# Patient Record
Sex: MALE | Race: WHITE | NOT HISPANIC OR LATINO | Employment: OTHER | URBAN - METROPOLITAN AREA
[De-identification: names, ages, dates, MRNs, and addresses within clinical notes are randomized per-mention and may not be internally consistent; named-entity substitution may affect disease eponyms.]

---

## 2017-01-18 PROBLEM — R07.9 CHEST PAIN: Status: ACTIVE | Noted: 2017-01-18

## 2017-01-18 PROBLEM — R07.9 CHEST PAIN WITH MODERATE RISK FOR CARDIAC ETIOLOGY: Status: ACTIVE | Noted: 2017-01-18

## 2017-03-13 ENCOUNTER — TELEPHONE (OUTPATIENT)
Dept: OTOLARYNGOLOGY | Facility: CLINIC | Age: 52
End: 2017-03-13

## 2017-03-13 NOTE — TELEPHONE ENCOUNTER
----- Message from Ruth Washington sent at 3/13/2017  4:23 PM CDT -----  Contact: Pt can be reached at [phone]  Pt is calling to schedule an appt for cyst on right side of neck, pt is requesting an appt.    Dr. Troncoso ([facility]) recommended pt to be seen with Dr. Woods  For head and neck      Thank you!

## 2017-04-04 ENCOUNTER — OFFICE VISIT (OUTPATIENT)
Dept: OTOLARYNGOLOGY | Facility: CLINIC | Age: 52
End: 2017-04-04
Payer: MEDICARE

## 2017-04-04 VITALS
HEART RATE: 75 BPM | WEIGHT: 291 LBS | SYSTOLIC BLOOD PRESSURE: 90 MMHG | TEMPERATURE: 97 F | BODY MASS INDEX: 37.36 KG/M2 | DIASTOLIC BLOOD PRESSURE: 65 MMHG

## 2017-04-04 DIAGNOSIS — L72.3 SEBACEOUS CYST: ICD-10-CM

## 2017-04-04 PROCEDURE — 1160F RVW MEDS BY RX/DR IN RCRD: CPT | Mod: S$GLB,,, | Performed by: OTOLARYNGOLOGY

## 2017-04-04 PROCEDURE — 3078F DIAST BP <80 MM HG: CPT | Mod: S$GLB,,, | Performed by: OTOLARYNGOLOGY

## 2017-04-04 PROCEDURE — 99203 OFFICE O/P NEW LOW 30 MIN: CPT | Mod: S$GLB,,, | Performed by: OTOLARYNGOLOGY

## 2017-04-04 PROCEDURE — 3074F SYST BP LT 130 MM HG: CPT | Mod: S$GLB,,, | Performed by: OTOLARYNGOLOGY

## 2017-04-04 PROCEDURE — 99999 PR PBB SHADOW E&M-EST. PATIENT-LVL III: CPT | Mod: PBBFAC,,, | Performed by: OTOLARYNGOLOGY

## 2017-04-04 NOTE — PROGRESS NOTES
"Subjective:       Patient ID: Gio Forrest is a 51 y.o. male.    Chief Complaint: mass on neck    HPI     Gio Forrest is a 51 year old male who [facility] for a posterior neck mass. It has been present for over 20 years. He has seen several dermatologist for this over the years, most recently Dr. Amos. Several months ago, Dr. Aoms injected it "with something."  His wife will often manually drain it, expressing foul smelling white discharge. He has been on antibiotics for this before, with no resolution of symptoms. He has some pain to the area with palpation. He denies sore throat, dysphagia, odynophagia, or otalgia. He is a non smoker. He recently had back surgery.    Past Medical History:   Diagnosis Date    Cardiomyopathy 8/12/2015    nonischemic    Diabetes     Dyslipidemia 8/12/2015    Gout 8/12/2015    Hyperlipidemia     Hypertension     Obesity 8/12/2015    Obstructive sleep apnea 8/12/2015    Obstructive sleep apnea 8/12/2015    Restless leg syndrome     Type 2 diabetes mellitus 8/12/2015       Past Surgical History:   Procedure Laterality Date    CERVICAL SPINE SURGERY      COLONOSCOPY  2008    HERNIA REPAIR      SHOULDER ARTHROSCOPY      SPINE SURGERY      TONSILLECTOMY           Current Outpatient Prescriptions:     ACCU-CHEK SMARTVIEW TEST STRIP Strp, , Disp: , Rfl:     acetaminophen (TYLENOL) 325 MG tablet, Take 2 tablets (650 mg total) by mouth every 8 (eight) hours as needed., Disp: , Rfl: 0    allopurinol (ZYLOPRIM) 100 MG tablet, Take 1 tablet (100 mg total) by mouth once daily., Disp: 30 tablet, Rfl: 3    ammonium lactate 12 % Crea, APPLY TO THE AFFECTED AREA(S) TWICE DAILY, Disp: , Rfl: 3    aspirin (ECOTRIN) 81 MG EC tablet, Take 81 mg by mouth once daily. Patient states holding asa at this time related scheduled surgery 01/24/2016, Disp: , Rfl:     atorvastatin (LIPITOR) 40 MG tablet, Take 40 mg by mouth every evening. , Disp: , " Rfl:     benazepril (LOTENSIN) 5 MG tablet, Take 1 tablet (5 mg total) by mouth 2 (two) times daily. (Patient taking differently: Take 5 mg by mouth every evening. ), Disp: 60 tablet, Rfl: 3    citalopram (CELEXA) 40 MG tablet, Take 1 tablet (40 mg total) by mouth once daily., Disp: 30 tablet, Rfl: 3    gabapentin (NEURONTIN) 300 MG capsule, Take 2 capsules (600 mg total) by mouth 3 (three) times daily., Disp: 180 capsule, Rfl: 3    hydrocodone-acetaminophen 10-325mg (NORCO)  mg Tab, Take 1 tablet by mouth 3 (three) times daily as needed. , Disp: , Rfl:     INSULIN ASPART (NOVOLOG SUBQ), Inject 30 Units into the skin 3 (three) times daily before meals. , Disp: , Rfl:     insulin aspart (NOVOLOG) 100 unit/mL injection, Inject 35 Units into the skin 2 (two) times daily before meals., Disp: 21 mL, Rfl: 3    insulin aspart protamine-insulin aspart (NOVOLOG MIX 70-30 FLEXPEN) 100 unit/mL (70-30) InPn pen, Inject 95 Units into the skin. Pt injects 95 units in am and 75 in the pm, Disp: , Rfl:     metformin (GLUCOPHAGE) 1000 MG tablet, Take 1 tablet (1,000 mg total) by mouth 2 (two) times daily., Disp: 60 tablet, Rfl: 3    MULTIVIT,THER IRON,CA,FA & MIN (MULTIVITAMIN) Tab, Take 1 tablet by mouth once daily. , Disp: , Rfl:     pantoprazole (PROTONIX) 40 MG tablet, Take 40 mg by mouth once daily., Disp: , Rfl:     pramipexole (MIRAPEX) 1 MG tablet, Pt takes 2 tabs at hs., Disp: 60 tablet, Rfl: 3    SILENOR 3 mg Tab, Take 1 tablet by mouth nightly as needed., Disp: , Rfl: 3    tizanidine (ZANAFLEX) 4 MG tablet, TAKE ONE TABLET BY MOUTH EVERY 6 HOURS AS NEEDED, Disp: 90 tablet, Rfl: 1    Review of patient's allergies indicates:  No Known Allergies    Social History     Social History    Marital status:      Spouse name: N/A    Number of children: N/A    Years of education: N/A     Occupational History    Not on file.     Social History Main Topics    Smoking status: Former Smoker    Smokeless  tobacco: Never Used    Alcohol use No    Drug use: No    Sexual activity: Yes     Partners: Female     Other Topics Concern    Not on file     Social History Narrative       Family History   Problem Relation Age of Onset    Diabetes Mother     Depression Mother     Heart disease Father     Anuerysm Father     Diabetes Father     Stroke Father        Review of Systems   Constitutional: Positive for fatigue. Negative for appetite change, chills, diaphoresis, fever and unexpected weight change.   HENT: Positive for hearing loss. Negative for congestion, dental problem, drooling, ear discharge, ear pain, facial swelling, mouth sores, nosebleeds, postnasal drip, rhinorrhea, sinus pressure, sneezing, sore throat, tinnitus, trouble swallowing and voice change.    Eyes: Positive for visual disturbance. Negative for pain, discharge, redness and itching.   Respiratory: Positive for shortness of breath. Negative for cough.    Cardiovascular: Negative for chest pain.   Gastrointestinal: Negative for abdominal distention, abdominal pain, diarrhea, nausea and vomiting.        Reflux   Endocrine: Negative for cold intolerance and heat intolerance.   Genitourinary: Negative for difficulty urinating.   Musculoskeletal: Positive for back pain and neck pain. Negative for neck stiffness.   Skin: Positive for rash.   Neurological: Positive for numbness. Negative for dizziness, weakness and headaches.   Hematological: Negative for adenopathy.   Psychiatric/Behavioral: Positive for dysphoric mood. The patient is nervous/anxious.        Objective:      Physical Exam   Constitutional: He is oriented to person, place, and time. He appears well-developed and well-nourished. He is cooperative. He does not appear ill. No distress.   HENT:   Head: Normocephalic and atraumatic.   Right Ear: Hearing, tympanic membrane, external ear and ear canal normal.   Left Ear: Hearing, tympanic membrane, external ear and ear canal normal.   Nose:  Nose normal. No mucosal edema, rhinorrhea or nasal deformity. No epistaxis.  No foreign bodies. Right sinus exhibits no maxillary sinus tenderness and no frontal sinus tenderness. Left sinus exhibits no maxillary sinus tenderness and no frontal sinus tenderness.   Mouth/Throat: Uvula is midline, oropharynx is clear and moist and mucous membranes are normal. Mucous membranes are not pale, not dry and not cyanotic. He does not have dentures. No oral lesions. No trismus in the jaw. Abnormal dentition. No uvula swelling. No oropharyngeal exudate, posterior oropharyngeal edema, posterior oropharyngeal erythema or tonsillar abscesses.   Eyes: Conjunctivae are normal. Right eye exhibits no discharge. Left eye exhibits no discharge. No scleral icterus.   Neck: Trachea normal, normal range of motion and phonation normal. Neck supple. No JVD present. No tracheal tenderness present. No tracheal deviation present. No thyroid mass and no thyromegaly present.       Salivary glands - there are no lesions or asymmetric findings in the submandibular or parotid glands     Cardiovascular: Normal rate.    Pulmonary/Chest: Effort normal. No stridor. No respiratory distress.   Lymphadenopathy:        Head (right side): No submental, no submandibular, no tonsillar and no preauricular adenopathy present.        Head (left side): No submental, no submandibular, no tonsillar and no preauricular adenopathy present.     He has no cervical adenopathy.   Neurological: He is alert and oriented to person, place, and time. No cranial nerve deficit.   Skin: Skin is warm and dry. No rash noted. He is not diaphoretic. No erythema. No pallor.   Psychiatric: He has a normal mood and affect. His behavior is normal. Thought content normal.   Vitals reviewed.      Assessment:       1. Sebaceous cyst       Plan:       Mr. Forrest has a posterior neck sebaceous cyst that has been chronically problematic. I have offered excision under local-MAC in the OR  versus possibly doing this in the clinic, versus observation. We will look for a date in the near future to do this.

## 2017-04-04 NOTE — MR AVS SNAPSHOT
[facility]  [address]  [facility] [address]  Phone: [phone]  Fax: [phone]                  Gio Forrest   2017 11:00 AM   Office Visit    Description:  Male : 1965   Provider:  Axel Woods MD   Department:  [facility] - Head[facility]Neck [facility]           Reason for Visit     mass on neck                To Do List           Goals (5 Years of Data)     None      Follow-Up and Disposition     Return if symptoms worsen or fail to improve.      [facility] On Call     [facility] On Call Nurse Care Line -  Assistance  Unless otherwise directed by your provider, please contact [facility] On-Call, our nurse care line that is available for  assistance.     Registered nurses in the [facility] On Call Center provide: appointment scheduling, clinical advisement, health education, and other advisory services.  Call: [phone] (toll free)               Medications           Message regarding Medications     Verify the changes and/or additions to your medication regime listed below are the same as discussed with your clinician today.  If any of these changes or additions are incorrect, please notify your healthcare provider.             Verify that the below list of medications is an accurate representation of the medications you are currently taking.  If none reported, the list may be blank. If incorrect, please contact your healthcare provider. Carry this list with you in case of emergency.           Current Medications     ACCU-CHEK SMARTVIEW TEST STRIP Strp     acetaminophen (TYLENOL) 325 MG tablet Take 2 tablets (650 mg total) by mouth every 8 (eight) hours as needed.    allopurinol (ZYLOPRIM) 100 MG tablet Take 1 tablet (100 mg total) by mouth once daily.    ammonium lactate 12 % Crea APPLY TO THE AFFECTED AREA(S) TWICE DAILY    aspirin (ECOTRIN) 81 MG EC tablet Take 81 mg by mouth once daily. Patient states holding asa at this time related scheduled surgery  01/24/2016    atorvastatin (LIPITOR) 40 MG tablet Take 40 mg by mouth every evening.     benazepril (LOTENSIN) 5 MG tablet Take 1 tablet (5 mg total) by mouth 2 (two) times daily.    citalopram (CELEXA) 40 MG tablet Take 1 tablet (40 mg total) by mouth once daily.    gabapentin (NEURONTIN) 300 MG capsule Take 2 capsules (600 mg total) by mouth 3 (three) times daily.    hydrocodone-acetaminophen 10-325mg (NORCO)  mg Tab Take 1 tablet by mouth 3 (three) times daily as needed.     INSULIN ASPART (NOVOLOG SUBQ) Inject 30 Units into the skin 3 (three) times daily before meals.     insulin aspart (NOVOLOG) 100 unit/mL injection Inject 35 Units into the skin 2 (two) times daily before meals.    insulin aspart protamine-insulin aspart (NOVOLOG MIX 70-30 FLEXPEN) 100 unit/mL (70-30) InPn pen Inject 95 Units into the skin. Pt injects 95 units in am and 75 in the pm    metformin (GLUCOPHAGE) 1000 MG tablet Take 1 tablet (1,000 mg total) by mouth 2 (two) times daily.    MULTIVIT,THER IRON,CA,FA & MIN (MULTIVITAMIN) Tab Take 1 tablet by mouth once daily.     pantoprazole (PROTONIX) 40 MG tablet Take 40 mg by mouth once daily.    pramipexole (MIRAPEX) 1 MG tablet Pt takes 2 tabs at hs.    SILENOR 3 mg Tab Take 1 tablet by mouth nightly as needed.    tizanidine (ZANAFLEX) 4 MG tablet TAKE ONE TABLET BY MOUTH EVERY 6 HOURS AS NEEDED           Clinical Reference Information           Your Vitals Were     BP Pulse Temp Weight BMI    90/65 75 97.4 °F (36.3 °C) 132 kg (291 lb 0.1 oz) 37.36 kg/m2      Blood Pressure          Most Recent Value    BP  90/65      Allergies as of 4/4/2017     No Known Allergies      Immunizations Administered on Date of Encounter - 4/4/2017     None      [facility] Sign-Up     Activating your [facility] account is as easy as 1-2-3!     1) Visit [email or web address], select Sign Up Now, enter this activation code and your date of birth, then select Next.  [email or web address]  Expires: 5/19/2017 11:50 AM      2)  Create a username and password to use when you visit [facility] in the future and select a security question in case you lose your password and select Next.    3) Enter your e-mail address and click Sign Up!    Additional Information  If you have questions, please e-mail [email or web address] or call [phone] to talk to our [employer] staff. Remember, [email or web address] is NOT to be used for urgent needs. For medical emergencies, dial 911.         Instructions    Call me if we have not set a date by Elizabeth.       Language Assistance Services     ATTENTION: Language assistance services are available, free of charge. Please call [phone].      ATENCIÓN: Si habla becky, tiene a cruz disposición servicios gratuitos de asistencia lingüística. Llame al [phone].     CHÚ Ý: N?u b?n nói Ti?ng Vi?t, có các d?ch v? h? tr? ngôn ng? mi?n phí dành cho b?n. G?i s? [phone].         [facility] complies with applicable Federal civil rights laws and does not discriminate on the basis of race, color, national origin, age, disability, or sex.

## 2017-04-18 PROBLEM — L72.3 SEBACEOUS CYST: Status: ACTIVE | Noted: 2017-04-18

## 2017-04-27 ENCOUNTER — TELEPHONE (OUTPATIENT)
Dept: OTOLARYNGOLOGY | Facility: CLINIC | Age: 52
End: 2017-04-27

## 2017-04-27 NOTE — TELEPHONE ENCOUNTER
Spoke to Gio Forrest wanted to schedule procedure Dasia is going to call Dr. Woods so he can give her date for procedure, will be calling back Mr. Forrest with the date of procedure.

## 2017-04-27 NOTE — TELEPHONE ENCOUNTER
----- Message from Ruth Carrasco sent at 4/27/2017 10:08 AM CDT -----  Contact: [phone]  Please call above patient needs to schedule a procedure waiting on your call thanks

## 2017-05-02 ENCOUNTER — TELEPHONE (OUTPATIENT)
Dept: OTOLARYNGOLOGY | Facility: CLINIC | Age: 52
End: 2017-05-02

## 2017-05-03 ENCOUNTER — TELEPHONE (OUTPATIENT)
Dept: OTOLARYNGOLOGY | Facility: CLINIC | Age: 52
End: 2017-05-03

## 2017-05-03 NOTE — TELEPHONE ENCOUNTER
----- Message from Deandra Patel LPN sent at 5/2/2017  5:50 PM CDT -----  Contact: pt   Hey girl,  I see you talked to this gentleman. I got the message noted below so I called him back and he said you all were working on it. Please let me know if I can help with anything.  Tila, Katelynn    ----- Message -----     From: Lynne Mi     Sent: 5/2/2017   1:43 PM       To: Conrado Monahan Staff    Pt is returning the nurses call pt said someone by the name of Padmini pt isn't sure who called  Can you please call pt at  [phone] jc

## 2017-05-17 ENCOUNTER — TELEPHONE (OUTPATIENT)
Dept: OTOLARYNGOLOGY | Facility: CLINIC | Age: 52
End: 2017-05-17

## 2017-05-17 NOTE — TELEPHONE ENCOUNTER
----- Message from Dasia Valladares RN sent at 5/17/2017 11:01 AM CDT -----  Contact: [phone]      ----- Message -----     From: Axel Woods MD     Sent: 5/12/2017   1:50 PM       To: Dasia Valladares RN    Can you let him know that I am trying to figure out when I will be able to operate on the [place] in June?  THanks  B  ----- Message -----     From: Dasia Valladares RN     Sent: 5/2/2017   4:08 PM       To: Axel Woods MD    Hi Dr. Woods, I spoke with Mr. Forrest he will take the next available OR date  D   ----- Message -----     From: Axel Woods MD     Sent: 5/1/2017   5:15 PM       To: Dasia Valladares RN    I forgot. When does he want it? Did we talk about OR versus clinic?  ----- Message -----     From: Dasia Valladares RN     Sent: 4/27/2017  10:25 AM       To: Axel Woods MD    Hi Dr. Woods Mr. Forrest called for his surgery date I did not see a case request entered  D  ----- Message -----     From: Ruth Carrasco     Sent: 4/27/2017  10:08 AM       To: Chuck REDDING Staff    Please call above patient needs to schedule a procedure waiting on your call thanks

## 2017-07-13 ENCOUNTER — OFFICE VISIT (OUTPATIENT)
Dept: OTOLARYNGOLOGY | Facility: CLINIC | Age: 52
End: 2017-07-13
Payer: MEDICARE

## 2017-07-13 ENCOUNTER — CLINICAL SUPPORT (OUTPATIENT)
Dept: AUDIOLOGY | Facility: CLINIC | Age: 52
End: 2017-07-13
Payer: MEDICARE

## 2017-07-13 VITALS
HEIGHT: 74 IN | SYSTOLIC BLOOD PRESSURE: 99 MMHG | WEIGHT: 289 LBS | BODY MASS INDEX: 37.09 KG/M2 | DIASTOLIC BLOOD PRESSURE: 61 MMHG | TEMPERATURE: 99 F | HEART RATE: 69 BPM

## 2017-07-13 DIAGNOSIS — H69.91 TYPE C TYMPANOGRAM OF RIGHT EAR: ICD-10-CM

## 2017-07-13 DIAGNOSIS — H90.11 CONDUCTIVE HEARING LOSS OF RIGHT EAR WITH UNRESTRICTED HEARING OF LEFT EAR: ICD-10-CM

## 2017-07-13 DIAGNOSIS — H92.01 OTALGIA OF RIGHT EAR: Primary | ICD-10-CM

## 2017-07-13 DIAGNOSIS — H92.01 REFERRED OTALGIA OF RIGHT EAR: ICD-10-CM

## 2017-07-13 DIAGNOSIS — M26.659 TMJ ARTHROPATHY: Primary | ICD-10-CM

## 2017-07-13 PROCEDURE — 92567 TYMPANOMETRY: CPT | Mod: S$GLB,,, | Performed by: AUDIOLOGIST

## 2017-07-13 PROCEDURE — 92557 COMPREHENSIVE HEARING TEST: CPT | Mod: S$GLB,,, | Performed by: AUDIOLOGIST

## 2017-07-13 PROCEDURE — 99214 OFFICE O/P EST MOD 30 MIN: CPT | Mod: S$GLB,,, | Performed by: NURSE PRACTITIONER

## 2017-07-13 PROCEDURE — 99999 PR PBB SHADOW E&M-EST. PATIENT-LVL IV: CPT | Mod: PBBFAC,,, | Performed by: NURSE PRACTITIONER

## 2017-07-13 PROCEDURE — 99999 PR PBB SHADOW E&M-EST. PATIENT-LVL I: CPT | Mod: PBBFAC,,,

## 2017-07-13 RX ORDER — TRAMADOL HYDROCHLORIDE 100 MG/1
100 TABLET, EXTENDED RELEASE ORAL DAILY
Qty: 14 TABLET | Refills: 0 | Status: SHIPPED | OUTPATIENT
Start: 2017-07-13 | End: 2017-07-28

## 2017-07-13 NOTE — LETTER
July 13, 2017      Morgan Hernandez MD  [address] [address]  [facility] [address]           [place] [facility]  [address] [facility] [address] [place] [address]  Phone: [phone]  Fax: [phone]          Patient: Gio Forrest   MR Number: [id number]   YOB: 1965   Date of Visit: 7/13/2017       Dear Dr. Morgan Hernandez:    Thank you for referring Gio Forrest to me for evaluation. Attached you will find relevant portions of my assessment and plan of care.    If you have questions, please do not hesitate to call me. I look forward to following Gio Forrest along with you.    Sincerely,    Anjali Parker, NP    Enclosure  CC:  No Recipients    If you would like to receive this communication electronically, please contact [email or web address] or [phone] to request more information on [employer] access.    For providers and/or their staff who would like to refer a patient to [facility], please contact us through our one-stop-shop provider referral line, [facility], at [phone].    If you feel you have received this communication in error or would no longer like to receive these types of communications, please e-mail [email or web address]

## 2017-07-13 NOTE — PROGRESS NOTES
Gio Forrest was seen 07/13/2017 for an audiological evaluation per Anjali Parker. Patient complained of right-sided otalgia and hearing loss that began after a dental procedure in approximately four weeks ago. Pt reported intermittent otalgia of the right ear since this event.     Results reveal a mild conductive hearing loss for the right ear, and normal hearing for the left ear.    Speech Reception Thresholds were  25 dBHL for the right ear and 15 dBHL for the left ear.    Word recognition scores were excellent for the right ear and excellent for the left ear.   Tympanograms were Type A with negative pressure for the right ear and Type A for the left ear.    Audiogram results were reviewed in detail with patient and all questions were answered. Results will be reviewed by ENT at the completion of this note.   Rec. Post treatment audiogram based on Anjali Parker's recommendations to monitor conductive component in right ear.  Rec. annual audio to monitor progression of hearing loss if conductive HL does not improve.  Recommend use of hearing protection devices when in the presence of loud sounds.

## 2017-07-13 NOTE — PROGRESS NOTES
Subjective:       Patient ID: Gio Forrest is a 51 y.o. male.    Chief Complaint: No chief complaint on file.    HPI   Patient had two right mandibular molars extracted approx four weeks ago. Since then he has experiencing varying degrees of right otalgia. Hearing is fine. No otorrhea. He states pain feels like an aggravated nerve.     Review of Systems   Constitutional: Negative.    HENT: Negative.    Eyes: Negative.    Respiratory: Negative.    Cardiovascular: Negative.    Gastrointestinal: Negative.    Musculoskeletal: Negative.    Skin: Negative.    Neurological: Negative.    Hematological: Negative.    Psychiatric/Behavioral: Negative.        Objective:      Physical Exam   Constitutional: He is oriented to person, place, and time. Vital signs are normal. He appears well-developed and well-nourished. He is cooperative. He does not appear ill. No distress.   HENT:   Head: Normocephalic and atraumatic.   Right Ear: Hearing, tympanic membrane, external ear and ear canal normal. Tympanic membrane is not erythematous. No middle ear effusion.   Left Ear: Hearing, tympanic membrane, external ear and ear canal normal. Tympanic membrane is not erythematous.  No middle ear effusion.   Nose: Nose normal. No mucosal edema or rhinorrhea. Right sinus exhibits no maxillary sinus tenderness and no frontal sinus tenderness. Left sinus exhibits no maxillary sinus tenderness and no frontal sinus tenderness.   Mouth/Throat: Uvula is midline, oropharynx is clear and moist and mucous membranes are normal. Mucous membranes are not pale, not dry and not cyanotic. No oral lesions. No oropharyngeal exudate, posterior oropharyngeal edema or posterior oropharyngeal erythema.   ++Pain elicited during palpation within auditory meatus while patient opened/closed jaw.   Eyes: Conjunctivae, EOM and lids are normal. Pupils are equal, round, and reactive to light. Right eye exhibits no discharge. Left eye exhibits no discharge. No scleral  "icterus.   Neck: Trachea normal and normal range of motion. Neck supple. No tracheal deviation present. No thyroid mass and no thyromegaly present.   Cardiovascular: Normal rate.    Pulmonary/Chest: Effort normal. No stridor. No respiratory distress. He has no wheezes.   Musculoskeletal: Normal range of motion.   Lymphadenopathy:        Head (right side): No submental, no submandibular, no tonsillar, no preauricular and no posterior auricular adenopathy present.        Head (left side): No submental, no submandibular, no tonsillar, no preauricular and no posterior auricular adenopathy present.     He has no cervical adenopathy.        Right cervical: No superficial cervical and no posterior cervical adenopathy present.       Left cervical: No superficial cervical and no posterior cervical adenopathy present.   Neurological: He is alert and oriented to person, place, and time. He has normal strength. Coordination and gait normal.   Skin: Skin is warm, dry and intact. No lesion and no rash noted. He is not diaphoretic. No cyanosis. No pallor.   Psychiatric: He has a normal mood and affect. His speech is normal and behavior is normal. Judgment and thought content normal. Cognition and memory are normal.   Nursing note and vitals reviewed.      Assessment:     Mild conductive loss AD only    TMJ arthropathy   Plan:     Nasal valsalva several times a day discussed for ETD AD associated with type "C" tympanogram and mild conductive component.     TMJ arthropathy discussed. Ultram X 2 weeks.   Return to clinic as needed for further ENT symptoms or complaints  "

## 2017-09-14 PROBLEM — Z86.0100 HISTORY OF COLON POLYPS: Status: ACTIVE | Noted: 2017-09-14

## 2017-09-14 PROBLEM — Z86.010 HISTORY OF COLON POLYPS: Status: ACTIVE | Noted: 2017-09-14

## 2018-05-18 ENCOUNTER — OFFICE VISIT (OUTPATIENT)
Dept: FAMILY MEDICINE | Facility: CLINIC | Age: 53
End: 2018-05-18
Payer: MEDICARE

## 2018-05-18 ENCOUNTER — LAB VISIT (OUTPATIENT)
Dept: LAB | Facility: HOSPITAL | Age: 53
End: 2018-05-18
Attending: FAMILY MEDICINE
Payer: MEDICARE

## 2018-05-18 VITALS
SYSTOLIC BLOOD PRESSURE: 128 MMHG | BODY MASS INDEX: 39.53 KG/M2 | DIASTOLIC BLOOD PRESSURE: 76 MMHG | WEIGHT: 308 LBS | HEIGHT: 74 IN | RESPIRATION RATE: 16 BRPM | HEART RATE: 80 BPM

## 2018-05-18 DIAGNOSIS — Z79.4 TYPE 2 DIABETES MELLITUS WITH DIABETIC DERMATITIS, WITH LONG-TERM CURRENT USE OF INSULIN: Primary | ICD-10-CM

## 2018-05-18 DIAGNOSIS — E11.620 TYPE 2 DIABETES MELLITUS WITH DIABETIC DERMATITIS, WITH LONG-TERM CURRENT USE OF INSULIN: ICD-10-CM

## 2018-05-18 DIAGNOSIS — E78.5 DYSLIPIDEMIA: ICD-10-CM

## 2018-05-18 DIAGNOSIS — Z12.5 SCREENING PSA (PROSTATE SPECIFIC ANTIGEN): ICD-10-CM

## 2018-05-18 DIAGNOSIS — I10 ESSENTIAL HYPERTENSION: ICD-10-CM

## 2018-05-18 DIAGNOSIS — I42.9 CARDIOMYOPATHY, UNSPECIFIED TYPE: ICD-10-CM

## 2018-05-18 DIAGNOSIS — E11.620 TYPE 2 DIABETES MELLITUS WITH DIABETIC DERMATITIS, WITH LONG-TERM CURRENT USE OF INSULIN: Primary | ICD-10-CM

## 2018-05-18 DIAGNOSIS — E66.01 MORBID OBESITY: ICD-10-CM

## 2018-05-18 DIAGNOSIS — Z79.4 TYPE 2 DIABETES MELLITUS WITH DIABETIC DERMATITIS, WITH LONG-TERM CURRENT USE OF INSULIN: ICD-10-CM

## 2018-05-18 PROBLEM — R07.9 CHEST PAIN: Status: RESOLVED | Noted: 2017-01-18 | Resolved: 2018-05-18

## 2018-05-18 PROBLEM — R07.9 CHEST PAIN WITH MODERATE RISK FOR CARDIAC ETIOLOGY: Status: RESOLVED | Noted: 2017-01-18 | Resolved: 2018-05-18

## 2018-05-18 LAB
ALBUMIN SERPL BCP-MCNC: 4 G/DL
ALP SERPL-CCNC: 131 U/L
ALT SERPL W/O P-5'-P-CCNC: 44 U/L
ANION GAP SERPL CALC-SCNC: 11 MMOL/L
AST SERPL-CCNC: 28 U/L
BASOPHILS # BLD AUTO: 0.04 K/UL
BASOPHILS NFR BLD: 0.4 %
BILIRUB SERPL-MCNC: 0.5 MG/DL
BUN SERPL-MCNC: 20 MG/DL
CALCIUM SERPL-MCNC: 9.9 MG/DL
CHLORIDE SERPL-SCNC: 102 MMOL/L
CHOLEST SERPL-MCNC: 125 MG/DL
CHOLEST/HDLC SERPL: 3.6 {RATIO}
CO2 SERPL-SCNC: 27 MMOL/L
COMPLEXED PSA SERPL-MCNC: 0.31 NG/ML
CREAT SERPL-MCNC: 1 MG/DL
DIFFERENTIAL METHOD: ABNORMAL
EOSINOPHIL # BLD AUTO: 0.2 K/UL
EOSINOPHIL NFR BLD: 1.8 %
ERYTHROCYTE [DISTWIDTH] IN BLOOD BY AUTOMATED COUNT: 12.9 %
EST. GFR  (AFRICAN AMERICAN): >60 ML/MIN/1.73 M^2
EST. GFR  (NON AFRICAN AMERICAN): >60 ML/MIN/1.73 M^2
ESTIMATED AVG GLUCOSE: 186 MG/DL
GLUCOSE SERPL-MCNC: 184 MG/DL
HBA1C MFR BLD HPLC: 8.1 %
HCT VFR BLD AUTO: 41 %
HDLC SERPL-MCNC: 35 MG/DL
HDLC SERPL: 28 %
HGB BLD-MCNC: 12.6 G/DL
IMM GRANULOCYTES # BLD AUTO: 0.03 K/UL
IMM GRANULOCYTES NFR BLD AUTO: 0.3 %
LDLC SERPL CALC-MCNC: 63.6 MG/DL
LYMPHOCYTES # BLD AUTO: 2.2 K/UL
LYMPHOCYTES NFR BLD: 23.8 %
MCH RBC QN AUTO: 28.3 PG
MCHC RBC AUTO-ENTMCNC: 30.7 G/DL
MCV RBC AUTO: 92 FL
MONOCYTES # BLD AUTO: 0.5 K/UL
MONOCYTES NFR BLD: 5.3 %
NEUTROPHILS # BLD AUTO: 6.4 K/UL
NEUTROPHILS NFR BLD: 68.4 %
NONHDLC SERPL-MCNC: 90 MG/DL
NRBC BLD-RTO: 0 /100 WBC
PLATELET # BLD AUTO: 228 K/UL
PMV BLD AUTO: 11 FL
POTASSIUM SERPL-SCNC: 4.9 MMOL/L
PROT SERPL-MCNC: 7.7 G/DL
RBC # BLD AUTO: 4.46 M/UL
SODIUM SERPL-SCNC: 140 MMOL/L
TRIGL SERPL-MCNC: 132 MG/DL
TSH SERPL DL<=0.005 MIU/L-ACNC: 1.22 UIU/ML
WBC # BLD AUTO: 9.32 K/UL

## 2018-05-18 PROCEDURE — 3074F SYST BP LT 130 MM HG: CPT | Mod: CPTII,S$GLB,, | Performed by: FAMILY MEDICINE

## 2018-05-18 PROCEDURE — 36415 COLL VENOUS BLD VENIPUNCTURE: CPT | Mod: PO

## 2018-05-18 PROCEDURE — 3078F DIAST BP <80 MM HG: CPT | Mod: CPTII,S$GLB,, | Performed by: FAMILY MEDICINE

## 2018-05-18 PROCEDURE — 84153 ASSAY OF PSA TOTAL: CPT

## 2018-05-18 PROCEDURE — 99999 PR PBB SHADOW E&M-EST. PATIENT-LVL III: CPT | Mod: PBBFAC,,, | Performed by: FAMILY MEDICINE

## 2018-05-18 PROCEDURE — 83036 HEMOGLOBIN GLYCOSYLATED A1C: CPT

## 2018-05-18 PROCEDURE — 85025 COMPLETE CBC W/AUTO DIFF WBC: CPT

## 2018-05-18 PROCEDURE — 84443 ASSAY THYROID STIM HORMONE: CPT

## 2018-05-18 PROCEDURE — 80061 LIPID PANEL: CPT

## 2018-05-18 PROCEDURE — 80053 COMPREHEN METABOLIC PANEL: CPT

## 2018-05-18 PROCEDURE — 99204 OFFICE O/P NEW MOD 45 MIN: CPT | Mod: S$GLB,,, | Performed by: FAMILY MEDICINE

## 2018-05-18 PROCEDURE — 3008F BODY MASS INDEX DOCD: CPT | Mod: CPTII,S$GLB,, | Performed by: FAMILY MEDICINE

## 2018-05-18 RX ORDER — PANTOPRAZOLE SODIUM 40 MG/1
40 TABLET, DELAYED RELEASE ORAL DAILY
Qty: 90 TABLET | Refills: 1 | Status: SHIPPED | OUTPATIENT
Start: 2018-05-18 | End: 2018-08-07 | Stop reason: SDUPTHER

## 2018-05-18 RX ORDER — BENAZEPRIL HYDROCHLORIDE 5 MG/1
5 TABLET ORAL 2 TIMES DAILY
Qty: 180 TABLET | Refills: 1 | Status: SHIPPED | OUTPATIENT
Start: 2018-05-18 | End: 2018-06-18 | Stop reason: SDUPTHER

## 2018-05-18 RX ORDER — METFORMIN HYDROCHLORIDE 1000 MG/1
1000 TABLET ORAL 2 TIMES DAILY
Qty: 180 TABLET | Refills: 1 | Status: SHIPPED | OUTPATIENT
Start: 2018-05-18 | End: 2018-08-07 | Stop reason: SDUPTHER

## 2018-05-18 RX ORDER — CITALOPRAM 40 MG/1
40 TABLET, FILM COATED ORAL DAILY
Qty: 90 TABLET | Refills: 1 | Status: SHIPPED | OUTPATIENT
Start: 2018-05-18 | End: 2018-09-26 | Stop reason: SDUPTHER

## 2018-05-18 RX ORDER — INSULIN ASPART 100 [IU]/ML
INJECTION, SUSPENSION SUBCUTANEOUS
Qty: 3 ML | Refills: 2 | Status: SHIPPED | OUTPATIENT
Start: 2018-05-18 | End: 2018-06-11 | Stop reason: SDUPTHER

## 2018-05-18 RX ORDER — ATORVASTATIN CALCIUM 40 MG/1
40 TABLET, FILM COATED ORAL NIGHTLY
Qty: 90 TABLET | Refills: 1 | Status: SHIPPED | OUTPATIENT
Start: 2018-05-18 | End: 2019-01-16 | Stop reason: SDUPTHER

## 2018-05-18 NOTE — PROGRESS NOTES
Subjective:       Patient ID: Gio Forrest is a 52 y.o. male.    Chief Complaint: Establish Care (Patient here to establish care)    Here as new patient to me to establish care. Needs most meds refilled and several referrals.  He follows with pain management as well.      Hypertension   This is a chronic problem. The current episode started more than 1 year ago. The problem is controlled. Pertinent negatives include no chest pain, palpitations or shortness of breath.   Diabetes   He presents for his follow-up diabetic visit. He has type 2 diabetes mellitus. Pertinent negatives for diabetes include no chest pain and no fatigue.     Review of Systems   Constitutional: Negative for chills, fatigue and fever.   Respiratory: Negative for cough, chest tightness and shortness of breath.    Cardiovascular: Negative for chest pain, palpitations and leg swelling.   Gastrointestinal: Negative for abdominal distention and abdominal pain.   Endocrine: Negative for cold intolerance and heat intolerance.   Skin: Negative for rash.       Objective:      Physical Exam   Constitutional: He appears well-developed and well-nourished.   HENT:   Head: Normocephalic and atraumatic.   Cardiovascular: Normal rate, regular rhythm and normal heart sounds.    Pulmonary/Chest: Effort normal and breath sounds normal.   Psychiatric: He has a normal mood and affect.   Nursing note and vitals reviewed.      Assessment:       1. Type 2 diabetes mellitus with diabetic dermatitis, with long-term current use of insulin    2. Dyslipidemia    3. Essential hypertension    4. Cardiomyopathy, unspecified type    5. Screening PSA (prostate specific antigen)    6. Morbid obesity        Plan:       Type 2 diabetes mellitus with diabetic dermatitis, with long-term current use of insulin  -     CBC auto differential; Future; Expected date: 05/18/2018  -     Comprehensive metabolic panel; Future; Expected date: 05/18/2018  -     Hemoglobin A1c; Future;  Expected date: 05/18/2018  -     Lipid panel; Future; Expected date: 05/18/2018  -     Microalbumin/creatinine urine ratio; Future; Expected date: 05/18/2018  -     TSH; Future; Expected date: 05/18/2018  -     Ambulatory referral to Optometry  -     Ambulatory referral to Podiatry    Dyslipidemia  -     CBC auto differential; Future; Expected date: 05/18/2018  -     Comprehensive metabolic panel; Future; Expected date: 05/18/2018  -     Hemoglobin A1c; Future; Expected date: 05/18/2018  -     Lipid panel; Future; Expected date: 05/18/2018  -     Microalbumin/creatinine urine ratio; Future; Expected date: 05/18/2018  -     TSH; Future; Expected date: 05/18/2018    Essential hypertension  -     CBC auto differential; Future; Expected date: 05/18/2018  -     Comprehensive metabolic panel; Future; Expected date: 05/18/2018  -     Hemoglobin A1c; Future; Expected date: 05/18/2018  -     Lipid panel; Future; Expected date: 05/18/2018  -     Microalbumin/creatinine urine ratio; Future; Expected date: 05/18/2018  -     TSH; Future; Expected date: 05/18/2018    Cardiomyopathy, unspecified type    Screening PSA (prostate specific antigen)  -     PSA, Screening; Future; Expected date: 05/18/2018    Morbid obesity    Other orders  -     atorvastatin (LIPITOR) 40 MG tablet; Take 1 tablet (40 mg total) by mouth every evening.  Dispense: 90 tablet; Refill: 1  -     benazepril (LOTENSIN) 5 MG tablet; Take 1 tablet (5 mg total) by mouth 2 (two) times daily.  Dispense: 180 tablet; Refill: 1  -     citalopram (CELEXA) 40 MG tablet; Take 1 tablet (40 mg total) by mouth once daily.  Dispense: 90 tablet; Refill: 1  -     metFORMIN (GLUCOPHAGE) 1000 MG tablet; Take 1 tablet (1,000 mg total) by mouth 2 (two) times daily.  Dispense: 180 tablet; Refill: 1  -     pantoprazole (PROTONIX) 40 MG tablet; Take 1 tablet (40 mg total) by mouth once daily.  Dispense: 90 tablet; Refill: 1  -     insulin aspart protamine-insulin aspart (NOVOLOG MIX  70-30FLEXPEN U-100) 100 unit/mL (70-30) InPn pen; Pt injects 95 units in am and 75 in the pm  Dispense: 3 mL; Refill: 2      Update labs and health maintenance. To see endo next month.  Will monitor chronic medical issues and continue current plan of care.      Follow-up in about 3 months (around 8/18/2018), or if symptoms worsen or fail to improve.

## 2018-05-22 ENCOUNTER — TELEPHONE (OUTPATIENT)
Dept: ENDOCRINOLOGY | Facility: CLINIC | Age: 53
End: 2018-05-22

## 2018-05-22 DIAGNOSIS — M10.00 IDIOPATHIC GOUT, UNSPECIFIED CHRONICITY, UNSPECIFIED SITE: ICD-10-CM

## 2018-05-22 NOTE — TELEPHONE ENCOUNTER
LM informing pt that no one in the Endocrine dept called him today, may have been another dept within [facility].

## 2018-05-22 NOTE — TELEPHONE ENCOUNTER
----- Message from Deep Lewis sent at 5/22/2018  9:28 AM CDT -----  Contact: same  Unsuccessful call placed to office.  Patient called in and stated he thought someone from this office had just called him but stated he was not sure.?      Patient call back number is [phone]

## 2018-05-25 RX ORDER — ALLOPURINOL 100 MG/1
100 TABLET ORAL DAILY
Qty: 30 TABLET | Refills: 1 | Status: SHIPPED | OUTPATIENT
Start: 2018-05-25 | End: 2018-08-03 | Stop reason: SDUPTHER

## 2018-05-25 RX ORDER — DOXEPIN 3 MG/1
1 TABLET, FILM COATED ORAL NIGHTLY PRN
Qty: 30 TABLET | Refills: 1 | Status: SHIPPED | OUTPATIENT
Start: 2018-05-25 | End: 2018-08-03 | Stop reason: SDUPTHER

## 2018-05-26 PROBLEM — R07.9 CHEST PAIN: Status: ACTIVE | Noted: 2018-05-26

## 2018-05-31 ENCOUNTER — TELEPHONE (OUTPATIENT)
Dept: FAMILY MEDICINE | Facility: CLINIC | Age: 53
End: 2018-05-31

## 2018-05-31 NOTE — TELEPHONE ENCOUNTER
----- Message from Dianne Ramsey sent at 5/31/2018 10:11 AM CDT -----  Contact: Katja FLORES   Type: Needs Medical Advice    Who Called:  Katja FLORES   Symptoms (please be specific):  NA  How long has patient had these symptoms:  NA  Pharmacy name and phone #: NA  Best Call Back Number:   Additional Information: Calling to schedule a [facility] f/u in 1 week. The patient was in the hospital for chest pains and had an angiogram done. He ended up having his gall bladder taken out. No avail appt in a week. Please advise.

## 2018-06-01 ENCOUNTER — TELEPHONE (OUTPATIENT)
Dept: FAMILY MEDICINE | Facility: CLINIC | Age: 53
End: 2018-06-01

## 2018-06-01 NOTE — PROGRESS NOTES
CC: This 52 y.o. male presents for management of diabetes  and chronic conditions pending review including HTN, HLP    HPI: He was diagnosed with T2DM in 2005.Has never been hospitalized r/t DM.  Family hx of DM: sister, mom and dad    Reports missing doses of DM medication 1-2 times a week- usually am insulin    monitoring BG at home: fasting              Diet: Eats 3  Meals a day, snacks  B: honey bun and gwen milk today or oatmeal, meat  Am snack: mints  Lunch: out- hamburger and fries  Afternoon snack- chips, honey bun  Dinner: Wife will cook, tries to bake or grill food  (last night ate Therio w potatoes)  Bedtime snack: Strawberry shortcake, likes sweets  Exercise: none  Gallbladder recently removed    CURRENT DM MEDS: Novolog 70/30 95 units and bedtime 75 units   Does notice that his bg drops in the day time as lowest as thr 50s, does get sweaty shaky (happening ~ 3 times a month)  Treats- his lows with lifesavers  Vial/pen:  Uses pen  Glucometer type:  True Test 2018    Standards of Care:  Eye exam: 1/2018 no h/o retinopathy     Has his [employer] business    ROS:   Gen: Appetite good, no weight gain or loss, denies fatigue and weakness.  Skin: Skin is intact and heals well, no rashes, no hair changes  Eyes: Denies visual disturbances  Resp: no SOB or HENDERSON, no cough  Cardiac: No palpitations, chest pain, no edema   GI: No nausea or vomiting, diarrhea, constipation, or abdominal pain.  /GYN: No nocturia, burning or pain.   MS/Neuro: Denies numbness/ tingling in BLE; Gait steady, speech clear  Psych: Denies drug/ETOH abuse, no hx of depression.  Other systems: negative.    PE:  GENERAL: Well developed, well nourished.appears older than age.   PSYCH: AAOx3, appropriate mood and affect, pleasant expression, conversant, appears relaxed, well groomed.   EYES: Conjunctiva, corneas clear  NECK: Supple, trachea midline   NEURO: Gait steady  SKIN: Skin warm and dry no acanthosis nigracans.  FOOT  EXAMINATION: 6/11/18  No foot deformity, corns or callus formation,  nails in good condition and well trimmed, no interspace maceration or ulceration noted.  Decreased hair growth present over toes/feet.  Positive vibratory response to 128 Hz tuning fork bilaterally.  Protective sensation intact with 10 gram monofilament.  +2 dorsalis pedis and posterior pulses noted.      Lab Results   Component Value Date    MICALBCREAT 8.0 05/18/2018       Hemoglobin A1C   Date Value Ref Range Status   05/18/2018 8.1 (H) 4.0 - 5.6 % Final     Comment:     According to ADA guidelines, hemoglobin A1c <7.0% represents  optimal control in non-pregnant diabetic patients. Different  metrics may apply to specific patient populations.   Standards of Medical Care in Diabetes-2016.  For the purpose of screening for the presence of diabetes:  <5.7%     Consistent with the absence of diabetes  5.7-6.4%  Consistent with increasing risk for diabetes   (prediabetes)  >or=6.5%  Consistent with diabetes  Currently, no consensus exists for use of hemoglobin A1c  for diagnosis of diabetes for children.  This Hemoglobin A1c assay has significant interference with fetal   hemoglobin   (HbF). The results are invalid for patients with abnormal amounts of   HbF,   including those with known Hereditary Persistence   of Fetal Hemoglobin. Heterozygous hemoglobin variants (HbAS, HbAC,   HbAD, HbAE, HbA2) do not significantly interfere with this assay;   however, presence of multiple variants in a sample may impact the %   interference.     01/19/2017 9.5 (H) 0.0 - 5.6 % Final     Comment:     Reference Interval:  5.0 - 5.6 Normal   5.7 - 6.4 High Risk   > 6.5 Diabetic    Hgb A1c results are standardized based on the (NGSP) National   Glycohemoglobin Standardization Program.    Hemoglobin A1C levels are related to mean serum/plasma glucose   during the preceding 2-3 months.        09/28/2016 9.9 (H) 0.0 - 5.6 % Final     Comment:     Reference Interval:  5.0  - 5.6 Normal   5.7 - 6.4 High Risk   > 6.5 Diabetic    Hgb A1c results are standardized based on the (NGSP) National   Glycohemoglobin Standardization Program.    Hemoglobin A1C levels are related to mean serum/plasma glucose   during the preceding 2-3 months.             ASSESSMENT and PLAN:     1. T2DM with hyperglycemia-   Having to stick himself twice w 70/30 dose at 95 u in am  Change Novolog 70/30 to 80 units breakfast and 75 units at dinner- not bedtime  Discussed timing of insulin and MOA  Missing doses, A1C and bg uncontrolled on this regimen  Would benefit from a continuous insulin infusion and a insulin bolus with each meal  Will Start vgo 40 6 clicks with each meal  Has previously failed MDI r/t missed meal doses, Vgo is attached to his person which would eliminate his previous issue   Discussed A1c and BG goals.   Instructed to monitor BG and bring meter/ log to every clinic visit.   - takes ASA, ACEi, statin    2. HTN - controlled, continue meds as previously prescribed and monitor.     3. HLP -  on statin therapy, LFTs WNL    4. TARYN- wears cpap nightly     5. LINDSEY- encouraged weight loss    6. Morbid obesity- Body mass index is 40.84 kg/m².'  Stressed need to improve diet, exercise 30 mins a day, 5 days a week. Limit sweets     Follow-up: in 3 months with lab prior

## 2018-06-05 ENCOUNTER — PATIENT OUTREACH (OUTPATIENT)
Dept: ADMINISTRATIVE | Facility: HOSPITAL | Age: 53
End: 2018-06-05

## 2018-06-05 ENCOUNTER — OFFICE VISIT (OUTPATIENT)
Dept: PODIATRY | Facility: CLINIC | Age: 53
End: 2018-06-05
Payer: MEDICARE

## 2018-06-05 ENCOUNTER — TELEPHONE (OUTPATIENT)
Dept: PODIATRY | Facility: CLINIC | Age: 53
End: 2018-06-05

## 2018-06-05 VITALS — WEIGHT: 303.81 LBS | BODY MASS INDEX: 40.27 KG/M2 | HEIGHT: 73 IN

## 2018-06-05 DIAGNOSIS — E11.49 TYPE II DIABETES MELLITUS WITH NEUROLOGICAL MANIFESTATIONS: Primary | ICD-10-CM

## 2018-06-05 PROCEDURE — 3008F BODY MASS INDEX DOCD: CPT | Mod: CPTII,S$GLB,, | Performed by: PODIATRIST

## 2018-06-05 PROCEDURE — 3045F PR MOST RECENT HEMOGLOBIN A1C LEVEL 7.0-9.0%: CPT | Mod: CPTII,S$GLB,, | Performed by: PODIATRIST

## 2018-06-05 PROCEDURE — 99202 OFFICE O/P NEW SF 15 MIN: CPT | Mod: S$GLB,,, | Performed by: PODIATRIST

## 2018-06-05 PROCEDURE — 99999 PR PBB SHADOW E&M-EST. PATIENT-LVL III: CPT | Mod: PBBFAC,,, | Performed by: PODIATRIST

## 2018-06-05 NOTE — LETTER
June 5, 2018    Gio Forrest  [address]  [facility] [address]             [facility]  [address]  [facility] [address]  Phone: [phone] Dear Mr. Forrest:    [facility] is committed to your overall health and would like to ensure that you are up to date on all of your health maintenance testing.  Our records indicate that you are overdue for your  ANNUAL DIABETIC EYE EXAM (Diabetic Retinopathy screening).    If you recently had this exam done at another facility, please let us know so that we may obtain copies from that facility.  If you have a copy of this eye exam report, please provide a copy for us to scan into your chart.  You are welcome to request that the report be faxed to us at  ([phone]).   If you have an upcoming eye exam appointment at another facility, please provide them with our fax number so that they may fax the report to us.      Also,  If you have not had this eye exam in the past year, we now have a Retinal Camera at the [facility].  If we do this exam the same day you see a member of the Primary Care team,  we can save you from having to pay a second co pay!     If you have any questions or concerns, please don't hesitate to call.    Sincerely,    Rubia Soto  Clinical Care Coordinator  [facility]  [address] [place] [address]  Phone: [phone]   Fax: [phone]

## 2018-06-05 NOTE — TELEPHONE ENCOUNTER
Returned call to pt. To assure he would make it into office before 2:45. Pt stated he would make it. Informed the pt he would not be able to be seen after that timeframe. Pt verbalized understanding and agreement.

## 2018-06-05 NOTE — PROGRESS NOTES
Subjective:      Patient ID: Gio Forrest is a 52 y.o. male.    Chief Complaint: Diabetic Foot Exam (Dr. Carroll 05/18/18) and Ingrown Toenail (left 3rd toenail )    Diabetes, increased risk amputation needing evaluation/management/optomization of foot care.  No current symptoms.  Wears casual shoes both feet.    Review of Systems   Constitution: Negative for chills, diaphoresis, fever, malaise/fatigue and night sweats.   Cardiovascular: Negative for claudication, cyanosis, leg swelling and syncope.   Skin: Negative for color change, dry skin, nail changes, rash, suspicious lesions and unusual hair distribution.   Musculoskeletal: Negative for falls, joint pain, joint swelling, muscle cramps, muscle weakness and stiffness.   Gastrointestinal: Negative for constipation, diarrhea, nausea and vomiting.   Neurological: Positive for sensory change. Negative for brief paralysis, disturbances in coordination, focal weakness, numbness, paresthesias and tremors.           Objective:      Physical Exam   Constitutional: He is oriented to person, place, and time. He appears well-developed and well-nourished. He is cooperative. No distress.   Cardiovascular:   Pulses:       Popliteal pulses are 2+ on the right side, and 2+ on the left side.        Dorsalis pedis pulses are 2+ on the right side, and 2+ on the left side.        Posterior tibial pulses are 2+ on the right side, and 2+ on the left side.   Capillary refill 3 seconds all toes/distal feet, all toes/both feet warm to touch.      Negative lymphadenopathy bilateral popliteal fossa and tarsal tunnel.      Negavie lower extremity edema bilateral.     Musculoskeletal:        Right ankle: He exhibits normal range of motion, no swelling, no ecchymosis, no deformity, no laceration and normal pulse. Achilles tendon normal. Achilles tendon exhibits no pain, no defect and normal Dhillon's test results.   Normal angle, base, station of gait. All ten toes without clubbing,  cyanosis, or signs of ischemia.  No pain to palpation bilateral lower extremities.  Range of motion, stability, muscle strength, and muscle tone normal bilateral feet and legs.     Lymphadenopathy: No inguinal adenopathy noted on the right or left side.   Negative lymphadenopathy bilateral popliteal fossa and tarsal tunnel.    Negative lymphangitic streaking bilateral feet/ankles/legs.   Neurological: He is alert and oriented to person, place, and time. He has normal strength. He displays no atrophy and no tremor. A sensory deficit is present. He exhibits normal muscle tone. Gait normal.   Reflex Scores:       Patellar reflexes are 2+ on the right side and 2+ on the left side.       Achilles reflexes are 2+ on the right side and 2+ on the left side.  Diminished/loss of protective sensation all toes bilateral to 10 gram monofilament.     Skin: Skin is warm, dry and intact. Capillary refill takes 2 to 3 seconds. No abrasion, no bruising, no burn, no ecchymosis, no laceration, no lesion and no rash noted. He is not diaphoretic. No cyanosis or erythema. No pallor. Nails show no clubbing.     Skin is normal age and health appropriate color, turgor, texture, and temperature bilateral lower extremities without ulceration, hyperpigmentation, discoloration, masses nodules or cords palpated.  No ecchymosis, erythema, edema, or cardinal signs of infection bilateral lower extremities.    All nails normal color and trophic qualities.    Psychiatric: He has a normal mood and affect.             Assessment:       Encounter Diagnosis   Name Primary?    Type II diabetes mellitus with neurological manifestations Yes         Plan:       Gio was seen today for diabetic foot exam and ingrown toenail.    Diagnoses and all orders for this visit:    Type II diabetes mellitus with neurological manifestations      I counseled the patient on his conditions, their implications and medical management.        - Shoe inspection. Diabetic  Foot Education. Patient reminded of the importance of good nutrition and blood sugar control to help prevent podiatric complications of diabetes. Patient instructed on proper foot hygeine. We discussed wearing proper shoe gear, daily foot inspections, never walking without protective shoe gear, never putting sharp instruments to feet, routine podiatric visits at least annually.      Discussed conservative treatment with shoes of adequate dimensions, material, and style to alleviate symptoms and delay or prevent surgical intervention.          Follow-up in about 1 year (around 6/5/2019).

## 2018-06-05 NOTE — PROGRESS NOTES
Health Maintenance Due   Topic Date Due    Foot Exam  09/28/2017    Eye Exam  05/03/2018     Letter mailed

## 2018-06-05 NOTE — TELEPHONE ENCOUNTER
----- Message from Armida Rader sent at 6/5/2018  2:21 PM CDT -----  Contact: self [phone]  He is calling to let you know that he is on his way, but he may be 5-10 min late.  Thank you!

## 2018-06-07 DIAGNOSIS — Z13.5 DIABETIC RETINOPATHY SCREENING: ICD-10-CM

## 2018-06-11 ENCOUNTER — TELEPHONE (OUTPATIENT)
Dept: ENDOCRINOLOGY | Facility: CLINIC | Age: 53
End: 2018-06-11

## 2018-06-11 ENCOUNTER — OFFICE VISIT (OUTPATIENT)
Dept: ENDOCRINOLOGY | Facility: CLINIC | Age: 53
End: 2018-06-11
Payer: MEDICARE

## 2018-06-11 VITALS
SYSTOLIC BLOOD PRESSURE: 112 MMHG | WEIGHT: 309.5 LBS | BODY MASS INDEX: 41.02 KG/M2 | HEART RATE: 78 BPM | DIASTOLIC BLOOD PRESSURE: 70 MMHG | HEIGHT: 73 IN

## 2018-06-11 DIAGNOSIS — E78.5 DYSLIPIDEMIA: ICD-10-CM

## 2018-06-11 DIAGNOSIS — E66.01 MORBID OBESITY: ICD-10-CM

## 2018-06-11 DIAGNOSIS — I10 ESSENTIAL HYPERTENSION: ICD-10-CM

## 2018-06-11 DIAGNOSIS — K75.81 NASH (NONALCOHOLIC STEATOHEPATITIS): ICD-10-CM

## 2018-06-11 DIAGNOSIS — E11.42 TYPE 2 DIABETES MELLITUS WITH DIABETIC POLYNEUROPATHY, WITH LONG-TERM CURRENT USE OF INSULIN: Primary | ICD-10-CM

## 2018-06-11 DIAGNOSIS — Z79.4 TYPE 2 DIABETES MELLITUS WITH DIABETIC POLYNEUROPATHY, WITH LONG-TERM CURRENT USE OF INSULIN: Primary | ICD-10-CM

## 2018-06-11 DIAGNOSIS — G47.33 OBSTRUCTIVE SLEEP APNEA: ICD-10-CM

## 2018-06-11 PROCEDURE — 99204 OFFICE O/P NEW MOD 45 MIN: CPT | Mod: S$GLB,,, | Performed by: NURSE PRACTITIONER

## 2018-06-11 PROCEDURE — 99999 PR PBB SHADOW E&M-EST. PATIENT-LVL IV: CPT | Mod: PBBFAC,,, | Performed by: NURSE PRACTITIONER

## 2018-06-11 RX ORDER — INSULIN ASPART 100 [IU]/ML
INJECTION, SOLUTION INTRAVENOUS; SUBCUTANEOUS
Qty: 30 ML | Refills: 12 | Status: SHIPPED | OUTPATIENT
Start: 2018-06-11 | End: 2018-09-10

## 2018-06-11 RX ORDER — INSULIN ASPART 100 [IU]/ML
INJECTION, SUSPENSION SUBCUTANEOUS
Qty: 3 ML | Refills: 2
Start: 2018-06-11 | End: 2018-09-10

## 2018-06-11 NOTE — TELEPHONE ENCOUNTER
----- Message from Remedios Morgan sent at 6/11/2018 10:31 AM CDT -----  Contact: pt  Pt calling at 10:30 am states on his way,trying to get to appointment in his 15 minute window. Called the office to advise but no answer.[phone] (home)

## 2018-06-11 NOTE — PATIENT INSTRUCTIONS
Snacks can be an important part of a balanced, healthy meal plan. They allow you to eat more frequently, feeling full and satisfied throughout the day. Also, they allow you to spread carbohydrates evenly, which may stabilize blood sugars.  Plus, snacks are enjoyable!     The amount of carbohydrate needed at snacks varies. Generally, about 15-30 grams of carbohydrate per snack is recommended.  Below you will find some tasty treats.       0-5 gm carb   Crystal Light   Vitamin Water Zero   Herbal tea, unsweetened   2 tsp peanut butter on celery   1./2 cup sugar-free jell-o   1 sugar-free popsicle   ¼ cup blueberries   8oz Blue Misti unsweetened almond milk   5 baby carrots & celery sticks, cucumbers, bell peppers dipped in ¼ cup salsa, 2Tbsp light ranch dressing or 2Tbsp plain Greek yogurt   10 Goldfish crackers   ½ oz low-fat cheese or string cheese   1 closed handful of nuts, unsalted   1 Tbsp of sunflower seeds, unsalted   1 cup Smart Pop popcorn   1 whole grain brown rice cake        15 gm carb   1 small piece of fruit or ½ banana or 1/2 cup lite canned fruit   3 elizabeth cracker squares   3 cups Smart Pop popcorn, top spray butter, Moser lite salt or cinnamon and Truvia   5 Vanilla Wafers   ½ cup low fat, no added sugar ice cream or frozen yogurt (Blue bell, Blue Bunny, Weight Watchers, Skinny Cow)   ½ turkey, ham, or chicken sandwich   ½ c fruit with ½ c Cottage cheese   4-6 unsalted wheat crackers with 1 oz low fat cheese or 1 tbsp peanut butter    30-45 goldfish crackers (depending on flavor)    7-8 Gnosticist mini brown rice cakes (caramel, apple cinnamon, chocolate)    12 Gnosticist mini brown rice cakes (cheddar, bbq, ranch)    1/3 cup hummus dip with raw veg   1/2 whole wheat chris, 1Tbsp hummus   Mini Pizza (1/2 whole wheat English muffin, low-fat  cheese, tomato sauce)   100 calorie snack pack (Oreo, Chips Ahoy, Ritz Mix, Baked Cheetos)   4-6 oz. light or Greek Style yogurt  (Afsaneh, Nancie, [facility], [facility])   ½ cup sugar-free pudding     6 in. wheat tortilla or chris oven toasted chips (topped with spray butter flavoring, cinnamon, Truvia OR spray butter, garlic powder, chili powder)    18 BBQ Popchips (available at [employer], [employer], Fresh Market)

## 2018-06-13 ENCOUNTER — TELEPHONE (OUTPATIENT)
Dept: FAMILY MEDICINE | Facility: CLINIC | Age: 53
End: 2018-06-13

## 2018-06-13 NOTE — TELEPHONE ENCOUNTER
----- Message from Missy Ledezma sent at 6/13/2018  9:29 AM CDT -----  Contact: Self  Patient forgot about his appt this morning at 9:40 AM and is having to reschedule and he is so sorry.  Thanks

## 2018-06-14 ENCOUNTER — TELEPHONE (OUTPATIENT)
Dept: ENDOCRINOLOGY | Facility: CLINIC | Age: 53
End: 2018-06-14

## 2018-06-14 NOTE — TELEPHONE ENCOUNTER
----- Message from Nanette Can sent at 6/14/2018  1:17 PM CDT -----  Asking to speak to nurse about his diabetic machine and his sugar levels / feels that they are low .. Afraid to take night insulin ? Call pt [phone]

## 2018-06-15 NOTE — TELEPHONE ENCOUNTER
----- Message from Beatriz Carter sent at 6/15/2018 11:40 AM CDT -----  Type: Needs Medical Advice    Who Called:  Patient   Symptoms (please be specific):  n/a  How long has patient had these symptoms:  n/a  Pharmacy name and phone #:  n/a  Best Call Back Number: [phone]  Additional Information: contact patient regarding what his insulin levels should be with the new VGO machine he received, he also wants to discuss his appointment with education.     Thank you

## 2018-06-15 NOTE — TELEPHONE ENCOUNTER
Pt Vgo device in. Rescheduled appt with Diabetes Educ. For Mon, June 18th for 3:00pm. Instructed to bring logs for visit    Verbalized understanding

## 2018-06-18 ENCOUNTER — OFFICE VISIT (OUTPATIENT)
Dept: PRIMARY CARE CLINIC | Facility: CLINIC | Age: 53
End: 2018-06-18
Payer: MEDICARE

## 2018-06-18 VITALS
HEART RATE: 64 BPM | TEMPERATURE: 98 F | BODY MASS INDEX: 41.26 KG/M2 | WEIGHT: 311.31 LBS | HEIGHT: 73 IN | SYSTOLIC BLOOD PRESSURE: 90 MMHG | DIASTOLIC BLOOD PRESSURE: 58 MMHG

## 2018-06-18 DIAGNOSIS — Z79.4 TYPE 2 DIABETES MELLITUS WITH DIABETIC POLYNEUROPATHY, WITH LONG-TERM CURRENT USE OF INSULIN: ICD-10-CM

## 2018-06-18 DIAGNOSIS — I10 ESSENTIAL HYPERTENSION: Primary | ICD-10-CM

## 2018-06-18 DIAGNOSIS — R53.83 FATIGUE, UNSPECIFIED TYPE: ICD-10-CM

## 2018-06-18 DIAGNOSIS — E11.42 TYPE 2 DIABETES MELLITUS WITH DIABETIC POLYNEUROPATHY, WITH LONG-TERM CURRENT USE OF INSULIN: ICD-10-CM

## 2018-06-18 DIAGNOSIS — I95.9 HYPOTENSION, UNSPECIFIED HYPOTENSION TYPE: ICD-10-CM

## 2018-06-18 DIAGNOSIS — E66.01 MORBID OBESITY: ICD-10-CM

## 2018-06-18 PROCEDURE — 3074F SYST BP LT 130 MM HG: CPT | Mod: CPTII,S$GLB,, | Performed by: NURSE PRACTITIONER

## 2018-06-18 PROCEDURE — 3045F PR MOST RECENT HEMOGLOBIN A1C LEVEL 7.0-9.0%: CPT | Mod: CPTII,S$GLB,, | Performed by: NURSE PRACTITIONER

## 2018-06-18 PROCEDURE — 3008F BODY MASS INDEX DOCD: CPT | Mod: CPTII,S$GLB,, | Performed by: NURSE PRACTITIONER

## 2018-06-18 PROCEDURE — 3078F DIAST BP <80 MM HG: CPT | Mod: CPTII,S$GLB,, | Performed by: NURSE PRACTITIONER

## 2018-06-18 PROCEDURE — 99214 OFFICE O/P EST MOD 30 MIN: CPT | Mod: S$GLB,,, | Performed by: NURSE PRACTITIONER

## 2018-06-18 RX ORDER — BENAZEPRIL HYDROCHLORIDE 5 MG/1
2.5 TABLET ORAL DAILY
Qty: 180 TABLET | Refills: 1
Start: 2018-06-18 | End: 2019-04-23 | Stop reason: SDUPTHER

## 2018-06-18 RX ORDER — BENAZEPRIL HYDROCHLORIDE 5 MG/1
2.5 TABLET ORAL DAILY
Qty: 180 TABLET | Refills: 1
Start: 2018-06-18 | End: 2018-06-18 | Stop reason: SDUPTHER

## 2018-06-18 NOTE — PROGRESS NOTES
Subjective:       Patient ID: Gio Forrest is a 52 y.o. male.    Chief Complaint: Diabetes  He was last seen in primary care by Dr. Carroll on 05/18/2018. This is my first time seeing him in the clinic.   Diabetes   Associated symptoms include fatigue. Pertinent negatives for diabetes include no chest pain.    has fatigue for the past 6-8 months. States he sits and get comfortable and is tired.  had a palate reduction earlier this year by Dr. Batista.  had a home study since surgery and he will be seeing Dr. Batista soon and having his machine re calibrated.   He has had some fluids this morning and has had breakfast.   Vitals:    06/18/18 1017   BP: (!) 90/58   Pulse:    Temp:      BP Readings from Last 3 Encounters:   06/18/18 (!) 90/58   06/11/18 112/70   06/06/18 122/70     Lab Results   Component Value Date    HGBA1C 8.1 (H) 05/18/2018     CMP  Sodium   Date Value Ref Range Status   05/30/2018 136 136 - 145 mmol/L Final     Potassium   Date Value Ref Range Status   05/30/2018 4.3 3.5 - 5.1 mmol/L Final     Chloride   Date Value Ref Range Status   05/30/2018 92 (L) 95 - 110 mmol/L Final     CO2   Date Value Ref Range Status   05/30/2018 29 22 - 31 mmol/L Final     Glucose   Date Value Ref Range Status   05/30/2018 303 (H) 70 - 110 mg/dL Final     Comment:     The ADA recommends the following guidelines for fasting glucose:  Normal:       less than 100 mg/dL  Prediabetes:  100 mg/dL to 125 mg/dL  Diabetes:     126 mg/dL or higher       BUN, Bld   Date Value Ref Range Status   05/30/2018 19 9 - 21 mg/dL Final     Creatinine   Date Value Ref Range Status   05/30/2018 0.98 0.50 - 1.40 mg/dL Final     Calcium   Date Value Ref Range Status   05/30/2018 9.8 8.4 - 10.2 mg/dL Final     Total Protein   Date Value Ref Range Status   05/28/2018 7.0 6.0 - 8.4 g/dL Final     Albumin   Date Value Ref Range Status   05/28/2018 3.9 3.5 - 5.2 g/dL Final     Total Bilirubin   Date Value Ref Range Status    05/28/2018 0.6 0.2 - 1.3 mg/dL Final     Alkaline Phosphatase   Date Value Ref Range Status   05/28/2018 121 38 - 145 U/L Final     AST ([place])   Date Value Ref Range Status   02/15/2016 66 (H) 17 - 59 U/L Final     AST   Date Value Ref Range Status   05/28/2018 29 17 - 59 U/L Final     ALT   Date Value Ref Range Status   05/28/2018 52 (H) 10 - 44 U/L Final     Anion Gap   Date Value Ref Range Status   05/30/2018 15 8 - 16 mmol/L Final     eGFR if    Date Value Ref Range Status   05/30/2018 >60 >60 mL/min/1.73 m^2 Final     eGFR if non    Date Value Ref Range Status   05/30/2018 >60 >60 mL/min/1.73 m^2 Final     Comment:     Calculation used to obtain the estimated glomerular filtration  rate (eGFR) is the CKD-EPI equation.        Review of Systems   Constitutional: Positive for fatigue.   Respiratory: Negative for shortness of breath.    Cardiovascular: Negative for chest pain and leg swelling.   All other systems reviewed and are negative.      States he takes his Lotensin once daily  His home glucose readings have not been higher than 191 (when ate watermelon), Mostly in 130-150  He had cholecystectomy 05/30/2018 at [facility]  He had an angiography per Dr. Portillo on 05/28/2018.  Objective:      Physical Exam   Constitutional: He is oriented to person, place, and time. He appears well-developed and well-nourished. He is active and cooperative.   HENT:   Head: Normocephalic and atraumatic.   Right Ear: Hearing, tympanic membrane, external ear and ear canal normal.   Left Ear: Hearing, tympanic membrane, external ear and ear canal normal.   Nose: Nose normal.   Mouth/Throat: Uvula is midline, oropharynx is clear and moist and mucous membranes are normal.   Eyes: Lids are normal.   Neck: Trachea normal, normal range of motion, full passive range of motion without pain and phonation normal. Neck supple.   Cardiovascular: Normal rate, regular rhythm and normal heart sounds.     Pulmonary/Chest: Effort normal and breath sounds normal.   Abdominal: Soft. Bowel sounds are normal. There is no tenderness.   Musculoskeletal: Normal range of motion.   Lymphadenopathy:        Head (right side): No submental, no submandibular, no tonsillar, no preauricular, no posterior auricular and no occipital adenopathy present.        Head (left side): No submental, no submandibular, no tonsillar, no preauricular, no posterior auricular and no occipital adenopathy present.     He has no cervical adenopathy.   Neurological: He is alert and oriented to person, place, and time.   Skin: Skin is warm, dry and intact.   Psychiatric: He has a normal mood and affect. His speech is normal and behavior is normal. Judgment and thought content normal. Cognition and memory are normal.   Nursing note and vitals reviewed.      Assessment & Plan:       Essential hypertension    Hypotension, unspecified hypotension type    Type 2 diabetes mellitus with diabetic polyneuropathy, with long-term current use of insulin    Fatigue, unspecified type    Morbid obesity    Other orders  -     Discontinue: benazepril (LOTENSIN) 5 MG tablet; Take 0.5 tablets (2.5 mg total) by mouth once daily.  Dispense: 180 tablet; Refill: 1  -     benazepril (LOTENSIN) 5 MG tablet; Take 0.5 tablets (2.5 mg total) by mouth once daily. Hold as of 06/18/2018 [facility]  Dispense: 180 tablet; Refill: 1      Medication List with Changes/Refills   Current Medications    ACCU-CHEK SMARTVIEW TEST STRIP STRP        ALLOPURINOL (ZYLOPRIM) 100 MG TABLET    Take 1 tablet (100 mg total) by mouth once daily.    AMMONIUM LACTATE 12 % CREA    APPLY TO THE AFFECTED AREA(S) TWICE DAILY    ASPIRIN (ECOTRIN) 81 MG EC TABLET    Take 81 mg by mouth once daily.     ATORVASTATIN (LIPITOR) 40 MG TABLET    Take 1 tablet (40 mg total) by mouth every evening.    CITALOPRAM (CELEXA) 40 MG TABLET    Take 1 tablet (40 mg total) by mouth once daily.    DOXEPIN (SILENOR) 3 MG TAB     Take 1 tablet by mouth nightly as needed.    GABAPENTIN (NEURONTIN) 300 MG CAPSULE    Take 2 capsules (600 mg total) by mouth 3 (three) times daily.    HYDROCODONE-ACETAMINOPHEN 10-325MG (NORCO)  MG TAB    Take 1 tablet by mouth 2 (two) times daily.     INSULIN ASPART PROTAMINE-INSULIN ASPART (NOVOLOG MIX 70-30FLEXPEN U-100) 100 UNIT/ML (70-30) INPN PEN    Pt injects 80 units in am and 75 in the pm    INSULIN ASPART U-100 (NOVOLOG U-100 INSULIN ASPART) 100 UNIT/ML INJECTION    Vgo 40 with  6 clicks at each meal    METFORMIN (GLUCOPHAGE) 1000 MG TABLET    Take 1 tablet (1,000 mg total) by mouth 2 (two) times daily.    MULTIVIT,THER IRON,CA,FA & MIN (MULTIVITAMIN) TAB    Take 1 tablet by mouth once daily.     PANTOPRAZOLE (PROTONIX) 40 MG TABLET    Take 1 tablet (40 mg total) by mouth once daily.    PRAMIPEXOLE (MIRAPEX) 1 MG TABLET    TAKE TWO TABLETS BY MOUTH AT BEDTIME AS NEEDED    SUB-Q INSULIN DEVICE, 40 UNIT (VGO 40) MARK    1 Device by Misc.(Non-Drug; Combo Route) route once daily.   Changed and/or Refilled Medications    Modified Medication Previous Medication    BENAZEPRIL (LOTENSIN) 5 MG TABLET benazepril (LOTENSIN) 5 MG tablet       Take 0.5 tablets (2.5 mg total) by mouth once daily. Hold as of 06/18/2018 [facility]    Take 1 tablet (5 mg total) by mouth 2 (two) times daily.   Hold benazepril until next Monday and then we will consider 2.5mg after blood pressure readings.   Hydrate well throughout the day    I have spent 30 minutes with patient with more than 50% of time with counseling regarding low blood pressure reading, changes in medication, importance of hydrating well and signs such as any heart rate abnormalities, decrease in urine output or darkened urine color which will be a sign to got to ED for evaluation or call for appt in clinic.He has been instructed to rest frequently when outside and to get up slowly from seated. He verbalized understanding      Follow-up if symptoms worsen or fail  to improve.

## 2018-06-18 NOTE — PATIENT INSTRUCTIONS
Hold benazepril until next Monday and then we will consider 2.5mg after blood pressure readings.   Hydrate well throughout the day

## 2018-06-25 ENCOUNTER — CLINICAL SUPPORT (OUTPATIENT)
Dept: DIABETES | Facility: CLINIC | Age: 53
End: 2018-06-25
Payer: MEDICARE

## 2018-06-25 VITALS — BODY MASS INDEX: 41.2 KG/M2 | HEIGHT: 73 IN | WEIGHT: 310.88 LBS

## 2018-06-25 DIAGNOSIS — Z79.4 TYPE 2 DIABETES MELLITUS WITH DIABETIC POLYNEUROPATHY, WITH LONG-TERM CURRENT USE OF INSULIN: ICD-10-CM

## 2018-06-25 DIAGNOSIS — E11.42 TYPE 2 DIABETES MELLITUS WITH DIABETIC POLYNEUROPATHY, WITH LONG-TERM CURRENT USE OF INSULIN: ICD-10-CM

## 2018-06-25 PROCEDURE — 99999 PR PBB SHADOW E&M-EST. PATIENT-LVL I: CPT | Mod: PBBFAC,,,

## 2018-06-25 PROCEDURE — G0108 DIAB MANAGE TRN  PER INDIV: HCPCS | Mod: S$GLB,,, | Performed by: DIETITIAN, REGISTERED

## 2018-06-25 NOTE — PROGRESS NOTES
DIABETES EDUCATOR NOTE   V-GO INSULIN DELIVERY INSTRUCTIONS     DIABETES EDUCATION    Referring provider: Gabby Rooney    Patient is here for instructions on use of the V-GO 40 which will provide 40 units of basal insulin given over 24 hours (1.67 units/hr) and up to 36 units of on-demand bolus dosing in 2-Unit increments. Patient will be giving 12 units of Novolog at each meal (6 pushes). Patient held his 70/30 injection this morning. Patient also advised that he will discontinue taking the 70/30 insulin and that he will only use the Novolog vial with the V-Go system.  He comes in today with his VGO prescription and Novolog vials filled so he was able to fill a cartridge and start one in clinic today.      Patient was instructed on the following:    Insert vial into EZ Fill and leave in place until vial is empty   Remove plug and insert V-Go    Draw insulin into EZ Fill and fill V-Go with insulin (check that V-Go is full of insulin)    Remove V-GO and clean and replace plug (advised to wipe the circular opening with an alcohol swab before replacing)    Select site for V-Go (site selection reviewed) and prepare infusion site with aseptic technique    Remove button cover and adhesive liner    Attach V-Go to site selected and insert needle by pushing needle button in to activate basal rate    Instructed patient on how to activate the bolus ready button and to push the bolus delivery button into the V-Go to deliver 2 units of insulin (1 push = 2 units). Patient advised that once all 36 units of the on-demand bolus have been given, the bolus buttons will lock, but the basal insulin will continue for the remainder of the 24 hours    To remove, release needle by sliding and pressing the needle release button    Remove the V-Go and discard (the V-Go is 100% disposable)   Patient instructed that the V-Go needs to be changed every 24 hours.    Patient was able to perform successful return demonstration of all.      General precautions reviewed with the patient:    V-Go needs to be removed before having an X-ray, MRI or CT scan (or any similar test or procedure). Replace with a new V-Go after the test or procedure is completed. Patient also advised to check with her doctor before any type of surgical procedure to see if the V-Go can be worn.    Patient advised to monitor her BG 4 times a day (pre-meals and at HS)    The V-Go should not be exposed to direct sunlight, hot tub, whirlpool or sauna use (replace with a new filled V-Go afterward)    Check that the V-Go is securely in place during and after periods of increased physical activity, exposure to water or gone under water to the depth of 3 feet, 3 inches (the V-Go can go under water and continue to work safely)    Reviewed s/s and tx of hypoglycemia      All of patient's questions and concerns were addressed.  He is concerned that he sweats through several pairs of clothes daily.  Instructed him to let us know if he is having trouble is the VGO units staying in place. Patient instructed to keep a BG log (log sheets provided) and send BG readings to MD in 1-2 weeks for review. Phone number was provided and patient advised to call with any questions or concerns.     Time spent with patient: 45 minutes

## 2018-07-02 ENCOUNTER — CLINICAL SUPPORT (OUTPATIENT)
Dept: PRIMARY CARE CLINIC | Facility: CLINIC | Age: 53
End: 2018-07-02
Payer: MEDICARE

## 2018-07-02 DIAGNOSIS — Z01.30 BP CHECK: Primary | ICD-10-CM

## 2018-07-02 NOTE — PROGRESS NOTES
Gio Forrest 52 y.o. male is here today for Blood Pressure check.   History of HTN no.    Review of patient's allergies indicates:  No Known Allergies  Creatinine   Date Value Ref Range Status   05/30/2018 0.98 0.50 - 1.40 mg/dL Final     Sodium   Date Value Ref Range Status   05/30/2018 136 136 - 145 mmol/L Final     Potassium   Date Value Ref Range Status   05/30/2018 4.3 3.5 - 5.1 mmol/L Final   ]  Patient verifies taking blood pressure medications on a regular basis at the same time of the day.     Current Outpatient Prescriptions:     ACCU-CHEK SMARTVIEW TEST STRIP Strp, , Disp: , Rfl:     allopurinol (ZYLOPRIM) 100 MG tablet, Take 1 tablet (100 mg total) by mouth once daily., Disp: 30 tablet, Rfl: 1    ammonium lactate 12 % Crea, APPLY TO THE AFFECTED AREA(S) TWICE DAILY, Disp: , Rfl: 3    aspirin (ECOTRIN) 81 MG EC tablet, Take 81 mg by mouth once daily. , Disp: , Rfl:     atorvastatin (LIPITOR) 40 MG tablet, Take 1 tablet (40 mg total) by mouth every evening., Disp: 90 tablet, Rfl: 1    benazepril (LOTENSIN) 5 MG tablet, Take 0.5 tablets (2.5 mg total) by mouth once daily. Hold as of 06/18/2018 [facility], Disp: 180 tablet, Rfl: 1    citalopram (CELEXA) 40 MG tablet, Take 1 tablet (40 mg total) by mouth once daily., Disp: 90 tablet, Rfl: 1    doxepin (SILENOR) 3 mg Tab, Take 1 tablet by mouth nightly as needed., Disp: 30 tablet, Rfl: 1    gabapentin (NEURONTIN) 300 MG capsule, Take 2 capsules (600 mg total) by mouth 3 (three) times daily., Disp: 180 capsule, Rfl: 3    hydrocodone-acetaminophen 10-325mg (NORCO)  mg Tab, Take 1 tablet by mouth 2 (two) times daily. , Disp: , Rfl:     insulin aspart protamine-insulin aspart (NOVOLOG MIX 70-30FLEXPEN U-100) 100 unit/mL (70-30) InPn pen, Pt injects 80 units in am and 75 in the pm, Disp: 3 mL, Rfl: 2    insulin aspart U-100 (NOVOLOG U-100 INSULIN ASPART) 100 unit/mL injection, Vgo 40 with  6 clicks at each meal, Disp: 30 mL, Rfl: 12     metFORMIN (GLUCOPHAGE) 1000 MG tablet, Take 1 tablet (1,000 mg total) by mouth 2 (two) times daily., Disp: 180 tablet, Rfl: 1    MULTIVIT,THER IRON,CA,FA & MIN (MULTIVITAMIN) Tab, Take 1 tablet by mouth once daily. , Disp: , Rfl:     pantoprazole (PROTONIX) 40 MG tablet, Take 1 tablet (40 mg total) by mouth once daily., Disp: 90 tablet, Rfl: 1    pramipexole (MIRAPEX) 1 MG tablet, TAKE TWO TABLETS BY MOUTH AT BEDTIME AS NEEDED, Disp: 60 tablet, Rfl: 0    sub-q insulin device, 40 unit (VGO 40) [place], 1 Device by Misc.(Non-Drug; Combo Route) route once daily., Disp: 30 Device, Rfl: 12  Does patient have record of home blood pressure readings no. Readings have been averaging normal.   Last dose of blood pressure medication was taken at currently holding.  Patient is asymptomatic.   Complains of no symptoms.        Blood pressure reading after 15 minutes was 138/78  Beba Carrasco notified.

## 2018-07-07 ENCOUNTER — TELEPHONE (OUTPATIENT)
Dept: ENDOCRINOLOGY | Facility: CLINIC | Age: 53
End: 2018-07-07

## 2018-07-07 NOTE — TELEPHONE ENCOUNTER
BG log received  Overall much improved on Vgo but he is having elevated fastings  Please ask patient if he is snacking after dinner?    (May need to give a separate injection of novolog at that time or perhaps a starlix.  If not snacking may require and injection on basal daily)

## 2018-07-09 ENCOUNTER — TELEPHONE (OUTPATIENT)
Dept: ENDOCRINOLOGY | Facility: CLINIC | Age: 53
End: 2018-07-09

## 2018-07-09 NOTE — TELEPHONE ENCOUNTER
----- Message from Yuridia Carbajal sent at 7/9/2018  4:16 PM CDT -----  returned call..[phone] (home)

## 2018-07-09 NOTE — TELEPHONE ENCOUNTER
----- Message from Analia Huynh sent at 7/9/2018  3:28 PM CDT -----  Returning your call.  Please call patient at [phone].

## 2018-07-09 NOTE — TELEPHONE ENCOUNTER
Attempted to reach pt, no answer  Pt called office several times with no answer on returned calls

## 2018-07-13 ENCOUNTER — TELEPHONE (OUTPATIENT)
Dept: ENDOCRINOLOGY | Facility: CLINIC | Age: 53
End: 2018-07-13

## 2018-07-13 RX ORDER — NATEGLINIDE 120 MG/1
120 TABLET ORAL
Qty: 90 TABLET | Refills: 3 | Status: SHIPPED | OUTPATIENT
Start: 2018-07-13 | End: 2018-09-10

## 2018-07-13 NOTE — TELEPHONE ENCOUNTER
Pt showed up at clinic stating that his blood sugar has been running high even with meds he is on. Stated 400 this am and wanted to let Gabby know, no logs turned in.

## 2018-08-03 ENCOUNTER — TELEPHONE (OUTPATIENT)
Dept: FAMILY MEDICINE | Facility: CLINIC | Age: 53
End: 2018-08-03

## 2018-08-03 DIAGNOSIS — M10.00 IDIOPATHIC GOUT, UNSPECIFIED CHRONICITY, UNSPECIFIED SITE: ICD-10-CM

## 2018-08-03 NOTE — PROGRESS NOTES
Refill Authorization Note     is requesting a refill authorization.    Brief assessment and rationale for refill: DEFER; doxepin/ Approve; Needs Labs      Date of last appointment: 5/18/2018     Refills Authorized: Yes  If authorized number of refills: 0        Medication-related problems identified: Requires labs  Medication Therapy Plan: GOUT- not commented on recently; Needs Labs (Uric Acid) & NTBS; Nov-8/30/18; Approve 3 more months   Name and strength of medication: doxepin (SILENOR) 3 mg /allopurinol (ZYLOPRIM) 100 MG tablet     Medication reconciliation completed: No  Comments:     Lab Results   Component Value Date    WBC 8.36 05/30/2018    HGB 13.4 (L) 05/30/2018    HCT 41.0 05/30/2018    MCV 87 05/30/2018     05/30/2018      Lab Results   Component Value Date    CREATININE 0.98 05/30/2018    BUN 19 05/30/2018     05/30/2018    K 4.3 05/30/2018    CL 92 (L) 05/30/2018    CO2 29 05/30/2018      Lab Results   Component Value Date    URICACID 5.0 05/17/2016      Lab Results   Component Value Date    ALT 52 (H) 05/28/2018    AST 29 05/28/2018    ALKPHOS 121 05/28/2018    BILITOT 0.6 05/28/2018

## 2018-08-06 RX ORDER — ALLOPURINOL 100 MG/1
100 TABLET ORAL DAILY
Qty: 90 TABLET | Refills: 0 | Status: SHIPPED | OUTPATIENT
Start: 2018-08-06 | End: 2018-11-14 | Stop reason: SDUPTHER

## 2018-08-06 NOTE — PROGRESS NOTES
Refill Authorization Note     is requesting a refill authorization.    Brief assessment and rationale for refill: DEFER; doxepin (unclear indication...insomnia?) / Approve; allopurinol: Needs Labs      Date of last appointment: 5/18/2018     Refills Authorized: Yes  If authorized number of refills: 0        Medication-related problems identified: Requires labs  Medication Therapy Plan: GOUT- not commented on recently; Needs Labs (Uric Acid) & NTBS; Nov-8/30/18; Approve 3 more months   Name and strength of medication: doxepin (SILENOR) 3 mg /allopurinol (ZYLOPRIM) 100 MG tablet     Medication reconciliation completed: No  Comments:   Lab Results   Component Value Date    WBC 8.36 05/30/2018    HGB 13.4 (L) 05/30/2018    HCT 41.0 05/30/2018    MCV 87 05/30/2018     05/30/2018      Lab Results   Component Value Date    CREATININE 0.98 05/30/2018    BUN 19 05/30/2018     05/30/2018    K 4.3 05/30/2018    CL 92 (L) 05/30/2018    CO2 29 05/30/2018      Lab Results   Component Value Date    URICACID 5.0 05/17/2016      Lab Results   Component Value Date    ALT 52 (H) 05/28/2018    AST 29 05/28/2018    ALKPHOS 121 05/28/2018    BILITOT 0.6 05/28/2018

## 2018-08-07 RX ORDER — DOXEPIN 3 MG/1
1 TABLET, FILM COATED ORAL NIGHTLY PRN
Qty: 30 TABLET | Refills: 1 | Status: SHIPPED | OUTPATIENT
Start: 2018-08-07 | End: 2018-09-04 | Stop reason: SDUPTHER

## 2018-08-07 RX ORDER — PANTOPRAZOLE SODIUM 40 MG/1
40 TABLET, DELAYED RELEASE ORAL DAILY
Qty: 90 TABLET | Refills: 1 | Status: SHIPPED | OUTPATIENT
Start: 2018-08-07 | End: 2019-01-07 | Stop reason: SDUPTHER

## 2018-08-07 RX ORDER — METFORMIN HYDROCHLORIDE 1000 MG/1
1000 TABLET ORAL 2 TIMES DAILY
Qty: 180 TABLET | Refills: 1 | Status: SHIPPED | OUTPATIENT
Start: 2018-08-07 | End: 2019-01-24 | Stop reason: SDUPTHER

## 2018-08-14 ENCOUNTER — TELEPHONE (OUTPATIENT)
Dept: FAMILY MEDICINE | Facility: CLINIC | Age: 53
End: 2018-08-14

## 2018-08-14 RX ORDER — PRAMIPEXOLE DIHYDROCHLORIDE 1 MG/1
2 TABLET ORAL NIGHTLY
Qty: 60 TABLET | Refills: 2 | Status: SHIPPED | OUTPATIENT
Start: 2018-08-14 | End: 2018-11-07 | Stop reason: SDUPTHER

## 2018-08-14 NOTE — TELEPHONE ENCOUNTER
----- Message from Beatriz Baez sent at 8/14/2018 12:45 PM CDT -----  Type:  Pharmacy Calling for an RX      Pharmacy Name:  [facility]  Prescription Name:  pramipexole (MIRAPEX) 1 MG tablet   Best Call Back Number:  [phone]  Additional Information:  Stated sent several requests/no response

## 2018-08-16 ENCOUNTER — PATIENT OUTREACH (OUTPATIENT)
Dept: ADMINISTRATIVE | Facility: HOSPITAL | Age: 53
End: 2018-08-16

## 2018-08-16 NOTE — LETTER
August 16, 2018    Gio Forrest  [address] [address] [address]             [facility]  [address]  [facility] [address]  Phone: [phone] Dear Mr. Forrest:    [facility] is committed to your overall health.  To help you get the most out of each of your visits, we will review your information to make sure you are up to date on all of your recommended tests and/or procedures.      Dr. Carroll  has found that your chart shows you may be due for the following:    Annual Dilated Eye Exam    REMINDER: lab appointment 9/4/18    If you have had any of the above done at another facility, please bring the records or information with you so that your record at [facility] will be complete.  If you would like to schedule any of these, please contact me.    If you are currently taking medication, please bring it with you to your appointment for review.    If you have any questions or concerns, please don't hesitate to call.    Sincerely,    Rubia Soto  Clinical Care Coordinator  [facility] [address] [place] [address]  Phone: [phone]   Fax: [phone]

## 2018-08-16 NOTE — PROGRESS NOTES
Health Maintenance Due   Topic Date Due    Eye Exam  05/03/2018    Influenza Vaccine  08/01/2018    Hemoglobin A1c  08/18/2018     Pre-visit outreach via mail

## 2018-08-29 ENCOUNTER — TELEPHONE (OUTPATIENT)
Dept: FAMILY MEDICINE | Facility: CLINIC | Age: 53
End: 2018-08-29

## 2018-08-29 NOTE — TELEPHONE ENCOUNTER
----- Message from Janet Crum MA sent at 8/29/2018 11:42 AM CDT -----  Contact: rodolfo   [facility] surgery clearance  Hand surgery   Call back

## 2018-09-04 RX ORDER — DOXEPIN 3 MG/1
1 TABLET, FILM COATED ORAL NIGHTLY PRN
Qty: 30 TABLET | Refills: 1 | Status: SHIPPED | OUTPATIENT
Start: 2018-09-04 | End: 2018-12-05 | Stop reason: SDUPTHER

## 2018-09-05 ENCOUNTER — TELEPHONE (OUTPATIENT)
Dept: PODIATRY | Facility: CLINIC | Age: 53
End: 2018-09-05

## 2018-09-05 NOTE — TELEPHONE ENCOUNTER
----- Message from Jameson Lopez sent at 9/5/2018 12:14 PM CDT -----  Contact: IVONE Barrera requests staff call him, needs to speak with someone about a matter.

## 2018-09-06 ENCOUNTER — TELEPHONE (OUTPATIENT)
Dept: PODIATRY | Facility: CLINIC | Age: 53
End: 2018-09-06

## 2018-09-06 ENCOUNTER — OFFICE VISIT (OUTPATIENT)
Dept: FAMILY MEDICINE | Facility: CLINIC | Age: 53
End: 2018-09-06
Payer: MEDICARE

## 2018-09-06 ENCOUNTER — LAB VISIT (OUTPATIENT)
Dept: LAB | Facility: HOSPITAL | Age: 53
End: 2018-09-06
Attending: NURSE PRACTITIONER
Payer: MEDICARE

## 2018-09-06 VITALS
HEART RATE: 67 BPM | DIASTOLIC BLOOD PRESSURE: 70 MMHG | RESPIRATION RATE: 18 BRPM | SYSTOLIC BLOOD PRESSURE: 119 MMHG | TEMPERATURE: 98 F | OXYGEN SATURATION: 98 % | WEIGHT: 303.56 LBS | HEIGHT: 73 IN | BODY MASS INDEX: 40.23 KG/M2

## 2018-09-06 DIAGNOSIS — Z79.4 TYPE 2 DIABETES MELLITUS WITH DIABETIC POLYNEUROPATHY, WITH LONG-TERM CURRENT USE OF INSULIN: ICD-10-CM

## 2018-09-06 DIAGNOSIS — G47.33 OSA ON CPAP: ICD-10-CM

## 2018-09-06 DIAGNOSIS — E11.42 TYPE 2 DIABETES MELLITUS WITH DIABETIC POLYNEUROPATHY, WITH LONG-TERM CURRENT USE OF INSULIN: ICD-10-CM

## 2018-09-06 DIAGNOSIS — Z01.818 PRE-OPERATIVE CLEARANCE: Primary | ICD-10-CM

## 2018-09-06 DIAGNOSIS — I10 ESSENTIAL HYPERTENSION: ICD-10-CM

## 2018-09-06 DIAGNOSIS — I42.8 OTHER CARDIOMYOPATHY: ICD-10-CM

## 2018-09-06 LAB
ESTIMATED AVG GLUCOSE: 289 MG/DL
HBA1C MFR BLD HPLC: 11.7 %

## 2018-09-06 PROCEDURE — 36415 COLL VENOUS BLD VENIPUNCTURE: CPT | Mod: PO

## 2018-09-06 PROCEDURE — 3008F BODY MASS INDEX DOCD: CPT | Mod: CPTII,,, | Performed by: NURSE PRACTITIONER

## 2018-09-06 PROCEDURE — 99999 PR PBB SHADOW E&M-EST. PATIENT-LVL V: CPT | Mod: PBBFAC,,, | Performed by: NURSE PRACTITIONER

## 2018-09-06 PROCEDURE — 3046F HEMOGLOBIN A1C LEVEL >9.0%: CPT | Mod: CPTII,,, | Performed by: NURSE PRACTITIONER

## 2018-09-06 PROCEDURE — 3078F DIAST BP <80 MM HG: CPT | Mod: CPTII,,, | Performed by: NURSE PRACTITIONER

## 2018-09-06 PROCEDURE — 3074F SYST BP LT 130 MM HG: CPT | Mod: CPTII,,, | Performed by: NURSE PRACTITIONER

## 2018-09-06 PROCEDURE — 99215 OFFICE O/P EST HI 40 MIN: CPT | Mod: PBBFAC,PO | Performed by: NURSE PRACTITIONER

## 2018-09-06 PROCEDURE — 99214 OFFICE O/P EST MOD 30 MIN: CPT | Mod: S$PBB,,, | Performed by: NURSE PRACTITIONER

## 2018-09-06 PROCEDURE — 83036 HEMOGLOBIN GLYCOSYLATED A1C: CPT

## 2018-09-06 RX ORDER — TIZANIDINE 4 MG/1
TABLET ORAL
COMMUNITY
End: 2019-04-23

## 2018-09-06 RX ORDER — CYCLOBENZAPRINE HCL 10 MG
10 TABLET ORAL NIGHTLY
Refills: 5 | COMMUNITY
Start: 2018-09-04 | End: 2019-12-25 | Stop reason: SDUPTHER

## 2018-09-06 RX ORDER — OXYCODONE AND ACETAMINOPHEN 10; 325 MG/1; MG/1
1 TABLET ORAL NIGHTLY
Refills: 0 | COMMUNITY
Start: 2018-09-04 | End: 2019-11-17

## 2018-09-06 NOTE — PROGRESS NOTES
Subjective:       Patient ID: Gio Forrest is a 52 y.o. male.    Chief Complaint: Pre-op Exam  He was last seen in primary care by me on 06/18/2018. He last saw Carly on 05/18/2018.  HPI   He is scheduled to have a trigger release of right hand per Dr. Wu on 09/12/2018. He is also scheduled for left shoulder surgery in October. He is here for pre operative clearance.  He is currently dealing with the loss of his mother a few days ago.   Vitals:    09/06/18 0734   BP: 119/70   Pulse: 67   Resp: 18   Temp: 98.3 °F (36.8 °C)     Past Medical History:   Diagnosis Date    Cardiomyopathy 8/12/2015    nonischemic    Depression     Diabetes     Dyslipidemia 8/12/2015    Gout 8/12/2015    Hyperlipidemia     Hypertension     Idiopathic gout     Lumbar pseudoarthrosis     LINDSEY (nonalcoholic steatohepatitis)     Obesity 8/12/2015    Obstructive sleep apnea 8/12/2015    Obstructive sleep apnea 8/12/2015    Restless leg syndrome     Sebaceous cyst 4/18/2017    Type 2 diabetes mellitus 8/12/2015     Lab Results   Component Value Date    WBC 8.36 05/30/2018    HGB 13.4 (L) 05/30/2018    HCT 41.0 05/30/2018    MCV 87 05/30/2018     05/30/2018     CMP  Sodium   Date Value Ref Range Status   05/30/2018 136 136 - 145 mmol/L Final     Potassium   Date Value Ref Range Status   05/30/2018 4.3 3.5 - 5.1 mmol/L Final     Chloride   Date Value Ref Range Status   05/30/2018 92 (L) 95 - 110 mmol/L Final     CO2   Date Value Ref Range Status   05/30/2018 29 22 - 31 mmol/L Final     Glucose   Date Value Ref Range Status   05/30/2018 303 (H) 70 - 110 mg/dL Final     Comment:     The ADA recommends the following guidelines for fasting glucose:  Normal:       less than 100 mg/dL  Prediabetes:  100 mg/dL to 125 mg/dL  Diabetes:     126 mg/dL or higher       BUN, Bld   Date Value Ref Range Status   05/30/2018 19 9 - 21 mg/dL Final     Creatinine   Date Value Ref Range Status   05/30/2018 0.98 0.50 - 1.40 mg/dL Final      Calcium   Date Value Ref Range Status   2018 9.8 8.4 - 10.2 mg/dL Final     Total Protein   Date Value Ref Range Status   2018 7.0 6.0 - 8.4 g/dL Final     Albumin   Date Value Ref Range Status   2018 3.9 3.5 - 5.2 g/dL Final     Total Bilirubin   Date Value Ref Range Status   2018 0.6 0.2 - 1.3 mg/dL Final     Alkaline Phosphatase   Date Value Ref Range Status   2018 121 38 - 145 U/L Final     AST ([place])   Date Value Ref Range Status   02/15/2016 66 (H) 17 - 59 U/L Final     AST   Date Value Ref Range Status   2018 29 17 - 59 U/L Final     ALT   Date Value Ref Range Status   2018 52 (H) 10 - 44 U/L Final     Anion Gap   Date Value Ref Range Status   2018 15 8 - 16 mmol/L Final     eGFR if    Date Value Ref Range Status   2018 >60 >60 mL/min/1.73 m^2 Final     eGFR if non    Date Value Ref Range Status   2018 >60 >60 mL/min/1.73 m^2 Final     Comment:     Calculation used to obtain the estimated glomerular filtration  rate (eGFR) is the CKD-EPI equation.        Lab Results   Component Value Date    HGBA1C 11.7 (H) 2018     EK2018 at [facility]  Cardiac Clearance Needed: YES, I have called and requested per Dr. Kowalski  Anticoagulants: yes    Review of Systems    Patient had a cardiac catheterization on 2018  CXRay at [facility] on 2018 with Nn acute findings          EKG per [facility] on 2018   Labs per [facility] on 2018                  Had Rhizotomy completed 2 weeks ago per Dr. Dhillon and he states experienced no complications using conscious sedation.  Objective:      Physical Exam   Constitutional: He is oriented to person, place, and time. He appears well-developed and well-nourished.   HENT:   Head: Normocephalic and atraumatic.   Right Ear: External ear normal.   Left Ear: External ear normal.   Nose: Nose normal.   Mouth/Throat: Oropharynx is clear and moist.    Neck: Normal range of motion. Neck supple.   Cardiovascular: Normal rate, regular rhythm and normal heart sounds.   Pulmonary/Chest: Effort normal and breath sounds normal.   Abdominal: Soft. Bowel sounds are normal.       Musculoskeletal: Normal range of motion.   Lymphadenopathy:        Head (right side): No submental, no submandibular, no tonsillar, no preauricular, no posterior auricular and no occipital adenopathy present.        Head (left side): No submental, no submandibular, no tonsillar, no preauricular, no posterior auricular and no occipital adenopathy present.     He has no cervical adenopathy.   Neurological: He is alert and oriented to person, place, and time.   Skin: Skin is warm, dry and intact.   Psychiatric: He has a normal mood and affect. His speech is normal and behavior is normal. Judgment and thought content normal. Cognition and memory are normal. He expresses no suicidal ideation. He expresses no suicidal plans.   He is experiencing some grief reaction with the recent loss of his mother   Nursing note and vitals reviewed.      Assessment & Plan:       Pre-operative clearance  -     Cancel: IN OFFICE EKG 12-LEAD (to Muse)    Type 2 diabetes mellitus with diabetic polyneuropathy, with long-term current use of insulin  -     Cancel: IN OFFICE EKG 12-LEAD (to Muse)  -     Hemoglobin A1c; Future; Expected date: 09/06/2018    Essential hypertension  -     Cancel: IN OFFICE EKG 12-LEAD (to Muse)    TARYN on CPAP    Other cardiomyopathy    With his history of cardiomyopathy,   He is able to do vigorous yard work or exercise without any chest pain or shortness of breath.  DM is uncontrolled  HTN is controlled  I have explained to him to discuss appt with Dr. Kowalski to get cardiac clearance.  I have also had appt made with Gabby Rooney DNP regarding his elevated sugar levels and he will see her on Monday.     Medication List           Accurate as of 9/6/18 11:59 PM. If you have any questions, ask  your nurse or doctor.               CONTINUE taking these medications    ACCU-CHEK SMARTVIEW TEST STRIP Strp  Generic drug:  blood sugar diagnostic     allopurinol 100 MG tablet  Commonly known as:  ZYLOPRIM  Take 1 tablet (100 mg total) by mouth once daily.     ammonium lactate 12 % Crea     aspirin 81 MG EC tablet  Commonly known as:  ECOTRIN     atorvastatin 40 MG tablet  Commonly known as:  LIPITOR  Take 1 tablet (40 mg total) by mouth every evening.     benazepril 5 MG tablet  Commonly known as:  LOTENSIN  Take 0.5 tablets (2.5 mg total) by mouth once daily. Hold as of 06/18/2018 Vera W[facility]     citalopram 40 MG tablet  Commonly known as:  CELEXA  Take 1 tablet (40 mg total) by mouth once daily.     cyclobenzaprine 10 MG tablet  Commonly known as:  FLEXERIL     doxepin 3 mg Tab  Commonly known as:  SILENOR  Take 1 tablet by mouth nightly as needed.     gabapentin 300 MG capsule  Commonly known as:  NEURONTIN  Take 2 capsules (600 mg total) by mouth 3 (three) times daily.     HYDROcodone-acetaminophen  mg per tablet  Commonly known as:  NORCO     insulin aspart protamine-insulin aspart 100 unit/mL (70-30) Inpn pen  Commonly known as:  NovoLOG Mix 70-30FlexPen U-100  Pt injects 80 units in am and 75 in the pm     insulin aspart U-100 100 unit/mL injection  Commonly known as:  NovoLOG U-100 Insulin aspart  Vgo 40 with  6 clicks at each meal     metFORMIN 1000 MG tablet  Commonly known as:  GLUCOPHAGE  Take 1 tablet (1,000 mg total) by mouth 2 (two) times daily.     multivitamin Tab     nateglinide 120 MG tablet  Commonly known as:  STARLIX  Take 1 tablet (120 mg total) by mouth daily with dinner or evening meal.     oxyCODONE-acetaminophen  mg per tablet  Commonly known as:  PERCOCET     pantoprazole 40 MG tablet  Commonly known as:  PROTONIX  Take 1 tablet (40 mg total) by mouth once daily.     pramipexole 1 MG tablet  Commonly known as:  MIRAPEX  Take 2 tablets (2 mg total) by mouth nightly.     sub-q  insulin device, 40 unit Oxana  Commonly known as:  VGO 40  1 Device by Misc.(Non-Drug; Combo Route) route once daily.     tiZANidine 4 MG tablet  Commonly known as:  ZANAFLEX              No Follow-up on file.

## 2018-09-06 NOTE — TELEPHONE ENCOUNTER
----- Message from Jameson Lopez sent at 9/6/2018  7:35 AM CDT -----  Contact: IVONE  Pt states unable to attend apt scheduled on 9/10/18 at 1:30 with Dr Jacinto in [place]. Pt states has apt in [place] at 2:20 on that day with doctor who will be doing his surgery 9/12/18. Pt states he has something that he needs podiatrist to see before he has surgery.

## 2018-09-10 ENCOUNTER — OFFICE VISIT (OUTPATIENT)
Dept: PODIATRY | Facility: CLINIC | Age: 53
End: 2018-09-10
Payer: MEDICARE

## 2018-09-10 ENCOUNTER — TELEPHONE (OUTPATIENT)
Dept: FAMILY MEDICINE | Facility: CLINIC | Age: 53
End: 2018-09-10

## 2018-09-10 ENCOUNTER — OFFICE VISIT (OUTPATIENT)
Dept: ENDOCRINOLOGY | Facility: CLINIC | Age: 53
End: 2018-09-10
Payer: MEDICARE

## 2018-09-10 ENCOUNTER — HOSPITAL ENCOUNTER (OUTPATIENT)
Dept: RADIOLOGY | Facility: CLINIC | Age: 53
Discharge: HOME | End: 2018-09-10
Attending: PODIATRIST
Payer: MEDICARE

## 2018-09-10 VITALS
BODY MASS INDEX: 40.56 KG/M2 | WEIGHT: 306 LBS | SYSTOLIC BLOOD PRESSURE: 100 MMHG | HEIGHT: 73 IN | DIASTOLIC BLOOD PRESSURE: 68 MMHG | RESPIRATION RATE: 18 BRPM | HEART RATE: 76 BPM

## 2018-09-10 VITALS — HEIGHT: 73 IN | WEIGHT: 304 LBS | BODY MASS INDEX: 40.29 KG/M2

## 2018-09-10 DIAGNOSIS — E78.5 DYSLIPIDEMIA: ICD-10-CM

## 2018-09-10 DIAGNOSIS — M77.9 CAPSULITIS: Primary | ICD-10-CM

## 2018-09-10 DIAGNOSIS — I10 ESSENTIAL HYPERTENSION: ICD-10-CM

## 2018-09-10 DIAGNOSIS — M79.672 FOOT PAIN, LEFT: ICD-10-CM

## 2018-09-10 DIAGNOSIS — Z79.4 TYPE 2 DIABETES MELLITUS WITH DIABETIC POLYNEUROPATHY, WITH LONG-TERM CURRENT USE OF INSULIN: Primary | ICD-10-CM

## 2018-09-10 DIAGNOSIS — K75.81 NASH (NONALCOHOLIC STEATOHEPATITIS): ICD-10-CM

## 2018-09-10 DIAGNOSIS — E11.42 TYPE 2 DIABETES MELLITUS WITH DIABETIC POLYNEUROPATHY, WITH LONG-TERM CURRENT USE OF INSULIN: Primary | ICD-10-CM

## 2018-09-10 DIAGNOSIS — M77.9 CAPSULITIS: ICD-10-CM

## 2018-09-10 DIAGNOSIS — E66.01 MORBID OBESITY: ICD-10-CM

## 2018-09-10 PROCEDURE — 99214 OFFICE O/P EST MOD 30 MIN: CPT | Mod: S$PBB,,, | Performed by: NURSE PRACTITIONER

## 2018-09-10 PROCEDURE — 99999 PR PBB SHADOW E&M-EST. PATIENT-LVL III: CPT | Mod: PBBFAC,,, | Performed by: PODIATRIST

## 2018-09-10 PROCEDURE — 3008F BODY MASS INDEX DOCD: CPT | Mod: CPTII,,, | Performed by: PODIATRIST

## 2018-09-10 PROCEDURE — 99215 OFFICE O/P EST HI 40 MIN: CPT | Mod: PBBFAC,25,27,PO | Performed by: NURSE PRACTITIONER

## 2018-09-10 PROCEDURE — 99213 OFFICE O/P EST LOW 20 MIN: CPT | Mod: 25,S$PBB,, | Performed by: PODIATRIST

## 2018-09-10 PROCEDURE — 29540 STRAPPING ANKLE &/FOOT: CPT | Mod: PBBFAC,PO,LT | Performed by: PODIATRIST

## 2018-09-10 PROCEDURE — 73630 X-RAY EXAM OF FOOT: CPT | Mod: TC,FY,PO,LT

## 2018-09-10 PROCEDURE — 99999 PR PBB SHADOW E&M-EST. PATIENT-LVL V: CPT | Mod: PBBFAC,,, | Performed by: NURSE PRACTITIONER

## 2018-09-10 PROCEDURE — 29540 STRAPPING ANKLE &/FOOT: CPT | Mod: S$PBB,LT,, | Performed by: PODIATRIST

## 2018-09-10 PROCEDURE — 99213 OFFICE O/P EST LOW 20 MIN: CPT | Mod: PBBFAC,25,PO | Performed by: PODIATRIST

## 2018-09-10 PROCEDURE — 73630 X-RAY EXAM OF FOOT: CPT | Mod: 26,LT,S$GLB, | Performed by: RADIOLOGY

## 2018-09-10 RX ORDER — PEN NEEDLE, DIABETIC 30 GX3/16"
NEEDLE, DISPOSABLE MISCELLANEOUS
Qty: 200 EACH | Refills: 3 | Status: SHIPPED | OUTPATIENT
Start: 2018-09-10 | End: 2020-06-23

## 2018-09-10 RX ORDER — DICLOFENAC SODIUM 10 MG/G
2 GEL TOPICAL 4 TIMES DAILY
Qty: 100 G | Refills: 2 | Status: ON HOLD | OUTPATIENT
Start: 2018-09-10 | End: 2019-08-28

## 2018-09-10 RX ORDER — INSULIN ASPART 100 [IU]/ML
INJECTION, SUSPENSION SUBCUTANEOUS
Qty: 11 BOX | Refills: 3
Start: 2018-09-10 | End: 2018-12-17

## 2018-09-10 NOTE — PROGRESS NOTES
Subjective:      Patient ID: Gio Forrest is a 52 y.o. male.    Chief Complaint: Foot Pain (Left)    Sharp deep pain left forefoot.  Gradual onset, worsening over past several weeks, aggravated by increased weight bearing, shoe gear, pressure.  No previous medical treatment.  OTC pain med not helping. Denies trauma, surgery both feet.    Review of Systems   Constitution: Negative for chills, diaphoresis, fever, malaise/fatigue and night sweats.   Cardiovascular: Negative for claudication, cyanosis, leg swelling and syncope.   Skin: Negative for color change, dry skin, nail changes, rash, suspicious lesions and unusual hair distribution.   Musculoskeletal: Positive for joint pain. Negative for falls, joint swelling, muscle cramps, muscle weakness and stiffness.   Gastrointestinal: Negative for constipation, diarrhea, nausea and vomiting.   Neurological: Negative for brief paralysis, disturbances in coordination, focal weakness, numbness, paresthesias, sensory change and tremors.           Objective:      Physical Exam   Constitutional: He is oriented to person, place, and time. He appears well-developed and well-nourished. He is cooperative. No distress.   Cardiovascular:   Pulses:       Popliteal pulses are 2+ on the right side, and 2+ on the left side.        Dorsalis pedis pulses are 2+ on the right side, and 2+ on the left side.        Posterior tibial pulses are 2+ on the right side, and 2+ on the left side.   Capillary refill 3 seconds all toes/distal feet, all toes/both feet warm to touch.      Negative lymphadenopathy bilateral popliteal fossa and tarsal tunnel.      Negavie lower extremity edema bilateral.     Musculoskeletal:        Right ankle: He exhibits normal range of motion, no swelling, no ecchymosis, no deformity, no laceration and normal pulse. Achilles tendon normal. Achilles tendon exhibits no pain, no defect and normal Dhillon's test results.   Pain to palpation inferior mtpj 2 left without  evidence of trauma or infection.    Ankle dorsiflexion decreased at <10 degrees bilateral with moderate increase with knee flexion bilateral.    Otherwise, Normal angle, base, station of gait. All ten toes without clubbing, cyanosis, or signs of ischemia.  No pain to palpation bilateral lower extremities.  Range of motion, stability, muscle strength, and muscle tone normal bilateral feet and legs.     Lymphadenopathy: No inguinal adenopathy noted on the right or left side.   Negative lymphadenopathy bilateral popliteal fossa and tarsal tunnel.    Negative lymphangitic streaking bilateral feet/ankles/legs.   Neurological: He is alert and oriented to person, place, and time. He has normal strength. He displays no atrophy and no tremor. No sensory deficit. He exhibits normal muscle tone. Gait normal.   Reflex Scores:       Patellar reflexes are 2+ on the right side and 2+ on the left side.       Achilles reflexes are 2+ on the right side and 2+ on the left side.  Negative tinel sign to percussion sural, superficial peroneal, deep peroneal, saphenous, and posterior tibial nerves right and left ankles and feet.      Negative moulder sign/click bilateral all intermetatarsal spaces.     Skin: Skin is warm, dry and intact. Capillary refill takes 2 to 3 seconds. No abrasion, no bruising, no burn, no ecchymosis, no laceration, no lesion and no rash noted. He is not diaphoretic. No cyanosis or erythema. No pallor. Nails show no clubbing.     Skin is normal age and health appropriate color, turgor, texture, and temperature bilateral lower extremities without ulceration, hyperpigmentation, discoloration, masses nodules or cords palpated.  No ecchymosis, erythema, edema, or cardinal signs of infection bilateral lower extremities.     Psychiatric: He has a normal mood and affect.             Assessment:       Encounter Diagnoses   Name Primary?    Capsulitis Yes    Foot pain, left          Plan:       Gio was seen today for  foot pain.    Diagnoses and all orders for this visit:    Capsulitis  -     X-Ray Foot Complete Left; Future    Foot pain, left  -     X-Ray Foot Complete Left; Future    Other orders  -     diclofenac sodium 1 % Gel; Apply 2 g topically 4 (four) times daily.      I counseled the patient on his conditions, their implications and medical management.        Patient will stretch the tendo achilles complex three times daily as demonstrated in the office.  Literature was dispensed illustrating proper stretching technique.    I applied a plantar rest strapping to the patient's left foot to offload symptomatic area, support the arch, and relieve pain.    Patient will obtain over the counter arch supports and wear them in shoes whenever possible.  Athletic shoes intended for walking or running are usually best.    The patient was advised that NSAID-type medications have two very important potential side effects: gastrointestinal irritation including hemorrhage and renal injuries. He was asked to take the medication with food and to stop if he experiences any GI upset. I asked him to call for vomiting, abdominal pain or black/bloody stools. The patient expresses understanding of these issues and questions were answered.    Discussed conservative treatment with shoes of adequate dimensions, material, and style to alleviate symptoms and delay or prevent surgical intervention.    Rx xrays, voltaren gel.          No Follow-up on file.

## 2018-09-10 NOTE — TELEPHONE ENCOUNTER
Spoke with Beatriz at Dr Kowalski's office, last office visit was good. Stress test done in May was Normal. Echo results EF 55-60% with Trace MR.  Ok to proceed with surgery.

## 2018-09-10 NOTE — PROGRESS NOTES
"CC: This 52 y.o. male presents for management of diabetes  and chronic conditions pending review including HTN, HLP    HPI: He was diagnosed with T2DM in 2005.Has never been hospitalized r/t DM.  Family hx of DM: sister, mom and dad    Since last visit Mother has passed away from Fatty liver and hepatic carcinoma     monitoring BG at home:  No logs or meter to clinic today    Bg 568 yesterday am- wife gave him a "shot" ~ 18 u (90 u U-100) of her U-500, at 7 pm his bg was 250, she gave him another "little shot of U500) bg dropped to 118. He was vomiting, diaphoretic, shakey - treated with a popsicle and PB & J sandwich and milk  Bg 150 this am    Diet: Eats 3  Meals a day, snacks on sweets-honey buns, cake, shakes  Exercise: none  CURRENT DM MEDS: Vgo 40 6 clicks  w each meal   Treats- his lows with lifesavers  Vial/pen:  Uses pen  Glucometer type:  True Test 2018    Standards of Care:  Eye exam: 1/2018 no h/o retinopathy     Has his own Lawn care business    ROS:   Gen: Appetite good, no weight gain or loss, denies fatigue and weakness.  Skin: Skin is intact and heals well, no rashes, no hair changes  Eyes: Denies visual disturbances  Resp: no SOB or HENDERSON, no cough  Cardiac: No palpitations, chest pain, no edema   GI: No nausea or vomiting, diarrhea, constipation, or abdominal pain.  /GYN: No nocturia, burning or pain.   MS/Neuro: Denies numbness/ tingling in BLE; Gait steady, speech clear  Psych: Denies drug/ETOH abuse, no hx of depression.  Other systems: negative.    PE:  GENERAL: Well developed, well nourished.appears older than age.   PSYCH: AAOx3, appropriate mood and affect, pleasant expression, conversant, appears relaxed, well groomed.   EYES: Conjunctiva, corneas clear  NECK: Supple, trachea midline   NEURO: Gait steady  SKIN: Skin warm and dry no acanthosis nigracans.  FOOT EXAMINATION: 6/11/18  No foot deformity, corns or callus formation,  nails in good condition and well trimmed, no interspace " maceration or ulceration noted.  Decreased hair growth present over toes/feet.  Positive vibratory response to 128 Hz tuning fork bilaterally.  Protective sensation intact with 10 gram monofilament.  +2 dorsalis pedis and posterior pulses noted.      Lab Results   Component Value Date    MICALBCREAT 8.0 05/18/2018       Hemoglobin A1C   Date Value Ref Range Status   09/06/2018 11.7 (H) 4.0 - 5.6 % Final     Comment:     ADA Screening Guidelines:  5.7-6.4%  Consistent with prediabetes  >or=6.5%  Consistent with diabetes  High levels of fetal hemoglobin interfere with the HbA1C  assay. Heterozygous hemoglobin variants (HbS, HgC, etc)do  not significantly interfere with this assay.   However, presence of multiple variants may affect accuracy.     05/18/2018 8.1 (H) 4.0 - 5.6 % Final     Comment:     According to ADA guidelines, hemoglobin A1c <7.0% represents  optimal control in non-pregnant diabetic patients. Different  metrics may apply to specific patient populations.   Standards of Medical Care in Diabetes-2016.  For the purpose of screening for the presence of diabetes:  <5.7%     Consistent with the absence of diabetes  5.7-6.4%  Consistent with increasing risk for diabetes   (prediabetes)  >or=6.5%  Consistent with diabetes  Currently, no consensus exists for use of hemoglobin A1c  for diagnosis of diabetes for children.  This Hemoglobin A1c assay has significant interference with fetal   hemoglobin   (HbF). The results are invalid for patients with abnormal amounts of   HbF,   including those with known Hereditary Persistence   of Fetal Hemoglobin. Heterozygous hemoglobin variants (HbAS, HbAC,   HbAD, HbAE, HbA2) do not significantly interfere with this assay;   however, presence of multiple variants in a sample may impact the %   interference.     01/19/2017 9.5 (H) 0.0 - 5.6 % Final     Comment:     Reference Interval:  5.0 - 5.6 Normal   5.7 - 6.4 High Risk   > 6.5 Diabetic    Hgb A1c results are  standardized based on the (NGSP) National   Glycohemoglobin Standardization Program.    Hemoglobin A1C levels are related to mean serum/plasma glucose   during the preceding 2-3 months.             ASSESSMENT and PLAN:     1. T2DM with hyperglycemia-    D/c Vgo 40  Start Novolog 70/30 70 units bid- log in 1 week or sooner for issues  Having trigger finger sz in am  Recommend d/c of VGo take 50 u of Nov 70/30 tonight, 20 units in am  Drink 4oz of apple juice if becomes hypo overnight   Discussed A1c and BG goals.   Instructed to monitor BG and bring meter/ log to every clinic visit.   - takes ASA, ACEi, statin    2. HTN - controlled, continue meds as previously prescribed and monitor.     3. HLP -  on statin therapy, LFTs WNL    4. TARYN- wears cpap nightly     5. LINDSEY- encouraged weight loss, refer to hepatology, mother recently passed away w HCC    6. Morbid obesity-  Body mass index is 40.37 kg/m².  Stressed need to improve diet, exercise 30 mins a day, 5 days a week. Limit sweets     Follow-up: in 3 months with lab prior

## 2018-09-10 NOTE — TELEPHONE ENCOUNTER
----- Message from Remedios Morgan sent at 9/10/2018 11:29 AM CDT -----  Contact: Dr Omkar Wu Office-Nohelia Wu Office calling julian need a surgery clearance on the pt who is having surgery on Wed....[phone](Nohelia)

## 2018-09-10 NOTE — PATIENT INSTRUCTIONS
Low Carb Snacks  Snacks can be an important part of a balanced, healthy meal plan. They allow you to eat more frequently, feeling full and satisfied throughout the day. Also, they allow you to spread carbohydrates evenly, which may stabilize blood sugars.  Plus, snacks are enjoyable!     The amount of carbohydrate needed at snacks varies. Generally, about 15-30 grams of carbohydrate per snack is recommended.  Below you will find some tasty treats.       0-5 gm carb   Crystal Light   Vitamin Water Zero   Herbal tea, unsweetened   2 tsp peanut butter on celery   1./2 cup sugar-free jell-o   1 sugar-free popsicle   ¼ cup blueberries   8oz Blue Misti unsweetened almond milk   5 baby carrots & celery sticks, cucumbers, bell peppers dipped in ¼ cup salsa, 2Tbsp light ranch dressing or 2Tbsp plain Greek yogurt   10 Goldfish crackers   ½ oz low-fat cheese or string cheese   1 closed handful of nuts, unsalted   1 Tbsp of sunflower seeds, unsalted   1 cup Smart Pop popcorn   1 whole grain brown rice cake        15 gm carb   1 small piece of fruit or ½ banana or 1/2 cup lite canned fruit   3 elizabeth cracker squares   3 cups Smart Pop popcorn, top spray butter, Moser lite salt or cinnamon and Truvia   5 Vanilla Wafers   ½ cup low fat, no added sugar ice cream or frozen yogurt (Blue bell, Blue Bunny, Weight Watchers, Skinny Cow)   ½ turkey, ham, or chicken sandwich   ½ c fruit with ½ c Cottage cheese   4-6 unsalted wheat crackers with 1 oz low fat cheese or 1 tbsp peanut butter    30-45 goldfish crackers (depending on flavor)    7-8 Judaism mini brown rice cakes (caramel, apple cinnamon, chocolate)    12 Judaism mini brown rice cakes (cheddar, bbq, ranch)    1/3 cup hummus dip with raw veg   1/2 whole wheat chris, 1Tbsp hummus   Mini Pizza (1/2 whole wheat English muffin, low-fat  cheese, tomato sauce)   100 calorie snack pack (Oreo, Chips Ahoy, Ritz Mix, Baked Cheetos)   4-6 oz. light or Greek  "Style yogurt (Chobani, Yoplait, Okios, [place])   ½ cup sugar-free pudding     6 in. wheat tortilla or chris oven toasted chips (topped with spray butter flavoring, cinnamon, Truvia OR spray butter, garlic powder, chili powder)    18 BBQ Popchips (available at [employer], Whole Foods, Fresh Market)     Celery with peanut butter   Celery with tuna salad   Hard boiled eggs   Dill pickles and cheddar cheese (no kidding, it's a great combo)   Nuts (keep raw ones in the freezer if you think you'll overeat them)   Sunflower seeds (get them in the shell so it will take longer to eat them)   Other seeds (How to Toast Pumpkin or Squash Seeds)   Low-Carb Trail Mix   Jerky (beef or turkey -- try to find low-sugar varieties)   Cheese sticks, such as string cheese   Sugar-free Jello, alone or with cottage cheese and a sprinkling of nuts. Make sugar-free lime Jello with part coconut milk -- For a large package, dissolve the powder in a cup of boiling water, add a can of coconut milk, and then add the rest of the water. Stir well.   Pepperoni "chips" -- Zap the slices in the microwave   Cheese with a few apple slices   4-ounce plain or sugar-free yogurt with berries and flax seed meal   Smoked salmon and cream cheese on cucumber slices   Lettuce Roll-ups -- Roll luncheon meat, egg salad, tuna or other filling and veggies in lettuce leaves   Lunch Meat Roll-ups -- Roll cheese or veggies in lunch meat (read the labels for carbs on the lunch meat)   Spread bean dip, spinach dip, or other low-carb dip or spread on the lunch meat or lettuce and then roll it up   Raw veggies and spinach dip, or other low-carb dip   Pork rinds ([place]), with or without dip   Ricotta cheese with fruit and/or nuts and/or flax seed meal   Mushrooms with cheese spread inside (or other spreads or dips)   Low-carb snack bars (watch out for sugar alcohols, especially maltitol)   Product Review: Atkins Advantage Bars   Pepperoni " Chips -- Microwave pepperoni slices until crisp. Great with cheeses and dips   Garlic Parmesan Flax Seed Crackers   Parmesan Crisps -- Good when you want a crunchy snack.   Peanut Butter Protein Balls

## 2018-09-10 NOTE — TELEPHONE ENCOUNTER
----- Message from Thierry Link sent at 9/10/2018  3:10 PM CDT -----  Contact: Pt  The Pt is requesting a call back regarding needing surgery clearance. Please call Pt to advise.     Call Back#: [phone]   Thanks

## 2018-09-10 NOTE — TELEPHONE ENCOUNTER
Phoned Stella with Dr Wu's office in regards to the pt's surgery clearance. Pt is scheduled for surgery in the AM. Instructed Nohelia as to what Vervenu Instructed pt to see Dr Kowalski for cardiac clearance and that the pt was suppose to see Gabby Rooney today? But according to pt's chart his appt with Gabby Rooney is not until 12/18. Please let us know what we need to tell Dr Wu's office. Thank you. CLC

## 2018-09-11 ENCOUNTER — TELEPHONE (OUTPATIENT)
Dept: FAMILY MEDICINE | Facility: CLINIC | Age: 53
End: 2018-09-11

## 2018-09-11 NOTE — TELEPHONE ENCOUNTER
----- Message from Mojgan Henderson sent at 9/11/2018  8:23 AM CDT -----  Contact: Nohelia from Dr.Brett Wu's office  Name of Who is Calling: Nohelia      What is the request in detail: Nohelia is calling states she didn't receive the cardiac clearance for the patient. Please fax clearance back to [phone]      Can the clinic reply by [facility]:       What [facility] to [facility]: Nohelia [phone]

## 2018-09-11 NOTE — TELEPHONE ENCOUNTER
Spoke to Nohelia and she states she did receive our clearance but needed the original. I gave her Dr. Gilman number and advised her to call their office. Nohelia verbalized understanding.

## 2018-09-12 ENCOUNTER — TELEPHONE (OUTPATIENT)
Dept: ENDOCRINOLOGY | Facility: CLINIC | Age: 53
End: 2018-09-12

## 2018-09-12 NOTE — TELEPHONE ENCOUNTER
----- Message from Liza Dorman sent at 9/12/2018  8:47 AM CDT -----  Contact: [facility]  Chalo is requesting to speak with nurse to check on pt's Prescription for( Novolog Insulin)  Pt also requested for prescription to be 90 day supply, pt stated he is currently out of medication  Please call to advise  Call Back   Thanks    [facility] - [address] [place] [id number]  Phone: [phone] Fax: [phone]

## 2018-09-26 NOTE — TELEPHONE ENCOUNTER
Received fax from pharmacy, requesting refill on med. Please advise LOV:  9/6/18        NOV:  none

## 2018-09-27 RX ORDER — CITALOPRAM 40 MG/1
40 TABLET, FILM COATED ORAL DAILY
Qty: 90 TABLET | Refills: 1 | Status: SHIPPED | OUTPATIENT
Start: 2018-09-27 | End: 2019-03-19

## 2018-10-12 ENCOUNTER — TELEPHONE (OUTPATIENT)
Dept: ADMINISTRATIVE | Facility: HOSPITAL | Age: 53
End: 2018-10-12

## 2018-10-25 PROBLEM — R65.20 SEVERE SEPSIS: Status: ACTIVE | Noted: 2018-10-25

## 2018-10-25 PROBLEM — J15.9 BACTERIAL PNEUMONIA: Status: ACTIVE | Noted: 2018-10-25

## 2018-10-25 PROBLEM — Z98.890 HISTORY OF ARTHROSCOPY OF LEFT SHOULDER: Status: ACTIVE | Noted: 2018-10-25

## 2018-10-25 PROBLEM — A41.9 SEVERE SEPSIS: Status: ACTIVE | Noted: 2018-10-25

## 2018-10-27 PROBLEM — K59.00 CONSTIPATION: Status: ACTIVE | Noted: 2018-10-27

## 2018-11-07 DIAGNOSIS — Z98.890 HISTORY OF ARTHROSCOPY OF LEFT SHOULDER: Primary | ICD-10-CM

## 2018-11-07 RX ORDER — PRAMIPEXOLE DIHYDROCHLORIDE 1 MG/1
2 TABLET ORAL NIGHTLY
Qty: 60 TABLET | Refills: 2 | Status: SHIPPED | OUTPATIENT
Start: 2018-11-07 | End: 2019-01-28 | Stop reason: SDUPTHER

## 2018-11-14 DIAGNOSIS — M10.00 IDIOPATHIC GOUT, UNSPECIFIED CHRONICITY, UNSPECIFIED SITE: ICD-10-CM

## 2018-11-14 NOTE — PROGRESS NOTES
Refill Authorization Note     is requesting a refill authorization.    Brief assessment and rationale for refill: APPROVE: needs labs  Amount/Quantity of medication ordered: 90d        Refills Authorized: Yes  If authorized number of refills: 0        Medication-related problems identified: Requires labs  Medication Therapy Plan: Labs WNL; Gout NCO; NTBL (Uric) please link to labs scheduled 12/10/18; approve 3 more  Name and strength of medication: allopurinol (ZYLOPRIM) 100 MG tablet  How patient will take medication: t1t qd  Medication reconciliation completed: No        Comments:   Lab Results   Component Value Date    WBC 6.60 10/26/2018    HGB 11.7 (L) 10/26/2018    HCT 37.0 (L) 10/26/2018    MCV 86 10/26/2018     10/26/2018     Lab Results   Component Value Date    URICACID 5.0 05/17/2016     Lab Results   Component Value Date    ALT 55 (H) 10/25/2018    AST 39 10/25/2018    ALKPHOS 121 10/25/2018    BILITOT 1.1 10/25/2018     Lab Results   Component Value Date    CREATININE 0.78 10/26/2018    BUN 19 10/26/2018     (L) 10/26/2018    K 4.9 10/26/2018    CL 96 10/26/2018    CO2 28 10/26/2018

## 2018-11-15 RX ORDER — ALLOPURINOL 100 MG/1
100 TABLET ORAL DAILY
Qty: 90 TABLET | Refills: 0 | Status: SHIPPED | OUTPATIENT
Start: 2018-11-15 | End: 2019-04-04 | Stop reason: SDUPTHER

## 2018-12-07 RX ORDER — DOXEPIN 3 MG/1
1 TABLET, FILM COATED ORAL NIGHTLY PRN
Qty: 30 TABLET | Refills: 1 | Status: SHIPPED | OUTPATIENT
Start: 2018-12-07 | End: 2019-03-19

## 2018-12-11 ENCOUNTER — LAB VISIT (OUTPATIENT)
Dept: LAB | Facility: HOSPITAL | Age: 53
End: 2018-12-11
Attending: FAMILY MEDICINE
Payer: MEDICARE

## 2018-12-11 ENCOUNTER — TELEPHONE (OUTPATIENT)
Dept: ENDOCRINOLOGY | Facility: CLINIC | Age: 53
End: 2018-12-11

## 2018-12-11 DIAGNOSIS — Z79.4 TYPE 2 DIABETES MELLITUS WITH DIABETIC POLYNEUROPATHY, WITH LONG-TERM CURRENT USE OF INSULIN: ICD-10-CM

## 2018-12-11 DIAGNOSIS — E11.42 TYPE 2 DIABETES MELLITUS WITH DIABETIC POLYNEUROPATHY, WITH LONG-TERM CURRENT USE OF INSULIN: ICD-10-CM

## 2018-12-11 DIAGNOSIS — M10.00 IDIOPATHIC GOUT, UNSPECIFIED CHRONICITY, UNSPECIFIED SITE: ICD-10-CM

## 2018-12-11 LAB
25(OH)D3+25(OH)D2 SERPL-MCNC: 24 NG/ML
ALBUMIN SERPL BCP-MCNC: 3.8 G/DL
ALP SERPL-CCNC: 124 U/L
ALT SERPL W/O P-5'-P-CCNC: 84 U/L
ANION GAP SERPL CALC-SCNC: 6 MMOL/L
AST SERPL-CCNC: 76 U/L
BILIRUB SERPL-MCNC: 0.5 MG/DL
BUN SERPL-MCNC: 16 MG/DL
CALCIUM SERPL-MCNC: 10.3 MG/DL
CHLORIDE SERPL-SCNC: 97 MMOL/L
CHOLEST SERPL-MCNC: 117 MG/DL
CHOLEST/HDLC SERPL: 4 {RATIO}
CO2 SERPL-SCNC: 31 MMOL/L
CREAT SERPL-MCNC: 1.3 MG/DL
EST. GFR  (AFRICAN AMERICAN): >60 ML/MIN/1.73 M^2
EST. GFR  (NON AFRICAN AMERICAN): >60 ML/MIN/1.73 M^2
ESTIMATED AVG GLUCOSE: 266 MG/DL
GLUCOSE SERPL-MCNC: 343 MG/DL
HBA1C MFR BLD HPLC: 10.9 %
HDLC SERPL-MCNC: 29 MG/DL
HDLC SERPL: 24.8 %
LDLC SERPL CALC-MCNC: 64 MG/DL
NONHDLC SERPL-MCNC: 88 MG/DL
POTASSIUM SERPL-SCNC: 5.2 MMOL/L
PROT SERPL-MCNC: 7.5 G/DL
SODIUM SERPL-SCNC: 134 MMOL/L
TRIGL SERPL-MCNC: 120 MG/DL
TSH SERPL DL<=0.005 MIU/L-ACNC: 3.16 UIU/ML
URATE SERPL-MCNC: 4.3 MG/DL

## 2018-12-11 PROCEDURE — 80061 LIPID PANEL: CPT

## 2018-12-11 PROCEDURE — 83036 HEMOGLOBIN GLYCOSYLATED A1C: CPT

## 2018-12-11 PROCEDURE — 84550 ASSAY OF BLOOD/URIC ACID: CPT

## 2018-12-11 PROCEDURE — 82306 VITAMIN D 25 HYDROXY: CPT

## 2018-12-11 PROCEDURE — 36415 COLL VENOUS BLD VENIPUNCTURE: CPT | Mod: PO

## 2018-12-11 PROCEDURE — 80053 COMPREHEN METABOLIC PANEL: CPT

## 2018-12-11 PROCEDURE — 84443 ASSAY THYROID STIM HORMONE: CPT

## 2018-12-17 ENCOUNTER — OFFICE VISIT (OUTPATIENT)
Dept: ENDOCRINOLOGY | Facility: CLINIC | Age: 53
End: 2018-12-17
Payer: MEDICARE

## 2018-12-17 VITALS
DIASTOLIC BLOOD PRESSURE: 72 MMHG | SYSTOLIC BLOOD PRESSURE: 110 MMHG | BODY MASS INDEX: 40.8 KG/M2 | WEIGHT: 307.88 LBS | HEART RATE: 77 BPM | HEIGHT: 73 IN

## 2018-12-17 DIAGNOSIS — I10 ESSENTIAL HYPERTENSION: ICD-10-CM

## 2018-12-17 DIAGNOSIS — E78.5 DYSLIPIDEMIA: ICD-10-CM

## 2018-12-17 DIAGNOSIS — G47.33 OSA ON CPAP: ICD-10-CM

## 2018-12-17 DIAGNOSIS — K75.81 NASH (NONALCOHOLIC STEATOHEPATITIS): ICD-10-CM

## 2018-12-17 DIAGNOSIS — E11.42 TYPE 2 DIABETES MELLITUS WITH DIABETIC POLYNEUROPATHY, WITH LONG-TERM CURRENT USE OF INSULIN: Primary | ICD-10-CM

## 2018-12-17 DIAGNOSIS — E66.01 MORBID OBESITY: ICD-10-CM

## 2018-12-17 DIAGNOSIS — Z79.4 TYPE 2 DIABETES MELLITUS WITH DIABETIC POLYNEUROPATHY, WITH LONG-TERM CURRENT USE OF INSULIN: Primary | ICD-10-CM

## 2018-12-17 PROBLEM — R65.20 SEVERE SEPSIS: Status: RESOLVED | Noted: 2018-10-25 | Resolved: 2018-12-17

## 2018-12-17 PROBLEM — R07.9 CHEST PAIN: Status: RESOLVED | Noted: 2018-05-26 | Resolved: 2018-12-17

## 2018-12-17 PROBLEM — J15.9 BACTERIAL PNEUMONIA: Status: RESOLVED | Noted: 2018-10-25 | Resolved: 2018-12-17

## 2018-12-17 PROBLEM — K59.00 CONSTIPATION: Status: RESOLVED | Noted: 2018-10-27 | Resolved: 2018-12-17

## 2018-12-17 PROBLEM — A41.9 SEVERE SEPSIS: Status: RESOLVED | Noted: 2018-10-25 | Resolved: 2018-12-17

## 2018-12-17 PROCEDURE — 99214 OFFICE O/P EST MOD 30 MIN: CPT | Mod: S$GLB,,, | Performed by: NURSE PRACTITIONER

## 2018-12-17 PROCEDURE — 99999 PR PBB SHADOW E&M-EST. PATIENT-LVL IV: CPT | Mod: PBBFAC,,, | Performed by: NURSE PRACTITIONER

## 2018-12-17 RX ORDER — PIOGLITAZONEHYDROCHLORIDE 15 MG/1
15 TABLET ORAL DAILY
Qty: 90 TABLET | Refills: 3 | Status: SHIPPED | OUTPATIENT
Start: 2018-12-17 | End: 2019-02-08 | Stop reason: SDUPTHER

## 2018-12-17 RX ORDER — INSULIN ASPART 100 [IU]/ML
INJECTION, SUSPENSION SUBCUTANEOUS
Qty: 11 BOX | Refills: 3
Start: 2018-12-17 | End: 2019-10-09 | Stop reason: SDUPTHER

## 2018-12-17 NOTE — PROGRESS NOTES
CC: This 53 y.o. male presents for management of diabetes  and chronic conditions pending review including HTN, HLP, morbid obesity, fatty liver, vitamin d deficiency     HPI: He was diagnosed with T2DM in 2005. Has never been hospitalized r/t DM.  Mother has passed away from Fatty liver and hepatic carcinoma in 2018    Family hx of DM: sister, mom and dad    He had a scope on his left arm in October and then admitted next day with pneumonia and severe sepsis  Also had a steroid injection into his trigger finger in October as well.     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  Skin: Skin is intact and heals well, no rashes, no hair changes  Eyes: Denies visual disturbances  Resp: no SOB or HENDERSON, no cough  Cardiac: No palpitations, chest pain, no edema   GI: No nausea or vomiting, diarrhea, constipation, or abdominal pain.  /GYN: No nocturia, burning or pain.   MS/Neuro: +numbness/ tingling in BLE; Gait steady, speech clear  Psych: Denies drug/ETOH abuse, no hx of depression.  Other systems: negative.    PE:  GENERAL: Well developed, well nourished.appears older than age.   PSYCH: AAOx3, appropriate mood and affect, pleasant expression, conversant, appears relaxed, well groomed.   EYES: Conjunctiva, corneas clear  NECK: Supple, trachea midline   NEURO: Gait steady  SKIN: Skin warm and dry no acanthosis nigracans.  FOOT EXAMINATION: 6/11/18  No  foot deformity, corns or callus formation,  nails in good condition and well trimmed, no interspace maceration or ulceration noted.  Decreased hair growth present over toes/feet.  Positive vibratory response to 128 Hz tuning fork bilaterally.  Protective sensation intact with 10 gram monofilament.  +2 dorsalis pedis and posterior pulses noted.      Lab Results   Component Value Date    MICALBCREAT 8.0 05/18/2018       Hemoglobin A1C   Date Value Ref Range Status   12/11/2018 10.9 (H) 4.0 - 5.6 % Final     Comment:     ADA Screening Guidelines:  5.7-6.4%  Consistent with prediabetes  >or=6.5%  Consistent with diabetes  High levels of fetal hemoglobin interfere with the HbA1C  assay. Heterozygous hemoglobin variants (HbS, HgC, etc)do  not significantly interfere with this assay.   However, presence of multiple variants may affect accuracy.     09/06/2018 11.7 (H) 4.0 - 5.6 % Final     Comment:     ADA Screening Guidelines:  5.7-6.4%  Consistent with prediabetes  >or=6.5%  Consistent with diabetes  High levels of fetal hemoglobin interfere with the HbA1C  assay. Heterozygous hemoglobin variants (HbS, HgC, etc)do  not significantly interfere with this assay.   However, presence of multiple variants may affect accuracy.     05/18/2018 8.1 (H) 4.0 - 5.6 % Final     Comment:     According to ADA guidelines, hemoglobin A1c <7.0% represents  optimal control in non-pregnant diabetic patients. Different  metrics may apply to specific patient populations.   Standards of Medical Care in Diabetes-2016.  For the purpose of screening for the presence of diabetes:  <5.7%     Consistent with the absence of diabetes  5.7-6.4%  Consistent with increasing risk for diabetes   (prediabetes)  >or=6.5%  Consistent with diabetes  Currently, no consensus exists for use of hemoglobin A1c  for diagnosis of diabetes for children.  This Hemoglobin A1c assay has significant interference with fetal   hemoglobin   (HbF). The results are invalid for  patients with abnormal amounts of   HbF,   including those with known Hereditary Persistence   of Fetal Hemoglobin. Heterozygous hemoglobin variants (HbAS, HbAC,   HbAD, HbAE, HbA2) do not significantly interfere with this assay;   however, presence of multiple variants in a sample may impact the %   interference.          ASSESSMENT and PLAN:     1. T2DM with hyperglycemia-    Start Actos 15 mg qday  Long discussion to get bg under control! He needs to take meds as prescribed and he needs to limit the sweets   Increase Novolog 70/30 to 80 units bid-aware should be taken 5-15 mins prior to meal  log in 1 week or sooner for issues  Discussed A1c and BG goals.   - takes ASA, ACEi, statin    2. HTN - controlled, continue meds as previously prescribed and monitor.     3. HLP -  on statin therapy, LFTs double normal chronically- fatty liver    4. TARYN- wears cpap nightly     5. LINDSEY- encouraged weight loss, refer to hepatology, mother recently passed away w HCC    6. Morbid obesity-  Body mass index is 40.62 kg/m².    Stressed need to improve diet, exercise 30 mins a day, 5 days a week. Limit sweets!!     Follow-up: in 3 months with lab prior

## 2018-12-18 ENCOUNTER — TELEPHONE (OUTPATIENT)
Dept: ENDOCRINOLOGY | Facility: CLINIC | Age: 53
End: 2018-12-18

## 2018-12-18 NOTE — TELEPHONE ENCOUNTER
----- Message from Andres Mao sent at 12/18/2018  7:55 AM CST -----  Contact: patient  Type: Needs Medical Advice    Who Called:  Patient  Symptoms (please be specific):    How long has patient had these symptoms:    Pharmacy name and phone #:    Best Call Back Number: [phone]  Additional Information: requesting a call back,have questions

## 2018-12-20 ENCOUNTER — PROCEDURE VISIT (OUTPATIENT)
Dept: HEPATOLOGY | Facility: CLINIC | Age: 53
End: 2018-12-20
Attending: NURSE PRACTITIONER
Payer: MEDICARE

## 2018-12-20 ENCOUNTER — OFFICE VISIT (OUTPATIENT)
Dept: HEPATOLOGY | Facility: CLINIC | Age: 53
End: 2018-12-20
Payer: MEDICARE

## 2018-12-20 VITALS
BODY MASS INDEX: 40.59 KG/M2 | WEIGHT: 306.25 LBS | DIASTOLIC BLOOD PRESSURE: 68 MMHG | RESPIRATION RATE: 18 BRPM | OXYGEN SATURATION: 97 % | HEIGHT: 73 IN | TEMPERATURE: 98 F | SYSTOLIC BLOOD PRESSURE: 122 MMHG | HEART RATE: 92 BPM

## 2018-12-20 DIAGNOSIS — E78.5 DYSLIPIDEMIA: ICD-10-CM

## 2018-12-20 DIAGNOSIS — R74.8 ELEVATED LIVER ENZYMES: ICD-10-CM

## 2018-12-20 DIAGNOSIS — K76.0 FATTY LIVER: ICD-10-CM

## 2018-12-20 DIAGNOSIS — I10 ESSENTIAL HYPERTENSION: ICD-10-CM

## 2018-12-20 DIAGNOSIS — K76.0 FATTY LIVER: Primary | ICD-10-CM

## 2018-12-20 DIAGNOSIS — E11.42 TYPE 2 DIABETES MELLITUS WITH DIABETIC POLYNEUROPATHY, WITH LONG-TERM CURRENT USE OF INSULIN: ICD-10-CM

## 2018-12-20 DIAGNOSIS — E66.01 CLASS 3 SEVERE OBESITY WITH SERIOUS COMORBIDITY AND BODY MASS INDEX (BMI) OF 40.0 TO 44.9 IN ADULT, UNSPECIFIED OBESITY TYPE: ICD-10-CM

## 2018-12-20 DIAGNOSIS — Z79.4 TYPE 2 DIABETES MELLITUS WITH DIABETIC POLYNEUROPATHY, WITH LONG-TERM CURRENT USE OF INSULIN: ICD-10-CM

## 2018-12-20 PROCEDURE — 3008F BODY MASS INDEX DOCD: CPT | Mod: CPTII,S$GLB,, | Performed by: NURSE PRACTITIONER

## 2018-12-20 PROCEDURE — 3046F HEMOGLOBIN A1C LEVEL >9.0%: CPT | Mod: CPTII,S$GLB,, | Performed by: NURSE PRACTITIONER

## 2018-12-20 PROCEDURE — 99999 PR PBB SHADOW E&M-EST. PATIENT-LVL IV: CPT | Mod: PBBFAC,,, | Performed by: NURSE PRACTITIONER

## 2018-12-20 PROCEDURE — 99204 OFFICE O/P NEW MOD 45 MIN: CPT | Mod: S$GLB,,, | Performed by: NURSE PRACTITIONER

## 2018-12-20 PROCEDURE — 3078F DIAST BP <80 MM HG: CPT | Mod: CPTII,S$GLB,, | Performed by: NURSE PRACTITIONER

## 2018-12-20 PROCEDURE — 3074F SYST BP LT 130 MM HG: CPT | Mod: CPTII,S$GLB,, | Performed by: NURSE PRACTITIONER

## 2018-12-20 PROCEDURE — 91200 LIVER ELASTOGRAPHY: CPT | Mod: S$GLB,,, | Performed by: NURSE PRACTITIONER

## 2018-12-20 NOTE — PROCEDURES
Fibroscan Procedure     Name: Gio Forrest  Date of Procedure : 2018   :: Aide Kilpatrick NP  Diagnosis: fatty liver    Probe: XL    Fibroscan readin.4 KPa    Fibrosis:F2     CAP readin dB/m    Steatosis: :S3 , >67% steatosis

## 2018-12-20 NOTE — PROGRESS NOTES
I have personally performed a face to face diagnostic evaluation on this patient. I have reviewed and agree with today's findings and the care plan outlined by Aide Kilpatrick NP with following comments:     Gio Forrest is a pleasant 53 y.o. male who was referred for elevated liver enzymes and sonographic finding of hepatic steatosis.     The patient's risk factors for NAFLD include:    · Obesity/overweight                     Yes (BMI 40)  · Dyslipidemia                                yes  · Insulin resistance/Diabetes         Yes (A1C 10.9)  · Family history of diabetes           yes    Agree with obtaining VCTE for hepatic fibrosis/steatosis assessment and checking serologies to rule out other causes of chronic liver diseases for the sake of thoroughness in work up.     We have counseled the patient regarding the life-style modifications: weight loss, low calorie/low fat diet and exercise.

## 2018-12-20 NOTE — LETTER
December 20, 2018      Gabby Rooney DNP, NP  [address]  [facility] [address]           [facility]  [address]  [facility] [address]  Phone: [phone]  Fax: [phone]          Patient: Gio Forrest   MR Number: [id number]   YOB: 1965   Date of Visit: 12/20/2018       Dear Gabby Rooney:    Thank you for referring Gio Forrest to me for evaluation. Attached you will find relevant portions of my assessment and plan of care.    If you have questions, please do not hesitate to call me. I look forward to following Gio Forrest along with you.    Sincerely,    Aide Kilpatrick, [place]    Enclosure  CC:  No Recipients    If you would like to receive this communication electronically, please contact [email or web address] or [phone] to request more information on [employer] Link access.    For providers and/or their staff who would like to refer a patient to [facility], please contact us through our one-stop-shop provider referral line, [facility] [place], at [phone].    If you feel you have received this communication in error or would no longer like to receive these types of communications, please e-mail [email or web address]

## 2018-12-20 NOTE — PROGRESS NOTES
[facility] Visit New Patient Note    REFERRING PROVIDER: Gabby Rooney    CHIEF COMPLAINT: Fatty liver    HPI: This is a 53 y.o. White male referred for evaluation of fatty liver that he has known about for many years. His risk factors for fatty liver include obesity, Body mass index is 40.4 kg/m²., DM2, HLD, HTN. Diabetes does not appear well controlled, most recent HgbA1c 10.9; currently on insulin, actos, and metformin (followed by Endocrine). He is trying to cut portions but eats a lot of carbohydrates.     Denies any current alcohol use though reports history of alcohol abuse > 30 years ago (stopped drinking in 93). He denies and IV or intranasal drug use. No herbal supplements. Of note, his mother had a history of fatty liver and liver cancer.     He feels well overall, does complain of some fatigue. Denies symptoms of hepatic decompensation including jaundice, dark urine, hematemesis, melena, slowed mentation, or abdominal distention. Reports mild pedal swelling.      Abd US (5/26/18): liver normal size (15.1 cm), hepatic steatosis; no comment on spleen size.     Review of available labs:  - Transaminases: elevated (40-80s)  - Alk Phos: normal  - Synthetic liver function: normal   - Platelets: normal, 150-200s        Review of patient's allergies indicates:  No Known Allergies     Current Outpatient Medications on File Prior to Visit   Medication Sig Dispense Refill    allopurinol (ZYLOPRIM) 100 MG tablet Take 1 tablet (100 mg total) by mouth once daily. 90 tablet 0    ammonium lactate 12 % Crea APPLY TO THE AFFECTED AREA(S) TWICE DAILY  3    aspirin (ECOTRIN) 81 MG EC tablet Take 81 mg by mouth once daily.       atorvastatin (LIPITOR) 40 MG tablet Take 1 tablet (40 mg total) by mouth every evening. 90 tablet 1    benazepril (LOTENSIN) 5 MG tablet Take 0.5 tablets (2.5 mg total) by mouth once daily. Hold as of 06/18/2018 Vera Wms 180 tablet 1    cyclobenzaprine (FLEXERIL) 10 MG tablet  "Take 10 mg by mouth 2 (two) times daily.  5    diclofenac sodium 1 % Gel Apply 2 g topically 4 (four) times daily. 100 g 2    doxepin (SILENOR) 3 mg Tab Take 1 tablet by mouth nightly as needed. 30 tablet 1    gabapentin (NEURONTIN) 300 MG capsule Take 2 capsules (600 mg total) by mouth 3 (three) times daily. 180 capsule 3    hydrocodone-acetaminophen 10-325mg (NORCO)  mg Tab Take 1 tablet by mouth 2 (two) times daily.       insulin aspart protamine-insulin aspart (NOVOLOG MIX 70-30FLEXPEN U-100) 100 unit/mL (70-30) InPn pen 80 units at breakfast and dinner 11 Box 3    levoFLOXacin (LEVAQUIN) 750 MG tablet Take 1 tablet (750 mg total) by mouth once daily. 5 tablet 0    metFORMIN (GLUCOPHAGE) 1000 MG tablet Take 1 tablet (1,000 mg total) by mouth 2 (two) times daily. 180 tablet 1    MULTIVIT,THER IRON,CA,FA & MIN (MULTIVITAMIN) Tab Take 1 tablet by mouth once daily.       oxyCODONE-acetaminophen (PERCOCET)  mg per tablet Take 1 tablet by mouth 3 (three) times daily as needed.  0    pen needle, diabetic (BD ULTRA-FINE MERLE PEN NEEDLE) 32 gauge x 5/32" Ndle Uses 2 daily 200 each 3    pioglitazone (ACTOS) 15 MG tablet Take 1 tablet (15 mg total) by mouth once daily. 90 tablet 3    pramipexole (MIRAPEX) 1 MG tablet Take 2 tablets (2 mg total) by mouth nightly. 60 tablet 2    tiZANidine (ZANAFLEX) 4 MG tablet tizanidine 4 mg tablet      ACCU-CHEK SMARTVIEW TEST STRIP Strp       citalopram (CELEXA) 40 MG tablet Take 1 tablet (40 mg total) by mouth once daily. 90 tablet 1    pantoprazole (PROTONIX) 40 MG tablet Take 1 tablet (40 mg total) by mouth once daily. 90 tablet 1     No current facility-administered medications on file prior to visit.        PMHX:  has a past medical history of Cardiomyopathy, Depression, Diabetes, Dyslipidemia (8/12/2015), Gout (8/12/2015), Hyperlipidemia, Hypertension, Idiopathic gout, Lumbar pseudoarthrosis, LINDSEY (nonalcoholic steatohepatitis), Obesity (8/12/2015), " "Obstructive sleep apnea (8/12/2015), Obstructive sleep apnea (8/12/2015), Restless leg syndrome, Sebaceous cyst (4/18/2017), and Type 2 diabetes mellitus (8/12/2015).    PSHX:  has a past surgical history that includes Hernia repair; Shoulder arthroscopy; Cervical spine surgery; Spine surgery; Colonoscopy (2008); Tonsillectomy; Elbow surgery (Bilateral); Back surgery; Cardiac catheterization; Cholecystectomy (05/30/2018); CHOLECYSTECTOMY, LAPAROSCOPIC (N/A, 5/30/2018); Coronary angiography (Left, 5/28/2018); Left heart cath (Left, 5/28/2018); COLONOSCOPY (N/A, 9/14/2017); ESOPHAGOGASTRODUODENOSCOPY (EGD) (N/A, 9/14/2017); and DILATION-ESOPHAGUS (N/A, 9/14/2017).    FAMILY HISTORY: Negative for liver disease, reviewed in EPIC.    SOCIAL HISTORY:   Social History     Tobacco Use   Smoking Status Former Smoker   Smokeless Tobacco Never Used       Social History     Substance and Sexual Activity   Alcohol Use No    Alcohol/week: 0.0 oz       Social History     Substance and Sexual Activity   Drug Use No       ROS:   GENERAL: Denies fever, chills, weight loss/gain. (+) fatigue  HEENT: Denies headaches, dizziness, vision/hearing changes  CARDIOVASCULAR: Denies chest pain, palpitations. Intermittent swelling to bilateral feet   RESPIRATORY: Denies dyspnea, cough  GI: Denies abdominal pain, rectal bleeding, nausea, vomiting. No change in bowel pattern or color  : Denies dysuria, hematuria   SKIN: Denies rash, itching   NEURO: Denies confusion, memory loss, or mood changes  PSYCH: Denies depression or anxiety  HEME/LYMPH: Denies easy bruising or bleeding  MSK: Denies joint pain or myalgia.     PHYSICAL EXAM:   Friendly White male, in no acute distress; alert and oriented to person, place and time  VITALS: /68 (BP Location: Left arm, Patient Position: Sitting, BP Method: Medium (Automatic))   Pulse 92   Temp 98.3 °F (36.8 °C) (Oral)   Resp 18   Ht 6' 1" (1.854 m)   Wt (!) 138.9 kg (306 lb 3.5 oz)   SpO2 97%   " BMI 40.40 kg/m²   HENT: Normocephalic, without obvious abnormality. Oral mucosa pink and moist.   EYES: Sclerae anicteric.   NECK: Supple. No masses or cervical adenopathy.  CARDIOVASCULAR: Regular rate and rhythm. No murmurs.  RESPIRATORY: Normal respiratory effort. BBS CTA. No wheezes or crackles.  GI: Soft, non-tender, non-distended. No palpable hepatosplenomegaly. No masses palpable. No ascites.  EXTREMITIES:  No clubbing, cyanosis or edema.  SKIN: Warm and dry. No jaundice. No rashes noted to exposed skin. No telangectasias noted. No palmar erythema.  NEURO:  Normal gait. No asterixis.  PSYCH:  Memory intact. Thought and speech pattern appropriate. Behavior normal. No depression or anxiety noted.    RECENT LABS:    Labs:  Lab Results   Component Value Date    WBC 6.60 10/26/2018    HGB 11.7 (L) 10/26/2018    HCT 37.0 (L) 10/26/2018     10/26/2018    CHOL 117 (L) 12/11/2018    TRIG 120 12/11/2018    HDL 29 (L) 12/11/2018     (L) 12/11/2018    K 5.2 (H) 12/11/2018    CREATININE 1.3 12/11/2018    ALT 84 (H) 12/11/2018    AST 76 (H) 12/11/2018    ALKPHOS 124 12/11/2018    BILITOT 0.5 12/11/2018    ALBUMIN 3.8 12/11/2018    INR 1.1 10/25/2018       No results found for: HEPAIGG    No results found for: HEPBSAG  No results found for: HEPBCAB  No results found for: HEPBSAB  No results found for: HEPCAB     DIAGNOSTIC STUDIES:  ABD. U/S - 5/26/18  FINDINGS:  Liver measures 15.1 cm with increased echogenicity.  No focal hepatic mass or intrahepatic ductal dilatation.  Normal directional flow is in the main portal vein.    Pancreas, abdominal aorta and IVC are normal.    Gallbladder contains a 1.0 cm shadowing calculus, nonmobile in the gallbladder neck.  No gallbladder wall thickening, pericholecystic fluid or sonographic Corral sign.  Common bile duct measures 0.5 cm.    Right kidney measures 12.3 x 1.6 x 1.3 cm. No stones or hydronephrosis.  Renal echogenicity is normal.      Impression       1. Nonmobile  stone in the gallbladder neck without other ultrasound findings suggestive of acute cholecystitis  2. Hepatic steatosis         ASSESSMENT:  53 y.o. White male with:  1. Fatty liver  -- Liver enzymes: transaminases elevated (40-80s), alk phos normal   -- Synthetic liver function: normal; platelets normal  -- US or other imaging: liver normal size, hepatic steatosis  -- Risk factors for fatty liver include: obesity, HTN, HLD, DM2     2. Diabetes mellitus type 2  -- HgbA1c 10.9    3. Hyperlipidemia  4. Obesity, Body mass index is 40.4 kg/m².      EDUCATION / DISCUSSION:   We discussed the manifestations of fatty liver. At this time there is no FDA approved therapy for fatty liver. The patient and I discussed the importance of lifestyle modifications such as diet, exercise, and weight loss for management of fatty liver. We reviewed modification of risk factors such as hypertension, hyperlipidemia, and diabetes mellitus / impaired fasting glucose. Discussed that excessive alcohol consumption can also cause fatty liver. We discussed a low carb, low sugar diet and a goal of 30 minutes of exercise 5 times per week. Patient is aware that fatty liver can progress to steatohepatitis and possibly to cirrhosis, putting one at increased risk for liver cancer, liver failure, and death.       PLAN:  1. Fibroscan today for fibrosis and steatosis staging  2. Serologic workup to rule out causes of liver disease  3. Referral to bariatric medicine for medical weight loss consult  4. Will check immunity markers for HBV/HAV and arrange for vaccination if needed.  5. Lifestyle modifications for management of fatty liver:   -- Weight loss goal of at least 15-20 lbs  -- Low carbohydrate, low sugar diet  -- Exercise, 5 days a week, 30 min a day  -- Recommend good control of cholesterol, blood pressure, blood sugar levels. Continue following with Endocrine to improve blood sugar control.   6. Follow-up pending above results     Thank you for  allowing me to participate in the care of AQUILINO Romo-C  Hepatology and Liver Transplant     Patient was seen in collaboration with Dr. Brooks      Addendum: Fibroscan kPa = 9.4, F2, moderate fibrosis. CAP = 373, S3, >67% steatosis. Results reviewed with patient in clinic. Follow-up pending lab results, likely around 3 months.

## 2018-12-26 ENCOUNTER — INITIAL CONSULT (OUTPATIENT)
Dept: BARIATRICS | Facility: CLINIC | Age: 53
End: 2018-12-26
Payer: MEDICARE

## 2018-12-26 VITALS
WEIGHT: 309.06 LBS | SYSTOLIC BLOOD PRESSURE: 90 MMHG | HEART RATE: 70 BPM | BODY MASS INDEX: 40.96 KG/M2 | HEIGHT: 73 IN | DIASTOLIC BLOOD PRESSURE: 48 MMHG

## 2018-12-26 DIAGNOSIS — I42.8 OTHER CARDIOMYOPATHY: ICD-10-CM

## 2018-12-26 DIAGNOSIS — E66.01 CLASS 3 SEVERE OBESITY DUE TO EXCESS CALORIES WITH SERIOUS COMORBIDITY AND BODY MASS INDEX (BMI) OF 40.0 TO 44.9 IN ADULT: Primary | ICD-10-CM

## 2018-12-26 DIAGNOSIS — M10.00 IDIOPATHIC GOUT, UNSPECIFIED CHRONICITY, UNSPECIFIED SITE: ICD-10-CM

## 2018-12-26 DIAGNOSIS — I10 ESSENTIAL HYPERTENSION: ICD-10-CM

## 2018-12-26 DIAGNOSIS — G47.33 OSA ON CPAP: ICD-10-CM

## 2018-12-26 DIAGNOSIS — Z79.4 TYPE 2 DIABETES MELLITUS WITH DIABETIC POLYNEUROPATHY, WITH LONG-TERM CURRENT USE OF INSULIN: ICD-10-CM

## 2018-12-26 DIAGNOSIS — E11.42 TYPE 2 DIABETES MELLITUS WITH DIABETIC POLYNEUROPATHY, WITH LONG-TERM CURRENT USE OF INSULIN: ICD-10-CM

## 2018-12-26 DIAGNOSIS — K76.0 FATTY LIVER: ICD-10-CM

## 2018-12-26 PROCEDURE — 99215 OFFICE O/P EST HI 40 MIN: CPT | Mod: S$GLB,,, | Performed by: INTERNAL MEDICINE

## 2018-12-26 PROCEDURE — 99999 PR PBB SHADOW E&M-EST. PATIENT-LVL III: CPT | Mod: PBBFAC,,, | Performed by: INTERNAL MEDICINE

## 2018-12-26 PROCEDURE — 3046F HEMOGLOBIN A1C LEVEL >9.0%: CPT | Mod: CPTII,S$GLB,, | Performed by: INTERNAL MEDICINE

## 2018-12-26 PROCEDURE — 3078F DIAST BP <80 MM HG: CPT | Mod: CPTII,S$GLB,, | Performed by: INTERNAL MEDICINE

## 2018-12-26 PROCEDURE — 3074F SYST BP LT 130 MM HG: CPT | Mod: CPTII,S$GLB,, | Performed by: INTERNAL MEDICINE

## 2018-12-26 PROCEDURE — 3008F BODY MASS INDEX DOCD: CPT | Mod: CPTII,S$GLB,, | Performed by: INTERNAL MEDICINE

## 2018-12-26 NOTE — PROGRESS NOTES
Subjective:       Patient ID: Gio Forrest is a 53 y.o. male.    Chief Complaint: Consult    CC:Weight.     Pt seen with brother present.     Current attempts at weight loss: New pt to me, referred by Aide Kilpatrick, NP  [address] [place] [address] , with Patient Active Problem List:     Dyslipidemia     Cardiomyopathy     TARYN on CPAP     Hypertension     Gout     Nonspecific abnormal cardiovascular system function study     Dyspnea and respiratory abnormality     Sebaceous cyst     History of colon polyps     Morbid obesity     Calculus of gallbladder with acute cholecystitis without obstruction     Type 2 diabetes mellitus with diabetic polyneuropathy, with long-term current use of insulin- on 80 units BID of 70/30. Recently started actos.      History of arthroscopy of left shoulder     Fatty liver       Lab Results       Component                Value               Date                       ALT                      84 (H)              12/11/2018                 AST                      76 (H)              12/11/2018                 ALKPHOS                  124                 12/11/2018                 BILITOT                  0.5                 12/11/2018               Lab Results       Component                Value               Date                       HGBA1C                   10.9 (H)            12/11/2018                 HGBA1C                   11.7 (H)            09/06/2018                 HGBA1C                   8.1 (H)             05/18/2018            Lab Results       Component                Value               Date                       LDLCALC                  64.0                12/11/2018                 CREATININE               1.3                 12/11/2018         Does not exercise. Does have gym membership.   Likes sweets.    Previous diet attempts: Has been to dietician.      History of medication for loss: Denies.   checked today. Is on chronic narcotics.  "He was on Tanzeum in the past. Did not have problems with it.     Heaviest weight: 315#    Lightest weight: 195#    Goal weight: 200#      Last eye exam:    Recent. No glaucoma.                                    Provider:    Typical eating patterns: Disabled. Does cut grass.  Lives with wofe and 10 yo son. Wife does most cooking. They do eat out often.    Breakfast: eggs, grits,. Melendez, sausage. Cereal- chocolate with marshmallows.   Weekends: pancakes    Lunch: BK- 2 burgers and fries. Waffle house bf with waffle.     Dinner: chicken or pork chop or ground meat with pot and veg.  BBQ    Snacks: cakes, cookies, pies.     Beverages: diet soda, sweet tea, water, denies ETOH. 1-2 glasses milk a day.     Willingness to change:  7/10    EKG:Sinus tachycardia  Otherwise normal ECG  Compared to ECG 05/26/2018 23:25:22  Sinus rhythm no longer present  Myocardial infarct finding no longer present    BMR: 2308      Review of Systems   Constitutional: Negative for chills and fever.   Respiratory: Positive for shortness of breath.         Uses CPAP regularly   Cardiovascular: Positive for leg swelling. Negative for chest pain.   Gastrointestinal: Negative for constipation and diarrhea.        Denies GERD on PPI   Genitourinary: Negative for difficulty urinating and dysuria.   Musculoskeletal: Positive for arthralgias and back pain.   Neurological: Positive for light-headedness. Negative for dizziness.        Positional       Objective:     BP (!) 90/48   Pulse 70   Ht 6' 1" (1.854 m)   Wt (!) 140.2 kg (309 lb 1.4 oz)   BMI 40.78 kg/m²     Physical Exam   Constitutional: He is oriented to person, place, and time. He appears well-developed. No distress.   Morbidly obese    HENT:   Head: Normocephalic and atraumatic.   Eyes: Pupils are equal, round, and reactive to light. No scleral icterus.   Neck: Normal range of motion. Neck supple. No thyromegaly present.   Cardiovascular: Normal rate, regular rhythm and normal heart " sounds. Exam reveals no gallop and no friction rub.   No murmur heard.  Pulmonary/Chest: Effort normal and breath sounds normal. No respiratory distress. He has no wheezes.   Abdominal: Soft. Bowel sounds are normal. He exhibits no distension. There is no tenderness.   Musculoskeletal: Normal range of motion. He exhibits edema.   Neurological: He is alert and oriented to person, place, and time.   Skin: Skin is warm and dry.   Psychiatric: He has a normal mood and affect. His behavior is normal. Judgment normal.   Vitals reviewed.      Assessment:       1. Class 3 severe obesity due to excess calories with serious comorbidity and body mass index (BMI) of 40.0 to 44.9 in adult    2. TARYN on CPAP    3. Type 2 diabetes mellitus with diabetic polyneuropathy, with long-term current use of insulin    4. Fatty liver    5. Essential hypertension    6. Other cardiomyopathy    7. Idiopathic gout, unspecified chronicity, unspecified site        Plan:         1. Class 3 severe obesity due to excess calories with serious comorbidity and body mass index (BMI) of 40.0 to 44.9 in adult  Exercise 30 min 3 days a week.     Patient counseled in strategies for long term weight loss and maintenance: Keeping a food diary, exercise for 1 hour a day and eating breakfast everyday.       Protein and fiber in every meal.     3 meals a day made up of the following:  Unlimited green vegetables, tomatoes, mushrooms, spaghetti squash, cauliflower, meat, poultry, seafood, eggs and hard cheeses.   Milk and plain yogurt  Dressings, seasonings, condiments, etc should have less than 2 g sugars.   Beans (1-1.5 cups) or nuts (1/4 cup) can have 1 x a day.   1-2 servings of citrus fruits, berries, pineapple or melon a day (1/2 cup)  Avoid fried foods    No/limit grains, rice, pasta, potatoes, bread, corn, peas, oatmeal, grits, tortillas, crackers, chips    No soda, sweet tea, juices or lemonade    [email or web address] for recipes. Moderate carb  intake    1700 sergio menu and meal ideas given.     2. TARYN on CPAP  Discussed importance of compliance with treatment, and that TARYN does require getting closer to normal BMI range to see improvement that some other co-morbidities. Continue with CPAP.     3. Type 2 diabetes mellitus with diabetic polyneuropathy, with long-term current use of insulin    - semaglutide (OZEMPIC) 0.25 mg or 0.5 mg(2 mg/1.5 mL) PnIj; Inject 0.5 mg into the skin every 7 days.  Dispense: 1.5 mL; Refill: 0  - semaglutide (OZEMPIC) 1 mg/0.75 mL (2 mg/1.5 mL) PnIj; Inject 1 mg into the skin every 7 days.  Dispense: 1.5 mL; Refill: 3    Start Ozempic once a week. Start with 0.25mg once a week x 4 weeks, then 0.5 mg weekly for 2 weeks, then fill 1 mg Rx.      Decrease portions as soon as you start Ozempic. Some nausea in the first 2 weeks is not unusual.     If you get pain across the upper abdomen and around to your back, please call the office.     Check blood sugar regularly as medications may need to be adjusted with changes in diet and weight loss.       4. Fatty liver  Discussed with patient that the only treatment for fatty liver is to lose weight.  Without doing so, it may progress to cirrhosis, permanent liver damage and possibly liver failure. Expect improvement with weight loss.      5. Essential hypertension  The current medical regimen is effective;  continue present plan and medications. Expect improvement with weight loss.     6. Other cardiomyopathy  Avoid stimulant anorectics     7. Idiopathic gout, unspecified chronicity, unspecified site  Discussed with pt that rapid weight loss can cause precipitation of gout attacks. Should pt have any gout flairs, we can add colchicine for prophylaxsis. Gout hand out given.

## 2018-12-26 NOTE — PATIENT INSTRUCTIONS
Start Ozempic once a week. Start with 0.25mg once a week x 4 weeks, then 0.5 mg weekly for 2 weeks, then fill 1 mg Rx.      Decrease portions as soon as you start Ozempic. Some nausea in the first 2 weeks is not unusual.     If you get pain across the upper abdomen and around to your back, please call the office.     Check blood sugar regularly as medications may need to be adjusted with changes in diet and weight loss.     Exercise 30 min 3 days a week.     Patient counseled in strategies for long term weight loss and maintenance: Keeping a food diary, exercise for 1 hour a day and eating breakfast everyday.       Protein and fiber in every meal.     3 meals a day made up of the following:  Unlimited green vegetables, tomatoes, mushrooms, spaghetti squash, cauliflower, meat, poultry, seafood, eggs and hard cheeses.   Milk and plain yogurt  Dressings, seasonings, condiments, etc should have less than 2 g sugars.   Beans (1-1.5 cups) or nuts (1/4 cup) can have 1 x a day.   1-2 servings of citrus fruits, berries, pineapple or melon a day (1/2 cup)  Avoid fried foods    No/limit grains, rice, pasta, potatoes, bread, corn, peas, oatmeal, grits, tortillas, crackers, chips    No soda, sweet tea, juices or lemonade    [email or web address] for recipes. Moderate carb intake    Sample Menu Plan: 1700 Calories;  grams of Protein     DAY 1     Breakfast  1 cup 2% cottage cheese, 1/2 cup fruit      Snack  200 calorie low-carb protein drink (4 grams sugar or less)    Lunch  Tuna salad on lettuce and tomato, using light saucedo    Snack  1oz unsalted nuts and 17 grapes (or a piece of fruit)    Dinner  3-4oz grilled fish   ½ mashed sweet potato with no calorie spray butter  1/2 cup cooked spinach, and1 cup salad with spray dressing    Snack  60 calorie ice cream bar, fudgsicle or frozen fruit bar      DAY 2    Breakfast  2 eggs with 1oz low-fat cheese, 1 slice Croatian hawkins  Snack  200 calorie low-carb protein drink (4 grams  sugar or less)    Lunch  Turkey and low-fat cheese  roll up with lettuce and tomato    Snack  1 cup low-fat cottage cheese, ½ cup fruit    Dinner  Baked chicken thigh  ½ cup garbanzo with olive oil and parmesan cheese  ½ cup cooked green beans, and1 cup salad with spray dressing    Snack  Greek yogurt cup    DAY 3    Breakfast   200 calorie low-carb protein drink (4 grams sugar or less)    Snack  Atkins protein bar    Lunch  Grilled Chicken sandwich, with lettuce, tomato and ¼ small avocado    Snack  2 sugar-free popsicles  1oz unsalted nuts    Dinner  1 cup red, white or black beans, 1 cup seasoned cauliflower rice  ½ cup green beans or other cooked vegetable    Snack  Greek yogurt cup      DAY 4    Breakfast  1 tablespoon peanut butter and ½ banana   Light yogurt cup (less than 100 calories)    Snack  Mozzarella string cheese  Fruit cup (no sugar added)    Lunch  1 cup garbanzo or white beans, with 3oz chicken breast , and ½ cup steamed broccoli. Toss with 1 tbsp olive oil and ½ cup shredded parmesan cheese  1 cup salad with spray dressing    Dinner  1 cup broth based vegetable and beef soup  200 calorie low-carb protein drink (4 grams sugar or less)    Snack  60 calorie ice cream bar, fudgsicle or frozen fruit bar        Meal Ideas for Regular Bariatric Diet  *Recipes and products available at [email or web address]      Breakfast: (15-20g protein)    - Egg white omelet: 2 egg whites or ½ cup Egg Beaters. (Optional proteins: cheese, shrimp, black beans, chicken, sliced turkey) (Optional veggies: tomatoes, salsa, spinach, mushrooms, onions, green peppers, or small slice avocado)     - Egg and sausage: 1 egg or ¼ cup Egg Beaters (any variety), with 1 gonzales or 2 links of Turkey sausage or Veggie breakfast sausage ([place] or Helijiaa)    - Crust-less breakfast quiche: To make a glass pie dish, mix 4oz part skim Ricotta, 1 cup skim milk, and 2 eggs as your base. Add protein: shredded cheese, sliced lean ham or  turkey, turkey hawkins/sausage. Add veggies: tomato, onion, green onion, mushroom, green pepper, spinach, etc.    - Yogurt parfait: Mix 1 - 6oz container Dannon Light N Fit vanilla yogurt, with ¼ cup Kashi Go Lean cereal    - Cottage cheese and fruit: ½ cup part-skim cottage cheese or ricotta cheese topped with fresh fruit or sugar free preserves     - Rupa Medley's Vanilla Egg custard* (add 2 Tbsp instant coffee granules to make Cappuccino Custard*)    - Hi-Protein café latte (skim milk, decaf coffee, 1 scoop protein powder). Optional to add Sugar free syrup or extract flavoring.    Lunch: (20-30g protein)    - ½ cup Black bean soup (Homemade or Progresso), with ¼ cup shredded low-fat cheese. Top with chopped tomato or fresh salsa.     - Lean deli turkey breast and low-fat sliced cheese, mustard or light saucedo to moisten, rolled up together, or wrapped in a Mick lettuce leaf    - Chicken salad made from dinner leftovers, moisten with low-fat salad dressing or light saucedo. Also try leftover salmon, shrimp, tuna or boiled eggs. Serve ½ cup over dark green salad    - Fat-free canned refried beans, topped with ¼ cup shredded low-fat cheese. Top with chopped tomato or fresh salsa.     - Greek salad: Top mixed greens with 1-2oz grilled chicken, tomatoes, red onions, 2-3 kalamata olives, and sprinkle lightly with feta cheese. Spritz with Balsamic vinegar to taste.     - Crust-less lunch quiche: To make a glass pie dish, mix 4oz part skim Ricotta, 1 cup skim milk, and 2 eggs as your base. Add protein: shredded cheese, sliced lean ham or turkey, shrimp, chicken. Add veggies: tomato, onion, green onion, mushroom, green pepper, spinach, artichoke, broccoli, etc.    - Pizza bake: tomato sauce, low-fat shredded mozzarella and turkey pepperoni or [place]araguan hawkins. Add any veggies.    - Cucumber crab bites: Spread ¼ cup crab dip (lump crabmeat + light cream cheese and green onions) over sliced cucumber.     - Chicken with light  spinach and artichoke dip*: Puree in : 6oz cooked and drained spinach, 2 cloves garlic, 1 can cannelloni beans, ½ cup chopped green onions, 1 can drained artichoke hearts (not marinated in oil), lemon juice and basil. Mix in 2oz chopped up chicken.    Supper: (20-30g protein)    - Serve grilled fish over dark green salad tossed with low-fat dressing, served with grilled asparagus mora     - Rotisserie chicken salad: served with sliced strawberries, walnuts, fat-free feta cheese crumbles and 1 tbsp Torres Own Light Raspberry [place] Vinaigrette    - Shrimp cocktail: Dip cold boiled shrimp in homemade low-sugar cocktail sauce (1/2 cup Nannette One Carb ketchup, 2 tbsp horseradish, 1/4 tsp hot sauce, 1 tsp Worcestershire sauce, 1 tbsp freshly-squeezed lemon juice). Serve with dark green salad, walnuts, and crumbled blue cheese drizzled with olive oil and Balsamic vinegar    - Tuna Melt: Spread tuna salad onto 2 thick slices of tomato. Top with low-fat cheese and broil until cheese is melted. May also be made with chicken salad of shrimp salad. Hurricane with different types of cheeses.    - Homemade low-fat Chili using extra lean ground beef or ground turkey. Top with shredded cheese and salsa as desired. May add dollop fat-free sour cream if desired    - Dinner Omelet with shrimp or chicken and onion, green peppers and chives.    - No noodle lasagna: Use sliced zucchini or eggplant in place of noodles.  Layer with part skim ricotta cheese and low sugar meat sauce (use very lean ground beef or ground turkey).    - Mexican chicken bake: Bake chunks of chicken breast or thigh with taco seasoning, Pace brand enchilada sauce, green onions and low-fat cheese. Serve with ¼ cup black beans or fat free refried beans topped with chopped tomatoes or salsa.    - Sade frozen meatballs, simmered in Classico Marinara sauce. Different flavors of salsa or spaghetti sauce create different dishes! Sprinkle with  parmesan cheese. Serve with grilled or steamed veggies, or a dark green salad.    - Simmer boneless skinless chicken thigh chunks in Classico Marinara sauce or roasted salsa until tender with chopped onion, bell pepper, garlic, mushrooms, spinach, etc.     - Hamburger, without the bun, dressed the way you like. Served with grilled or steamed veggies.    - Eggplant parmesan: Bake slices of eggplant at 350 degrees for 15 minutes. Layer tomato sauce, sliced eggplant and low-fat mozzarella cheese in a baking dish and cover with foil. Bake 30-40 more minutes or until bubbly. Uncover and bake at 400 degrees for about 15 more minutes, or until top is slightly crisp.    - Fish tacos: grilled/baked white fish, wrapped in Mick lettuce leaf, topped with salsa, shredded low-fat cheese, and light coleslaw.    Snacks: (100-200 calories; >5g protein)    - 1 low-fat cheese stick with 8 cherry tomatoes or 1 serving fresh fruit  - 4 thin slices fat-free turkey breast and 1 slice low-fat cheese  - 4 thin slices fat-free honey ham with wedge of melon  - 1/4 cup unsalted nuts with ½ cup fruit  - 6-oz container Dannon Light n Fit vanilla yogurt, topped with 1oz unsalted nuts         - apple, celery or baby carrots spread with 2 Tbsp natural peanut butter or almond butter   - apple slices with 1 oz slice low-fat cheese  - celery, cucumber, bell pepper or baby carrots dipped in ¼ cup hummus bean spread or light spinach and artichoke dip (*recipe in lunch section)  - 100 calorie bag microwave light popcorn with 3 tbsp grated parmesan cheese  - Neal Links Beef Steak - 14g protein! (similar to beef jerky)  - 2 wedges Laughing Cow - Light Herb & Garlic Cheese with sliced cucumber or green bell pepper  - 1/2 cup low-fat cottage cheese with ¼ cup fruit or ¼ cup salsa  - RTD Protein drinks: Atkins, Low Carb Slim Fast, EAS light, Muscle Milk Light, etc.  - Homemade Protein drinks: GNC Soy95, Isopure, Nectar, UNJURY, Whey Gourmet, etc. Mix 1  scoop powder with 8oz skim/1% milk or light soymilk.  - Protein bars: Atkins, EAS, Pure Protein, Think Thin, Detour, etc. Must have 0-4 grams sugar - Read the label.    Takeout Options: No more than twice/week  Deli - Salads (no pasta or rice), meats, cheeses. Roasted chicken. Lox (salmon)    Mexican - Platters which don't include tortillas, chips, or rice. Go easy on the beans. Example: Fajitas without the tortillas. Ask the  not to bring chips to the table if they are too tempting.    Greek - Meat or fish and vegetable, but no bread or rice. Including hummus, baba ganoush, etc, is OK. Most sit-down Greek restaurants can provide you with cucumber slices for dipping instead of chris bread.    Fast Food (Avoid as much as possible) - Salads (no croutons and limit salad dressing to 2 tbsp), grilled chicken sandwich without the bun and ask for no saucedo. Sonyas low fat chili or Taco Bell pintos and cheese.    BBQ - The meats are fine if you ask for sauces on the side, but most of the traditional side dishes are loaded with carbs. Segun slaw, baked beans and BBQ sauce are typically made with sugar.    Chinese - Nothing deep-fried, no rice or noodles. Many Chinese sauces have starch and sugar in them, so you'll have to use your judgement. If you find that these sauces trigger cravings, or cause Dumping, you can ask for the sauce to be made without sugar or just use soy sauce.        NUTRITION THERAPY for GOUT  With proper treatment, people who have gout do not usually progress to the chronic tophaceous phase of gout. What is the proper treatment of gout?  to help control gout and there are lifestyle recommendations. People with gout are advised to:  Avoid alcohol or drink alcohol in moderation  Drink plenty of water and other fluids  Maintain an ideal body weight   if overweight but avoid fasting or quick weight loss schemes    How to Treat Gout with Diet and other Modifications  What Should You Eat?  Dietary  restrictions suggest what people should not eat, but what should people eat? What foods will help control gout attacks? The American Medical Association recommends the following dietary guidelines for people with gout, advising them to eat a diet:  high in complex carbohydrates (fiber-rich whole grains, fruits, and vegetables)  low in protein (15% of calories and sources should be soy, lean meats, or poultry)  no more than 30% of calories in fat (with only 10% animal fats)                   (below 50mg purine per 100 gms of edible portion)   Milk and milk products   Eggs   Cereals   Vegetables except those listed below   Fruits   Fats and oils ( 1-2 tbsp. per day)   Nuts                   (50 - 500 mg purine per 100 gms of edible portion )    Vegetables like peas, beans, spinach, apple, mushrooms and cauliflower                  ( 500 - 1000 mg purine per 100 gms of edible portion)   Meat   Crab and Fish especially white bait, sardines, and herring.   Liver   Kidney   Heart   Pancreas

## 2018-12-26 NOTE — LETTER
December 27, 2018      Aide Kilpatrick, NP  [address] [facility] [address]           [facility]  [address]  [place] [address]  Phone: [phone]  Fax: [phone]          Patient: Gio Forrest   MR Number: [id number]   YOB: 1965   Date of Visit: 12/26/2018       Dear Aide Kilpatrick:    Thank you for referring Gio Forrest to me for evaluation. Attached you will find relevant portions of my assessment and plan of care.    If you have questions, please do not hesitate to call me. I look forward to following Gio Forrest along with you.    Sincerely,    Jessika Easley MD    Enclosure  CC:  No Recipients    If you would like to receive this communication electronically, please contact [email or web address] or [phone] to request more information on [facility] access.    For providers and/or their staff who would like to refer a patient to [facility], please contact us through our one-stop-shop provider referral line, [facility], at [phone].    If you feel you have received this communication in error or would no longer like to receive these types of communications, please e-mail [email or web address]

## 2018-12-28 ENCOUNTER — TELEPHONE (OUTPATIENT)
Dept: HEPATOLOGY | Facility: CLINIC | Age: 53
End: 2018-12-28

## 2018-12-28 DIAGNOSIS — R74.8 ELEVATED LIVER ENZYMES: Primary | ICD-10-CM

## 2018-12-28 NOTE — TELEPHONE ENCOUNTER
Called patient to review lab results. No answer, left voicemail with clinic contact information. Will plan for follow-up visit in 3 months with repeat labs at that time.     Please schedule patient for labs and clinic visit in 3 months. Thanks.

## 2019-01-07 DIAGNOSIS — K21.9 GASTROESOPHAGEAL REFLUX DISEASE, ESOPHAGITIS PRESENCE NOT SPECIFIED: Primary | ICD-10-CM

## 2019-01-08 RX ORDER — PANTOPRAZOLE SODIUM 40 MG/1
40 TABLET, DELAYED RELEASE ORAL DAILY
Qty: 90 TABLET | Refills: 0 | Status: SHIPPED | OUTPATIENT
Start: 2019-01-08 | End: 2019-04-23 | Stop reason: SDUPTHER

## 2019-01-08 NOTE — PROGRESS NOTES
Refill Authorization Note     is requesting a refill authorization.    Brief assessment and rationale for refill: APPROVE: prr  Amount/Quantity of medication ordered: 90d        Refills Authorized: Yes  If authorized number of refills: 0           Medication Therapy Plan: GERD- lco(12/26/2018), nco by pcp recently; Approve 3 more months   Name and strength of medication: pantoprazole (PROTONIX) 40 MG tablet  How patient will take medication: t1t po qd  Medication reconciliation completed: No        Comments:   Lab Results   Component Value Date    WBC 6.60 10/26/2018    HGB 11.7 (L) 10/26/2018    RBC 4.30 (L) 10/26/2018    HCT 37.0 (L) 10/26/2018    MCV 86 10/26/2018     10/26/2018     Lab Results   Component Value Date    MG 1.7 10/26/2018     No results found for: [id number]    Appointment in past 12 months or upcoming 90 days  Last visit with authorizing provider:  KAYLIE Carroll MD:5/18/2018     Next visit with authorizing provider:   KAYLIE Carroll MD:Visit date not found

## 2019-01-16 RX ORDER — ATORVASTATIN CALCIUM 40 MG/1
40 TABLET, FILM COATED ORAL NIGHTLY
Qty: 90 TABLET | Refills: 1 | Status: SHIPPED | OUTPATIENT
Start: 2019-01-16 | End: 2019-05-16 | Stop reason: SDUPTHER

## 2019-01-25 ENCOUNTER — TELEPHONE (OUTPATIENT)
Dept: ENDOCRINOLOGY | Facility: CLINIC | Age: 54
End: 2019-01-25

## 2019-01-25 ENCOUNTER — HOSPITAL ENCOUNTER (OUTPATIENT)
Dept: RADIOLOGY | Facility: HOSPITAL | Age: 54
Discharge: HOME | End: 2019-01-25
Attending: PODIATRIST
Payer: MEDICARE

## 2019-01-25 ENCOUNTER — OFFICE VISIT (OUTPATIENT)
Dept: PODIATRY | Facility: CLINIC | Age: 54
End: 2019-01-25
Payer: MEDICARE

## 2019-01-25 VITALS — HEIGHT: 73 IN | WEIGHT: 309.06 LBS | BODY MASS INDEX: 40.96 KG/M2

## 2019-01-25 DIAGNOSIS — M25.571 ACUTE RIGHT ANKLE PAIN: Primary | ICD-10-CM

## 2019-01-25 DIAGNOSIS — R20.2 PARESTHESIA OF FOOT, BILATERAL: ICD-10-CM

## 2019-01-25 DIAGNOSIS — E11.42 DIABETIC POLYNEUROPATHY ASSOCIATED WITH TYPE 2 DIABETES MELLITUS: ICD-10-CM

## 2019-01-25 DIAGNOSIS — E66.01 MORBID OBESITY WITH BMI OF 40.0-44.9, ADULT: ICD-10-CM

## 2019-01-25 DIAGNOSIS — B35.1 ONYCHOMYCOSIS DUE TO DERMATOPHYTE: ICD-10-CM

## 2019-01-25 DIAGNOSIS — M25.571 ACUTE RIGHT ANKLE PAIN: ICD-10-CM

## 2019-01-25 PROCEDURE — 3008F PR BODY MASS INDEX (BMI) DOCUMENTED: ICD-10-PCS | Mod: CPTII,S$GLB,, | Performed by: PODIATRIST

## 2019-01-25 PROCEDURE — 99999 PR PBB SHADOW E&M-EST. PATIENT-LVL II: CPT | Mod: PBBFAC,,, | Performed by: PODIATRIST

## 2019-01-25 PROCEDURE — 99999 PR PBB SHADOW E&M-EST. PATIENT-LVL II: ICD-10-PCS | Mod: PBBFAC,,, | Performed by: PODIATRIST

## 2019-01-25 PROCEDURE — 73610 XR ANKLE COMPLETE 3 VIEW RIGHT: ICD-10-PCS | Mod: 26,RT,, | Performed by: RADIOLOGY

## 2019-01-25 PROCEDURE — 3046F PR MOST RECENT HEMOGLOBIN A1C LEVEL > 9.0%: ICD-10-PCS | Mod: CPTII,S$GLB,, | Performed by: PODIATRIST

## 2019-01-25 PROCEDURE — 11721 PR DEBRIDEMENT OF NAILS, 6 OR MORE: ICD-10-PCS | Mod: Q9,S$GLB,, | Performed by: PODIATRIST

## 2019-01-25 PROCEDURE — 3008F BODY MASS INDEX DOCD: CPT | Mod: CPTII,S$GLB,, | Performed by: PODIATRIST

## 2019-01-25 PROCEDURE — 73610 X-RAY EXAM OF ANKLE: CPT | Mod: 26,RT,, | Performed by: RADIOLOGY

## 2019-01-25 PROCEDURE — 99213 OFFICE O/P EST LOW 20 MIN: CPT | Mod: 25,S$GLB,, | Performed by: PODIATRIST

## 2019-01-25 PROCEDURE — 73610 X-RAY EXAM OF ANKLE: CPT | Mod: TC,PO,RT

## 2019-01-25 PROCEDURE — 3046F HEMOGLOBIN A1C LEVEL >9.0%: CPT | Mod: CPTII,S$GLB,, | Performed by: PODIATRIST

## 2019-01-25 PROCEDURE — 11721 DEBRIDE NAIL 6 OR MORE: CPT | Mod: Q9,S$GLB,, | Performed by: PODIATRIST

## 2019-01-25 PROCEDURE — 99213 PR OFFICE/OUTPT VISIT, EST, LEVL III, 20-29 MIN: ICD-10-PCS | Mod: 25,S$GLB,, | Performed by: PODIATRIST

## 2019-01-25 RX ORDER — TESTOSTERONE CYPIONATE 200 MG/ML
INJECTION, SOLUTION INTRAMUSCULAR
COMMUNITY
End: 2019-03-19

## 2019-01-25 RX ORDER — METFORMIN HYDROCHLORIDE 1000 MG/1
1000 TABLET ORAL 2 TIMES DAILY
Qty: 180 TABLET | Refills: 0 | Status: SHIPPED | OUTPATIENT
Start: 2019-01-25 | End: 2019-03-19 | Stop reason: SDUPTHER

## 2019-01-25 RX ORDER — BROMPHENIRAMINE MALEATE, PSEUDOEPHEDRINE HYDROCHLORIDE, AND DEXTROMETHORPHAN HYDROBROMIDE 2; 30; 10 MG/5ML; MG/5ML; MG/5ML
SYRUP ORAL
COMMUNITY
End: 2019-04-09

## 2019-01-25 NOTE — TELEPHONE ENCOUNTER
Bg log received, bg continue to be uncontrolled likely related to this particular regimen and patient's diet  Is he taking the Ozempic????

## 2019-01-26 NOTE — PROGRESS NOTES
Subjective:      Patient ID: Gio Forrest is a 53 y.o. male.    Chief Complaint: Diabetes Mellitus (Carly 9/6/18 12/11/18 10.9); Diabetic Foot Exam; Foot Problem (tingling in feet at night); and Ankle Pain (right injures it)    Gio is a 53 y.o. male who presents to the clinic for evaluation and treatment of diabetic feet. Gio has a past medical history of Cardiomyopathy, Depression, Diabetes, Dyslipidemia (8/12/2015), Gout (8/12/2015), Hyperlipidemia, Hypertension, Idiopathic gout, Lumbar pseudoarthrosis, LINDSEY (nonalcoholic steatohepatitis), Obesity (8/12/2015), Obstructive sleep apnea (8/12/2015), Obstructive sleep apnea (8/12/2015), Restless leg syndrome, Sebaceous cyst (4/18/2017), and Type 2 diabetes mellitus (8/12/2015). Patient's chief complaint consists of tingling/numbness in bilateral foot.  States symptoms are exacerbated while at rest and partially alleviated with activity.  Has been taking gabapentin, however, has been unable to increase the dosage on account of the side effect of drowsiness.  Inquires as to additional treatment options.  Also, relates having pain in the Rt. Anterior lateral ankle with weight bearing.  States symptoms began 1-2 months ago after spraining the ankle.  Relates improvement in symptoms, however, continues to note pain on occasion with weight bearing.  Symptoms are mostly resolved with applying a topical nsaid.  Denies any additional pedal complaints.    PCP: BLANK Carroll MD    Date Last Seen by PCP: 9/6/18  Hemoglobin A1C   Date Value Ref Range Status   12/11/2018 10.9 (H) 4.0 - 5.6 % Final     Comment:     ADA Screening Guidelines:  5.7-6.4%  Consistent with prediabetes  >or=6.5%  Consistent with diabetes  High levels of fetal hemoglobin interfere with the HbA1C  assay. Heterozygous hemoglobin variants (HbS, HgC, etc)do  not significantly interfere with this assay.   However, presence of multiple variants may affect accuracy.     09/06/2018 11.7 (H) 4.0 -  5.6 % Final     Comment:     ADA Screening Guidelines:  5.7-6.4%  Consistent with prediabetes  >or=6.5%  Consistent with diabetes  High levels of fetal hemoglobin interfere with the HbA1C  assay. Heterozygous hemoglobin variants (HbS, HgC, etc)do  not significantly interfere with this assay.   However, presence of multiple variants may affect accuracy.     05/18/2018 8.1 (H) 4.0 - 5.6 % Final     Comment:     According to ADA guidelines, hemoglobin A1c <7.0% represents  optimal control in non-pregnant diabetic patients. Different  metrics may apply to specific patient populations.   Standards of Medical Care in Diabetes-2016.  For the purpose of screening for the presence of diabetes:  <5.7%     Consistent with the absence of diabetes  5.7-6.4%  Consistent with increasing risk for diabetes   (prediabetes)  >or=6.5%  Consistent with diabetes  Currently, no consensus exists for use of hemoglobin A1c  for diagnosis of diabetes for children.  This Hemoglobin A1c assay has significant interference with fetal   hemoglobin   (HbF). The results are invalid for patients with abnormal amounts of   HbF,   including those with known Hereditary Persistence   of Fetal Hemoglobin. Heterozygous hemoglobin variants (HbAS, HbAC,   HbAD, HbAE, HbA2) do not significantly interfere with this assay;   however, presence of multiple variants in a sample may impact the %   interference.             Past Medical History:   Diagnosis Date    Cardiomyopathy     Depression     Diabetes     Dyslipidemia 8/12/2015    Gout 8/12/2015    Hyperlipidemia     Hypertension     Idiopathic gout     Lumbar pseudoarthrosis     LINDSEY (nonalcoholic steatohepatitis)     Obesity 8/12/2015    Obstructive sleep apnea 8/12/2015    Obstructive sleep apnea 8/12/2015    Restless leg syndrome     Sebaceous cyst 4/18/2017    Type 2 diabetes mellitus 8/12/2015       Past Surgical History:   Procedure Laterality Date    BACK SURGERY      x2    CARDIAC  CATHETERIZATION      CERVICAL SPINE SURGERY      CHOLECYSTECTOMY  05/30/2018    Dr. ROGELIO Tao, [facility]     CHOLECYSTECTOMY, LAPAROSCOPIC N/A 5/30/2018    Performed [facility] Fletcher Tao [facility] at [facility] OR    COLONOSCOPY  2008    COLONOSCOPY N/A 9/14/2017    [facility] Obi Beltre [facility] at [facility] ENDO    Coronary angiography Left 5/28/2018    [facility] Rafiq Malik [facility] at [facility] CATH    DILATION-ESOPHAGUS N/A 9/14/2017    [facility]bi [facility]re [facility] at [facility] ENDO    ELBOW SURGERY Bilateral     ESOPHAGOGASTRODUODENOSCOPY (EGD) N/A 9/14/2017    [facility]bi [facility]re [facility] ENDO    HERNIA REPAIR      Left heart cath Left 5/28/2018    Performed [facility]ik [facility] at [facility] CATH    SHOULDER ARTHROSCOPY      SPINE SURGERY      lumbar x2    TONSILLECTOMY         Family History   Problem Relation Age of Onset    Diabetes Mother     Depression Mother     Liver cancer Mother     Heart disease Father     Anuerysm Father     Diabetes Father     Stroke Father        Social History     Socioeconomic History    Marital status:      Spouse name: None    Number of children: None    Years of education: None    Highest education level: None   Social Needs    Financial resource strain: None    Food insecurity - worry: None    Food insecurity - inability: None    Transportation needs - medical: None    Transportation needs - non-medical: None   Occupational History    None   Tobacco Use    Smoking status: Former Smoker    Smokeless tobacco: Never Used   Substance and Sexual Activity    Alcohol use: No     Alcohol/week: 0.0 oz    Drug use: No    Sexual activity: Yes     Partners: Female   Other Topics Concern    None   Social History Narrative    None       Current Outpatient Medications   Medication Sig Dispense Refill    ACCU-CHEK SMARTVIEW TEST STRIP Strp       allopurinol (ZYLOPRIM) 100 MG tablet Take 1 tablet (100 mg total) by mouth once daily. 90 tablet 0     ammonium lactate 12 % Crea APPLY TO THE AFFECTED AREA(S) TWICE DAILY  3    aspirin (ECOTRIN) 81 MG EC tablet Take 81 mg by mouth once daily.       atorvastatin (LIPITOR) 40 MG tablet Take 1 tablet (40 mg total) by mouth every evening. 90 tablet 1    benazepril (LOTENSIN) 5 MG tablet Take 0.5 tablets (2.5 mg total) by mouth once daily. Hold as of 06/18/2018 Vera Wms 180 tablet 1    brompheniramine-pseudoeph-DM (BROMFED DM) 2-30-10 mg/5 mL Syrp brompheniramine-pseudoephedrine-DM 2 mg-30 mg-10 mg/5 mL oral syrup      citalopram (CELEXA) 40 MG tablet Take 1 tablet (40 mg total) by mouth once daily. 90 tablet 1    cyclobenzaprine (FLEXERIL) 10 MG tablet Take 10 mg by mouth 2 (two) times daily.  5    diclofenac sodium 1 % Gel Apply 2 g topically 4 (four) times daily. 100 g 2    doxepin (SILENOR) 3 mg Tab Take 1 tablet by mouth nightly as needed. 30 tablet 1    flu vac [id number] 36mos up,PF, (FLUZONE QUAD 7775-0528, PF,) 60 mcg (15 mcg x 4)/0.5 mL Syrg Fluzone Quad 2018-19(PF) 60 mcg(15 mcgx4)/0.5 mL intramuscular syringe   inject into the muscle one time.      gabapentin (NEURONTIN) 300 MG capsule Take 2 capsules (600 mg total) by mouth 3 (three) times daily. 180 capsule 3    hydrocodone-acetaminophen 10-325mg (NORCO)  mg Tab Take 1 tablet by mouth 2 (two) times daily.       insulin aspart protamine-insulin aspart (NOVOLOG MIX 70-30FLEXPEN U-100) 100 unit/mL (70-30) InPn pen 80 units at breakfast and dinner 11 Box 3    levoFLOXacin (LEVAQUIN) 750 MG tablet Take 1 tablet (750 mg total) by mouth once daily. 5 tablet 0    metFORMIN (GLUCOPHAGE) 1000 MG tablet Take 1 tablet (1,000 mg total) by mouth 2 (two) times daily. 180 tablet 0    MULTIVIT,THER IRON,CA,FA & MIN (MULTIVITAMIN) Tab Take 1 tablet by mouth once daily.       oxyCODONE-acetaminophen (PERCOCET)  mg per tablet Take 1 tablet by mouth 3 (three) times daily as needed.  0    pantoprazole (PROTONIX) 40 MG tablet Take 1 tablet (40 mg  "total) by mouth once daily. 90 tablet 0    pen needle, diabetic (BD ULTRA-FINE MERLE PEN NEEDLE) 32 gauge x 5/32" Ndle Uses 2 daily 200 each 3    pioglitazone (ACTOS) 15 MG tablet Take 1 tablet (15 mg total) by mouth once daily. 90 tablet 3    pramipexole (MIRAPEX) 1 MG tablet Take 2 tablets (2 mg total) by mouth nightly. 60 tablet 2    semaglutide (OZEMPIC) 0.25 mg or 0.5 mg(2 mg/1.5 mL) PnIj Inject 0.5 mg into the skin every 7 days. 1.5 mL 0    [START ON 1/30/2019] semaglutide (OZEMPIC) 1 mg/0.75 mL (2 mg/1.5 mL) PnIj Inject 1 mg into the skin every 7 days. 1.5 mL 3    testosterone cypionate (DEPOTESTOTERONE CYPIONATE) 200 mg/mL injection testosterone cypionate 200 mg/mL intramuscular oil      tiZANidine (ZANAFLEX) 4 MG tablet tizanidine 4 mg tablet       No current facility-administered medications for this visit.        Review of patient's allergies indicates:  No Known Allergies      Review of Systems   Constitution: Negative for chills and fever.   Cardiovascular: Negative for claudication and leg swelling.   Skin: Positive for color change and nail changes.   Musculoskeletal: Positive for myalgias. Negative for joint pain, joint swelling, muscle cramps and muscle weakness.   Neurological: Positive for numbness and paresthesias.   Psychiatric/Behavioral: Negative for altered mental status.           Objective:      Physical Exam   Constitutional: He is oriented to person, place, and time. He appears well-developed and well-nourished. No distress.   Cardiovascular:   Pulses:       Dorsalis pedis pulses are 2+ on the right side, and 2+ on the left side.        Posterior tibial pulses are 1+ on the right side, and 1+ on the left side.   CFT is < 3 seconds bilateral.  Pedal hair growth is decreased bilateral.  No varicosities noted bilateral.  Mild lower extremity edema noted bilateral.  Toes are warm to touch bilateral.     Musculoskeletal: He exhibits no edema or tenderness.   Muscle strength 5/5 in all " muscle groups bilateral.  No tenderness nor crepitation with ROM of foot/ankle joints bilateral.  Mild pain with palpation to the Rt. Anterior talofibular ligament. (-) anterior and posterior drawer sign bilateral.  Bilateral pes planus foot type.  Bilateral hallux abducto valgus.  Bilateral semi-reducible contracture of toe 2-5.     Neurological: He is alert and oriented to person, place, and time. He has normal strength. A sensory deficit is present.   Protective sensation per [place] monofilament is decreased bilateral.  Vibratory sensation is intact bilateral.  Light touch is intact bilateral.   Skin: Skin is warm, dry and intact. Capillary refill takes less than 2 seconds. No abrasion, no bruising, no burn, no ecchymosis, no laceration, no lesion and no petechiae noted. He is not diaphoretic. No erythema. No pallor.   Pedal skin appears thin bilateral.  Toenails x 10 appear thickened by 2 mm's, elongated by 4 mm's, and discolored with subungual debris. Examination of the skin reveals no evidence of significant maceration, rashes, open lesions, suspicious appearing nevi or other concerning lesions.              Assessment:       Encounter Diagnoses   Name Primary?    Acute right ankle pain Yes    Diabetic polyneuropathy associated with type 2 diabetes mellitus     Onychomycosis due to dermatophyte     Paresthesia of foot, bilateral     Morbid obesity with BMI of 40.0-44.9, adult          Plan:       Gio was seen today for diabetes mellitus, diabetic foot exam, foot problem and ankle pain.    Diagnoses and all orders for this visit:    Acute right ankle pain  -     X-Ray Ankle Complete Right; Future    Diabetic polyneuropathy associated with type 2 diabetes mellitus    Onychomycosis due to dermatophyte    Paresthesia of foot, bilateral    Morbid obesity with BMI of 40.0-44.9, adult      I counseled the patient on his conditions, their implications and medical management.    X-rays ordered of the  Rt. Ankle.  No obvious signs of trauma or significant deformity.      Suspect a mild ankle sprain involving the Rt. Anterior talofibular ligament.  Advised to ice the affected area up to 20 minutes daily.  To continue applying a topical nsaid to the ankle as well. Will consider a MRI should symptoms fail to improve within the next 6 weeks.    Discussed the importance of diet and exercise as they pertain to diabetes management and maintaining a healthy BMI.    Given both verbal and written information regarding alpha lipoic acid and B-complex vitamins.  Take as instructed in clinic.    Shoe inspection. Diabetic Foot Education. Patient reminded of the importance of good nutrition and blood sugar control to help prevent podiatric complications of diabetes. Patient instructed on proper foot hygeine. We discussed wearing proper shoe gear, daily foot inspections, never walking without protective shoe gear, never putting sharp instruments to feet    With patient's permission, nails were aggressively reduced and debrided x 10 to their soft tissue attachment mechanically and with electric , removing all offending nail and debris. Patient relates relief following the procedure. He will continue to monitor the areas daily, inspect his feet, wear protective shoe gear when ambulatory, moisturizer to maintain skin integrity and follow in this office in approximately 2-3 months, sooner p.r.n.    Follow-up in about 3 months (around 4/25/2019).    Rj Nuñez DPM

## 2019-01-28 DIAGNOSIS — Z98.890 HISTORY OF ARTHROSCOPY OF LEFT SHOULDER: ICD-10-CM

## 2019-01-28 NOTE — TELEPHONE ENCOUNTER
----- Message from Fazal Lamb sent at 1/28/2019  9:02 AM CST -----  Who Called:[employer]   Refill or New Rx: refill   RX Name and Strength: pramipexole (MIRAPEX) 1 MG tablet   Preferred Pharmacy with phone number:    [facility] [place]  [place] - [address] [place] [address]  Phone: [phone] Fax: [phone]  Local or Mail Order: local    Ordering Provider:  Same   Best Call Back Number:  [phone]  Additional Information: please call pt when completed to advise.please leave pt a detailed message if there is no answer.

## 2019-01-29 RX ORDER — PRAMIPEXOLE DIHYDROCHLORIDE 1 MG/1
2 TABLET ORAL NIGHTLY
Qty: 60 TABLET | Refills: 2 | Status: SHIPPED | OUTPATIENT
Start: 2019-01-29 | End: 2019-03-26 | Stop reason: SDUPTHER

## 2019-02-08 RX ORDER — PIOGLITAZONEHYDROCHLORIDE 15 MG/1
15 TABLET ORAL DAILY
Qty: 90 TABLET | Refills: 3 | Status: SHIPPED | OUTPATIENT
Start: 2019-02-08 | End: 2020-01-02

## 2019-02-08 NOTE — TELEPHONE ENCOUNTER
----- Message from Nanette Can sent at 2/8/2019  2:26 PM CST -----  [phone] ... Returning call to give nurse further information

## 2019-02-08 NOTE — TELEPHONE ENCOUNTER
"Pt reports improvement in BG recently "down in 100s"  Is taking Ozempic. Reports better diet and planning to join gym.  Will bring in updated bg logs next week for review    Asking for actos refill  Either refill or let me know and I will send refill (since can't send this note and refill together to you)      "

## 2019-02-11 RX ORDER — DOXEPIN 3 MG/1
1 TABLET, FILM COATED ORAL NIGHTLY PRN
Qty: 30 TABLET | Refills: 1 | OUTPATIENT
Start: 2019-02-11

## 2019-02-22 ENCOUNTER — TELEPHONE (OUTPATIENT)
Dept: PODIATRY | Facility: CLINIC | Age: 54
End: 2019-02-22

## 2019-02-22 NOTE — TELEPHONE ENCOUNTER
Called patient back to let him know that I spoke with his wife and got her rescheduled to the very next day. Left all of this on a voicemail.

## 2019-02-22 NOTE — TELEPHONE ENCOUNTER
----- Message from Ruth Kendrick sent at 2/22/2019  9:10 AM CST -----  Contact: self  Patient [phone] is returning call to Nurse from this morning/please call

## 2019-03-18 ENCOUNTER — LAB VISIT (OUTPATIENT)
Dept: LAB | Facility: HOSPITAL | Age: 54
End: 2019-03-18
Attending: NURSE PRACTITIONER
Payer: MEDICARE

## 2019-03-18 DIAGNOSIS — Z79.4 TYPE 2 DIABETES MELLITUS WITH DIABETIC POLYNEUROPATHY, WITH LONG-TERM CURRENT USE OF INSULIN: ICD-10-CM

## 2019-03-18 DIAGNOSIS — E11.42 TYPE 2 DIABETES MELLITUS WITH DIABETIC POLYNEUROPATHY, WITH LONG-TERM CURRENT USE OF INSULIN: ICD-10-CM

## 2019-03-18 LAB
25(OH)D3+25(OH)D2 SERPL-MCNC: 34 NG/ML
ALBUMIN SERPL BCP-MCNC: 4 G/DL
ALP SERPL-CCNC: 127 U/L
ALT SERPL W/O P-5'-P-CCNC: 48 U/L
ANION GAP SERPL CALC-SCNC: 8 MMOL/L
AST SERPL-CCNC: 40 U/L
BILIRUB SERPL-MCNC: 0.6 MG/DL
BUN SERPL-MCNC: 14 MG/DL
CALCIUM SERPL-MCNC: 10.1 MG/DL
CHLORIDE SERPL-SCNC: 103 MMOL/L
CO2 SERPL-SCNC: 32 MMOL/L
CREAT SERPL-MCNC: 1.1 MG/DL
EST. GFR  (AFRICAN AMERICAN): >60 ML/MIN/1.73 M^2
EST. GFR  (NON AFRICAN AMERICAN): >60 ML/MIN/1.73 M^2
ESTIMATED AVG GLUCOSE: 197 MG/DL
GLUCOSE SERPL-MCNC: 103 MG/DL
HBA1C MFR BLD HPLC: 8.5 %
POTASSIUM SERPL-SCNC: 4.6 MMOL/L
PROT SERPL-MCNC: 7.5 G/DL
SODIUM SERPL-SCNC: 143 MMOL/L

## 2019-03-18 PROCEDURE — 80053 COMPREHEN METABOLIC PANEL: CPT

## 2019-03-18 PROCEDURE — 83036 HEMOGLOBIN GLYCOSYLATED A1C: CPT

## 2019-03-18 PROCEDURE — 36415 COLL VENOUS BLD VENIPUNCTURE: CPT | Mod: PO

## 2019-03-18 PROCEDURE — 82306 VITAMIN D 25 HYDROXY: CPT

## 2019-03-19 ENCOUNTER — OFFICE VISIT (OUTPATIENT)
Dept: ENDOCRINOLOGY | Facility: CLINIC | Age: 54
End: 2019-03-19
Payer: MEDICARE

## 2019-03-19 VITALS
DIASTOLIC BLOOD PRESSURE: 70 MMHG | SYSTOLIC BLOOD PRESSURE: 120 MMHG | WEIGHT: 305.25 LBS | BODY MASS INDEX: 39.17 KG/M2 | HEIGHT: 74 IN | HEART RATE: 79 BPM

## 2019-03-19 DIAGNOSIS — I10 ESSENTIAL HYPERTENSION: ICD-10-CM

## 2019-03-19 DIAGNOSIS — E78.5 DYSLIPIDEMIA: ICD-10-CM

## 2019-03-19 DIAGNOSIS — E66.01 MORBID OBESITY: ICD-10-CM

## 2019-03-19 DIAGNOSIS — E11.42 TYPE 2 DIABETES MELLITUS WITH DIABETIC POLYNEUROPATHY, WITH LONG-TERM CURRENT USE OF INSULIN: Primary | ICD-10-CM

## 2019-03-19 DIAGNOSIS — K76.0 FATTY LIVER: ICD-10-CM

## 2019-03-19 DIAGNOSIS — Z79.4 TYPE 2 DIABETES MELLITUS WITH DIABETIC POLYNEUROPATHY, WITH LONG-TERM CURRENT USE OF INSULIN: Primary | ICD-10-CM

## 2019-03-19 DIAGNOSIS — G47.33 OSA ON CPAP: ICD-10-CM

## 2019-03-19 PROCEDURE — 99999 PR PBB SHADOW E&M-EST. PATIENT-LVL IV: ICD-10-PCS | Mod: PBBFAC,,, | Performed by: NURSE PRACTITIONER

## 2019-03-19 PROCEDURE — 99214 OFFICE O/P EST MOD 30 MIN: CPT | Mod: S$GLB,,, | Performed by: NURSE PRACTITIONER

## 2019-03-19 PROCEDURE — 99214 PR OFFICE/OUTPT VISIT, EST, LEVL IV, 30-39 MIN: ICD-10-PCS | Mod: S$GLB,,, | Performed by: NURSE PRACTITIONER

## 2019-03-19 PROCEDURE — 99999 PR PBB SHADOW E&M-EST. PATIENT-LVL IV: CPT | Mod: PBBFAC,,, | Performed by: NURSE PRACTITIONER

## 2019-03-19 RX ORDER — METFORMIN HYDROCHLORIDE 1000 MG/1
1000 TABLET ORAL 2 TIMES DAILY
Qty: 180 TABLET | Refills: 3 | Status: SHIPPED | OUTPATIENT
Start: 2019-03-19 | End: 2020-05-11

## 2019-03-19 NOTE — PROGRESS NOTES
CC: This 53 y.o. male presents for management of diabetes  and chronic conditions pending review including HTN, HLP, morbid obesity, fatty liver, vitamin d deficiency     HPI: He was diagnosed with T2DM in 2005. Has never been hospitalized r/t DM.  Mother has passed away from Fatty liver and hepatic carcinoma in 2018    Family hx of DM: sister, mom and dad    Saw Dr Easley since last visit- started on ozempic, out of drug for 2 weeks now  Not sleeping well- can fall asleep but not stay asleep   Having steroid injection in his back again on April 4 th     monitoring BG at home:  Brings log          Diet: Eats 2-3  Meals a day, snacks on sweets-honey buns, cake, shakes- has been trying to cut back  Exercise: none, but active- working on his car, pressure washing house etc  Did go to the gym 1 day, 2 weeks ago. 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  Skin: Skin is intact and heals well, no rashes, no hair changes  Eyes: Denies visual disturbances  Resp: no SOB or HENDERSON, no cough  Cardiac: No palpitations, chest pain, no edema   GI: No nausea or vomiting, diarrhea, constipation, or abdominal pain.  /GYN: No nocturia, burning or pain.   MS/Neuro: +numbness/ tingling in BLE; Gait steady, speech clear  Psych: Denies drug/ETOH abuse, no hx of depression.  Other systems: negative.    PE:  GENERAL: Well developed, well nourished.appears older than age.   PSYCH: AAOx3, appropriate mood and affect, pleasant expression, conversant, appears relaxed, well groomed.   EYES: Conjunctiva, corneas clear  NECK: Supple, trachea midline   NEURO: Gait steady  SKIN: Skin  warm and dry no acanthosis nigracans.  FOOT EXAMINATION: 6/11/18  No foot deformity, corns or callus formation,  nails in good condition and well trimmed, no interspace maceration or ulceration noted.  Decreased hair growth present over toes/feet.  Positive vibratory response to 128 Hz tuning fork bilaterally.  Protective sensation intact with 10 gram monofilament.  +2 dorsalis pedis and posterior pulses noted.    Lab Results   Component Value Date    MICALBCREAT 8.0 05/18/2018       Hemoglobin A1C   Date Value Ref Range Status   03/18/2019 8.5 (H) 4.0 - 5.6 % Final     Comment:     ADA Screening Guidelines:  5.7-6.4%  Consistent with prediabetes  >or=6.5%  Consistent with diabetes  High levels of fetal hemoglobin interfere with the HbA1C  assay. Heterozygous hemoglobin variants (HbS, HgC, etc)do  not significantly interfere with this assay.   However, presence of multiple variants may affect accuracy.     12/11/2018 10.9 (H) 4.0 - 5.6 % Final     Comment:     ADA Screening Guidelines:  5.7-6.4%  Consistent with prediabetes  >or=6.5%  Consistent with diabetes  High levels of fetal hemoglobin interfere with the HbA1C  assay. Heterozygous hemoglobin variants (HbS, HgC, etc)do  not significantly interfere with this assay.   However, presence of multiple variants may affect accuracy.     09/06/2018 11.7 (H) 4.0 - 5.6 % Final     Comment:     ADA Screening Guidelines:  5.7-6.4%  Consistent with prediabetes  >or=6.5%  Consistent with diabetes  High levels of fetal hemoglobin interfere with the HbA1C  assay. Heterozygous hemoglobin variants (HbS, HgC, etc)do  not significantly interfere with this assay.   However, presence of multiple variants may affect accuracy.          ASSESSMENT and PLAN:     1. T2DM with hyperglycemia-       Continue Novolog 70/30 to 80 units bid, Actos 15 mg qd, metformin 1000 mg bid,Resume Ozempic 0.5 mg qweek,   Resume Ozempic 0.5 mg weekly  log in 1 week or sooner for issues  Discussed A1c and BG  goals.   - takes ASA, ACEi, statin    2. HTN - controlled, continue meds as previously prescribed and monitor.     3. HLP -  on statin therapy, LFTs improved!- fatty liver    4. TARYN- wears cpap nightly     5. LINDSEY- encouraged continued weight loss, saw hepatology, mother passed away w HCC    6. Obesity-  Body mass index is 39.19 kg/m². Continue to improve diet, exercise 30 mins a day, 5 days a week. F/u w Dr. Easley     Follow-up: in 3 months with lab prior

## 2019-03-26 DIAGNOSIS — Z98.890 HISTORY OF ARTHROSCOPY OF LEFT SHOULDER: ICD-10-CM

## 2019-03-27 RX ORDER — PRAMIPEXOLE DIHYDROCHLORIDE 1 MG/1
2 TABLET ORAL NIGHTLY
Qty: 60 TABLET | Refills: 2 | Status: SHIPPED | OUTPATIENT
Start: 2019-03-27 | End: 2019-07-16 | Stop reason: SDUPTHER

## 2019-03-27 RX ORDER — CITALOPRAM 40 MG/1
40 TABLET, FILM COATED ORAL DAILY
Qty: 30 TABLET | Refills: 2 | Status: SHIPPED | OUTPATIENT
Start: 2019-03-27 | End: 2019-04-23

## 2019-04-04 DIAGNOSIS — M10.00 IDIOPATHIC GOUT, UNSPECIFIED CHRONICITY, UNSPECIFIED SITE: ICD-10-CM

## 2019-04-04 RX ORDER — ALLOPURINOL 100 MG/1
100 TABLET ORAL DAILY
Qty: 90 TABLET | Refills: 0 | Status: SHIPPED | OUTPATIENT
Start: 2019-04-04 | End: 2019-04-23 | Stop reason: SDUPTHER

## 2019-04-23 ENCOUNTER — TELEPHONE (OUTPATIENT)
Dept: PODIATRY | Facility: CLINIC | Age: 54
End: 2019-04-23

## 2019-04-23 ENCOUNTER — OFFICE VISIT (OUTPATIENT)
Dept: FAMILY MEDICINE | Facility: CLINIC | Age: 54
End: 2019-04-23
Payer: MEDICARE

## 2019-04-23 VITALS
OXYGEN SATURATION: 96 % | WEIGHT: 295.88 LBS | TEMPERATURE: 98 F | BODY MASS INDEX: 39.21 KG/M2 | SYSTOLIC BLOOD PRESSURE: 118 MMHG | RESPIRATION RATE: 18 BRPM | HEART RATE: 83 BPM | DIASTOLIC BLOOD PRESSURE: 64 MMHG | HEIGHT: 73 IN

## 2019-04-23 DIAGNOSIS — I42.8 OTHER CARDIOMYOPATHY: ICD-10-CM

## 2019-04-23 DIAGNOSIS — M10.00 IDIOPATHIC GOUT, UNSPECIFIED CHRONICITY, UNSPECIFIED SITE: ICD-10-CM

## 2019-04-23 DIAGNOSIS — Z79.4 TYPE 2 DIABETES MELLITUS WITH DIABETIC POLYNEUROPATHY, WITH LONG-TERM CURRENT USE OF INSULIN: ICD-10-CM

## 2019-04-23 DIAGNOSIS — E66.01 MORBID OBESITY: ICD-10-CM

## 2019-04-23 DIAGNOSIS — I25.10 ATHEROSCLEROSIS OF NATIVE CORONARY ARTERY OF NATIVE HEART WITHOUT ANGINA PECTORIS: ICD-10-CM

## 2019-04-23 DIAGNOSIS — I10 ESSENTIAL HYPERTENSION: Primary | ICD-10-CM

## 2019-04-23 DIAGNOSIS — J30.89 ALLERGIC RHINITIS DUE TO OTHER ALLERGIC TRIGGER, UNSPECIFIED SEASONALITY: ICD-10-CM

## 2019-04-23 DIAGNOSIS — E11.42 TYPE 2 DIABETES MELLITUS WITH DIABETIC POLYNEUROPATHY, WITH LONG-TERM CURRENT USE OF INSULIN: ICD-10-CM

## 2019-04-23 DIAGNOSIS — K21.9 GASTROESOPHAGEAL REFLUX DISEASE, ESOPHAGITIS PRESENCE NOT SPECIFIED: ICD-10-CM

## 2019-04-23 PROBLEM — I25.119 ATHEROSCLEROSIS OF NATIVE CORONARY ARTERY OF NATIVE HEART WITH ANGINA PECTORIS: Status: ACTIVE | Noted: 2019-04-23

## 2019-04-23 PROCEDURE — 3074F SYST BP LT 130 MM HG: CPT | Mod: CPTII,S$GLB,, | Performed by: NURSE PRACTITIONER

## 2019-04-23 PROCEDURE — 3045F PR MOST RECENT HEMOGLOBIN A1C LEVEL 7.0-9.0%: CPT | Mod: CPTII,S$GLB,, | Performed by: NURSE PRACTITIONER

## 2019-04-23 PROCEDURE — 99999 PR PBB SHADOW E&M-EST. PATIENT-LVL V: ICD-10-PCS | Mod: PBBFAC,,, | Performed by: NURSE PRACTITIONER

## 2019-04-23 PROCEDURE — 99999 PR PBB SHADOW E&M-EST. PATIENT-LVL V: CPT | Mod: PBBFAC,,, | Performed by: NURSE PRACTITIONER

## 2019-04-23 PROCEDURE — 3008F PR BODY MASS INDEX (BMI) DOCUMENTED: ICD-10-PCS | Mod: CPTII,S$GLB,, | Performed by: NURSE PRACTITIONER

## 2019-04-23 PROCEDURE — 3078F PR MOST RECENT DIASTOLIC BLOOD PRESSURE < 80 MM HG: ICD-10-PCS | Mod: CPTII,S$GLB,, | Performed by: NURSE PRACTITIONER

## 2019-04-23 PROCEDURE — 99499 RISK ADDL DX/OHS AUDIT: ICD-10-PCS | Mod: S$GLB,,, | Performed by: NURSE PRACTITIONER

## 2019-04-23 PROCEDURE — 3078F DIAST BP <80 MM HG: CPT | Mod: CPTII,S$GLB,, | Performed by: NURSE PRACTITIONER

## 2019-04-23 PROCEDURE — 3008F BODY MASS INDEX DOCD: CPT | Mod: CPTII,S$GLB,, | Performed by: NURSE PRACTITIONER

## 2019-04-23 PROCEDURE — 3074F PR MOST RECENT SYSTOLIC BLOOD PRESSURE < 130 MM HG: ICD-10-PCS | Mod: CPTII,S$GLB,, | Performed by: NURSE PRACTITIONER

## 2019-04-23 PROCEDURE — 99214 OFFICE O/P EST MOD 30 MIN: CPT | Mod: S$GLB,,, | Performed by: NURSE PRACTITIONER

## 2019-04-23 PROCEDURE — 99499 UNLISTED E&M SERVICE: CPT | Mod: S$GLB,,, | Performed by: NURSE PRACTITIONER

## 2019-04-23 PROCEDURE — 3045F PR MOST RECENT HEMOGLOBIN A1C LEVEL 7.0-9.0%: ICD-10-PCS | Mod: CPTII,S$GLB,, | Performed by: NURSE PRACTITIONER

## 2019-04-23 PROCEDURE — 99214 PR OFFICE/OUTPT VISIT, EST, LEVL IV, 30-39 MIN: ICD-10-PCS | Mod: S$GLB,,, | Performed by: NURSE PRACTITIONER

## 2019-04-23 RX ORDER — BENAZEPRIL HYDROCHLORIDE 5 MG/1
2.5 TABLET ORAL DAILY
Qty: 180 TABLET | Refills: 1 | Status: ON HOLD
Start: 2019-04-23 | End: 2019-08-28 | Stop reason: SDUPTHER

## 2019-04-23 RX ORDER — ALLOPURINOL 100 MG/1
100 TABLET ORAL DAILY
Qty: 90 TABLET | Refills: 1 | Status: ON HOLD | OUTPATIENT
Start: 2019-04-23 | End: 2019-08-28 | Stop reason: SDUPTHER

## 2019-04-23 RX ORDER — DESLORATADINE 5 MG/1
5 TABLET ORAL DAILY
Qty: 30 TABLET | Refills: 3 | Status: SHIPPED | OUTPATIENT
Start: 2019-04-23 | End: 2019-07-31 | Stop reason: SDUPTHER

## 2019-04-23 RX ORDER — PANTOPRAZOLE SODIUM 40 MG/1
40 TABLET, DELAYED RELEASE ORAL DAILY
Qty: 90 TABLET | Refills: 1 | Status: SHIPPED | OUTPATIENT
Start: 2019-04-23 | End: 2019-09-04 | Stop reason: SDUPTHER

## 2019-04-23 RX ORDER — LANCETS
EACH MISCELLANEOUS
Qty: 100 EACH | Refills: 3 | Status: SHIPPED | OUTPATIENT
Start: 2019-04-23 | End: 2020-04-28 | Stop reason: ALTCHOICE

## 2019-04-23 RX ORDER — INSULIN PUMP SYRINGE, 3 ML
EACH MISCELLANEOUS
Qty: 1 EACH | Refills: 1 | Status: SHIPPED | OUTPATIENT
Start: 2019-04-23 | End: 2024-03-05

## 2019-04-23 RX ORDER — CLONAZEPAM 0.5 MG/1
0.5 TABLET ORAL DAILY
COMMUNITY
End: 2022-01-26

## 2019-04-23 NOTE — TELEPHONE ENCOUNTER
Called patient called back no answer, left very detailed message on cell phone. Letting him know there was nothing open or available until his schedule date on 5/2/19. Let him know I could place him on a wait list but that was all I could do for a routine DM foot check.

## 2019-04-23 NOTE — PROGRESS NOTES
Subjective:       Patient ID: Gio Forrest is a 53 y.o. male.    Chief Complaint: Hypertension (Patient here for follow up) and Hyperlipidemia  He was last seen in primary care by me on 09/06/2018.  Last saw Carly on 05/18/2018.  HPI   He was seen at the ED ([facility]) on 04/06/2019 and 04/09/2019 and was diagnosed with bronchitis. States he is doing much better. He is following up with chronic medical diagnosis.  Vitals:    04/23/19 0731   BP: 118/64   Pulse: 83   Resp: 18   Temp: 98.1 °F (36.7 °C)     BP Readings from Last 3 Encounters:   04/23/19 118/64   04/09/19 126/68   04/06/19 (!) 102/53     Review of Systems      He has lost 11 pounds since December 2018  Lab Results   Component Value Date    HGBA1C 8.5 (H) 03/18/2019     CMP  Sodium   Date Value Ref Range Status   03/18/2019 143 136 - 145 mmol/L Final     Potassium   Date Value Ref Range Status   03/18/2019 4.6 3.5 - 5.1 mmol/L Final     Chloride   Date Value Ref Range Status   03/18/2019 103 95 - 110 mmol/L Final     CO2   Date Value Ref Range Status   03/18/2019 32 (H) 23 - 29 mmol/L Final     Glucose   Date Value Ref Range Status   03/18/2019 103 70 - 110 mg/dL Final     BUN, Bld   Date Value Ref Range Status   03/18/2019 14 6 - 20 mg/dL Final     Creatinine   Date Value Ref Range Status   03/18/2019 1.1 0.5 - 1.4 mg/dL Final     Calcium   Date Value Ref Range Status   03/18/2019 10.1 8.7 - 10.5 mg/dL Final     Total Protein   Date Value Ref Range Status   03/18/2019 7.5 6.0 - 8.4 g/dL Final     Albumin   Date Value Ref Range Status   03/18/2019 4.0 3.5 - 5.2 g/dL Final     Total Bilirubin   Date Value Ref Range Status   03/18/2019 0.6 0.1 - 1.0 mg/dL Final     Comment:     For infants and newborns, interpretation of results should be based  on gestational age, weight and in agreement with clinical  observations.  Premature Infant recommended reference ranges:  Up to 24 hours.............<8.0 mg/dL  Up to 48 hours............<12.0 mg/dL  3-5  days..................<15.0 mg/dL  6-29 days.................<15.0 mg/dL       Alkaline Phosphatase   Date Value Ref Range Status   03/18/2019 127 55 - 135 U/L Final     AST ([place])   Date Value Ref Range Status   02/15/2016 66 (H) 17 - 59 U/L Final     AST   Date Value Ref Range Status   03/18/2019 40 10 - 40 U/L Final     ALT   Date Value Ref Range Status   03/18/2019 48 (H) 10 - 44 U/L Final     Anion Gap   Date Value Ref Range Status   03/18/2019 8 8 - 16 mmol/L Final     eGFR if    Date Value Ref Range Status   03/18/2019 >60.0 >60 mL/min/1.73 m^2 Final     eGFR if non    Date Value Ref Range Status   03/18/2019 >60.0 >60 mL/min/1.73 m^2 Final     Comment:     Calculation used to obtain the estimated glomerular filtration  rate (eGFR) is the CKD-EPI equation.        Objective:      Physical Exam   Constitutional: He is oriented to person, place, and time. Vital signs are normal. He appears well-developed and well-nourished. He is active and cooperative.   HENT:   Head: Normocephalic and atraumatic.   Right Ear: Hearing, tympanic membrane, external ear and ear canal normal.   Left Ear: Hearing, tympanic membrane, external ear and ear canal normal.   Nose: Nose normal.   Mouth/Throat: Uvula is midline, oropharynx is clear and moist and mucous membranes are normal.   Neck: Normal range of motion. Neck supple.   Cardiovascular: Normal rate and regular rhythm.   Pulmonary/Chest: Effort normal and breath sounds normal.   Abdominal: Soft. Bowel sounds are normal. There is no tenderness.   Large obese abdomen   Musculoskeletal: Normal range of motion.   Lymphadenopathy:        Head (right side): No submental, no submandibular, no tonsillar, no preauricular, no posterior auricular and no occipital adenopathy present.        Head (left side): No submental, no submandibular, no tonsillar, no preauricular, no posterior auricular and no occipital adenopathy present.     He has no  cervical adenopathy.   Neurological: He is alert and oriented to person, place, and time.   Skin: Skin is warm, dry and intact.   Psychiatric: He has a normal mood and affect. His speech is normal and behavior is normal. Judgment and thought content normal. Cognition and memory are normal.   Nursing note and vitals reviewed.      Assessment & Plan:       Essential hypertension  -     benazepril (LOTENSIN) 5 MG tablet; Take 0.5 tablets (2.5 mg total) by mouth once daily. Hold as of 06/18/2018 [facility]  Dispense: 180 tablet; Refill: 1    Type 2 diabetes mellitus with diabetic polyneuropathy, with long-term current use of insulin  -     blood-glucose meter kit; To check BG 2 times daily, to use with insurance preferred meter  Dispense: 1 each; Refill: 1  -     lancets Misc; To check BG 2 times daily, to use with insurance preferred meter  Dispense: 100 each; Refill: 3  -     blood sugar diagnostic Strp; To check BG 2 times daily, to use with insurance preferred meter  Dispense: 100 each; Refill: 3    Other cardiomyopathy    Atherosclerosis of native coronary artery of native heart without angina pectoris    Gastroesophageal reflux disease, esophagitis presence not specified  -     pantoprazole (PROTONIX) 40 MG tablet; Take 1 tablet (40 mg total) by mouth once daily.  Dispense: 90 tablet; Refill: 1    Morbid obesity    Idiopathic gout, unspecified chronicity, unspecified site  -     allopurinol (ZYLOPRIM) 100 MG tablet; Take 1 tablet (100 mg total) by mouth once daily.  Dispense: 90 tablet; Refill: 1    Allergic rhinitis due to other allergic trigger, unspecified seasonality  -     desloratadine (CLARINEX) 5 mg tablet; Take 1 tablet (5 mg total) by mouth once daily. Take for drip in back of throat  Dispense: 30 tablet; Refill: 3          Orthopaedic Surgeon - Omkar Brizuela at [facility] in [place]  Medication List with Changes/Refills   New Medications    BLOOD SUGAR DIAGNOSTIC STRP    To check BG 2 times daily, to use  "with insurance preferred meter    BLOOD-GLUCOSE METER KIT    To check BG 2 times daily, to use with insurance preferred meter    DESLORATADINE (CLARINEX) 5 MG TABLET    Take 1 tablet (5 mg total) by mouth once daily. Take for drip in back of throat    LANCETS MISC    To check BG 2 times daily, to use with insurance preferred meter   Current Medications    ACCU-CHEK SMARTVIEW TEST STRIP STRP        ALBUTEROL (PROVENTIL/VENTOLIN HFA) 90 MCG/ACTUATION INHALER    Inhale 1-2 puffs into the lungs every 6 (six) hours as needed for Wheezing. Rescue    AMMONIUM LACTATE 12 % CREA    APPLY TO THE AFFECTED AREA(S) TWICE DAILY    ATORVASTATIN (LIPITOR) 40 MG TABLET    Take 1 tablet (40 mg total) by mouth every evening.    CLONAZEPAM (KLONOPIN) 0.5 MG TABLET    Take 0.5 mg by mouth.    CYCLOBENZAPRINE (FLEXERIL) 10 MG TABLET    Take 10 mg by mouth 2 (two) times daily.    DICLOFENAC SODIUM 1 % GEL    Apply 2 g topically 4 (four) times daily.    GABAPENTIN (NEURONTIN) 300 MG CAPSULE    Take 2 capsules (600 mg total) by mouth 3 (three) times daily.    INSULIN ASPART PROTAMINE-INSULIN ASPART (NOVOLOG MIX 70-30FLEXPEN U-100) 100 UNIT/ML (70-30) INPN PEN    80 units at breakfast and dinner    METFORMIN (GLUCOPHAGE) 1000 MG TABLET    Take 1 tablet (1,000 mg total) by mouth 2 (two) times daily.    MULTIVIT,THER IRON,CA,FA & MIN (MULTIVITAMIN) TAB    Take 1 tablet by mouth once daily.     OXYCODONE-ACETAMINOPHEN (PERCOCET)  MG PER TABLET    Take 1 tablet by mouth nightly as needed.     PEN NEEDLE, DIABETIC (BD ULTRA-FINE MERLE PEN NEEDLE) 32 GAUGE X 5/32" NDLE    Uses 2 daily    PIOGLITAZONE (ACTOS) 15 MG TABLET    Take 1 tablet (15 mg total) by mouth once daily.    PRAMIPEXOLE (MIRAPEX) 1 MG TABLET    Take 2 tablets (2 mg total) by mouth nightly.    SEMAGLUTIDE (OZEMPIC) 1 MG/0.75 ML (2 MG/1.5 ML) PNIJ    Inject 1 mg into the skin every 7 days.   Changed and/or Refilled Medications    Modified Medication Previous Medication    " ALLOPURINOL (ZYLOPRIM) 100 MG TABLET allopurinol (ZYLOPRIM) 100 MG tablet       Take 1 tablet (100 mg total) by mouth once daily.    Take 1 tablet (100 mg total) by mouth once daily.    BENAZEPRIL (LOTENSIN) 5 MG TABLET benazepril (LOTENSIN) 5 MG tablet       Take 0.5 tablets (2.5 mg total) by mouth once daily. Hold as of 06/18/2018 [facility]    Take 0.5 tablets (2.5 mg total) by mouth once daily. Hold as of 06/18/2018 [facility]    PANTOPRAZOLE (PROTONIX) 40 MG TABLET pantoprazole (PROTONIX) 40 MG tablet       Take 1 tablet (40 mg total) by mouth once daily.    Take 1 tablet (40 mg total) by mouth once daily.   Discontinued Medications    ASPIRIN (ECOTRIN) 81 MG EC TABLET    Take 81 mg by mouth once daily.     AZITHROMYCIN (Z-REINALDO) 250 MG TABLET    Take 1 tablet (250 mg total) by mouth once daily. Take first 2 tablets together, then 1 every day until finished.    CITALOPRAM (CELEXA) 40 MG TABLET    Take 1 tablet (40 mg total) by mouth once daily.    FLU VAC [id number] 36MOS UP,PF, (FLUZONE QUAD 3802-7609, PF,) 60 MCG (15 MCG X 4)/0.5 ML SYRG    Fluzone Quad 2018-19(PF) 60 mcg(15 mcgx4)/0.5 mL intramuscular syringe   inject into the muscle one time.    SEMAGLUTIDE (OZEMPIC) 0.25 MG OR 0.5 MG(2 MG/1.5 ML) PNIJ    Inject 0.5 mg into the skin every 7 days.    TIZANIDINE (ZANAFLEX) 4 MG TABLET    tizanidine 4 mg tablet     Follow up if symptoms worsen or fail to improve.

## 2019-05-17 RX ORDER — ATORVASTATIN CALCIUM 40 MG/1
TABLET, FILM COATED ORAL
Qty: 90 TABLET | Refills: 1 | Status: ON HOLD | OUTPATIENT
Start: 2019-05-17 | End: 2019-08-28 | Stop reason: SDUPTHER

## 2019-06-17 ENCOUNTER — LAB VISIT (OUTPATIENT)
Dept: LAB | Facility: HOSPITAL | Age: 54
End: 2019-06-17
Attending: NURSE PRACTITIONER
Payer: MEDICARE

## 2019-06-17 DIAGNOSIS — Z79.4 TYPE 2 DIABETES MELLITUS WITH DIABETIC POLYNEUROPATHY, WITH LONG-TERM CURRENT USE OF INSULIN: ICD-10-CM

## 2019-06-17 DIAGNOSIS — E11.42 TYPE 2 DIABETES MELLITUS WITH DIABETIC POLYNEUROPATHY, WITH LONG-TERM CURRENT USE OF INSULIN: ICD-10-CM

## 2019-06-17 LAB
ALBUMIN SERPL BCP-MCNC: 3.6 G/DL (ref 3.5–5.2)
ALP SERPL-CCNC: 126 U/L (ref 55–135)
ALT SERPL W/O P-5'-P-CCNC: 33 U/L (ref 10–44)
ANION GAP SERPL CALC-SCNC: 9 MMOL/L (ref 8–16)
AST SERPL-CCNC: 24 U/L (ref 10–40)
BILIRUB SERPL-MCNC: 0.4 MG/DL (ref 0.1–1)
BUN SERPL-MCNC: 15 MG/DL (ref 6–20)
CALCIUM SERPL-MCNC: 9.6 MG/DL (ref 8.7–10.5)
CHLORIDE SERPL-SCNC: 103 MMOL/L (ref 95–110)
CHOLEST SERPL-MCNC: 88 MG/DL (ref 120–199)
CHOLEST/HDLC SERPL: 3.5 {RATIO} (ref 2–5)
CO2 SERPL-SCNC: 29 MMOL/L (ref 23–29)
CREAT SERPL-MCNC: 1.1 MG/DL (ref 0.5–1.4)
EST. GFR  (AFRICAN AMERICAN): >60 ML/MIN/1.73 M^2
EST. GFR  (NON AFRICAN AMERICAN): >60 ML/MIN/1.73 M^2
ESTIMATED AVG GLUCOSE: 186 MG/DL (ref 68–131)
GLUCOSE SERPL-MCNC: 174 MG/DL (ref 70–110)
HBA1C MFR BLD HPLC: 8.1 % (ref 4–5.6)
HDLC SERPL-MCNC: 25 MG/DL (ref 40–75)
HDLC SERPL: 28.4 % (ref 20–50)
LDLC SERPL CALC-MCNC: 40.4 MG/DL (ref 63–159)
NONHDLC SERPL-MCNC: 63 MG/DL
POTASSIUM SERPL-SCNC: 4.5 MMOL/L (ref 3.5–5.1)
PROT SERPL-MCNC: 7.1 G/DL (ref 6–8.4)
SODIUM SERPL-SCNC: 141 MMOL/L (ref 136–145)
TRIGL SERPL-MCNC: 113 MG/DL (ref 30–150)

## 2019-06-17 PROCEDURE — 83036 HEMOGLOBIN GLYCOSYLATED A1C: CPT

## 2019-06-17 PROCEDURE — 80053 COMPREHEN METABOLIC PANEL: CPT

## 2019-06-17 PROCEDURE — 80061 LIPID PANEL: CPT

## 2019-06-17 PROCEDURE — 36415 COLL VENOUS BLD VENIPUNCTURE: CPT | Mod: PO

## 2019-06-20 ENCOUNTER — TELEPHONE (OUTPATIENT)
Dept: ENDOCRINOLOGY | Facility: CLINIC | Age: 54
End: 2019-06-20

## 2019-06-20 NOTE — TELEPHONE ENCOUNTER
----- Message from RT Kenzie sent at 6/20/2019  2:15 PM CDT -----  Contact: pt    pt , returned Nurse Levy's missed call, called pod, thanks.

## 2019-07-05 ENCOUNTER — OFFICE VISIT (OUTPATIENT)
Dept: PODIATRY | Facility: CLINIC | Age: 54
End: 2019-07-05
Payer: MEDICARE

## 2019-07-05 VITALS — HEIGHT: 73 IN | BODY MASS INDEX: 39.21 KG/M2 | WEIGHT: 295.88 LBS

## 2019-07-05 DIAGNOSIS — R20.2 PARESTHESIA OF FOOT, BILATERAL: ICD-10-CM

## 2019-07-05 DIAGNOSIS — E66.01 SEVERE OBESITY (BMI 35.0-39.9) WITH COMORBIDITY: ICD-10-CM

## 2019-07-05 DIAGNOSIS — B35.1 ONYCHOMYCOSIS DUE TO DERMATOPHYTE: ICD-10-CM

## 2019-07-05 DIAGNOSIS — E11.42 DIABETIC POLYNEUROPATHY ASSOCIATED WITH TYPE 2 DIABETES MELLITUS: Primary | ICD-10-CM

## 2019-07-05 PROCEDURE — 11721 PR DEBRIDEMENT OF NAILS, 6 OR MORE: ICD-10-PCS | Mod: 59,Q9,S$GLB, | Performed by: PODIATRIST

## 2019-07-05 PROCEDURE — 11056 PR TRIM BENIGN HYPERKERATOTIC SKIN LESION,2-4: ICD-10-PCS | Mod: Q9,S$GLB,, | Performed by: PODIATRIST

## 2019-07-05 PROCEDURE — 99499 UNLISTED E&M SERVICE: CPT | Mod: S$GLB,,, | Performed by: PODIATRIST

## 2019-07-05 PROCEDURE — 11056 PARNG/CUTG B9 HYPRKR LES 2-4: CPT | Mod: Q9,S$GLB,, | Performed by: PODIATRIST

## 2019-07-05 PROCEDURE — 99999 PR PBB SHADOW E&M-EST. PATIENT-LVL II: CPT | Mod: PBBFAC,,, | Performed by: PODIATRIST

## 2019-07-05 PROCEDURE — 11721 DEBRIDE NAIL 6 OR MORE: CPT | Mod: 59,Q9,S$GLB, | Performed by: PODIATRIST

## 2019-07-05 PROCEDURE — 99999 PR PBB SHADOW E&M-EST. PATIENT-LVL II: ICD-10-PCS | Mod: PBBFAC,,, | Performed by: PODIATRIST

## 2019-07-05 PROCEDURE — 99499 NO LOS: ICD-10-PCS | Mod: S$GLB,,, | Performed by: PODIATRIST

## 2019-07-05 NOTE — PROGRESS NOTES
Subjective:      Patient ID: Gio Forrest is a 53 y.o. male.    Chief Complaint: Diabetes Mellitus (Carly 6/17/19 8.1) and Diabetic Foot Exam    Gio is a 53 y.o. male who presents to the clinic for evaluation and treatment of diabetic feet. Gio has a past medical history of Cardiomyopathy, Depression, Diabetes, Dyslipidemia (8/12/2015), Gout (8/12/2015), Hyperlipidemia, Hypertension, Idiopathic gout, Lumbar pseudoarthrosis, LINDSEY (nonalcoholic steatohepatitis), Obesity (8/12/2015), Obstructive sleep apnea (8/12/2015), Obstructive sleep apnea (8/12/2015), Restless leg syndrome, Sebaceous cyst (4/18/2017), and Type 2 diabetes mellitus (8/12/2015). Patient continues to note tingling/numbness in bilateral foot.  States symptoms are present nocturnally and at rest.  Symptoms are mostly alleviated with activity.  Inquires as to the name of previously mentioned supplements to address neuropathy symptoms, as he misplaced the prior AVS.  Denies any additional pedal complaints.    PCP: BLANK Carroll MD    Date Last Seen by PCP: 6/17/19  Hemoglobin A1C   Date Value Ref Range Status   06/17/2019 8.1 (H) 4.0 - 5.6 % Final     Comment:     ADA Screening Guidelines:  5.7-6.4%  Consistent with prediabetes  >or=6.5%  Consistent with diabetes  High levels of fetal hemoglobin interfere with the HbA1C  assay. Heterozygous hemoglobin variants (HbS, HgC, etc)do  not significantly interfere with this assay.   However, presence of multiple variants may affect accuracy.     03/18/2019 8.5 (H) 4.0 - 5.6 % Final     Comment:     ADA Screening Guidelines:  5.7-6.4%  Consistent with prediabetes  >or=6.5%  Consistent with diabetes  High levels of fetal hemoglobin interfere with the HbA1C  assay. Heterozygous hemoglobin variants (HbS, HgC, etc)do  not significantly interfere with this assay.   However, presence of multiple variants may affect accuracy.     12/11/2018 10.9 (H) 4.0 - 5.6 % Final     Comment:     ADA Screening  Guidelines:  5.7-6.4%  Consistent with prediabetes  >or=6.5%  Consistent with diabetes  High levels of fetal hemoglobin interfere with the HbA1C  assay. Heterozygous hemoglobin variants (HbS, HgC, etc)do  not significantly interfere with this assay.   However, presence of multiple variants may affect accuracy.             Past Medical History:   Diagnosis Date    Cardiomyopathy     Depression     Diabetes     Dyslipidemia 8/12/2015    Gout 8/12/2015    Hyperlipidemia     Hypertension     Idiopathic gout     Lumbar pseudoarthrosis     LINDSEY (nonalcoholic steatohepatitis)     Obesity 8/12/2015    Obstructive sleep apnea 8/12/2015    Obstructive sleep apnea 8/12/2015    Restless leg syndrome     Sebaceous cyst 4/18/2017    Type 2 diabetes mellitus 8/12/2015       Past Surgical History:   Procedure Laterality Date    BACK SURGERY      x2    CARDIAC CATHETERIZATION      CERVICAL SPINE SURGERY      CHOLECYSTECTOMY  05/30/2018    Dr. ROGELIO Tao, [facility]     CHOLECYSTECTOMY, LAPAROSCOPIC N/A 5/30/2018    Performed by Fletcher Tao MD at [facility] OR    COLONOSCOPY  2008    COLONOSCOPY N/A 9/14/2017    Performed by Obi Beltre MD at [facility] ENDO    Coronary angiography Left 5/28/2018    Performed by Rafiq Malik MD at [facility] CATH    DILATION-ESOPHAGUS N/A 9/14/2017    Performed by Obi Beltre MD at [facility] ENDO    ELBOW SURGERY Bilateral     ESOPHAGOGASTRODUODENOSCOPY (EGD) N/A 9/14/2017    Performed by Obi Beltre MD at [facility] ENDO    HERNIA REPAIR      Left heart cath Left 5/28/2018    Performed by Rafiq Malik MD at [facility] CATH    SHOULDER ARTHROSCOPY      SPINE SURGERY      lumbar x2    TONSILLECTOMY         Family History   Problem Relation Age of Onset    Diabetes Mother     Depression Mother     Liver cancer Mother     Heart disease Father     Anuerysm Father     Diabetes Father     Stroke Father        Social History     Socioeconomic History    Marital status:       Spouse name: Not on file    Number of children: Not on file    Years of education: Not on file    Highest education level: Not on file   Occupational History    Not on file   Social Needs    Financial resource strain: Not on file    Food insecurity:     Worry: Not on file     Inability: Not on file    Transportation needs:     Medical: Not on file     Non-medical: Not on file   Tobacco Use    Smoking status: Former Smoker    Smokeless tobacco: Never Used   Substance and Sexual Activity    Alcohol use: No     Alcohol/week: 0.0 oz    Drug use: No    Sexual activity: Yes     Partners: Female   Lifestyle    Physical activity:     Days per week: Not on file     Minutes per session: Not on file    Stress: Not on file   Relationships    Social connections:     Talks on phone: Not on file     Gets together: Not on file     Attends Christianity service: Not on file     Active member of club or organization: Not on file     Attends meetings of clubs or organizations: Not on file     Relationship status: Not on file   Other Topics Concern    Not on file   Social History Narrative    Not on file       Current Outpatient Medications   Medication Sig Dispense Refill    ACCU-CHEK SMARTVIEW TEST STRIP Strp       albuterol (PROVENTIL/VENTOLIN HFA) 90 mcg/actuation inhaler Inhale 1-2 puffs into the lungs every 6 (six) hours as needed for Wheezing. Rescue 1 Inhaler 0    allopurinol (ZYLOPRIM) 100 MG tablet Take 1 tablet (100 mg total) by mouth once daily. 90 tablet 1    ammonium lactate 12 % Crea APPLY TO THE AFFECTED AREA(S) TWICE DAILY  3    atorvastatin (LIPITOR) 40 MG tablet TAKE ONE TABLET BY MOUTH EVERY EVENING 90 tablet 1    benazepril (LOTENSIN) 5 MG tablet Take 0.5 tablets (2.5 mg total) by mouth once daily. Hold as of 06/18/2018 [facility] 180 tablet 1    blood sugar diagnostic Strp To check BG 2 times daily, to use with insurance preferred meter 100 each 3    blood-glucose meter kit To check  "BG 2 times daily, to use with insurance preferred meter 1 each 1    clonazePAM (KLONOPIN) 0.5 MG tablet Take 0.5 mg by mouth.      cyclobenzaprine (FLEXERIL) 10 MG tablet Take 10 mg by mouth 2 (two) times daily.  5    desloratadine (CLARINEX) 5 mg tablet Take 1 tablet (5 mg total) by mouth once daily. Take for drip in back of throat 30 tablet 3    diclofenac sodium 1 % Gel Apply 2 g topically 4 (four) times daily. 100 g 2    gabapentin (NEURONTIN) 300 MG capsule Take 2 capsules (600 mg total) by mouth 3 (three) times daily. (Patient taking differently: Take 300 mg by mouth 4 (four) times daily. One tablet in am, three tablets at night) 180 capsule 3    insulin aspart protamine-insulin aspart (NOVOLOG MIX 70-30FLEXPEN U-100) 100 unit/mL (70-30) InPn pen 80 units at breakfast and dinner 11 Box 3    lancets Misc To check BG 2 times daily, to use with insurance preferred meter 100 each 3    metFORMIN (GLUCOPHAGE) 1000 MG tablet Take 1 tablet (1,000 mg total) by mouth 2 (two) times daily. 180 tablet 3    MULTIVIT,THER IRON,CA,FA & MIN (MULTIVITAMIN) Tab Take 1 tablet by mouth once daily.       oxyCODONE-acetaminophen (PERCOCET)  mg per tablet Take 1 tablet by mouth nightly as needed.   0    pantoprazole (PROTONIX) 40 MG tablet Take 1 tablet (40 mg total) by mouth once daily. 90 tablet 1    pen needle, diabetic (BD ULTRA-FINE MERLE PEN NEEDLE) 32 gauge x 5/32" Ndle Uses 2 daily 200 each 3    pioglitazone (ACTOS) 15 MG tablet Take 1 tablet (15 mg total) by mouth once daily. 90 tablet 3    pramipexole (MIRAPEX) 1 MG tablet Take 2 tablets (2 mg total) by mouth nightly. 60 tablet 2    semaglutide (OZEMPIC) 1 mg/0.75 mL (2 mg/1.5 mL) PnIj Inject 1 mg into the skin every 7 days. 1.5 mL 3     No current facility-administered medications for this visit.        Review of patient's allergies indicates:  No Known Allergies      Review of Systems   Constitution: Negative for chills and fever.   Cardiovascular: " Negative for claudication and leg swelling.   Skin: Positive for color change and nail changes.   Musculoskeletal: Negative for joint pain, joint swelling, muscle cramps, muscle weakness and myalgias.   Neurological: Positive for numbness and paresthesias.   Psychiatric/Behavioral: Negative for altered mental status.           Objective:      Physical Exam   Constitutional: He is oriented to person, place, and time. He appears well-developed and well-nourished. No distress.   Cardiovascular:   Pulses:       Dorsalis pedis pulses are 2+ on the right side, and 2+ on the left side.        Posterior tibial pulses are 1+ on the right side, and 1+ on the left side.   CFT is < 3 seconds bilateral.  Pedal hair growth is decreased bilateral.  No varicosities noted bilateral.  Mild lower extremity edema noted bilateral.  Toes are warm to touch bilateral.     Musculoskeletal: He exhibits no edema or tenderness.   Muscle strength 5/5 in all muscle groups bilateral.  No tenderness nor crepitation with ROM of foot/ankle joints bilateral.  (-) for pain with palpation of bilateral foot and ankle.  Bilateral pes planus foot type.  Bilateral hallux abducto valgus.  Bilateral semi-reducible contracture of toe 2-5.     Neurological: He is alert and oriented to person, place, and time. He has normal strength. A sensory deficit is present.   Protective sensation per [place] monofilament is decreased bilateral.  Vibratory sensation is intact bilateral.  Light touch is intact bilateral.   Skin: Skin is warm, dry and intact. Capillary refill takes less than 2 seconds. Lesion noted. No abrasion, no bruising, no burn, no ecchymosis, no laceration and no petechiae noted. He is not diaphoretic. No erythema. No pallor.   Pedal skin appears thin bilateral.  Toenails x 10 appear thickened by 2 mm's, elongated by 3 mm's, and discolored with subungual debris.  Hyperkeratotic lesion noted to bilateral medial hallux.   Examination of the skin  reveals no evidence of significant maceration, rashes, open lesions, suspicious appearing nevi or other concerning lesions.              Assessment:       Encounter Diagnoses   Name Primary?    Diabetic polyneuropathy associated with type 2 diabetes mellitus Yes    Paresthesia of foot, bilateral     Onychomycosis due to dermatophyte     Severe obesity (BMI 35.0-39.9) with comorbidity          Plan:       Gio was seen today for diabetes mellitus and diabetic foot exam.    Diagnoses and all orders for this visit:    Diabetic polyneuropathy associated with type 2 diabetes mellitus    Paresthesia of foot, bilateral    Onychomycosis due to dermatophyte    Severe obesity (BMI 35.0-39.9) with comorbidity      I counseled the patient on his conditions, their implications and medical management.    Discussed the importance of diet and exercise as they pertain to diabetes management and maintaining a healthy BMI.    Being that he misplaced the prior AVS, he was given written information regarding alpha lipoic acid and B-complex vitamins.  Take as instructed in clinic.    Shoe inspection. Diabetic Foot Education. Patient reminded of the importance of good nutrition and blood sugar control to help prevent podiatric complications of diabetes. Patient instructed on proper foot hygeine. We discussed wearing proper shoe gear, daily foot inspections, never walking without protective shoe gear, never putting sharp instruments to feet    With patient's permission, nails were aggressively reduced and debrided x 10 to their soft tissue attachment mechanically and with electric , removing all offending nail and debris.  Also, a sterile #15 scalpel was used to trim lesions x 2 down to smooth appearing skin without incident.  Patient relates relief following the procedure. He will continue to monitor the areas daily, inspect his feet, wear protective shoe gear when ambulatory, moisturizer to maintain skin integrity and  follow in this office in approximately 4 months, sooner p.r.n.    Follow up in about 4 months (around 11/5/2019).    Rj Nuñez DPM

## 2019-07-16 DIAGNOSIS — Z98.890 HISTORY OF ARTHROSCOPY OF LEFT SHOULDER: ICD-10-CM

## 2019-07-17 RX ORDER — PRAMIPEXOLE DIHYDROCHLORIDE 1 MG/1
TABLET ORAL
Qty: 60 TABLET | Refills: 2 | Status: ON HOLD | OUTPATIENT
Start: 2019-07-17 | End: 2019-08-28 | Stop reason: SDUPTHER

## 2019-07-30 ENCOUNTER — OFFICE VISIT (OUTPATIENT)
Dept: BARIATRICS | Facility: CLINIC | Age: 54
End: 2019-07-30
Payer: MEDICARE

## 2019-07-30 VITALS
DIASTOLIC BLOOD PRESSURE: 62 MMHG | SYSTOLIC BLOOD PRESSURE: 115 MMHG | WEIGHT: 298.31 LBS | HEIGHT: 73 IN | BODY MASS INDEX: 39.54 KG/M2 | HEART RATE: 63 BPM

## 2019-07-30 DIAGNOSIS — E11.42 TYPE 2 DIABETES MELLITUS WITH DIABETIC POLYNEUROPATHY, WITH LONG-TERM CURRENT USE OF INSULIN: ICD-10-CM

## 2019-07-30 DIAGNOSIS — E66.01 CLASS 2 SEVERE OBESITY WITH BODY MASS INDEX (BMI) OF 35 TO 39.9 WITH SERIOUS COMORBIDITY: Primary | ICD-10-CM

## 2019-07-30 DIAGNOSIS — Z79.4 TYPE 2 DIABETES MELLITUS WITH DIABETIC POLYNEUROPATHY, WITH LONG-TERM CURRENT USE OF INSULIN: ICD-10-CM

## 2019-07-30 PROCEDURE — 99999 PR PBB SHADOW E&M-EST. PATIENT-LVL III: ICD-10-PCS | Mod: PBBFAC,,, | Performed by: INTERNAL MEDICINE

## 2019-07-30 PROCEDURE — 99213 OFFICE O/P EST LOW 20 MIN: CPT | Mod: S$GLB,,, | Performed by: INTERNAL MEDICINE

## 2019-07-30 PROCEDURE — 3078F DIAST BP <80 MM HG: CPT | Mod: CPTII,S$GLB,, | Performed by: INTERNAL MEDICINE

## 2019-07-30 PROCEDURE — 3045F PR MOST RECENT HEMOGLOBIN A1C LEVEL 7.0-9.0%: CPT | Mod: CPTII,S$GLB,, | Performed by: INTERNAL MEDICINE

## 2019-07-30 PROCEDURE — 3008F PR BODY MASS INDEX (BMI) DOCUMENTED: ICD-10-PCS | Mod: CPTII,S$GLB,, | Performed by: INTERNAL MEDICINE

## 2019-07-30 PROCEDURE — 3078F PR MOST RECENT DIASTOLIC BLOOD PRESSURE < 80 MM HG: ICD-10-PCS | Mod: CPTII,S$GLB,, | Performed by: INTERNAL MEDICINE

## 2019-07-30 PROCEDURE — 3008F BODY MASS INDEX DOCD: CPT | Mod: CPTII,S$GLB,, | Performed by: INTERNAL MEDICINE

## 2019-07-30 PROCEDURE — 3074F SYST BP LT 130 MM HG: CPT | Mod: CPTII,S$GLB,, | Performed by: INTERNAL MEDICINE

## 2019-07-30 PROCEDURE — 99999 PR PBB SHADOW E&M-EST. PATIENT-LVL III: CPT | Mod: PBBFAC,,, | Performed by: INTERNAL MEDICINE

## 2019-07-30 PROCEDURE — 3074F PR MOST RECENT SYSTOLIC BLOOD PRESSURE < 130 MM HG: ICD-10-PCS | Mod: CPTII,S$GLB,, | Performed by: INTERNAL MEDICINE

## 2019-07-30 PROCEDURE — 3045F PR MOST RECENT HEMOGLOBIN A1C LEVEL 7.0-9.0%: ICD-10-PCS | Mod: CPTII,S$GLB,, | Performed by: INTERNAL MEDICINE

## 2019-07-30 PROCEDURE — 99213 PR OFFICE/OUTPT VISIT, EST, LEVL III, 20-29 MIN: ICD-10-PCS | Mod: S$GLB,,, | Performed by: INTERNAL MEDICINE

## 2019-07-30 RX ORDER — TOPIRAMATE 25 MG/1
25 TABLET ORAL 2 TIMES DAILY
Qty: 60 TABLET | Refills: 3 | Status: ON HOLD | OUTPATIENT
Start: 2019-07-30 | End: 2019-08-28

## 2019-07-30 NOTE — PROGRESS NOTES
Subjective:       Patient ID: Gio Forrest is a 53 y.o. male.    Chief Complaint: Follow-up    Pt here today for follow-up. Has lost 11 lbs. Started LCHF diet and Ozempic. He states he is eating less. He does still make some not so great choices like pizza, though less often. Doing a lot of yard work. He has signed up for the gym.   . His LEs have normalized. His A1c has improved significantly, though not at goal.   Lab Results       Component                Value               Date                       ALT                      33                  06/17/2019                 AST                      24                  06/17/2019                 ALKPHOS                  126                 06/17/2019                 BILITOT                  0.4                 06/17/2019                Lab Results       Component                Value               Date                       HGBA1C                   8.1 (H)             06/17/2019                 HGBA1C                   8.5 (H)             03/18/2019                 HGBA1C                   10.9 (H)            12/11/2018            Lab Results       Component                Value               Date                       LDLCALC                  40.4 (L)            06/17/2019                 CREATININE               1.1                 06/17/2019               Review of Systems   Constitutional: Negative for chills and fever.   Respiratory: Positive for shortness of breath.         Uses CPAP regularly   Cardiovascular: Positive for leg swelling. Negative for chest pain.   Gastrointestinal: Negative for constipation and diarrhea.        Denies GERD on PPI   Genitourinary: Negative for difficulty urinating and dysuria.   Musculoskeletal: Positive for arthralgias and back pain.   Neurological: Positive for light-headedness. Negative for dizziness.        Positional       Objective:     /62 (BP Location: Right arm, Patient Position: Sitting, BP Method: Large  "(Automatic))   Pulse 63   Ht 6' 1" (1.854 m)   Wt 135.3 kg (298 lb 4.5 oz)   BMI 39.35 kg/m²     Physical Exam   Constitutional: He is oriented to person, place, and time. He appears well-developed. No distress.   Morbidly obese    HENT:   Head: Normocephalic and atraumatic.   Eyes: Pupils are equal, round, and reactive to light. No scleral icterus.   Neck: Normal range of motion. Neck supple.   Cardiovascular: Normal rate.   Pulmonary/Chest: Effort normal.   Musculoskeletal: Normal range of motion. He exhibits no edema.   Neurological: He is alert and oriented to person, place, and time.   Skin: Skin is warm and dry.   Psychiatric: He has a normal mood and affect. His behavior is normal. Judgment normal.   Vitals reviewed.      Assessment:       1. Class 2 severe obesity with body mass index (BMI) of 35 to 39.9 with serious comorbidity    2. Type 2 diabetes mellitus with diabetic polyneuropathy, with long-term current use of insulin        Plan:         Gio was seen today for follow-up.    Diagnoses and all orders for this visit:    Class 2 severe obesity with body mass index (BMI) of 35 to 39.9 with serious comorbidity    Type 2 diabetes mellitus with diabetic polyneuropathy, with long-term current use of insulin  -     semaglutide (OZEMPIC) 1 mg/dose (2 mg/1.5 mL) PnIj; Inject 1 mg subq weekly    Other orders  -     topiramate (TOPAMAX) 25 MG tablet; Take 1 tablet (25 mg total) by mouth 2 (two) times daily.        Exercise 30 min 5 days a week.       Protein and fiber in every meal.     3 meals a day made up of the following:  Unlimited green vegetables, tomatoes, mushrooms, spaghetti squash, cauliflower, meat, poultry, seafood, eggs and hard cheeses.   Milk and plain yogurt  Dressings, seasonings, condiments, etc should have less than 2 g sugars.   Beans (1-1.5 cups) or nuts (1/4 cup) can have 1 x a day.   1-2 servings of citrus fruits, berries, pineapple or melon a day (1/2 cup)  Avoid fried " foods    No/limit grains, rice, pasta, potatoes, bread, corn, peas, oatmeal, grits, tortillas, crackers, chips    No soda, sweet tea, juices or lemonade    [email or web address] for recipes. Moderate carb intake    1500 esrgio menu and healthy all day tips given.

## 2019-07-30 NOTE — PATIENT INSTRUCTIONS
Patient was informed that topiramate is used for migraine prevention and seizures. Weight loss is a common side effect that is well documented. S/he understands this. S/he was informed of the potential side effects such as serious and possibly fatal rash in which case the medication should be discontinued immediately. Paresthesias, forgetfulness, fatigue, kidney stones, GI symptoms, and changes in lab values such as electrolytes, blood counts and kidney function.    Start topiramate  in the evening for 1 week, then morning and evening.     Exercise 30 min 5 days a week.       Protein and fiber in every meal.     3 meals a day made up of the following:  Unlimited green vegetables, tomatoes, mushrooms, spaghetti squash, cauliflower, meat, poultry, seafood, eggs and hard cheeses.   Milk and plain yogurt  Dressings, seasonings, condiments, etc should have less than 2 g sugars.   Beans (1-1.5 cups) or nuts (1/4 cup) can have 1 x a day.   1-2 servings of citrus fruits, berries, pineapple or melon a day (1/2 cup)  Avoid fried foods    No/limit grains, rice, pasta, potatoes, bread, corn, peas, oatmeal, grits, tortillas, crackers, chips    No soda, sweet tea, juices or lemonade    [email or web address] for recipes. Moderate carb intake        1200- 1500 calorie Sample meal plan   80-120g protein per day.   Protein drinks and bars: 0-4 grams sugar   Drink lots of water throughout the day and exercise!   MENU # 1   Breakfast: 2 eggs, 1 turkey sausage gonzales, 1 apple   Snack: P3 protein pack  Lunch: 2 roll-ups (sliced turkey, low-fat sliced cheese, mustard), 12 baby carrots dipped in 1 Tbsp natural peanut butter   Mid-Day Snack: ¼ cup unsalted almonds, ½ cup fruit   Dinner: 1 chicken thigh simmered in tomato sauce + 2 Tbsp mozzarella cheese, ½ cup black beans, 1/2 cup steamed carrots   Evening Snack: 1/2 cup grapes with 4 cubes low-fat cheddar cheese   ___________________________________________________   MENU # 2   Breakfast: 200  calorie protein drink   Mid-morning snack : ¼ cup unsalted nuts, medium banana   Lunch: 3oz tuna or chicken salad made with 2 tbsp light saucedo, over salad with tomatoes and cucumbers.   Snack: low-fat cheese stick   Dinner: 3oz hamburger gonzales, slice of low-fat cheese, 1 cup yellow squash and zucchini sauteed in nonstick cookspray  Snack: 6oz light yogurt   _______________________________________________________   Menu #3   Breakfast: 6oz plain Greek yogurt + fruit (½ banana OR ½ cup fruit - pineapple, blueberries, strawberries, peach), may add Splenda to feli.   Lunch: Grilled chicken breast w/ slice low-fat pepper silvia cheese, 1/2 cup grilled/baked asparagus and small salad with Salad Spritzer.   Mid-Day snack: 200 calorie protein drink   Dinner: 4oz Grilled fish, ½ cup white beans, ½ cup cooked spinach   Evening Snack: fudgsicle no-sugar-added   Menu # 4   Breakfast: 1 scoop vanilla protein powder + 4oz skim milk + 4oz coffee   Snack: Pure protein bar   Lunch: 2 Lettuce tacos: 3oz seasoned ground turkey wrapped in a Mick lettuce leaves with 1 Tbsp shredded cheese and dollop low-fat sour cream   Snack: ½ cup cottage cheese, ½ cup pineapple chunks   Dinner: Shrimp omelet: 2 eggs, ½ cup shrimp, green onions, and shredded cheese   ______________________________________________________   Menu #5   Breakfast: 1 cup low-fat cottage cheese, ½ cup peaches (no sugar added)   Snack: 1 apple, 4 cubes cheese   Lunch: 3oz baked pork chop, 1 cup okra and tomato stew   Snack: 1/2 cup black beans + salsa + dollop light sour cream   Dinner: Caprese chicken salad: 3 oz chicken breast, 1oz fresh mozzarella, sliced tomato, 1 Tbsp olive oil, basil   Snack: sugar-free popsicle   _______________________________________________________  Menu #6   Breakfast: ½ cup part-skim ricotta cheese, 2 Tbsp sugar-free strawberry preserves, sprinkle of slivered almonds   Snack: 1 orange + string cheese  Lunch: 3 oz canned chicken, 1oz shredded  cheddar cheese, ½ cup black beans, 2 Tbsp salsa   Snack: 200 calorie Protein drink   Dinner: 4 oz grilled salmon steak, over mixed green salad with 2 tbsp light dressing   _______________________________________________________  Menu #7  Breakfast: crust-less quiche (bake 1 egg mixed w/ low fat cheese, broccoli and low sodium ham in a muffin cup until cooked inside)  Snack: 1 light yogurt  Lunch: protein shake (1 scoop powder + 8 oz skim milk, blended w/ ice)  Snack: small apple w/ 2 tbsp PB2 (powdered peanut butter)  Dinner: 2 Turkey meatballs w/ low sugar marinara sauce, 1/2 cup spiralized zucchini (sauteed on stove top w/ nonstick cookspray)        Eating well to be healthy and lose weight does not have to be hard. It also does not have to be time consuming or expensive. There a lots of ways you can work in healthy choices into your day. Many of these are easy, quick and even family friendly!    Homemade hazelnut au lait  Brew your favorite brand of hazelnut flavored coffee ([employer] makes a good one). Microwave 1/2 cup of milk that fits your eating plan (whole, skim or sugar-free almond milk can all work). Add half to 1 oz sugar free hazelnut syrup.     Quick and easy breakfast  1-2 boiled eggs or mini-frittatas with a tangerine. The boiled eggs and mini-frittatas can both be made ahead and last for up to 4 days in the refrigerator. Bonus if you portion them out in ready to go containers or zipper bags.     Breakfast Egg Muffins with Hawkins and Spinach  Makes 12 muffins  Ingredients    6 eggs  ¼ cup milk  ¼ teaspoon salt  2 cups grated cheddar cheese  3/4 cup spinach, cooked and drained (about 8 oz fresh spinach)  6 hawkins slices, cooked, drained of fat, and chopped  1/2 cup grated Parmesan cheese (optional)    Instructions      Preheat oven to 350 degrees. Use a regular 12-cup muffin pan. Spray the muffin pan with non-stick cooking spray.  In a large bowl, beat eggs until smooth. Add milk, salt, Cheddar cheese  and mix. Stir spinach, cooked hawkins into the egg mixture. Ladle the egg mixture into greased muffin cups ¾ full.  Top each muffin cup with grated Parmesan cheese.  Bake for 25 minutes. Remove from the oven, let the muffins cool for 30 minutes before removing them from the pan.      Be a brown bagger! When you make dinner, plan for an extra helping. When you serve your plate for dinner, serve an additional helping into a container that you can take with you the next day. If you don't have a refrigerator available during the day, an insulated lunch bag and ice packs will help you safely store you lunch.     Cold Brewed Iced tea. Fill a pitcher with 64 oz filtered water. Add either 4 regular tea bags of your choice or a large iced tea bag. Refrigerate over night then remove the tea bags. The tea will not be bitter and is super flavorful. Get creative! Try combinations like green tea and hibiscus tea or black tea with lemon zinger. Add orange or lemon slices for even more flavor.     Snack wisely. Protein filled snacks will fill you up, allowing you to get by with fewer calories. String cheese, pork skins (chicharrones), turkey pepperoni, or celery with cream cheese will all fit the bill.       Ditch the take out. Turkey tacos (with or without a low carb tortilla), burgers (without the bun), or fun stir fries are all quick and easy. The whole family will be happy, and you can save calories and money.      Orange Chicken Stir de la paz with asparagus   Makes 6 servings  Ingredients:    1.5 lbs boneless skinless chicken breast/tenders, diced into 1-inch pieces  1 Tbsp extra virgin olive or avocado oil, divided  2 lb asparagus, end portions trimmed and remainder diced into 1 1/2-inch pieces  1 small yellow onion, sliced into thin strips  8 oz button mushrooms, sliced  1 Tbsp peeled and finely grated fresh yuri  4 cloves garlic, minced  1/2 cup low-sodium chicken broth  Juice of 2 fresh oranges  2 Tbsp low sodium soy sauce  2  Tbsp cornstarch  Sea salt and freshly ground black pepper    Directions:    In a 12-inch non-stick wok, heat 1/2 oil over moderately high heat. Once oil is hot, add diced chicken and season lightly with salt and pepper. Sauté until cooked through, tossing occasionally, about 5-6 minutes.  Place chicken on a large plate and set aside. Return wok, reduce to medium-high heat, add remaining oil.  Once oil is hot, add asparagus, yellow onion and mushrooms, and sauté until tender-crisp, about 4 - 5 minutes, adding in garlic and yuri during the last 1 minute of sautéing.  Meanwhile, in a mixing bowl whisk together chicken broth, orange juice, soy sauce and cornstarch until well blended.  Pour chicken broth mixture into skillet with veggies, season with salt and pepper to taste, and bring mixture to a light boil, stirring constantly. Allow mixture to gently boil, stirring constantly, until thickened, about 1 minute.  Toss chicken into mixture and serve immediately over cauliflower rice or Shirataki noodles.      Skinny Chicken Tortilla Soup  Makes 7 servings    2 teaspoons olive oil  1 cup onion, chopped (about 1 small)  2 cups celery, sliced (about 4 medium stalks)  4 garlic cloves, minced  4 medium tomatoes, chopped  2 cups water  4 cups low-sodium organic chicken broth  3 cups chopped and/or shredded rotisserie chicken, skinless  2 cups sliced carrots (about 3 medium)  1 teaspoon dried oregano leaves  2 teaspoons chili powder  1 teaspoon garlic powder  2 teaspoons cumin  ½ teaspoon cayenne pepper (add less or omit, if you don't want a spicy soup)  ½ teaspoon sea salt + more to taste  ½ teaspoon pepper + more to taste    Directions:   Put all ingredients into a large crock pot. Cook on low for 5-6 hours.     Optional garnish with chopped avocado, chopped fresh cilantro, crumbled Cotija cheese, sour cream, Greek yogurt, your favorite hot sauce.           Vegan Avocado Banana Chocolate Pudding  Makes 4  servings  Ingredients    1 1/2 ripe avocados  2 ripe bananas  6 tbsp raw cacao powder or unsweetened cocoa powder  2-3 tbsp maple syrup (or calorie free sweetener)  1/4 cup almond milk  Instructions    Blend everything together in a  until the consistency is smooth and velvety. Taste and see if more sweetener is needed and stir to make sure everything is evenly mixed. Blend a second time if needed.  Top with banana slices, raw cacao nibs, almond butter, or any other toppings and enjoy!

## 2019-07-31 DIAGNOSIS — J30.89 ALLERGIC RHINITIS DUE TO OTHER ALLERGIC TRIGGER, UNSPECIFIED SEASONALITY: ICD-10-CM

## 2019-07-31 RX ORDER — DESLORATADINE 5 MG/1
TABLET ORAL
Qty: 30 TABLET | Refills: 3 | Status: ON HOLD | OUTPATIENT
Start: 2019-07-31 | End: 2019-08-28 | Stop reason: SDUPTHER

## 2019-08-08 ENCOUNTER — PATIENT OUTREACH (OUTPATIENT)
Dept: ADMINISTRATIVE | Facility: HOSPITAL | Age: 54
End: 2019-08-08

## 2019-08-22 RX ORDER — CITALOPRAM 40 MG/1
TABLET, FILM COATED ORAL
Qty: 30 TABLET | Refills: 2 | OUTPATIENT
Start: 2019-08-22

## 2019-08-26 ENCOUNTER — TELEPHONE (OUTPATIENT)
Dept: ENDOCRINOLOGY | Facility: CLINIC | Age: 54
End: 2019-08-26

## 2019-08-26 NOTE — TELEPHONE ENCOUNTER
----- Message from Vick Cruz sent at 8/26/2019  1:59 PM CDT -----  Contact: Patient [phone]  Patient would like to know when is he suppose to come back for a f/u visit. Please call to advise.

## 2019-08-26 NOTE — TELEPHONE ENCOUNTER
----- Message from Lynne Sandhu sent at 4/23/2019  7:26 AM CDT -----  Contact: self  Patient is in clinic today and would like to be seen as soon as possible for his 5/2 appt. Please call back at [phone] (home)      Unclear cause   Recheck CBC and CMP; add sed rate  Consider rechecking urine once antibiotics completed  XS tylenol for pain  Increase fluids  Will f/u with labs tomorrow

## 2019-08-27 PROBLEM — I47.10 SVT (SUPRAVENTRICULAR TACHYCARDIA): Status: ACTIVE | Noted: 2019-08-27

## 2019-08-27 PROBLEM — N17.9 AKI (ACUTE KIDNEY INJURY): Status: ACTIVE | Noted: 2019-08-27

## 2019-09-04 ENCOUNTER — OFFICE VISIT (OUTPATIENT)
Dept: FAMILY MEDICINE | Facility: CLINIC | Age: 54
End: 2019-09-04
Payer: MEDICARE

## 2019-09-04 VITALS
SYSTOLIC BLOOD PRESSURE: 104 MMHG | DIASTOLIC BLOOD PRESSURE: 68 MMHG | HEIGHT: 73 IN | WEIGHT: 298.06 LBS | BODY MASS INDEX: 39.5 KG/M2 | OXYGEN SATURATION: 96 % | TEMPERATURE: 98 F | HEART RATE: 72 BPM

## 2019-09-04 DIAGNOSIS — K21.9 GASTROESOPHAGEAL REFLUX DISEASE, ESOPHAGITIS PRESENCE NOT SPECIFIED: ICD-10-CM

## 2019-09-04 DIAGNOSIS — G25.81 RESTLESS LEG SYNDROME: ICD-10-CM

## 2019-09-04 DIAGNOSIS — R13.19 OTHER DYSPHAGIA: ICD-10-CM

## 2019-09-04 DIAGNOSIS — R53.83 OTHER FATIGUE: ICD-10-CM

## 2019-09-04 DIAGNOSIS — Z79.4 TYPE 2 DIABETES MELLITUS WITH DIABETIC POLYNEUROPATHY, WITH LONG-TERM CURRENT USE OF INSULIN: ICD-10-CM

## 2019-09-04 DIAGNOSIS — Z79.899 POLYPHARMACY: ICD-10-CM

## 2019-09-04 DIAGNOSIS — K59.09 OTHER CONSTIPATION: ICD-10-CM

## 2019-09-04 DIAGNOSIS — N17.9 AKI (ACUTE KIDNEY INJURY): Primary | ICD-10-CM

## 2019-09-04 DIAGNOSIS — E11.42 TYPE 2 DIABETES MELLITUS WITH DIABETIC POLYNEUROPATHY, WITH LONG-TERM CURRENT USE OF INSULIN: ICD-10-CM

## 2019-09-04 DIAGNOSIS — I47.10 SVT (SUPRAVENTRICULAR TACHYCARDIA): ICD-10-CM

## 2019-09-04 PROCEDURE — 3074F PR MOST RECENT SYSTOLIC BLOOD PRESSURE < 130 MM HG: ICD-10-PCS | Mod: CPTII,S$GLB,, | Performed by: NURSE PRACTITIONER

## 2019-09-04 PROCEDURE — 99214 OFFICE O/P EST MOD 30 MIN: CPT | Mod: 25,S$GLB,, | Performed by: NURSE PRACTITIONER

## 2019-09-04 PROCEDURE — 3074F SYST BP LT 130 MM HG: CPT | Mod: CPTII,S$GLB,, | Performed by: NURSE PRACTITIONER

## 2019-09-04 PROCEDURE — 99499 RISK ADDL DX/OHS AUDIT: ICD-10-PCS | Mod: S$GLB,,, | Performed by: NURSE PRACTITIONER

## 2019-09-04 PROCEDURE — G0008 ADMIN INFLUENZA VIRUS VAC: HCPCS | Mod: S$GLB,,, | Performed by: NURSE PRACTITIONER

## 2019-09-04 PROCEDURE — 99999 PR PBB SHADOW E&M-EST. PATIENT-LVL V: CPT | Mod: PBBFAC,,, | Performed by: NURSE PRACTITIONER

## 2019-09-04 PROCEDURE — 3045F PR MOST RECENT HEMOGLOBIN A1C LEVEL 7.0-9.0%: ICD-10-PCS | Mod: CPTII,S$GLB,, | Performed by: NURSE PRACTITIONER

## 2019-09-04 PROCEDURE — 99499 UNLISTED E&M SERVICE: CPT | Mod: S$GLB,,, | Performed by: NURSE PRACTITIONER

## 2019-09-04 PROCEDURE — 3008F PR BODY MASS INDEX (BMI) DOCUMENTED: ICD-10-PCS | Mod: CPTII,S$GLB,, | Performed by: NURSE PRACTITIONER

## 2019-09-04 PROCEDURE — 3078F PR MOST RECENT DIASTOLIC BLOOD PRESSURE < 80 MM HG: ICD-10-PCS | Mod: CPTII,S$GLB,, | Performed by: NURSE PRACTITIONER

## 2019-09-04 PROCEDURE — 3078F DIAST BP <80 MM HG: CPT | Mod: CPTII,S$GLB,, | Performed by: NURSE PRACTITIONER

## 2019-09-04 PROCEDURE — 90686 FLU VACCINE (QUAD) GREATER THAN OR EQUAL TO 3YO PRESERVATIVE FREE IM: ICD-10-PCS | Mod: S$GLB,,, | Performed by: NURSE PRACTITIONER

## 2019-09-04 PROCEDURE — 99999 PR PBB SHADOW E&M-EST. PATIENT-LVL V: ICD-10-PCS | Mod: PBBFAC,,, | Performed by: NURSE PRACTITIONER

## 2019-09-04 PROCEDURE — 99214 PR OFFICE/OUTPT VISIT, EST, LEVL IV, 30-39 MIN: ICD-10-PCS | Mod: 25,S$GLB,, | Performed by: NURSE PRACTITIONER

## 2019-09-04 PROCEDURE — 90686 IIV4 VACC NO PRSV 0.5 ML IM: CPT | Mod: S$GLB,,, | Performed by: NURSE PRACTITIONER

## 2019-09-04 PROCEDURE — 3045F PR MOST RECENT HEMOGLOBIN A1C LEVEL 7.0-9.0%: CPT | Mod: CPTII,S$GLB,, | Performed by: NURSE PRACTITIONER

## 2019-09-04 PROCEDURE — G0008 FLU VACCINE (QUAD) GREATER THAN OR EQUAL TO 3YO PRESERVATIVE FREE IM: ICD-10-PCS | Mod: S$GLB,,, | Performed by: NURSE PRACTITIONER

## 2019-09-04 PROCEDURE — 3008F BODY MASS INDEX DOCD: CPT | Mod: CPTII,S$GLB,, | Performed by: NURSE PRACTITIONER

## 2019-09-04 RX ORDER — CITALOPRAM 40 MG/1
40 TABLET, FILM COATED ORAL DAILY
Refills: 2 | COMMUNITY
Start: 2019-08-24 | End: 2020-01-02

## 2019-09-04 RX ORDER — PANTOPRAZOLE SODIUM 40 MG/1
40 TABLET, DELAYED RELEASE ORAL DAILY
Qty: 90 TABLET | Refills: 1 | Status: SHIPPED | OUTPATIENT
Start: 2019-09-04 | End: 2020-03-17

## 2019-09-04 RX ORDER — PRAMIPEXOLE DIHYDROCHLORIDE 1 MG/1
2 TABLET ORAL NIGHTLY
Qty: 60 TABLET | Refills: 5 | Status: SHIPPED | OUTPATIENT
Start: 2019-09-04 | End: 2019-10-09

## 2019-09-04 NOTE — PATIENT INSTRUCTIONS
Keep appt tomorrow with cardiologist  Ducosate stool softener daily  Stay out of the heat for the next few weeks  Hydrate well  Make a follow up with GI ([facility]) for your swallowing concerns.      Constipation (Adult)  Constipation means that you have bowel movements that are less frequent than usual. Stools often become very hard and difficult to pass.  Constipation is very common. At some point in life it affects almost everyone. Since everyone's bowel habits are different, what is constipation to one person may not be to another. Your healthcare provider may do tests to diagnose constipation. It depends on what he or she finds when evaluating you.    Symptoms of constipation include:  · Abdominal pain  · Bloating  · Vomiting  · Painful bowel movements  · Itching, swelling, bleeding, or pain around the anus  Causes  Constipation can have many causes. These include:  · Diet low in fiber  · Too much dairy  · Not drinking enough liquids  · Lack of exercise or physical activity. This is especially true for older adults.  · Changes in lifestyle or daily routine, including pregnancy, aging, work, and travel  · Frequent use or misuse of laxatives  · Ignoring the urge to have a bowel movement or delaying it until later  · Medicines, such as certain prescription pain medicines, iron supplements, antacids, certain antidepressants, and calcium supplements  · Diseases like irritable bowel syndrome, bowel obstructions, stroke, diabetes, thyroid disease, Parkinson disease, hemorrhoids, and colon cancer  Complications  Potential complications of constipation can include:  · Hemorrhoids  · Rectal bleeding from hemorrhoids or anal fissures (skin tears)  · Hernias  · Dependency on laxatives  · Chronic constipation  · Fecal impaction  · Bowel obstruction or perforation  Home care  All treatment should be done after talking with your healthcare provider. This is especially true if you have another medical problems, are taking  prescription medicines, or are an older adult. Treatment most often involves lifestyle changes. You may also need medicines. Your healthcare provider will tell you which will work best for you. Follow the advice below to help avoid this problem in the future.  Lifestyle changes  These lifestyle changes can help prevent constipation:  · Diet. Eat a high-fiber diet, with fresh fruit and vegetables, and reduce dairy intake, meats, and processed foods  · Fluids. It's important to get enough fluids each day. Drink plenty of water when you eat more fiber. If you are on diet that limits the amount of fluid you can have, talk about this with your healthcare provider.  · Regular exercise. Check with your healthcare provider first.  Medications  Take any medicines as directed. Some laxatives are safe to use only every now and then. Others can be taken on a regular basis. Talk with your doctor or pharmacist if you have questions.  Prescription pain medicines can cause constipation. If you are taking this kind of medicine, ask your healthcare provider if you should also take a stool softener.  Medicines you may take to treat constipation include:  · Fiber supplements  · Stool softeners  · Laxatives  · Enemas  · Rectal suppositories  Follow-up care  Follow up with your healthcare provider if symptoms don't get better in the next few days. You may need to have more tests or see a specialist.  Call 911  Call 911 if any of these occur:  · Trouble breathing  · Stiff, rigid abdomen that is severely painful to touch  · Confusion  · Fainting or loss of consciousness  · Rapid heart rate  · Chest pain  When to seek medical advice  Call your healthcare provider right away if any of these occur:  · Fever over 100.4°F (38°C)  · Failure to resume normal bowel movements  · Pain in your abdomen or back gets worse  · Nausea or vomiting  · Swelling in your abdomen  · Blood in the stool  · Black, tarry stool  · Involuntary weight  loss  · Weakness  Date Last Reviewed: 12/30/2015  © 1277-7460 [employer], [employer]. [address] [place] [address], [place] [address]. All rights reserved. This information is not intended as a substitute for professional medical care. Always follow your healthcare professional's instructions.

## 2019-09-04 NOTE — PROGRESS NOTES
Subjective:       Patient ID: Gio Forrest is a 53 y.o. male.    Chief Complaint: Hypertension and Diabetes  He was last seen in primary care by me on 04/23/2019.  He last saw Carly on 07/16/19  HPI   He was seen in the ED on 08/27/2019 and was inpatient for about 24 hours.   He was diagnosed with acute kidney injury and had a run of SVT which was concerted with adenosine. He was discharged on 08/28/2019.  He went to have oral surgery on 08/29/2019 and had numerous teeth removed 6.  He presented to ED again on 08/30/2019 for chest pain.  States he gets SOB with very minimal exertion since this episode of hospital visits.  States been a little hard to swallow at times.    Vitals:    09/04/19 1136   BP: 104/68   Pulse: 72   Temp: 97.7 °F (36.5 °C)     Review of Systems   Constitutional: Positive for fatigue.       Lab Results   Component Value Date    HGBA1C 8.1 (H) 06/17/2019     CMP  Sodium   Date Value Ref Range Status   08/30/2019 134 (L) 136 - 145 mmol/L Final     Potassium   Date Value Ref Range Status   08/30/2019 5.1 3.5 - 5.1 mmol/L Final     Chloride   Date Value Ref Range Status   08/30/2019 94 (L) 95 - 110 mmol/L Final     CO2   Date Value Ref Range Status   08/30/2019 30 22 - 31 mmol/L Final     Glucose   Date Value Ref Range Status   08/30/2019 248 (H) 70 - 110 mg/dL Final     Comment:     The ADA recommends the following guidelines for fasting glucose:  Normal:       less than 100 mg/dL  Prediabetes:  100 mg/dL to 125 mg/dL  Diabetes:     126 mg/dL or higher       BUN, Bld   Date Value Ref Range Status   08/30/2019 14 9 - 21 mg/dL Final     Creatinine   Date Value Ref Range Status   08/30/2019 0.87 0.50 - 1.40 mg/dL Final     Calcium   Date Value Ref Range Status   08/30/2019 9.4 8.4 - 10.2 mg/dL Final     Total Protein   Date Value Ref Range Status   08/30/2019 7.7 6.0 - 8.4 g/dL Final     Albumin   Date Value Ref Range Status   08/30/2019 4.5 3.5 - 5.2 g/dL Final     Total Bilirubin   Date Value  Ref Range Status   08/30/2019 0.5 0.2 - 1.3 mg/dL Final     Alkaline Phosphatase   Date Value Ref Range Status   08/30/2019 104 38 - 145 U/L Final     AST ([place])   Date Value Ref Range Status   02/15/2016 66 (H) 17 - 59 U/L Final     AST   Date Value Ref Range Status   08/30/2019 31 17 - 59 U/L Final     ALT   Date Value Ref Range Status   08/30/2019 50 (H) 10 - 44 U/L Final     Anion Gap   Date Value Ref Range Status   08/30/2019 10 8 - 16 mmol/L Final     eGFR if    Date Value Ref Range Status   08/30/2019 >60 >60 mL/min/1.73 m^2 Final     eGFR if non    Date Value Ref Range Status   08/30/2019 >60 >60 mL/min/1.73 m^2 Final     Comment:     Calculation used to obtain the estimated glomerular filtration  rate (eGFR) is the CKD-EPI equation.        I have reviewed his last labs from [facility] dated 08/30/2019  He had a colonoscopy on 09/14/2017 with 5mm polyp  Reviewed last results of UGI on 09/14/17  Objective:      Physical Exam   Constitutional: He is oriented to person, place, and time. Vital signs are normal. He appears well-developed and well-nourished. He is active and cooperative.  Non-toxic appearance. He does not have a sickly appearance. He does not appear ill. No distress.   HENT:   Head: Normocephalic and atraumatic.   Right Ear: Hearing, tympanic membrane, external ear and ear canal normal.   Left Ear: Hearing, tympanic membrane, external ear and ear canal normal.   Nose: Nose normal.   Mouth/Throat: Uvula is midline, oropharynx is clear and moist and mucous membranes are normal.   Eyes: Lids are normal.   Neck: Trachea normal, normal range of motion, full passive range of motion without pain and phonation normal. Neck supple.   Cardiovascular: Normal rate, regular rhythm and normal heart sounds.   Pulmonary/Chest: Effort normal and breath sounds normal.   Abdominal: Soft. Bowel sounds are normal. There is no tenderness.   Large and obese abdomen   Musculoskeletal:  Normal range of motion.   Lymphadenopathy:        Head (right side): No submental, no submandibular, no tonsillar, no preauricular, no posterior auricular and no occipital adenopathy present.        Head (left side): No submental, no submandibular, no tonsillar, no preauricular, no posterior auricular and no occipital adenopathy present.     He has no cervical adenopathy.   Neurological: He is alert and oriented to person, place, and time.   Skin: Skin is warm, dry and intact.   Psychiatric: He has a normal mood and affect. His speech is normal and behavior is normal. Judgment and thought content normal. Cognition and memory are normal.   Nursing note and vitals reviewed.      Assessment & Plan:       KRIS (acute kidney injury)- Resolved  CMP  Sodium   Date Value Ref Range Status   08/30/2019 134 (L) 136 - 145 mmol/L Final     Potassium   Date Value Ref Range Status   08/30/2019 5.1 3.5 - 5.1 mmol/L Final     Chloride   Date Value Ref Range Status   08/30/2019 94 (L) 95 - 110 mmol/L Final     CO2   Date Value Ref Range Status   08/30/2019 30 22 - 31 mmol/L Final     Glucose   Date Value Ref Range Status   08/30/2019 248 (H) 70 - 110 mg/dL Final     Comment:     The ADA recommends the following guidelines for fasting glucose:  Normal:       less than 100 mg/dL  Prediabetes:  100 mg/dL to 125 mg/dL  Diabetes:     126 mg/dL or higher       BUN, Bld   Date Value Ref Range Status   08/30/2019 14 9 - 21 mg/dL Final     Creatinine   Date Value Ref Range Status   08/30/2019 0.87 0.50 - 1.40 mg/dL Final     Calcium   Date Value Ref Range Status   08/30/2019 9.4 8.4 - 10.2 mg/dL Final     Total Protein   Date Value Ref Range Status   08/30/2019 7.7 6.0 - 8.4 g/dL Final     Albumin   Date Value Ref Range Status   08/30/2019 4.5 3.5 - 5.2 g/dL Final     Total Bilirubin   Date Value Ref Range Status   08/30/2019 0.5 0.2 - 1.3 mg/dL Final     Alkaline Phosphatase   Date Value Ref Range Status   08/30/2019 104 38 - 145 U/L Final      AST ([place])   Date Value Ref Range Status   02/15/2016 66 (H) 17 - 59 U/L Final     AST   Date Value Ref Range Status   08/30/2019 31 17 - 59 U/L Final     ALT   Date Value Ref Range Status   08/30/2019 50 (H) 10 - 44 U/L Final     Anion Gap   Date Value Ref Range Status   08/30/2019 10 8 - 16 mmol/L Final     eGFR if    Date Value Ref Range Status   08/30/2019 >60 >60 mL/min/1.73 m^2 Final     eGFR if non    Date Value Ref Range Status   08/30/2019 >60 >60 mL/min/1.73 m^2 Final     Comment:     Calculation used to obtain the estimated glomerular filtration  rate (eGFR) is the CKD-EPI equation.          Other fatigue    SVT (supraventricular tachycardia)- Resolved, will see cardiology tomorrow    Other constipation- OTC stool softener daily    Type 2 diabetes mellitus with diabetic polyneuropathy, with long-term current use of insulin    Restless leg syndrome  -     pramipexole (MIRAPEX) 1 MG tablet; Take 2 tablets (2 mg total) by mouth every evening.  Dispense: 60 tablet; Refill: 5    Gastroesophageal reflux disease, esophagitis presence not specified  -     pantoprazole (PROTONIX) 40 MG tablet; Take 1 tablet (40 mg total) by mouth once daily.  Dispense: 90 tablet; Refill: 1    Other dysphagia- Follow up with GI ([place])     Polypharmacy    Other orders  -     Influenza - Quadrivalent (3 years & older) (PF)      Medication List with Changes/Refills   Current Medications    ACCU-CHEK SMARTVIEW TEST STRIP STRP        ALLOPURINOL (ZYLOPRIM) 100 MG TABLET    Take 100 mg by mouth once daily.    ASPIRIN (ECOTRIN) 81 MG EC TABLET    Take 81 mg by mouth once daily.    ATORVASTATIN (LIPITOR) 40 MG TABLET    Take 40 mg by mouth once daily.    BENAZEPRIL (LOTENSIN) 5 MG TABLET    Take 0.5 tablets (2.5 mg total) by mouth once daily.    BLOOD SUGAR DIAGNOSTIC STRP    To check BG 2 times daily, to use with insurance preferred meter    BLOOD-GLUCOSE METER KIT    To check BG 2 times  "daily, to use with insurance preferred meter    CITALOPRAM (CELEXA) 40 MG TABLET    Take 40 mg by mouth once daily.    CLONAZEPAM (KLONOPIN) 0.5 MG TABLET    Take 0.5 mg by mouth once daily.     CYCLOBENZAPRINE (FLEXERIL) 10 MG TABLET    Take 10 mg by mouth every evening.     DESLORATADINE (CLARINEX) 5 MG TABLET    Take 5 mg by mouth once daily.    GABAPENTIN (NEURONTIN) 300 MG CAPSULE    Take 300 mg by mouth every morning.     GABAPENTIN (NEURONTIN) 300 MG CAPSULE    Take 900 mg by mouth every evening.     INSULIN ASPART PROTAMINE-INSULIN ASPART (NOVOLOG MIX 70-30FLEXPEN U-100) 100 UNIT/ML (70-30) INPN PEN    80 units at breakfast and dinner    LANCETS MISC    To check BG 2 times daily, to use with insurance preferred meter    METFORMIN (GLUCOPHAGE) 1000 MG TABLET    Take 1 tablet (1,000 mg total) by mouth 2 (two) times daily.    METOPROLOL SUCCINATE (TOPROL-XL) 25 MG 24 HR TABLET    Take 1 tablet (25 mg total) by mouth once daily.    MULTIVIT,THER IRON,CA,FA & MIN (MULTIVITAMIN) TAB    Take 1 tablet by mouth once daily.     OXYCODONE-ACETAMINOPHEN (PERCOCET)  MG PER TABLET    Take 1 tablet by mouth every evening.     PEN NEEDLE, DIABETIC (BD ULTRA-FINE MERLE PEN NEEDLE) 32 GAUGE X 5/32" NDLE    Uses 2 daily    PIOGLITAZONE (ACTOS) 15 MG TABLET    Take 1 tablet (15 mg total) by mouth once daily.    SEMAGLUTIDE (OZEMPIC) 1 MG/DOSE (2 MG/1.5 ML) PNIJ    Inject 1 mg into the skin every 7 days.   Changed and/or Refilled Medications    Modified Medication Previous Medication    PANTOPRAZOLE (PROTONIX) 40 MG TABLET pantoprazole (PROTONIX) 40 MG tablet       Take 1 tablet (40 mg total) by mouth once daily.    Take 1 tablet (40 mg total) by mouth once daily.    PRAMIPEXOLE (MIRAPEX) 1 MG TABLET pramipexole (MIRAPEX) 1 MG tablet       Take 2 tablets (2 mg total) by mouth every evening.    Take 2 mg by mouth every evening.   Discontinued Medications    FLU VAC [id number] 36MOS UP,PF, 60 MCG (15 MCG X 4)/0.5 ML SYRG    " Fluzone Quad 2018-19(PF) 60 mcg(15 mcgx4)/0.5 mL intramuscular syringe   inject into the muscle one time.         Follow up in about 3 months (around 12/4/2019), or if symptoms worsen or fail to improve.

## 2019-09-17 DIAGNOSIS — M10.00 IDIOPATHIC GOUT, UNSPECIFIED CHRONICITY, UNSPECIFIED SITE: ICD-10-CM

## 2019-09-18 RX ORDER — ALLOPURINOL 100 MG/1
TABLET ORAL
Qty: 90 TABLET | Refills: 0 | Status: SHIPPED | OUTPATIENT
Start: 2019-09-18 | End: 2020-01-07

## 2019-10-07 DIAGNOSIS — Z98.890 HISTORY OF ARTHROSCOPY OF LEFT SHOULDER: ICD-10-CM

## 2019-10-09 RX ORDER — PRAMIPEXOLE DIHYDROCHLORIDE 1 MG/1
TABLET ORAL
Qty: 60 TABLET | Refills: 2 | Status: SHIPPED | OUTPATIENT
Start: 2019-10-09 | End: 2020-07-07

## 2019-10-09 RX ORDER — INSULIN ASPART 100 [IU]/ML
INJECTION, SUSPENSION SUBCUTANEOUS
Qty: 11 BOX | Refills: 0 | Status: SHIPPED | OUTPATIENT
Start: 2019-10-09 | End: 2020-03-16

## 2019-10-09 NOTE — TELEPHONE ENCOUNTER
----- Message from Efe Gruber sent at 10/9/2019 10:42 AM CDT -----  [facility]  Patient need a refill on NOVOLOG MIX 70-30FLEXPEN U-100 please send to [facility], any questions please call back at [phone] (home)     [facility] - [place] - [address] [place] [address]  Phone: [phone] Fax: [phone]

## 2019-10-14 DIAGNOSIS — K21.9 GASTROESOPHAGEAL REFLUX DISEASE, ESOPHAGITIS PRESENCE NOT SPECIFIED: ICD-10-CM

## 2019-10-15 RX ORDER — PANTOPRAZOLE SODIUM 40 MG/1
TABLET, DELAYED RELEASE ORAL
Qty: 90 TABLET | Refills: 1 | Status: SHIPPED | OUTPATIENT
Start: 2019-10-15 | End: 2019-11-17 | Stop reason: SDUPTHER

## 2019-10-25 ENCOUNTER — TELEPHONE (OUTPATIENT)
Dept: FAMILY MEDICINE | Facility: CLINIC | Age: 54
End: 2019-10-25

## 2019-10-25 ENCOUNTER — TELEPHONE (OUTPATIENT)
Dept: ENDOCRINOLOGY | Facility: CLINIC | Age: 54
End: 2019-10-25

## 2019-10-25 NOTE — TELEPHONE ENCOUNTER
Call to patient, advised urgent care as symptoms are worsening and he has been dealing with it for over a week.

## 2019-10-25 NOTE — TELEPHONE ENCOUNTER
----- Message from Maeve Cruz sent at 10/25/2019  4:08 PM CDT -----  Type:  Sooner Apoointment Request    Caller is requesting a sooner appointment.  Caller declined first available appointment listed below.  Caller will not accept being placed on the waitlist and is requesting a message be sent to doctor.    Name of Caller:  Patient - Gio   When is the first available appointment?  02/12/2020  Symptoms:  Needs follow up visit missed the last on on 9/6 was NO show   Best Call Back Number:  [phone]   Additional Information: trying to get in sooner

## 2019-10-25 NOTE — TELEPHONE ENCOUNTER
----- Message from Maeve Cruz sent at 10/25/2019  4:04 PM CDT -----  Type: Needs Medical Advice     Who Called: patient - Gio    Symptoms (please be specific):  Bad cough about a week also has phlegm has a yellow color to it   How long has patient had these symptoms:   1 week   Pharmacy name and phone #:    [facility] - [place] - [address] [place] [address]  Phone: [phone] Fax: [phone]      Best Call Back Number: [phone]  Additional Information: pt requesting to have something called in to his local pharmacy - last seen Beba on 9/4/19

## 2019-11-04 ENCOUNTER — TELEPHONE (OUTPATIENT)
Dept: ENDOCRINOLOGY | Facility: CLINIC | Age: 54
End: 2019-11-04

## 2019-11-04 DIAGNOSIS — E11.42 TYPE 2 DIABETES MELLITUS WITH DIABETIC POLYNEUROPATHY, WITH LONG-TERM CURRENT USE OF INSULIN: Primary | ICD-10-CM

## 2019-11-04 DIAGNOSIS — Z79.4 TYPE 2 DIABETES MELLITUS WITH DIABETIC POLYNEUROPATHY, WITH LONG-TERM CURRENT USE OF INSULIN: Primary | ICD-10-CM

## 2019-11-04 NOTE — PROGRESS NOTES
Refill Authorization Note     is requesting a refill authorization.    Brief assessment and rationale for refill: APPROVE: prr   Name and strength of medication: ATORVASTATIN CALCIUM 40 MG TABLET            Medication reconciliation completed: No              How patient will take medication: t1t po qd           Comments:   Requested Prescriptions   Pending Prescriptions Disp Refills    atorvastatin (LIPITOR) 40 MG tablet [Pharmacy Med Name: ATORVASTATIN CALCIUM 40 MG TABLET] 90 tablet 1     Sig: TAKE ONE TABLET BY MOUTH EVERY EVENING       Cardiovascular:  Antilipid - Statins Passed - 11/4/2019  9:32 AM        Passed - Patient is at least 18 years old        Passed - Office visit in past 12 months or future 90 days     Recent Outpatient Visits            2 months ago KRIS (acute kidney injury)    [facility] Beba Carrasco DNP, APRN    3 months ago Class 2 severe obesity with body mass index (BMI) of 35 to 39.9 with serious comorbidity    [facility] - Bariatric Surgery Jessika Easley MD    4 months ago Diabetic polyneuropathy associated with type 2 diabetes mellitus    [facility] - Podiatry Rj Nuñez DPM    6 months ago Essential hypertension    [facility] Beba Carrasco DNP, APRSYLVESTER    7 months ago Type 2 diabetes mellitus with diabetic polyneuropathy, with long-term current use of insulin    [facility] - Endocrinology Gabby Rooney DNP, NP          Future Appointments              Tomorrow Gabby Rooney DNP, NP [facility] - Endocrinology, [facility]    In 1 week [facility]    In 1 month Beba Carrasco DNP, APRN [facility]                Passed - Lipid Panel completed in last 360 days     Lab Results   Component Value Date    CHOL 88 (L) 06/17/2019    HDL 25 (L) 06/17/2019    LDLCALC 40.4 (L) 06/17/2019    TRIG 113 06/17/2019             Passed - ALT is 94 or below and within 360 days     ALT    Date Value Ref Range Status   08/30/2019 50 (H) 10 - 44 U/L Final   08/28/2019 41 10 - 44 U/L Final   08/27/2019 47 (H) 10 - 44 U/L Final              Passed - AST is 54 or below and within 360 days     [facility]   Date Value Ref Range Status   02/15/2016 66 (H) 17 - 59 U/L Final     AST   Date Value Ref Range Status   08/30/2019 31 17 - 59 U/L Final   08/28/2019 26 17 - 59 U/L Final   08/27/2019 34 17 - 59 U/L Final

## 2019-11-04 NOTE — TELEPHONE ENCOUNTER
----- Message from Genesis Blanchard sent at 11/4/2019  1:29 PM CST -----  Contact: patient   Pt want to know if he needs any blood work done before appt tomorrow, please call back at [phone] (home)

## 2019-11-04 NOTE — TELEPHONE ENCOUNTER
LM advising pt lab orders are in but they are fasting.  His appt is tomorrow afternoon w/ Ms. Gabby.  If unable to come tomorrow morning for labs, may have to come in another day soon for the fasting labs (has an appt with another provider next week).

## 2019-11-05 ENCOUNTER — OFFICE VISIT (OUTPATIENT)
Dept: ENDOCRINOLOGY | Facility: CLINIC | Age: 54
End: 2019-11-05
Payer: MEDICARE

## 2019-11-05 VITALS
DIASTOLIC BLOOD PRESSURE: 70 MMHG | WEIGHT: 300 LBS | HEIGHT: 73 IN | SYSTOLIC BLOOD PRESSURE: 130 MMHG | HEART RATE: 73 BPM | BODY MASS INDEX: 39.76 KG/M2

## 2019-11-05 DIAGNOSIS — E78.5 DYSLIPIDEMIA: ICD-10-CM

## 2019-11-05 DIAGNOSIS — I42.8 OTHER CARDIOMYOPATHY: ICD-10-CM

## 2019-11-05 DIAGNOSIS — K76.0 FATTY LIVER: ICD-10-CM

## 2019-11-05 DIAGNOSIS — E11.42 TYPE 2 DIABETES MELLITUS WITH DIABETIC POLYNEUROPATHY, WITH LONG-TERM CURRENT USE OF INSULIN: Primary | ICD-10-CM

## 2019-11-05 DIAGNOSIS — I10 ESSENTIAL HYPERTENSION: ICD-10-CM

## 2019-11-05 DIAGNOSIS — Z79.4 TYPE 2 DIABETES MELLITUS WITH DIABETIC POLYNEUROPATHY, WITH LONG-TERM CURRENT USE OF INSULIN: Primary | ICD-10-CM

## 2019-11-05 DIAGNOSIS — G47.33 OSA ON CPAP: ICD-10-CM

## 2019-11-05 PROBLEM — N17.9 AKI (ACUTE KIDNEY INJURY): Status: RESOLVED | Noted: 2019-08-27 | Resolved: 2019-11-05

## 2019-11-05 PROCEDURE — 99999 PR PBB SHADOW E&M-EST. PATIENT-LVL V: CPT | Mod: PBBFAC,,, | Performed by: NURSE PRACTITIONER

## 2019-11-05 PROCEDURE — 99999 PR PBB SHADOW E&M-EST. PATIENT-LVL V: ICD-10-PCS | Mod: PBBFAC,,, | Performed by: NURSE PRACTITIONER

## 2019-11-05 PROCEDURE — 99214 PR OFFICE/OUTPT VISIT, EST, LEVL IV, 30-39 MIN: ICD-10-PCS | Mod: S$GLB,,, | Performed by: NURSE PRACTITIONER

## 2019-11-05 PROCEDURE — 99214 OFFICE O/P EST MOD 30 MIN: CPT | Mod: S$GLB,,, | Performed by: NURSE PRACTITIONER

## 2019-11-05 RX ORDER — ATORVASTATIN CALCIUM 40 MG/1
TABLET, FILM COATED ORAL
Qty: 90 TABLET | Refills: 1 | Status: SHIPPED | OUTPATIENT
Start: 2019-11-05 | End: 2020-04-21

## 2019-11-05 NOTE — PATIENT INSTRUCTIONS
Low Carb Snacks  Snacks can be an important part of a balanced, healthy meal plan. They allow you to eat more frequently, feeling full and satisfied throughout the day. Also, they allow you to spread carbohydrates evenly, which may stabilize blood sugars.  Plus, snacks are enjoyable!     The amount of carbohydrate needed at snacks varies. Generally, about 15-30 grams of carbohydrate per snack is recommended.  Below you will find some tasty treats.       0-5 gm carb   Crystal Light   Vitamin Water Zero   Herbal tea, unsweetened   2 tsp peanut butter on celery   1./2 cup sugar-free jell-o   1 sugar-free popsicle   ¼ cup blueberries   8oz Blue Misti unsweetened almond milk   5 baby carrots & celery sticks, cucumbers, bell peppers dipped in ¼ cup salsa, 2Tbsp light ranch dressing or 2Tbsp plain Greek yogurt   10 Goldfish crackers   ½ oz low-fat cheese or string cheese   1 closed handful of nuts, unsalted   1 Tbsp of sunflower seeds, unsalted   1 cup Smart Pop popcorn   1 whole grain brown rice cake        15 gm carb   1 small piece of fruit or ½ banana or 1/2 cup lite canned fruit   3 elizabeth cracker squares   3 cups Smart Pop popcorn, top spray butter, Moser lite salt or cinnamon and Truvia   5 Vanilla Wafers   ½ cup low fat, no added sugar ice cream or frozen yogurt (Blue bell, Blue Bunny, Weight Watchers, Skinny Cow)   ½ turkey, ham, or chicken sandwich   ½ c fruit with ½ c Cottage cheese   4-6 unsalted wheat crackers with 1 oz low fat cheese or 1 tbsp peanut butter    30-45 goldfish crackers (depending on flavor)    7-8 Zoroastrian mini brown rice cakes (caramel, apple cinnamon, chocolate)    12 Zoroastrian mini brown rice cakes (cheddar, bbq, ranch)    1/3 cup hummus dip with raw veg   1/2 whole wheat chris, 1Tbsp hummus   Mini Pizza (1/2 whole wheat English muffin, low-fat  cheese, tomato sauce)   100 calorie snack pack (Oreo, Chips Ahoy, Ritz Mix, Baked Cheetos)   4-6 oz. light or Greek  "Style yogurt (Chobani, Yoplait, Okios, [place])   ½ cup sugar-free pudding     6 in. wheat tortilla or chris oven toasted chips (topped with spray butter flavoring, cinnamon, Truvia OR spray butter, garlic powder, chili powder)    18 BBQ Popchips (available at Target, Whole Foods, Fresh Market)     Celery with peanut butter   Celery with tuna salad   Hard boiled eggs   Dill pickles and cheddar cheese (no kidding, it's a great combo)   Nuts (keep raw ones in the freezer if you think you'll overeat them)   Sunflower seeds (get them in the shell so it will take longer to eat them)   Other seeds (How to Toast Pumpkin or Squash Seeds)   Low-Carb Trail Mix   Jerky (beef or turkey -- try to find low-sugar varieties)   Cheese sticks, such as string cheese   Sugar-free Jello, alone or with cottage cheese and a sprinkling of nuts. Make sugar-free lime Jello with part coconut milk -- For a large package, dissolve the powder in a cup of boiling water, add a can of coconut milk, and then add the rest of the water. Stir well.   Pepperoni "chips" -- Zap the slices in the microwave   Cheese with a few apple slices   4-ounce plain or sugar-free yogurt with berries and flax seed meal   Smoked salmon and cream cheese on cucumber slices   Lettuce Roll-ups -- Roll luncheon meat, egg salad, tuna or other filling and veggies in lettuce leaves   Lunch Meat Roll-ups -- Roll cheese or veggies in lunch meat (read the labels for carbs on the lunch meat)   Spread bean dip, spinach dip, or other low-carb dip or spread on the lunch meat or lettuce and then roll it up   Raw veggies and spinach dip, or other low-carb dip   Pork rinds (Chicharrón), with or without dip   Ricotta cheese with fruit and/or nuts and/or flax seed meal   Mushrooms with cheese spread inside (or other spreads or dips)   Low-carb snack bars (watch out for sugar alcohols, especially maltitol)   Product Review: Atkins Advantage Bars   Pepperoni " Chips -- Microwave pepperoni slices until crisp. Great with cheeses and dips   Garlic Parmesan Flax Seed Crackers   Parmesan Crisps -- Good when you want a crunchy snack.   Peanut Butter Protein Balls

## 2019-11-05 NOTE — PROGRESS NOTES
CC: This 54 y.o. male presents for management of diabetes  and chronic conditions pending review including HTN, HLP, morbid obesity, fatty liver, vitamin d deficiency     HPI: He was diagnosed with T2DM in 2005. Has never been hospitalized r/t DM.  Mother has passed away from Fatty liver and hepatic carcinoma in 2018    Family hx of DM: sister, mom and dad    Last seen March 2019  Rhizotomy this am  He has no recent labs  No recent logs   Did not take his insulin this am   Bg this am 157  Usually checks fasting only   + hypoglycemia- had bg of 47, may happen in the middle of the night or mid day    Off 70/30 for ~ 1 week bc of Pharmacy error (gave him rapid acting only)    Diet: Eats 2-3  Meals a day, snacks on sweets-honey buns, cake, shakes- has been trying to cut back    Exercise: none  CURRENT DM MEDS:   Novolog 70/30 80 u bid; metformin 1000 mg bid, actos 15 mg qd,  ozempic 1 mg weekly  Treats- his lows with Mixerss  Vial/pen:  Uses pen  Glucometer type:  True Test 2018    Standards of Care:  Eye exam: 1/2018 no h/o retinopathy - appt w [facility]     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  Skin: Skin is intact and heals well, no rashes, no hair changes  Eyes: Denies visual disturbances  Resp: no SOB or HENDERSON, no cough  Cardiac: No palpitations, chest pain, no edema   GI: No nausea or vomiting, diarrhea, constipation, or abdominal pain.  /GYN: No nocturia, burning or pain.   MS/Neuro: +numbness/ tingling in BLE; Gait steady, speech clear  Psych: Denies drug/ETOH abuse, no hx of depression.  Other systems: negative.    PE:  GENERAL: Well developed, well nourished.appears older than age.   PSYCH: AAOx3, appropriate mood and affect, pleasant expression, conversant, appears relaxed, well groomed.   EYES: Conjunctiva, corneas clear  NECK: Supple, trachea midline   NEURO: Gait steady  SKIN: Skin warm and dry no acanthosis nigracans.  FOOT EXAMINATION:7/2019         Lab Results   Component Value Date    MICALBCREAT 4.8 06/17/2019       Hemoglobin A1C   Date Value Ref Range Status   06/17/2019 8.1 (H) 4.0 - 5.6 % Final     Comment:     ADA Screening Guidelines:  5.7-6.4%  Consistent with prediabetes  >or=6.5%  Consistent with diabetes  High levels of fetal hemoglobin interfere with the HbA1C  assay. Heterozygous hemoglobin variants (HbS, HgC, etc)do  not significantly interfere with this assay.   However, presence of multiple variants may affect accuracy.     03/18/2019 8.5 (H) 4.0 - 5.6 % Final     Comment:     ADA Screening Guidelines:  5.7-6.4%  Consistent with prediabetes  >or=6.5%  Consistent with diabetes  High levels of fetal hemoglobin interfere with the HbA1C  assay. Heterozygous hemoglobin variants (HbS, HgC, etc)do  not significantly interfere with this assay.   However, presence of multiple variants may affect accuracy.     12/11/2018 10.9 (H) 4.0 - 5.6 % Final     Comment:     ADA Screening Guidelines:  5.7-6.4%  Consistent with prediabetes  >or=6.5%  Consistent with diabetes  High levels of fetal hemoglobin interfere with the HbA1C  assay. Heterozygous hemoglobin variants (HbS, HgC, etc)do  not significantly interfere with this assay.   However, presence of multiple variants may affect accuracy.          ASSESSMENT and PLAN:     1. T2DM with hyperglycemia-   Labs in am   CGMS- med changes based on results  Continue Novolog 70/30 to 80 units bid, Actos 15 mg qd, metformin 1000 mg bid,Resume Ozempic 1 mg qweek,      Discussed A1c and BG goals.   - takes ASA, ACEi, statin    2. HTN - controlled, continue meds as previously prescribed and monitor.     3. HLP -  on statin therapy,  Check labs tomorrow    4. TARYN- not wearing regularly, WEAR cpap nightly     5. LINDSEY- encouraged continued weight loss, saw hepatology, mother passed away w HCC    6. Obesity-  Body mass index is 39.58 kg/m². Continue to improve diet, exercise 30 mins a day, 5 days a week.        Follow-up: after CGMS and in 3 months with lab prior

## 2019-11-07 ENCOUNTER — PATIENT OUTREACH (OUTPATIENT)
Dept: ADMINISTRATIVE | Facility: HOSPITAL | Age: 54
End: 2019-11-07

## 2019-11-07 ENCOUNTER — LAB VISIT (OUTPATIENT)
Dept: LAB | Facility: HOSPITAL | Age: 54
End: 2019-11-07
Payer: MEDICARE

## 2019-11-07 DIAGNOSIS — E11.42 TYPE 2 DIABETES MELLITUS WITH DIABETIC POLYNEUROPATHY, WITH LONG-TERM CURRENT USE OF INSULIN: ICD-10-CM

## 2019-11-07 DIAGNOSIS — Z79.4 TYPE 2 DIABETES MELLITUS WITH DIABETIC POLYNEUROPATHY, WITH LONG-TERM CURRENT USE OF INSULIN: ICD-10-CM

## 2019-11-07 LAB
ALBUMIN SERPL BCP-MCNC: 3.9 G/DL (ref 3.5–5.2)
ALP SERPL-CCNC: 126 U/L (ref 55–135)
ALT SERPL W/O P-5'-P-CCNC: 40 U/L (ref 10–44)
ANION GAP SERPL CALC-SCNC: 8 MMOL/L (ref 8–16)
AST SERPL-CCNC: 24 U/L (ref 10–40)
BILIRUB SERPL-MCNC: 0.6 MG/DL (ref 0.1–1)
BUN SERPL-MCNC: 19 MG/DL (ref 6–20)
CALCIUM SERPL-MCNC: 9.4 MG/DL (ref 8.7–10.5)
CHLORIDE SERPL-SCNC: 98 MMOL/L (ref 95–110)
CHOLEST SERPL-MCNC: 100 MG/DL (ref 120–199)
CHOLEST/HDLC SERPL: 3.1 {RATIO} (ref 2–5)
CO2 SERPL-SCNC: 30 MMOL/L (ref 23–29)
CREAT SERPL-MCNC: 1.2 MG/DL (ref 0.5–1.4)
EST. GFR  (AFRICAN AMERICAN): >60 ML/MIN/1.73 M^2
EST. GFR  (NON AFRICAN AMERICAN): >60 ML/MIN/1.73 M^2
ESTIMATED AVG GLUCOSE: 217 MG/DL (ref 68–131)
GLUCOSE SERPL-MCNC: 318 MG/DL (ref 70–110)
HBA1C MFR BLD HPLC: 9.2 % (ref 4–5.6)
HDLC SERPL-MCNC: 32 MG/DL (ref 40–75)
HDLC SERPL: 32 % (ref 20–50)
LDLC SERPL CALC-MCNC: 45.4 MG/DL (ref 63–159)
NONHDLC SERPL-MCNC: 68 MG/DL
POTASSIUM SERPL-SCNC: 4.8 MMOL/L (ref 3.5–5.1)
PROT SERPL-MCNC: 7.5 G/DL (ref 6–8.4)
SODIUM SERPL-SCNC: 136 MMOL/L (ref 136–145)
TRIGL SERPL-MCNC: 113 MG/DL (ref 30–150)

## 2019-11-07 PROCEDURE — 80061 LIPID PANEL: CPT

## 2019-11-07 PROCEDURE — 83036 HEMOGLOBIN GLYCOSYLATED A1C: CPT

## 2019-11-07 PROCEDURE — 36415 COLL VENOUS BLD VENIPUNCTURE: CPT | Mod: PO

## 2019-11-07 PROCEDURE — 80053 COMPREHEN METABOLIC PANEL: CPT

## 2019-11-07 NOTE — PROGRESS NOTES
a1c uncontrolled report  Future Appointments   Date Time Provider Department Center   11/11/2019  2:00 PM JE ENDOCRINE EDUCATOR [facility] DIABET[facility]   11/12/2019  8:40 AM Rj Nuñez DPM [facility] POD [facility]   11/18/2019 10:00 AM JE ENDOCRINE EDUCATOR [facility]   11/18/2019 10:30 AM Gabby Rooney DNP, NP [facility] ENDOCRN [facility]   12/9/2019 10:00 AM Beba Carrasco DNP, APRN [facility]   1/31/2020 10:00 AM LAB, JE [facility]   2/7/2020 11:00 AM Gabby Rooney DNP, NP [facility] ENDOCRN [facility]

## 2019-11-11 ENCOUNTER — CLINICAL SUPPORT (OUTPATIENT)
Dept: DIABETES | Facility: CLINIC | Age: 54
End: 2019-11-11
Payer: MEDICARE

## 2019-11-11 ENCOUNTER — TELEPHONE (OUTPATIENT)
Dept: ENDOCRINOLOGY | Facility: CLINIC | Age: 54
End: 2019-11-11

## 2019-11-11 VITALS — BODY MASS INDEX: 39.59 KG/M2 | WEIGHT: 300.06 LBS

## 2019-11-11 DIAGNOSIS — E11.42 TYPE 2 DIABETES MELLITUS WITH DIABETIC POLYNEUROPATHY, WITH LONG-TERM CURRENT USE OF INSULIN: Primary | ICD-10-CM

## 2019-11-11 DIAGNOSIS — Z79.4 TYPE 2 DIABETES MELLITUS WITH DIABETIC POLYNEUROPATHY, WITH LONG-TERM CURRENT USE OF INSULIN: Primary | ICD-10-CM

## 2019-11-11 NOTE — PROGRESS NOTES
"DIABETES EDUCATOR NOTE   PLACEMENT OF FREESTYLE DORYS PRO SENSOR  CONTINOUS GLUCOSE MONITORING SYSTEM (CGMS)    Patient is here in clinic today for placement of continuous glucose monitoring sensor.      Patient verified that they were here for CGMS procedure ordered by their provider and that they have a working glucose meter and supplies at home.   Patient provided with a Freestyle Doyrs Sensor and a copy of the Continuous Glucose Monitoring Patient Log to fill out during the study.   A detailed explanation of Continuous Glucose Monitoring was provided. Patient informed that this is a blind procedure and that they will not actually see the blood sugar tracing in real time.  Reviewed with patient the  patient education handout called "Your Freestyle Dorys Pro sensor: What you need to know" to review self-care during the study to avoid sensor loosening or removal ie... bathing, swimming, dressing, and exercising.   Instructed patient to check blood sugar using home glucometer and to record the following on provided patient log sheets: Blood sugar taken at home, Meals and snacks, Activity, and Diabetes medications taken and dosage    Patient was brought to a private location.  Arm for insertion was selected and prepared and allowed to dry. Glucose Sensor Serial Number [id number] was inserted to back of patient's left upper arm.    The following forms were given and reviewed in detail with patient and all questions answered.   · Continuous Glucose Monitoring Patient Log #[id number]  · Freestyle Manufacture Patient Handout "Your Freestyle Dorys Pro Sensor: What you need to know"     Instructions: Time: 15 min   Insertion of sensor done individually in private:  Time: 15 minutes         "

## 2019-11-11 NOTE — TELEPHONE ENCOUNTER
"Pt informed Diabetes Educators will not be in tomorrow  "Ok then I will be in today for 2:00pm. Thank you man"  "

## 2019-11-11 NOTE — TELEPHONE ENCOUNTER
----- Message from Jaye Cox sent at 11/11/2019 12:15 PM CST -----  Contact: PT  Type: Needs Medical Advice    Who Called:  Pt  Best Call Back Number: [phone]  Additional Information: Requesting a call back regarding getting his appointment moved to tomorrow morning instead of at 2 pm but if you cant move it tomorrow morning pt stated he will keep is appointment for today   Please Advise ---Thank you

## 2019-11-18 ENCOUNTER — TELEPHONE (OUTPATIENT)
Dept: ENDOCRINOLOGY | Facility: CLINIC | Age: 54
End: 2019-11-18

## 2019-11-18 NOTE — TELEPHONE ENCOUNTER
Pt called to see if he still needed to come in for appt today, but message was sent in wife's chart. Tried calling pt to reschedule to Fri, no answer or vm.

## 2019-11-19 ENCOUNTER — CLINICAL SUPPORT (OUTPATIENT)
Dept: DIABETES | Facility: CLINIC | Age: 54
End: 2019-11-19
Payer: MEDICARE

## 2019-11-19 ENCOUNTER — OFFICE VISIT (OUTPATIENT)
Dept: ENDOCRINOLOGY | Facility: CLINIC | Age: 54
End: 2019-11-19
Payer: MEDICARE

## 2019-11-19 ENCOUNTER — OFFICE VISIT (OUTPATIENT)
Dept: PODIATRY | Facility: CLINIC | Age: 54
End: 2019-11-19
Payer: MEDICARE

## 2019-11-19 VITALS
HEIGHT: 74 IN | DIASTOLIC BLOOD PRESSURE: 60 MMHG | SYSTOLIC BLOOD PRESSURE: 95 MMHG | HEART RATE: 64 BPM | WEIGHT: 306.69 LBS | HEIGHT: 74 IN | BODY MASS INDEX: 39.36 KG/M2 | WEIGHT: 306.69 LBS | BODY MASS INDEX: 39.36 KG/M2

## 2019-11-19 VITALS
DIASTOLIC BLOOD PRESSURE: 60 MMHG | HEART RATE: 64 BPM | HEIGHT: 74 IN | BODY MASS INDEX: 39.38 KG/M2 | WEIGHT: 306.88 LBS | SYSTOLIC BLOOD PRESSURE: 101 MMHG

## 2019-11-19 DIAGNOSIS — K76.0 FATTY LIVER: ICD-10-CM

## 2019-11-19 DIAGNOSIS — E11.42 DIABETIC POLYNEUROPATHY ASSOCIATED WITH TYPE 2 DIABETES MELLITUS: Primary | ICD-10-CM

## 2019-11-19 DIAGNOSIS — E11.42 TYPE 2 DIABETES MELLITUS WITH DIABETIC POLYNEUROPATHY, WITH LONG-TERM CURRENT USE OF INSULIN: Primary | ICD-10-CM

## 2019-11-19 DIAGNOSIS — E11.42 TYPE 2 DIABETES MELLITUS WITH DIABETIC POLYNEUROPATHY, WITH LONG-TERM CURRENT USE OF INSULIN: ICD-10-CM

## 2019-11-19 DIAGNOSIS — E78.5 DYSLIPIDEMIA: ICD-10-CM

## 2019-11-19 DIAGNOSIS — B35.1 ONYCHOMYCOSIS DUE TO DERMATOPHYTE: ICD-10-CM

## 2019-11-19 DIAGNOSIS — I10 ESSENTIAL HYPERTENSION: ICD-10-CM

## 2019-11-19 DIAGNOSIS — R20.2 PARESTHESIA OF FOOT, BILATERAL: ICD-10-CM

## 2019-11-19 DIAGNOSIS — E66.01 SEVERE OBESITY (BMI 35.0-39.9) WITH COMORBIDITY: ICD-10-CM

## 2019-11-19 DIAGNOSIS — L84 CORN OR CALLUS: ICD-10-CM

## 2019-11-19 DIAGNOSIS — G47.33 OSA ON CPAP: ICD-10-CM

## 2019-11-19 DIAGNOSIS — Z79.4 TYPE 2 DIABETES MELLITUS WITH DIABETIC POLYNEUROPATHY, WITH LONG-TERM CURRENT USE OF INSULIN: ICD-10-CM

## 2019-11-19 DIAGNOSIS — Z79.4 TYPE 2 DIABETES MELLITUS WITH DIABETIC POLYNEUROPATHY, WITH LONG-TERM CURRENT USE OF INSULIN: Primary | ICD-10-CM

## 2019-11-19 PROCEDURE — 99999 PR PBB SHADOW E&M-EST. PATIENT-LVL II: CPT | Mod: PBBFAC,,,

## 2019-11-19 PROCEDURE — 11056 PARNG/CUTG B9 HYPRKR LES 2-4: CPT | Mod: Q9,S$GLB,, | Performed by: PODIATRIST

## 2019-11-19 PROCEDURE — 95250 PR GLUCOSE MONITORING,72 HRS,SUB-Q SENSOR: ICD-10-PCS | Mod: S$GLB,,, | Performed by: DIETITIAN, REGISTERED

## 2019-11-19 PROCEDURE — 95251 PR GLUCOSE MONITOR, 72 HOUR, PHYS INTERP: ICD-10-PCS | Mod: S$GLB,,, | Performed by: NURSE PRACTITIONER

## 2019-11-19 PROCEDURE — 95250 CONT GLUC MNTR PHYS/QHP EQP: CPT | Mod: S$GLB,,, | Performed by: DIETITIAN, REGISTERED

## 2019-11-19 PROCEDURE — 99499 NO LOS: ICD-10-PCS | Mod: S$GLB,,, | Performed by: PODIATRIST

## 2019-11-19 PROCEDURE — 99999 PR PBB SHADOW E&M-EST. PATIENT-LVL III: ICD-10-PCS | Mod: PBBFAC,,, | Performed by: NURSE PRACTITIONER

## 2019-11-19 PROCEDURE — G0108 DIAB MANAGE TRN  PER INDIV: HCPCS | Mod: S$GLB,,, | Performed by: DIETITIAN, REGISTERED

## 2019-11-19 PROCEDURE — 99213 PR OFFICE/OUTPT VISIT, EST, LEVL III, 20-29 MIN: ICD-10-PCS | Mod: S$GLB,,, | Performed by: NURSE PRACTITIONER

## 2019-11-19 PROCEDURE — 99499 UNLISTED E&M SERVICE: CPT | Mod: S$GLB,,, | Performed by: PODIATRIST

## 2019-11-19 PROCEDURE — 99999 PR PBB SHADOW E&M-EST. PATIENT-LVL III: CPT | Mod: PBBFAC,,, | Performed by: NURSE PRACTITIONER

## 2019-11-19 PROCEDURE — G0108 PR DIAB MANAGE TRN  PER INDIV: ICD-10-PCS | Mod: S$GLB,,, | Performed by: DIETITIAN, REGISTERED

## 2019-11-19 PROCEDURE — 99999 PR PBB SHADOW E&M-EST. PATIENT-LVL III: ICD-10-PCS | Mod: PBBFAC,,, | Performed by: PODIATRIST

## 2019-11-19 PROCEDURE — 99999 PR PBB SHADOW E&M-EST. PATIENT-LVL II: ICD-10-PCS | Mod: PBBFAC,,,

## 2019-11-19 PROCEDURE — 11721 PR DEBRIDEMENT OF NAILS, 6 OR MORE: ICD-10-PCS | Mod: 59,Q9,S$GLB, | Performed by: PODIATRIST

## 2019-11-19 PROCEDURE — 99999 PR PBB SHADOW E&M-EST. PATIENT-LVL III: CPT | Mod: PBBFAC,,, | Performed by: PODIATRIST

## 2019-11-19 PROCEDURE — 11721 DEBRIDE NAIL 6 OR MORE: CPT | Mod: 59,Q9,S$GLB, | Performed by: PODIATRIST

## 2019-11-19 PROCEDURE — 11056 PR TRIM BENIGN HYPERKERATOTIC SKIN LESION,2-4: ICD-10-PCS | Mod: Q9,S$GLB,, | Performed by: PODIATRIST

## 2019-11-19 PROCEDURE — 95251 CONT GLUC MNTR ANALYSIS I&R: CPT | Mod: S$GLB,,, | Performed by: NURSE PRACTITIONER

## 2019-11-19 PROCEDURE — 99213 OFFICE O/P EST LOW 20 MIN: CPT | Mod: S$GLB,,, | Performed by: NURSE PRACTITIONER

## 2019-11-19 RX ORDER — PERPHENAZINE 16 MG
1 TABLET ORAL DAILY
Qty: 30 EACH | Refills: 11 | COMMUNITY
Start: 2019-11-19 | End: 2019-12-19

## 2019-11-19 NOTE — PROGRESS NOTES
CC: This 54 y.o. male presents for management of diabetes  and chronic conditions pending review including HTN, HLP, morbid obesity, fatty liver, vitamin d deficiency     HPI: He was diagnosed with T2DM in 2005. Has never been hospitalized r/t DM.  Mother has passed away from Fatty liver and hepatic carcinoma in 2018    Since last visit:  Arrives without an appt.  EGD yesterday  Sunday stiches placed in his left wrist- put through glass window, now has 7 stiches     Family hx of DM: sister, mom and dad    Arrives for CGMS Interpretation:    CGMS reveals large pp excursions, bg drops in the middle of the night  Patient does confirm that he is not taking his insulin appropriately, timing of insulin is random and not in relation to a meal      hypoglycemia-   x1    Diet: Eats 2-3  Meals a day, snacks on sweets-honey buns, cake, shakes     Exercise: none  CURRENT DM MEDS:   Novolog 70/30 80 u bid; metformin 1000 mg bid, actos 15 mg qd,  ozempic 1 mg weekly (Tuesday)  Treats- his lows with Radicos  Vial/pen:  Uses pen  Glucometer type:  True Test 2018    Standards of Care:  Eye exam: 1/2018 no h/o retinopathy - appt w [facility]     Has his own Lawn care business    ROS:   Gen: Appetite good, + weight gain 6 lbs, + fatigue  .  Skin: Skin is intact and heals well, no rashes, no hair changes  Eyes: Denies visual disturbances  Resp: no SOB or HENDERSNO, no cough  Cardiac: No palpitations, chest pain, no edema   GI: No nausea or vomiting, diarrhea, constipation, or abdominal pain.  /GYN: No nocturia, burning or pain.   MS/Neuro: +numbness/ tingling in BLE; Gait steady, speech clear  Psych: Denies drug/ETOH abuse, no hx of depression.  Other systems: negative.    PE:  GENERAL: Well developed, well nourished.appears older than age.   PSYCH: AAOx3, appropriate mood and affect, pleasant expression, conversant, appears relaxed, well groomed.   EYES: Conjunctiva, corneas clear  NECK: Supple, trachea midline   NEURO: Gait  steady  SKIN: Skin warm and dry no acanthosis nigracans.  FOOT EXAMINATION:7/2019        Lab Results   Component Value Date    MICALBCREAT 4.8 06/17/2019       Hemoglobin A1C   Date Value Ref Range Status   11/07/2019 9.2 (H) 4.0 - 5.6 % Final     Comment:     ADA Screening Guidelines:  5.7-6.4%  Consistent with prediabetes  >or=6.5%  Consistent with diabetes  High levels of fetal hemoglobin interfere with the HbA1C  assay. Heterozygous hemoglobin variants (HbS, HgC, etc)do  not significantly interfere with this assay.   However, presence of multiple variants may affect accuracy.     06/17/2019 8.1 (H) 4.0 - 5.6 % Final     Comment:     ADA Screening Guidelines:  5.7-6.4%  Consistent with prediabetes  >or=6.5%  Consistent with diabetes  High levels of fetal hemoglobin interfere with the HbA1C  assay. Heterozygous hemoglobin variants (HbS, HgC, etc)do  not significantly interfere with this assay.   However, presence of multiple variants may affect accuracy.     03/18/2019 8.5 (H) 4.0 - 5.6 % Final     Comment:     ADA Screening Guidelines:  5.7-6.4%  Consistent with prediabetes  >or=6.5%  Consistent with diabetes  High levels of fetal hemoglobin interfere with the HbA1C  assay. Heterozygous hemoglobin variants (HbS, HgC, etc)do  not significantly interfere with this assay.   However, presence of multiple variants may affect accuracy.          ASSESSMENT and PLAN:     1. T2DM with hyperglycemia-      Long discussion on need for him to control BG, Epic review show's A1C >8% x past 4 yrs at least    DM edu- interested in pump, would be Medtronic pump only so we could do big meal, small meal, snack bolus  We have tried MDI and Vgo in the past- he was not-compliant with either regimen   As he would like to continue on 70/30 regimen for cost purposes-- He HAS to take the Novolog 70/30 5-15 minutes prior to breakfast and supper-  Continue Novolog 70/30 to 80 units bid, Actos 15 mg qd, metformin 1000 mg bid,Resume Ozempic 1  mg qweek,    Discussed A1c and BG goals.   - takes ASA, ACEi, statin    2. HTN - controlled, continue meds as previously prescribed and monitor.     3. HLP -  on statin therapy,     4. TARYN- not wearing regularly, WEAR cpap nightly     5. LINDSEY- encouraged continued weight loss, saw hepatology, mother passed away w HCC    6. Obesity-  Body mass index is 39.37 kg/m².   Continue to improve diet, exercise 30 mins a day, 5 days a week.       Follow-up:  in 3 months with lab prior

## 2019-11-19 NOTE — PROGRESS NOTES
DIABETES EDUCATOR NOTE   Return of the Freestyle Dennis Pro Sensor and Patient Log.    Patient returned to clinic today to return Glucose Sensor and signed patient log form used in CGMS procedure.  Sensor fell off and only 3 days of data were recorded.      The CGMS Sensor will be scanned and downloaded. All reports will be imported into the patient's electronic medical record.    Endocrine provider will complete data interpretation and make recommendations; will forward recommendations to the ordering provider for follow up with patient.

## 2019-11-19 NOTE — PROGRESS NOTES
Subjective:      Patient ID: Gio Forrest is a 54 y.o. male.    Chief Complaint: Diabetes Mellitus (11/7/19 9.2 Carly) and Diabetic Foot Exam    Gio is a 54 y.o. male who presents to the clinic for evaluation and treatment of diabetic feet. Gio has a past medical history of Cardiomyopathy, Depression, Diabetes, Dyslipidemia (8/12/2015), Gout (8/12/2015), Hypertension, Idiopathic gout, Lumbar pseudoarthrosis, LINDSEY (nonalcoholic steatohepatitis), Obesity (8/12/2015), Obstructive sleep apnea (8/12/2015), Restless leg syndrome, Sebaceous cyst (4/18/2017), and Type 2 diabetes mellitus (8/12/2015). Patient continues to note neuropathy pain in the feet.   Symptoms are consistent with tingling and burning.  Relates that he has attempted to take alpha lipoic acid yet.  Continues taking 900mg gabapentin daily with minimal improvement.  Denies any additional pedal complaints.    PCP: BLANK Carroll MD    Date Last Seen by PCP: 11/7/19  Hemoglobin A1C   Date Value Ref Range Status   11/07/2019 9.2 (H) 4.0 - 5.6 % Final     Comment:     ADA Screening Guidelines:  5.7-6.4%  Consistent with prediabetes  >or=6.5%  Consistent with diabetes  High levels of fetal hemoglobin interfere with the HbA1C  assay. Heterozygous hemoglobin variants (HbS, HgC, etc)do  not significantly interfere with this assay.   However, presence of multiple variants may affect accuracy.     06/17/2019 8.1 (H) 4.0 - 5.6 % Final     Comment:     ADA Screening Guidelines:  5.7-6.4%  Consistent with prediabetes  >or=6.5%  Consistent with diabetes  High levels of fetal hemoglobin interfere with the HbA1C  assay. Heterozygous hemoglobin variants (HbS, HgC, etc)do  not significantly interfere with this assay.   However, presence of multiple variants may affect accuracy.     03/18/2019 8.5 (H) 4.0 - 5.6 % Final     Comment:     ADA Screening Guidelines:  5.7-6.4%  Consistent with prediabetes  >or=6.5%  Consistent with diabetes  High levels of fetal  hemoglobin interfere with the HbA1C  assay. Heterozygous hemoglobin variants (HbS, HgC, etc)do  not significantly interfere with this assay.   However, presence of multiple variants may affect accuracy.             Past Medical History:   Diagnosis Date    Cardiomyopathy     Depression     Diabetes     Dyslipidemia 8/12/2015    Gout 8/12/2015    Hypertension     Idiopathic gout     Lumbar pseudoarthrosis     LINDSEY (nonalcoholic steatohepatitis)     Obesity 8/12/2015    Obstructive sleep apnea 8/12/2015    Restless leg syndrome     Sebaceous cyst 4/18/2017    Type 2 diabetes mellitus 8/12/2015       Past Surgical History:   Procedure Laterality Date    BACK SURGERY      x2    CARDIAC CATHETERIZATION      CERVICAL SPINE SURGERY      CHOLECYSTECTOMY  05/30/2018    Dr. ROGELIO Tao, [facility]     COLONOSCOPY  2008    COLONOSCOPY N/A 9/14/2017    Procedure: COLONOSCOPY;  Surgeon: Obi Beltre MD;  Location: [facility] ENDO;  Service: Endoscopy;  Laterality: N/A;    CORONARY ANGIOGRAPHY Left 5/28/2018    Procedure: Coronary angiography;  Surgeon: Rafiq Malik MD;  Location: [facility] CATH;  Service: Cardiology;  Laterality: Left;    ELBOW SURGERY Bilateral     HERNIA REPAIR      LAPAROSCOPIC CHOLECYSTECTOMY N/A 5/30/2018    Procedure: CHOLECYSTECTOMY, LAPAROSCOPIC;  Surgeon: Fletcher Tao MD;  Location: [facility] OR;  Service: General;  Laterality: N/A;    LEFT HEART CATHETERIZATION Left 5/28/2018    Procedure: Left heart cath;  Surgeon: Rafiq Malik MD;  Location: [facility] CATH;  Service: Cardiology;  Laterality: Left;    SHOULDER ARTHROSCOPY      SPINE SURGERY      lumbar x2    TONSILLECTOMY         Family History   Problem Relation Age of Onset    Diabetes Mother     Depression Mother     Liver cancer Mother     Heart disease Father     Anuerysm Father     Diabetes Father     Stroke Father        Social History     Socioeconomic History    Marital status:      Spouse name: Not on  file    Number of children: Not on file    Years of education: Not on file    Highest education level: Not on file   Occupational History    Not on file   Social Needs    Financial resource strain: Not on file    Food insecurity:     Worry: Not on file     Inability: Not on file    Transportation needs:     Medical: Not on file     Non-medical: Not on file   Tobacco Use    Smoking status: Former Smoker     Packs/day: 0.50     Types: Cigarettes     Last attempt to quit:      Years since quittin.9    Smokeless tobacco: Never Used   Substance and Sexual Activity    Alcohol use: No     Alcohol/week: 0.0 standard drinks    Drug use: No    Sexual activity: Yes     Partners: Female   Lifestyle    Physical activity:     Days per week: Not on file     Minutes per session: Not on file    Stress: Not on file   Relationships    Social connections:     Talks on phone: Not on file     Gets together: Not on file     Attends [employer] service: Not on file     Active member of club or organization: Not on file     Attends meetings of clubs or organizations: Not on file     Relationship status: Not on file   Other Topics Concern    Not on file   Social History Narrative    Not on file       Current Outpatient Medications   Medication Sig Dispense Refill    ACCU-CHEK SMARTVIEW TEST STRIP Strp       allopurinol (ZYLOPRIM) 100 MG tablet TAKE ONE TABLET BY MOUTH ONCE DAILY 90 tablet 0    aspirin (ECOTRIN) 81 MG EC tablet Take 81 mg by mouth once daily.      atorvastatin (LIPITOR) 40 MG tablet TAKE ONE TABLET BY MOUTH EVERY EVENING 90 tablet 1    benazepril (LOTENSIN) 5 MG tablet Take 0.5 tablets (2.5 mg total) by mouth once daily.      blood sugar diagnostic Strp To check BG 2 times daily, to use with insurance preferred meter 100 each 3    blood-glucose meter kit To check BG 2 times daily, to use with insurance preferred meter 1 each 1    cephALEXin (KEFLEX) 500 MG capsule Take 2 capsules (1,000 mg  "total) by mouth every 12 (twelve) hours. for 5 days 20 capsule 0    citalopram (CELEXA) 40 MG tablet Take 40 mg by mouth once daily.  2    clonazePAM (KLONOPIN) 0.5 MG tablet Take 0.5 mg by mouth once daily.       cyclobenzaprine (FLEXERIL) 10 MG tablet Take 10 mg by mouth every evening.   5    dicyclomine (BENTYL) 20 mg tablet Take 1 tablet (20 mg total) by mouth 2 (two) times daily. 20 tablet 0    gabapentin (NEURONTIN) 300 MG capsule Take 300 mg by mouth every morning.       gabapentin (NEURONTIN) 300 MG capsule Take 900 mg by mouth every evening.       insulin aspart protamine-insulin aspart (NOVOLOG MIX 70-30FLEXPEN U-100) 100 unit/mL (70-30) InPn pen 80 units at breakfast and dinner 11 Box 0    lancets Misc To check BG 2 times daily, to use with insurance preferred meter 100 each 3    metFORMIN (GLUCOPHAGE) 1000 MG tablet Take 1 tablet (1,000 mg total) by mouth 2 (two) times daily. 180 tablet 3    metoprolol succinate (TOPROL-XL) 25 MG 24 hr tablet Take 1 tablet (25 mg total) by mouth once daily. 30 tablet 0    metoprolol tartrate (LOPRESSOR) 25 MG tablet Take 37.5 mg by mouth 2 (two) times daily.      mometasone 0.1% (ELOCON) 0.1 % cream Apply topically daily as needed.      MULTIVIT,THER IRON,CA,FA & MIN (MULTIVITAMIN) Tab Take 1 tablet by mouth once daily.       ondansetron (ZOFRAN-ODT) 4 MG TbDL Take 1 tablet (4 mg total) by mouth every 8 (eight) hours as needed. 20 tablet 0    pantoprazole (PROTONIX) 40 MG tablet Take 1 tablet (40 mg total) by mouth once daily. 90 tablet 1    pen needle, diabetic (BD ULTRA-FINE MERLE PEN NEEDLE) 32 gauge x 5/32" Ndle Uses 2 daily 200 each 3    pioglitazone (ACTOS) 15 MG tablet Take 1 tablet (15 mg total) by mouth once daily. 90 tablet 3    pramipexole (MIRAPEX) 1 MG tablet TAKE TWO TABLETS BY MOUTH nightly 60 tablet 2    semaglutide (OZEMPIC) 1 mg/dose (2 mg/1.5 mL) PnIj Inject 1 mg into the skin every 7 days.       No current facility-administered " medications for this visit.        Review of patient's allergies indicates:  No Known Allergies      Review of Systems   Constitution: Negative for chills and fever.   Cardiovascular: Negative for claudication and leg swelling.   Skin: Positive for color change and nail changes.   Musculoskeletal: Negative for joint pain, joint swelling, muscle cramps, muscle weakness and myalgias.   Neurological: Positive for numbness and paresthesias.   Psychiatric/Behavioral: Negative for altered mental status.           Objective:      Physical Exam   Constitutional: He is oriented to person, place, and time. He appears well-developed and well-nourished. No distress.   Cardiovascular:   Pulses:       Dorsalis pedis pulses are 2+ on the right side, and 2+ on the left side.        Posterior tibial pulses are 1+ on the right side, and 1+ on the left side.   CFT is < 3 seconds bilateral.  Pedal hair growth is decreased bilateral.  No varicosities noted bilateral.  Mild lower extremity edema noted bilateral.  Toes are warm to touch bilateral.     Musculoskeletal: He exhibits tenderness. He exhibits no edema.   Muscle strength 5/5 in all muscle groups bilateral.  No tenderness nor crepitation with ROM of foot/ankle joints bilateral.  Mild pain with palpation to the Rt. Peroneus tendons inferior to the lateral malleolus.   Bilateral pes planus foot type.  Bilateral hallux abducto valgus.  Bilateral semi-reducible contracture of toe 2-5.     Neurological: He is alert and oriented to person, place, and time. He has normal strength. A sensory deficit is present.   Protective sensation per [place] monofilament is decreased bilateral.  Vibratory sensation is intact bilateral.  Light touch is intact bilateral.   Skin: Skin is warm, dry and intact. Capillary refill takes less than 2 seconds. Lesion noted. No abrasion, no bruising, no burn, no ecchymosis, no laceration and no petechiae noted. He is not diaphoretic. No erythema. No  pallor.   Pedal skin appears thin bilateral.  Toenails x 10 appear thickened by 2 mm's, elongated by 4 mm's, and discolored with subungual debris.  Hyperkeratotic lesion noted to bilateral medial hallux.   Examination of the skin reveals no evidence of significant maceration, rashes, open lesions, suspicious appearing nevi or other concerning lesions.              Assessment:       Encounter Diagnoses   Name Primary?    Diabetic polyneuropathy associated with type 2 diabetes mellitus Yes    Onychomycosis due to dermatophyte     Severe obesity (BMI 35.0-39.9) with comorbidity     Paresthesia of foot, bilateral     Corn or callus          Plan:       Gio was seen today for diabetes mellitus and diabetic foot exam.    Diagnoses and all orders for this visit:    Diabetic polyneuropathy associated with type 2 diabetes mellitus    Onychomycosis due to dermatophyte    Severe obesity (BMI 35.0-39.9) with comorbidity    Paresthesia of foot, bilateral    Corn or callus      I counseled the patient on his conditions, their implications and medical management.    Will send a Rx of alpha lipoic acid to his pharmacy.  If this fails to improve neuropathy symptoms, will consider increasing the dosage of gabapentin to 1200mg.    Discussed the importance of diet and exercise as they pertain to diabetes management and maintaining a healthy BMI.    Shoe inspection. Diabetic Foot Education. Patient reminded of the importance of good nutrition and blood sugar control to help prevent podiatric complications of diabetes. Patient instructed on proper foot hygeine. We discussed wearing proper shoe gear, daily foot inspections, never walking without protective shoe gear, never putting sharp instruments to feet    With patient's permission, nails were aggressively reduced and debrided x 10 to their soft tissue attachment mechanically and with electric , removing all offending nail and debris.  Also, a sterile #15 scalpel was  used to trim lesions x 2 down to smooth appearing skin without incident.  Patient relates relief following the procedure. He will continue to monitor the areas daily, inspect his feet, wear protective shoe gear when ambulatory, moisturizer to maintain skin integrity and follow in this office in approximately 4 months, sooner p.r.n.    Follow up in about 4 months (around 3/19/2020).    Rj Nuñez DPM

## 2019-11-19 NOTE — PROGRESS NOTES
Diabetes Education  Author: Clary Grimes RD, CDE  Date: 11/19/2019    Diabetes Care Management Summary  Diabetes Education Record Assessment/Progress: Initial-Patient being referred to show him Medtronic demo pump.  He was last seen for dm education in 2018.  He has tried the VGO in the past.  States he frequently misses doses of insulin and knows he does not eat right.  Patient is inquiring about possibility of pump.    Current Diabetes Risk Level: Moderate     Last A1c:   Lab Results   Component Value Date    HGBA1C 9.2 (H) 11/07/2019     Last visit with Diabetes Educator: : 11/19/2019    Diabetes Type  Diabetes Type : Type II    Diabetes History  Diabetes Diagnosis: >10 years  Current Treatment: Insulin(70/30 insulin 80 units twice daily)    Health Maintenance was reviewed today with patient. Discussed with patient importance of routine eye exams, foot exams/foot care, blood work (i.e.: A1c, microalbumin, and lipid), dental visits, yearly flu vaccine, and pneumonia vaccine as indicated by PCP. Patient verbalized understanding.     Health Maintenance Topics with due status: Not Due       Topic Last Completion Date    Colonoscopy 09/14/2017    Urine Microalbumin 06/17/2019    Foot Exam 07/05/2019    Lipid Panel 11/07/2019    Hemoglobin A1c 11/07/2019    TETANUS VACCINE 11/17/2019    High Dose Statin 11/19/2019    Aspirin/Antiplatelet Therapy 11/19/2019     Health Maintenance Due   Topic Date Due    Eye Exam  10/16/2019     Monitoring   Monitoring: Has up to date meter but admits he is not good at remembering to check blood sugars  Self Monitoring : Professional cgm was reviewed today by Gabby Rooney- see media    Diabetes Education Assessment/Progress  Medications (states correct name, dose, onset, peak, duration, side effects & timing of meds): Discussion, Demonstration    Patient was able to see Medtronic demo insulin pump.  Went over basic features and basal vs bolus terminology.  Patient currently not carb  counting so would use manual bolus feature.  He would probably need to change infusion set every 2 days vs 3 days.  Reviewed difference if cgm options vs insulin pump therapy as patient thought he would use his phone to give insulin if he got a pump.  Stressed that he would still need to use pump to get meal time insulin doses.      Monitoring (monitoring blood glucose/other parameters & using results): Discussion-Cgm options reviewed.      EDUCATION/ PLAN:    Patient feels that he would be able to operate the pump to change infusion sets and give manual bolus doses.  Gabby Rooney is agreeable to see if it would be covered on his insurance.  Cost is an issue for patient. Will submit necessary paperwork to [employer] and patient will let us know if he decides to move forward.  Phone # provided and encouraged pt to call with any questions/concerns.      Today's Self-Management Care Plan was developed with the patient's input and is based on barriers identified during today's assessment.    The long and short-term goals in the care plan were written with the patient/caregiver's input. The patient has agreed to work toward these goals to improve his overall diabetes control.      The patient received a copy of today's self-management plan and verbalized understanding of the care plan, goals, and all of today's instructions.      The patient was encouraged to communicate with his physician and care team regarding his condition(s) and treatment.  I provided the patient with my contact information today and encouraged him to contact me via phone or patient portal as needed.     Education Units of Time   Time Spent: 45 min

## 2019-12-09 ENCOUNTER — HOSPITAL ENCOUNTER (OUTPATIENT)
Dept: RADIOLOGY | Facility: HOSPITAL | Age: 54
Discharge: HOME | End: 2019-12-09
Attending: NURSE PRACTITIONER
Payer: MEDICARE

## 2019-12-09 ENCOUNTER — PATIENT OUTREACH (OUTPATIENT)
Dept: ADMINISTRATIVE | Facility: HOSPITAL | Age: 54
End: 2019-12-09

## 2019-12-09 ENCOUNTER — OFFICE VISIT (OUTPATIENT)
Dept: PRIMARY CARE CLINIC | Facility: CLINIC | Age: 54
End: 2019-12-09
Payer: MEDICARE

## 2019-12-09 VITALS
BODY MASS INDEX: 39.87 KG/M2 | SYSTOLIC BLOOD PRESSURE: 110 MMHG | WEIGHT: 310.63 LBS | HEIGHT: 74 IN | HEART RATE: 80 BPM | OXYGEN SATURATION: 96 % | DIASTOLIC BLOOD PRESSURE: 66 MMHG

## 2019-12-09 DIAGNOSIS — M25.441 SWELLING OF JOINT, HAND, RIGHT: Primary | ICD-10-CM

## 2019-12-09 DIAGNOSIS — G89.29 CHRONIC PAIN OF RIGHT HAND: ICD-10-CM

## 2019-12-09 DIAGNOSIS — Z82.61 FAMILY HISTORY OF RHEUMATOID ARTHRITIS: ICD-10-CM

## 2019-12-09 DIAGNOSIS — M25.441 SWELLING OF JOINT, HAND, RIGHT: ICD-10-CM

## 2019-12-09 DIAGNOSIS — M79.641 CHRONIC PAIN OF RIGHT HAND: ICD-10-CM

## 2019-12-09 DIAGNOSIS — E11.42 TYPE 2 DIABETES MELLITUS WITH DIABETIC POLYNEUROPATHY, WITH LONG-TERM CURRENT USE OF INSULIN: ICD-10-CM

## 2019-12-09 DIAGNOSIS — I10 ESSENTIAL HYPERTENSION: ICD-10-CM

## 2019-12-09 DIAGNOSIS — E66.01 MORBID OBESITY: ICD-10-CM

## 2019-12-09 DIAGNOSIS — Z79.4 TYPE 2 DIABETES MELLITUS WITH DIABETIC POLYNEUROPATHY, WITH LONG-TERM CURRENT USE OF INSULIN: ICD-10-CM

## 2019-12-09 DIAGNOSIS — Z79.899 POLYPHARMACY: ICD-10-CM

## 2019-12-09 PROCEDURE — 3078F DIAST BP <80 MM HG: CPT | Mod: CPTII,S$GLB,, | Performed by: NURSE PRACTITIONER

## 2019-12-09 PROCEDURE — 3046F HEMOGLOBIN A1C LEVEL >9.0%: CPT | Mod: CPTII,S$GLB,, | Performed by: NURSE PRACTITIONER

## 2019-12-09 PROCEDURE — 3074F PR MOST RECENT SYSTOLIC BLOOD PRESSURE < 130 MM HG: ICD-10-PCS | Mod: CPTII,S$GLB,, | Performed by: NURSE PRACTITIONER

## 2019-12-09 PROCEDURE — 3008F PR BODY MASS INDEX (BMI) DOCUMENTED: ICD-10-PCS | Mod: CPTII,S$GLB,, | Performed by: NURSE PRACTITIONER

## 2019-12-09 PROCEDURE — 73130 XR HAND COMPLETE 3 VIEW RIGHT: ICD-10-PCS | Mod: 26,RT,, | Performed by: RADIOLOGY

## 2019-12-09 PROCEDURE — 99214 PR OFFICE/OUTPT VISIT, EST, LEVL IV, 30-39 MIN: ICD-10-PCS | Mod: S$GLB,,, | Performed by: NURSE PRACTITIONER

## 2019-12-09 PROCEDURE — 3078F PR MOST RECENT DIASTOLIC BLOOD PRESSURE < 80 MM HG: ICD-10-PCS | Mod: CPTII,S$GLB,, | Performed by: NURSE PRACTITIONER

## 2019-12-09 PROCEDURE — 3008F BODY MASS INDEX DOCD: CPT | Mod: CPTII,S$GLB,, | Performed by: NURSE PRACTITIONER

## 2019-12-09 PROCEDURE — 73130 X-RAY EXAM OF HAND: CPT | Mod: TC,FY,PO,RT

## 2019-12-09 PROCEDURE — 3074F SYST BP LT 130 MM HG: CPT | Mod: CPTII,S$GLB,, | Performed by: NURSE PRACTITIONER

## 2019-12-09 PROCEDURE — 99214 OFFICE O/P EST MOD 30 MIN: CPT | Mod: S$GLB,,, | Performed by: NURSE PRACTITIONER

## 2019-12-09 PROCEDURE — 73130 X-RAY EXAM OF HAND: CPT | Mod: 26,RT,, | Performed by: RADIOLOGY

## 2019-12-09 PROCEDURE — 3046F PR MOST RECENT HEMOGLOBIN A1C LEVEL > 9.0%: ICD-10-PCS | Mod: CPTII,S$GLB,, | Performed by: NURSE PRACTITIONER

## 2019-12-09 RX ORDER — DESLORATADINE 5 MG/1
TABLET ORAL
Refills: 3 | COMMUNITY
Start: 2019-11-22 | End: 2019-12-25

## 2019-12-09 RX ORDER — MELOXICAM 15 MG/1
15 TABLET ORAL DAILY
Status: ON HOLD | COMMUNITY
Start: 2019-12-04 | End: 2023-01-21 | Stop reason: HOSPADM

## 2019-12-09 NOTE — Clinical Note
[facility] on [address]. Patient gets eye exams there. Can we get latest eye exam

## 2019-12-09 NOTE — LETTER
AUTHORIZATION FOR RELEASE OF   CONFIDENTIAL INFORMATION    [facility],    We are seeing Gio Forrest, date of birth 1965, in the clinic at [facility]. BLANK Carroll MD is the patient's PCP. Gio Forrest has an outstanding lab/procedure at the time we reviewed his chart. In order to help keep his health information updated, he has authorized us to request the following medical record(s):       EYE EXAM                  Please fax records to [facility], W Michael Ellerbe, MD, [phone]     If you have any questions, please contact Rubia Soto, Care Coordinator   at [phone].            Patient Name: Gio Forrets  : 1965  Patient Phone #: [phone]

## 2019-12-09 NOTE — PATIENT INSTRUCTIONS
Get your eye appointment before the end of the year.    Diet: Diabetes  Food is an important tool that you can use to control diabetes and stay healthy. Eating well-balanced meals in the correct amounts will help you control your blood glucose levels and prevent low blood sugar reactions. It will also help you reduce the health risks of diabetes. There is no one specific diet that is right for everyone with diabetes. But there are general guidelines to follow. A registered dietitian (RD) will create a tailored diet approach thats just right for you. He or she will also help you plan healthy meals and snacks. If you have any questions, call your dietitian for advice.     Guidelines for success  Talk with your healthcare provider before starting a diabetes diet or weight loss program. If you haven't talked with a dietitian yet, ask your provider for a referral. The following guidelines can help you succeed:  · Select foods from the 6 food groups below. Your dietitian will help you find food choices within each group. He or she will also show you serving sizes and how many servings you can have at each meal.  ¨ Grains, beans, and starchy vegetables  ¨ Vegetables  ¨ Fruit  ¨ Milk or yogurt  ¨ Meat, poultry, fish, or tofu  ¨ Healthy fats  · Check your blood sugar levels as directed by your provider. Take any medicine as prescribed by your provider.  · Learn to read food labels and pick the right portion sizes.  · Eat only the amount of food in your meal plan. Eat about the same amount of food at regular times each day. Dont skip meals. Eat meals 4 to 5 hours apart, with snacks in between.  · Limit alcohol. It raises blood sugar levels. Drink water or calorie-free diet drinks that use safe sweeteners.  · Eat less fat to help lower your risk of heart disease. Use nonfat or low-fat dairy products and lean meats. Avoid fried foods. Use cooking oils that are unsaturated, such as olive, canola, or peanut oil.  · Talk with  your dietitian about safe sugar substitutes.  · Avoid added salt. It can contribute to high blood pressure, which can cause heart disease. People with diabetes already have a risk of high blood pressure and heart disease.  · Stay at a healthy weight. If you need to lose weight, cut down on your portion sizes. But dont skip meals. Exercise is an important part of any weight management program. Talk with your provider about an exercise program thats right for you.  · For more information about the best diet plan for you, talk with a registered dietitian (RD). To find an RD in your area, contact:  ¨ Academy of Nutrition and Dietetics www.eatright.org  ¨ The American Diabetes Association 888-238-3172 www.diabetes.org  Date Last Reviewed: 8/1/2016 © 2000-2017 [employer]. [address] [place] [address], [place] [address]. All rights reserved. This information is not intended as a substitute for professional medical care. Always follow your healthcare professional's instructions.

## 2019-12-09 NOTE — LETTER
AUTHORIZATION FOR RELEASE OF   CONFIDENTIAL INFORMATION    [facility],    We are seeing Gio Forrest, date of birth 1965, in the clinic at [facility]. BLANK Carroll MD is the patient's PCP. Gio Forrest has an outstanding lab/procedure at the time we reviewed his chart. In order to help keep his health information updated, he has authorized us to request the following medical record(s):        EYE EXAM                  Please fax records to [facility], W Michael Ellerbe, MD,  [phone]     If you have any questions, please contact Rubia Soto, Care Coordinator   at [phone].            Patient Name: Gio Forrest  : 1965  Patient Phone #: [phone]

## 2019-12-09 NOTE — PROGRESS NOTES
"Subjective:       Patient ID: Gio Forrest is a 54 y.o. male.    Chief Complaint: Hypertension and Arthritis  He was last seen in primary care by me on 09/04/2019.  He lat saw Carly on 07/16/2019.   HPI   Complain of having swelling and pain to right hand and "can't hardly do anything with it.   He is right hand dominant. He has used a gel from pain management and it did not help much and hasn't noticed too much of a difference.   He is also her to follow up with HTN.   Vitals:    12/09/19 1016   BP: 110/66   Pulse: 80     BP Readings from Last 3 Encounters:   12/09/19 110/66   11/19/19 95/60   11/19/19 101/60     Review of Systems   Musculoskeletal: Positive for joint swelling.        Right hand joint swelling and painful, worsening       States had to go to ED on 11/17/2017 after having an accident and left wrist went through window.   States his blood sugars now at home are doing better and none are running over 200.  Objective:      Physical Exam   Constitutional: He is oriented to person, place, and time. Vital signs are normal. He appears well-developed and well-nourished. He is active and cooperative.   HENT:   Head: Normocephalic and atraumatic.   Right Ear: Hearing, tympanic membrane, external ear and ear canal normal.   Left Ear: Hearing, tympanic membrane, external ear and ear canal normal.   Nose: Nose normal.   Mouth/Throat: Uvula is midline, oropharynx is clear and moist and mucous membranes are normal.   Eyes: Conjunctivae and lids are normal.   Neck: Trachea normal, normal range of motion, full passive range of motion without pain and phonation normal. Neck supple.   Cardiovascular: Normal rate, regular rhythm and normal heart sounds.   Pulmonary/Chest: Effort normal and breath sounds normal.   Abdominal: Soft. Bowel sounds are normal. There is no tenderness.   Large and obese abdomen   Musculoskeletal: He exhibits edema.        Hands:  Swelling and tenderness to right hand PIP and DIP joints "   Lymphadenopathy:        Head (right side): No submental, no submandibular, no tonsillar, no preauricular, no posterior auricular and no occipital adenopathy present.        Head (left side): No submental, no submandibular, no tonsillar, no preauricular, no posterior auricular and no occipital adenopathy present.     He has no cervical adenopathy.   Neurological: He is alert and oriented to person, place, and time. He displays a negative Romberg sign.   Skin: Skin is warm, dry and intact.   Psychiatric: He has a normal mood and affect. His speech is normal and behavior is normal. Judgment and thought content normal. Cognition and memory are normal.   Nursing note and vitals reviewed.      Assessment & Plan:       Swelling of joint, hand, right  -     Rheumatoid factor; Future; Expected date: 12/09/2019  -     NIKITA Screen w/Reflex; Future; Expected date: 12/09/2019  -     Sedimentation rate; Future; Expected date: 12/09/2019  -     X-Ray Hand Complete Right; Future; Expected date: 12/09/2019    Chronic pain of right hand  -     Rheumatoid factor; Future; Expected date: 12/09/2019  -     NIKITA Screen w/Reflex; Future; Expected date: 12/09/2019  -     Sedimentation rate; Future; Expected date: 12/09/2019  -     X-Ray Hand Complete Right; Future; Expected date: 12/09/2019    Essential hypertension- Controlled, Lotensin, Metformin, Lopressor    Family history of rheumatoid arthritis  Comments:  paternal grandmother    Type 2 diabetes mellitus with diabetic polyneuropathy, with long-term current use of insulin- Uncontrolled, Managed by endocrinology, Actos, Metformin, Ozempic, Novolog  Lab Results   Component Value Date    HGBA1C 9.2 (H) 11/07/2019       Polypharmacy    Morbid obesity      Medication List with Changes/Refills   Current Medications    ACCU-CHEK SMARTVIEW TEST STRIP STRP        ALLOPURINOL (ZYLOPRIM) 100 MG TABLET    TAKE ONE TABLET BY MOUTH ONCE DAILY    ALPHA LIPOIC ACID 600 MG CAP    Take 1 capsule by  "mouth once daily.    ASPIRIN (ECOTRIN) 81 MG EC TABLET    Take 81 mg by mouth once daily.    ATORVASTATIN (LIPITOR) 40 MG TABLET    TAKE ONE TABLET BY MOUTH EVERY EVENING    BENAZEPRIL (LOTENSIN) 5 MG TABLET    Take 0.5 tablets (2.5 mg total) by mouth once daily.    BLOOD SUGAR DIAGNOSTIC STRP    To check BG 2 times daily, to use with insurance preferred meter    BLOOD-GLUCOSE METER KIT    To check BG 2 times daily, to use with insurance preferred meter    CITALOPRAM (CELEXA) 40 MG TABLET    Take 40 mg by mouth once daily.    CLONAZEPAM (KLONOPIN) 0.5 MG TABLET    Take 0.5 mg by mouth once daily.     CYCLOBENZAPRINE (FLEXERIL) 10 MG TABLET    Take 10 mg by mouth every evening.     DESLORATADINE (CLARINEX) 5 MG TABLET    TAKE ONE TABLET BY MOUTH DAILY take for drip in back of throat    GABAPENTIN (NEURONTIN) 300 MG CAPSULE    Take 300 mg by mouth every morning.     GABAPENTIN (NEURONTIN) 300 MG CAPSULE    Take 900 mg by mouth every evening.     INSULIN ASPART PROTAMINE-INSULIN ASPART (NOVOLOG MIX 70-30FLEXPEN U-100) 100 UNIT/ML (70-30) INPN PEN    80 units at breakfast and dinner    LANCETS MISC    To check BG 2 times daily, to use with insurance preferred meter    MELOXICAM (MOBIC) 15 MG TABLET        METFORMIN (GLUCOPHAGE) 1000 MG TABLET    Take 1 tablet (1,000 mg total) by mouth 2 (two) times daily.    METOPROLOL TARTRATE (LOPRESSOR) 25 MG TABLET    Take 37.5 mg by mouth 2 (two) times daily.    MOMETASONE 0.1% (ELOCON) 0.1 % CREAM    Apply topically daily as needed.    MULTIVIT,THER IRON,CA,FA & MIN (MULTIVITAMIN) TAB    Take 1 tablet by mouth once daily.     ONDANSETRON (ZOFRAN-ODT) 4 MG TBDL    Take 1 tablet (4 mg total) by mouth every 8 (eight) hours as needed.    PANTOPRAZOLE (PROTONIX) 40 MG TABLET    Take 1 tablet (40 mg total) by mouth once daily.    PEN NEEDLE, DIABETIC (BD ULTRA-FINE MERLE PEN NEEDLE) 32 GAUGE X 5/32" NDLE    Uses 2 daily    PIOGLITAZONE (ACTOS) 15 MG TABLET    Take 1 tablet (15 mg total) " by mouth once daily.    PRAMIPEXOLE (MIRAPEX) 1 MG TABLET    TAKE TWO TABLETS BY MOUTH nightly    SEMAGLUTIDE (OZEMPIC) 1 MG/DOSE (2 MG/1.5 ML) PNIJ    Inject 1 mg subq weekly   Discontinued Medications    INFUSION SET FOR INSULIN PUMP (MINIMED INFUSION SET) ISET    Change site q2days    INSULIN PUMP SYRINGE 3 ML MISC    Change site q2days    METOPROLOL SUCCINATE (TOPROL-XL) 25 MG 24 HR TABLET    Take 1 tablet (25 mg total) by mouth once daily.    SUBCUTANEOUS INSULIN PUMP (MINIMED 670G INSULIN PUMP) MISC    Dispense 1         Follow up in about 3 months (around 3/9/2020), or if symptoms worsen or fail to improve.

## 2019-12-11 ENCOUNTER — TELEPHONE (OUTPATIENT)
Dept: FAMILY MEDICINE | Facility: CLINIC | Age: 54
End: 2019-12-11

## 2019-12-11 NOTE — TELEPHONE ENCOUNTER
----- Message from Lindsey Hammond sent at 12/11/2019 10:58 AM CST -----  Contact: [phone]/ self   Patient called in returning your call. Please advise.

## 2019-12-12 ENCOUNTER — TELEPHONE (OUTPATIENT)
Dept: PRIMARY CARE CLINIC | Facility: CLINIC | Age: 54
End: 2019-12-12

## 2019-12-12 ENCOUNTER — TELEPHONE (OUTPATIENT)
Dept: FAMILY MEDICINE | Facility: CLINIC | Age: 54
End: 2019-12-12

## 2019-12-12 NOTE — TELEPHONE ENCOUNTER
----- Message from Bria Black sent at 12/12/2019 12:09 PM CST -----  Contact: Pt  Pt missed a call and would like the nurse to return their call.    Pt can be reached at [phone]

## 2019-12-12 NOTE — TELEPHONE ENCOUNTER
----- Message from Claudio Kellogg sent at 12/12/2019 10:07 AM CST -----  Type:  Patient Returning Call    Who Called:  Patient  Who Left Message for Patient:  Eli  Does the patient know what this is regarding?:  Test results  Best Call Back Number:  [phone]  Additional Information:

## 2019-12-12 NOTE — TELEPHONE ENCOUNTER
----- Message from Ivon Carrasco sent at 12/12/2019  3:14 PM CST -----  Type:  Test Results    Who Called: self   Name of Test (Lab/Mammo/Etc):  X-ray Date of Test:  Monday 12/09/2019   Ordering Provider:  Beba Carrasco   Where the test was performed: [facility]   Best Call Back Number:  [phone]  Additional Information:

## 2019-12-12 NOTE — TELEPHONE ENCOUNTER
I have been unable to reach via telephone. Left message that I was trying to speak with him. Please notify his xray showed degenerative changes (osteoarthritis). Can you please ask if he is taking the mobic daily and if it provides any relief. If not I can change to another NSAID agent.

## 2019-12-13 NOTE — TELEPHONE ENCOUNTER
Informed patient of results. Patient states that the mobic does not help. Please sent alternative.

## 2019-12-25 RX ORDER — DESLORATADINE 5 MG/1
TABLET ORAL
Qty: 30 TABLET | Refills: 3 | Status: SHIPPED | OUTPATIENT
Start: 2019-12-25 | End: 2020-09-04 | Stop reason: CLARIF

## 2020-01-02 NOTE — TELEPHONE ENCOUNTER
Please see the following assessment. This refill request still requires some action on your part.       Pt last saw PCP >15 months ago (05/2018). Appears pt is following with you. Outside refill clinic protocol to renew. Defer requests to you.       Thank you.

## 2020-01-02 NOTE — PROGRESS NOTES
Refill Authorization Note     is requesting a refill authorization.    Brief assessment and rationale for refill: REVIEW: pioglitazone -abnormal labs // APPROVE: citalopram -prr          Medication Therapy Plan: A1C>8; lov(12/19) by KARINA Carrasco, SVITLANA, APRN; lov with PCP(5/18); FOVS                              Comments:   Requested Prescriptions   Pending Prescriptions Disp Refills    pioglitazone (ACTOS) 15 MG tablet [Pharmacy Med Name: PIOGLITAZONE HCL 15 MG TABLET] 90 tablet 0     Sig: TAKE ONE TABLET BY MOUTH ONCE DAILY       Endocrinology:  Diabetes - Glitazones - pioglitazone Failed - 1/2/2020 11:12 AM        Failed - HBA1C is 7.9 or below and within 180 days     Hemoglobin A1C   Date Value Ref Range Status   11/07/2019 9.2 (H) 4.0 - 5.6 % Final     Comment:     ADA Screening Guidelines:  5.7-6.4%  Consistent with prediabetes  >or=6.5%  Consistent with diabetes  High levels of fetal hemoglobin interfere with the HbA1C  assay. Heterozygous hemoglobin variants (HbS, HgC, etc)do  not significantly interfere with this assay.   However, presence of multiple variants may affect accuracy.     06/17/2019 8.1 (H) 4.0 - 5.6 % Final     Comment:     ADA Screening Guidelines:  5.7-6.4%  Consistent with prediabetes  >or=6.5%  Consistent with diabetes  High levels of fetal hemoglobin interfere with the HbA1C  assay. Heterozygous hemoglobin variants (HbS, HgC, etc)do  not significantly interfere with this assay.   However, presence of multiple variants may affect accuracy.     03/18/2019 8.5 (H) 4.0 - 5.6 % Final     Comment:     ADA Screening Guidelines:  5.7-6.4%  Consistent with prediabetes  >or=6.5%  Consistent with diabetes  High levels of fetal hemoglobin interfere with the HbA1C  assay. Heterozygous hemoglobin variants (HbS, HgC, etc)do  not significantly interfere with this assay.   However, presence of multiple variants may affect accuracy.                Passed - Patient is at least 18 years old         Passed - Office visit in past 12 months or future 90 days     Recent Outpatient Visits            3 weeks ago Swelling of joint, hand, right    [facility] Beba Carrasco DNP, APRN    1 month ago Type 2 diabetes mellitus with diabetic polyneuropathy, with long-term current use of insulin    [facility] Gabby Rooney DNP, NP    1 month ago Diabetic polyneuropathy associated with type 2 diabetes mellitus    [facility] Rj Nuñez DPM    1 month ago Type 2 diabetes mellitus with diabetic polyneuropathy, with long-term current use of insulin    [facility] Gabby Rooney DNP, NP    4 months ago KRIS (acute kidney injury)    [facility] Beba Carrasco DNP, APRN          Future Appointments              In 4 weeks LAB, [place] [facility] [place]    In 1 month [facility] DNP, NP [facility]    In 2 months [facility]j [facility]    In 3 months KAYLIE Carroll MD [facility]                Passed - ALT is 94 or below and within 360 days     ALT   Date Value Ref Range Status   11/07/2019 40 10 - 44 U/L Final   11/06/2019 47 (H) 10 - 44 U/L Final   08/30/2019 50 (H) 10 - 44 U/L Final              Passed - Na in normal range and within 360 days     Sodium   Date Value Ref Range Status   11/07/2019 136 136 - 145 mmol/L Final   11/06/2019 135 (L) 136 - 145 mmol/L Final   08/30/2019 134 (L) 136 - 145 mmol/L Final             citalopram (CELEXA) 40 MG tablet [Pharmacy Med Name: CITALOPRAM HBR 40 MG TABLET] 90 tablet 0     Sig: TAKE ONE TABLET BY MOUTH DAILY       Citalopram (Celexa) Protocol: Should not exceed 40 mg / day Passed - 1/2/2020 11:12 AM        Passed - Visit with authorizing provider in past 12 months or upcoming 90 days         Blood Pressure Readings: <139/89mmHg 12 months   BP Readings from Last 3 Encounters:   12/25/19 (!)  120/58   12/09/19 110/66   11/19/19 95/60         Digital Hypertension Data: values will display if enrolled   Last 5 Patient Entered Readings                                      Current 30 Day Average:      There is no flowsheet data to display.

## 2020-01-06 DIAGNOSIS — M10.00 IDIOPATHIC GOUT, UNSPECIFIED CHRONICITY, UNSPECIFIED SITE: ICD-10-CM

## 2020-01-07 RX ORDER — PIOGLITAZONEHYDROCHLORIDE 15 MG/1
TABLET ORAL
Qty: 90 TABLET | Refills: 0 | Status: SHIPPED | OUTPATIENT
Start: 2020-01-07 | End: 2020-05-18

## 2020-01-07 RX ORDER — ALLOPURINOL 100 MG/1
TABLET ORAL
Qty: 90 TABLET | Refills: 0 | Status: SHIPPED | OUTPATIENT
Start: 2020-01-07 | End: 2020-11-24

## 2020-01-07 RX ORDER — CITALOPRAM 40 MG/1
TABLET, FILM COATED ORAL
Qty: 90 TABLET | Refills: 0 | Status: SHIPPED | OUTPATIENT
Start: 2020-01-07 | End: 2020-07-23

## 2020-01-08 ENCOUNTER — TELEPHONE (OUTPATIENT)
Dept: ENDOCRINOLOGY | Facility: CLINIC | Age: 55
End: 2020-01-08

## 2020-01-08 DIAGNOSIS — Z79.4 TYPE 2 DIABETES MELLITUS WITH DIABETIC POLYNEUROPATHY, WITH LONG-TERM CURRENT USE OF INSULIN: Primary | ICD-10-CM

## 2020-01-08 DIAGNOSIS — E11.42 TYPE 2 DIABETES MELLITUS WITH DIABETIC POLYNEUROPATHY, WITH LONG-TERM CURRENT USE OF INSULIN: Primary | ICD-10-CM

## 2020-01-08 RX ORDER — INSULIN ASPART 100 [IU]/ML
INJECTION, SOLUTION INTRAVENOUS; SUBCUTANEOUS
Qty: 50 ML | Refills: 1 | Status: SHIPPED | OUTPATIENT
Start: 2020-01-08 | End: 2020-02-17 | Stop reason: SDUPTHER

## 2020-01-08 NOTE — TELEPHONE ENCOUNTER
Patient scheduled for [employer] insulin pump training on Tuesday 1/21.  He is currently Novolog 70/30 80 units twice daily.  Can you write starting orders and send script for vials to the [facility] on his chart?   Thanks

## 2020-01-08 NOTE — TELEPHONE ENCOUNTER
Basal rate 2.3 u/h  Bolus per meal 20 units  Snack 5 units  Active insulin 3 hrs  ISF 20  Change pump site q2 days  Continue actos and ozempic

## 2020-01-21 ENCOUNTER — CLINICAL SUPPORT (OUTPATIENT)
Dept: DIABETES | Facility: CLINIC | Age: 55
End: 2020-01-21
Payer: MEDICARE

## 2020-01-21 DIAGNOSIS — Z46.81 INSULIN PUMP TRAINING: ICD-10-CM

## 2020-01-21 DIAGNOSIS — E11.42 TYPE 2 DIABETES MELLITUS WITH DIABETIC POLYNEUROPATHY, WITH LONG-TERM CURRENT USE OF INSULIN: ICD-10-CM

## 2020-01-21 DIAGNOSIS — Z79.4 TYPE 2 DIABETES MELLITUS WITH DIABETIC POLYNEUROPATHY, WITH LONG-TERM CURRENT USE OF INSULIN: ICD-10-CM

## 2020-01-21 PROCEDURE — G0108 DIAB MANAGE TRN  PER INDIV: HCPCS | Mod: S$GLB,,, | Performed by: DIETITIAN, REGISTERED

## 2020-01-21 PROCEDURE — G0108 PR DIAB MANAGE TRN  PER INDIV: ICD-10-PCS | Mod: S$GLB,,, | Performed by: DIETITIAN, REGISTERED

## 2020-01-21 PROCEDURE — 99999 PR PBB SHADOW E&M-EST. PATIENT-LVL II: ICD-10-PCS | Mod: PBBFAC,,, | Performed by: DIETITIAN, REGISTERED

## 2020-01-21 PROCEDURE — 99999 PR PBB SHADOW E&M-EST. PATIENT-LVL II: CPT | Mod: PBBFAC,,, | Performed by: DIETITIAN, REGISTERED

## 2020-01-21 RX ORDER — INSULIN ASPART 100 [IU]/ML
INJECTION, SOLUTION INTRAVENOUS; SUBCUTANEOUS
Qty: 10 ML | Refills: 0 | COMMUNITY
Start: 2020-01-21 | End: 2020-03-16 | Stop reason: SDUPTHER

## 2020-01-21 NOTE — PROGRESS NOTES
Diabetes Education  Author: Clary Grimes RD, CDE  Date: 1/21/2020    Diabetes Care Management Summary  Diabetes Education Record Assessment/Progress: Comprehensive/Group-Patient in clinic today for insulin pump training.  He was last seen for dm education in 11/2019.  He comes in today stating he did not sleep well last night and forgot his insulin dose last night    Current Diabetes Risk Level: Moderate     Current Diabetes Treatment   Current Treatment: Insulin-Novolog 70/30 80 units BID    Social History  Primary Support: Self, Spouse    Diabetes Education Assessment/Progress  Medications (states correct name, dose, onset, peak, duration, side effects & timing of meds): Discussion, Demonstration     Patients blood sugar at the start of the visit was 483.  He did not take his 80 units of 70/30 last night or this morning.  Gabby Rooney notified and wanted him to take 20 units of Novolog manually and agreed that he would still start on his pump today.   He administered Novolog while in clinic and a half hour later sugar was 430.      Attempted to do pump training but patient was falling asleep throughout visit.  Pump was programmed with starting orders per Gabby Rooney.      Basal rate:  2.3 u/hr  CHO- 1:5 (will use manual dose vs bolus wizard)  Bolus amount per meal - 20 units  Snack amount - 5 units  ISF 1:20  Goal: 140  Active insulin: 3 hours     Discussed how to take manual bolus as well as through the bolus wizard.  Set preset bolus menu to 20 units for meal and 5 units for snacks.  Also paired glucometer to pump so he could bolus with his meter as well. Attempted to go through reservoir and tubing menu and was not able to complete visit. Patient did not appear to be comprehending information that was provided due to being sleepy.       Diabetes Care Plan/Intervention  Education Plan/Intervention: Due to patient falling asleep multiple times in first half hour of visit we decided it would be best to  reschedule his start.  Appointment was made next week to come back to finish start.  He was encouraged to bring his wife to that visit.  Also stressed that he should continue to take his 2 doses of 70/30 insulin twice daily in interim.  He will take his morning dose of insulin he missed today when he returns home.  He will bring back all supplies next week to complete start.  Call in interim with questions/concerns.      Today's Self-Management Care Plan was developed with the patient's input and is based on barriers identified during today's assessment.    The long and short-term goals in the care plan were written with the patient/caregiver's input. The patient has agreed to work toward these goals to improve his overall diabetes control.      The patient received a copy of today's self-management plan and verbalized understanding of the care plan, goals, and all of today's instructions.      The patient was encouraged to communicate with his physician and care team regarding his condition(s) and treatment.  I provided the patient with my contact information today and encouraged him to contact me via phone or patient portal as needed.     Education Units of Time   Time Spent: 60 min

## 2020-01-26 ENCOUNTER — PATIENT OUTREACH (OUTPATIENT)
Dept: ADMINISTRATIVE | Facility: OTHER | Age: 55
End: 2020-01-26

## 2020-01-26 DIAGNOSIS — Z13.5 ENCOUNTER FOR SCREENING FOR DIABETIC RETINOPATHY: Primary | ICD-10-CM

## 2020-02-03 ENCOUNTER — TELEPHONE (OUTPATIENT)
Dept: ENDOCRINOLOGY | Facility: CLINIC | Age: 55
End: 2020-02-03

## 2020-02-03 NOTE — TELEPHONE ENCOUNTER
----- Message from Natacha Bloom sent at 2/3/2020 11:23 AM CST -----  Contact: patient  Type: Needs Medical Advice  Who Called:  patient  Best Call Back Number: [phone]  Additional Information: Patient needs to reschedule apt, patient is have surgery on 2/6/20, please call to reschedule apt and labs.  Thanks!

## 2020-02-16 ENCOUNTER — PATIENT OUTREACH (OUTPATIENT)
Dept: ADMINISTRATIVE | Facility: OTHER | Age: 55
End: 2020-02-16

## 2020-02-17 RX ORDER — INSULIN ASPART 100 [IU]/ML
INJECTION, SOLUTION INTRAVENOUS; SUBCUTANEOUS
Qty: 50 ML | Refills: 1 | Status: SHIPPED | OUTPATIENT
Start: 2020-02-17 | End: 2020-03-16

## 2020-03-02 ENCOUNTER — PATIENT OUTREACH (OUTPATIENT)
Dept: ADMINISTRATIVE | Facility: OTHER | Age: 55
End: 2020-03-02

## 2020-03-03 ENCOUNTER — TELEPHONE (OUTPATIENT)
Dept: ENDOCRINOLOGY | Facility: CLINIC | Age: 55
End: 2020-03-03

## 2020-03-03 ENCOUNTER — CLINICAL SUPPORT (OUTPATIENT)
Dept: DIABETES | Facility: CLINIC | Age: 55
End: 2020-03-03
Payer: MEDICARE

## 2020-03-03 ENCOUNTER — LAB VISIT (OUTPATIENT)
Dept: LAB | Facility: HOSPITAL | Age: 55
End: 2020-03-03
Attending: NURSE PRACTITIONER
Payer: MEDICARE

## 2020-03-03 DIAGNOSIS — E11.42 TYPE 2 DIABETES MELLITUS WITH DIABETIC POLYNEUROPATHY, WITH LONG-TERM CURRENT USE OF INSULIN: Primary | ICD-10-CM

## 2020-03-03 DIAGNOSIS — Z79.4 TYPE 2 DIABETES MELLITUS WITH DIABETIC POLYNEUROPATHY, WITH LONG-TERM CURRENT USE OF INSULIN: ICD-10-CM

## 2020-03-03 DIAGNOSIS — E11.42 TYPE 2 DIABETES MELLITUS WITH DIABETIC POLYNEUROPATHY, WITH LONG-TERM CURRENT USE OF INSULIN: ICD-10-CM

## 2020-03-03 DIAGNOSIS — Z46.81 INSULIN PUMP TRAINING: ICD-10-CM

## 2020-03-03 DIAGNOSIS — Z79.4 TYPE 2 DIABETES MELLITUS WITH DIABETIC POLYNEUROPATHY, WITH LONG-TERM CURRENT USE OF INSULIN: Primary | ICD-10-CM

## 2020-03-03 LAB
ALBUMIN/CREAT UR: NORMAL UG/MG (ref 0–30)
CREAT UR-MCNC: 62 MG/DL (ref 23–375)
MICROALBUMIN UR DL<=1MG/L-MCNC: <2.5 UG/ML

## 2020-03-03 PROCEDURE — G0108 DIAB MANAGE TRN  PER INDIV: HCPCS | Mod: S$GLB,,, | Performed by: DIETITIAN, REGISTERED

## 2020-03-03 PROCEDURE — G0108 PR DIAB MANAGE TRN  PER INDIV: ICD-10-PCS | Mod: S$GLB,,, | Performed by: DIETITIAN, REGISTERED

## 2020-03-03 PROCEDURE — 82043 UR ALBUMIN QUANTITATIVE: CPT

## 2020-03-03 NOTE — PROGRESS NOTES
Diabetes Education  Author: Clary Grimes RD, CDE  Date: 3/3/2020    Diabetes Care Management Summary  Diabetes Education Record Assessment/Progress: Comprehensive/Group-Patient returns today to complete insulin pump training that was started on 1/21.  That visit was not completed due to patient falling asleep.  He is in clinic with wife today but continuing to fall asleep throughout visit.  He is interested in getting another sleep study done and discussed with Gabby Rooney today.  He prefers to have it completed at an outside [facility] facility.      Current Diabetes Risk Level: Moderate     Last A1c:   Lab Results   Component Value Date    HGBA1C 9.2 (H) 11/07/2019     Last visit with Diabetes Educator: : 03/03/2020    Diabetes Type  Diabetes Type : Type II    Diabetes History  Current Treatment: Insulin-States he has been out of Novolog 70/30 insulin and has been only taking Novolog twice daily - he took 60 units this am but states he normally takes 80 units twice daily  Reviewed Problem List with Patient: Yes     Health Maintenance was reviewed today with patient. Discussed with patient importance of routine eye exams, foot exams/foot care, blood work (i.e.: A1c, microalbumin, and lipid), dental visits, yearly flu vaccine, and pneumonia vaccine as indicated by PCP. Patient verbalized understanding.     Health Maintenance Topics with due status: Not Due       Topic Last Completion Date    Colonoscopy 09/14/2017    Urine Microalbumin 06/17/2019    Foot Exam 07/05/2019    Lipid Panel 11/07/2019    TETANUS VACCINE 11/17/2019    Aspirin/Antiplatelet Therapy 11/19/2019    High Dose Statin 12/09/2019     Health Maintenance Due   Topic Date Due    Eye Exam  10/16/2019    Hemoglobin A1c  02/07/2020     Diabetes Education Assessment/Progress  Medications (states correct name, dose, onset, peak, duration, side effects & timing of meds): Discussion, Demonstration    Verified that settings were still set in the pump from  last visit.  They are as follows:    Initial Settings  Basal rate: 2.3 u/hr  Preset Bolus: 20 units for meal and 8 units for snacks (this was increased from 5 units per Gabby Rooney)   Dual/Square : OFF  Max Basal rate 3 u/hr  Max Bolus: 25 units  Auto Suspend: off  Bolus Speed: Standard    Programmed Bolus Wizard settings per provider:    Bolus Wizard  CHO RATIO: 1:5 (patient will use preset bolus feature of 20 units instead)  Insulin Sensitivity :1:20  Blood glucose goal: 120  Active insulin: 3 hrs    Instructed and reviewed [place] [employer] menu:  Went back through steps to fill and change reservoir.  Wife was present for these steps and will assist with changing.  Discussed that he will need to change sets every 2 days vs 3 days with the amount of insulin he is taking.  He was able to go through process of filling and menus to start new infusion set today.      Went through steps he will use to take preset meal bolus and how to correct with bolus wizard feature.  Stressed that he will need to both of these steps each time he eats.  He also understands he will now need to test before each meal and at bedtime.  He was also trained how to use snack preset feature when eating high carb snacks.      Contour Next Link Meter  Glucometer successfully auto-connected to insulin pump for tawny with Bolus Wizard.  Reviewed frequency of monitoring glucose as well as bolus feature available from meter.     Discussed storage of insulin and back-up plan if pump is broken or fails ;  Encouraged patient to keep extra insulin as needed. Reviewed treatment of hypoglycemia, hyperglycemia and troubleshooting of pump referring to Quick - Reference Safety rules.  Also reviewed suspend feature as well.      [employer] 24 hour support line provided and person    Follow-up Plan:   Patient was scheduled for f/u with Gabby Rooney in 2 weeks.  He was encouraged to call in interim with any questions/problems with high or low blood  sugars.  Clinic phone # as well as Medtronic 24 hr support line provided.       Today's Self-Management Care Plan was developed with the patient's input and is based on barriers identified during today's assessment.    The long and short-term goals in the care plan were written with the patient/caregiver's input. The patient has agreed to work toward these goals to improve his overall diabetes control.      The patient received a copy of today's self-management plan and verbalized understanding of the care plan, goals, and all of today's instructions.      The patient was encouraged to communicate with his physician and care team regarding his condition(s) and treatment.  I provided the patient with my contact information today and encouraged him to contact me via phone or patient portal as needed.     Education Units of Time   Time Spent: 90 min

## 2020-03-12 ENCOUNTER — PATIENT OUTREACH (OUTPATIENT)
Dept: ADMINISTRATIVE | Facility: OTHER | Age: 55
End: 2020-03-12

## 2020-03-16 ENCOUNTER — OFFICE VISIT (OUTPATIENT)
Dept: ENDOCRINOLOGY | Facility: CLINIC | Age: 55
End: 2020-03-16
Payer: MEDICARE

## 2020-03-16 VITALS
WEIGHT: 301.5 LBS | DIASTOLIC BLOOD PRESSURE: 56 MMHG | HEIGHT: 73 IN | HEART RATE: 77 BPM | SYSTOLIC BLOOD PRESSURE: 104 MMHG | BODY MASS INDEX: 39.96 KG/M2

## 2020-03-16 DIAGNOSIS — E78.5 DYSLIPIDEMIA: ICD-10-CM

## 2020-03-16 DIAGNOSIS — I10 ESSENTIAL HYPERTENSION: ICD-10-CM

## 2020-03-16 DIAGNOSIS — G47.33 OSA ON CPAP: ICD-10-CM

## 2020-03-16 DIAGNOSIS — K76.0 FATTY LIVER: ICD-10-CM

## 2020-03-16 DIAGNOSIS — E66.9 OBESITY (BMI 30-39.9): ICD-10-CM

## 2020-03-16 DIAGNOSIS — E11.42 TYPE 2 DIABETES MELLITUS WITH DIABETIC POLYNEUROPATHY, WITH LONG-TERM CURRENT USE OF INSULIN: Primary | ICD-10-CM

## 2020-03-16 DIAGNOSIS — Z79.4 TYPE 2 DIABETES MELLITUS WITH DIABETIC POLYNEUROPATHY, WITH LONG-TERM CURRENT USE OF INSULIN: Primary | ICD-10-CM

## 2020-03-16 PROBLEM — E66.01 MORBID OBESITY: Status: RESOLVED | Noted: 2018-05-18 | Resolved: 2020-03-16

## 2020-03-16 PROCEDURE — 99214 OFFICE O/P EST MOD 30 MIN: CPT | Mod: S$GLB,,, | Performed by: NURSE PRACTITIONER

## 2020-03-16 PROCEDURE — 99499 UNLISTED E&M SERVICE: CPT | Mod: S$GLB,,, | Performed by: NURSE PRACTITIONER

## 2020-03-16 PROCEDURE — 3052F PR MOST RECENT HEMOGLOBIN A1C LEVEL 8.0 - < 9.0%: ICD-10-PCS | Mod: CPTII,S$GLB,, | Performed by: NURSE PRACTITIONER

## 2020-03-16 PROCEDURE — 99999 PR PBB SHADOW E&M-EST. PATIENT-LVL V: CPT | Mod: PBBFAC,,, | Performed by: NURSE PRACTITIONER

## 2020-03-16 PROCEDURE — 99499 RISK ADDL DX/OHS AUDIT: ICD-10-PCS | Mod: S$GLB,,, | Performed by: NURSE PRACTITIONER

## 2020-03-16 PROCEDURE — 99999 PR PBB SHADOW E&M-EST. PATIENT-LVL V: ICD-10-PCS | Mod: PBBFAC,,, | Performed by: NURSE PRACTITIONER

## 2020-03-16 PROCEDURE — 99214 PR OFFICE/OUTPT VISIT, EST, LEVL IV, 30-39 MIN: ICD-10-PCS | Mod: S$GLB,,, | Performed by: NURSE PRACTITIONER

## 2020-03-16 PROCEDURE — 3052F HG A1C>EQUAL 8.0%<EQUAL 9.0%: CPT | Mod: CPTII,S$GLB,, | Performed by: NURSE PRACTITIONER

## 2020-03-16 RX ORDER — INSULIN LISPRO 100 [IU]/ML
150 INJECTION, SOLUTION INTRAVENOUS; SUBCUTANEOUS
COMMUNITY
End: 2020-06-23 | Stop reason: SDUPTHER

## 2020-03-16 NOTE — PROGRESS NOTES
CC: This 54 y.o. male presents for management of diabetes  and chronic conditions pending review including HTN, HLP, morbid obesity, fatty liver, vitamin d deficiency     HPI: He was diagnosed with T2DM in 2005. Has never been hospitalized r/t DM.  Mother has passed away from Fatty liver and hepatic carcinoma in 2018  Family hx of DM: sister, mom and dad    Since last visit started on Medtronic 670g insulin pump  See Media tab for download  Not checking his bg frequently,  Mostly missing his breakfast and lunch hien bolus  Also of note he was admitted to the hospital after accidentally bolusing him self w 2.5ml of insulin when he was changing his site and reservoir  He has corrected that issue    Diet: Eats 2-3  Meals a day, snacks on sweets-honey buns, cake, shakes     Exercise: none  CURRENT DM MEDS:    metformin 1000 mg bid, actos 15 mg qd,  ozempic 1 mg weekly (Tuesday)  Humalog in Medtronic 670g insulin pump:  Basal 2.3 u/h  Carb ratio 1:5  ISF 20  target 110-120  Act Ins 3 hrs  Bolus preset- 20  Snack - 8 u  Vial/pen:  Uses pen  Glucometer type:  True Test 2018    Standards of Care:  Eye exam: 1/2018 no h/o retinopathy - appt w [facility]     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  Skin: Skin is intact and heals well, no rashes, no hair changes  Eyes: Denies visual disturbances  Resp: no SOB or HENDERSON, no cough  Cardiac: No palpitations, chest pain, no edema   GI: No nausea or vomiting, diarrhea, constipation, or abdominal pain.  /GYN: No nocturia, burning or pain.   MS/Neuro: +numbness/ tingling in BLE; Gait steady, speech clear  Psych: Denies drug/ETOH abuse, no hx of depression.  Other systems: negative.    PE:  GENERAL: Well developed, well nourished.appears older than age.   PSYCH: AAOx3, appropriate mood and affect, pleasant expression, conversant, appears relaxed, well groomed.   EYES: Conjunctiva, corneas clear  NECK: Supple, trachea midline    NEURO: Gait steady  SKIN: Skin warm and dry no acanthosis nigracans.  FOOT EXAMINATION:7/2019        Lab Results   Component Value Date    MICALBCREAT Unable to calculate 03/03/2020       Hemoglobin A1C   Date Value Ref Range Status   03/03/2020 8.7 (H) 4.0 - 5.6 % Final     Comment:     ADA Screening Guidelines:  5.7-6.4%  Consistent with prediabetes  >or=6.5%  Consistent with diabetes  High levels of fetal hemoglobin interfere with the HbA1C  assay. Heterozygous hemoglobin variants (HbS, HgC, etc)do  not significantly interfere with this assay.   However, presence of multiple variants may affect accuracy.     11/07/2019 9.2 (H) 4.0 - 5.6 % Final     Comment:     ADA Screening Guidelines:  5.7-6.4%  Consistent with prediabetes  >or=6.5%  Consistent with diabetes  High levels of fetal hemoglobin interfere with the HbA1C  assay. Heterozygous hemoglobin variants (HbS, HgC, etc)do  not significantly interfere with this assay.   However, presence of multiple variants may affect accuracy.     06/17/2019 8.1 (H) 4.0 - 5.6 % Final     Comment:     ADA Screening Guidelines:  5.7-6.4%  Consistent with prediabetes  >or=6.5%  Consistent with diabetes  High levels of fetal hemoglobin interfere with the HbA1C  assay. Heterozygous hemoglobin variants (HbS, HgC, etc)do  not significantly interfere with this assay.   However, presence of multiple variants may affect accuracy.          ASSESSMENT and PLAN:   Continue Actos 15 mg qd, metformin 1000 mg bid,Resume Ozempic 1 mg qweek,   CHange pump settings as below:  Basal 1.8u/h  ISF 30  Bolus preset- 12  Snack - 5 u  Check bg and take insulin as prescribed, I think this regimen is working much better and has decreased the amount of insulin that he's actually taking  But he has to take it!  DM edu- pump download in 2 weeks   Discussed A1c and BG goals.   - takes ASA, ACEi, statin    2. HTN - controlled, continue meds as previously prescribed and monitor.     3. HLP -  on statin  therapy,     4. TARYN- not wearing regularly, WEAR cpap nightly     5. LINDSEY- encouraged continued weight loss, saw hepatology, mother passed away w HCC    6. Obesity-  Body mass index is 39.78 kg/m².   Continue to improve diet, exercise 30 mins a day, 5 days a week.       Follow-up:  in 3 months with lab prior

## 2020-03-16 NOTE — PROGRESS NOTES
Patient, Gio Forrest (MRN #[id number]), presented with a recorded BMI of 39.78 kg/m^2 and a documented comorbidity(s):  - Diabetes Mellitus Type 2  - Obstructive Sleep Apnea  - Hypertension  - Hyperlipidemia  to which the severe obesity is a contributing factor. This is consistent with the definition of severe obesity (BMI 35.0-39.9) with comorbidity (ICD-10 E66.01, Z68.35). The patient's severe obesity was monitored, evaluated, addressed and/or treated. This addendum to the medical record is made on 03/16/2020.

## 2020-03-17 DIAGNOSIS — K21.9 GASTROESOPHAGEAL REFLUX DISEASE, ESOPHAGITIS PRESENCE NOT SPECIFIED: ICD-10-CM

## 2020-03-17 RX ORDER — PANTOPRAZOLE SODIUM 40 MG/1
TABLET, DELAYED RELEASE ORAL
Qty: 90 TABLET | Refills: 1 | Status: SHIPPED | OUTPATIENT
Start: 2020-03-17 | End: 2020-07-11

## 2020-03-18 ENCOUNTER — PATIENT OUTREACH (OUTPATIENT)
Dept: ADMINISTRATIVE | Facility: OTHER | Age: 55
End: 2020-03-18

## 2020-03-27 ENCOUNTER — PATIENT OUTREACH (OUTPATIENT)
Dept: ADMINISTRATIVE | Facility: OTHER | Age: 55
End: 2020-03-27

## 2020-04-02 ENCOUNTER — TELEPHONE (OUTPATIENT)
Dept: FAMILY MEDICINE | Facility: CLINIC | Age: 55
End: 2020-04-02

## 2020-04-02 NOTE — TELEPHONE ENCOUNTER
On the phone 26 minutes helping patient activate [facility] and download the [place] abigail to do a virtual visit next thursday

## 2020-04-09 ENCOUNTER — PATIENT OUTREACH (OUTPATIENT)
Dept: ADMINISTRATIVE | Facility: HOSPITAL | Age: 55
End: 2020-04-09

## 2020-04-09 NOTE — PROGRESS NOTES
Chart review completed 04/09/2020.  Care Everywhere updates requested and reviewed.  Immunizations reconciled. Media reviewed.

## 2020-04-13 ENCOUNTER — PATIENT OUTREACH (OUTPATIENT)
Dept: ADMINISTRATIVE | Facility: OTHER | Age: 55
End: 2020-04-13

## 2020-04-20 NOTE — PROGRESS NOTES
Refill Routing Note     Medication(s) are not appropriate for processing by [facility]:       Non-participating provider               Medication reconciliation completed: No        Appointments [id number] or future 3m with PCP    Date Provider   Last Visit   12/9/2019 Beba Carrasco DNP, TARAN   Next Visit   Visit date not found Beba Carrasco DNP, TARAN     Automatic Epic Protocol Generated Data:    Requested Prescriptions   Pending Prescriptions Disp Refills    atorvastatin (LIPITOR) 40 MG tablet [Pharmacy Med Name: atorvastatin 40 mg tablet] 90 tablet 1     Sig: TAKE ONE TABLET BY MOUTH EVERY EVENING       Cardiovascular:  Antilipid - Statins Passed - 4/15/2020  8:41 AM        Passed - Patient is at least 18 years old        Passed - Office visit in past 12 months or future 90 days.     Recent Outpatient Visits            1 month ago Type 2 diabetes mellitus with diabetic polyneuropathy, with long-term current use of insulin    [facility] - Endocrinology Gabby Rooney DNP, NP    4 months ago Swelling of joint, hand, right    [facility] Beba Carrasco DNP, APRN    5 months ago Type 2 diabetes mellitus with diabetic polyneuropathy, with long-term current use of insulin    [facility] End[facility]ology Gabby Rooney DNP, NP    5 months ago Diabetic polyneuropathy associated with type 2 diabetes mellitus    [facility] - Podiatry Rj Nuñez DPM    5 months ago Type 2 diabetes mellitus with diabetic polyneuropathy, with long-term current use of insulin    [facility] - Endocrinology Gabby Rooney DNP, NP          Future Appointments              In 1 month [facility] [place] [facility]    In 2 months Gabby Rooney DNP, NP [facility], [place]                Passed - Lipid Panel completed in last 360 days     Lab Results   Component Value Date    CHOL 100 (L) 11/07/2019    HDL 32 (L) 11/07/2019    LDLCALC 45.4 (L) 11/07/2019    TRIG 113  11/07/2019             Passed - ALT is 94 or below and within 360 days     ALT   Date Value Ref Range Status   03/21/2020 31 0 - 50 U/L Final   03/04/2020 29 0 - 50 U/L Final   03/03/2020 27 10 - 44 U/L Final              Passed - AST is 54 or below and within 360 days     AST ([place])   Date Value Ref Range Status   02/15/2016 66 (H) 17 - 59 U/L Final     AST   Date Value Ref Range Status   03/21/2020 28 17 - 59 U/L Final   03/04/2020 25 17 - 59 U/L Final   03/03/2020 22 10 - 40 U/L Final                 Note composed:8:00 PM 04/19/2020

## 2020-04-21 RX ORDER — ATORVASTATIN CALCIUM 40 MG/1
TABLET, FILM COATED ORAL
Qty: 90 TABLET | Refills: 1 | Status: ON HOLD | OUTPATIENT
Start: 2020-04-21 | End: 2020-10-08

## 2020-04-28 DIAGNOSIS — E11.42 TYPE 2 DIABETES MELLITUS WITH DIABETIC POLYNEUROPATHY, WITH LONG-TERM CURRENT USE OF INSULIN: Primary | ICD-10-CM

## 2020-04-28 DIAGNOSIS — Z79.4 TYPE 2 DIABETES MELLITUS WITH DIABETIC POLYNEUROPATHY, WITH LONG-TERM CURRENT USE OF INSULIN: Primary | ICD-10-CM

## 2020-04-28 RX ORDER — LANCETS
1 EACH MISCELLANEOUS
Qty: 100 EACH | Refills: 6 | Status: SHIPPED | OUTPATIENT
Start: 2020-04-28 | End: 2020-05-04

## 2020-04-30 ENCOUNTER — TELEPHONE (OUTPATIENT)
Dept: ENDOCRINOLOGY | Facility: CLINIC | Age: 55
End: 2020-04-30

## 2020-04-30 ENCOUNTER — PATIENT OUTREACH (OUTPATIENT)
Dept: ADMINISTRATIVE | Facility: OTHER | Age: 55
End: 2020-04-30

## 2020-05-01 ENCOUNTER — TELEPHONE (OUTPATIENT)
Dept: ENDOCRINOLOGY | Facility: CLINIC | Age: 55
End: 2020-05-01

## 2020-05-01 ENCOUNTER — CLINICAL SUPPORT (OUTPATIENT)
Dept: DIABETES | Facility: CLINIC | Age: 55
End: 2020-05-01
Payer: MEDICARE

## 2020-05-01 DIAGNOSIS — E11.42 TYPE 2 DIABETES MELLITUS WITH DIABETIC POLYNEUROPATHY, WITH LONG-TERM CURRENT USE OF INSULIN: Primary | ICD-10-CM

## 2020-05-01 DIAGNOSIS — Z79.4 TYPE 2 DIABETES MELLITUS WITH DIABETIC POLYNEUROPATHY, WITH LONG-TERM CURRENT USE OF INSULIN: ICD-10-CM

## 2020-05-01 DIAGNOSIS — E11.42 TYPE 2 DIABETES MELLITUS WITH DIABETIC POLYNEUROPATHY, WITH LONG-TERM CURRENT USE OF INSULIN: ICD-10-CM

## 2020-05-01 DIAGNOSIS — Z79.4 TYPE 2 DIABETES MELLITUS WITH DIABETIC POLYNEUROPATHY, WITH LONG-TERM CURRENT USE OF INSULIN: Primary | ICD-10-CM

## 2020-05-01 PROCEDURE — G0108 DIAB MANAGE TRN  PER INDIV: HCPCS | Mod: S$GLB,,, | Performed by: DIETITIAN, REGISTERED

## 2020-05-01 PROCEDURE — G0108 PR DIAB MANAGE TRN  PER INDIV: ICD-10-PCS | Mod: S$GLB,,, | Performed by: DIETITIAN, REGISTERED

## 2020-05-01 NOTE — TELEPHONE ENCOUNTER
----- Message from Gabby Rooney DNP, NP sent at 5/1/2020  2:30 PM CDT -----  Sent to pharmacy again, if they denied will need to do PA w [facility] or exception    ----- Message -----  From: Jackie Harrell RD, CDE  Sent: 5/1/2020   1:30 PM CDT  To: Gabby Rooney DNP, NP    Gayatri,    The Contour Next test strips sent in 4/28 are being denied as they are not preferred--can you send in that note that states these are the only test strips that are needed for his insulin pump? Or can one of the nurses do this?

## 2020-05-01 NOTE — PROGRESS NOTES
Diabetes Education  Author: Jackie Harrell RD, CDE  Date: 5/1/2020    Patient presents for in clinic appointment today as he was recently started on Medtronic insulin pump and unable to figure out method of downloading pump at home.  Brought in today for insulin pump download and review to ensure patient safely using insulin pump therapy to improve his uncontrolled Type 2 diabetes.      Diabetes Care Management Summary  Diabetes Education Record Assessment/Progress: Comprehensive/Group  Current Diabetes Risk Level: Moderate     Last A1c:   Lab Results   Component Value Date    HGBA1C 8.7 (H) 03/03/2020     Last visit with Diabetes Educator: : 003/03/2020    Diabetes Type  Diabetes Type : Type II    Diabetes History  Current Treatment: Insulin pump, Insulin(Medtronic 670G pump)  Reviewed Problem List with Patient: Yes    Health Maintenance was reviewed today with patient. Discussed with patient importance of routine eye exams, foot exams/foot care, blood work (i.e.: A1c, microalbumin, and lipid), dental visits, yearly flu vaccine, and pneumonia vaccine as indicated by PCP. Patient verbalized understanding.     Health Maintenance Topics with due status: Not Due       Topic Last Completion Date    Colonoscopy 09/14/2017    Foot Exam 07/05/2019    Lipid Panel 11/07/2019    TETANUS VACCINE 11/17/2019    Hemoglobin A1c 03/03/2020    Urine Microalbumin 03/03/2020    Aspirin/Antiplatelet Therapy 03/16/2020    High Dose Statin 04/21/2020     Health Maintenance Due   Topic Date Due    Eye Exam  10/16/2019     Nutrition  Meal Planning: skipping meals    Monitoring   Monitoring: Cont Next EZ USB  Self Monitoring : Reports is checking his glucose but his Contour Next glucometer is not linked to his Medtronic pump--also in need of more Contour Next test strips  Blood Glucose Logs: Yes(See pump download attached to visit in Media--minimal blood glucose readings available)  Do you use a personal continuous glucose monitor?:  No  In the last month, how often have you had a low blood sugar reaction?: never(Denies any recent glucose readings less than 70 mg/dL)    Current Diabetes Treatment   Current Treatment: Insulin pump, Insulin(Medtronic 670G pump)    Social History  Preferred Learning Method: Face to Face        Barriers to Change  Learning Challenges : Other(Patient likes things to be simple--trying to simplify insulin pump therapy as patient improved once beginning pump therapy)    Readiness to Learn   Readiness to Learn : Acceptance    Cultural Influences  Cultural Influences: None    Diabetes Education Assessment/Progress  Medications (states correct name, dose, onset, peak, duration, side effects & timing of meds): Discussion, Instructed, Demonstrates Understanding/Competency(verbalizes/demonstrates).  Patient here today as he initiated insulin pump in March 2020--last Endocrine follow up 3/16/2020.  Unable too download pump at home--patient leads a simple life and not comfortable with computers so patient presents today in clinic for insulin pump download and review.  Patient has only been bolusing an average of once daily.  Minimal blood glucose readings on pump download.  Endocrine provider notified that very little data available so unable to safely make insulin pump settings adjustments.        Monitoring (monitoring blood glucose/other parameters & using results): Discussion, Demonstration, Demonstrates Understanding/Competency (verbalizes/demonstrates), Written Materials Provided  Patient instructed to increase frequency of entering blood glucose into pump--would help if his Contour Next meter which sends glucose readings straight to pump was set up--he does not have the Contour Next glucometer with him today at visit so only able to give him detailed instructions with pictures in hopes he is able to connect the glucometer to his pump.  Patient states he is in need of additional Contour Next glucose test strips and that  is pharmacy is not covering those as they are not the preferred test strips--will notify endocrine provider to see if able to get these test strips covered since required for use with Medtronic insulin pump.     Diabetes Care Plan/Intervention  Education Plan/Intervention:   1.  Patient instructed to enter more BG readings into insulin pump.  Currently his Contour Next glucometer not linked to Medtronic pump but he does not have glucometer with him at appointment today.  Written instructions on how to link meter to pump given to patient today with detailed pictures to assist patient.    2.  Patient needs to enter preset bolus at EACH meal--currently only bolusing for 1 meal a day but reports he is eating more frequently.  Discussed importance of bolusing for each meal and snack.    3.  Ideally, patient could correct elevated glucose readings using bolus wizard feature on pump but patient feels this is too much and will just continue to do preset bolus of 12units for meals and 5 units for snacks. Patient does state he can improve on entering glucose levels into pump. Cannot safely make changes to insulin pump settings if patient not entering glucose readings or bolusing for meals and snacks.    4.  Endocrine provider follow up in June 2020, patient aware.         Today's Self-Management Care Plan was developed with the patient's input and is based on barriers identified during today's assessment.    The long and short-term goals in the care plan were written with the patient/caregiver's input. The patient has agreed to work toward these goals to improve his overall diabetes control.      The patient received a copy of today's self-management plan and verbalized understanding of the care plan, goals, and all of today's instructions.      The patient was encouraged to communicate with his physician and care team regarding his condition(s) and treatment.  I provided the patient with my contact information today and  encouraged him to contact me via phone or patient portal as needed.     Education Units of Time   Time Spent: 30 min

## 2020-05-04 ENCOUNTER — TELEPHONE (OUTPATIENT)
Dept: ENDOCRINOLOGY | Facility: CLINIC | Age: 55
End: 2020-05-04

## 2020-05-04 DIAGNOSIS — Z79.4 TYPE 2 DIABETES MELLITUS WITH DIABETIC POLYNEUROPATHY, WITH LONG-TERM CURRENT USE OF INSULIN: ICD-10-CM

## 2020-05-04 DIAGNOSIS — E11.42 TYPE 2 DIABETES MELLITUS WITH DIABETIC POLYNEUROPATHY, WITH LONG-TERM CURRENT USE OF INSULIN: ICD-10-CM

## 2020-05-04 RX ORDER — LANCETS
EACH MISCELLANEOUS
Qty: 400 EACH | Refills: 4 | Status: ON HOLD | OUTPATIENT
Start: 2020-05-04 | End: 2023-01-21 | Stop reason: HOSPADM

## 2020-05-05 ENCOUNTER — PATIENT MESSAGE (OUTPATIENT)
Dept: ADMINISTRATIVE | Facility: HOSPITAL | Age: 55
End: 2020-05-05

## 2020-05-11 RX ORDER — METFORMIN HYDROCHLORIDE 1000 MG/1
TABLET ORAL
Qty: 180 TABLET | Refills: 4 | Status: SHIPPED | OUTPATIENT
Start: 2020-05-11 | End: 2021-05-26 | Stop reason: SDUPTHER

## 2020-05-18 RX ORDER — PIOGLITAZONEHYDROCHLORIDE 15 MG/1
TABLET ORAL
Qty: 90 TABLET | Refills: 0 | Status: SHIPPED | OUTPATIENT
Start: 2020-05-18 | End: 2020-06-23 | Stop reason: SDUPTHER

## 2020-06-12 ENCOUNTER — TELEPHONE (OUTPATIENT)
Dept: ENDOCRINOLOGY | Facility: CLINIC | Age: 55
End: 2020-06-12

## 2020-06-12 ENCOUNTER — PATIENT OUTREACH (OUTPATIENT)
Dept: ADMINISTRATIVE | Facility: OTHER | Age: 55
End: 2020-06-12

## 2020-06-12 ENCOUNTER — LAB VISIT (OUTPATIENT)
Dept: LAB | Facility: HOSPITAL | Age: 55
End: 2020-06-12
Attending: NURSE PRACTITIONER
Payer: MEDICARE

## 2020-06-12 ENCOUNTER — TELEPHONE (OUTPATIENT)
Dept: BARIATRICS | Facility: CLINIC | Age: 55
End: 2020-06-12

## 2020-06-12 DIAGNOSIS — Z79.4 TYPE 2 DIABETES MELLITUS WITH DIABETIC POLYNEUROPATHY, WITH LONG-TERM CURRENT USE OF INSULIN: ICD-10-CM

## 2020-06-12 DIAGNOSIS — E11.42 TYPE 2 DIABETES MELLITUS WITH DIABETIC POLYNEUROPATHY, WITH LONG-TERM CURRENT USE OF INSULIN: ICD-10-CM

## 2020-06-12 LAB
ALBUMIN SERPL BCP-MCNC: 3.8 G/DL (ref 3.5–5.2)
ALP SERPL-CCNC: 120 U/L (ref 55–135)
ALT SERPL W/O P-5'-P-CCNC: 27 U/L (ref 10–44)
ANION GAP SERPL CALC-SCNC: 7 MMOL/L (ref 8–16)
AST SERPL-CCNC: 24 U/L (ref 10–40)
BILIRUB SERPL-MCNC: 0.3 MG/DL (ref 0.1–1)
BUN SERPL-MCNC: 12 MG/DL (ref 6–20)
CALCIUM SERPL-MCNC: 9.5 MG/DL (ref 8.7–10.5)
CHLORIDE SERPL-SCNC: 101 MMOL/L (ref 95–110)
CO2 SERPL-SCNC: 30 MMOL/L (ref 23–29)
CREAT SERPL-MCNC: 1 MG/DL (ref 0.5–1.4)
EST. GFR  (AFRICAN AMERICAN): >60 ML/MIN/1.73 M^2
EST. GFR  (NON AFRICAN AMERICAN): >60 ML/MIN/1.73 M^2
GLUCOSE SERPL-MCNC: 164 MG/DL (ref 70–110)
POTASSIUM SERPL-SCNC: 4.5 MMOL/L (ref 3.5–5.1)
PROT SERPL-MCNC: 7.1 G/DL (ref 6–8.4)
SODIUM SERPL-SCNC: 138 MMOL/L (ref 136–145)

## 2020-06-12 PROCEDURE — 83036 HEMOGLOBIN GLYCOSYLATED A1C: CPT

## 2020-06-12 PROCEDURE — 80053 COMPREHEN METABOLIC PANEL: CPT

## 2020-06-12 PROCEDURE — 36415 COLL VENOUS BLD VENIPUNCTURE: CPT | Mod: PO

## 2020-06-12 NOTE — TELEPHONE ENCOUNTER
----- Message from Juani Chen sent at 6/12/2020 10:19 AM CDT -----  Good morning, MRN [id number] has questions about pump. Please contact patient [phone].    Thank you

## 2020-06-12 NOTE — TELEPHONE ENCOUNTER
Attempted to reach pt in regards to converting appt into a virtual visit with . No answer, LVM requesting a call back

## 2020-06-12 NOTE — TELEPHONE ENCOUNTER
Patient showed up at [facility] stating he had questions about Medtronic pump. I was with another patient and Mr. Forrest left and provided his phone number to registration desk and asked to be called to answer his insulin pump questions.      Left message on cell with DM education phone number to return call in hopes to answer insulin pump questions.

## 2020-06-13 LAB
ESTIMATED AVG GLUCOSE: 177 MG/DL (ref 68–131)
HBA1C MFR BLD HPLC: 7.8 % (ref 4–5.6)

## 2020-06-15 ENCOUNTER — OFFICE VISIT (OUTPATIENT)
Dept: BARIATRICS | Facility: CLINIC | Age: 55
End: 2020-06-15
Payer: MEDICARE

## 2020-06-15 ENCOUNTER — OFFICE VISIT (OUTPATIENT)
Dept: PRIMARY CARE CLINIC | Facility: CLINIC | Age: 55
End: 2020-06-15
Payer: MEDICARE

## 2020-06-15 VITALS
BODY MASS INDEX: 40.82 KG/M2 | DIASTOLIC BLOOD PRESSURE: 52 MMHG | WEIGHT: 308 LBS | HEART RATE: 71 BPM | SYSTOLIC BLOOD PRESSURE: 107 MMHG | HEIGHT: 73 IN | TEMPERATURE: 98 F

## 2020-06-15 DIAGNOSIS — E66.01 MORBID OBESITY WITH BMI OF 40.0-44.9, ADULT: ICD-10-CM

## 2020-06-15 DIAGNOSIS — E11.42 TYPE 2 DIABETES MELLITUS WITH DIABETIC POLYNEUROPATHY, WITH LONG-TERM CURRENT USE OF INSULIN: Primary | ICD-10-CM

## 2020-06-15 DIAGNOSIS — R53.83 FATIGUE, UNSPECIFIED TYPE: ICD-10-CM

## 2020-06-15 DIAGNOSIS — Z79.4 TYPE 2 DIABETES MELLITUS WITH DIABETIC POLYNEUROPATHY, WITH LONG-TERM CURRENT USE OF INSULIN: Primary | ICD-10-CM

## 2020-06-15 DIAGNOSIS — I47.10 SVT (SUPRAVENTRICULAR TACHYCARDIA): ICD-10-CM

## 2020-06-15 DIAGNOSIS — E11.42 TYPE 2 DIABETES MELLITUS WITH DIABETIC POLYNEUROPATHY, WITH LONG-TERM CURRENT USE OF INSULIN: ICD-10-CM

## 2020-06-15 DIAGNOSIS — G47.33 OSA ON CPAP: ICD-10-CM

## 2020-06-15 DIAGNOSIS — I42.8 OTHER CARDIOMYOPATHY: ICD-10-CM

## 2020-06-15 DIAGNOSIS — Z79.4 TYPE 2 DIABETES MELLITUS WITH DIABETIC POLYNEUROPATHY, WITH LONG-TERM CURRENT USE OF INSULIN: ICD-10-CM

## 2020-06-15 DIAGNOSIS — E66.01 CLASS 2 SEVERE OBESITY WITH BODY MASS INDEX (BMI) OF 35 TO 39.9 WITH SERIOUS COMORBIDITY: ICD-10-CM

## 2020-06-15 DIAGNOSIS — I10 ESSENTIAL HYPERTENSION: Primary | ICD-10-CM

## 2020-06-15 DIAGNOSIS — Z79.899 OTHER LONG TERM (CURRENT) DRUG THERAPY: ICD-10-CM

## 2020-06-15 PROCEDURE — 99442 PR PHYSICIAN TELEPHONE EVALUATION 11-20 MIN: ICD-10-PCS | Mod: 95,,, | Performed by: INTERNAL MEDICINE

## 2020-06-15 PROCEDURE — 3051F HG A1C>EQUAL 7.0%<8.0%: CPT | Mod: CPTII,S$GLB,, | Performed by: NURSE PRACTITIONER

## 2020-06-15 PROCEDURE — 3074F PR MOST RECENT SYSTOLIC BLOOD PRESSURE < 130 MM HG: ICD-10-PCS | Mod: CPTII,95,, | Performed by: INTERNAL MEDICINE

## 2020-06-15 PROCEDURE — 3078F DIAST BP <80 MM HG: CPT | Mod: CPTII,S$GLB,, | Performed by: NURSE PRACTITIONER

## 2020-06-15 PROCEDURE — 99214 PR OFFICE/OUTPT VISIT, EST, LEVL IV, 30-39 MIN: ICD-10-PCS | Mod: S$GLB,,, | Performed by: NURSE PRACTITIONER

## 2020-06-15 PROCEDURE — 3008F PR BODY MASS INDEX (BMI) DOCUMENTED: ICD-10-PCS | Mod: CPTII,S$GLB,, | Performed by: NURSE PRACTITIONER

## 2020-06-15 PROCEDURE — 3074F PR MOST RECENT SYSTOLIC BLOOD PRESSURE < 130 MM HG: ICD-10-PCS | Mod: CPTII,S$GLB,, | Performed by: NURSE PRACTITIONER

## 2020-06-15 PROCEDURE — 3078F PR MOST RECENT DIASTOLIC BLOOD PRESSURE < 80 MM HG: ICD-10-PCS | Mod: CPTII,S$GLB,, | Performed by: NURSE PRACTITIONER

## 2020-06-15 PROCEDURE — 3051F HG A1C>EQUAL 7.0%<8.0%: CPT | Mod: CPTII,95,, | Performed by: INTERNAL MEDICINE

## 2020-06-15 PROCEDURE — 3051F PR MOST RECENT HEMOGLOBIN A1C LEVEL 7.0 - < 8.0%: ICD-10-PCS | Mod: CPTII,S$GLB,, | Performed by: NURSE PRACTITIONER

## 2020-06-15 PROCEDURE — 99214 OFFICE O/P EST MOD 30 MIN: CPT | Mod: S$GLB,,, | Performed by: NURSE PRACTITIONER

## 2020-06-15 PROCEDURE — 3074F SYST BP LT 130 MM HG: CPT | Mod: CPTII,95,, | Performed by: INTERNAL MEDICINE

## 2020-06-15 PROCEDURE — 3078F PR MOST RECENT DIASTOLIC BLOOD PRESSURE < 80 MM HG: ICD-10-PCS | Mod: CPTII,95,, | Performed by: INTERNAL MEDICINE

## 2020-06-15 PROCEDURE — 3074F SYST BP LT 130 MM HG: CPT | Mod: CPTII,S$GLB,, | Performed by: NURSE PRACTITIONER

## 2020-06-15 PROCEDURE — 3078F DIAST BP <80 MM HG: CPT | Mod: CPTII,95,, | Performed by: INTERNAL MEDICINE

## 2020-06-15 PROCEDURE — 3051F PR MOST RECENT HEMOGLOBIN A1C LEVEL 7.0 - < 8.0%: ICD-10-PCS | Mod: CPTII,95,, | Performed by: INTERNAL MEDICINE

## 2020-06-15 PROCEDURE — 3008F BODY MASS INDEX DOCD: CPT | Mod: CPTII,S$GLB,, | Performed by: NURSE PRACTITIONER

## 2020-06-15 PROCEDURE — 99442 PR PHYSICIAN TELEPHONE EVALUATION 11-20 MIN: CPT | Mod: 95,,, | Performed by: INTERNAL MEDICINE

## 2020-06-15 RX ORDER — SEMAGLUTIDE 1.34 MG/ML
INJECTION, SOLUTION SUBCUTANEOUS
Qty: 3 ML | Refills: 1 | Status: SHIPPED | OUTPATIENT
Start: 2020-06-15 | End: 2020-06-23 | Stop reason: SDUPTHER

## 2020-06-15 RX ORDER — TOPIRAMATE 25 MG/1
25 TABLET ORAL 2 TIMES DAILY
Qty: 60 TABLET | Refills: 3 | Status: SHIPPED | OUTPATIENT
Start: 2020-06-15 | End: 2020-06-24 | Stop reason: CLARIF

## 2020-06-15 NOTE — PROGRESS NOTES
Established Patient - Audio Only Telehealth Visit     The patient location is: [facility], Exam room  The chief complaint leading to consultation is:   Chief Complaint   Patient presents with    Follow-up      Visit type: Virtual visit with audio only (telephone)  Total time spent with patient: 16 min       The reason for the audio only service rather than synchronous audio and video virtual visit was related to technical difficulties or patient preference/necessity.     Each patient to whom I provide medical services by telemedicine is:  (1) informed of the relationship between the physician and patient and the respective role of any other health care provider with respect to management of the patient; and (2) notified that they may decline to receive medical services by telemedicine and may withdraw from such care at any time. Patient verbally consented to receive this service via voice-only telephone call.       HPI:    Pt here today for follow-up. Has gained 9 lbs.since last OV. He did have 2 surgeries since I last sow himm, and has been less active.  Started LCHF diet and Ozempic. He states he is eating less. Sometimes snacks, but not eating seconds. Sometimes not hungry through the day time.  Doing a lot of yard work. He has signed up for the gym.   . His LEs have normalized. His A1c has improved significantly, though not at goal.         Lab Results   Component Value Date    ALT 27 06/12/2020    AST 24 06/12/2020    ALKPHOS 120 06/12/2020    BILITOT 0.3 06/12/2020      Lab Results   Component Value Date    HGBA1C 7.8 (H) 06/12/2020    HGBA1C 8.7 (H) 03/03/2020    HGBA1C 9.2 (H) 11/07/2019     Lab Results   Component Value Date    LDLCALC 45.4 (L) 11/07/2019    CREATININE 1.0 06/12/2020         Prev 298 current weight 307#  Assessment and plan:     Gio was seen today for follow-up.    Diagnoses and all orders for this visit:    Type 2 diabetes mellitus with diabetic polyneuropathy, with long-term  current use of insulin  -     semaglutide (OZEMPIC) 1 mg/dose (2 mg/1.5 mL) PnIj; Inject 1 mg subq weekly    Class 2 severe obesity with body mass index (BMI) of 35 to 39.9 with serious comorbidity    Other orders  -     topiramate (TOPAMAX) 25 MG tablet; Take 1 tablet (25 mg total) by mouth 2 (two) times daily.           Medical and surgical options discussed today. Seminar info given.       Continue 1mg Ozempic.    Decrease portions as soon as you start Ozempic. Some nausea in the first 2 weeks is not unusual.     If you get pain across the upper abdomen and around to your back, please call the office.       Patient was informed that topiramate is used for migraine prevention and seizures. Weight loss is a common side effect that is well documented. S/he understands this. S/he was informed of the potential side effects such as serious and possibly fatal rash in which case the medication should be discontinued immediately. Paresthesias, forgetfulness, fatigue, kidney stones, GI symptoms, and changes in lab values such as electrolytes, blood counts and kidney function.    Start topiramate  in the evening for 1 week, then morning and evening.     Activity as cleared by surgery.            This service was not originating from a related E/M service provided within the previous 7 days nor will  to an E/M service or procedure within the next 24 hours or my soonest available appointment.  Prevailing standard of care was able to be met in this audio-only visit.

## 2020-06-15 NOTE — PROGRESS NOTES
Subjective:       Patient ID: Gio Forrest is a 54 y.o. male.    Chief Complaint: Hypertension (follow up and medication refill)  Patient was last seen by me on 12/09/2019.  Last seen by PCP on 05/18/2018.  HPI   Here to follow up with HTN. States he feels lack of energy.  Vitals:    06/15/20 0950   BP: (!) 107/52   Pulse: 71   Temp: 98 °F (36.7 °C)     BP Readings from Last 3 Encounters:   06/15/20 (!) 107/52   03/21/20 137/61   03/16/20 (!) 104/56     Review of Systems   Constitutional: Positive for fatigue.   Neurological: Negative for dizziness and facial asymmetry.       States has had neck and back surgery since his last visit with me.  States he is taking an injection for erectile dysfunction  Lab Results   Component Value Date    HGBA1C 7.8 (H) 06/12/2020     CMP  Sodium   Date Value Ref Range Status   06/12/2020 138 136 - 145 mmol/L Final     Potassium   Date Value Ref Range Status   06/12/2020 4.5 3.5 - 5.1 mmol/L Final     Chloride   Date Value Ref Range Status   06/12/2020 101 95 - 110 mmol/L Final     CO2   Date Value Ref Range Status   06/12/2020 30 (H) 23 - 29 mmol/L Final     Glucose   Date Value Ref Range Status   06/12/2020 164 (H) 70 - 110 mg/dL Final     BUN, Bld   Date Value Ref Range Status   06/12/2020 12 6 - 20 mg/dL Final     Creatinine   Date Value Ref Range Status   06/12/2020 1.0 0.5 - 1.4 mg/dL Final     Calcium   Date Value Ref Range Status   06/12/2020 9.5 8.7 - 10.5 mg/dL Final     Total Protein   Date Value Ref Range Status   06/12/2020 7.1 6.0 - 8.4 g/dL Final     Albumin   Date Value Ref Range Status   06/12/2020 3.8 3.5 - 5.2 g/dL Final     Total Bilirubin   Date Value Ref Range Status   06/12/2020 0.3 0.1 - 1.0 mg/dL Final     Comment:     For infants and newborns, interpretation of results should be based  on gestational age, weight and in agreement with clinical  observations.  Premature Infant recommended reference ranges:  Up to 24 hours.............<8.0 mg/dL  Up to 48  hours............<12.0 mg/dL  3-5 days..................<15.0 mg/dL  6-29 days.................<15.0 mg/dL       Alkaline Phosphatase   Date Value Ref Range Status   06/12/2020 120 55 - 135 U/L Final     AST ([place])   Date Value Ref Range Status   02/15/2016 66 (H) 17 - 59 U/L Final     AST   Date Value Ref Range Status   06/12/2020 24 10 - 40 U/L Final     ALT   Date Value Ref Range Status   06/12/2020 27 10 - 44 U/L Final     Anion Gap   Date Value Ref Range Status   06/12/2020 7 (L) 8 - 16 mmol/L Final     eGFR if    Date Value Ref Range Status   06/12/2020 >60.0 >60 mL/min/1.73 m^2 Final     eGFR if non    Date Value Ref Range Status   06/12/2020 >60.0 >60 mL/min/1.73 m^2 Final     Comment:     Calculation used to obtain the estimated glomerular filtration  rate (eGFR) is the CKD-EPI equation.        Objective:      Physical Exam  Vitals signs and nursing note reviewed.   Constitutional:       General: He is awake.      Appearance: Normal appearance. He is obese.   HENT:      Head: Normocephalic and atraumatic.      Right Ear: Hearing, tympanic membrane, ear canal and external ear normal.      Left Ear: Hearing, tympanic membrane, ear canal and external ear normal.      Nose: Nose normal.   Eyes:      General: Lids are normal.         Right eye: No foreign body or discharge.         Left eye: No foreign body or discharge.   Neck:      Musculoskeletal: Normal range of motion. No neck rigidity.   Cardiovascular:      Rate and Rhythm: Normal rate and regular rhythm.   Pulmonary:      Effort: Pulmonary effort is normal.      Breath sounds: Normal breath sounds. No decreased breath sounds.   Abdominal:      General: Abdomen is protuberant. Bowel sounds are normal.      Palpations: Abdomen is soft.      Tenderness: There is no abdominal tenderness.   Musculoskeletal: Normal range of motion.   Lymphadenopathy:      Cervical:      Right cervical: No superficial, deep or posterior  cervical adenopathy.     Left cervical: No superficial, deep or posterior cervical adenopathy.   Skin:     General: Skin is warm and dry.   Neurological:      General: No focal deficit present.      Mental Status: He is alert and oriented to person, place, and time.      Coordination: Coordination is intact.      Gait: Gait is intact.   Psychiatric:         Attention and Perception: Attention and perception normal.         Mood and Affect: Mood and affect normal.         Speech: Speech normal.         Behavior: Behavior is cooperative.         Thought Content: Thought content normal.         Cognition and Memory: Cognition and memory normal.         Judgment: Judgment normal.         Assessment & Plan:       Essential hypertension- Stable and slightly low, Lotensin and Metoprolol  BP Readings from Last 3 Encounters:   06/15/20 (!) 107/52   03/21/20 137/61   03/16/20 (!) 104/56       Type 2 diabetes mellitus with diabetic polyneuropathy, with long-term current use of insulin- Controlled  Lab Results   Component Value Date    HGBA1C 7.8 (H) 06/12/2020       Other cardiomyopathy-Managed by cardiology    Fatigue, unspecified type  -     Vitamin B12; Future; Expected date: 06/15/2020  -     Vitamin D; Future; Expected date: 06/15/2020    SVT (supraventricular tachycardia)    Morbid obesity with BMI of 40.0-44.9, adult  -     Vitamin B12; Future; Expected date: 06/15/2020  -     Vitamin D; Future; Expected date: 06/15/2020    Other long term (current) drug therapy   -     Vitamin B12; Future; Expected date: 06/15/2020    TARYN on CPAP            Medication List with Changes/Refills   New Medications    TOPIRAMATE (TOPAMAX) 25 MG TABLET    Take 1 tablet (25 mg total) by mouth 2 (two) times daily.   Current Medications    ALLOPURINOL (ZYLOPRIM) 100 MG TABLET    TAKE ONE TABLET BY MOUTH EVERY DAY    ASPIRIN (ECOTRIN) 81 MG EC TABLET    Take 81 mg by mouth once daily.    ATORVASTATIN (LIPITOR) 40 MG TABLET    TAKE ONE TABLET BY  "MOUTH EVERY EVENING    BENAZEPRIL (LOTENSIN) 5 MG TABLET    Take 0.5 tablets (2.5 mg total) by mouth once daily.    BLOOD SUGAR DIAGNOSTIC (CONTOUR NEXT TEST STRIPS) STRP    Tests 4 times a day- Contour Next test strips required for Medtronic insulin pump    BLOOD-GLUCOSE METER KIT    To check BG 2 times daily, to use with insurance preferred meter    CITALOPRAM (CELEXA) 40 MG TABLET    TAKE ONE TABLET BY MOUTH DAILY    CLONAZEPAM (KLONOPIN) 0.5 MG TABLET    Take 0.5 mg by mouth once daily.     DESLORATADINE (CLARINEX) 5 MG TABLET    TAKE ONE TABLET BY MOUTH DAILY take for drip in back of throat    GABAPENTIN (NEURONTIN) 300 MG CAPSULE    Take 300 mg by mouth every morning.     GABAPENTIN (NEURONTIN) 300 MG CAPSULE    Take 900 mg by mouth every evening.     INFUSION SET FOR INSULIN PUMP (MINIMED PRO-SET INFUSION 24") ISET    Changes site q2days, dispense 90day supply    INSULIN LISPRO 100 UNIT/ML INJECTION    Inject 150 Units into the skin.     INSULIN PUMP SYRINGE (MINIMED SYRINGE RESERVOIR) 3 ML MISC    Changes q2days, dispense 3 month supply    LANCETS MISC    To be used with Contour Next as necessary with Medtronic insulin pump, uses 4 daily    MELOXICAM (MOBIC) 15 MG TABLET        METFORMIN (GLUCOPHAGE) 1000 MG TABLET    TAKE ONE TABLET BY MOUTH TWICE DAILY    METOPROLOL TARTRATE (LOPRESSOR) 25 MG TABLET    Take 37.5 mg by mouth 2 (two) times daily.    MOMETASONE 0.1% (ELOCON) 0.1 % CREAM    Apply topically daily as needed.    MULTIVIT,THER IRON,CA,FA & MIN (MULTIVITAMIN) TAB    Take 1 tablet by mouth once daily.     PANTOPRAZOLE (PROTONIX) 40 MG TABLET    TAKE ONE TABLET BY MOUTH EVERY DAY    PEN NEEDLE, DIABETIC (BD ULTRA-FINE MERLE PEN NEEDLE) 32 GAUGE X 5/32" NDLE    Uses 2 daily    PIOGLITAZONE (ACTOS) 15 MG TABLET    TAKE ONE TABLET BY MOUTH DAILY    PRAMIPEXOLE (MIRAPEX) 1 MG TABLET    TAKE TWO TABLETS BY MOUTH nightly   Changed and/or Refilled Medications    Modified Medication Previous Medication    " SEMAGLUTIDE (OZEMPIC) 1 MG/DOSE (2 MG/1.5 ML) PNIJ semaglutide (OZEMPIC) 1 mg/dose (2 mg/1.5 mL) PnIj       Inject 1 mg subq weekly    Inject 1 mg subq weekly   Discontinued Medications    METHYLPREDNISOLONE (MEDROL DOSEPACK) 4 MG TABLET    As directed.     Follow up in about 4 months (around 10/15/2020), or if symptoms worsen or fail to improve.

## 2020-06-15 NOTE — PATIENT INSTRUCTIONS
Continue 1mg Ozempic.    Decrease portions as soon as you start Ozempic. Some nausea in the first 2 weeks is not unusual.     If you get pain across the upper abdomen and around to your back, please call the office.       Patient was informed that topiramate is used for migraine prevention and seizures. Weight loss is a common side effect that is well documented. S/he understands this. S/he was informed of the potential side effects such as serious and possibly fatal rash in which case the medication should be discontinued immediately. Paresthesias, forgetfulness, fatigue, kidney stones, GI symptoms, and changes in lab values such as electrolytes, blood counts and kidney function.    Start topiramate  in the evening for 1 week, then morning and evening.     Activity as cleared by surgery.     [facility] Survival Guide  Tips to Stay on Track During COVID-19      DO DON'T   Keep up with your food log  Maintaining your caloric intake and diet quality is critical for achieving your health and weight loss goals.  Download the Marilou Breezie and use [facility] Code 39950 to track food and fluid intake Feel Discouraged - You can do this!  There are a lot of changes happening in our world but don't let them discourage you. Focus on the future and remind yourself of all the work and effort you've put in so far.     Keep protein-rich foods stocked up  buy chicken and turkey in bulk and freeze them, keep dry or canned beans on hand, get the largest quantity of eggs available. Make sure you are getting your required protein intake (between  grams EACH DAY).   Drink fluid during meals or 30 minutes before/after eating  Your regular routine may have changed which may have caused some of your meal or snack times to change.  keep track of when you consume any liquid and time your meals accordingly. This will also help you make sure you are staying hydrated throughout the day   Continue with your  protein drinks or bars  Order online or use grocery delivery service. Always make sure you order more WELL BEFORE you are running low, especially now when deliveries may take longer to arrive.  Remember to check to make sure your protein shakes or bars have 4 gms of sugar or less.     Keep table sugar around  You may be more tempted to add it to your drinks or food if it is visible and easily accessible.   Try new recipes  Use this time to experiment in the kitchen and find some different healthy dishes you enjoy - you can use the nutrition booklet to help guide you.  If you cannot find your copy, please download it from our website @ [email or web address]  Click here to download [facility]'s Nutrition Binder.   Add tough or crunchy foods back into your diet too quickly  Raw veggies are great snack foods but adding them in too quickly after surgery will cause pain and discomfort   Eat your meals slowly and intentionally  You shouldn't have to rush out to be anywhere so really take your time and aim for each meal to last around 20-30 minutes.   Drink sugary/bubbly drinks or alcohol    It may be tempting especially if you are surrounded by others without these limitations, but these beverages will prevent weight loss and cause gastric  pain/discomfort     Listen to your body - try to recognize when you feel full.  Learning your body's signals can be difficult but it is a key step in your weight loss journey. While you are is this process of working on this step, be sure portion out the recommended quantities of foods and meals/snacks to prevent overeating.   Eat your meals using electronics  This is especially difficult at home where your use of computers, phones, and TVs are pretty much unregulated. Designate a meal spot or spots where there is limited distractions and you can focus on your food - maybe your kitchen table or on an outside porch or deck.    Continue taking vitamins and minerals  Take them at the same time each day and keep a log of when you take your supplements to make sure you don't miss any. These supplements are essential for preventing malnutrition and other health problems that will deter your progress.   'Save' your appetite   You may feel like holding out from food as long as possible so you can eat a large meal later on, but your body needs energy throughout the day in order to properly fuel itself and keep you alert.  Try eating small meals throughout the day - use these meals as mini breaks from work or projects you are doing at home.   Stay active!  It is so important for both your physical and mental health that you get regular physical activity. Even if you can no longer physically go to the gym or to workout classes there are tons of online resources to keep you moving. Incorporate a specific exercise time into your daily home routine and keep a journal of your activity.   Order food delivery or take out   Most places are now offering delivery services, but it can still be difficult to find and choose healthy options. Choose to do a grocery store  or delivery instead- cooking food at home is less expensive then eating out!   Review the resources you've been provided and keep in touch with your healthcare team.  Let them know if you are struggling or experiencing any problems - they are here to help!  Call us to schedule a telehealth visit at [phone] Isolate yourself   It may be called 'social distancing' but that does not mean we can't still connect with one another. Phone calls or group video chats are great ways to keep in touch while staying at home. Any method of getting regular social time with friends and family will help to remind you that you're not in this alone.     Grocery List    Items to Keep Stocked in Your Kitchen  PROTEINS (Lean)    Eggs  Beans (canned and/or dried)  Skinless chicken/turkey   Tuna/[place]  (canned and/or pouch)  Tofu or Tempeh  Meagan Veggie Burgers  Fish or shrimp (fresh or frozen)  Ground Beef (90% lean)  Steaks  Chobani Greek Yogurt  Cabot Cottage Cheese  Hummus  Low-fat cheeses (Laughing Cow, Baby Bell, mozzarella string cheese)  Fairlife Non-fat Milk    Vegetables (non-starchy)  *veggies can be fresh or frozen    Broccoli   Cauliflower   Carrots   Onions   Cabbage   Radishes   Zucchini   Okra   Greens   Peppers   Spinach   Turnips   Mushrooms   Tomatoes   Celery   Lettuce   Asparagus  Eggplant   Green Onions   Kale    Fruits  *Fruits can be fresh or frozen    Apples  Oranges  Pears  Kiwi  Melon  Berries  Peaches  Unsweetened Applesauce    *Avoid fruits canned In syrup  *Stick to 1-2 servings of fruit/day   Vitamins    Flintstones Complete  Chewables    Super B-Complex tablets with 50 mg Thiamine    Nature's Way liquid Calcium Citrate + Vit D     Sublingual Vitamin B12   Other    Sugar-free Popsicles    Sugar-free Jello    Crystal Lite    Low Fat Condiments     Decaf Coffee/Tea           Foods to Avoid Having Around the House  Butter/Margarine Cookies Candy Chips  Pretzels Grits  Granola Popcorn Melendez Corn  Bread Alcohol Soda  Pasta  Rice  Cake/Pie Sausage Potatoes Ice Cream                 Tricks to Prevent Emotional Eating During Quarantine      Keep 'trigger foods' out of the house   Keep yourself distracted with work, games, music, or whatever hobby you enjoy. This may be the time to try a new activity!   Try fighting stress with breathing techniques, yoga, meditation, or prayer   Mix up your meals with a variety of dishes   Keep up with your food diary   Call or video chat with a friend or family   Plan your meals for specific time and try for smaller meals throughout the day   Pre-portion all meals and snacks    Step outside for some fresh air or do a quick exercise activity to reset yourself    *Avoid negative thoughts about yourself - if you have a slip-up, you are not a  failure. Forgive yourself and focus on learning from it so you can prevent it for the future              Ways to Stay Active While Staying Inside      Youtube Videos - free exercise and gym classes at any fitness level   Apps -tons of fitness apps are currently offering free trial periods and offer workouts that don't require equipment   Chores around the house, such as cleaning or gardening   Walk up and down the stairs   Video-chat with your regular workout roseanne and do an online class together   Make a playlist of your favorite fun songs and dance around - no need to worry about knowing any serious dance moves, just jump around get your heart rate up!    While you are limited to working out at home, you may find it easier to do short, mini workouts multiple times a day instead of all at once. Try to still get at least 30 minutes of physical activity in each day!   Physical Health = Mental Health    The health of both your mind and body are equally important -be sure you are taking the time to care for both. It is likely that the current health concerns and quarantine mandates caused significant changes to your normal routine. Although this can feel overwhelming and seem difficult to manage, there are ways you can take to manage these feelings and keep your mental and physical health journey on track. Here are some tips for self-care during quarantine:     Meditate, take deep breaths - find any practice that will help you center yourself.   Move around your house throughout the day. Avoid staying in the same seat or room for too long and try to work in an area of your house that get lots of sunlight if possible.   Get fresh air for a bit every day - being outside is a great way to improve your mood! You don't necessarily have to go far from your house. You could even just hang out on your porch for a while or take a walk around the block.    Get good sleep and maintain a regular sleep  schedule   Connect with others. While we aren't able to physically be with others right now it is still so important to socialize and interact with other people, even if it is being done remotely.    Keep yourself busy. Make a list of tasks that you want to complete around the house, start a new book, do some art projects, try journaling.     If you are interested in bariatric surgery we will need you to either attend an in-person seminar or online seminar. If you choose to attend an in-person seminar this is give once a month and you may call [phone] to arrange your appointment. If you choose to view the online seminar please go to: [email or web address] . The seminar is best viewed on a desktop or laptop computer. Upon completion of the seminar on the last slide you will see the code word comprised of letters and numbers in red and you should jot them down as youll need them to enter into the required form. On that same page youll see the link which will take you to the form. Please enter all the information required, including the code word. You will receive an email confirmation and so will we. Upon receiving this letter you will be called so that your appointments can be arranged. There is also two links for you to print out two packets of information. One is the patient information packet and the other is the nutrition worksheet. Please complete them and bring to your next appointment in our department.

## 2020-06-15 NOTE — PATIENT INSTRUCTIONS
Please start taking your blood pressure at home to assure that it is not getting too low      Exercise for a Healthier Heart  You may wonder how you can improve the health of your heart. If youre thinking about exercise, youre on the right track. You dont need to become an athlete, but you do need a certain amount of brisk exercise to help strengthen your heart. If you have been diagnosed with a heart condition, your doctor may recommend exercise to help stabilize your condition. To help make exercise a habit, choose safe, fun activities.     Exercise with a friend. When activity is fun, you're more likely to stick with it.     Be sure to check with your healthcare provider before starting an exercise program.   Why exercise?  Exercising regularly offers many healthy rewards. It can help you do all of the following:  · Improve your blood cholesterol level to help prevent further heart trouble  · Lower your blood pressure to help prevent a stroke or heart attack  · Control diabetes, or reduce your risk of getting this disease  · Improve your heart and lung function  · Reach and maintain a healthy weight  · Make your muscles stronger and more limber so you can stay active  · Prevent falls and fractures by slowing the loss of bone mass (osteoporosis)  · Manage stress better  · Reduce your blood pressure  · Improve your sense of self and your body image  Exercise tips  Ease into your routine. Set small goals. Then build on them.  Exercise on most days. Aim for a total of 150 or more minutes of moderate to  vigorous intensity activity each week. Consider 40 minutes, 3 to 4 times a week. For best results, activity should last for 40 minutes on average. It is OK to work up to the 40 minute period over time. Examples of moderate-intensity activity is walking 1 mile in 15 minutes or 30 to 45 minutes of yard work.  Step up your daily activity level. Along with your exercise program, try being more active throughout the  day. Walk instead of drive. Do more household tasks or yard work.  Choose one or more activities you enjoy. Walking is one of the easiest things you can do. You can also try swimming, riding a bike, dancing, or taking an exercise class.  Stop exercising and call your doctor if you:  · Have chest pain or feel dizzy or lightheaded  · Feel burning, tightness, pressure, or heaviness in your chest, neck, shoulders, back, or arms  · Have unusual shortness of breath  · Have increased joint or muscle pain  · Have palpitations or an irregular heartbeat   Date Last Reviewed: 5/1/2016 © 2000-2017 [employer]. [address] [place] [address], [place] [address]. All rights reserved. This information is not intended as a substitute for professional medical care. Always follow your healthcare professional's instructions.        Eating Heart-Healthy Foods  Eating has a big impact on your heart health. In fact, eating healthier can improve several of your heart risks at once. For instance, it helps you manage weight, cholesterol, and blood pressure. Here are ideas to help you make heart-healthy changes without giving up all the foods and flavors you love.  Getting started  · Talk with your health care provider about eating plans, such as the DASH or Mediterranean diet. You may also be referred to a dietitian.  · Change a few things at a time. Give yourself time to get used to a few eating changes before adding more.  · Work to create a tasty, healthy eating plan that you can stick to for the rest of your life.    Goals for healthy eating  Below are some tips to improve your eating habits:  · Limit saturated fats and trans fats. Saturated fats raise your levels of cholesterol, so keep these fats to a minimum. They are found in foods such as fatty meats, whole milk, cheese, and palm and coconut oils. Avoid trans fats because they lower good cholesterol as well as raise bad cholesterol. Trans fats are most often found in  processed foods.  · Reduce sodium (salt) intake. Eating too much salt may increase your blood pressure. Limit your sodium intake to 2,300 milligrams (mg) per day, or less if your health care provider recommends it. Dining out less often and eating fewer processed foods are two great ways to decrease the amount of salt you consume.  · Managing calories. A calorie is a unit of energy. Your body burns calories for fuel, but if you eat more calories than your body burns, the extras are stored as fat. Your health care provider can help you create a diet plan to manage your calories. This will likely include eating healthier foods as well as exercising regularly. To help you track your progress, keep a diary to record what you eat and how often you exercise.  Choose the right foods  Aim to make these foods staples of your diet. If you have diabetes, you may have different recommendations than what is listed here:  · Fruits and vegetable provide plenty of nutrients without a lot of calories. At meals, fill half your plate with these foods. Split the other half of your plate between whole grains and lean protein.  · Whole grains are high in fiber and rich in vitamins and nutrients. Good choices include whole-wheat bread, pasta, and brown rice.  · Lean proteins give you nutrition with less fat. Good choices include fish, skinless chicken, and beans.  · Low-fat or nonfat dairy provides nutrients without a lot of fat. Try low-fat or nonfat milk, cheese, or yogurt.  · Healthy fats can be good for you in small amounts. These are unsaturated fats, such as olive oil, nuts, and fish. Try to have at least 2 servings per week of fatty fish such as salmon, sardines, mackerel, rainbow trout, and albacore tuna. These contain omega-3 fatty acids, which are good for your heart. Flaxseed is another source of a heart-healthy fat.  More on heart healthy eating    Read food labels  Healthy eating starts at the grocery store. Be sure to pay  attention to food labels on packaged foods. Look for products that are high in fiber and protein, and low in saturated fat, cholesterol, and sodium. Avoid products that contain trans fat. And pay close attention to serving size. For instance, if you plan to eat two servings, double all the numbers on the label.  Prepare food right  A key part of healthy cooking is cutting down on added fat and salt. Look on the internet for lower-fat, lower-sodium recipes. Also, try these tips:  · Remove fat from meat and skin from poultry before cooking.  · Skim fat from the surface of soups and sauces.  · Broil, boil, bake, steam, grill, and microwave food without added fats.  · Choose ingredients that spice up your food without adding calories, fat, or sodium. Try these items: horseradish, hot sauce, lemon, mustard, nonfat salad dressings, and vinegar. For salt-free herbs and spices, try basil, cilantro, cinnamon, pepper, and rosemary.  Date Last Reviewed: 6/25/2015 © 2000-2017 [employer]. [address] [place] [address]. All rights reserved. This information is not intended as a substitute for professional medical care. Always follow your healthcare professional's instructions.

## 2020-06-18 DIAGNOSIS — Z79.4 TYPE 2 DIABETES MELLITUS WITH DIABETIC POLYNEUROPATHY, WITH LONG-TERM CURRENT USE OF INSULIN: ICD-10-CM

## 2020-06-18 DIAGNOSIS — E11.42 TYPE 2 DIABETES MELLITUS WITH DIABETIC POLYNEUROPATHY, WITH LONG-TERM CURRENT USE OF INSULIN: ICD-10-CM

## 2020-06-18 PROBLEM — I95.89 HYPOTENSION DUE TO HYPOVOLEMIA: Status: ACTIVE | Noted: 2020-06-18

## 2020-06-18 PROBLEM — E86.1 HYPOTENSION DUE TO HYPOVOLEMIA: Status: ACTIVE | Noted: 2020-06-18

## 2020-06-18 RX ORDER — SEMAGLUTIDE 1.34 MG/ML
INJECTION, SOLUTION SUBCUTANEOUS
Qty: 3 ML | Refills: 1 | Status: CANCELLED | OUTPATIENT
Start: 2020-06-18

## 2020-06-18 NOTE — TELEPHONE ENCOUNTER
Attempted to reach pt in regards to scheduling a f/u with  around 9/15/20. No answer, LVM requesting a call back.

## 2020-06-19 PROBLEM — R55 NEAR SYNCOPE: Status: ACTIVE | Noted: 2020-06-19

## 2020-06-23 ENCOUNTER — OFFICE VISIT (OUTPATIENT)
Dept: ENDOCRINOLOGY | Facility: CLINIC | Age: 55
End: 2020-06-23
Payer: MEDICARE

## 2020-06-23 ENCOUNTER — LAB VISIT (OUTPATIENT)
Dept: LAB | Facility: HOSPITAL | Age: 55
End: 2020-06-23
Attending: NURSE PRACTITIONER
Payer: MEDICARE

## 2020-06-23 ENCOUNTER — OFFICE VISIT (OUTPATIENT)
Dept: FAMILY MEDICINE | Facility: CLINIC | Age: 55
End: 2020-06-23
Payer: MEDICARE

## 2020-06-23 ENCOUNTER — OFFICE VISIT (OUTPATIENT)
Dept: PODIATRY | Facility: CLINIC | Age: 55
End: 2020-06-23
Payer: MEDICARE

## 2020-06-23 ENCOUNTER — PATIENT OUTREACH (OUTPATIENT)
Dept: ADMINISTRATIVE | Facility: OTHER | Age: 55
End: 2020-06-23

## 2020-06-23 ENCOUNTER — TELEPHONE (OUTPATIENT)
Dept: FAMILY MEDICINE | Facility: CLINIC | Age: 55
End: 2020-06-23

## 2020-06-23 VITALS
HEART RATE: 62 BPM | TEMPERATURE: 98 F | OXYGEN SATURATION: 98 % | SYSTOLIC BLOOD PRESSURE: 118 MMHG | DIASTOLIC BLOOD PRESSURE: 60 MMHG | BODY MASS INDEX: 39.15 KG/M2 | WEIGHT: 304.88 LBS

## 2020-06-23 VITALS — BODY MASS INDEX: 40.06 KG/M2 | HEIGHT: 73 IN | WEIGHT: 302.25 LBS

## 2020-06-23 VITALS
WEIGHT: 304.25 LBS | HEART RATE: 90 BPM | BODY MASS INDEX: 39.05 KG/M2 | OXYGEN SATURATION: 98 % | HEIGHT: 74 IN | DIASTOLIC BLOOD PRESSURE: 62 MMHG | SYSTOLIC BLOOD PRESSURE: 110 MMHG

## 2020-06-23 DIAGNOSIS — Z79.4 TYPE 2 DIABETES MELLITUS WITH DIABETIC POLYNEUROPATHY, WITH LONG-TERM CURRENT USE OF INSULIN: Primary | ICD-10-CM

## 2020-06-23 DIAGNOSIS — K76.0 FATTY LIVER: ICD-10-CM

## 2020-06-23 DIAGNOSIS — Z79.899 OTHER LONG TERM (CURRENT) DRUG THERAPY: ICD-10-CM

## 2020-06-23 DIAGNOSIS — N52.9 ERECTILE DYSFUNCTION, UNSPECIFIED ERECTILE DYSFUNCTION TYPE: ICD-10-CM

## 2020-06-23 DIAGNOSIS — I25.10 ATHEROSCLEROSIS OF NATIVE CORONARY ARTERY OF NATIVE HEART WITHOUT ANGINA PECTORIS: ICD-10-CM

## 2020-06-23 DIAGNOSIS — K21.9 GASTROESOPHAGEAL REFLUX DISEASE WITHOUT ESOPHAGITIS: ICD-10-CM

## 2020-06-23 DIAGNOSIS — G47.33 OSA ON CPAP: ICD-10-CM

## 2020-06-23 DIAGNOSIS — E66.01 CLASS 2 SEVERE OBESITY DUE TO EXCESS CALORIES WITH SERIOUS COMORBIDITY AND BODY MASS INDEX (BMI) OF 39.0 TO 39.9 IN ADULT: ICD-10-CM

## 2020-06-23 DIAGNOSIS — E11.42 TYPE 2 DIABETES MELLITUS WITH DIABETIC POLYNEUROPATHY, WITH LONG-TERM CURRENT USE OF INSULIN: Primary | ICD-10-CM

## 2020-06-23 DIAGNOSIS — R53.83 FATIGUE, UNSPECIFIED TYPE: ICD-10-CM

## 2020-06-23 DIAGNOSIS — E78.5 DYSLIPIDEMIA: ICD-10-CM

## 2020-06-23 DIAGNOSIS — I42.8 OTHER CARDIOMYOPATHY: ICD-10-CM

## 2020-06-23 DIAGNOSIS — I10 ESSENTIAL HYPERTENSION: ICD-10-CM

## 2020-06-23 DIAGNOSIS — M77.42 METATARSALGIA OF LEFT FOOT: ICD-10-CM

## 2020-06-23 DIAGNOSIS — E55.9 VITAMIN D INSUFFICIENCY: ICD-10-CM

## 2020-06-23 DIAGNOSIS — K59.00 CONSTIPATION, UNSPECIFIED CONSTIPATION TYPE: ICD-10-CM

## 2020-06-23 DIAGNOSIS — E66.01 SEVERE OBESITY (BMI 35.0-39.9) WITH COMORBIDITY: ICD-10-CM

## 2020-06-23 DIAGNOSIS — E66.01 MORBID OBESITY: ICD-10-CM

## 2020-06-23 DIAGNOSIS — B35.1 ONYCHOMYCOSIS DUE TO DERMATOPHYTE: ICD-10-CM

## 2020-06-23 DIAGNOSIS — E11.42 DIABETIC POLYNEUROPATHY ASSOCIATED WITH TYPE 2 DIABETES MELLITUS: Primary | ICD-10-CM

## 2020-06-23 DIAGNOSIS — Z09 HOSPITAL DISCHARGE FOLLOW-UP: Primary | ICD-10-CM

## 2020-06-23 PROBLEM — E66.812 CLASS 2 SEVERE OBESITY DUE TO EXCESS CALORIES WITH SERIOUS COMORBIDITY AND BODY MASS INDEX (BMI) OF 39.0 TO 39.9 IN ADULT: Status: ACTIVE | Noted: 2020-06-23

## 2020-06-23 LAB — 25(OH)D3+25(OH)D2 SERPL-MCNC: 74 NG/ML (ref 30–96)

## 2020-06-23 PROCEDURE — 99214 OFFICE O/P EST MOD 30 MIN: CPT | Mod: S$GLB,,, | Performed by: NURSE PRACTITIONER

## 2020-06-23 PROCEDURE — 99214 PR OFFICE/OUTPT VISIT, EST, LEVL IV, 30-39 MIN: ICD-10-PCS | Mod: S$GLB,,, | Performed by: NURSE PRACTITIONER

## 2020-06-23 PROCEDURE — 99499 UNLISTED E&M SERVICE: CPT | Mod: S$PBB,,, | Performed by: NURSE PRACTITIONER

## 2020-06-23 PROCEDURE — 3008F PR BODY MASS INDEX (BMI) DOCUMENTED: ICD-10-PCS | Mod: CPTII,S$GLB,, | Performed by: NURSE PRACTITIONER

## 2020-06-23 PROCEDURE — 3074F PR MOST RECENT SYSTOLIC BLOOD PRESSURE < 130 MM HG: ICD-10-PCS | Mod: CPTII,S$GLB,, | Performed by: NURSE PRACTITIONER

## 2020-06-23 PROCEDURE — 99999 PR PBB SHADOW E&M-EST. PATIENT-LVL III: ICD-10-PCS | Mod: PBBFAC,,, | Performed by: NURSE PRACTITIONER

## 2020-06-23 PROCEDURE — 3051F PR MOST RECENT HEMOGLOBIN A1C LEVEL 7.0 - < 8.0%: ICD-10-PCS | Mod: CPTII,S$GLB,, | Performed by: NURSE PRACTITIONER

## 2020-06-23 PROCEDURE — 99999 PR PBB SHADOW E&M-EST. PATIENT-LVL V: CPT | Mod: PBBFAC,,, | Performed by: NURSE PRACTITIONER

## 2020-06-23 PROCEDURE — 3078F PR MOST RECENT DIASTOLIC BLOOD PRESSURE < 80 MM HG: ICD-10-PCS | Mod: CPTII,S$GLB,, | Performed by: NURSE PRACTITIONER

## 2020-06-23 PROCEDURE — 84403 ASSAY OF TOTAL TESTOSTERONE: CPT

## 2020-06-23 PROCEDURE — 82306 VITAMIN D 25 HYDROXY: CPT

## 2020-06-23 PROCEDURE — 99999 PR PBB SHADOW E&M-EST. PATIENT-LVL V: ICD-10-PCS | Mod: PBBFAC,,, | Performed by: NURSE PRACTITIONER

## 2020-06-23 PROCEDURE — 99999 PR PBB SHADOW E&M-EST. PATIENT-LVL III: ICD-10-PCS | Mod: PBBFAC,,, | Performed by: PODIATRIST

## 2020-06-23 PROCEDURE — 3078F DIAST BP <80 MM HG: CPT | Mod: CPTII,S$GLB,, | Performed by: NURSE PRACTITIONER

## 2020-06-23 PROCEDURE — 99499 RISK ADDL DX/OHS AUDIT: ICD-10-PCS | Mod: S$PBB,,, | Performed by: NURSE PRACTITIONER

## 2020-06-23 PROCEDURE — 99999 PR PBB SHADOW E&M-EST. PATIENT-LVL III: CPT | Mod: PBBFAC,,, | Performed by: NURSE PRACTITIONER

## 2020-06-23 PROCEDURE — 99999 PR PBB SHADOW E&M-EST. PATIENT-LVL III: CPT | Mod: PBBFAC,,, | Performed by: PODIATRIST

## 2020-06-23 PROCEDURE — 3074F SYST BP LT 130 MM HG: CPT | Mod: CPTII,S$GLB,, | Performed by: NURSE PRACTITIONER

## 2020-06-23 PROCEDURE — 99499 UNLISTED E&M SERVICE: CPT | Mod: S$PBB,,, | Performed by: PODIATRIST

## 2020-06-23 PROCEDURE — 11721 DEBRIDE NAIL 6 OR MORE: CPT | Mod: Q9,S$GLB,, | Performed by: PODIATRIST

## 2020-06-23 PROCEDURE — 99499 RISK ADDL DX/OHS AUDIT: ICD-10-PCS | Mod: S$PBB,,, | Performed by: PODIATRIST

## 2020-06-23 PROCEDURE — 11721 PR DEBRIDEMENT OF NAILS, 6 OR MORE: ICD-10-PCS | Mod: Q9,S$GLB,, | Performed by: PODIATRIST

## 2020-06-23 PROCEDURE — 36415 COLL VENOUS BLD VENIPUNCTURE: CPT | Mod: PO

## 2020-06-23 PROCEDURE — 3008F BODY MASS INDEX DOCD: CPT | Mod: CPTII,S$GLB,, | Performed by: NURSE PRACTITIONER

## 2020-06-23 PROCEDURE — 3051F HG A1C>EQUAL 7.0%<8.0%: CPT | Mod: CPTII,S$GLB,, | Performed by: NURSE PRACTITIONER

## 2020-06-23 PROCEDURE — 99499 UNLISTED E&M SERVICE: CPT | Mod: S$GLB,,, | Performed by: PODIATRIST

## 2020-06-23 PROCEDURE — 82607 VITAMIN B-12: CPT

## 2020-06-23 RX ORDER — INSULIN LISPRO 100 [IU]/ML
INJECTION, SOLUTION INTRAVENOUS; SUBCUTANEOUS
Qty: 15 VIAL | Refills: 4
Start: 2020-06-23 | End: 2020-07-24 | Stop reason: SDUPTHER

## 2020-06-23 RX ORDER — PIOGLITAZONEHYDROCHLORIDE 15 MG/1
15 TABLET ORAL DAILY
Qty: 90 TABLET | Refills: 3 | Status: SHIPPED | OUTPATIENT
Start: 2020-06-23 | End: 2020-09-15

## 2020-06-23 RX ORDER — SEMAGLUTIDE 1.34 MG/ML
INJECTION, SOLUTION SUBCUTANEOUS
Qty: 6 SYRINGE | Refills: 4 | Status: SHIPPED | OUTPATIENT
Start: 2020-06-23 | End: 2020-09-04

## 2020-06-23 NOTE — TELEPHONE ENCOUNTER
Spoke with patient, he had his hospital follow up today with Camille Angel NP. And stated that everything was taken care of at that appointment but he does still need to go see the cardiology. Patient stated that he will get that appointment set up. Advised him if he needs to see Dr. Carroll or Beba bassett up just let us know and we will get him scheduled.

## 2020-06-23 NOTE — PROGRESS NOTES
Subjective:       Patient ID: Gio Forrest is a 54 y.o. male.    Chief Complaint: Hospital Follow Up    HPI   Mr. Forrest is a new patient to me. He presents for hospital follow up. Admitted to [facility] for chest pain.   Cardiac enzymes neg x3.  Last echo EF 50% with grade 2 diastolic. No plans for further cardiac workup   Acute hypotension with near syncopal symptoms, holding lopressor and given fluids  BP improved.  ACE and BBlocker DCd.  he is overall feeling better since discharge. Associates chest pain with constipation and reflux     Cardiologist is Dr. Kowalski --needs to make follow    Constipation: takes daily stool softener   GERD: on PPI (pantoprazole 40mg daily). Trigger associated--pizza caused severe symptoms last night.   Followed by Dr. Thomson for morbid obesity. He is interested in surgery--great candidate given concurrent T2DM and pain related to obesity. Was told Dr. Devine's staff will contact him to schedule appointment--referral placed today by endo    Patient reports chronic fatigue and erectile dysfunction. Requesting NP Danilo labs (b12, D) as well as testosterone. We discussed chronic conditions' effect on ED  Vitals:    06/23/20 0913   BP: 118/60   Pulse: 62   Temp: 97.8 °F (36.6 °C)     Review of Systems   Constitutional: Positive for fatigue. Negative for diaphoresis and fever.   HENT: Negative for facial swelling and trouble swallowing.    Eyes: Negative for discharge and redness.   Respiratory: Negative for cough and shortness of breath.    Cardiovascular: Negative for chest pain and palpitations.   Gastrointestinal: Positive for constipation. Negative for diarrhea.   Genitourinary: Negative for difficulty urinating.        ED   Musculoskeletal: Positive for back pain. Negative for gait problem.   Skin: Negative for pallor and rash.   Neurological: Negative for dizziness, facial asymmetry, speech difficulty and light-headedness.   Psychiatric/Behavioral: Negative for confusion. The  patient is not nervous/anxious.        Past Medical History:   Diagnosis Date    Cardiomyopathy     Depression     Diabetes     Dyslipidemia 8/12/2015    Gout 8/12/2015    Hypertension     Idiopathic gout     Lumbar pseudoarthrosis     LINDSEY (nonalcoholic steatohepatitis)     Obesity 8/12/2015    Obstructive sleep apnea 8/12/2015    Restless leg syndrome     Sebaceous cyst 4/18/2017    Type 2 diabetes mellitus 8/12/2015     Objective:      Physical Exam  Vitals signs and nursing note reviewed.   Constitutional:       General: He is not in acute distress.     Appearance: He is not diaphoretic.   HENT:      Head: Normocephalic.      Right Ear: Hearing normal.      Left Ear: Hearing normal.      Nose: Nose normal.   Eyes:      General: Lids are normal.      Conjunctiva/sclera: Conjunctivae normal.   Neck:      Vascular: No JVD.      Trachea: No tracheal deviation.   Cardiovascular:      Rate and Rhythm: Normal rate.      Heart sounds: Normal heart sounds.   Pulmonary:      Effort: Pulmonary effort is normal.      Breath sounds: Normal breath sounds.   Abdominal:      General: There is no distension.   Musculoskeletal:         General: No deformity.   Skin:     Coloration: Skin is not pale.   Neurological:      Mental Status: He is alert and oriented to person, place, and time.   Psychiatric:         Speech: Speech normal.         Behavior: Behavior normal.         Thought Content: Thought content normal.         Judgment: Judgment normal.         Assessment:       1. Hospital discharge follow-up    2. Essential hypertension    3. Constipation, unspecified constipation type    4. Gastroesophageal reflux disease without esophagitis    5. Erectile dysfunction, unspecified erectile dysfunction type    6. Fatigue, unspecified type    7. Vitamin D insufficiency    8. Other long term (current) drug therapy    9. Morbid obesity        Plan:       Hospital discharge follow-up  Essential hypertension   Agree with  "medication adjustments--continue to hold BB and ACE. Schedule JOVI Kowalski     Constipation, unspecified constipation type   Adequate daily water intake, start metamucil    Gastroesophageal reflux disease without esophagitis   Emphasized trigger avoidance     Erectile dysfunction, unspecified erectile dysfunction type  -     Testosterone; Future; Expected date: 06/23/2020    Fatigue, unspecified type  -     Vitamin D; Future; Expected date: 06/23/2020  -     Vitamin B12; Future; Expected date: 06/23/2020    Vitamin D insufficiency  -     Vitamin D; Future; Expected date: 06/23/2020    Other long term (current) drug therapy  -     Vitamin B12; Future; Expected date: 06/23/2020    Morbid obesity   Follow up with Dr. Nilson ESPANA October with PCP as scheduled, routinely with specialists, me as needed      Medication List with Changes/Refills   Current Medications    ALLOPURINOL (ZYLOPRIM) 100 MG TABLET    TAKE ONE TABLET BY MOUTH EVERY DAY    ASPIRIN (ECOTRIN) 81 MG EC TABLET    Take 81 mg by mouth once daily.    ATORVASTATIN (LIPITOR) 40 MG TABLET    TAKE ONE TABLET BY MOUTH EVERY EVENING    BLOOD-GLUCOSE METER KIT    To check BG 2 times daily, to use with insurance preferred meter    CITALOPRAM (CELEXA) 40 MG TABLET    TAKE ONE TABLET BY MOUTH DAILY    CLONAZEPAM (KLONOPIN) 0.5 MG TABLET    Take 0.5 mg by mouth once daily.     DESLORATADINE (CLARINEX) 5 MG TABLET    TAKE ONE TABLET BY MOUTH DAILY take for drip in back of throat    GABAPENTIN (NEURONTIN) 300 MG CAPSULE    Take 300 mg by mouth every morning.     GABAPENTIN (NEURONTIN) 300 MG CAPSULE    Take 900 mg by mouth every evening.     INFUSION SET FOR INSULIN PUMP (MINIMED PRO-SET INFUSION 24") ISET    Changes site q2days, dispense 90day supply    INSULIN LISPRO 100 UNIT/ML INJECTION    Uses 150u/day in insulin pump    INSULIN PUMP SYRINGE (MINIMED SYRINGE RESERVOIR) 3 ML MISC    Changes q2days, dispense 3 month supply    LANCETS MISC    To be used with " Contour Next as necessary with Medtronic insulin pump, uses 4 daily    MELOXICAM (MOBIC) 15 MG TABLET    Take 15 mg by mouth once daily.     METFORMIN (GLUCOPHAGE) 1000 MG TABLET    TAKE ONE TABLET BY MOUTH TWICE DAILY    MOMETASONE 0.1% (ELOCON) 0.1 % CREAM    Apply topically daily as needed.    MULTIVIT,THER IRON,CA,FA & MIN (MULTIVITAMIN) TAB    Take 1 tablet by mouth once daily.     PANTOPRAZOLE (PROTONIX) 40 MG TABLET    TAKE ONE TABLET BY MOUTH EVERY DAY    PIOGLITAZONE (ACTOS) 15 MG TABLET    Take 1 tablet (15 mg total) by mouth once daily.    PRAMIPEXOLE (MIRAPEX) 1 MG TABLET    TAKE TWO TABLETS BY MOUTH nightly    SEMAGLUTIDE (OZEMPIC) 1 MG/DOSE (2 MG/1.5 ML) PNIJ    Inject 1 mg subq weekly    TOPIRAMATE (TOPAMAX) 25 MG TABLET    Take 1 tablet (25 mg total) by mouth 2 (two) times daily.   Changed and/or Refilled Medications    Modified Medication Previous Medication    BLOOD SUGAR DIAGNOSTIC (CONTOUR NEXT TEST STRIPS) STRP blood sugar diagnostic (CONTOUR NEXT TEST STRIPS) Strp       Use to Test 4 times a day.  Contour Next test strips required for Medtronic insulin pump    Use to Test 4 times a day.  Contour Next test strips required for Medtronic insulin pump

## 2020-06-23 NOTE — PATIENT INSTRUCTIONS
Start METAMUCIL      Constipation (Adult)  Constipation means that you have bowel movements that are less frequent than usual. Stools often become very hard and difficult to pass.  Constipation is very common. At some point in life it affects almost everyone. Since everyone's bowel habits are different, what is constipation to one person may not be to another. Your healthcare provider may do tests to diagnose constipation. It depends on what he or she finds when evaluating you.    Symptoms of constipation include:  · Abdominal pain  · Bloating  · Vomiting  · Painful bowel movements  · Itching, swelling, bleeding, or pain around the anus  Causes  Constipation can have many causes. These include:  · Diet low in fiber  · Too much dairy  · Not drinking enough liquids  · Lack of exercise or physical activity. This is especially true for older adults.  · Changes in lifestyle or daily routine, including pregnancy, aging, work, and travel  · Frequent use or misuse of laxatives  · Ignoring the urge to have a bowel movement or delaying it until later  · Medicines, such as certain prescription pain medicines, iron supplements, antacids, certain antidepressants, and calcium supplements  · Diseases like irritable bowel syndrome, bowel obstructions, stroke, diabetes, thyroid disease, Parkinson disease, hemorrhoids, and colon cancer  Complications  Potential complications of constipation can include:  · Hemorrhoids  · Rectal bleeding from hemorrhoids or anal fissures (skin tears)  · Hernias  · Dependency on laxatives  · Chronic constipation  · Fecal impaction  · Bowel obstruction or perforation  Home care  All treatment should be done after talking with your healthcare provider. This is especially true if you have another medical problems, are taking prescription medicines, or are an older adult. Treatment most often involves lifestyle changes. You may also need medicines. Your healthcare provider will tell you which will work  best for you. Follow the advice below to help avoid this problem in the future.  Lifestyle changes  These lifestyle changes can help prevent constipation:  · Diet. Eat a high-fiber diet, with fresh fruit and vegetables, and reduce dairy intake, meats, and processed foods  · Fluids. It's important to get enough fluids each day. Drink plenty of water when you eat more fiber. If you are on diet that limits the amount of fluid you can have, talk about this with your healthcare provider.  · Regular exercise. Check with your healthcare provider first.  Medications  Take any medicines as directed. Some laxatives are safe to use only every now and then. Others can be taken on a regular basis. Talk with your doctor or pharmacist if you have questions.  Prescription pain medicines can cause constipation. If you are taking this kind of medicine, ask your healthcare provider if you should also take a stool softener.  Medicines you may take to treat constipation include:  · Fiber supplements  · Stool softeners  · Laxatives  · Enemas  · Rectal suppositories  Follow-up care  Follow up with your healthcare provider if symptoms don't get better in the next few days. You may need to have more tests or see a specialist.  Call 911  Call 911 if any of these occur:  · Trouble breathing  · Stiff, rigid abdomen that is severely painful to touch  · Confusion  · Fainting or loss of consciousness  · Rapid heart rate  · Chest pain  When to seek medical advice  Call your healthcare provider right away if any of these occur:  · Fever over 100.4°F (38°C)  · Failure to resume normal bowel movements  · Pain in your abdomen or back gets worse  · Nausea or vomiting  · Swelling in your abdomen  · Blood in the stool  · Black, tarry stool  · Involuntary weight loss  · Weakness  Date Last Reviewed: 12/30/2015  © 5077-8261 [employer]. [address] [place] [address], [place] [address]. All rights reserved. This information is not intended as a  substitute for professional medical care. Always follow your healthcare professional's instructions.        GERD (Adult)    The esophagus is a tube that carries food from the mouth to the stomach. A valve at the lower end of the esophagus prevents stomach acid from flowing upward. When this valve doesn't work properly, stomach contents may repeatedly flow back up (reflux) into the esophagus. This is called gastroesophageal reflux disease (GERD). GERD can irritate the esophagus. It can cause problems with swallowing or breathing. In severe cases, GERD can cause recurrent pneumonia or other serious problems.  Symptoms of reflux include burning, pressure or sharp pain in the upper abdomen or mid to lower chest. The pain can spread to the neck, back, or shoulder. There may be belching, an acid taste in the back of the throat, chronic cough, or sore throat or hoarseness. GERD symptoms often occur during the day after a big meal. They can also occur at night when lying down.   Home care  Lifestyle changes can help reduce symptoms. If needed, medicines may be prescribed. Symptoms often improve with treatment, but if treatment is stopped, the symptoms often return after a few months. So most persons with GERD will need to continue treatment.  Lifestyle changes  · Limit or avoid fatty, fried, and spicy foods, as well as coffee, chocolate, mint, and foods with high acid content such as tomatoes and citrus fruit and juices (orange, grapefruit, lemon).  · Dont eat large meals, especially at night. Frequent, smaller meals are best. Do not lie down right after eating. And dont eat anything 3 hours before going to bed.  · Avoid drinking alcohol and smoking. As much as possible, stay away from second hand smoke.  · If you are overweight, losing weight will reduce symptoms.   · Avoid wearing tight clothing around your stomach area.  · If your symptoms occur during sleep, use a foam wedge to elevate your upper body (not just your  "head.) Or, place 4" blocks under the head of your bed.  Medicines  If needed, medicines can help relieve the symptoms of GERD and prevent damage to the esophagus. Discuss a medicine plan with your healthcare provider. This may include one or more of the following medicines:  · Antacids to help neutralize the normal acids in your stomach.  · Acid blockers (H2 blockers) to decrease acid production.  · Acid inhibitors (PPIs) to decrease acid production in a different way than the blockers. They may work better, but can take a little longer to take effect.  Take an antacid 30-60 minutes after eating and at bedtime, but not at the same time as an acid blocker.  Try not to take medicines such as ibuprofen and aspirin. If you are taking aspirin for your heart or other medical reasons, talk to your healthcare provider about stopping it.  Follow-up care  Follow up with your healthcare provider or as advised by our staff.  When to seek medical advice  Call your healthcare provider if any of the following occur:  · Stomach pain gets worse or moves to the lower right abdomen (appendix area)  · Chest pain appears or gets worse, or spreads to the back, neck, shoulder, or arm  · Frequent vomiting (cant keep down liquids)  · Blood in the stool or vomit (red or black in color)  · Feeling weak or dizzy  · Fever of 100.4ºF (38ºC) or higher, or as directed by your healthcare provider  Date Last Reviewed: 6/23/2015  © 3453-5792 [employer]. [address] [place] [address], [place] [address]. All rights reserved. This information is not intended as a substitute for professional medical care. Always follow your healthcare professional's instructions.        "

## 2020-06-23 NOTE — TELEPHONE ENCOUNTER
----- Message from Nicolasa Huynh sent at 6/23/2020  7:31 AM CDT -----  Regarding: Hospital F/U  Good morning! Mr. Forrest is at the clinic today for some other appointments but has been trying to get in touch with Dr. Carroll or Beba Carrasco' offices to schedule a hospital f/u appt. I was unable to see any openings until July. Can someone please contact Mr. Forrest at [phone]?    Thank you

## 2020-06-23 NOTE — PROGRESS NOTES
CC: This 54 y.o. male presents for management of diabetes  and chronic conditions pending review including HTN, HLP, morbid obesity, fatty liver, vitamin d deficiency     HPI: He was diagnosed with T2DM in 2005. Has never been hospitalized r/t DM.  Mother has passed away from Fatty liver and hepatic carcinoma in 2018  Family hx of DM: sister, mom and dad    Since last visit he was admitted to the hospital w chest pain, hypotension, dehydration ~ 1 week ago  metoprolol and benazepril stopped   Seeing Camille Foote NP today  Also seeing Dr Easley in bariatrics   had neck surgery in Feb 2020- diskectomy  Back surgery April - Dr Camejo    Medtronic 670g insulin pump- See Media tab for download  Not checking his bg frequently- 1-2  times most days, missing meal boluses frequently   But overall greatly improved - bg 100-190    Diet: Eats 2-3  Meals a day, snacks on sweets-honey buns, cake, shakes     Exercise: none  CURRENT DM MEDS:    metformin 1000 mg bid, actos 15 mg qd,  ozempic 1 mg weekly (Sunday)  Humalog in Medtronic 670g insulin pump:  Basal 1.8 u/h  Carb ratio 1:5  ISF 30  target 140  Act Ins 3 hrs  Bolus preset- 12  Snack - 5 u  Vial/pen:  Uses pen  Glucometer type:  True Test 2018    Standards of Care:  Eye exam: 1/2018 no h/o retinopathy - appt w [facility]  (missed appt on Monday- he needs to reschedule)    ROS:   Gen: Appetite good, + weight gain 3 lbs, + fatigue  .  Skin: Skin is intact and heals well, no rashes, no hair changes  Eyes: Denies visual disturbances  Resp: no SOB or HENDERSON, no cough  Cardiac: No palpitations, chest pain, no edema   GI: No nausea or vomiting, diarrhea, constipation, or abdominal pain.  /GYN: No nocturia, burning or pain.   MS/Neuro: +numbness/ tingling in BLE; Gait steady, speech clear  Psych: Denies drug/ETOH abuse, no hx of depression.  Other systems: negative.    PE:  GENERAL: Well developed, well nourished.appears older than age.   PSYCH: AAOx3, appropriate mood and  affect, pleasant expression, conversant, appears relaxed, well groomed.   EYES: Conjunctiva, corneas clear  NECK: Supple, trachea midline   NEURO: Gait steady  SKIN: Skin warm and dry no acanthosis nigracans.  FOOT EXAMINATION: 6/23/2020 Dr Nuñez     Lab Results   Component Value Date    MICALBCREAT Unable to calculate 03/03/2020       Hemoglobin A1C   Date Value Ref Range Status   06/12/2020 7.8 (H) 4.0 - 5.6 % Final     Comment:     ADA Screening Guidelines:  5.7-6.4%  Consistent with prediabetes  >or=6.5%  Consistent with diabetes  High levels of fetal hemoglobin interfere with the HbA1C  assay. Heterozygous hemoglobin variants (HbS, HgC, etc)do  not significantly interfere with this assay.   However, presence of multiple variants may affect accuracy.     03/03/2020 8.7 (H) 4.0 - 5.6 % Final     Comment:     ADA Screening Guidelines:  5.7-6.4%  Consistent with prediabetes  >or=6.5%  Consistent with diabetes  High levels of fetal hemoglobin interfere with the HbA1C  assay. Heterozygous hemoglobin variants (HbS, HgC, etc)do  not significantly interfere with this assay.   However, presence of multiple variants may affect accuracy.     11/07/2019 9.2 (H) 4.0 - 5.6 % Final     Comment:     ADA Screening Guidelines:  5.7-6.4%  Consistent with prediabetes  >or=6.5%  Consistent with diabetes  High levels of fetal hemoglobin interfere with the HbA1C  assay. Heterozygous hemoglobin variants (HbS, HgC, etc)do  not significantly interfere with this assay.   However, presence of multiple variants may affect accuracy.          ASSESSMENT and PLAN:    1. Type 2 diabetes w DM PN   Continue Actos 15 mg qd, metformin 1000 mg bid,  Ozempic 1 mg qweek,   Change pump settings as below:  Target 120-140  Check bg 4 times a day   Discussed A1c and BG goals.    2. HTN - controlled, continue meds as previously prescribed and monitor.     3. HLP -  on statin therapy,     4. TARYN-   wearing regularly,      5. LINDSEY- encouraged weight loss,   mother passed away w HCC    6. Obesity-  Body mass index is 39.06 kg/m².     improve diet, exercise 30 mins a day, 5 days a week.  Considering bariatric surgery      Follow-up:  in 3 months with lab prior

## 2020-06-23 NOTE — PROGRESS NOTES
Subjective:      Patient ID: Gio Forrest is a 54 y.o. male.    Chief Complaint: Diabetes Mellitus and Diabetic Foot Exam    Gio is a 54 y.o. male who presents to the clinic for evaluation and treatment of diabetic feet. Gio has a past medical history of Cardiomyopathy, Depression, Diabetes, Dyslipidemia (8/12/2015), Gout (8/12/2015), Hypertension, Idiopathic gout, Lumbar pseudoarthrosis, LINDSEY (nonalcoholic steatohepatitis), Obesity (8/12/2015), Obstructive sleep apnea (8/12/2015), Restless leg syndrome, Sebaceous cyst (4/18/2017), and Type 2 diabetes mellitus (8/12/2015). Patient relates having a moderate amount of pain in the Lt. Forefoot.  States symptoms are exacerbated with prolonged weight bearing.  Symptoms are alleviated with rest.  Currently rates pain as a 0/10.  Denies recent injury to the affected foot nor change in physical activity.  Has not attempted to self treat.  Relates improvement in his blood glucose, however, is interested in bariatric surgery to further minimize complications secondary to diabetes.  Notes having toenails that are in need of trimming.  Denies any additional pedal complaints.    PCP: BLANK Carroll MD    Date Last Seen by PCP: 6/20  Hemoglobin A1C   Date Value Ref Range Status   06/12/2020 7.8 (H) 4.0 - 5.6 % Final     Comment:     ADA Screening Guidelines:  5.7-6.4%  Consistent with prediabetes  >or=6.5%  Consistent with diabetes  High levels of fetal hemoglobin interfere with the HbA1C  assay. Heterozygous hemoglobin variants (HbS, HgC, etc)do  not significantly interfere with this assay.   However, presence of multiple variants may affect accuracy.     03/03/2020 8.7 (H) 4.0 - 5.6 % Final     Comment:     ADA Screening Guidelines:  5.7-6.4%  Consistent with prediabetes  >or=6.5%  Consistent with diabetes  High levels of fetal hemoglobin interfere with the HbA1C  assay. Heterozygous hemoglobin variants (HbS, HgC, etc)do  not significantly interfere with this  assay.   However, presence of multiple variants may affect accuracy.     11/07/2019 9.2 (H) 4.0 - 5.6 % Final     Comment:     ADA Screening Guidelines:  5.7-6.4%  Consistent with prediabetes  >or=6.5%  Consistent with diabetes  High levels of fetal hemoglobin interfere with the HbA1C  assay. Heterozygous hemoglobin variants (HbS, HgC, etc)do  not significantly interfere with this assay.   However, presence of multiple variants may affect accuracy.             Past Medical History:   Diagnosis Date    Cardiomyopathy     Depression     Diabetes     Dyslipidemia 8/12/2015    Gout 8/12/2015    Hypertension     Idiopathic gout     Lumbar pseudoarthrosis     LINDSEY (nonalcoholic steatohepatitis)     Obesity 8/12/2015    Obstructive sleep apnea 8/12/2015    Restless leg syndrome     Sebaceous cyst 4/18/2017    Type 2 diabetes mellitus 8/12/2015       Past Surgical History:   Procedure Laterality Date    BACK SURGERY      x2    CARDIAC CATHETERIZATION      CERVICAL SPINE SURGERY      CHOLECYSTECTOMY  05/30/2018    Dr. ROGELIO Tao, [facility]     COLONOSCOPY  2008    COLONOSCOPY N/A 9/14/2017    Procedure: COLONOSCOPY;  Surgeon: Obi Beltre MD;  Location: [facility] ENDO;  Service: Endoscopy;  Laterality: N/A;    CORONARY ANGIOGRAPHY Left 5/28/2018    Procedure: Coronary angiography;  Surgeon: Rafiq Malik MD;  Location: [facility] CATH;  Service: Cardiology;  Laterality: Left;    ELBOW SURGERY Bilateral     HERNIA REPAIR      LAPAROSCOPIC CHOLECYSTECTOMY N/A 5/30/2018    Procedure: CHOLECYSTECTOMY, LAPAROSCOPIC;  Surgeon: Fletcher Tao MD;  Location: [facility] OR;  Service: General;  Laterality: N/A;    LEFT HEART CATHETERIZATION Left 5/28/2018    Procedure: Left heart cath;  Surgeon: Rafiq Malik MD;  Location: [facility] CATH;  Service: Cardiology;  Laterality: Left;    NECK SURGERY      SHOULDER ARTHROSCOPY      SPINE SURGERY      lumbar x2    TONSILLECTOMY         Family History   Problem  Relation Age of Onset    Diabetes Mother     Depression Mother     Liver cancer Mother     Heart disease Father     Anuerysm Father     Diabetes Father     Stroke Father        Social History     Socioeconomic History    Marital status:      Spouse name: Not on file    Number of children: Not on file    Years of education: Not on file    Highest education level: Not on file   Occupational History    Not on file   Social Needs    Financial resource strain: Not on file    Food insecurity     Worry: Not on file     Inability: Not on file    Transportation needs     Medical: Not on file     Non-medical: Not on file   Tobacco Use    Smoking status: Former Smoker     Packs/day: 0.50     Types: Cigarettes     Quit date: 1990     Years since quittin.4    Smokeless tobacco: Never Used   Substance and Sexual Activity    Alcohol use: No     Alcohol/week: 0.0 standard drinks    Drug use: No    Sexual activity: Yes     Partners: Female   Lifestyle    Physical activity     Days per week: Not on file     Minutes per session: Not on file    Stress: Not on file   Relationships    Social connections     Talks on phone: Not on file     Gets together: Not on file     Attends [employer] service: Not on file     Active member of club or organization: Not on file     Attends meetings of clubs or organizations: Not on file     Relationship status: Not on file   Other Topics Concern    Not on file   Social History Narrative    Not on file       Current Outpatient Medications   Medication Sig Dispense Refill    allopurinol (ZYLOPRIM) 100 MG tablet TAKE ONE TABLET BY MOUTH EVERY DAY 90 tablet 0    aspirin (ECOTRIN) 81 MG EC tablet Take 81 mg by mouth once daily.      atorvastatin (LIPITOR) 40 MG tablet TAKE ONE TABLET BY MOUTH EVERY EVENING 90 tablet 1    citalopram (CELEXA) 40 MG tablet TAKE ONE TABLET BY MOUTH DAILY 90 tablet 0    clonazePAM (KLONOPIN) 0.5 MG tablet Take 0.5 mg by mouth once daily.    "    desloratadine (CLARINEX) 5 mg tablet TAKE ONE TABLET BY MOUTH DAILY take for drip in back of throat 30 tablet 3    gabapentin (NEURONTIN) 300 MG capsule Take 300 mg by mouth every morning.       gabapentin (NEURONTIN) 300 MG capsule Take 900 mg by mouth every evening.       infusion set for insulin pump (MINIMED PRO-SET INFUSION 24") ISet Changes site q2days, dispense 90day supply 45 each 4    insulin pump syringe (MINIMVideoClix SYRINGE RESERVOIR) 3 mL Misc Changes q2days, dispense 3 month supply 45 each 4    lancets Misc To be used with Contour Next as necessary with Medtronic insulin pump, uses 4 daily 400 each 4    meloxicam (MOBIC) 15 MG tablet Take 15 mg by mouth once daily.       metFORMIN (GLUCOPHAGE) 1000 MG tablet TAKE ONE TABLET BY MOUTH TWICE DAILY 180 tablet 4    mometasone 0.1% (ELOCON) 0.1 % cream Apply topically daily as needed.      MULTIVIT,THER IRON,CA,FA & MIN (MULTIVITAMIN) Tab Take 1 tablet by mouth once daily.       pantoprazole (PROTONIX) 40 MG tablet TAKE ONE TABLET BY MOUTH EVERY DAY 90 tablet 1    pramipexole (MIRAPEX) 1 MG tablet TAKE TWO TABLETS BY MOUTH nightly 60 tablet 2    topiramate (TOPAMAX) 25 MG tablet Take 1 tablet (25 mg total) by mouth 2 (two) times daily. 60 tablet 3    blood sugar diagnostic (CONTOUR NEXT TEST STRIPS) Strp Use to Test 4 times a day.  Contour Next test strips required for Medtronic insulin pump 400 strip 4    blood-glucose meter kit To check BG 2 times daily, to use with insurance preferred meter 1 each 1    insulin lispro 100 unit/mL injection Uses 150u/day in insulin pump 15 vial 4    pioglitazone (ACTOS) 15 MG tablet Take 1 tablet (15 mg total) by mouth once daily. 90 tablet 3    semaglutide (OZEMPIC) 1 mg/dose (2 mg/1.5 mL) PnIj Inject 1 mg subq weekly 6 Syringe 4     No current facility-administered medications for this visit.        Review of patient's allergies indicates:  No Known Allergies      Review of Systems   Constitution: " Negative for chills and fever.   Cardiovascular: Negative for claudication and leg swelling.   Skin: Positive for color change and nail changes.   Musculoskeletal: Negative for joint pain, joint swelling, muscle cramps, muscle weakness and myalgias.   Neurological: Positive for numbness. Negative for paresthesias.   Psychiatric/Behavioral: Negative for altered mental status.           Objective:      Physical Exam  Constitutional:       General: He is not in acute distress.     Appearance: He is well-developed. He is not diaphoretic.   Cardiovascular:      Pulses:           Dorsalis pedis pulses are 2+ on the right side and 2+ on the left side.        Posterior tibial pulses are 1+ on the right side and 1+ on the left side.      Comments: CFT is < 3 seconds bilateral.  Pedal hair growth is decreased bilateral.  No varicosities noted bilateral.  Mild lower extremity edema noted bilateral.  Toes are warm to touch bilateral.    Musculoskeletal:         General: Tenderness present.      Comments: Muscle strength 5/5 in all muscle groups bilateral.  No tenderness nor crepitation with ROM of foot/ankle joints bilateral.  Mild pain with palpation to the Lt. Sub 2nd and 3rd met heads.  Bilateral gastrocnemius equinus.  Bilateral pes planus foot type.  Bilateral hallux abducto valgus.  Bilateral semi-reducible contracture of toe 2-5.     Skin:     General: Skin is warm and dry.      Capillary Refill: Capillary refill takes less than 2 seconds.      Coloration: Skin is not pale.      Findings: No abrasion, bruising, burn, ecchymosis, erythema, laceration, lesion or petechiae.      Comments: Pedal skin appears thin bilateral.  Toenails x 10 appear thickened by 2 mm's, elongated by 4 mm's, and discolored with subungual debris. Examination of the skin reveals no evidence of significant maceration, rashes, open lesions, suspicious appearing nevi or other concerning lesions.    Neurological:      Mental Status: He is alert and  oriented to person, place, and time.      Sensory: Sensory deficit present.      Comments: Protective sensation per [place] monofilament is decreased bilateral.  Vibratory sensation is intact bilateral.  Light touch is intact bilateral.               Assessment:       Encounter Diagnoses   Name Primary?    Severe obesity (BMI 35.0-39.9) with comorbidity     Diabetic polyneuropathy associated with type 2 diabetes mellitus Yes    Onychomycosis due to dermatophyte     Metatarsalgia of left foot          Plan:       Gio was seen today for diabetes mellitus and diabetic foot exam.    Diagnoses and all orders for this visit:    Diabetic polyneuropathy associated with type 2 diabetes mellitus    Severe obesity (BMI 35.0-39.9) with comorbidity    Onychomycosis due to dermatophyte    Metatarsalgia of left foot      I counseled the patient on his conditions, their implications and medical management.    - Perform stretching exercises, see handout for details, to address tightness of calf muscles.      - Recommend wearing supportive shoes or orthopedic sandals only.  Avoid barefoot walking, flip flops, and Crocs, as this will exacerbate current symptoms.      - Recommend wearing a pair of OTC insoles.  Given both verbal and written information regarding this.    - Recommend icing the affected area a minimum of 20 minutes daily.    - Avoid high impact activities such as squatting, stooping, and running as these activities will exacerbate symptoms.      - May consider applying a topical analgesic, voltaren cream, to help with pain symptoms.      - Discussed the importance of diet and exercise as they pertain to diabetes management and maintaining a healthy BMI.    - Shoe inspection. Diabetic Foot Education. Patient reminded of the importance of good nutrition and blood sugar control to help prevent podiatric complications of diabetes. Patient instructed on proper foot hygeine. We discussed wearing proper shoe  gear, daily foot inspections, never walking without protective shoe gear, never putting sharp instruments to feet    - With patient's permission, nails were aggressively reduced and debrided x 10 to their soft tissue attachment mechanically and with electric , removing all offending nail and debris.  Also, a sterile #15 scalpel was used to trim lesions x 2 down to smooth appearing skin without incident.  Patient relates relief following the procedure. He will continue to monitor the areas daily, inspect his feet, wear protective shoe gear when ambulatory, moisturizer to maintain skin integrity and follow in this office in approximately 4 months, sooner p.r.n.    Follow up in about 3 months (around 9/23/2020).    Rj Nuñez DPM

## 2020-06-24 ENCOUNTER — PATIENT MESSAGE (OUTPATIENT)
Dept: FAMILY MEDICINE | Facility: CLINIC | Age: 55
End: 2020-06-24

## 2020-06-24 ENCOUNTER — OFFICE VISIT (OUTPATIENT)
Dept: OPTOMETRY | Facility: CLINIC | Age: 55
End: 2020-06-24
Payer: MEDICARE

## 2020-06-24 DIAGNOSIS — H52.4 MYOPIA WITH ASTIGMATISM AND PRESBYOPIA, BILATERAL: ICD-10-CM

## 2020-06-24 DIAGNOSIS — H52.203 MYOPIA WITH ASTIGMATISM AND PRESBYOPIA, BILATERAL: ICD-10-CM

## 2020-06-24 DIAGNOSIS — E11.9 DIABETES MELLITUS TYPE 2 WITHOUT RETINOPATHY: Primary | ICD-10-CM

## 2020-06-24 DIAGNOSIS — H52.13 MYOPIA WITH ASTIGMATISM AND PRESBYOPIA, BILATERAL: ICD-10-CM

## 2020-06-24 DIAGNOSIS — H43.393 VITREOUS FLOATERS, BILATERAL: ICD-10-CM

## 2020-06-24 DIAGNOSIS — Z13.5 GLAUCOMA SCREENING: ICD-10-CM

## 2020-06-24 DIAGNOSIS — Z79.4 TYPE 2 DIABETES MELLITUS WITH DIABETIC POLYNEUROPATHY, WITH LONG-TERM CURRENT USE OF INSULIN: ICD-10-CM

## 2020-06-24 DIAGNOSIS — E11.42 TYPE 2 DIABETES MELLITUS WITH DIABETIC POLYNEUROPATHY, WITH LONG-TERM CURRENT USE OF INSULIN: ICD-10-CM

## 2020-06-24 LAB
TESTOST SERPL-MCNC: 237 NG/DL (ref 304–1227)
VIT B12 SERPL-MCNC: 1348 PG/ML (ref 210–950)

## 2020-06-24 PROCEDURE — 92015 PR REFRACTION: ICD-10-PCS | Mod: S$GLB,,, | Performed by: OPTOMETRIST

## 2020-06-24 PROCEDURE — 92004 PR EYE EXAM, NEW PATIENT,COMPREHESV: ICD-10-PCS | Mod: S$GLB,,, | Performed by: OPTOMETRIST

## 2020-06-24 PROCEDURE — 92004 COMPRE OPH EXAM NEW PT 1/>: CPT | Mod: S$GLB,,, | Performed by: OPTOMETRIST

## 2020-06-24 PROCEDURE — 99999 PR PBB SHADOW E&M-EST. PATIENT-LVL IV: CPT | Mod: PBBFAC,,, | Performed by: OPTOMETRIST

## 2020-06-24 PROCEDURE — 99999 PR PBB SHADOW E&M-EST. PATIENT-LVL IV: ICD-10-PCS | Mod: PBBFAC,,, | Performed by: OPTOMETRIST

## 2020-06-24 PROCEDURE — 92015 DETERMINE REFRACTIVE STATE: CPT | Mod: S$GLB,,, | Performed by: OPTOMETRIST

## 2020-06-24 NOTE — PATIENT INSTRUCTIONS
"DRY EYES -- BURNING OR GENESIS SYMPTOMS:  Use Over The Counter artificial tears as needed for dry eye symptoms.   Some common brands include:  Systane, Optive, Refresh, and Thera-Tears.  These drops can be used as frequently as desired, but may be most helpful use during long periods of concentrated work.  For example, reading / working at the computer. Start with 3-4x per day.     Nighttime Ophthalmic gel or ointments are available: Refresh PM, Genteal, and Lacrilube.    Avoid drops that "get redness out" (Visine, Murine, Clear Eyes), as these may contain medication that could further irritate the eyes, especially with chronic use.    ALLERGY EYES -- ITCHING SYMPTOMS:  Over the counter medications include--Pataday, Zaditor, and Alaway.  Use as directed 1-2 drops daily for symptoms of itching / watering eyes.  These drops will not help for dry eye or exposure symptoms.    REDNESS RELIEF:  Lumify---is a good redness reliever that will not cause irritation if used chronically.         FLASHES / FLOATERS / POSTERIOR VITREOUS DETACHMENT    Call the clinic if you have any further changes in symptoms.  Including:  Increased numbers of floaters or flashing lights, dimness or darkness that moves through or stays constant in your vision, or any pain in the eye (s).    You may sometimes see small specks or clouds moving in your field of vision.  They are called FLOATERS.  You can often see them when looking at a plain background, like a blank wall or blue rené.  Floaters are actually tiny clumps of gel or cells inside the VITREOUS, the clear jelly-like fluid that fills the inside of your eye.    While these objects look like they are in front of your eye, they are actually floating inside.  What you see are the shadows they cast on the RETINA, the nerve layer at the back of the eye that senses light and allows you to see.      POSTERIOR VITREOUS DETACHMENT    The appearance of new floaters may be alarming.  If you suddenly " develop new floaters, you should contact your eye care professional  right away.    The retina can tear if the shrinking vitreous pulls away from the wall of the eye.  This sometimes causes a small amount of bleeding in the eye that may appear as new floaters.    A torn retina is always a serious problem, since it can lead to a retinal detachment.  You should see your eye care professional as soon as possible if:     even one new floater appears suddenly;   you see sudden flashes of light;   you notice other symptoms, like the loss of side vision, or a curtain closes down in your vision        POSTERIOR VITREOUS DETACHMENT is more common for people who:     are nearsighted;   have had cataract surgery;   have had YAG laser surgery of the eye;   have had inflammation inside the eye;   are over age 60.      While some floaters may remain visible, many of them will fade over time and become less noticeable.  Even if you've had some floaters for years, you should have your eyes checked as soon as possible if you notice new ones.    FLASHING LIGHTS    When the vitreous gel rubs or pulls on the retina, you may see what look like flashing lights or lightning streaks.  These flashes can appear off and on for several weeks or months.      Some people experience flashes of light that appear as jagged lines or heat waves in both eyes, lasting 10-20 minutes.  These flashes are caused by a spasm of blood vessels in the brain, which is called a migraine.    If a headache follows these flashes, it's called a migraine headache.  If   no headache occurs, these flashes are called Ophthalmic or Ocular Migraine.            DIABETES AND THE EYE / DIABETIC RETINOPATHY    Diabetic retinopathy is a condition occurring in persons with diabetes, which causes progressive damage to the retina, the light sensitive lining at the back of the eye. It is a serious sight-threatening complication of diabetes.    Diabetic retinopathy is  the result of damage to the tiny blood vessels that nourish the retina. They leak blood and other fluids that cause swelling of retinal tissue and clouding of vision. The condition usually affects both eyes. The longer a person has diabetes, the more likely they will develop diabetic retinopathy. If left untreated, diabetic retinopathy can cause blindness.  There are two basic types of diabetic retinopathy:    Background or nonproliferative diabetic retinopathy (NPDR)  Nonproliferative diabetic retinopathy (NPDR) is the earliest stage of diabetic retinopathy. With this condition, damaged blood vessels in the retina begin to leak extra fluid and small amounts of blood into the eye. Sometimes, deposits of cholesterol or other fats from the blood may leak into the retina. Many people with diabetes have mild NPDR, which usually does not affect their vision. However, if their vision is affected, it is the result of macular edema and macular ischemia.    If vision is affected due to macular changes, a consult with a Retina Specialist may be advised.  This is an ophthalmologist that treats retina conditions, including diabetic retinopathy.     Proliferative diabetic retinopathy (PDR)  Proliferative diabetic retinopathy (PDR) mainly occurs when many of the blood vessels in the retina close, preventing enough blood flow. In an attempt to supply blood to the area where the original vessels closed, the retina responds by growing new blood vessels. This is called neovascularization. However, these new blood vessels are abnormal and do not supply the retina with proper blood flow. The new vessels are also often accompanied by scar tissue that may cause the retina to wrinkle or detach. PDR may cause more severe vision loss than NPDR because it can affect both central and peripheral vision.     A patient diagnosed with proliferative diabetic eye disease will be referred to a retinal specialist for consultation.    Often there are  no visual symptoms in the early stages of diabetic retinopathy. That is why our eye care professionals recommend that everyone with diabetes have a comprehensive dilated eye examination once a year. Early detection and treatment can limit the potential for significant vision loss from diabetic retinopathy.

## 2020-06-24 NOTE — LETTER
June 24, 2020      Gabby Rooney, DNP, NP  [address]  [facility] [address]           [facility]  [address] [place] [address]  Phone: [phone]  Fax: [phone]          Patient: Gio Forrest   MR Number: [id number]   YOB: 1965   Date of Visit: 6/24/2020       Dear Gabby Rooney:    Thank you for referring Gio Forrest to me for evaluation. Attached you will find relevant portions of my assessment and plan of care.    If you have questions, please do not hesitate to call me. I look forward to following Gio Forrest along with you.    Sincerely,    JHON Wood, OD    Enclosure  CC:  No Recipients    If you would like to receive this communication electronically, please contact [email or web address] or [phone] to request more information on [employer] Link access.    For providers and/or their staff who would like to refer a patient to [facility], please contact us through our one-stop-shop provider referral line, [facility], at [phone].    If you feel you have received this communication in error or would no longer like to receive these types of communications, please e-mail [email or web address]

## 2020-06-24 NOTE — PROGRESS NOTES
HPI     Routine DM-dle-1 year    Pt complains of blurred vision at near. No eye pain. No flashes or   floaters. BSL controlled.    Hemoglobin A1C       Date                     Value               Ref Range             Status                06/12/2020               7.8 (H)             4.0 - 5.6 %           Final              Comment:    ADA Screening Guidelines:  5.7-6.4%  Consistent with   prediabetes  >or=6.5%  Consistent with diabetes  High levels of fetal   hemoglobin interfere with the HbA1C  assay. Heterozygous hemoglobin   variants (HbS, HgC, etc)do  not significantly interfere with this assay.     However, presence of multiple variants may affect accuracy.         03/03/2020               8.7 (H)             4.0 - 5.6 %           Final              Comment:    ADA Screening Guidelines:  5.7-6.4%  Consistent with   prediabetes  >or=6.5%  Consistent with diabetes  High levels of fetal   hemoglobin interfere with the HbA1C  assay. Heterozygous hemoglobin   variants (HbS, HgC, etc)do  not significantly interfere with this assay.     However, presence of multiple variants may affect accuracy.         11/07/2019               9.2 (H)             4.0 - 5.6 %           Final              Comment:    ADA Screening Guidelines:  5.7-6.4%  Consistent with   prediabetes  >or=6.5%  Consistent with diabetes  High levels of fetal   hemoglobin interfere with the HbA1C  assay. Heterozygous hemoglobin   variants (HbS, HgC, etc)do  not significantly interfere with this assay.     However, presence of multiple variants may affect accuracy.    ----------      Last edited by Norma Sanches on 6/24/2020  8:35 AM. (History)        ROS     Positive for: Eyes    Negative for: Constitutional, Gastrointestinal, Neurological, Skin,   Genitourinary, Musculoskeletal, HENT, Endocrine, Cardiovascular,   Respiratory, Psychiatric, Allergic/Imm, Heme/Lymph    Last edited by JHON Wood, OD on 6/24/2020  8:45 AM. (History)         Assessment /Plan     For exam results, see Encounter Report.    Diabetes mellitus type 2 without retinopathy    Type 2 diabetes mellitus with diabetic polyneuropathy, with long-term current use of insulin  -     Ambulatory referral/consult to Optometry    Vitreous floaters, bilateral    Glaucoma screening    Myopia with astigmatism and presbyopia, bilateral      1,2. No saroj/ retinopathy, no csme, gave Diabetic Retinopathy info, control glucose and BP  Advise annual DFE  3. Mild OU, RD precautions given and reviewed. Patient knows to call/ message if any further changes in symptoms occur.  4. Not suspect  5. Updated specs rx, gave copy---ok continue with otc if desired    Discussed and educated patient on current findings /plan.  RTC 1 year, prn if any changes / issues

## 2020-06-25 PROBLEM — K35.80 ACUTE APPENDICITIS: Status: ACTIVE | Noted: 2020-06-25

## 2020-06-25 PROBLEM — K35.30 ACUTE APPENDICITIS WITH LOCALIZED PERITONITIS, WITHOUT PERFORATION, ABSCESS, OR GANGRENE: Status: ACTIVE | Noted: 2020-06-25

## 2020-06-26 PROBLEM — K35.30 ACUTE APPENDICITIS WITH LOCALIZED PERITONITIS, WITHOUT PERFORATION, ABSCESS, OR GANGRENE: Status: RESOLVED | Noted: 2020-06-25 | Resolved: 2020-06-26

## 2020-06-26 PROBLEM — K35.80 ACUTE APPENDICITIS: Status: RESOLVED | Noted: 2020-06-25 | Resolved: 2020-06-26

## 2020-06-26 PROBLEM — R07.9 CHEST PAIN: Status: RESOLVED | Noted: 2018-05-26 | Resolved: 2020-06-26

## 2020-07-09 ENCOUNTER — OFFICE VISIT (OUTPATIENT)
Dept: FAMILY MEDICINE | Facility: CLINIC | Age: 55
End: 2020-07-09
Payer: MEDICARE

## 2020-07-09 VITALS
DIASTOLIC BLOOD PRESSURE: 70 MMHG | TEMPERATURE: 98 F | SYSTOLIC BLOOD PRESSURE: 126 MMHG | OXYGEN SATURATION: 96 % | BODY MASS INDEX: 38.62 KG/M2 | HEIGHT: 74 IN | WEIGHT: 300.94 LBS | HEART RATE: 84 BPM

## 2020-07-09 DIAGNOSIS — R79.89 LOW SERUM TESTOSTERONE LEVEL: Primary | ICD-10-CM

## 2020-07-09 DIAGNOSIS — G47.33 OSA ON CPAP: ICD-10-CM

## 2020-07-09 DIAGNOSIS — E11.42 TYPE 2 DIABETES MELLITUS WITH DIABETIC POLYNEUROPATHY, WITH LONG-TERM CURRENT USE OF INSULIN: ICD-10-CM

## 2020-07-09 DIAGNOSIS — Z79.4 TYPE 2 DIABETES MELLITUS WITH DIABETIC POLYNEUROPATHY, WITH LONG-TERM CURRENT USE OF INSULIN: ICD-10-CM

## 2020-07-09 DIAGNOSIS — I10 ESSENTIAL HYPERTENSION: ICD-10-CM

## 2020-07-09 DIAGNOSIS — K76.0 FATTY LIVER: ICD-10-CM

## 2020-07-09 PROBLEM — I95.89 HYPOTENSION DUE TO HYPOVOLEMIA: Status: RESOLVED | Noted: 2020-06-18 | Resolved: 2020-07-09

## 2020-07-09 PROBLEM — Z82.61 FAMILY HISTORY OF RHEUMATOID ARTHRITIS: Status: RESOLVED | Noted: 2019-12-09 | Resolved: 2020-07-09

## 2020-07-09 PROBLEM — E86.1 HYPOTENSION DUE TO HYPOVOLEMIA: Status: RESOLVED | Noted: 2020-06-18 | Resolved: 2020-07-09

## 2020-07-09 PROCEDURE — 3074F PR MOST RECENT SYSTOLIC BLOOD PRESSURE < 130 MM HG: ICD-10-PCS | Mod: CPTII,S$GLB,, | Performed by: INTERNAL MEDICINE

## 2020-07-09 PROCEDURE — 99214 PR OFFICE/OUTPT VISIT, EST, LEVL IV, 30-39 MIN: ICD-10-PCS | Mod: S$GLB,,, | Performed by: INTERNAL MEDICINE

## 2020-07-09 PROCEDURE — 99999 PR PBB SHADOW E&M-EST. PATIENT-LVL V: ICD-10-PCS | Mod: PBBFAC,,, | Performed by: INTERNAL MEDICINE

## 2020-07-09 PROCEDURE — 99214 OFFICE O/P EST MOD 30 MIN: CPT | Mod: S$GLB,,, | Performed by: INTERNAL MEDICINE

## 2020-07-09 PROCEDURE — 3078F DIAST BP <80 MM HG: CPT | Mod: CPTII,S$GLB,, | Performed by: INTERNAL MEDICINE

## 2020-07-09 PROCEDURE — 3078F PR MOST RECENT DIASTOLIC BLOOD PRESSURE < 80 MM HG: ICD-10-PCS | Mod: CPTII,S$GLB,, | Performed by: INTERNAL MEDICINE

## 2020-07-09 PROCEDURE — 3051F PR MOST RECENT HEMOGLOBIN A1C LEVEL 7.0 - < 8.0%: ICD-10-PCS | Mod: CPTII,S$GLB,, | Performed by: INTERNAL MEDICINE

## 2020-07-09 PROCEDURE — 99999 PR PBB SHADOW E&M-EST. PATIENT-LVL V: CPT | Mod: PBBFAC,,, | Performed by: INTERNAL MEDICINE

## 2020-07-09 PROCEDURE — 3051F HG A1C>EQUAL 7.0%<8.0%: CPT | Mod: CPTII,S$GLB,, | Performed by: INTERNAL MEDICINE

## 2020-07-09 PROCEDURE — 3074F SYST BP LT 130 MM HG: CPT | Mod: CPTII,S$GLB,, | Performed by: INTERNAL MEDICINE

## 2020-07-09 PROCEDURE — 3008F BODY MASS INDEX DOCD: CPT | Mod: CPTII,S$GLB,, | Performed by: INTERNAL MEDICINE

## 2020-07-09 PROCEDURE — 3008F PR BODY MASS INDEX (BMI) DOCUMENTED: ICD-10-PCS | Mod: CPTII,S$GLB,, | Performed by: INTERNAL MEDICINE

## 2020-07-09 NOTE — PROGRESS NOTES
Subjective     Gio Forrest is a 54 y.o. old, male here for Follow-up (discuss labs)    Patient is here to discuss labs and for follow-up on chronic medical problems.  New to me.  He is a 54-year-old with past medical history of insulin-dependent type 2 diabetes with neuropathy, hypertension, hyperlipidemia, SVT, [place], obstructive sleep apnea on CPAP, obesity, ED, gout.  We discussed his recent lab results.  Diabetes fairly well controlled, has an insulin pump, followed by endocrinology.  Total testosterone level is low.  This was taken at 10:00 a.m. in the morning.  He has not been consistently wearing his CPAP.  He has had multiple surgeries in the past year.  He is compliant with all of his medications.  He is followed by urology for ED.  He has a remote history of being on testosterone replacement therapy.    Review of Systems   Constitutional: Negative.    Respiratory: Negative.    Cardiovascular: Negative.    Musculoskeletal: Positive for back pain, joint pain and neck pain.     Medications     Outpatient Medications Marked as Taking for the 7/9/20 encounter (Office Visit) with Neel Santizo MD   Medication Sig Dispense Refill    allopurinol (ZYLOPRIM) 100 MG tablet TAKE ONE TABLET BY MOUTH EVERY DAY (Patient taking differently: Take 100 mg by mouth once daily. ) 90 tablet 0    aspirin (ECOTRIN) 81 MG EC tablet Take 81 mg by mouth once daily.      atorvastatin (LIPITOR) 40 MG tablet TAKE ONE TABLET BY MOUTH EVERY EVENING (Patient taking differently: Take 40 mg by mouth every evening. ) 90 tablet 1    blood sugar diagnostic (CONTOUR NEXT TEST STRIPS) Strp Use to Test 4 times a day.  Contour Next test strips required for Medtronic insulin pump 400 strip 4    citalopram (CELEXA) 40 MG tablet TAKE ONE TABLET BY MOUTH DAILY (Patient taking differently: Take 40 mg by mouth once daily. ) 90 tablet 0    clonazePAM (KLONOPIN) 0.5 MG tablet Take 0.5 mg by mouth once daily.       cyclobenzaprine  "(FLEXERIL) 10 MG tablet Take 1 tablet by mouth 2 (two) times daily.      desloratadine (CLARINEX) 5 mg tablet TAKE ONE TABLET BY MOUTH DAILY take for drip in back of throat (Patient taking differently: Take 5 mg by mouth once daily. ) 30 tablet 3    gabapentin (NEURONTIN) 300 MG capsule Take 300 mg by mouth every morning.       gabapentin (NEURONTIN) 300 MG capsule Take 900 mg by mouth every evening.       HYDROcodone-acetaminophen (NORCO)  mg per tablet Take 1 tablet by mouth every 6 (six) hours as needed for Pain (acute post-op pain). 20 tablet 0    infusion set for insulin pump (MINIMED PRO-SET INFUSION 24") ISet Changes site q2days, dispense 90day supply 45 each 4    insulin lispro 100 unit/mL injection Uses 150u/day in insulin pump (Patient taking differently: Inject 150 Units into the skin continuous. Uses 150u/day in insulin pump) 15 vial 4    insulin pump syringe (MINIMED SYRINGE RESERVOIR) 3 mL Misc Changes q2days, dispense 3 month supply 45 each 4    lancets Misc To be used with Contour Next as necessary with Medtronic insulin pump, uses 4 daily 400 each 4    meloxicam (MOBIC) 15 MG tablet Take 15 mg by mouth once daily.       metFORMIN (GLUCOPHAGE) 1000 MG tablet TAKE ONE TABLET BY MOUTH TWICE DAILY (Patient taking differently: Take 1,000 mg by mouth 2 (two) times daily with meals. ) 180 tablet 4    mometasone 0.1% (ELOCON) 0.1 % cream Apply topically daily as needed.      MULTIVIT,THER IRON,CA,FA & MIN (MULTIVITAMIN) Tab Take 1 tablet by mouth once daily.       ondansetron (ZOFRAN) 4 MG tablet Take 1 tablet (4 mg total) by mouth every 6 (six) hours as needed for Nausea. 20 tablet 0    pantoprazole (PROTONIX) 40 MG tablet TAKE ONE TABLET BY MOUTH EVERY DAY (Patient taking differently: Take 40 mg by mouth once daily. Do not crush, break, chew) 90 tablet 1    pioglitazone (ACTOS) 15 MG tablet Take 1 tablet (15 mg total) by mouth once daily. 90 tablet 3    pramipexole (MIRAPEX) 1 MG " "tablet TAKE TWO TABLETS BY MOUTH EVERY evening 60 tablet 5    semaglutide (OZEMPIC) 1 mg/dose (2 mg/1.5 mL) PnIj Inject 1 mg subq weekly (Patient taking differently: Inject into the skin every Sunday. Inject 1 mg subq weekly) 6 Syringe 4     Objective     /70 (BP Location: Right arm, Patient Position: Sitting)   Pulse 84   Temp 97.7 °F (36.5 °C) (Oral)   Ht 6' 2" (1.88 m)   Wt (!) 136.5 kg (300 lb 14.9 oz)   SpO2 96%   BMI 38.64 kg/m²   Physical Exam   Constitutional: He appears well-developed. No distress.   Cardiovascular: Normal rate, regular rhythm and intact distal pulses.   No murmur heard.  Pulmonary/Chest: Effort normal and breath sounds normal. No respiratory distress.   Abdominal:   obese     Assessment and Plan     1. Low serum testosterone level  - Testosterone Panel; Future    2. Essential hypertension    3. Type 2 diabetes mellitus with diabetic polyneuropathy, with long-term current use of insulin    4. Fatty liver    5. TARYN on CPAP    ___________________  Neel Santizo MD  Internal Medicine and Pediatrics    "

## 2020-07-15 ENCOUNTER — TELEPHONE (OUTPATIENT)
Dept: ENDOCRINOLOGY | Facility: CLINIC | Age: 55
End: 2020-07-15

## 2020-07-15 NOTE — TELEPHONE ENCOUNTER
Initiated PA for Contour Next test strips (required for Medtronic insulin pump)    Key: [id number] - PA Case ID: [id number]    Awaiting determination

## 2020-07-16 ENCOUNTER — LAB VISIT (OUTPATIENT)
Dept: LAB | Facility: HOSPITAL | Age: 55
End: 2020-07-16
Attending: INTERNAL MEDICINE
Payer: MEDICARE

## 2020-07-16 DIAGNOSIS — R79.89 LOW SERUM TESTOSTERONE LEVEL: ICD-10-CM

## 2020-07-16 PROCEDURE — 36415 COLL VENOUS BLD VENIPUNCTURE: CPT | Mod: PO

## 2020-07-16 PROCEDURE — 82040 ASSAY OF SERUM ALBUMIN: CPT

## 2020-07-21 ENCOUNTER — TELEPHONE (OUTPATIENT)
Dept: FAMILY MEDICINE | Facility: CLINIC | Age: 55
End: 2020-07-21

## 2020-07-21 NOTE — TELEPHONE ENCOUNTER
----- Message from Jossue Thakkar sent at 7/21/2020  8:09 AM CDT -----  Type: Needs Medical Advice    Who Called:  Patient  Best Call Back Number: [phone]  Additional Information: Patient would like to discuss test results. Please call to advise. Thanks!

## 2020-07-21 NOTE — TELEPHONE ENCOUNTER
----- Message from Ivon Carrasco sent at 7/21/2020  3:34 PM CDT -----  Regarding: return call  Contact: self  Type:  Patient Returning Call    Who Called:  self  Who Left Message for Patient:  Pat  Does the patient know what this is regarding?:    Best Call Back Number:  [phone]  Additional Information:

## 2020-07-23 LAB
ALBUMIN SERPL-MCNC: 4.1 G/DL (ref 3.6–5.1)
SHBG SERPL-SCNC: 25 NMOL/L (ref 10–50)
TESTOST FREE SERPL-MCNC: 50 PG/ML (ref 46–224)
TESTOST SERPL-MCNC: 307 NG/DL (ref 250–1100)
TESTOSTERONE.FREE+WB SERPL-MCNC: 94.1 NG/DL (ref 110–575)

## 2020-07-23 RX ORDER — PEN NEEDLE, DIABETIC 30 GX3/16"
NEEDLE, DISPOSABLE MISCELLANEOUS
Qty: 200 EACH | Refills: 3 | Status: CANCELLED | OUTPATIENT
Start: 2020-07-23

## 2020-07-24 RX ORDER — INSULIN LISPRO 100 [IU]/ML
INJECTION, SOLUTION INTRAVENOUS; SUBCUTANEOUS
Qty: 15 VIAL | Refills: 4
Start: 2020-07-24 | End: 2020-10-19 | Stop reason: SDUPTHER

## 2020-07-24 RX ORDER — CITALOPRAM 40 MG/1
TABLET, FILM COATED ORAL
Qty: 90 TABLET | Refills: 1 | Status: SHIPPED | OUTPATIENT
Start: 2020-07-24 | End: 2020-10-19

## 2020-07-29 ENCOUNTER — OFFICE VISIT (OUTPATIENT)
Dept: BARIATRICS | Facility: CLINIC | Age: 55
End: 2020-07-29
Payer: MEDICARE

## 2020-07-29 VITALS
RESPIRATION RATE: 16 BRPM | WEIGHT: 306 LBS | HEIGHT: 75 IN | TEMPERATURE: 98 F | HEART RATE: 85 BPM | BODY MASS INDEX: 38.05 KG/M2 | SYSTOLIC BLOOD PRESSURE: 114 MMHG | DIASTOLIC BLOOD PRESSURE: 57 MMHG

## 2020-07-29 DIAGNOSIS — E11.42 TYPE 2 DIABETES MELLITUS WITH DIABETIC POLYNEUROPATHY, WITH LONG-TERM CURRENT USE OF INSULIN: ICD-10-CM

## 2020-07-29 DIAGNOSIS — Z79.4 TYPE 2 DIABETES MELLITUS WITH DIABETIC POLYNEUROPATHY, WITH LONG-TERM CURRENT USE OF INSULIN: ICD-10-CM

## 2020-07-29 DIAGNOSIS — G47.33 OSA ON CPAP: ICD-10-CM

## 2020-07-29 DIAGNOSIS — I10 ESSENTIAL HYPERTENSION: ICD-10-CM

## 2020-07-29 DIAGNOSIS — E66.01 CLASS 2 SEVERE OBESITY DUE TO EXCESS CALORIES WITH SERIOUS COMORBIDITY AND BODY MASS INDEX (BMI) OF 39.0 TO 39.9 IN ADULT: ICD-10-CM

## 2020-07-29 DIAGNOSIS — E66.01 MORBID OBESITY: Primary | ICD-10-CM

## 2020-07-29 DIAGNOSIS — K76.0 FATTY LIVER: ICD-10-CM

## 2020-07-29 PROCEDURE — 3008F BODY MASS INDEX DOCD: CPT | Mod: CPTII,S$GLB,, | Performed by: SURGERY

## 2020-07-29 PROCEDURE — 99215 OFFICE O/P EST HI 40 MIN: CPT | Mod: S$GLB,,, | Performed by: SURGERY

## 2020-07-29 PROCEDURE — 3078F DIAST BP <80 MM HG: CPT | Mod: CPTII,S$GLB,, | Performed by: SURGERY

## 2020-07-29 PROCEDURE — 3051F HG A1C>EQUAL 7.0%<8.0%: CPT | Mod: CPTII,S$GLB,, | Performed by: SURGERY

## 2020-07-29 PROCEDURE — 99999 PR PBB SHADOW E&M-EST. PATIENT-LVL V: CPT | Mod: PBBFAC,,, | Performed by: SURGERY

## 2020-07-29 PROCEDURE — 3074F SYST BP LT 130 MM HG: CPT | Mod: CPTII,S$GLB,, | Performed by: SURGERY

## 2020-07-29 PROCEDURE — 3074F PR MOST RECENT SYSTOLIC BLOOD PRESSURE < 130 MM HG: ICD-10-PCS | Mod: CPTII,S$GLB,, | Performed by: SURGERY

## 2020-07-29 PROCEDURE — 3051F PR MOST RECENT HEMOGLOBIN A1C LEVEL 7.0 - < 8.0%: ICD-10-PCS | Mod: CPTII,S$GLB,, | Performed by: SURGERY

## 2020-07-29 PROCEDURE — 3078F PR MOST RECENT DIASTOLIC BLOOD PRESSURE < 80 MM HG: ICD-10-PCS | Mod: CPTII,S$GLB,, | Performed by: SURGERY

## 2020-07-29 PROCEDURE — 3008F PR BODY MASS INDEX (BMI) DOCUMENTED: ICD-10-PCS | Mod: CPTII,S$GLB,, | Performed by: SURGERY

## 2020-07-29 PROCEDURE — 99215 PR OFFICE/OUTPT VISIT, EST, LEVL V, 40-54 MIN: ICD-10-PCS | Mod: S$GLB,,, | Performed by: SURGERY

## 2020-07-29 PROCEDURE — 99999 PR PBB SHADOW E&M-EST. PATIENT-LVL V: ICD-10-PCS | Mod: PBBFAC,,, | Performed by: SURGERY

## 2020-07-29 RX ORDER — METOPROLOL TARTRATE 25 MG/1
25 TABLET, FILM COATED ORAL 2 TIMES DAILY
COMMUNITY
End: 2020-09-23

## 2020-07-29 RX ORDER — TIZANIDINE 4 MG/1
TABLET ORAL
COMMUNITY
Start: 2020-07-22 | End: 2020-09-04 | Stop reason: CLARIF

## 2020-07-29 NOTE — LETTER
July 29, 2020      Gabby Rooney, DNP, NP  [address]  [facility] [address]           [facility]  [address]  [place] [address]  Phone: [phone]  Fax: [phone]          Patient: Gio Forrest   MR Number: [id number]   YOB: 1965   Date of Visit: 7/29/2020       Dear Gabby Rooney:    Thank you for referring Gio Forrest to me for evaluation. Attached you will find relevant portions of my assessment and plan of care.    If you have questions, please do not hesitate to call me. I look forward to following Gio Forrest along with you.    Sincerely,    Bharat Devine MD    Enclosure  CC:  No Recipients    If you would like to receive this communication electronically, please contact [email or web address] or [phone] to request more information on [employer] Link access.    For providers and/or their staff who would like to refer a patient to [facility], please contact us through our one-stop-shop provider referral line, [facility], at [phone].    If you feel you have received this communication in error or would no longer like to receive these types of communications, please e-mail [email or web address]

## 2020-07-29 NOTE — PROGRESS NOTES
Initial Consult    Chief Complaint   Patient presents with    Obesity       History of Present Illness:  Patient is a 54 y.o. male who is referred for evaluation of surgical treatment of morbid obesity. His Body mass index is 38.76 kg/m². He has known comorbidities of diabetes mellitus, dyslipidemia, hypertension and obstructive sleep apnea. He has attended the bariatric seminar and is most interested in gastric sleeve surgery.      Past attempts at weight loss include: Unsupervised:  Decreased eating;  Supervised:  None;  Diet pills:  None;  Exercise attempts:  Swimming    Weight history:   At current weight:  15 years  Obese for over 15 years.  More than 35 pounds overweight for over 15 years.  More than 100 pounds overweight for 15 years.  Maximum weight reached:  320 lb  Most weight lost was 20 lb through decreased eating for 3 to 6 months.  He describes His eating habits as sweet eater, snacker/grazer.    TARYN screening: wears mask and has machine    Reflux screening: takes protonix     Review of patient's allergies indicates:  No Known Allergies    Current Outpatient Medications   Medication Sig Dispense Refill    allopurinol (ZYLOPRIM) 100 MG tablet TAKE ONE TABLET BY MOUTH EVERY DAY (Patient taking differently: Take 100 mg by mouth once daily. ) 90 tablet 0    aspirin (ECOTRIN) 81 MG EC tablet Take 81 mg by mouth once daily.      atorvastatin (LIPITOR) 40 MG tablet TAKE ONE TABLET BY MOUTH EVERY EVENING (Patient taking differently: Take 40 mg by mouth every evening. ) 90 tablet 1    blood sugar diagnostic (CONTOUR NEXT TEST STRIPS) Strp Use to Test 4 times a day.  Contour Next test strips required for Medtronic insulin pump 400 strip 4    blood-glucose meter kit To check BG 2 times daily, to use with insurance preferred meter 1 each 1    citalopram (CELEXA) 40 MG tablet TAKE ONE TABLET BY MOUTH DAILY 90 tablet 1    clonazePAM (KLONOPIN) 0.5 MG tablet Take 0.5 mg by mouth once daily.       gabapentin  "(NEURONTIN) 300 MG capsule Take 300 mg by mouth every morning.       gabapentin (NEURONTIN) 300 MG capsule Take 900 mg by mouth every evening.       HYDROcodone-acetaminophen (NORCO)  mg per tablet Take 1 tablet by mouth every 6 (six) hours as needed for Pain (acute post-op pain). 20 tablet 0    infusion set for insulin pump (MINIMED PRO-SET INFUSION 24") ISet Changes site q2days, dispense 90day supply 45 each 4    insulin lispro 100 unit/mL injection Uses 150u/day in insulin pump 15 vial 4    insulin pump syringe (MIN[employer] SYRINGE RESERVOIR) 3 mL Misc Changes q2days, dispense 3 month supply 45 each 4    lancets Misc To be used with Contour Next as necessary with [email or web address] insulin pump, uses 4 daily 400 each 4    meloxicam (MOBIC) 15 MG tablet Take 15 mg by mouth once daily.       metFORMIN (GLUCOPHAGE) 1000 MG tablet TAKE ONE TABLET BY MOUTH TWICE DAILY (Patient taking differently: Take 1,000 mg by mouth 2 (two) times daily with meals. ) 180 tablet 4    metoprolol tartrate (LOPRESSOR) 25 MG tablet metoprolol tartrate 25 mg tablet   Take 1 tablet twice a day by oral route.      MULTIVIT,THER IRON,CA,FA & MIN (MULTIVITAMIN) Tab Take 1 tablet by mouth once daily.       ondansetron (ZOFRAN) 4 MG tablet Take 1 tablet (4 mg total) by mouth every 6 (six) hours as needed for Nausea. 20 tablet 0    pantoprazole (PROTONIX) 40 MG tablet TAKE ONE TABLET BY MOUTH EVERY DAY 90 tablet 3    pioglitazone (ACTOS) 15 MG tablet Take 1 tablet (15 mg total) by mouth once daily. 90 tablet 3    pramipexole (MIRAPEX) 1 MG tablet TAKE TWO TABLETS BY MOUTH EVERY evening 60 tablet 5    semaglutide (OZEMPIC) 1 mg/dose (2 mg/1.5 mL) PnIj Inject 1 mg subq weekly (Patient taking differently: Inject into the skin every Sunday. Inject 1 mg subq weekly) 6 Syringe 4    tiZANidine (ZANAFLEX) 4 MG tablet       desloratadine (CLARINEX) 5 mg tablet TAKE ONE TABLET BY MOUTH DAILY take for drip in back of throat (Patient not taking: " No sig reported) 30 tablet 3    mometasone 0.1% (ELOCON) 0.1 % cream Apply topically daily as needed.       No current facility-administered medications for this visit.        Past Medical History:   Diagnosis Date    Cardiomyopathy     Depression     Diabetes     Dyslipidemia 8/12/2015    Gout 8/12/2015    Hypertension     Idiopathic gout     Lumbar pseudoarthrosis     LINDSEY (nonalcoholic steatohepatitis)     Obesity 8/12/2015    Obstructive sleep apnea 8/12/2015    Restless leg syndrome     Sebaceous cyst 4/18/2017    Type 2 diabetes mellitus 8/12/2015     Past Surgical History:   Procedure Laterality Date    BACK SURGERY      x2    CARDIAC CATHETERIZATION      CERVICAL SPINE SURGERY      CHOLECYSTECTOMY  05/30/2018    Dr. ROGELIO Tao, [facility]     COLONOSCOPY  2008    COLONOSCOPY N/A 9/14/2017    Procedure: COLONOSCOPY;  Surgeon: Obi Beltre MD;  Location: [facility] ENDO;  Service: Endoscopy;  Laterality: N/A;    CORONARY ANGIOGRAPHY Left 5/28/2018    Procedure: Coronary angiography;  Surgeon: Rafiq Malik MD;  Location: [facility] CATH;  Service: Cardiology;  Laterality: Left;    ELBOW SURGERY Bilateral     HERNIA REPAIR      LAPAROSCOPIC APPENDECTOMY N/A 6/25/2020    Procedure: APPENDECTOMY, LAPAROSCOPIC;  Surgeon: Gordon Can MD;  Location: [facility] OR;  Service: General;  Laterality: N/A;    LAPAROSCOPIC CHOLECYSTECTOMY N/A 5/30/2018    Procedure: CHOLECYSTECTOMY, LAPAROSCOPIC;  Surgeon: Fletcher Tao MD;  Location: [facility] OR;  Service: General;  Laterality: N/A;    LEFT HEART CATHETERIZATION Left 5/28/2018    Procedure: Left heart cath;  Surgeon: Rafiq Malik MD;  Location: [facility] CATH;  Service: Cardiology;  Laterality: Left;    NECK SURGERY      SHOULDER ARTHROSCOPY      SPINE SURGERY      lumbar x2    TONSILLECTOMY       Family History   Problem Relation Age of Onset    Diabetes Mother     Depression Mother     Liver cancer Mother     Obesity Mother     Heart  disease Father     Anuerysm Father     Diabetes Father     Stroke Father     Glaucoma Paternal Grandmother     Obesity Sister      Social History     Tobacco Use    Smoking status: Former Smoker     Packs/day: 0.50     Types: Cigarettes     Quit date:      Years since quittin.5    Smokeless tobacco: Never Used   Substance Use Topics    Alcohol use: No     Alcohol/week: 0.0 standard drinks    Drug use: No        Chart review:    2020:  [facility]:  Family Medicine:  Treated for low testosterone, essential hypertension, fatty liver, diabetes type 2, obstructive sleep apnea on CPAP    Lab review:    Lab Results   Component Value Date    TSH 3.200 2019     Lab Results   Component Value Date    HGBA1C 7.8 (H) 2020         Radiology review:    2020:  CT abdomen pelvis:  Images independently reviewed:  Dilated thickened appendix, umbilical hernia, no obvious hiatal hernia, hepatic steatosis    Review of Systems:  Review of Systems   Constitutional: Positive for activity change, appetite change and fatigue. Negative for chills, diaphoresis and fever.   HENT: Negative for rhinorrhea, sinus pressure and tinnitus.    Eyes: Negative for visual disturbance.   Respiratory: Positive for chest tightness. Negative for cough, choking, shortness of breath, wheezing and stridor.    Cardiovascular: Positive for chest pain, palpitations and leg swelling.   Gastrointestinal: Positive for constipation. Negative for abdominal distention, abdominal pain, blood in stool, diarrhea, nausea, rectal pain and vomiting.   Endocrine: Negative for cold intolerance and heat intolerance.   Genitourinary: Negative for difficulty urinating and flank pain.   Musculoskeletal: Positive for arthralgias, back pain, joint swelling, neck pain and neck stiffness. Negative for gait problem.   Skin: Negative for color change and rash.   Neurological: Positive for numbness. Negative for dizziness, tremors, seizures and  "syncope.   Hematological: Does not bruise/bleed easily.   Psychiatric/Behavioral: Negative for confusion and decreased concentration.       Physical:     Vital Signs (Most Recent)  Temp: 98 °F (36.7 °C) (07/29/20 1315)  Pulse: 85 (07/29/20 1315)  Resp: 16 (07/29/20 1315)  BP: (!) 114/57 (07/29/20 1315)  6' 2.5" (1.892 m)  (!) 138.8 kg (306 lb)     Body comp:  Fat Percent:  48.6 %  Fat Mass:  147 lb  FFM:  155.4 lb  BMR: 2223 kcal    Physical Exam:  Physical Exam  Vitals signs and nursing note reviewed.   Constitutional:       General: He is not in acute distress.     Appearance: He is not diaphoretic.   HENT:      Head: Atraumatic.   Eyes:      General: No scleral icterus.     Conjunctiva/sclera: Conjunctivae normal.      Pupils: Pupils are equal, round, and reactive to light.   Neck:      Musculoskeletal: Normal range of motion and neck supple.      Thyroid: No thyromegaly.      Vascular: No JVD.      Trachea: No tracheal deviation.   Cardiovascular:      Rate and Rhythm: Normal rate and regular rhythm.      Heart sounds: Normal heart sounds. No murmur. No friction rub. No gallop.    Pulmonary:      Effort: Pulmonary effort is normal. No respiratory distress.      Breath sounds: Normal breath sounds. No wheezing or rales.   Chest:      Chest wall: No tenderness.   Abdominal:      General: Bowel sounds are normal. There is no distension.      Palpations: Abdomen is soft. There is no mass.      Tenderness: There is no abdominal tenderness. There is no guarding or rebound.       Musculoskeletal: Normal range of motion.         General: No tenderness.   Skin:     General: Skin is warm and dry.   Neurological:      Mental Status: He is alert and oriented to person, place, and time.      Cranial Nerves: No cranial nerve deficit.         ASSESSMENT/PLAN:        1. Morbid obesity  EKG 12-lead    Ambulatory referral/consult to Psychiatry    [place] Upper GI   2. Class 2 severe obesity due to excess calories with serious " comorbidity and body mass index (BMI) of 39.0 to 39.9 in adult  Ambulatory referral/consult to Bariatric Surgery   3. Type 2 diabetes mellitus with diabetic polyneuropathy, with long-term current use of insulin  Ambulatory referral/consult to Nutrition Services    Ambulatory referral/consult to Cardiology   4. Fatty liver     5. Essential hypertension     6. TARYN on CPAP         Plan:  Gio Forrest has morbid obesity as their Body mass index is 38.76 kg/m². He would benefit from weight loss surgery and has chosen gastric sleeve surgery as the preferred procedure. He understands that this is a tool and lifestyle change will be necessary to keep weight off. I went over possible complications of all surgeries with the patient and he is agreeable to surgery.    MSD 0/3     He will need:    Labs  EKG  UGI   dietary consult  psych consult   Seminar    I will obtain the following clearances prior to surgery: cardiology       Diet plan: high protein low carb- mainly meats and vegetables  Exercise plan: Cardiovascular exercise, get HR over 100 for 20 minutes 3 times per week.  Start multivitamin

## 2020-08-14 ENCOUNTER — TELEPHONE (OUTPATIENT)
Dept: OTOLARYNGOLOGY | Facility: CLINIC | Age: 55
End: 2020-08-14

## 2020-08-14 ENCOUNTER — CLINICAL SUPPORT (OUTPATIENT)
Dept: BARIATRICS | Facility: CLINIC | Age: 55
End: 2020-08-14
Payer: MEDICARE

## 2020-08-14 DIAGNOSIS — Z71.3 DIETARY COUNSELING AND SURVEILLANCE: Primary | ICD-10-CM

## 2020-08-14 PROCEDURE — 99999 PR PBB SHADOW E&M-EST. PATIENT-LVL II: ICD-10-PCS | Mod: PBBFAC,,, | Performed by: DIETITIAN, REGISTERED

## 2020-08-14 PROCEDURE — 99999 PR PBB SHADOW E&M-EST. PATIENT-LVL II: CPT | Mod: PBBFAC,,, | Performed by: DIETITIAN, REGISTERED

## 2020-08-14 NOTE — TELEPHONE ENCOUNTER
----- Message from Ivon Carrasco sent at 8/14/2020  2:21 PM CDT -----  Regarding: appointment  Contact: self  Type:  Same Day Appointment Request    Caller is requesting a same day appointment.  Caller declined first available appointment listed below.      Name of Caller:  self  When is the first available appointment?    Symptoms:  earache  Best Call Back Number:  [phone]  Additional Information:

## 2020-08-14 NOTE — TELEPHONE ENCOUNTER
First available appt scheduled, 8/18/2020 at 9:40am with Anjali Parker, patient states the appt date and time will work for him.

## 2020-08-14 NOTE — PROGRESS NOTES
"NUTRITIONAL CONSULT    Referring Physician: Dr. Devine  Reason for MNT Referral: Initial assessment for sleeve gastrectomy work-up    PAST MEDICAL HISTORY:   54 y.o. male  Body mass index is 39.85 kg/m² (pended)..    Past attempts at weight loss include: Unsupervised:  Decreased eating;  Supervised:  None;  Diet pills:  None;  Exercise attempts:  Swimming     Weight history:   At current weight:  15 years  Obese for over 15 years.  More than 35 pounds overweight for over 15 years.  More than 100 pounds overweight for 15 years.  Maximum weight reached:  320 lb  Most weight lost was 20 lb through decreased eating for 3 to 6 months.  He describes His eating habits as sweet eater, snacker/grazer.    Past Medical History:   Diagnosis Date    Cardiomyopathy     Depression     Diabetes     Dyslipidemia 8/12/2015    Gout 8/12/2015    Hypertension     Idiopathic gout     Lumbar pseudoarthrosis     LINDSEY (nonalcoholic steatohepatitis)     Obesity 8/12/2015    Obstructive sleep apnea 8/12/2015    Restless leg syndrome     Sebaceous cyst 4/18/2017    Type 2 diabetes mellitus 8/12/2015       CLINICAL DATA:  54 y.o.-year-old White male.  Height: 6'2.5  Weight: 310 lbs  IBW: 178 lbs  BMI: 39.85  The patient's goal weight (50% EBW): 244 lbs  Personal goal weight: "the weight where I can put on my socks and shoes without pain.     Goal for Bariatric Surgery: manage diabetes    NUTRITIONAL NEEDS:  2330 Calories (using [place] [address] Jeor Equation)  60-80 Grams Protein per ASMBS guidelines    NUTRITION & HEALTH HISTORY:  Greatest challenge: sweets    Current diet recall: Breakfast: eggs, sausage, grits usually but has stopped eating grits since his assessment with Dr. Devine, Premier Protein shake, or Slim Fast.  Lunch: skips  Dinner:  salad, rotel roasted chicken, bunless hamburger, protein/vegetable  Snacks: excessive, junk food  Fluids: tea where 1/2 is sugar and 1/2 is diet, 2-3 bottles of water, Diet Cola   "   Current Diet:  Meal pattern:   Protein supplements: 0-1  Snacking: excessive; junk food  Vegetables: Likes a variety. Eats 2-3 times per week.  Fruits: Likes a variety. Eats rarely.  Beverages: water, diet soda, sweet tea and diet tea  Dining out: Reduced lately.   Cooking at home: Daily. Mostly baked and grilled meat, fish and vegetables.    Exercise:  Past exercise: Adequate    Current exercise: Adequate  Restrictions to exercise: none    Vitamins / Minerals / Herbs:   M/V, vitamin C, B12       Labs:   Reviewed A1C 7.8% 6/20.  Low H/H.    Social:  Works regular daytime shifts.  Lives with alone  Grocery shopping and food prep self  Patient believes the family will be supportive after surgery.  Alcohol: None.  Smoking: None.    ASSESSMENT:  · Patient reports attempts at weight loss, only to regain lost weight.  · Patient demonstrated knowledge of healthy eating behaviors and exercise patterns; admits to not eating healthy and not exercising at this point.  · Patient states willingness to change lifestyle and make behavior modifications as evidenced by good exercise, dietary changes, reduced dining out and healthier cooking at home.        Barriers to Education: none    Stage of change: action    NUTRITION DIAGNOSIS:     Morbid Obesity related to Food and nutrition related knowledge deficit as evidence by BMI.    BARIATRIC DIET DISCUSSION/PLAN:  Discussed diet after surgery and related to patient's food record.  Reviewed nutrition guidelines for before and after surgery.  Answered all questions.  eliminate junk food.  Opt for protein based snacks if hungry in between meals.    RECOMMENDATIONS:  Patient is a good candidate for bariatric surgery.    Needs additional visit(s) with RD.    Patient verbalized understanding.    Expect good  compliance after surgery at this time.    Communicated nutrition plan with bariatric team.    SESSION TIME:  60 minutes

## 2020-08-19 ENCOUNTER — HOSPITAL ENCOUNTER (OUTPATIENT)
Dept: RADIOLOGY | Facility: HOSPITAL | Age: 55
Discharge: HOME | End: 2020-08-19
Attending: SURGERY
Payer: MEDICARE

## 2020-08-19 ENCOUNTER — OFFICE VISIT (OUTPATIENT)
Dept: OTOLARYNGOLOGY | Facility: CLINIC | Age: 55
End: 2020-08-19
Payer: MEDICARE

## 2020-08-19 VITALS — WEIGHT: 308.63 LBS | BODY MASS INDEX: 39.61 KG/M2 | HEIGHT: 74 IN

## 2020-08-19 DIAGNOSIS — E66.01 MORBID OBESITY: ICD-10-CM

## 2020-08-19 DIAGNOSIS — L29.9 ITCHING OF EAR: Primary | ICD-10-CM

## 2020-08-19 DIAGNOSIS — H61.23 BILATERAL IMPACTED CERUMEN: ICD-10-CM

## 2020-08-19 PROCEDURE — 99999 PR PBB SHADOW E&M-EST. PATIENT-LVL V: ICD-10-PCS | Mod: PBBFAC,,, | Performed by: NURSE PRACTITIONER

## 2020-08-19 PROCEDURE — 74240 FL UPPER GI: ICD-10-PCS | Mod: 26,,, | Performed by: RADIOLOGY

## 2020-08-19 PROCEDURE — A9698 NON-RAD CONTRAST MATERIALNOC: HCPCS | Mod: PO | Performed by: SURGERY

## 2020-08-19 PROCEDURE — 69210 REMOVE IMPACTED EAR WAX UNI: CPT | Mod: S$GLB,,, | Performed by: NURSE PRACTITIONER

## 2020-08-19 PROCEDURE — 74240 X-RAY XM UPR GI TRC 1CNTRST: CPT | Mod: TC,FY,PO

## 2020-08-19 PROCEDURE — 3008F BODY MASS INDEX DOCD: CPT | Mod: CPTII,S$GLB,, | Performed by: NURSE PRACTITIONER

## 2020-08-19 PROCEDURE — 99203 OFFICE O/P NEW LOW 30 MIN: CPT | Mod: 25,S$GLB,, | Performed by: NURSE PRACTITIONER

## 2020-08-19 PROCEDURE — 74240 X-RAY XM UPR GI TRC 1CNTRST: CPT | Mod: 26,,, | Performed by: RADIOLOGY

## 2020-08-19 PROCEDURE — 3008F PR BODY MASS INDEX (BMI) DOCUMENTED: ICD-10-PCS | Mod: CPTII,S$GLB,, | Performed by: NURSE PRACTITIONER

## 2020-08-19 PROCEDURE — 25500020 PHARM REV CODE 255: Mod: PO | Performed by: SURGERY

## 2020-08-19 PROCEDURE — 69210 PR REMOVAL IMPACTED CERUMEN REQUIRING INSTRUMENTATION, UNILATERAL: ICD-10-PCS | Mod: S$GLB,,, | Performed by: NURSE PRACTITIONER

## 2020-08-19 PROCEDURE — 99203 PR OFFICE/OUTPT VISIT, NEW, LEVL III, 30-44 MIN: ICD-10-PCS | Mod: 25,S$GLB,, | Performed by: NURSE PRACTITIONER

## 2020-08-19 PROCEDURE — 99999 PR PBB SHADOW E&M-EST. PATIENT-LVL V: CPT | Mod: PBBFAC,,, | Performed by: NURSE PRACTITIONER

## 2020-08-19 RX ORDER — TESTOSTERONE CYPIONATE 200 MG/ML
400 INJECTION, SOLUTION INTRAMUSCULAR
COMMUNITY
Start: 2020-08-11 | End: 2022-04-25

## 2020-08-19 RX ORDER — BENAZEPRIL HYDROCHLORIDE 5 MG/1
TABLET ORAL
COMMUNITY
Start: 2020-08-11 | End: 2020-09-04 | Stop reason: CLARIF

## 2020-08-19 RX ORDER — OXYCODONE AND ACETAMINOPHEN 10; 325 MG/1; MG/1
TABLET ORAL
COMMUNITY
Start: 2020-08-13 | End: 2020-09-04 | Stop reason: CLARIF

## 2020-08-19 RX ORDER — TOPIRAMATE 25 MG/1
25 TABLET ORAL 2 TIMES DAILY
COMMUNITY
Start: 2020-08-11 | End: 2020-09-04 | Stop reason: CLARIF

## 2020-08-19 RX ADMIN — BARIUM SULFATE 355 ML: 0.6 SUSPENSION ORAL at 09:08

## 2020-08-19 RX ADMIN — Medication: at 09:08

## 2020-08-19 NOTE — PATIENT INSTRUCTIONS
Debrox (over-the-counter) ear wax softener  Once week    Recommended treatments for chronically itchy ears:  1. Coconut oil  2. Hydrocortisone-type (steroid) lotion, either over-the-counter or prescription  3. Fluocinolone-type (steroid) drops, prescription  4. Keep ears dry to prevent fungal infection. If fungal elements are present, an antifungal treatment may be prescribed.     Itchy Ears:  Inexpensive treatment -- coconut oil, mineral oil, baby oil. Use an eye dropper to place a drop or two into each ear every night.   Less inexpensive treatment -- DermOtic prescription ear drops containing a hydrocortisone/anti-itch ingredient in a glycerin-based solution made for chronic dermatitis conditions of the ear (psoriasis, eczema, etc.)

## 2020-08-19 NOTE — PROGRESS NOTES
Subjective:       Patient ID: Gio Forrest is a 54 y.o. male.    Chief Complaint: No chief complaint on file.    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   Patient is self-referred for itchy ears. Suspects wax build up. Gets dry flaky white skin from his ears. Wants to ensure no infection or treatable issue.     Review of Systems   Constitutional: Negative.    HENT: Negative.    Eyes: Negative.    Respiratory: Negative.    Cardiovascular: Negative.    Gastrointestinal: Negative.    Musculoskeletal: Negative.    Integumentary:  Negative.   Neurological: Negative.    Hematological: Negative.    Psychiatric/Behavioral: Negative.          Objective:      Physical Exam  Vitals signs and nursing note reviewed.   Constitutional:       General: He is not in acute distress.     Appearance: He is well-developed. He is not ill-appearing or diaphoretic.   HENT:      Head: Normocephalic and atraumatic.      Right Ear: Hearing, tympanic membrane, ear canal and external ear normal. No middle ear effusion. Tympanic membrane is not erythematous.      Left Ear: Hearing, tympanic membrane, ear canal and external ear normal.  No middle ear effusion. Tympanic membrane is not erythematous.      Nose: Nose normal.      Mouth/Throat:      Pharynx: Uvula midline.   Eyes:      General: Lids are normal. No scleral icterus.        Right eye: No discharge.         Left eye: No discharge.   Neck:      Musculoskeletal: Normal range of motion and neck supple.      Trachea: Trachea normal. No tracheal deviation.   Cardiovascular:      Rate and Rhythm: Normal rate.   Pulmonary:      Effort: Pulmonary effort is normal. No respiratory distress.      Breath sounds: No stridor. No wheezing.   Musculoskeletal: Normal range of motion.   Skin:     General: Skin is warm and dry.      Coloration: Skin is not pale.   Neurological:      Mental Status: He is alert and oriented  to person, place, and time.      Coordination: Coordination normal.      Gait: Gait normal.   Psychiatric:         Speech: Speech normal.         Behavior: Behavior normal. Behavior is cooperative.         Thought Content: Thought content normal.         Judgment: Judgment normal.         SEPARATE PROCEDURE IN OFFICE:   Procedure: Removal of impacted cerumen, bilateral   Pre Procedure Diagnosis: Cerumen Impaction   Post Procedure Diagnosis: Cerumen Impaction   Verbal informed consent in regards to risk of trauma to ear canal, ear drum or hearing, discomfort during procedure and/or inability to remove cerumen impaction in one session or unforeseen events or complications.   No anesthesia.     Procedure in detail:   Ear canal visualized bilateral with appropriate size ear speculum utilizing Operating Head Binocular Otomicroscope   Utilizing the following:  Ring curet, delicate alligator forceps, and/or suction cannula was used. The impacted cerumen of the ear canals was removed atraumatically. The TM and EAC were then inspected and found to be clear of wax. See description of TMs/EACs in PE above.   Complications: No   Condition: Improved/Good     Assessment:     Bilateral cerumen impactions removed    Chronic aural pruritis  Plan:     Baby oil or coconut oil prn pruritis    Return to clinic as needed for further ENT concerns

## 2020-09-04 PROBLEM — R07.2 PRECORDIAL PAIN: Status: ACTIVE | Noted: 2020-09-04

## 2020-09-04 PROBLEM — R07.9 CHEST PAIN: Status: ACTIVE | Noted: 2020-09-04

## 2020-09-04 PROBLEM — R07.9 CHEST PAIN: Status: RESOLVED | Noted: 2020-09-04 | Resolved: 2020-09-04

## 2020-09-10 ENCOUNTER — LAB VISIT (OUTPATIENT)
Dept: LAB | Facility: HOSPITAL | Age: 55
End: 2020-09-10
Attending: NURSE PRACTITIONER
Payer: MEDICARE

## 2020-09-10 DIAGNOSIS — E11.42 TYPE 2 DIABETES MELLITUS WITH DIABETIC POLYNEUROPATHY, WITH LONG-TERM CURRENT USE OF INSULIN: ICD-10-CM

## 2020-09-10 DIAGNOSIS — Z79.4 TYPE 2 DIABETES MELLITUS WITH DIABETIC POLYNEUROPATHY, WITH LONG-TERM CURRENT USE OF INSULIN: ICD-10-CM

## 2020-09-10 LAB — TSH SERPL DL<=0.005 MIU/L-ACNC: 1.82 UIU/ML (ref 0.4–4)

## 2020-09-10 PROCEDURE — 36415 COLL VENOUS BLD VENIPUNCTURE: CPT | Mod: PO

## 2020-09-10 PROCEDURE — 84443 ASSAY THYROID STIM HORMONE: CPT

## 2020-09-10 PROCEDURE — 83036 HEMOGLOBIN GLYCOSYLATED A1C: CPT

## 2020-09-11 LAB
ESTIMATED AVG GLUCOSE: 169 MG/DL (ref 68–131)
HBA1C MFR BLD HPLC: 7.5 % (ref 4–5.6)

## 2020-09-15 ENCOUNTER — OFFICE VISIT (OUTPATIENT)
Dept: ENDOCRINOLOGY | Facility: CLINIC | Age: 55
End: 2020-09-15
Payer: MEDICARE

## 2020-09-15 ENCOUNTER — PATIENT OUTREACH (OUTPATIENT)
Dept: ADMINISTRATIVE | Facility: OTHER | Age: 55
End: 2020-09-15

## 2020-09-15 ENCOUNTER — OFFICE VISIT (OUTPATIENT)
Dept: PODIATRY | Facility: CLINIC | Age: 55
End: 2020-09-15
Payer: MEDICARE

## 2020-09-15 VITALS
BODY MASS INDEX: 39.78 KG/M2 | WEIGHT: 309.94 LBS | HEIGHT: 74 IN | SYSTOLIC BLOOD PRESSURE: 130 MMHG | DIASTOLIC BLOOD PRESSURE: 70 MMHG | HEART RATE: 69 BPM | OXYGEN SATURATION: 98 %

## 2020-09-15 VITALS — BODY MASS INDEX: 39.59 KG/M2 | WEIGHT: 308.44 LBS | HEIGHT: 74 IN

## 2020-09-15 DIAGNOSIS — B35.1 ONYCHOMYCOSIS DUE TO DERMATOPHYTE: ICD-10-CM

## 2020-09-15 DIAGNOSIS — G47.33 OSA ON CPAP: ICD-10-CM

## 2020-09-15 DIAGNOSIS — E66.01 CLASS 2 SEVERE OBESITY DUE TO EXCESS CALORIES WITH SERIOUS COMORBIDITY AND BODY MASS INDEX (BMI) OF 39.0 TO 39.9 IN ADULT: ICD-10-CM

## 2020-09-15 DIAGNOSIS — E66.01 SEVERE OBESITY (BMI 35.0-39.9) WITH COMORBIDITY: ICD-10-CM

## 2020-09-15 DIAGNOSIS — E11.42 TYPE 2 DIABETES MELLITUS WITH DIABETIC POLYNEUROPATHY, WITH LONG-TERM CURRENT USE OF INSULIN: Primary | ICD-10-CM

## 2020-09-15 DIAGNOSIS — I10 ESSENTIAL HYPERTENSION: ICD-10-CM

## 2020-09-15 DIAGNOSIS — R20.2 PARESTHESIA OF FOOT, BILATERAL: ICD-10-CM

## 2020-09-15 DIAGNOSIS — E11.42 DIABETIC POLYNEUROPATHY ASSOCIATED WITH TYPE 2 DIABETES MELLITUS: Primary | ICD-10-CM

## 2020-09-15 DIAGNOSIS — Z79.4 TYPE 2 DIABETES MELLITUS WITH DIABETIC POLYNEUROPATHY, WITH LONG-TERM CURRENT USE OF INSULIN: Primary | ICD-10-CM

## 2020-09-15 DIAGNOSIS — E78.5 DYSLIPIDEMIA: ICD-10-CM

## 2020-09-15 DIAGNOSIS — K76.0 FATTY LIVER: ICD-10-CM

## 2020-09-15 PROCEDURE — 11721 PR DEBRIDEMENT OF NAILS, 6 OR MORE: ICD-10-PCS | Mod: Q9,S$GLB,, | Performed by: PODIATRIST

## 2020-09-15 PROCEDURE — 99999 PR PBB SHADOW E&M-EST. PATIENT-LVL II: CPT | Mod: PBBFAC,,, | Performed by: PODIATRIST

## 2020-09-15 PROCEDURE — 99999 PR PBB SHADOW E&M-EST. PATIENT-LVL II: ICD-10-PCS | Mod: PBBFAC,,, | Performed by: PODIATRIST

## 2020-09-15 PROCEDURE — 3051F HG A1C>EQUAL 7.0%<8.0%: CPT | Mod: CPTII,S$GLB,, | Performed by: NURSE PRACTITIONER

## 2020-09-15 PROCEDURE — 99214 OFFICE O/P EST MOD 30 MIN: CPT | Mod: S$GLB,,, | Performed by: NURSE PRACTITIONER

## 2020-09-15 PROCEDURE — 99214 PR OFFICE/OUTPT VISIT, EST, LEVL IV, 30-39 MIN: ICD-10-PCS | Mod: S$GLB,,, | Performed by: NURSE PRACTITIONER

## 2020-09-15 PROCEDURE — 3051F PR MOST RECENT HEMOGLOBIN A1C LEVEL 7.0 - < 8.0%: ICD-10-PCS | Mod: CPTII,S$GLB,, | Performed by: NURSE PRACTITIONER

## 2020-09-15 PROCEDURE — 99499 UNLISTED E&M SERVICE: CPT | Mod: S$GLB,,, | Performed by: PODIATRIST

## 2020-09-15 PROCEDURE — 99499 NO LOS: ICD-10-PCS | Mod: S$GLB,,, | Performed by: PODIATRIST

## 2020-09-15 PROCEDURE — 99999 PR PBB SHADOW E&M-EST. PATIENT-LVL V: CPT | Mod: PBBFAC,,, | Performed by: NURSE PRACTITIONER

## 2020-09-15 PROCEDURE — 99999 PR PBB SHADOW E&M-EST. PATIENT-LVL V: ICD-10-PCS | Mod: PBBFAC,,, | Performed by: NURSE PRACTITIONER

## 2020-09-15 PROCEDURE — 11721 DEBRIDE NAIL 6 OR MORE: CPT | Mod: Q9,S$GLB,, | Performed by: PODIATRIST

## 2020-09-15 RX ORDER — CARVEDILOL 25 MG/1
25 TABLET ORAL 2 TIMES DAILY WITH MEALS
COMMUNITY
End: 2021-03-24 | Stop reason: SDUPTHER

## 2020-09-15 RX ORDER — SEMAGLUTIDE 1.34 MG/ML
INJECTION, SOLUTION SUBCUTANEOUS
Qty: 2 SYRINGE | Refills: 12 | Status: SHIPPED | OUTPATIENT
Start: 2020-09-15 | End: 2021-09-21 | Stop reason: SDUPTHER

## 2020-09-15 NOTE — PROGRESS NOTES
CC: This 54 y.o. male presents for management of diabetes  and chronic conditions pending review including HTN, HLP, morbid obesity, fatty liver, vitamin d deficiency     HPI: He was diagnosed with T2DM in 2005. Has never been hospitalized r/t DM.  Mother has passed away from Fatty liver and hepatic carcinoma in 2018  Family hx of DM: sister, mom and dad  neck surgery in Feb 2020- diskectomy  Back surgery April - Dr Bashir  Carpal tunnel sx 3 weeks ago    Continues to be worked up for bariatric surgery   He was on steroids notice bg were higher during that time- 200s  Medtronic 670g insulin pump- See Media tab for download  Not checking his bg and entering into pump     missing meal boluses frequently - may enter in one every few days   Report bg 150s- nothing entered into pump   ozempic 1 mg weekly stopped at d/c in hospital??? -it offers CV protection     Diet: Eats 2-3  Meals a day, snacks on sweets     Exercise: none  CURRENT DM MEDS:    metformin 1000 mg bid, actos 15 mg qd,    Humalog in Medtronic 670g insulin pump:  Basal 1.8 u/h  Carb ratio 1:5  ISF 30  target 120-40  Act Ins 3 hrs  Bolus preset- 12  Snack - 5 u  Vial/pen:  Uses pen  Glucometer type:  True Test 2018    Standards of Care:  Eye exam: 7/2020  no h/o retinopathy - appt w [facility]       ROS:   Gen: Appetite good, + weight gain 5 lbs, + fatigue  .  Skin: Skin is intact and heals well, no rashes, no hair changes  Eyes: Denies visual disturbances  Resp: no SOB or HENDERSON, no cough  Cardiac: No palpitations, chest pain, no edema   GI: No nausea or vomiting, diarrhea, constipation, or abdominal pain.  /GYN: No nocturia, burning or pain.   MS/Neuro: +numbness/ tingling in BLE; Gait steady, speech clear  Psych: Denies drug/ETOH abuse, no hx of depression.  Other systems: negative.    PE:  GENERAL: Well developed, well nourished.appears older than age.   PSYCH: AAOx3, appropriate mood and affect, pleasant expression, conversant, appears relaxed, well  groomed.   EYES: Conjunctiva, corneas clear  NECK: Supple, trachea midline   NEURO: Gait steady  SKIN: Skin warm and dry no acanthosis nigracans.  FOOT EXAMINATION:9/15/2020 Dr Nuñez     Lab Results   Component Value Date    MICALBCREAT Unable to calculate 03/03/2020       Hemoglobin A1C   Date Value Ref Range Status   09/10/2020 7.5 (H) 4.0 - 5.6 % Final     Comment:     ADA Screening Guidelines:  5.7-6.4%  Consistent with prediabetes  >or=6.5%  Consistent with diabetes  High levels of fetal hemoglobin interfere with the HbA1C  assay. Heterozygous hemoglobin variants (HbS, HgC, etc)do  not significantly interfere with this assay.   However, presence of multiple variants may affect accuracy.     09/04/2020 7.9 (H) 0.0 - 5.6 % Final     Comment:     Reference Interval:  5.0 - 5.6 Normal   5.7 - 6.4 High Risk   > 6.5 Diabetic    Hgb A1c results are standardized based on the (NGSP) National   Glycohemoglobin Standardization Program.    Hemoglobin A1C levels are related to mean serum/plasma glucose   during the preceding 2-3 months.        06/12/2020 7.8 (H) 4.0 - 5.6 % Final     Comment:     ADA Screening Guidelines:  5.7-6.4%  Consistent with prediabetes  >or=6.5%  Consistent with diabetes  High levels of fetal hemoglobin interfere with the HbA1C  assay. Heterozygous hemoglobin variants (HbS, HgC, etc)do  not significantly interfere with this assay.   However, presence of multiple variants may affect accuracy.          ASSESSMENT and PLAN:    1. Type 2 diabetes w DM PN  D/c Actos, Continue metformin 1000 mg bid, Resume Ozempic 1 mg qweekly  Check your bg 4 times a day- put it in the Pump  Put YOUR MEAL  BOLUSES IN THE PUMP  Change pump settings as below:  Target 120-140  Check bg 4 times a day   Discussed A1c and BG goals.    2. HTN - controlled, continue meds as previously prescribed and monitor.     3. HLP -  on statin therapy,     4. TARYN-   wearing regularly,      5. LINDSEY- encouraged weight loss,  mother passed  away w HCC    6. Obesity-  Body mass index is 39.8 kg/m².     improve diet, exercise 30 mins a day, 5 days a week. Following w Dr Devine  bariatric surgery      Follow-up:  in 3 months with lab prior

## 2020-09-15 NOTE — PROGRESS NOTES
Health Maintenance Due   Topic Date Due    Shingles Vaccine (1 of 2) 10/28/2016    Influenza Vaccine (1) 08/01/2020     Updates were requested from care everywhere.  Chart was reviewed for overdue Proactive [facility] Encounters (DANITZA) topics (CRS, Breast Cancer Screening, Eye exam)  Health Maintenance has been updated.  LIN[place] immunization registry triggered.  Immunizations were reconciled.

## 2020-09-15 NOTE — PROGRESS NOTES
Subjective:      Patient ID: Gio Forrest is a 54 y.o. male.    Chief Complaint: Diabetes Mellitus ([place]) and Diabetic Foot Exam    Gio is a 54 y.o. male who presents to the clinic for evaluation and treatment of diabetic feet. Gio has a past medical history of Cardiomyopathy, Depression, Diabetes, Dyslipidemia (8/12/2015), Gout (8/12/2015), Hypertension, Idiopathic gout, Lumbar pseudoarthrosis, LINDSEY (nonalcoholic steatohepatitis), Obesity (8/12/2015), Obstructive sleep apnea (8/12/2015), Restless leg syndrome, Sebaceous cyst (4/18/2017), and Type 2 diabetes mellitus (8/12/2015). Patient relates having an increase in neuropathy pain with today's exam.  Describes burning pain in the feet that is exacerbated while at rest but can also be present throughout the day.  Has been taking 1200mg gabapentin, however, this no longer symptoms to manage his pain symptoms.  Inquires as to additional treatment options.   Notes having toenails that are in need of trimming.  Notes improved control over his blood glucose, as he is working hard to have bariatric surgery.  Denies any additional pedal complaints.    Endocrine: Gabby Rooney NP  Date Last Seen: 9/20  Hemoglobin A1C   Date Value Ref Range Status   09/10/2020 7.5 (H) 4.0 - 5.6 % Final     Comment:     ADA Screening Guidelines:  5.7-6.4%  Consistent with prediabetes  >or=6.5%  Consistent with diabetes  High levels of fetal hemoglobin interfere with the HbA1C  assay. Heterozygous hemoglobin variants (HbS, HgC, etc)do  not significantly interfere with this assay.   However, presence of multiple variants may affect accuracy.     09/04/2020 7.9 (H) 0.0 - 5.6 % Final     Comment:     Reference Interval:  5.0 - 5.6 Normal   5.7 - 6.4 High Risk   > 6.5 Diabetic    Hgb A1c results are standardized based on the (NGSP) National   Glycohemoglobin Standardization Program.    Hemoglobin A1C levels are related to mean serum/plasma glucose   during the preceding 2-3 months.         06/12/2020 7.8 (H) 4.0 - 5.6 % Final     Comment:     ADA Screening Guidelines:  5.7-6.4%  Consistent with prediabetes  >or=6.5%  Consistent with diabetes  High levels of fetal hemoglobin interfere with the HbA1C  assay. Heterozygous hemoglobin variants (HbS, HgC, etc)do  not significantly interfere with this assay.   However, presence of multiple variants may affect accuracy.             Past Medical History:   Diagnosis Date    Cardiomyopathy     Depression     Diabetes     Dyslipidemia 8/12/2015    Gout 8/12/2015    Hypertension     Idiopathic gout     Lumbar pseudoarthrosis     LINDSEY (nonalcoholic steatohepatitis)     Obesity 8/12/2015    Obstructive sleep apnea 8/12/2015    Restless leg syndrome     Sebaceous cyst 4/18/2017    Type 2 diabetes mellitus 8/12/2015       Past Surgical History:   Procedure Laterality Date    BACK SURGERY      x2    CARDIAC CATHETERIZATION      CERVICAL SPINE SURGERY      CHOLECYSTECTOMY  05/30/2018    Dr. ROGELIO Tao, [facility]     COLONOSCOPY  2008    COLONOSCOPY N/A 9/14/2017    Procedure: COLONOSCOPY;  Surgeon: Obi Beltre MD;  Location: [facility] ENDO;  Service: Endoscopy;  Laterality: N/A;    CORONARY ANGIOGRAPHY Left 5/28/2018    Procedure: Coronary angiography;  Surgeon: Rafiq Malik MD;  Location: [facility] CATH;  Service: Cardiology;  Laterality: Left;    ELBOW SURGERY Bilateral     HERNIA REPAIR      LAPAROSCOPIC APPENDECTOMY N/A 6/25/2020    Procedure: APPENDECTOMY, LAPAROSCOPIC;  Surgeon: Gordon Can MD;  Location: [facility] OR;  Service: General;  Laterality: N/A;    LAPAROSCOPIC CHOLECYSTECTOMY N/A 5/30/2018    Procedure: CHOLECYSTECTOMY, LAPAROSCOPIC;  Surgeon: Fletcher Tao MD;  Location: [facility] OR;  Service: General;  Laterality: N/A;    LEFT HEART CATHETERIZATION Left 5/28/2018    Procedure: Left heart cath;  Surgeon: Rafiq Malik MD;  Location: [facility] CATH;  Service: Cardiology;  Laterality: Left;    NECK SURGERY      SHOULDER  ARTHROSCOPY      SPINE SURGERY      lumbar x2    TONSILLECTOMY         Family History   Problem Relation Age of Onset    Diabetes Mother     Depression Mother     Liver cancer Mother     Obesity Mother     Heart disease Father     Anuerysm Father     Diabetes Father     Stroke Father     Glaucoma Paternal Grandmother     Obesity Sister        Social History     Socioeconomic History    Marital status:      Spouse name: Not on file    Number of children: Not on file    Years of education: Not on file    Highest education level: Not on file   Occupational History    Not on file   Social Needs    Financial resource strain: Not on file    Food insecurity     Worry: Not on file     Inability: Not on file    Transportation needs     Medical: Not on file     Non-medical: Not on file   Tobacco Use    Smoking status: Former Smoker     Packs/day: 0.50     Types: Cigarettes     Quit date: 1990     Years since quittin.    Smokeless tobacco: Never Used   Substance and Sexual Activity    Alcohol use: No     Alcohol/week: 0.0 standard drinks    Drug use: No    Sexual activity: Yes     Partners: Female   Lifestyle    Physical activity     Days per week: Not on file     Minutes per session: Not on file    Stress: Not on file   Relationships    Social connections     Talks on phone: Not on file     Gets together: Not on file     Attends Tenriism service: Not on file     Active member of club or organization: Not on file     Attends meetings of clubs or organizations: Not on file     Relationship status: Not on file   Other Topics Concern    Not on file   Social History Narrative                Current Outpatient Medications   Medication Sig Dispense Refill    allopurinol (ZYLOPRIM) 100 MG tablet TAKE ONE TABLET BY MOUTH EVERY DAY (Patient taking differently: Take 100 mg by mouth once daily. ) 90 tablet 0    aspirin (ECOTRIN) 81 MG EC tablet Take 81 mg by mouth once daily.       "atorvastatin (LIPITOR) 40 MG tablet TAKE ONE TABLET BY MOUTH EVERY EVENING (Patient taking differently: Take 40 mg by mouth every evening. ) 90 tablet 1    blood sugar diagnostic (CONTOUR NEXT TEST STRIPS) Strp Use to Test 4 times a day.  Contour Next test strips required for Medtronic insulin pump 400 strip 4    citalopram (CELEXA) 40 MG tablet TAKE ONE TABLET BY MOUTH DAILY (Patient taking differently: Take 40 mg by mouth once daily. ) 90 tablet 1    clonazePAM (KLONOPIN) 0.5 MG tablet Take 0.5 mg by mouth once daily.       gabapentin (NEURONTIN) 300 MG capsule Take 300 mg by mouth every morning.       gabapentin (NEURONTIN) 300 MG capsule Take 900 mg by mouth every evening.       HYDROcodone-acetaminophen (NORCO)  mg per tablet Take 1 tablet by mouth every 6 (six) hours as needed for Pain (acute post-op pain). 20 tablet 0    infusion set for insulin pump (MINIMED PRO-SET INFUSION 24") ISet Changes site q2days, dispense 90day supply 45 each 4    insulin aspart U-100 (NOVOLOG FLEXPEN U-100 INSULIN) 100 unit/mL (3 mL) InPn pen Infuse 5 units per hour via insulin pump. +12 units via bolus with each meal.      insulin lispro 100 unit/mL injection Uses 150u/day in insulin pump 15 vial 4    insulin pump syringe (MINIMED SYRINGE RESERVOIR) 3 mL Misc Changes q2days, dispense 3 month supply 45 each 4    lancets Misc To be used with Contour Next as necessary with Medtronic insulin pump, uses 4 daily 400 each 4    meloxicam (MOBIC) 15 MG tablet Take 15 mg by mouth once daily.       metFORMIN (GLUCOPHAGE) 1000 MG tablet TAKE ONE TABLET BY MOUTH TWICE DAILY (Patient taking differently: Take 1,000 mg by mouth 2 (two) times daily with meals. ) 180 tablet 4    metoprolol tartrate (LOPRESSOR) 25 MG tablet Take 25 mg by mouth 2 (two) times daily.       mometasone 0.1% (ELOCON) 0.1 % cream Apply topically daily as needed.      pantoprazole (PROTONIX) 40 MG tablet TAKE ONE TABLET BY MOUTH EVERY DAY (Patient " taking differently: Take 40 mg by mouth once daily. Do not crush, break, chew) 90 tablet 3    pramipexole (MIRAPEX) 1 MG tablet TAKE TWO TABLETS BY MOUTH EVERY evening (Patient taking differently: Take 2 mg by mouth every evening. ) 60 tablet 5    testosterone cypionate (DEPOTESTOTERONE CYPIONATE) 200 mg/mL injection Inject 400 mg into the muscle every 28 days.       blood-glucose meter kit To check BG 2 times daily, to use with insurance preferred meter 1 each 1    carvediloL (COREG) 25 MG tablet Take 25 mg by mouth 2 (two) times daily with meals.      semaglutide (OZEMPIC) 1 mg/dose (2 mg/1.5 mL) PnIj Take 1 mg weekly 2 Syringe 12     No current facility-administered medications for this visit.        Review of patient's allergies indicates:  No Known Allergies      Review of Systems   Constitution: Negative for chills and fever.   Cardiovascular: Negative for claudication and leg swelling.   Skin: Positive for color change and nail changes.   Musculoskeletal: Negative for joint pain, joint swelling, muscle cramps, muscle weakness and myalgias.   Neurological: Positive for numbness and paresthesias.   Psychiatric/Behavioral: Negative for altered mental status.           Objective:      Physical Exam  Constitutional:       General: He is not in acute distress.     Appearance: He is well-developed. He is not diaphoretic.   Cardiovascular:      Pulses:           Dorsalis pedis pulses are 2+ on the right side and 2+ on the left side.        Posterior tibial pulses are 1+ on the right side and 1+ on the left side.      Comments: CFT is < 3 seconds bilateral.  Pedal hair growth is decreased bilateral.  No varicosities noted bilateral.  Mild lower extremity edema noted bilateral.  Toes are warm to touch bilateral.    Musculoskeletal:         General: No tenderness.      Comments: Muscle strength 5/5 in all muscle groups bilateral.  No tenderness nor crepitation with ROM of foot/ankle joints bilateral.  (-) for pain  with palpation of bilateral foot and ankle.   Bilateral gastrocnemius equinus.  Bilateral pes planus foot type.  Bilateral hallux abducto valgus.  Bilateral semi-reducible contracture of toe 2-5.     Skin:     General: Skin is warm and dry.      Capillary Refill: Capillary refill takes 2 to 3 seconds.      Coloration: Skin is not pale.      Findings: No abrasion, bruising, burn, ecchymosis, erythema, laceration, lesion or petechiae.      Comments: Pedal skin appears thin bilateral.  Toenails x 10 appear thickened by 2 mm's, elongated by 4 mm's, and discolored with subungual debris. Examination of the skin reveals no evidence of significant maceration, rashes, open lesions, suspicious appearing nevi or other concerning lesions.    Neurological:      Mental Status: He is alert and oriented to person, place, and time.      Sensory: Sensory deficit present.      Motor: No weakness or atrophy.      Comments: Protective sensation per [place] monofilament is decreased bilateral.  Vibratory sensation is intact bilateral.  Light touch is intact bilateral.               Assessment:       Encounter Diagnoses   Name Primary?    Severe obesity (BMI 35.0-39.9) with comorbidity     Diabetic polyneuropathy associated with type 2 diabetes mellitus Yes    Onychomycosis due to dermatophyte     Paresthesia of foot, bilateral          Plan:       Gio was seen today for diabetes mellitus and diabetic foot exam.    Diagnoses and all orders for this visit:    Diabetic polyneuropathy associated with type 2 diabetes mellitus    Severe obesity (BMI 35.0-39.9) with comorbidity    Onychomycosis due to dermatophyte    Paresthesia of foot, bilateral      I counseled the patient on his conditions, their implications and medical management.    - Recommend taking 1500mg gabapentin daily to address current paresthesias.    - Discussed the importance of diet and exercise as they pertain to diabetes management and maintaining a healthy  BMI.    - Shoe inspection. Diabetic Foot Education. Patient reminded of the importance of good nutrition and blood sugar control to help prevent podiatric complications of diabetes. Patient instructed on proper foot hygeine. We discussed wearing proper shoe gear, daily foot inspections, never walking without protective shoe gear, never putting sharp instruments to feet    - With patient's permission, nails were aggressively reduced and debrided x 10 to their soft tissue attachment mechanically and with electric , removing all offending nail and debris.  Patient relates relief following the procedure. He will continue to monitor the areas daily, inspect his feet, wear protective shoe gear when ambulatory, moisturizer to maintain skin integrity and follow in this office in approximately 3 months, sooner p.r.n.    Follow up in about 3 months (around 12/15/2020).    Rj Nuñez DPM

## 2020-09-17 ENCOUNTER — OFFICE VISIT (OUTPATIENT)
Dept: PSYCHIATRY | Facility: CLINIC | Age: 55
End: 2020-09-17
Payer: COMMERCIAL

## 2020-09-17 VITALS
DIASTOLIC BLOOD PRESSURE: 64 MMHG | HEART RATE: 74 BPM | HEIGHT: 74 IN | BODY MASS INDEX: 39.37 KG/M2 | WEIGHT: 306.75 LBS | SYSTOLIC BLOOD PRESSURE: 107 MMHG

## 2020-09-17 DIAGNOSIS — E66.01 MORBID OBESITY: ICD-10-CM

## 2020-09-17 DIAGNOSIS — Z79.4 TYPE 2 DIABETES MELLITUS WITH DIABETIC POLYNEUROPATHY, WITH LONG-TERM CURRENT USE OF INSULIN: ICD-10-CM

## 2020-09-17 DIAGNOSIS — E11.42 TYPE 2 DIABETES MELLITUS WITH DIABETIC POLYNEUROPATHY, WITH LONG-TERM CURRENT USE OF INSULIN: ICD-10-CM

## 2020-09-17 DIAGNOSIS — I47.10 SVT (SUPRAVENTRICULAR TACHYCARDIA): ICD-10-CM

## 2020-09-17 DIAGNOSIS — G47.33 OSA ON CPAP: ICD-10-CM

## 2020-09-17 DIAGNOSIS — Z71.89 ENCOUNTER FOR PSYCHOLOGICAL ASSESSMENT PRIOR TO BARIATRIC SURGERY: Primary | ICD-10-CM

## 2020-09-17 DIAGNOSIS — E66.01 CLASS 2 SEVERE OBESITY DUE TO EXCESS CALORIES WITH SERIOUS COMORBIDITY AND BODY MASS INDEX (BMI) OF 39.0 TO 39.9 IN ADULT: ICD-10-CM

## 2020-09-17 PROCEDURE — 99499 UNLISTED E&M SERVICE: CPT | Mod: S$PBB,,, | Performed by: PSYCHIATRY & NEUROLOGY

## 2020-09-17 PROCEDURE — 90792 PR PSYCHIATRIC DIAGNOSTIC EVALUATION W/MEDICAL SERVICES: ICD-10-PCS | Mod: S$GLB,,, | Performed by: PSYCHIATRY & NEUROLOGY

## 2020-09-17 PROCEDURE — 99499 RISK ADDL DX/OHS AUDIT: ICD-10-PCS | Mod: S$PBB,,, | Performed by: PSYCHIATRY & NEUROLOGY

## 2020-09-17 PROCEDURE — 99999 PR PBB SHADOW E&M-EST. PATIENT-LVL IV: ICD-10-PCS | Mod: PBBFAC,,, | Performed by: PSYCHIATRY & NEUROLOGY

## 2020-09-17 PROCEDURE — 99999 PR PBB SHADOW E&M-EST. PATIENT-LVL IV: CPT | Mod: PBBFAC,,, | Performed by: PSYCHIATRY & NEUROLOGY

## 2020-09-17 PROCEDURE — 90792 PSYCH DIAG EVAL W/MED SRVCS: CPT | Mod: S$GLB,,, | Performed by: PSYCHIATRY & NEUROLOGY

## 2020-09-17 RX ORDER — OXYCODONE AND ACETAMINOPHEN 10; 325 MG/1; MG/1
1 TABLET ORAL EVERY 12 HOURS PRN
COMMUNITY
Start: 2020-09-11 | End: 2020-10-10 | Stop reason: CLARIF

## 2020-09-17 RX ORDER — CYCLOBENZAPRINE HCL 10 MG
10 TABLET ORAL
COMMUNITY
Start: 2020-08-27 | End: 2022-01-26
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
## 2020-09-17 NOTE — Clinical Note
Hi- I have some concerns about this patient and would be happy to discuss with you further if necessary. You are able to read my assessment at end of my note for more detail- I have referred to therapy and think this will be a necessary component of his ability to adhere to meaningful lifestyle change, but I also think he'll need added support through dietary education. Let me know if you have any questions or concerns- thanks, aw

## 2020-09-18 ENCOUNTER — CLINICAL SUPPORT (OUTPATIENT)
Dept: BARIATRICS | Facility: CLINIC | Age: 55
End: 2020-09-18
Payer: MEDICARE

## 2020-09-18 ENCOUNTER — TELEPHONE (OUTPATIENT)
Dept: BARIATRICS | Facility: CLINIC | Age: 55
End: 2020-09-18

## 2020-09-18 VITALS — BODY MASS INDEX: 39.59 KG/M2 | HEIGHT: 74 IN | WEIGHT: 308.44 LBS

## 2020-09-18 DIAGNOSIS — E66.01 CLASS 2 SEVERE OBESITY DUE TO EXCESS CALORIES WITH SERIOUS COMORBIDITY AND BODY MASS INDEX (BMI) OF 39.0 TO 39.9 IN ADULT: ICD-10-CM

## 2020-09-18 DIAGNOSIS — Z71.3 DIETARY COUNSELING AND SURVEILLANCE: ICD-10-CM

## 2020-09-18 PROCEDURE — 99999 PR PBB SHADOW E&M-EST. PATIENT-LVL I: ICD-10-PCS | Mod: PBBFAC,,, | Performed by: DIETITIAN, REGISTERED

## 2020-09-18 PROCEDURE — 99999 PR PBB SHADOW E&M-EST. PATIENT-LVL I: CPT | Mod: PBBFAC,,, | Performed by: DIETITIAN, REGISTERED

## 2020-09-18 PROCEDURE — 97803 MED NUTRITION INDIV SUBSEQ: CPT | Mod: S$GLB,,, | Performed by: DIETITIAN, REGISTERED

## 2020-09-18 PROCEDURE — 97803 PR MED NUTR THER, SUBSQ, INDIV, EA 15 MIN: ICD-10-PCS | Mod: S$GLB,,, | Performed by: DIETITIAN, REGISTERED

## 2020-09-18 NOTE — PROGRESS NOTES
"NUTRITION NOTE    Referring Physician: Dr. Devine  Reason for MNT Referral: Medically Supervised Diet pending Gastric Sleeve    PAST MEDICAL HISTORY:    Patient presents for 2nd visit for MSD with weight loss over the past month; total weight loss by making numerous dietary and lifestyle changes.    Past Medical History:   Diagnosis Date    Cardiomyopathy     Depression     Diabetes     Dyslipidemia 8/12/2015    Gout 8/12/2015    Hypertension     Idiopathic gout     Lumbar pseudoarthrosis     LINDSEY (nonalcoholic steatohepatitis)     Obesity 8/12/2015    Obstructive sleep apnea 8/12/2015    Restless leg syndrome     Sebaceous cyst 4/18/2017    Type 2 diabetes mellitus 8/12/2015       CLINICAL DATA:  54 y.o. male.    There were no vitals filed for this visit.    Current Weight: 308 lbs  Weight Change Since Initial Visit: +2 lbs  Ideal Body Weight: 178 lbs  BMI: 39.6     CURRENT DIET:  Regular diet.  Diet Recall: Breakfast: eggs, hawkins, toast, grits. Pt will have a ham, eggs, and cheese croissant. "When on the road."  Midmorning: hot dog or sausage on bun from gas station, pack of crackers "when on the road."  Lunch: leftovers, sandwich, salad with vinegar   Dinner: protein + vegetable + side dish    Pt reports "I don't eat like I used to. I didn't have a problem eating a big meal before.  I can't eat all of that now."    Diet Includes:  Meal Pattern: Same.  Protein Supplements: 0 per day.  Snacking: Excess. Snacks include junk foods and sweets.    Vegetables: Likes a variety. Eats almost daily.  Fruits: Likes a variety. Eats rarely.  Beverages: water, Powerade,   Dining Out: Dining out: Weekly. Mostly fast food.  Cooking at home: Daily. Mostly baked and grilled meat, fish, starchy CHO and vegetables.    CURRENT EXERCISE:  None    Vitamins / Minerals / Herbs:   M/V, vitamin C, B12    Food Allergies:   NKFA    Social:  Works regular daytime shifts.  Alcohol: None.  Smoking: None.    ASSESSMENT:  Patient " demonstrates some willingness to change lifestyle habits as evidenced by weight loss and change in portion sizes.    Doing fair with working on greatest challenges excessive junk food.  Pt reports he is working on his portion sizes of all foods and only eats one sweet a day.    Excess calorie intake.  Adequate protein intake.    PLAN:    Diet: Adjust diet plan.   Breakfast: eggs & hawkins  Lunch: salads with vinegar and protein source  Dinner: protein + vegetable + slowly decrease side dish.  Decrease portion size of sweets.  Increase water to 64 ounces a day.    Exercise: Increase.    Behavior Modification: Stop drinking regular sugar/high fructose corn syrup sweetened beverages.    Weight loss prior to surgery (5-10% TBW): -2 lbs    Return to clinic in one month.    SESSION TIME:  30 minutes

## 2020-09-18 NOTE — TELEPHONE ENCOUNTER
Return call to reschedule appointment.  Pt reports he will try to be here for the 10:00 am appointment. He is aware RD has open afternoon slots.

## 2020-09-20 PROBLEM — Z71.89 ENCOUNTER FOR PSYCHOLOGICAL ASSESSMENT PRIOR TO BARIATRIC SURGERY: Status: ACTIVE | Noted: 2020-09-20

## 2020-09-23 ENCOUNTER — TELEPHONE (OUTPATIENT)
Dept: PSYCHIATRY | Facility: CLINIC | Age: 55
End: 2020-09-23

## 2020-09-23 PROBLEM — R06.09 DOE (DYSPNEA ON EXERTION): Status: ACTIVE | Noted: 2020-09-23

## 2020-09-23 NOTE — TELEPHONE ENCOUNTER
Called pt regarding below message. Advised pt of first available in clinic appt. Pt agree to schedule. Appt date, time, and location given. Pt verbalized understanding with no further questions.

## 2020-09-23 NOTE — TELEPHONE ENCOUNTER
Please reach out to patient and schedule. Dr. Lozano and Dr. Youngblood have been communicating.  Patient would like to be see in person.  Thanks in advance-  Laurel

## 2020-10-06 ENCOUNTER — LAB VISIT (OUTPATIENT)
Dept: FAMILY MEDICINE | Facility: CLINIC | Age: 55
End: 2020-10-06
Payer: MEDICARE

## 2020-10-06 DIAGNOSIS — Z01.818 PREOP EXAMINATION: ICD-10-CM

## 2020-10-06 PROCEDURE — U0003 INFECTIOUS AGENT DETECTION BY NUCLEIC ACID (DNA OR RNA); SEVERE ACUTE RESPIRATORY SYNDROME CORONAVIRUS 2 (SARS-COV-2) (CORONAVIRUS DISEASE [COVID-19]), AMPLIFIED PROBE TECHNIQUE, MAKING USE OF HIGH THROUGHPUT TECHNOLOGIES AS DESCRIBED BY CMS-2020-01-R: HCPCS

## 2020-10-07 LAB — SARS-COV-2 RNA RESP QL NAA+PROBE: NOT DETECTED

## 2020-10-09 ENCOUNTER — OFFICE VISIT (OUTPATIENT)
Dept: BARIATRICS | Facility: CLINIC | Age: 55
End: 2020-10-09
Payer: MEDICARE

## 2020-10-09 VITALS
TEMPERATURE: 98 F | HEIGHT: 75 IN | WEIGHT: 304 LBS | SYSTOLIC BLOOD PRESSURE: 114 MMHG | BODY MASS INDEX: 37.8 KG/M2 | DIASTOLIC BLOOD PRESSURE: 64 MMHG | RESPIRATION RATE: 16 BRPM | HEART RATE: 69 BPM

## 2020-10-09 DIAGNOSIS — Z79.4 TYPE 2 DIABETES MELLITUS WITH DIABETIC POLYNEUROPATHY, WITH LONG-TERM CURRENT USE OF INSULIN: ICD-10-CM

## 2020-10-09 DIAGNOSIS — K76.0 FATTY LIVER: ICD-10-CM

## 2020-10-09 DIAGNOSIS — G47.33 OSA ON CPAP: ICD-10-CM

## 2020-10-09 DIAGNOSIS — E11.42 TYPE 2 DIABETES MELLITUS WITH DIABETIC POLYNEUROPATHY, WITH LONG-TERM CURRENT USE OF INSULIN: ICD-10-CM

## 2020-10-09 DIAGNOSIS — E66.01 MORBID OBESITY: Primary | ICD-10-CM

## 2020-10-09 DIAGNOSIS — E66.01 CLASS 2 SEVERE OBESITY DUE TO EXCESS CALORIES WITH SERIOUS COMORBIDITY AND BODY MASS INDEX (BMI) OF 39.0 TO 39.9 IN ADULT: ICD-10-CM

## 2020-10-09 PROCEDURE — 3074F PR MOST RECENT SYSTOLIC BLOOD PRESSURE < 130 MM HG: ICD-10-PCS | Mod: CPTII,S$GLB,, | Performed by: SURGERY

## 2020-10-09 PROCEDURE — 99999 PR PBB SHADOW E&M-EST. PATIENT-LVL V: ICD-10-PCS | Mod: PBBFAC,,, | Performed by: SURGERY

## 2020-10-09 PROCEDURE — 99999 PR PBB SHADOW E&M-EST. PATIENT-LVL V: CPT | Mod: PBBFAC,,, | Performed by: SURGERY

## 2020-10-09 PROCEDURE — 3078F DIAST BP <80 MM HG: CPT | Mod: CPTII,S$GLB,, | Performed by: SURGERY

## 2020-10-09 PROCEDURE — 99213 PR OFFICE/OUTPT VISIT, EST, LEVL III, 20-29 MIN: ICD-10-PCS | Mod: S$GLB,,, | Performed by: SURGERY

## 2020-10-09 PROCEDURE — 99213 OFFICE O/P EST LOW 20 MIN: CPT | Mod: S$GLB,,, | Performed by: SURGERY

## 2020-10-09 PROCEDURE — 3051F HG A1C>EQUAL 7.0%<8.0%: CPT | Mod: CPTII,S$GLB,, | Performed by: SURGERY

## 2020-10-09 PROCEDURE — 3074F SYST BP LT 130 MM HG: CPT | Mod: CPTII,S$GLB,, | Performed by: SURGERY

## 2020-10-09 PROCEDURE — 3078F PR MOST RECENT DIASTOLIC BLOOD PRESSURE < 80 MM HG: ICD-10-PCS | Mod: CPTII,S$GLB,, | Performed by: SURGERY

## 2020-10-09 PROCEDURE — 3008F PR BODY MASS INDEX (BMI) DOCUMENTED: ICD-10-PCS | Mod: CPTII,S$GLB,, | Performed by: SURGERY

## 2020-10-09 PROCEDURE — 3051F PR MOST RECENT HEMOGLOBIN A1C LEVEL 7.0 - < 8.0%: ICD-10-PCS | Mod: CPTII,S$GLB,, | Performed by: SURGERY

## 2020-10-09 PROCEDURE — 3008F BODY MASS INDEX DOCD: CPT | Mod: CPTII,S$GLB,, | Performed by: SURGERY

## 2020-10-09 RX ORDER — METOPROLOL TARTRATE 25 MG/1
37.5 TABLET, FILM COATED ORAL 2 TIMES DAILY
COMMUNITY
Start: 2020-10-07 | End: 2021-03-24

## 2020-10-09 NOTE — PROGRESS NOTES
"Medically Supervised Weight Loss Documentation    Date of Visit: 10/09/2020    Patient: Gio Forrest    Current Weight: 304  Current BMI: Body mass index is 38.51 kg/m².  Weight Change: - 2 pounds    Last Weight: 306    Beginning Weight: 306     Vitals:   Vitals:    10/09/20 1101   BP: 114/64   Pulse: 69   Resp: 16   Temp: 98 °F (36.7 °C)       Comorbidities:   Past Medical History:   Diagnosis Date    Cardiomyopathy     Depression     Diabetes     Dyslipidemia 8/12/2015    Gout 8/12/2015    Hypertension     Idiopathic gout     Lumbar pseudoarthrosis     LINDSEY (nonalcoholic steatohepatitis)     Obesity 8/12/2015    Obstructive sleep apnea 8/12/2015    Restless leg syndrome     Sebaceous cyst 4/18/2017    Type 2 diabetes mellitus 8/12/2015       Medications:  Current Outpatient Medications on File Prior to Visit   Medication Sig Dispense Refill    allopurinol (ZYLOPRIM) 100 MG tablet TAKE ONE TABLET BY MOUTH EVERY DAY 90 tablet 0    aspirin (ECOTRIN) 81 MG EC tablet Take 81 mg by mouth once daily.      atorvastatin (LIPITOR) 40 MG tablet TAKE ONE TABLET BY MOUTH EVERY EVENING 90 tablet 1    blood sugar diagnostic (CONTOUR NEXT TEST STRIPS) Strp Use to Test 4 times a day.  Contour Next test strips required for Medtronic insulin pump 400 strip 4    blood-glucose meter kit To check BG 2 times daily, to use with insurance preferred meter 1 each 1    carvediloL (COREG) 25 MG tablet Take 25 mg by mouth 2 (two) times daily with meals.      citalopram (CELEXA) 40 MG tablet TAKE ONE TABLET BY MOUTH DAILY 90 tablet 1    clonazePAM (KLONOPIN) 0.5 MG tablet Take 0.5 mg by mouth once daily.       cyclobenzaprine (FLEXERIL) 10 MG tablet Take 10 mg by mouth 2 (two) times daily.      gabapentin (NEURONTIN) 300 MG capsule Take 300 mg by mouth every morning.       gabapentin (NEURONTIN) 300 MG capsule Take 900 mg by mouth every evening.       infusion set for insulin pump (MINIMED PRO-SET INFUSION 24") " ISet Changes site q2days, dispense 90day supply 45 each 4    insulin lispro 100 unit/mL injection Uses 150u/day in insulin pump 15 vial 4    insulin pump syringe (MINIMED SYRINGE RESERVOIR) 3 mL Misc Changes q2days, dispense 3 month supply 45 each 4    lancets Misc To be used with Contour Next as necessary with Medtronic insulin pump, uses 4 daily 400 each 4    meloxicam (MOBIC) 15 MG tablet Take 15 mg by mouth once daily.       metFORMIN (GLUCOPHAGE) 1000 MG tablet TAKE ONE TABLET BY MOUTH TWICE DAILY 180 tablet 4    oxyCODONE-acetaminophen (PERCOCET)  mg per tablet Take 1 tablet by mouth every 12 (twelve) hours as needed.       pantoprazole (PROTONIX) 40 MG tablet TAKE ONE TABLET BY MOUTH EVERY DAY 90 tablet 3    pramipexole (MIRAPEX) 1 MG tablet TAKE TWO TABLETS BY MOUTH EVERY evening 60 tablet 5    semaglutide (OZEMPIC) 1 mg/dose (2 mg/1.5 mL) PnIj Take 1 mg weekly 2 Syringe 12    testosterone cypionate (DEPOTESTOTERONE CYPIONATE) 200 mg/mL injection Inject 400 mg into the muscle every 28 days.       metoprolol tartrate (LOPRESSOR) 25 MG tablet TABLET ONE &1/2 TABLET BY MOUTH TWICE DAILY      mometasone 0.1% (ELOCON) 0.1 % cream Apply topically daily as needed.       Current Facility-Administered Medications on File Prior to Visit   Medication Dose Route Frequency Provider Last Rate Last Dose    [DISCONTINUED] fentaNYL injection    PRN Rip Che III, MD   50 mcg at 10/08/20 1030    [DISCONTINUED] heparin (porcine) in NaCl (PF) 2,000 unit/1,000 mL solution    PRN Rip Che III, MD   1 mL at 10/08/20 0859    [DISCONTINUED] isoproterenol HCL 1 mg in dextrose 5 % 250 mL infusion  2 mcg/min Intravenous Continuous Rip Che III, MD   Stopped at 10/08/20 0937    [DISCONTINUED] lidocaine (PF) 10 mg/ml (1%) injection    PRN Rip Che III, MD   20 mL at 10/08/20 0830    [DISCONTINUED] midazolam (VERSED) 1 mg/mL injection    PRN Rip Che III, MD   2 mg at 10/08/20 0936     [DISCONTINUED] propofol (DIPRIVAN) 10 mg/mL IVP    PRN Rip Che III, MD   50 mg at 10/08/20 0947         Body comp:  Fat Percent:  47 %  Fat Mass:  142.8 lb  FFM:  161.2 lb  TBW: 129 lb  TBW %:  42.4 %  BMR: 2295 kcal      Diet Education Discussed:    Cutting back on portions    Snacking on more fruits    Exercise/Activity Discussed:    None exercising recently    Behavior or Diet Goals for this patient:    Ablation of heart done recently- will wait for final clearance   Limit carbohydrates, try to limit portions as well.  Start exercising when able.     MSD 3/3      He will need:     Labs  EKG  UGI   dietary consult- continue  psych consult - follow up therapy   Seminar     I will obtain the following clearances prior to surgery: cardiology     : I met with the patient for 15 minutes and counseled his for over 50% of that time

## 2020-10-14 ENCOUNTER — TELEPHONE (OUTPATIENT)
Dept: DIABETES | Facility: CLINIC | Age: 55
End: 2020-10-14

## 2020-10-14 ENCOUNTER — CLINICAL SUPPORT (OUTPATIENT)
Dept: DIABETES | Facility: CLINIC | Age: 55
End: 2020-10-14
Payer: MEDICARE

## 2020-10-14 DIAGNOSIS — Z46.81 INSULIN PUMP FITTING OR ADJUSTMENT: ICD-10-CM

## 2020-10-14 DIAGNOSIS — E11.42 TYPE 2 DIABETES MELLITUS WITH DIABETIC POLYNEUROPATHY, WITH LONG-TERM CURRENT USE OF INSULIN: ICD-10-CM

## 2020-10-14 DIAGNOSIS — Z79.4 TYPE 2 DIABETES MELLITUS WITH DIABETIC POLYNEUROPATHY, WITH LONG-TERM CURRENT USE OF INSULIN: ICD-10-CM

## 2020-10-14 PROCEDURE — G0108 DIAB MANAGE TRN  PER INDIV: HCPCS | Mod: S$GLB,,, | Performed by: DIETITIAN, REGISTERED

## 2020-10-14 PROCEDURE — G0108 PR DIAB MANAGE TRN  PER INDIV: ICD-10-PCS | Mod: S$GLB,,, | Performed by: DIETITIAN, REGISTERED

## 2020-10-14 NOTE — PROGRESS NOTES
Diabetes Education  Author: Clary Grimes RD, CDE  Date: 10/14/2020    Diabetes Care Management Summary  Diabetes Education Record Assessment/Progress: Comprehensive/Group- Patient in clinic for pump upload.  He reports blood sugars have been in 300-400 range since last Thursday when he had a cardiac procedure.  Current Diabetes Risk Level: Moderate     Last visit with Diabetes Educator: : 3/2020    Diabetes Type  Diabetes Type : Type II    Diabetes History  Current Treatment: Insulin pump    His Medtronic insulin pump was downloaded and reviewed by Gabby Rooney.  See reports in media.      The following changes were made:    His meal time preset bolus was changed to 15 units and snack preset bolus increased to 7.  Changes were made while he was in clinic today.   Made him an appointment to f/u with Gabby in 2 weeks for another upload.  He was encouraged to call clinic in interim if blood sugars remain elevated.      Today's Self-Management Care Plan was developed with the patient's input and is based on barriers identified during today's assessment.    The long and short-term goals in the care plan were written with the patient/caregiver's input. The patient has agreed to work toward these goals to improve his overall diabetes control.      The patient received a copy of today's self-management plan and verbalized understanding of the care plan, goals, and all of today's instructions.      The patient was encouraged to communicate with his physician and care team regarding his condition(s) and treatment.  I provided the patient with my contact information today and encouraged him to contact me via phone or patient portal as needed.     Education Units of Time   Time Spent: 30 min

## 2020-10-15 ENCOUNTER — OFFICE VISIT (OUTPATIENT)
Dept: FAMILY MEDICINE | Facility: CLINIC | Age: 55
End: 2020-10-15
Payer: MEDICARE

## 2020-10-15 VITALS
SYSTOLIC BLOOD PRESSURE: 110 MMHG | WEIGHT: 310.19 LBS | BODY MASS INDEX: 39.81 KG/M2 | HEIGHT: 74 IN | DIASTOLIC BLOOD PRESSURE: 70 MMHG | HEART RATE: 71 BPM | TEMPERATURE: 98 F | OXYGEN SATURATION: 96 %

## 2020-10-15 DIAGNOSIS — E78.5 DYSLIPIDEMIA: ICD-10-CM

## 2020-10-15 DIAGNOSIS — Z79.4 TYPE 2 DIABETES MELLITUS WITH DIABETIC POLYNEUROPATHY, WITH LONG-TERM CURRENT USE OF INSULIN: ICD-10-CM

## 2020-10-15 DIAGNOSIS — M65.331 TRIGGER MIDDLE FINGER OF RIGHT HAND: ICD-10-CM

## 2020-10-15 DIAGNOSIS — E11.42 TYPE 2 DIABETES MELLITUS WITH DIABETIC POLYNEUROPATHY, WITH LONG-TERM CURRENT USE OF INSULIN: ICD-10-CM

## 2020-10-15 DIAGNOSIS — Z00.00 WELLNESS EXAMINATION: Primary | ICD-10-CM

## 2020-10-15 DIAGNOSIS — I10 ESSENTIAL HYPERTENSION: ICD-10-CM

## 2020-10-15 PROCEDURE — 99999 PR PBB SHADOW E&M-EST. PATIENT-LVL V: CPT | Mod: PBBFAC,,, | Performed by: FAMILY MEDICINE

## 2020-10-15 PROCEDURE — 3008F BODY MASS INDEX DOCD: CPT | Mod: CPTII,S$GLB,, | Performed by: FAMILY MEDICINE

## 2020-10-15 PROCEDURE — 99214 PR OFFICE/OUTPT VISIT, EST, LEVL IV, 30-39 MIN: ICD-10-PCS | Mod: S$GLB,,, | Performed by: FAMILY MEDICINE

## 2020-10-15 PROCEDURE — 3051F HG A1C>EQUAL 7.0%<8.0%: CPT | Mod: CPTII,S$GLB,, | Performed by: FAMILY MEDICINE

## 2020-10-15 PROCEDURE — 3078F DIAST BP <80 MM HG: CPT | Mod: CPTII,S$GLB,, | Performed by: FAMILY MEDICINE

## 2020-10-15 PROCEDURE — 3074F SYST BP LT 130 MM HG: CPT | Mod: CPTII,S$GLB,, | Performed by: FAMILY MEDICINE

## 2020-10-15 PROCEDURE — 3051F PR MOST RECENT HEMOGLOBIN A1C LEVEL 7.0 - < 8.0%: ICD-10-PCS | Mod: CPTII,S$GLB,, | Performed by: FAMILY MEDICINE

## 2020-10-15 PROCEDURE — 99999 PR PBB SHADOW E&M-EST. PATIENT-LVL V: ICD-10-PCS | Mod: PBBFAC,,, | Performed by: FAMILY MEDICINE

## 2020-10-15 PROCEDURE — 99214 OFFICE O/P EST MOD 30 MIN: CPT | Mod: S$GLB,,, | Performed by: FAMILY MEDICINE

## 2020-10-15 PROCEDURE — 3074F PR MOST RECENT SYSTOLIC BLOOD PRESSURE < 130 MM HG: ICD-10-PCS | Mod: CPTII,S$GLB,, | Performed by: FAMILY MEDICINE

## 2020-10-15 PROCEDURE — 3008F PR BODY MASS INDEX (BMI) DOCUMENTED: ICD-10-PCS | Mod: CPTII,S$GLB,, | Performed by: FAMILY MEDICINE

## 2020-10-15 PROCEDURE — 3078F PR MOST RECENT DIASTOLIC BLOOD PRESSURE < 80 MM HG: ICD-10-PCS | Mod: CPTII,S$GLB,, | Performed by: FAMILY MEDICINE

## 2020-10-15 NOTE — PROGRESS NOTES
Subjective:       Patient ID: Gio Forrest is a 54 y.o. male.    Chief Complaint: Diabetes (4 mo follow up)    Here for wellness and f/u diabetes. Sugar elevated last few weeks.     Diabetes  He presents for his follow-up diabetic visit. He has type 2 diabetes mellitus. His disease course has been fluctuating. Pertinent negatives for hypoglycemia include no nervousness/anxiousness. Pertinent negatives for diabetes include no chest pain.   Hypertension  This is a chronic problem. The current episode started more than 1 year ago. The problem is controlled. Pertinent negatives include no chest pain, palpitations or shortness of breath.     Review of Systems   Constitutional: Negative for chills and fever.   Respiratory: Negative for cough, chest tightness and shortness of breath.    Cardiovascular: Negative for chest pain, palpitations and leg swelling.   Endocrine: Negative for cold intolerance and heat intolerance.   Psychiatric/Behavioral: Negative for decreased concentration. The patient is not nervous/anxious.        Objective:      Physical Exam  Vitals signs and nursing note reviewed.   Constitutional:       Appearance: He is well-developed.   HENT:      Head: Normocephalic and atraumatic.   Cardiovascular:      Rate and Rhythm: Normal rate and regular rhythm.      Heart sounds: Normal heart sounds.   Pulmonary:      Effort: Pulmonary effort is normal.      Breath sounds: Normal breath sounds.         Assessment:       1. Wellness examination    2. Essential hypertension    3. Dyslipidemia    4. Type 2 diabetes mellitus with diabetic polyneuropathy, with long-term current use of insulin    5. Trigger middle finger of right hand        Plan:       Wellness examination    Essential hypertension    Dyslipidemia    Type 2 diabetes mellitus with diabetic polyneuropathy, with long-term current use of insulin    Trigger middle finger of right hand  -     Ambulatory referral/consult to Orthopedics; Future; Expected  date: 10/22/2020      F/u with endo as planned  Plans for gastric sleeve  htn stable  Lipid stable  Will monitor chronic medical issues and continue current plan of care.      Follow up in about 6 months (around 4/15/2021), or if symptoms worsen or fail to improve.

## 2020-10-16 ENCOUNTER — TELEPHONE (OUTPATIENT)
Dept: BARIATRICS | Facility: CLINIC | Age: 55
End: 2020-10-16

## 2020-10-16 NOTE — TELEPHONE ENCOUNTER
----- Message from Nancy Blow sent at 10/16/2020  4:14 PM CDT -----  Regarding: MD Cancel Appt  Contact: pt  Type:  Patient Returning Call    Who Called:  pt  Who Left Message for Patient:  unsure  Does the patient know what this is regarding?:  rescheduling appt, MD cancelled  Best Call Back Number:  [phone] (home)   Additional Information:  pt states someone left a message to reschedule some appt. Not sure of MD or date. Please return his call. Thanks!

## 2020-10-19 RX ORDER — INSULIN LISPRO 100 [IU]/ML
INJECTION, SOLUTION INTRAVENOUS; SUBCUTANEOUS
Qty: 14 VIAL | Refills: 3 | Status: SHIPPED | OUTPATIENT
Start: 2020-10-19 | End: 2020-10-20

## 2020-10-19 NOTE — TELEPHONE ENCOUNTER
----- Message from Andres Mao sent at 10/19/2020 11:49 AM CDT -----  Contact: patient  Type:  RX Refill Request    Who Called:  patient  Refill or New Rx:  refill  RX Name and Strength:  insulin  How is the patient currently taking it? (ex. 1XDay):    Is this a 30 day or 90 day RX:    Preferred Pharmacy with phone number:  [facility]  Local or Mail Order:  local  Ordering Provider:  Gabby Gilbert Call Back Number:  [phone]  Additional Information:  requesting a call back when Rx has been sent to pharmacy,stated pharmacy has been sending request no response

## 2020-10-20 RX ORDER — INSULIN LISPRO 100 [IU]/ML
INJECTION, SOLUTION INTRAVENOUS; SUBCUTANEOUS
Qty: 14 ML | Refills: 3 | Status: SHIPPED | OUTPATIENT
Start: 2020-10-20 | End: 2020-10-20

## 2020-10-20 NOTE — TELEPHONE ENCOUNTER
----- Message from Missy Ledezma sent at 10/20/2020 10:00 AM CDT -----  Contact: Nicolasa Baldwin just needs a diagnoses code for the patients insulin Lispro that was sent over.  Patient is at the pharmacy call back at [phone].  Thanks

## 2020-11-02 ENCOUNTER — TELEPHONE (OUTPATIENT)
Dept: ENDOCRINOLOGY | Facility: CLINIC | Age: 55
End: 2020-11-02

## 2020-11-02 NOTE — TELEPHONE ENCOUNTER
----- Message from Beatriz Baez sent at 11/2/2020  2:59 PM CST -----  Type: Needs Medical Advice    Who Called:  Patient  Best Call Back Number: [phone]  Additional Information:  Patient needs to speak with nurse concerning his blood sugar levels/stating leaving town tomorrow/needs advice/please call back to advise.

## 2020-12-01 ENCOUNTER — NURSE TRIAGE (OUTPATIENT)
Dept: ADMINISTRATIVE | Facility: CLINIC | Age: 55
End: 2020-12-01

## 2020-12-01 ENCOUNTER — OFFICE VISIT (OUTPATIENT)
Dept: URGENT CARE | Facility: CLINIC | Age: 55
End: 2020-12-01
Payer: MEDICARE

## 2020-12-01 VITALS
HEIGHT: 74 IN | RESPIRATION RATE: 16 BRPM | SYSTOLIC BLOOD PRESSURE: 114 MMHG | WEIGHT: 310 LBS | HEART RATE: 69 BPM | TEMPERATURE: 98 F | DIASTOLIC BLOOD PRESSURE: 67 MMHG | BODY MASS INDEX: 39.78 KG/M2 | OXYGEN SATURATION: 98 %

## 2020-12-01 DIAGNOSIS — U07.1 COVID-19 VIRUS DETECTED: ICD-10-CM

## 2020-12-01 DIAGNOSIS — R19.7 DIARRHEA, UNSPECIFIED TYPE: ICD-10-CM

## 2020-12-01 DIAGNOSIS — U07.1 COVID-19 VIRUS INFECTION: Primary | ICD-10-CM

## 2020-12-01 LAB
CTP QC/QA: YES
SARS-COV-2 RDRP RESP QL NAA+PROBE: POSITIVE

## 2020-12-01 PROCEDURE — 99214 OFFICE O/P EST MOD 30 MIN: CPT | Mod: S$GLB,,, | Performed by: PHYSICIAN ASSISTANT

## 2020-12-01 PROCEDURE — 3008F BODY MASS INDEX DOCD: CPT | Mod: CPTII,S$GLB,, | Performed by: PHYSICIAN ASSISTANT

## 2020-12-01 PROCEDURE — 3008F PR BODY MASS INDEX (BMI) DOCUMENTED: ICD-10-PCS | Mod: CPTII,S$GLB,, | Performed by: PHYSICIAN ASSISTANT

## 2020-12-01 PROCEDURE — U0002: ICD-10-PCS | Mod: QW,S$GLB,, | Performed by: PHYSICIAN ASSISTANT

## 2020-12-01 PROCEDURE — 99214 PR OFFICE/OUTPT VISIT, EST, LEVL IV, 30-39 MIN: ICD-10-PCS | Mod: S$GLB,,, | Performed by: PHYSICIAN ASSISTANT

## 2020-12-01 PROCEDURE — U0002 COVID-19 LAB TEST NON-CDC: HCPCS | Mod: QW,S$GLB,, | Performed by: PHYSICIAN ASSISTANT

## 2020-12-01 NOTE — LETTER
[address], [address] ? [place], [address] ? Phone [phone] ? Fax [phone]           Return to Work/School    Patient: Gio Forrest  YOB: 1965   Date: 12/01/2020      To Whom It May Concern:     Gio Forrest was in contact with/seen in my office on 12/01/2020. COVID-19 is present in our communities across the [facility]. Not all patients are eligible or appropriate to be tested. In this situation, your employee meets the following criteria:    Return to Work/School Protocol & Process for Employee/Student with symptoms concerning for COVID-19   The below are simply guidelines of our health system for our employees/students --- Please note that your Employer/School may have different criteria for timeline to return to work/school.    If patient has NOT been tested for COVID 19, but is symptomatic with or without a known positive contact- exclude from work/school until please follow CDC symptoms-based strategy and exclude from work/school until:   At least 3 days (72 hours) have passed since recovery defined as resolution of fever without the use of fever-reducing medications AND    Improvement in respiratory symptoms (e.g., cough, shortness of breath, etc.); AND    At least 10 days have passed since symptoms first appeared.    If patient has been tested for COVID19, please see below:  --IF test results are NOT DETECTED/NEGATIVE, please exclude employee/student from work/school until:   24 hours fever-free without the use of fever-reducing medications AND   Improvement in symptoms (e.g. cough, shortness of breath, etc.)  *Please be aware that there are False Negative possibilities with testing, and you should return to work/school based upon CDC guidelines, not simply a negative result, unless your employer/school has a different return to work/school protocol/guidance for you. *    --IF test results are POSITIVE, please exclude employee/student from work/school  until:   At least 3 days (72 hours) have passed since recovery defined as resolution of fever without the use of fever-reducing medications AND    Improvement in symptoms (e.g., cough, shortness of breath, etc.); AND   At least 10 days have passed since symptoms first appeared.    --IF test results are POSITIVE, but employee/student never had symptoms, follow CDC's time-based strategy and exclude from work/school until:   10 days have passed since first positive COVID-19 test AND NO symptoms have developed, otherwise you would exclude for 10 days since the date of the new symptom.   If symptoms develop after positive result, use above symptom-based strategy.    --IF YOU ARE BEING TESTED BECAUSE OF A HIGH RISK/POSITIVE EXPOSURE, which the CDC defines as direct contact 6 feet or less for >15 minutes with a known positive person, you should follow CDC guidelines as well as your employer/school protocols for safely returning to work/school.     Return to Work/School Recommendations - After returning to work/school, patient should:   Wear a facemask at all times while in your work/school facility until all symptoms are completely resolved or until 14 days after illness onset, whichever is longer, to protect yourself and co-workers/other students.   Be restricted from contact with severely immunocompromised patients (e.g., transplant, hematology-oncology) until 14 days after illness onset.   Adhere to hand hygiene, respiratory hygiene, and cough etiquette in CDC's interim infection control guidance (e.g., cover nose and mouth when coughing or sneezing, dispose of tissues in waste receptacles).   Self-monitor for symptoms and seek re-evaluation if respiratory symptoms recur or worsen.     If you have any questions or concerns, or if I can be of further assistance, please do not hesitate to contact me.     Sincerely,        Lynne Weiss PA-C

## 2020-12-01 NOTE — TELEPHONE ENCOUNTER
Got message that pt is positive for Covid with symptoms. Tried to call pt to see how blood sugars are, but no answer and no voicemail option. No indication blood sugar was high from message received.  PCP was informed of pt status too    Just letting you know. Has Dennis but not sharing

## 2020-12-01 NOTE — TELEPHONE ENCOUNTER
Pt would like to be tested for covid. Sore throat, muscle pain, cough, diarrhea, and fatigue. Pt stated symptoms started yesterday afternoon. Pt stated he has type 2 diabetes. Care advice recommend pt receives a call from Md office within 1 hour. Please call and advise pt.     Reason for Disposition   HIGH RISK patient (e.g., age > 64 years, diabetes, heart or lung disease, weak immune system) (Exception: has already been evaluated by healthcare provider and has no new or worsening symptoms)    Additional Information   Negative: Severe difficulty breathing (e.g., struggling for each breath, speaks in single words)   Negative: Difficult to awaken or acting confused (e.g., disoriented, slurred speech)   Negative: Bluish (or gray) lips or face now   Negative: Shock suspected (e.g., cold/pale/clammy skin, too weak to stand, low BP, rapid pulse)   Negative: Sounds like a life-threatening emergency to the triager   Negative: SEVERE or constant chest pain or pressure (Exception: mild central chest pain, present only when coughing)   Negative: MODERATE difficulty breathing (e.g., speaks in phrases, SOB even at rest, pulse 100-120)   Negative: MILD difficulty breathing (e.g., minimal/no SOB at rest, SOB with walking, pulse <100)   Negative: Chest pain   Negative: Patient sounds very sick or weak to the triager   Negative: Fever > 103 F (39.4 C)   Negative: [1] Fever > 101 F (38.3 C) AND [2] age > 60   Negative: [1] Fever > 100.0 F (37.8 C) AND [2] bedridden (e.g., nursing home patient, CVA, chronic illness, recovering from surgery)    Protocols used: CORONAVIRUS (COVID-19) DIAGNOSED OR [id number]

## 2020-12-02 NOTE — PATIENT INSTRUCTIONS
You must understand that you've received an Urgent Care treatment only and that you may be released before all your medical problems are known or treated. You, the patient, will arrange for follow up care as instructed.  Follow up with your PCP or specialty clinic as directed if not improved or as needed. You can call [phone] to schedule an appointment with the appropriate provider.  If your condition worsens we recommend that you receive another evaluation at the Emergency Department for any concerns or worsening of condition.  Patient aware and verbalized understanding.    Reviewed COVID-19 results with patient.  Counseled patient and answered questions in regards to COVID-19 testing.  Advised patient to go home/quarantine, treat symptoms and avoid contact with others until symptoms are resolved.   Increase fluids and rest is important.  Humidifier use at home.  OTC Claritin or Zyrtec daily as needed for nasal congestion/postnasal drip/allergies.  OTC Flonase Nasal Spray as directed for nasal congestion/postnasal drip/allergies.  Advised patient to take OTC VITAMIN C and VITAMIN D and ZINC as discussed.  Alternate OTC Tylenol every 4-6 hours as needed for pain, headache, fever, etc.  Info given for virtual visit, covid 19 information line, state info line.   Advised patient to follow-up with PCP and/or Specialist for further evaluation as needed.   Strict ER precautions given to patient.  Follow local/state guidelines per covid emergency.   Patient aware, verbalized understanding and agreed with plan of care.    CDC RECOMMENDATIONS  --IF test results are NEGATIVE and NO known high risk exposure to covid-19 virus, you can be excluded from work/school until:  o MINIMUM OF 24 hours fever-free without the use of fever-reducing medications AND  o Improvement in symptoms (e.g. cough, shortness of breath, fatigue, GI symptoms, etc)     --IF YOU ARE BEING TESTED BECAUSE OF A HIGH RISK EXPOSURE, which the CDC defines  as direct contact 6 feet or less for >15 minutes with a known positive person, you should follow CDC guidelines as well as your employer/school protocols for safely returning to work/school.   *Please be aware that there are False Negative possibilities with testing, so you should return to work/school based upon CDC guidelines, not simply a negative result, unless your employer/school has a different RTW protocol/guidance for you.     --IF test results are POSITIVE , you should be excluded from work/school until:  o At least 3 days (72 hours) FEVER-FREE without the use of fever-reducing medications AND  o Improvement in symptoms (e.g., cough, shortness of breathing, fatigue, GI symptoms, etc) AND  o At least 10 days have passed since symptoms first appeared.    IF NOT IMPROVING, FOLLOW UP WITH VIRTUAL ONLINE VISIT WITH A PROVIDER 24/7 - FOR MORE INFORMATION OR TO DOWNLOAD THE [facility], VISIT [facility] ANYWHERE [email or web address]/ANYWHERE  FOR 24/7 NURSE ADVICE, CALL [phone]  FOR COVID 19 RELATED QUESTIONS, CALL the [facility] covid hotline: [phone]  [place] FOR UP TO DATE INFORMATION: Text or dial 211, test keyword LACOVID -573 OR DIAL 211    HELPFUL EXTERNAL RESOURCES:  OFFICE OF PUBLIC HEALTH: [place] - http://ldh.la.gov/ and [phone]  CENTER FOR DISEASE CONTROL - https://www.cdc.gov/   WORLD HEALTH ORGANIZATION (WHO) - https://www.who.int/   CDC WHEN TO QUARANTINE - https://www.cdc.gov/coronavirus/2019-ncov/if-you-are-sick/quarantine.html     INFO ABOUT [facility] COVID-19 RAPID TESTING:  This test utilizes isothermal nucleic acid amplification technology to detect the SARS-CoV-2 RdRp nucleic acid segment.   The analytical sensitivity (limit of detection) is 125 genome equivalents/mL.   A POSITIVE result implies infection with the SARS-CoV-2 virus; the patient is presumed to be contagious.     A NEGATIVE result means that SARS-CoV-2 nucleic acids are not present above the limit of detection.   A  NEGATIVE result should be treated as presumptive. It does not rule out the possibility of COVID-19 and should not be the sole basis for treatment decisions.   This test is only for use under the Food and Drug Administration s Emergency Use Authorization (EUA).   Commercial kits are provided by [employer]. Performance characteristics of the EUA have been independently verified by [facility] Department of Pathology and Laboratory Medicine.   _________________________________________________________________   The authorized Fact Sheet for Healthcare Providers and the authorized Fact Sheet for Patients of the ID NOW COVID-19 are available on the FDA website:   https://www.fda.gov/media/917072/download  https://www.fda.gov/media/844142/download

## 2020-12-02 NOTE — PROGRESS NOTES
"Subjective:       Patient ID: Gio Forrest is a 55 y.o. male.    Vitals:  height is 6' 2" (1.88 m) and weight is 140.6 kg (310 lb) (abnormal). His temperature is 97.7 °F (36.5 °C). His blood pressure is 114/67 and his pulse is 69. His respiration is 16 and oxygen saturation is 98%.     Chief Complaint: Diarrhea    Patient presents to urgent care with diarrhea, cough, sore throat and fatigue that started yesterday. Patient reports no known exposure to COVID-19.    Diarrhea   This is a new problem. The current episode started yesterday. The problem occurs less than 2 times per day. The problem has been unchanged. The stool consistency is described as watery. The patient states that diarrhea does not awaken him from sleep. Associated symptoms include coughing, headaches and myalgias. Pertinent negatives include no abdominal pain, arthralgias, bloating, chills, fever, increased  flatus, sweats, URI, vomiting or weight loss. Nothing aggravates the symptoms. There are no known risk factors. He has tried nothing for the symptoms.       Constitution: Positive for fatigue. Negative for chills, sweating and fever.   HENT: Positive for congestion, postnasal drip and sore throat. Negative for ear pain, drooling, nosebleeds, foreign body in nose, sinus pain, sinus pressure, trouble swallowing and voice change.    Neck: Negative for neck pain, neck stiffness, painful lymph nodes and neck swelling.   Cardiovascular: Negative for chest pain, leg swelling, palpitations, sob on exertion and passing out.   Eyes: Negative for eye discharge, eye itching, eye pain, eye redness, double vision, blurred vision and eyelid swelling.   Respiratory: Positive for cough. Negative for chest tightness, sputum production, bloody sputum, shortness of breath, stridor and wheezing.    Gastrointestinal: Positive for diarrhea. Negative for abdominal pain, abdominal bloating, nausea, vomiting, constipation and heartburn.   Genitourinary: Negative for " dysuria, frequency, urgency, flank pain, hematuria and pelvic pain.   Musculoskeletal: Positive for muscle ache. Negative for joint pain, joint swelling, abnormal ROM of joint, back pain, pain with walking and muscle cramps.   Skin: Negative for rash and hives.   Allergic/Immunologic: Negative for seasonal allergies, hives, itching and sneezing.   Neurological: Positive for headaches. Negative for dizziness, light-headedness, passing out, facial drooping, speech difficulty, loss of balance, altered mental status, loss of consciousness and seizures.   Hematologic/Lymphatic: Negative for swollen lymph nodes.   Psychiatric/Behavioral: Negative for altered mental status and nervous/anxious. The patient is not nervous/anxious.        Objective:      Physical Exam   Constitutional: He is oriented to person, place, and time. He appears well-developed. He is cooperative.  Non-toxic appearance. He does not appear ill. No distress.   HENT:   Head: Normocephalic and atraumatic.   Ears:   Right Ear: Hearing, tympanic membrane, external ear and ear canal normal.   Left Ear: Hearing, tympanic membrane, external ear and ear canal normal.   Nose: Mucosal edema and rhinorrhea present. No nasal deformity. No epistaxis. Right sinus exhibits no maxillary sinus tenderness and no frontal sinus tenderness. Left sinus exhibits no maxillary sinus tenderness and no frontal sinus tenderness.   Mouth/Throat: Uvula is midline and mucous membranes are normal. No trismus in the jaw. Normal dentition. No uvula swelling. Posterior oropharyngeal erythema and cobblestoning present. No oropharyngeal exudate, posterior oropharyngeal edema or tonsillar abscesses. No tonsillar exudate.   Eyes: Conjunctivae and lids are normal. No scleral icterus.   Neck: Trachea normal, full passive range of motion without pain and phonation normal. Neck supple. No neck rigidity. No edema and no erythema present.   Cardiovascular: Normal rate, regular rhythm, normal  heart sounds and normal pulses.   Pulmonary/Chest: Effort normal and breath sounds normal. No accessory muscle usage or stridor. No respiratory distress. He has no decreased breath sounds. He has no wheezes. He has no rhonchi. He has no rales.   Abdominal: Soft. Normal appearance and bowel sounds are normal. He exhibits no distension, no abdominal bruit, no pulsatile midline mass and no mass. There is no abdominal tenderness.   Musculoskeletal: Normal range of motion.         General: No deformity.   Lymphadenopathy:     He has no cervical adenopathy.   Neurological: He is alert and oriented to person, place, and time. He has normal sensation and normal strength. He exhibits normal muscle tone. Gait normal. Coordination normal.   Skin: Skin is warm, dry, intact, not diaphoretic, not pale and no rash. Capillary refill takes less than 2 seconds. Psychiatric: His speech is normal and behavior is normal. Judgment and thought content normal.   Nursing note and vitals reviewed.        Results for orders placed or performed in visit on 12/01/20   POCT COVID-19 Rapid Screening   Result Value Ref Range    POC Rapid COVID Positive (A) Negative     Acceptable Yes        Assessment:       1. COVID-19 virus infection    2. Diarrhea, unspecified type        Plan:         COVID-19 virus infection    Diarrhea, unspecified type  -     POCT COVID-19 Rapid Screening      Patient Instructions   You must understand that you've received an Urgent Care treatment only and that you may be released before all your medical problems are known or treated. You, the patient, will arrange for follow up care as instructed.  Follow up with your PCP or specialty clinic as directed if not improved or as needed. You can call [phone] to schedule an appointment with the appropriate provider.  If your condition worsens we recommend that you receive another evaluation at the Emergency Department for any concerns or worsening of  condition.  Patient aware and verbalized understanding.    Reviewed COVID-19 results with patient.  Counseled patient and answered questions in regards to COVID-19 testing.  Advised patient to go home/quarantine, treat symptoms and avoid contact with others until symptoms are resolved.   Increase fluids and rest is important.  Humidifier use at home.  OTC Claritin or Zyrtec daily as needed for nasal congestion/postnasal drip/allergies.  OTC Flonase Nasal Spray as directed for nasal congestion/postnasal drip/allergies.  Advised patient to take OTC VITAMIN C and VITAMIN D and ZINC as discussed.  Alternate OTC Tylenol every 4-6 hours as needed for pain, headache, fever, etc.  Info given for virtual visit, covid 19 information line, state info line.   Advised patient to follow-up with PCP and/or Specialist for further evaluation as needed.   Strict ER precautions given to patient.  Follow local/state guidelines per covid emergency.   Patient aware, verbalized understanding and agreed with plan of care.    CDC RECOMMENDATIONS  --IF test results are NEGATIVE and NO known high risk exposure to covid-19 virus, you can be excluded from work/school until:  o MINIMUM OF 24 hours fever-free without the use of fever-reducing medications AND  o Improvement in symptoms (e.g. cough, shortness of breath, fatigue, GI symptoms, etc)     --IF YOU ARE BEING TESTED BECAUSE OF A HIGH RISK EXPOSURE, which the CDC defines as direct contact 6 feet or less for >15 minutes with a known positive person, you should follow CDC guidelines as well as your employer/school protocols for safely returning to work/school.   *Please be aware that there are False Negative possibilities with testing, so you should return to work/school based upon CDC guidelines, not simply a negative result, unless your employer/school has a different RTW protocol/guidance for you.     --IF test results are POSITIVE , you should be excluded from work/school until:  o At  least 3 days (72 hours) FEVER-FREE without the use of fever-reducing medications AND  o Improvement in symptoms (e.g., cough, shortness of breathing, fatigue, GI symptoms, etc) AND  o At least 10 days have passed since symptoms first appeared.    IF NOT IMPROVING, FOLLOW UP WITH VIRTUAL ONLINE VISIT WITH A PROVIDER 24/7 - FOR MORE INFORMATION OR TO DOWNLOAD THE [facility], VISIT [facility] ANYWHERE [email or web address]  FOR 24/7 NURSE ADVICE, CALL [phone]  FOR COVID 19 RELATED QUESTIONS, CALL the [facility] covid hotline: [phone]  [place] FOR UP TO DATE INFORMATION: Text or dial 211, test keyword LACOVID -259 OR DIAL 211    HELPFUL EXTERNAL RESOURCES:  OFFICE OF PUBLIC HEALTH: [place] - http://ldh.[place].gov/ and 1-722.170.5944  CENTER FOR DISEASE CONTROL - https://www.cdc.gov/   WORLD HEALTH ORGANIZATION (WHO) - https://www.who.int/   CDC WHEN TO QUARANTINE - https://www.cdc.gov/coronavirus/2019-ncov/if-you-are-sick/quarantine.html     INFO ABOUT [facility] COVID-19 RAPID TESTING:  This test utilizes isothermal nucleic acid amplification technology to detect the SARS-CoV-2 RdRp nucleic acid segment.   The analytical sensitivity (limit of detection) is 125 genome equivalents/mL.   A POSITIVE result implies infection with the SARS-CoV-2 virus; the patient is presumed to be contagious.     A NEGATIVE result means that SARS-CoV-2 nucleic acids are not present above the limit of detection.   A NEGATIVE result should be treated as presumptive. It does not rule out the possibility of COVID-19 and should not be the sole basis for treatment decisions.   This test is only for use under the Food and Drug Administration s Emergency Use Authorization (EUA).   Commercial kits are provided by [employer]. Performance characteristics of the EUA have been independently verified by [facility] Department of Pathology and Laboratory Medicine.    _________________________________________________________________   The authorized Fact Sheet for Healthcare Providers and the authorized Fact Sheet for Patients of the ID NOW COVID-19 are available on the FDA website:   https://www.fda.gov/media/712289/download  https://www.fda.gov/media/497394/download

## 2020-12-11 ENCOUNTER — TELEPHONE (OUTPATIENT)
Dept: BARIATRICS | Facility: CLINIC | Age: 55
End: 2020-12-11

## 2020-12-28 ENCOUNTER — NURSE TRIAGE (OUTPATIENT)
Dept: ADMINISTRATIVE | Facility: CLINIC | Age: 55
End: 2020-12-28

## 2020-12-29 ENCOUNTER — TELEPHONE (OUTPATIENT)
Dept: ENDOCRINOLOGY | Facility: CLINIC | Age: 55
End: 2020-12-29

## 2020-12-29 NOTE — TELEPHONE ENCOUNTER
CBG 460s, feeling drowsy and hard to wake up. Advised to call 911. Verbalized understanding.     Reason for Disposition   Unconscious or difficult to awaken    Protocols used: DIABETES - HIGH BLOOD SUGAR-A-AH

## 2020-12-29 NOTE — TELEPHONE ENCOUNTER
Called pt but unable to reach. Attempting to determine current bg levels and left detailed message that soonest appt is 2/9/21 in AM  Advised pt to call back with concerns if bg still not stable

## 2020-12-29 NOTE — TELEPHONE ENCOUNTER
----- Message from Gi Washingtno sent at 12/29/2020  3:05 PM CST -----  Regarding: sooner appt request  Contact: IVONE LAI [[id number]]  Type:  Sooner Appointment Request    Caller is requesting a sooner appointment.    Caller is requesting a message be sent to doctor.    Name of Caller:IVONE LAI [[id number]]  When is the first available appointment?  3/1/2021  Symptoms:  ER f/u increase blood sugar  Would the patient rather a call back or a response via [facility]? Call back   Best Call Back Number:  [phone]   Additional Information: went to ER on 12/28/2020 via ambulance, levels was approximately 478

## 2020-12-29 NOTE — TELEPHONE ENCOUNTER
Pt was in ED for elevated glucose. Out now and bg mid 200s  Unable to get appt now. Pt admits he has been eating bad.  Advised when he eats poorly and does not make appts (multiple no shows and cancellations) it is difficult to near impossible to maintain diabetes  Pt scheduled for soonest appt with Gabby (2/9//21). Advised to eat better, hydrate, and follow med regimen  Pt asking if can restart actos (was d/c by Gabby on 9/15)  On pump    Please advise

## 2020-12-30 NOTE — TELEPHONE ENCOUNTER
Left voicemail for pt to not restart glimepiride, per Jacques, APRN:  No.  Gabby stopped it for a reason.

## 2021-01-04 ENCOUNTER — PATIENT MESSAGE (OUTPATIENT)
Dept: ADMINISTRATIVE | Facility: HOSPITAL | Age: 56
End: 2021-01-04

## 2021-01-21 ENCOUNTER — PATIENT OUTREACH (OUTPATIENT)
Dept: ADMINISTRATIVE | Facility: OTHER | Age: 56
End: 2021-01-21

## 2021-01-21 ENCOUNTER — TELEPHONE (OUTPATIENT)
Dept: PODIATRY | Facility: CLINIC | Age: 56
End: 2021-01-21

## 2021-01-22 ENCOUNTER — TELEPHONE (OUTPATIENT)
Dept: PODIATRY | Facility: CLINIC | Age: 56
End: 2021-01-22

## 2021-01-29 ENCOUNTER — TELEPHONE (OUTPATIENT)
Dept: ENDOCRINOLOGY | Facility: CLINIC | Age: 56
End: 2021-01-29

## 2021-01-30 ENCOUNTER — TELEPHONE (OUTPATIENT)
Dept: FAMILY MEDICINE | Facility: CLINIC | Age: 56
End: 2021-01-30

## 2021-03-19 ENCOUNTER — TELEPHONE (OUTPATIENT)
Dept: PODIATRY | Facility: CLINIC | Age: 56
End: 2021-03-19

## 2021-03-23 ENCOUNTER — PATIENT OUTREACH (OUTPATIENT)
Dept: ADMINISTRATIVE | Facility: OTHER | Age: 56
End: 2021-03-23

## 2021-03-24 ENCOUNTER — OFFICE VISIT (OUTPATIENT)
Dept: PODIATRY | Facility: CLINIC | Age: 56
End: 2021-03-24
Payer: MEDICARE

## 2021-03-24 ENCOUNTER — OFFICE VISIT (OUTPATIENT)
Dept: FAMILY MEDICINE | Facility: CLINIC | Age: 56
End: 2021-03-24
Payer: MEDICARE

## 2021-03-24 ENCOUNTER — HOSPITAL ENCOUNTER (OUTPATIENT)
Dept: RADIOLOGY | Facility: HOSPITAL | Age: 56
Discharge: HOME | End: 2021-03-24
Attending: PODIATRIST
Payer: MEDICARE

## 2021-03-24 VITALS
DIASTOLIC BLOOD PRESSURE: 76 MMHG | HEART RATE: 76 BPM | BODY MASS INDEX: 38.08 KG/M2 | HEIGHT: 74 IN | WEIGHT: 296.75 LBS | SYSTOLIC BLOOD PRESSURE: 130 MMHG | OXYGEN SATURATION: 96 %

## 2021-03-24 VITALS — WEIGHT: 309.94 LBS | BODY MASS INDEX: 39.78 KG/M2 | HEIGHT: 74 IN

## 2021-03-24 DIAGNOSIS — F33.0 MILD EPISODE OF RECURRENT MAJOR DEPRESSIVE DISORDER: ICD-10-CM

## 2021-03-24 DIAGNOSIS — I42.9 CARDIOMYOPATHY, UNSPECIFIED TYPE: ICD-10-CM

## 2021-03-24 DIAGNOSIS — Z01.818 PRE-OPERATIVE CLEARANCE: Primary | ICD-10-CM

## 2021-03-24 DIAGNOSIS — B35.1 ONYCHOMYCOSIS DUE TO DERMATOPHYTE: ICD-10-CM

## 2021-03-24 DIAGNOSIS — Z79.4 TYPE 2 DIABETES MELLITUS WITH HYPERGLYCEMIA, WITH LONG-TERM CURRENT USE OF INSULIN: ICD-10-CM

## 2021-03-24 DIAGNOSIS — M62.462 GASTROCNEMIUS EQUINUS OF LEFT LOWER EXTREMITY: ICD-10-CM

## 2021-03-24 DIAGNOSIS — E11.42 DIABETIC POLYNEUROPATHY ASSOCIATED WITH TYPE 2 DIABETES MELLITUS: Primary | ICD-10-CM

## 2021-03-24 DIAGNOSIS — M77.42 METATARSALGIA OF LEFT FOOT: ICD-10-CM

## 2021-03-24 DIAGNOSIS — G47.33 OSA ON CPAP: ICD-10-CM

## 2021-03-24 DIAGNOSIS — I47.10 SVT (SUPRAVENTRICULAR TACHYCARDIA): ICD-10-CM

## 2021-03-24 DIAGNOSIS — E66.01 SEVERE OBESITY (BMI 35.0-39.9) WITH COMORBIDITY: ICD-10-CM

## 2021-03-24 DIAGNOSIS — M46.06 SPINAL ENTHESOPATHY, LUMBAR REGION: ICD-10-CM

## 2021-03-24 DIAGNOSIS — M12.812 ROTATOR CUFF ARTHROPATHY, LEFT: ICD-10-CM

## 2021-03-24 DIAGNOSIS — E11.65 TYPE 2 DIABETES MELLITUS WITH HYPERGLYCEMIA, WITH LONG-TERM CURRENT USE OF INSULIN: ICD-10-CM

## 2021-03-24 DIAGNOSIS — I10 ESSENTIAL HYPERTENSION: ICD-10-CM

## 2021-03-24 DIAGNOSIS — E66.01 MORBID OBESITY: ICD-10-CM

## 2021-03-24 PROBLEM — Z86.79 S/P CATHETER ABLATION OF SLOW PATHWAY: Status: ACTIVE | Noted: 2021-03-24

## 2021-03-24 PROBLEM — Z98.890 S/P CATHETER ABLATION OF SLOW PATHWAY: Status: ACTIVE | Noted: 2021-03-24

## 2021-03-24 PROCEDURE — 99213 PR OFFICE/OUTPT VISIT, EST, LEVL III, 20-29 MIN: ICD-10-PCS | Mod: 25,S$GLB,, | Performed by: PODIATRIST

## 2021-03-24 PROCEDURE — 99999 PR PBB SHADOW E&M-EST. PATIENT-LVL III: ICD-10-PCS | Mod: PBBFAC,,, | Performed by: PODIATRIST

## 2021-03-24 PROCEDURE — 3078F PR MOST RECENT DIASTOLIC BLOOD PRESSURE < 80 MM HG: ICD-10-PCS | Mod: CPTII,S$GLB,, | Performed by: NURSE PRACTITIONER

## 2021-03-24 PROCEDURE — 73630 X-RAY EXAM OF FOOT: CPT | Mod: TC,PO,LT

## 2021-03-24 PROCEDURE — 3051F HG A1C>EQUAL 7.0%<8.0%: CPT | Mod: CPTII,S$GLB,, | Performed by: NURSE PRACTITIONER

## 2021-03-24 PROCEDURE — 3075F PR MOST RECENT SYSTOLIC BLOOD PRESS GE 130-139MM HG: ICD-10-PCS | Mod: CPTII,S$GLB,, | Performed by: NURSE PRACTITIONER

## 2021-03-24 PROCEDURE — 11721 DEBRIDE NAIL 6 OR MORE: CPT | Mod: Q9,S$GLB,, | Performed by: PODIATRIST

## 2021-03-24 PROCEDURE — 3074F SYST BP LT 130 MM HG: CPT | Mod: CPTII,S$GLB,, | Performed by: PODIATRIST

## 2021-03-24 PROCEDURE — 3008F BODY MASS INDEX DOCD: CPT | Mod: CPTII,S$GLB,, | Performed by: PODIATRIST

## 2021-03-24 PROCEDURE — 3072F LOW RISK FOR RETINOPATHY: CPT | Mod: S$GLB,,, | Performed by: PODIATRIST

## 2021-03-24 PROCEDURE — 3008F BODY MASS INDEX DOCD: CPT | Mod: CPTII,S$GLB,, | Performed by: NURSE PRACTITIONER

## 2021-03-24 PROCEDURE — 73630 XR FOOT COMPLETE 3 VIEW LEFT: ICD-10-PCS | Mod: 26,LT,, | Performed by: RADIOLOGY

## 2021-03-24 PROCEDURE — 3074F PR MOST RECENT SYSTOLIC BLOOD PRESSURE < 130 MM HG: ICD-10-PCS | Mod: CPTII,S$GLB,, | Performed by: PODIATRIST

## 2021-03-24 PROCEDURE — 3051F HG A1C>EQUAL 7.0%<8.0%: CPT | Mod: CPTII,S$GLB,, | Performed by: PODIATRIST

## 2021-03-24 PROCEDURE — 3078F DIAST BP <80 MM HG: CPT | Mod: CPTII,S$GLB,, | Performed by: NURSE PRACTITIONER

## 2021-03-24 PROCEDURE — 99499 UNLISTED E&M SERVICE: CPT | Mod: S$GLB,,, | Performed by: PODIATRIST

## 2021-03-24 PROCEDURE — 99999 PR PBB SHADOW E&M-EST. PATIENT-LVL V: CPT | Mod: PBBFAC,,, | Performed by: NURSE PRACTITIONER

## 2021-03-24 PROCEDURE — 99214 PR OFFICE/OUTPT VISIT, EST, LEVL IV, 30-39 MIN: ICD-10-PCS | Mod: S$GLB,,, | Performed by: NURSE PRACTITIONER

## 2021-03-24 PROCEDURE — 3051F PR MOST RECENT HEMOGLOBIN A1C LEVEL 7.0 - < 8.0%: ICD-10-PCS | Mod: CPTII,S$GLB,, | Performed by: PODIATRIST

## 2021-03-24 PROCEDURE — 3072F PR LOW RISK FOR RETINOPATHY: ICD-10-PCS | Mod: S$GLB,,, | Performed by: PODIATRIST

## 2021-03-24 PROCEDURE — 1125F PR PAIN SEVERITY QUANTIFIED, PAIN PRESENT: ICD-10-PCS | Mod: S$GLB,,, | Performed by: NURSE PRACTITIONER

## 2021-03-24 PROCEDURE — 3078F PR MOST RECENT DIASTOLIC BLOOD PRESSURE < 80 MM HG: ICD-10-PCS | Mod: CPTII,S$GLB,, | Performed by: PODIATRIST

## 2021-03-24 PROCEDURE — 99213 OFFICE O/P EST LOW 20 MIN: CPT | Mod: 25,S$GLB,, | Performed by: PODIATRIST

## 2021-03-24 PROCEDURE — 99999 PR PBB SHADOW E&M-EST. PATIENT-LVL V: ICD-10-PCS | Mod: PBBFAC,,, | Performed by: NURSE PRACTITIONER

## 2021-03-24 PROCEDURE — 3072F LOW RISK FOR RETINOPATHY: CPT | Mod: S$GLB,,, | Performed by: NURSE PRACTITIONER

## 2021-03-24 PROCEDURE — 3051F PR MOST RECENT HEMOGLOBIN A1C LEVEL 7.0 - < 8.0%: ICD-10-PCS | Mod: CPTII,S$GLB,, | Performed by: NURSE PRACTITIONER

## 2021-03-24 PROCEDURE — 3078F DIAST BP <80 MM HG: CPT | Mod: CPTII,S$GLB,, | Performed by: PODIATRIST

## 2021-03-24 PROCEDURE — 99499 RISK ADDL DX/OHS AUDIT: ICD-10-PCS | Mod: S$GLB,,, | Performed by: PODIATRIST

## 2021-03-24 PROCEDURE — 73630 X-RAY EXAM OF FOOT: CPT | Mod: 26,LT,, | Performed by: RADIOLOGY

## 2021-03-24 PROCEDURE — 11721 PR DEBRIDEMENT OF NAILS, 6 OR MORE: ICD-10-PCS | Mod: Q9,S$GLB,, | Performed by: PODIATRIST

## 2021-03-24 PROCEDURE — 3008F PR BODY MASS INDEX (BMI) DOCUMENTED: ICD-10-PCS | Mod: CPTII,S$GLB,, | Performed by: NURSE PRACTITIONER

## 2021-03-24 PROCEDURE — 3008F PR BODY MASS INDEX (BMI) DOCUMENTED: ICD-10-PCS | Mod: CPTII,S$GLB,, | Performed by: PODIATRIST

## 2021-03-24 PROCEDURE — 99999 PR PBB SHADOW E&M-EST. PATIENT-LVL III: CPT | Mod: PBBFAC,,, | Performed by: PODIATRIST

## 2021-03-24 PROCEDURE — 3075F SYST BP GE 130 - 139MM HG: CPT | Mod: CPTII,S$GLB,, | Performed by: NURSE PRACTITIONER

## 2021-03-24 PROCEDURE — 99214 OFFICE O/P EST MOD 30 MIN: CPT | Mod: S$GLB,,, | Performed by: NURSE PRACTITIONER

## 2021-03-24 PROCEDURE — 1125F AMNT PAIN NOTED PAIN PRSNT: CPT | Mod: S$GLB,,, | Performed by: PODIATRIST

## 2021-03-24 PROCEDURE — 1125F PR PAIN SEVERITY QUANTIFIED, PAIN PRESENT: ICD-10-PCS | Mod: S$GLB,,, | Performed by: PODIATRIST

## 2021-03-24 PROCEDURE — 3072F PR LOW RISK FOR RETINOPATHY: ICD-10-PCS | Mod: S$GLB,,, | Performed by: NURSE PRACTITIONER

## 2021-03-24 PROCEDURE — 1125F AMNT PAIN NOTED PAIN PRSNT: CPT | Mod: S$GLB,,, | Performed by: NURSE PRACTITIONER

## 2021-04-01 ENCOUNTER — CLINICAL SUPPORT (OUTPATIENT)
Dept: URGENT CARE | Facility: CLINIC | Age: 56
End: 2021-04-01
Payer: MEDICARE

## 2021-04-01 DIAGNOSIS — Z11.52 ENCOUNTER FOR SCREENING LABORATORY TESTING FOR COVID-19 VIRUS: Primary | ICD-10-CM

## 2021-04-01 LAB
CTP QC/QA: YES
SARS-COV-2 RDRP RESP QL NAA+PROBE: NEGATIVE

## 2021-04-01 PROCEDURE — 99211 PR OFFICE/OUTPT VISIT, EST, LEVL I: ICD-10-PCS | Mod: S$GLB,,, | Performed by: FAMILY MEDICINE

## 2021-04-01 PROCEDURE — 99211 OFF/OP EST MAY X REQ PHY/QHP: CPT | Mod: S$GLB,,, | Performed by: FAMILY MEDICINE

## 2021-04-01 PROCEDURE — U0002: ICD-10-PCS | Mod: QW,S$GLB,, | Performed by: FAMILY MEDICINE

## 2021-04-01 PROCEDURE — U0002 COVID-19 LAB TEST NON-CDC: HCPCS | Mod: QW,S$GLB,, | Performed by: FAMILY MEDICINE

## 2021-04-06 ENCOUNTER — PATIENT MESSAGE (OUTPATIENT)
Dept: ADMINISTRATIVE | Facility: HOSPITAL | Age: 56
End: 2021-04-06

## 2021-04-06 ENCOUNTER — TELEPHONE (OUTPATIENT)
Dept: FAMILY MEDICINE | Facility: CLINIC | Age: 56
End: 2021-04-06

## 2021-04-07 ENCOUNTER — TELEPHONE (OUTPATIENT)
Dept: FAMILY MEDICINE | Facility: CLINIC | Age: 56
End: 2021-04-07

## 2021-04-14 ENCOUNTER — PATIENT MESSAGE (OUTPATIENT)
Dept: PSYCHIATRY | Facility: CLINIC | Age: 56
End: 2021-04-14

## 2021-04-15 ENCOUNTER — LAB VISIT (OUTPATIENT)
Dept: LAB | Facility: HOSPITAL | Age: 56
End: 2021-04-15
Attending: NURSE PRACTITIONER
Payer: MEDICARE

## 2021-04-15 ENCOUNTER — TELEPHONE (OUTPATIENT)
Dept: PSYCHIATRY | Facility: CLINIC | Age: 56
End: 2021-04-15

## 2021-04-15 DIAGNOSIS — Z79.4 TYPE 2 DIABETES MELLITUS WITH HYPERGLYCEMIA, WITH LONG-TERM CURRENT USE OF INSULIN: ICD-10-CM

## 2021-04-15 DIAGNOSIS — E11.65 TYPE 2 DIABETES MELLITUS WITH HYPERGLYCEMIA, WITH LONG-TERM CURRENT USE OF INSULIN: ICD-10-CM

## 2021-04-15 DIAGNOSIS — I10 ESSENTIAL HYPERTENSION: ICD-10-CM

## 2021-04-15 LAB
ALBUMIN SERPL BCP-MCNC: 3.9 G/DL (ref 3.5–5.2)
ALP SERPL-CCNC: 130 U/L (ref 55–135)
ALT SERPL W/O P-5'-P-CCNC: 20 U/L (ref 10–44)
ANION GAP SERPL CALC-SCNC: 6 MMOL/L (ref 8–16)
AST SERPL-CCNC: 19 U/L (ref 10–40)
BASOPHILS # BLD AUTO: 0.03 K/UL (ref 0–0.2)
BASOPHILS NFR BLD: 0.4 % (ref 0–1.9)
BILIRUB SERPL-MCNC: 0.6 MG/DL (ref 0.1–1)
BUN SERPL-MCNC: 14 MG/DL (ref 6–20)
CALCIUM SERPL-MCNC: 9.3 MG/DL (ref 8.7–10.5)
CHLORIDE SERPL-SCNC: 104 MMOL/L (ref 95–110)
CHOLEST SERPL-MCNC: 140 MG/DL (ref 120–199)
CHOLEST/HDLC SERPL: 5.2 {RATIO} (ref 2–5)
CO2 SERPL-SCNC: 31 MMOL/L (ref 23–29)
CREAT SERPL-MCNC: 0.9 MG/DL (ref 0.5–1.4)
DIFFERENTIAL METHOD: ABNORMAL
EOSINOPHIL # BLD AUTO: 0.2 K/UL (ref 0–0.5)
EOSINOPHIL NFR BLD: 2.2 % (ref 0–8)
ERYTHROCYTE [DISTWIDTH] IN BLOOD BY AUTOMATED COUNT: 14.8 % (ref 11.5–14.5)
EST. GFR  (AFRICAN AMERICAN): >60 ML/MIN/1.73 M^2
EST. GFR  (NON AFRICAN AMERICAN): >60 ML/MIN/1.73 M^2
ESTIMATED AVG GLUCOSE: 192 MG/DL (ref 68–131)
GLUCOSE SERPL-MCNC: 140 MG/DL (ref 70–110)
HBA1C MFR BLD: 8.3 % (ref 4–5.6)
HCT VFR BLD AUTO: 40.2 % (ref 40–54)
HDLC SERPL-MCNC: 27 MG/DL (ref 40–75)
HDLC SERPL: 19.3 % (ref 20–50)
HGB BLD-MCNC: 12.6 G/DL (ref 14–18)
IMM GRANULOCYTES # BLD AUTO: 0.03 K/UL (ref 0–0.04)
IMM GRANULOCYTES NFR BLD AUTO: 0.4 % (ref 0–0.5)
LDLC SERPL CALC-MCNC: 92.8 MG/DL (ref 63–159)
LYMPHOCYTES # BLD AUTO: 1.9 K/UL (ref 1–4.8)
LYMPHOCYTES NFR BLD: 26.2 % (ref 18–48)
MCH RBC QN AUTO: 26.7 PG (ref 27–31)
MCHC RBC AUTO-ENTMCNC: 31.3 G/DL (ref 32–36)
MCV RBC AUTO: 85 FL (ref 82–98)
MONOCYTES # BLD AUTO: 0.5 K/UL (ref 0.3–1)
MONOCYTES NFR BLD: 6.3 % (ref 4–15)
NEUTROPHILS # BLD AUTO: 4.7 K/UL (ref 1.8–7.7)
NEUTROPHILS NFR BLD: 64.5 % (ref 38–73)
NONHDLC SERPL-MCNC: 113 MG/DL
NRBC BLD-RTO: 0 /100 WBC
PLATELET # BLD AUTO: 235 K/UL (ref 150–450)
PMV BLD AUTO: 11.2 FL (ref 9.2–12.9)
POTASSIUM SERPL-SCNC: 4.6 MMOL/L (ref 3.5–5.1)
PROT SERPL-MCNC: 7.4 G/DL (ref 6–8.4)
RBC # BLD AUTO: 4.72 M/UL (ref 4.6–6.2)
SODIUM SERPL-SCNC: 141 MMOL/L (ref 136–145)
TRIGL SERPL-MCNC: 101 MG/DL (ref 30–150)
WBC # BLD AUTO: 7.25 K/UL (ref 3.9–12.7)

## 2021-04-15 PROCEDURE — 80061 LIPID PANEL: CPT | Performed by: NURSE PRACTITIONER

## 2021-04-15 PROCEDURE — 36415 COLL VENOUS BLD VENIPUNCTURE: CPT | Mod: PO | Performed by: NURSE PRACTITIONER

## 2021-04-15 PROCEDURE — 83036 HEMOGLOBIN GLYCOSYLATED A1C: CPT | Performed by: NURSE PRACTITIONER

## 2021-04-15 PROCEDURE — 80053 COMPREHEN METABOLIC PANEL: CPT | Performed by: NURSE PRACTITIONER

## 2021-04-15 PROCEDURE — 85025 COMPLETE CBC W/AUTO DIFF WBC: CPT | Performed by: NURSE PRACTITIONER

## 2021-04-16 ENCOUNTER — OFFICE VISIT (OUTPATIENT)
Dept: FAMILY MEDICINE | Facility: CLINIC | Age: 56
End: 2021-04-16
Payer: MEDICARE

## 2021-04-16 VITALS
WEIGHT: 299.81 LBS | HEIGHT: 74 IN | HEART RATE: 80 BPM | SYSTOLIC BLOOD PRESSURE: 132 MMHG | OXYGEN SATURATION: 97 % | DIASTOLIC BLOOD PRESSURE: 74 MMHG | BODY MASS INDEX: 38.48 KG/M2

## 2021-04-16 DIAGNOSIS — I10 ESSENTIAL HYPERTENSION: ICD-10-CM

## 2021-04-16 DIAGNOSIS — E66.01 SEVERE OBESITY (BMI 35.0-39.9) WITH COMORBIDITY: ICD-10-CM

## 2021-04-16 DIAGNOSIS — Z79.899 POLYPHARMACY: ICD-10-CM

## 2021-04-16 DIAGNOSIS — M1A.0710 CHRONIC GOUT OF RIGHT FOOT, UNSPECIFIED CAUSE: ICD-10-CM

## 2021-04-16 DIAGNOSIS — E11.42 TYPE 2 DIABETES MELLITUS WITH DIABETIC POLYNEUROPATHY: Primary | ICD-10-CM

## 2021-04-16 PROCEDURE — 99999 PR PBB SHADOW E&M-EST. PATIENT-LVL V: ICD-10-PCS | Mod: PBBFAC,,, | Performed by: NURSE PRACTITIONER

## 2021-04-16 PROCEDURE — 99214 OFFICE O/P EST MOD 30 MIN: CPT | Mod: S$GLB,,, | Performed by: NURSE PRACTITIONER

## 2021-04-16 PROCEDURE — 1126F PR PAIN SEVERITY QUANTIFIED, NO PAIN PRESENT: ICD-10-PCS | Mod: S$GLB,,, | Performed by: NURSE PRACTITIONER

## 2021-04-16 PROCEDURE — 3072F LOW RISK FOR RETINOPATHY: CPT | Mod: S$GLB,,, | Performed by: NURSE PRACTITIONER

## 2021-04-16 PROCEDURE — 3072F PR LOW RISK FOR RETINOPATHY: ICD-10-PCS | Mod: S$GLB,,, | Performed by: NURSE PRACTITIONER

## 2021-04-16 PROCEDURE — 3008F BODY MASS INDEX DOCD: CPT | Mod: CPTII,S$GLB,, | Performed by: NURSE PRACTITIONER

## 2021-04-16 PROCEDURE — 3052F HG A1C>EQUAL 8.0%<EQUAL 9.0%: CPT | Mod: CPTII,S$GLB,, | Performed by: NURSE PRACTITIONER

## 2021-04-16 PROCEDURE — 99214 PR OFFICE/OUTPT VISIT, EST, LEVL IV, 30-39 MIN: ICD-10-PCS | Mod: S$GLB,,, | Performed by: NURSE PRACTITIONER

## 2021-04-16 PROCEDURE — 99999 PR PBB SHADOW E&M-EST. PATIENT-LVL V: CPT | Mod: PBBFAC,,, | Performed by: NURSE PRACTITIONER

## 2021-04-16 PROCEDURE — 3008F PR BODY MASS INDEX (BMI) DOCUMENTED: ICD-10-PCS | Mod: CPTII,S$GLB,, | Performed by: NURSE PRACTITIONER

## 2021-04-16 PROCEDURE — 3052F PR MOST RECENT HEMOGLOBIN A1C LEVEL 8.0 - < 9.0%: ICD-10-PCS | Mod: CPTII,S$GLB,, | Performed by: NURSE PRACTITIONER

## 2021-04-16 PROCEDURE — 1126F AMNT PAIN NOTED NONE PRSNT: CPT | Mod: S$GLB,,, | Performed by: NURSE PRACTITIONER

## 2021-04-16 RX ORDER — INSULIN LISPRO 100 [IU]/ML
INJECTION, SOLUTION INTRAVENOUS; SUBCUTANEOUS
Qty: 14 ML | Refills: 1 | Status: SHIPPED | OUTPATIENT
Start: 2021-04-16 | End: 2021-09-21 | Stop reason: SDUPTHER

## 2021-04-30 ENCOUNTER — OFFICE VISIT (OUTPATIENT)
Dept: ENDOCRINOLOGY | Facility: CLINIC | Age: 56
End: 2021-04-30
Payer: MEDICARE

## 2021-04-30 VITALS
WEIGHT: 300.38 LBS | HEART RATE: 64 BPM | HEIGHT: 74 IN | DIASTOLIC BLOOD PRESSURE: 68 MMHG | BODY MASS INDEX: 38.55 KG/M2 | SYSTOLIC BLOOD PRESSURE: 122 MMHG

## 2021-04-30 DIAGNOSIS — Z79.4 TYPE 2 DIABETES MELLITUS WITH HYPERGLYCEMIA, WITH LONG-TERM CURRENT USE OF INSULIN: Primary | ICD-10-CM

## 2021-04-30 DIAGNOSIS — E78.5 DYSLIPIDEMIA: ICD-10-CM

## 2021-04-30 DIAGNOSIS — I10 ESSENTIAL HYPERTENSION: ICD-10-CM

## 2021-04-30 DIAGNOSIS — E11.65 TYPE 2 DIABETES MELLITUS WITH HYPERGLYCEMIA, WITH LONG-TERM CURRENT USE OF INSULIN: Primary | ICD-10-CM

## 2021-04-30 PROCEDURE — 1126F PR PAIN SEVERITY QUANTIFIED, NO PAIN PRESENT: ICD-10-PCS | Mod: S$GLB,,, | Performed by: NURSE PRACTITIONER

## 2021-04-30 PROCEDURE — 3052F HG A1C>EQUAL 8.0%<EQUAL 9.0%: CPT | Mod: CPTII,S$GLB,, | Performed by: NURSE PRACTITIONER

## 2021-04-30 PROCEDURE — 3008F BODY MASS INDEX DOCD: CPT | Mod: CPTII,S$GLB,, | Performed by: NURSE PRACTITIONER

## 2021-04-30 PROCEDURE — 99214 PR OFFICE/OUTPT VISIT, EST, LEVL IV, 30-39 MIN: ICD-10-PCS | Mod: S$GLB,,, | Performed by: NURSE PRACTITIONER

## 2021-04-30 PROCEDURE — 99999 PR PBB SHADOW E&M-EST. PATIENT-LVL V: ICD-10-PCS | Mod: PBBFAC,,, | Performed by: NURSE PRACTITIONER

## 2021-04-30 PROCEDURE — 99214 OFFICE O/P EST MOD 30 MIN: CPT | Mod: S$GLB,,, | Performed by: NURSE PRACTITIONER

## 2021-04-30 PROCEDURE — 99999 PR PBB SHADOW E&M-EST. PATIENT-LVL V: CPT | Mod: PBBFAC,,, | Performed by: NURSE PRACTITIONER

## 2021-04-30 PROCEDURE — 1126F AMNT PAIN NOTED NONE PRSNT: CPT | Mod: S$GLB,,, | Performed by: NURSE PRACTITIONER

## 2021-04-30 PROCEDURE — 3072F LOW RISK FOR RETINOPATHY: CPT | Mod: S$GLB,,, | Performed by: NURSE PRACTITIONER

## 2021-04-30 PROCEDURE — 3072F PR LOW RISK FOR RETINOPATHY: ICD-10-PCS | Mod: S$GLB,,, | Performed by: NURSE PRACTITIONER

## 2021-04-30 PROCEDURE — 3008F PR BODY MASS INDEX (BMI) DOCUMENTED: ICD-10-PCS | Mod: CPTII,S$GLB,, | Performed by: NURSE PRACTITIONER

## 2021-04-30 PROCEDURE — 3052F PR MOST RECENT HEMOGLOBIN A1C LEVEL 8.0 - < 9.0%: ICD-10-PCS | Mod: CPTII,S$GLB,, | Performed by: NURSE PRACTITIONER

## 2021-05-02 ENCOUNTER — NURSE TRIAGE (OUTPATIENT)
Dept: ADMINISTRATIVE | Facility: CLINIC | Age: 56
End: 2021-05-02

## 2021-05-10 ENCOUNTER — PATIENT MESSAGE (OUTPATIENT)
Dept: RESEARCH | Facility: HOSPITAL | Age: 56
End: 2021-05-10

## 2021-05-12 ENCOUNTER — OFFICE VISIT (OUTPATIENT)
Dept: ORTHOPEDICS | Facility: CLINIC | Age: 56
End: 2021-05-12
Payer: MEDICARE

## 2021-05-12 VITALS
SYSTOLIC BLOOD PRESSURE: 130 MMHG | BODY MASS INDEX: 37.22 KG/M2 | DIASTOLIC BLOOD PRESSURE: 73 MMHG | HEART RATE: 80 BPM | HEIGHT: 74 IN | WEIGHT: 290 LBS

## 2021-05-12 DIAGNOSIS — M19.041 DEGENERATIVE ARTHRITIS OF METACARPOPHALANGEAL JOINT OF MIDDLE FINGER OF RIGHT HAND: ICD-10-CM

## 2021-05-12 DIAGNOSIS — M65.341 TRIGGER FINGER, RIGHT RING FINGER: Primary | ICD-10-CM

## 2021-05-12 PROCEDURE — 99203 PR OFFICE/OUTPT VISIT, NEW, LEVL III, 30-44 MIN: ICD-10-PCS | Mod: 25,S$GLB,, | Performed by: ORTHOPAEDIC SURGERY

## 2021-05-12 PROCEDURE — 1125F AMNT PAIN NOTED PAIN PRSNT: CPT | Mod: S$GLB,,, | Performed by: ORTHOPAEDIC SURGERY

## 2021-05-12 PROCEDURE — 99499 UNLISTED E&M SERVICE: CPT | Mod: S$GLB,,, | Performed by: ORTHOPAEDIC SURGERY

## 2021-05-12 PROCEDURE — 99999 PR PBB SHADOW E&M-EST. PATIENT-LVL IV: CPT | Mod: PBBFAC,,, | Performed by: ORTHOPAEDIC SURGERY

## 2021-05-12 PROCEDURE — 99499 RISK ADDL DX/OHS AUDIT: ICD-10-PCS | Mod: S$GLB,,, | Performed by: ORTHOPAEDIC SURGERY

## 2021-05-12 PROCEDURE — 3008F BODY MASS INDEX DOCD: CPT | Mod: CPTII,S$GLB,, | Performed by: ORTHOPAEDIC SURGERY

## 2021-05-12 PROCEDURE — 3008F PR BODY MASS INDEX (BMI) DOCUMENTED: ICD-10-PCS | Mod: CPTII,S$GLB,, | Performed by: ORTHOPAEDIC SURGERY

## 2021-05-12 PROCEDURE — 99999 PR PBB SHADOW E&M-EST. PATIENT-LVL IV: ICD-10-PCS | Mod: PBBFAC,,, | Performed by: ORTHOPAEDIC SURGERY

## 2021-05-12 PROCEDURE — 1125F PR PAIN SEVERITY QUANTIFIED, PAIN PRESENT: ICD-10-PCS | Mod: S$GLB,,, | Performed by: ORTHOPAEDIC SURGERY

## 2021-05-12 PROCEDURE — 20600 SMALL JOINT ASPIRATION/INJECTION: R LONG MCP: ICD-10-PCS | Mod: RT,S$GLB,, | Performed by: ORTHOPAEDIC SURGERY

## 2021-05-12 PROCEDURE — 3072F LOW RISK FOR RETINOPATHY: CPT | Mod: S$GLB,,, | Performed by: ORTHOPAEDIC SURGERY

## 2021-05-12 PROCEDURE — 3072F PR LOW RISK FOR RETINOPATHY: ICD-10-PCS | Mod: S$GLB,,, | Performed by: ORTHOPAEDIC SURGERY

## 2021-05-12 PROCEDURE — 20600 DRAIN/INJ JOINT/BURSA W/O US: CPT | Mod: RT,S$GLB,, | Performed by: ORTHOPAEDIC SURGERY

## 2021-05-12 PROCEDURE — 99203 OFFICE O/P NEW LOW 30 MIN: CPT | Mod: 25,S$GLB,, | Performed by: ORTHOPAEDIC SURGERY

## 2021-05-12 RX ORDER — TRIAMCINOLONE ACETONIDE 40 MG/ML
40 INJECTION, SUSPENSION INTRA-ARTICULAR; INTRAMUSCULAR
Status: DISCONTINUED | OUTPATIENT
Start: 2021-05-12 | End: 2021-05-12 | Stop reason: HOSPADM

## 2021-05-12 RX ADMIN — TRIAMCINOLONE ACETONIDE 40 MG: 40 INJECTION, SUSPENSION INTRA-ARTICULAR; INTRAMUSCULAR at 04:05

## 2021-05-25 DIAGNOSIS — Z98.890 STATUS POST SHOULDER SURGERY: Primary | ICD-10-CM

## 2021-05-26 DIAGNOSIS — E11.65 TYPE 2 DIABETES MELLITUS WITH HYPERGLYCEMIA, WITH LONG-TERM CURRENT USE OF INSULIN: Primary | ICD-10-CM

## 2021-05-26 DIAGNOSIS — Z79.4 TYPE 2 DIABETES MELLITUS WITH HYPERGLYCEMIA, WITH LONG-TERM CURRENT USE OF INSULIN: Primary | ICD-10-CM

## 2021-05-26 RX ORDER — METFORMIN HYDROCHLORIDE 1000 MG/1
1000 TABLET ORAL 2 TIMES DAILY
Qty: 180 TABLET | Refills: 4 | Status: SHIPPED | OUTPATIENT
Start: 2021-05-26 | End: 2021-09-21 | Stop reason: SDUPTHER

## 2021-05-31 ENCOUNTER — CLINICAL SUPPORT (OUTPATIENT)
Dept: REHABILITATION | Facility: HOSPITAL | Age: 56
End: 2021-05-31
Payer: MEDICARE

## 2021-05-31 DIAGNOSIS — M25.512 CHRONIC LEFT SHOULDER PAIN: ICD-10-CM

## 2021-05-31 DIAGNOSIS — G89.29 CHRONIC LEFT SHOULDER PAIN: ICD-10-CM

## 2021-05-31 DIAGNOSIS — M25.619 LIMITED RANGE OF MOTION (ROM) OF SHOULDER: ICD-10-CM

## 2021-05-31 DIAGNOSIS — R53.1 WEAKNESS: ICD-10-CM

## 2021-05-31 PROCEDURE — 97161 PT EVAL LOW COMPLEX 20 MIN: CPT | Mod: PO | Performed by: PHYSICAL THERAPIST

## 2021-05-31 PROCEDURE — 97110 THERAPEUTIC EXERCISES: CPT | Mod: PO | Performed by: PHYSICAL THERAPIST

## 2021-06-04 ENCOUNTER — TELEPHONE (OUTPATIENT)
Dept: PSYCHIATRY | Facility: CLINIC | Age: 56
End: 2021-06-04

## 2021-06-10 ENCOUNTER — TELEPHONE (OUTPATIENT)
Dept: PSYCHIATRY | Facility: CLINIC | Age: 56
End: 2021-06-10

## 2021-06-10 ENCOUNTER — TELEPHONE (OUTPATIENT)
Dept: ENDOCRINOLOGY | Facility: CLINIC | Age: 56
End: 2021-06-10

## 2021-06-10 RX ORDER — INSULIN GLARGINE 100 [IU]/ML
50 INJECTION, SOLUTION SUBCUTANEOUS NIGHTLY
Qty: 1 BOX | Refills: 0 | Status: SHIPPED | OUTPATIENT
Start: 2021-06-10 | End: 2021-09-21 | Stop reason: SDUPTHER

## 2021-06-10 RX ORDER — INSULIN PUMP SYRINGE, 3 ML
EACH MISCELLANEOUS
Qty: 45 EACH | Refills: 4 | OUTPATIENT
Start: 2021-06-10

## 2021-06-11 ENCOUNTER — TELEPHONE (OUTPATIENT)
Dept: ENDOCRINOLOGY | Facility: CLINIC | Age: 56
End: 2021-06-11

## 2021-06-11 DIAGNOSIS — Z79.4 TYPE 2 DIABETES MELLITUS WITH HYPERGLYCEMIA, WITH LONG-TERM CURRENT USE OF INSULIN: Primary | ICD-10-CM

## 2021-06-11 DIAGNOSIS — E11.65 TYPE 2 DIABETES MELLITUS WITH HYPERGLYCEMIA, WITH LONG-TERM CURRENT USE OF INSULIN: Primary | ICD-10-CM

## 2021-06-11 RX ORDER — INSULIN LISPRO 100 [IU]/ML
INJECTION, SOLUTION INTRAVENOUS; SUBCUTANEOUS
Qty: 4.5 VIAL | Refills: 0 | Status: SHIPPED | OUTPATIENT
Start: 2021-06-11 | End: 2021-09-21 | Stop reason: SDUPTHER

## 2021-06-14 ENCOUNTER — TELEPHONE (OUTPATIENT)
Dept: ENDOCRINOLOGY | Facility: CLINIC | Age: 56
End: 2021-06-14

## 2021-06-29 ENCOUNTER — TELEPHONE (OUTPATIENT)
Dept: ENDOCRINOLOGY | Facility: CLINIC | Age: 56
End: 2021-06-29

## 2021-06-30 ENCOUNTER — TELEPHONE (OUTPATIENT)
Dept: PSYCHIATRY | Facility: CLINIC | Age: 56
End: 2021-06-30

## 2021-07-14 ENCOUNTER — TELEPHONE (OUTPATIENT)
Dept: PSYCHIATRY | Facility: CLINIC | Age: 56
End: 2021-07-14

## 2021-07-23 ENCOUNTER — CLINICAL SUPPORT (OUTPATIENT)
Dept: URGENT CARE | Facility: CLINIC | Age: 56
End: 2021-07-23
Payer: MEDICARE

## 2021-07-23 DIAGNOSIS — Z20.822 ENCOUNTER FOR LABORATORY TESTING FOR COVID-19 VIRUS: Primary | ICD-10-CM

## 2021-07-23 LAB
CTP QC/QA: YES
SARS-COV-2 RDRP RESP QL NAA+PROBE: NEGATIVE

## 2021-07-23 PROCEDURE — 99211 OFF/OP EST MAY X REQ PHY/QHP: CPT | Mod: S$GLB,,, | Performed by: FAMILY MEDICINE

## 2021-07-23 PROCEDURE — U0002 COVID-19 LAB TEST NON-CDC: HCPCS | Mod: QW,S$GLB,, | Performed by: FAMILY MEDICINE

## 2021-07-23 PROCEDURE — U0002: ICD-10-PCS | Mod: QW,S$GLB,, | Performed by: FAMILY MEDICINE

## 2021-07-23 PROCEDURE — 99211 PR OFFICE/OUTPT VISIT, EST, LEVL I: ICD-10-PCS | Mod: S$GLB,,, | Performed by: FAMILY MEDICINE

## 2021-07-24 DIAGNOSIS — M10.00 IDIOPATHIC GOUT, UNSPECIFIED CHRONICITY, UNSPECIFIED SITE: ICD-10-CM

## 2021-07-24 RX ORDER — ALLOPURINOL 100 MG/1
TABLET ORAL
Qty: 90 TABLET | Refills: 0 | Status: SHIPPED | OUTPATIENT
Start: 2021-07-24 | End: 2022-01-12

## 2021-07-31 ENCOUNTER — PATIENT OUTREACH (OUTPATIENT)
Dept: ADMINISTRATIVE | Facility: OTHER | Age: 56
End: 2021-07-31

## 2021-08-04 ENCOUNTER — TELEPHONE (OUTPATIENT)
Dept: ENDOCRINOLOGY | Facility: CLINIC | Age: 56
End: 2021-08-04

## 2021-08-04 ENCOUNTER — PATIENT MESSAGE (OUTPATIENT)
Dept: ADMINISTRATIVE | Facility: HOSPITAL | Age: 56
End: 2021-08-04

## 2021-08-10 RX ORDER — PIOGLITAZONEHYDROCHLORIDE 15 MG/1
TABLET ORAL
Qty: 90 TABLET | Refills: 0 | Status: SHIPPED | OUTPATIENT
Start: 2021-08-10 | End: 2021-09-21 | Stop reason: SDUPTHER

## 2021-08-18 ENCOUNTER — OFFICE VISIT (OUTPATIENT)
Dept: ORTHOPEDICS | Facility: CLINIC | Age: 56
End: 2021-08-18
Payer: MEDICARE

## 2021-08-18 VITALS
HEART RATE: 72 BPM | BODY MASS INDEX: 37.2 KG/M2 | HEIGHT: 74 IN | WEIGHT: 289.88 LBS | DIASTOLIC BLOOD PRESSURE: 58 MMHG | SYSTOLIC BLOOD PRESSURE: 100 MMHG

## 2021-08-18 DIAGNOSIS — Z01.818 PRE-OP TESTING: Primary | ICD-10-CM

## 2021-08-18 DIAGNOSIS — M65.341 TRIGGER FINGER, RIGHT RING FINGER: Primary | ICD-10-CM

## 2021-08-18 PROCEDURE — 3078F PR MOST RECENT DIASTOLIC BLOOD PRESSURE < 80 MM HG: ICD-10-PCS | Mod: CPTII,S$GLB,, | Performed by: PHYSICIAN ASSISTANT

## 2021-08-18 PROCEDURE — 3072F PR LOW RISK FOR RETINOPATHY: ICD-10-PCS | Mod: CPTII,S$GLB,, | Performed by: PHYSICIAN ASSISTANT

## 2021-08-18 PROCEDURE — 99999 PR PBB SHADOW E&M-EST. PATIENT-LVL V: ICD-10-PCS | Mod: PBBFAC,,, | Performed by: PHYSICIAN ASSISTANT

## 2021-08-18 PROCEDURE — 3008F BODY MASS INDEX DOCD: CPT | Mod: CPTII,S$GLB,, | Performed by: PHYSICIAN ASSISTANT

## 2021-08-18 PROCEDURE — 1160F PR REVIEW ALL MEDS BY PRESCRIBER/CLIN PHARMACIST DOCUMENTED: ICD-10-PCS | Mod: CPTII,S$GLB,, | Performed by: PHYSICIAN ASSISTANT

## 2021-08-18 PROCEDURE — 3072F LOW RISK FOR RETINOPATHY: CPT | Mod: CPTII,S$GLB,, | Performed by: PHYSICIAN ASSISTANT

## 2021-08-18 PROCEDURE — 99999 PR PBB SHADOW E&M-EST. PATIENT-LVL V: CPT | Mod: PBBFAC,,, | Performed by: PHYSICIAN ASSISTANT

## 2021-08-18 PROCEDURE — 99213 OFFICE O/P EST LOW 20 MIN: CPT | Mod: S$GLB,,, | Performed by: PHYSICIAN ASSISTANT

## 2021-08-18 PROCEDURE — 3074F PR MOST RECENT SYSTOLIC BLOOD PRESSURE < 130 MM HG: ICD-10-PCS | Mod: CPTII,S$GLB,, | Performed by: PHYSICIAN ASSISTANT

## 2021-08-18 PROCEDURE — 3074F SYST BP LT 130 MM HG: CPT | Mod: CPTII,S$GLB,, | Performed by: PHYSICIAN ASSISTANT

## 2021-08-18 PROCEDURE — 3052F HG A1C>EQUAL 8.0%<EQUAL 9.0%: CPT | Mod: CPTII,S$GLB,, | Performed by: PHYSICIAN ASSISTANT

## 2021-08-18 PROCEDURE — 3078F DIAST BP <80 MM HG: CPT | Mod: CPTII,S$GLB,, | Performed by: PHYSICIAN ASSISTANT

## 2021-08-18 PROCEDURE — 1125F AMNT PAIN NOTED PAIN PRSNT: CPT | Mod: CPTII,S$GLB,, | Performed by: PHYSICIAN ASSISTANT

## 2021-08-18 PROCEDURE — 3008F PR BODY MASS INDEX (BMI) DOCUMENTED: ICD-10-PCS | Mod: CPTII,S$GLB,, | Performed by: PHYSICIAN ASSISTANT

## 2021-08-18 PROCEDURE — 3052F PR MOST RECENT HEMOGLOBIN A1C LEVEL 8.0 - < 9.0%: ICD-10-PCS | Mod: CPTII,S$GLB,, | Performed by: PHYSICIAN ASSISTANT

## 2021-08-18 PROCEDURE — 1159F PR MEDICATION LIST DOCUMENTED IN MEDICAL RECORD: ICD-10-PCS | Mod: CPTII,S$GLB,, | Performed by: PHYSICIAN ASSISTANT

## 2021-08-18 PROCEDURE — 1159F MED LIST DOCD IN RCRD: CPT | Mod: CPTII,S$GLB,, | Performed by: PHYSICIAN ASSISTANT

## 2021-08-18 PROCEDURE — 99213 PR OFFICE/OUTPT VISIT, EST, LEVL III, 20-29 MIN: ICD-10-PCS | Mod: S$GLB,,, | Performed by: PHYSICIAN ASSISTANT

## 2021-08-18 PROCEDURE — 1125F PR PAIN SEVERITY QUANTIFIED, PAIN PRESENT: ICD-10-PCS | Mod: CPTII,S$GLB,, | Performed by: PHYSICIAN ASSISTANT

## 2021-08-18 PROCEDURE — 1160F RVW MEDS BY RX/DR IN RCRD: CPT | Mod: CPTII,S$GLB,, | Performed by: PHYSICIAN ASSISTANT

## 2021-08-24 ENCOUNTER — TELEPHONE (OUTPATIENT)
Dept: ENDOCRINOLOGY | Facility: CLINIC | Age: 56
End: 2021-08-24

## 2021-08-24 DIAGNOSIS — E11.65 TYPE 2 DIABETES MELLITUS WITH HYPERGLYCEMIA, WITH LONG-TERM CURRENT USE OF INSULIN: Primary | ICD-10-CM

## 2021-08-24 DIAGNOSIS — Z79.4 TYPE 2 DIABETES MELLITUS WITH HYPERGLYCEMIA, WITH LONG-TERM CURRENT USE OF INSULIN: Primary | ICD-10-CM

## 2021-09-07 ENCOUNTER — LAB VISIT (OUTPATIENT)
Dept: FAMILY MEDICINE | Facility: CLINIC | Age: 56
End: 2021-09-07
Payer: MEDICARE

## 2021-09-07 DIAGNOSIS — Z01.818 PRE-OP TESTING: ICD-10-CM

## 2021-09-07 PROCEDURE — U0003 INFECTIOUS AGENT DETECTION BY NUCLEIC ACID (DNA OR RNA); SEVERE ACUTE RESPIRATORY SYNDROME CORONAVIRUS 2 (SARS-COV-2) (CORONAVIRUS DISEASE [COVID-19]), AMPLIFIED PROBE TECHNIQUE, MAKING USE OF HIGH THROUGHPUT TECHNOLOGIES AS DESCRIBED BY CMS-2020-01-R: HCPCS | Performed by: ORTHOPAEDIC SURGERY

## 2021-09-07 PROCEDURE — U0005 INFEC AGEN DETEC AMPLI PROBE: HCPCS | Performed by: ORTHOPAEDIC SURGERY

## 2021-09-08 LAB
SARS-COV-2 RNA RESP QL NAA+PROBE: NOT DETECTED
SARS-COV-2- CYCLE NUMBER: NORMAL

## 2021-09-09 ENCOUNTER — HOSPITAL ENCOUNTER (OUTPATIENT)
Facility: HOSPITAL | Age: 56
Discharge: HOME | End: 2021-09-09
Attending: ORTHOPAEDIC SURGERY | Admitting: ORTHOPAEDIC SURGERY
Payer: MEDICARE

## 2021-09-09 VITALS
DIASTOLIC BLOOD PRESSURE: 60 MMHG | OXYGEN SATURATION: 98 % | RESPIRATION RATE: 16 BRPM | SYSTOLIC BLOOD PRESSURE: 132 MMHG | HEART RATE: 65 BPM | TEMPERATURE: 98 F

## 2021-09-09 DIAGNOSIS — M65.341 TRIGGER FINGER, RIGHT RING FINGER: Primary | ICD-10-CM

## 2021-09-09 LAB — GLUCOSE SERPL-MCNC: 158 MG/DL (ref 70–110)

## 2021-09-09 PROCEDURE — 71000015 HC POSTOP RECOV 1ST HR: Mod: PO | Performed by: ORTHOPAEDIC SURGERY

## 2021-09-09 PROCEDURE — 82962 GLUCOSE BLOOD TEST: CPT | Mod: PO | Performed by: ORTHOPAEDIC SURGERY

## 2021-09-09 PROCEDURE — 26055 INCISE FINGER TENDON SHEATH: CPT | Mod: F8,,, | Performed by: ORTHOPAEDIC SURGERY

## 2021-09-09 PROCEDURE — 63600175 PHARM REV CODE 636 W HCPCS: Mod: PO | Performed by: PHYSICIAN ASSISTANT

## 2021-09-09 PROCEDURE — 36000706: Mod: PO | Performed by: ORTHOPAEDIC SURGERY

## 2021-09-09 PROCEDURE — 26055 PR INCISE FINGER TENDON SHEATH: ICD-10-PCS | Mod: F8,,, | Performed by: ORTHOPAEDIC SURGERY

## 2021-09-09 PROCEDURE — 36000707: Mod: PO | Performed by: ORTHOPAEDIC SURGERY

## 2021-09-09 PROCEDURE — 25000003 PHARM REV CODE 250: Mod: PO | Performed by: ORTHOPAEDIC SURGERY

## 2021-09-09 RX ORDER — LIDOCAINE HYDROCHLORIDE 10 MG/ML
INJECTION, SOLUTION EPIDURAL; INFILTRATION; INTRACAUDAL; PERINEURAL
Status: DISCONTINUED | OUTPATIENT
Start: 2021-09-09 | End: 2021-09-09 | Stop reason: HOSPADM

## 2021-09-09 RX ORDER — BUPIVACAINE HYDROCHLORIDE 2.5 MG/ML
INJECTION, SOLUTION EPIDURAL; INFILTRATION; INTRACAUDAL
Status: DISCONTINUED | OUTPATIENT
Start: 2021-09-09 | End: 2021-09-09 | Stop reason: HOSPADM

## 2021-09-09 RX ORDER — IBUPROFEN 600 MG/1
600 TABLET ORAL 3 TIMES DAILY
Qty: 6 TABLET | Refills: 0 | Status: SHIPPED | OUTPATIENT
Start: 2021-09-09 | End: 2021-09-11

## 2021-09-17 ENCOUNTER — TELEPHONE (OUTPATIENT)
Dept: ORTHOPEDICS | Facility: CLINIC | Age: 56
End: 2021-09-17

## 2021-09-19 ENCOUNTER — PATIENT OUTREACH (OUTPATIENT)
Dept: ADMINISTRATIVE | Facility: OTHER | Age: 56
End: 2021-09-19

## 2021-09-21 ENCOUNTER — TELEPHONE (OUTPATIENT)
Dept: ENDOCRINOLOGY | Facility: CLINIC | Age: 56
End: 2021-09-21

## 2021-09-21 ENCOUNTER — OFFICE VISIT (OUTPATIENT)
Dept: ENDOCRINOLOGY | Facility: CLINIC | Age: 56
End: 2021-09-21
Payer: MEDICARE

## 2021-09-21 VITALS
DIASTOLIC BLOOD PRESSURE: 64 MMHG | BODY MASS INDEX: 37.81 KG/M2 | HEIGHT: 74 IN | SYSTOLIC BLOOD PRESSURE: 118 MMHG | HEART RATE: 65 BPM | WEIGHT: 294.63 LBS | OXYGEN SATURATION: 99 %

## 2021-09-21 DIAGNOSIS — Z79.4 TYPE 2 DIABETES MELLITUS WITH DIABETIC POLYNEUROPATHY, WITH LONG-TERM CURRENT USE OF INSULIN: Primary | ICD-10-CM

## 2021-09-21 DIAGNOSIS — E11.42 TYPE 2 DIABETES MELLITUS WITH DIABETIC POLYNEUROPATHY, WITH LONG-TERM CURRENT USE OF INSULIN: Primary | ICD-10-CM

## 2021-09-21 DIAGNOSIS — E11.65 TYPE 2 DIABETES MELLITUS WITH HYPERGLYCEMIA, WITH LONG-TERM CURRENT USE OF INSULIN: ICD-10-CM

## 2021-09-21 DIAGNOSIS — I42.8 OTHER CARDIOMYOPATHY: ICD-10-CM

## 2021-09-21 DIAGNOSIS — E78.5 DYSLIPIDEMIA: ICD-10-CM

## 2021-09-21 DIAGNOSIS — Z79.4 TYPE 2 DIABETES MELLITUS WITH HYPERGLYCEMIA, WITH LONG-TERM CURRENT USE OF INSULIN: ICD-10-CM

## 2021-09-21 DIAGNOSIS — I10 ESSENTIAL HYPERTENSION: ICD-10-CM

## 2021-09-21 DIAGNOSIS — G47.33 OSA ON CPAP: ICD-10-CM

## 2021-09-21 DIAGNOSIS — I25.10 ATHEROSCLEROSIS OF NATIVE CORONARY ARTERY OF NATIVE HEART WITHOUT ANGINA PECTORIS: ICD-10-CM

## 2021-09-21 DIAGNOSIS — E11.42 TYPE 2 DIABETES MELLITUS WITH DIABETIC POLYNEUROPATHY: ICD-10-CM

## 2021-09-21 LAB — GLUCOSE SERPL-MCNC: 276 MG/DL (ref 70–110)

## 2021-09-21 PROCEDURE — 3074F PR MOST RECENT SYSTOLIC BLOOD PRESSURE < 130 MM HG: ICD-10-PCS | Mod: CPTII,S$GLB,, | Performed by: NURSE PRACTITIONER

## 2021-09-21 PROCEDURE — 99214 OFFICE O/P EST MOD 30 MIN: CPT | Mod: S$GLB,,, | Performed by: NURSE PRACTITIONER

## 2021-09-21 PROCEDURE — 3078F PR MOST RECENT DIASTOLIC BLOOD PRESSURE < 80 MM HG: ICD-10-PCS | Mod: CPTII,S$GLB,, | Performed by: NURSE PRACTITIONER

## 2021-09-21 PROCEDURE — 4010F ACE/ARB THERAPY RXD/TAKEN: CPT | Mod: CPTII,S$GLB,, | Performed by: NURSE PRACTITIONER

## 2021-09-21 PROCEDURE — 3046F PR MOST RECENT HEMOGLOBIN A1C LEVEL > 9.0%: ICD-10-PCS | Mod: CPTII,S$GLB,, | Performed by: NURSE PRACTITIONER

## 2021-09-21 PROCEDURE — 99214 PR OFFICE/OUTPT VISIT, EST, LEVL IV, 30-39 MIN: ICD-10-PCS | Mod: S$GLB,,, | Performed by: NURSE PRACTITIONER

## 2021-09-21 PROCEDURE — 3046F HEMOGLOBIN A1C LEVEL >9.0%: CPT | Mod: CPTII,S$GLB,, | Performed by: NURSE PRACTITIONER

## 2021-09-21 PROCEDURE — 3008F PR BODY MASS INDEX (BMI) DOCUMENTED: ICD-10-PCS | Mod: CPTII,S$GLB,, | Performed by: NURSE PRACTITIONER

## 2021-09-21 PROCEDURE — 82962 GLUCOSE BLOOD TEST: CPT | Mod: S$GLB,,, | Performed by: NURSE PRACTITIONER

## 2021-09-21 PROCEDURE — 82962 POCT GLUCOSE, HAND-HELD DEVICE: ICD-10-PCS | Mod: S$GLB,,, | Performed by: NURSE PRACTITIONER

## 2021-09-21 PROCEDURE — 3008F BODY MASS INDEX DOCD: CPT | Mod: CPTII,S$GLB,, | Performed by: NURSE PRACTITIONER

## 2021-09-21 PROCEDURE — 4010F PR ACE/ARB THEARPY RXD/TAKEN: ICD-10-PCS | Mod: CPTII,S$GLB,, | Performed by: NURSE PRACTITIONER

## 2021-09-21 PROCEDURE — 3072F LOW RISK FOR RETINOPATHY: CPT | Mod: CPTII,S$GLB,, | Performed by: NURSE PRACTITIONER

## 2021-09-21 PROCEDURE — 3074F SYST BP LT 130 MM HG: CPT | Mod: CPTII,S$GLB,, | Performed by: NURSE PRACTITIONER

## 2021-09-21 PROCEDURE — 3078F DIAST BP <80 MM HG: CPT | Mod: CPTII,S$GLB,, | Performed by: NURSE PRACTITIONER

## 2021-09-21 PROCEDURE — 99999 PR PBB SHADOW E&M-EST. PATIENT-LVL V: CPT | Mod: PBBFAC,,, | Performed by: NURSE PRACTITIONER

## 2021-09-21 PROCEDURE — 1159F PR MEDICATION LIST DOCUMENTED IN MEDICAL RECORD: ICD-10-PCS | Mod: CPTII,S$GLB,, | Performed by: NURSE PRACTITIONER

## 2021-09-21 PROCEDURE — 3072F PR LOW RISK FOR RETINOPATHY: ICD-10-PCS | Mod: CPTII,S$GLB,, | Performed by: NURSE PRACTITIONER

## 2021-09-21 PROCEDURE — 99999 PR PBB SHADOW E&M-EST. PATIENT-LVL V: ICD-10-PCS | Mod: PBBFAC,,, | Performed by: NURSE PRACTITIONER

## 2021-09-21 PROCEDURE — 1159F MED LIST DOCD IN RCRD: CPT | Mod: CPTII,S$GLB,, | Performed by: NURSE PRACTITIONER

## 2021-09-21 RX ORDER — INSULIN LISPRO 100 [IU]/ML
INJECTION, SOLUTION INTRAVENOUS; SUBCUTANEOUS
Qty: 14 VIAL | Refills: 4 | Status: SHIPPED | OUTPATIENT
Start: 2021-09-21 | End: 2021-12-08

## 2021-09-21 RX ORDER — INSULIN LISPRO 100 [IU]/ML
INJECTION, SOLUTION INTRAVENOUS; SUBCUTANEOUS
Qty: 14 ML | Refills: 1 | Status: SHIPPED | OUTPATIENT
Start: 2021-09-21 | End: 2022-09-06

## 2021-09-21 RX ORDER — BLOOD-GLUCOSE SENSOR
EACH MISCELLANEOUS
Qty: 3 DEVICE | Refills: 12 | Status: SHIPPED | OUTPATIENT
Start: 2021-09-21 | End: 2021-12-21 | Stop reason: SDUPTHER

## 2021-09-21 RX ORDER — PIOGLITAZONEHYDROCHLORIDE 15 MG/1
15 TABLET ORAL DAILY
Qty: 90 TABLET | Refills: 4 | Status: SHIPPED | OUTPATIENT
Start: 2021-09-21 | End: 2022-09-27 | Stop reason: SDUPTHER

## 2021-09-21 RX ORDER — INSULIN GLARGINE 100 [IU]/ML
50 INJECTION, SOLUTION SUBCUTANEOUS NIGHTLY
Qty: 1 BOX | Refills: 0 | Status: SHIPPED | OUTPATIENT
Start: 2021-09-21 | End: 2022-06-24

## 2021-09-21 RX ORDER — BLOOD-GLUCOSE,RECEIVER,CONT
EACH MISCELLANEOUS
Qty: 1 EACH | Refills: 0 | Status: SHIPPED | OUTPATIENT
Start: 2021-09-21 | End: 2021-12-21 | Stop reason: SDUPTHER

## 2021-09-21 RX ORDER — METFORMIN HYDROCHLORIDE 1000 MG/1
1000 TABLET ORAL 2 TIMES DAILY
Qty: 180 TABLET | Refills: 4 | Status: SHIPPED | OUTPATIENT
Start: 2021-09-21 | End: 2022-09-27 | Stop reason: SDUPTHER

## 2021-09-21 RX ORDER — BLOOD-GLUCOSE TRANSMITTER
EACH MISCELLANEOUS
Qty: 1 DEVICE | Refills: 3 | Status: SHIPPED | OUTPATIENT
Start: 2021-09-21 | End: 2021-12-21 | Stop reason: SDUPTHER

## 2021-09-21 RX ORDER — SEMAGLUTIDE 1.34 MG/ML
INJECTION, SOLUTION SUBCUTANEOUS
Qty: 2 PEN | Refills: 12 | Status: SHIPPED | OUTPATIENT
Start: 2021-09-21 | End: 2022-03-22

## 2021-09-22 ENCOUNTER — PATIENT MESSAGE (OUTPATIENT)
Dept: FAMILY MEDICINE | Facility: CLINIC | Age: 56
End: 2021-09-22

## 2021-09-22 ENCOUNTER — OFFICE VISIT (OUTPATIENT)
Dept: PODIATRY | Facility: CLINIC | Age: 56
End: 2021-09-22
Payer: MEDICARE

## 2021-09-22 VITALS — HEIGHT: 74 IN | WEIGHT: 294.75 LBS | BODY MASS INDEX: 37.83 KG/M2

## 2021-09-22 DIAGNOSIS — E11.42 DIABETIC POLYNEUROPATHY ASSOCIATED WITH TYPE 2 DIABETES MELLITUS: Primary | ICD-10-CM

## 2021-09-22 DIAGNOSIS — E66.01 SEVERE OBESITY (BMI 35.0-39.9) WITH COMORBIDITY: ICD-10-CM

## 2021-09-22 DIAGNOSIS — B35.1 ONYCHOMYCOSIS DUE TO DERMATOPHYTE: ICD-10-CM

## 2021-09-22 DIAGNOSIS — M77.42 METATARSALGIA OF LEFT FOOT: ICD-10-CM

## 2021-09-22 PROCEDURE — 1159F MED LIST DOCD IN RCRD: CPT | Mod: CPTII,S$GLB,, | Performed by: PODIATRIST

## 2021-09-22 PROCEDURE — 99999 PR PBB SHADOW E&M-EST. PATIENT-LVL IV: ICD-10-PCS | Mod: PBBFAC,,, | Performed by: PODIATRIST

## 2021-09-22 PROCEDURE — 99499 UNLISTED E&M SERVICE: CPT | Mod: S$GLB,,, | Performed by: PODIATRIST

## 2021-09-22 PROCEDURE — 99499 NO LOS: ICD-10-PCS | Mod: S$GLB,,, | Performed by: PODIATRIST

## 2021-09-22 PROCEDURE — 3072F LOW RISK FOR RETINOPATHY: CPT | Mod: CPTII,S$GLB,, | Performed by: PODIATRIST

## 2021-09-22 PROCEDURE — 99999 PR PBB SHADOW E&M-EST. PATIENT-LVL IV: CPT | Mod: PBBFAC,,, | Performed by: PODIATRIST

## 2021-09-22 PROCEDURE — 3046F HEMOGLOBIN A1C LEVEL >9.0%: CPT | Mod: CPTII,S$GLB,, | Performed by: PODIATRIST

## 2021-09-22 PROCEDURE — 4010F ACE/ARB THERAPY RXD/TAKEN: CPT | Mod: CPTII,S$GLB,, | Performed by: PODIATRIST

## 2021-09-22 PROCEDURE — 4010F PR ACE/ARB THEARPY RXD/TAKEN: ICD-10-PCS | Mod: CPTII,S$GLB,, | Performed by: PODIATRIST

## 2021-09-22 PROCEDURE — 3046F PR MOST RECENT HEMOGLOBIN A1C LEVEL > 9.0%: ICD-10-PCS | Mod: CPTII,S$GLB,, | Performed by: PODIATRIST

## 2021-09-22 PROCEDURE — 1159F PR MEDICATION LIST DOCUMENTED IN MEDICAL RECORD: ICD-10-PCS | Mod: CPTII,S$GLB,, | Performed by: PODIATRIST

## 2021-09-22 PROCEDURE — 3008F PR BODY MASS INDEX (BMI) DOCUMENTED: ICD-10-PCS | Mod: CPTII,S$GLB,, | Performed by: PODIATRIST

## 2021-09-22 PROCEDURE — 3072F PR LOW RISK FOR RETINOPATHY: ICD-10-PCS | Mod: CPTII,S$GLB,, | Performed by: PODIATRIST

## 2021-09-22 PROCEDURE — 11721 PR DEBRIDEMENT OF NAILS, 6 OR MORE: ICD-10-PCS | Mod: Q9,S$GLB,, | Performed by: PODIATRIST

## 2021-09-22 PROCEDURE — 3008F BODY MASS INDEX DOCD: CPT | Mod: CPTII,S$GLB,, | Performed by: PODIATRIST

## 2021-09-22 PROCEDURE — 11721 DEBRIDE NAIL 6 OR MORE: CPT | Mod: Q9,S$GLB,, | Performed by: PODIATRIST

## 2021-09-27 ENCOUNTER — OFFICE VISIT (OUTPATIENT)
Dept: ORTHOPEDICS | Facility: CLINIC | Age: 56
End: 2021-09-27
Payer: MEDICARE

## 2021-09-27 ENCOUNTER — CLINICAL SUPPORT (OUTPATIENT)
Dept: DIABETES | Facility: CLINIC | Age: 56
End: 2021-09-27
Payer: MEDICARE

## 2021-09-27 VITALS
DIASTOLIC BLOOD PRESSURE: 67 MMHG | HEART RATE: 84 BPM | HEIGHT: 74 IN | BODY MASS INDEX: 37.83 KG/M2 | HEIGHT: 74 IN | WEIGHT: 294.75 LBS | BODY MASS INDEX: 37.83 KG/M2 | SYSTOLIC BLOOD PRESSURE: 102 MMHG | WEIGHT: 294.75 LBS

## 2021-09-27 DIAGNOSIS — Z79.4 TYPE 2 DIABETES MELLITUS WITH DIABETIC POLYNEUROPATHY, WITH LONG-TERM CURRENT USE OF INSULIN: ICD-10-CM

## 2021-09-27 DIAGNOSIS — M65.341 TRIGGER FINGER, RIGHT RING FINGER: Primary | ICD-10-CM

## 2021-09-27 DIAGNOSIS — E11.42 TYPE 2 DIABETES MELLITUS WITH DIABETIC POLYNEUROPATHY, WITH LONG-TERM CURRENT USE OF INSULIN: ICD-10-CM

## 2021-09-27 DIAGNOSIS — Z46.81 COUNSELING FOR INSULIN PUMP: ICD-10-CM

## 2021-09-27 PROCEDURE — 3078F PR MOST RECENT DIASTOLIC BLOOD PRESSURE < 80 MM HG: ICD-10-PCS | Mod: CPTII,S$GLB,, | Performed by: ORTHOPAEDIC SURGERY

## 2021-09-27 PROCEDURE — 99999 PR PBB SHADOW E&M-EST. PATIENT-LVL V: ICD-10-PCS | Mod: PBBFAC,,, | Performed by: ORTHOPAEDIC SURGERY

## 2021-09-27 PROCEDURE — 4010F ACE/ARB THERAPY RXD/TAKEN: CPT | Mod: CPTII,S$GLB,, | Performed by: ORTHOPAEDIC SURGERY

## 2021-09-27 PROCEDURE — 1159F MED LIST DOCD IN RCRD: CPT | Mod: CPTII,S$GLB,, | Performed by: ORTHOPAEDIC SURGERY

## 2021-09-27 PROCEDURE — 99999 PR PBB SHADOW E&M-EST. PATIENT-LVL II: ICD-10-PCS | Mod: PBBFAC,,,

## 2021-09-27 PROCEDURE — 3008F PR BODY MASS INDEX (BMI) DOCUMENTED: ICD-10-PCS | Mod: CPTII,S$GLB,, | Performed by: ORTHOPAEDIC SURGERY

## 2021-09-27 PROCEDURE — 4010F PR ACE/ARB THEARPY RXD/TAKEN: ICD-10-PCS | Mod: CPTII,S$GLB,, | Performed by: ORTHOPAEDIC SURGERY

## 2021-09-27 PROCEDURE — 99024 POSTOP FOLLOW-UP VISIT: CPT | Mod: S$GLB,,, | Performed by: ORTHOPAEDIC SURGERY

## 2021-09-27 PROCEDURE — 3074F PR MOST RECENT SYSTOLIC BLOOD PRESSURE < 130 MM HG: ICD-10-PCS | Mod: CPTII,S$GLB,, | Performed by: ORTHOPAEDIC SURGERY

## 2021-09-27 PROCEDURE — G0108 PR DIAB MANAGE TRN  PER INDIV: ICD-10-PCS | Mod: S$GLB,,, | Performed by: INTERNAL MEDICINE

## 2021-09-27 PROCEDURE — 99999 PR PBB SHADOW E&M-EST. PATIENT-LVL II: CPT | Mod: PBBFAC,,,

## 2021-09-27 PROCEDURE — 3046F PR MOST RECENT HEMOGLOBIN A1C LEVEL > 9.0%: ICD-10-PCS | Mod: CPTII,S$GLB,, | Performed by: ORTHOPAEDIC SURGERY

## 2021-09-27 PROCEDURE — 3046F HEMOGLOBIN A1C LEVEL >9.0%: CPT | Mod: CPTII,S$GLB,, | Performed by: ORTHOPAEDIC SURGERY

## 2021-09-27 PROCEDURE — 3074F SYST BP LT 130 MM HG: CPT | Mod: CPTII,S$GLB,, | Performed by: ORTHOPAEDIC SURGERY

## 2021-09-27 PROCEDURE — 3072F LOW RISK FOR RETINOPATHY: CPT | Mod: CPTII,S$GLB,, | Performed by: ORTHOPAEDIC SURGERY

## 2021-09-27 PROCEDURE — 99999 PR PBB SHADOW E&M-EST. PATIENT-LVL V: CPT | Mod: PBBFAC,,, | Performed by: ORTHOPAEDIC SURGERY

## 2021-09-27 PROCEDURE — 3008F BODY MASS INDEX DOCD: CPT | Mod: CPTII,S$GLB,, | Performed by: ORTHOPAEDIC SURGERY

## 2021-09-27 PROCEDURE — 3072F PR LOW RISK FOR RETINOPATHY: ICD-10-PCS | Mod: CPTII,S$GLB,, | Performed by: ORTHOPAEDIC SURGERY

## 2021-09-27 PROCEDURE — G0108 DIAB MANAGE TRN  PER INDIV: HCPCS | Mod: S$GLB,,, | Performed by: INTERNAL MEDICINE

## 2021-09-27 PROCEDURE — 1160F RVW MEDS BY RX/DR IN RCRD: CPT | Mod: CPTII,S$GLB,, | Performed by: ORTHOPAEDIC SURGERY

## 2021-09-27 PROCEDURE — 99024 PR POST-OP FOLLOW-UP VISIT: ICD-10-PCS | Mod: S$GLB,,, | Performed by: ORTHOPAEDIC SURGERY

## 2021-09-27 PROCEDURE — 1159F PR MEDICATION LIST DOCUMENTED IN MEDICAL RECORD: ICD-10-PCS | Mod: CPTII,S$GLB,, | Performed by: ORTHOPAEDIC SURGERY

## 2021-09-27 PROCEDURE — 3078F DIAST BP <80 MM HG: CPT | Mod: CPTII,S$GLB,, | Performed by: ORTHOPAEDIC SURGERY

## 2021-09-27 PROCEDURE — 1160F PR REVIEW ALL MEDS BY PRESCRIBER/CLIN PHARMACIST DOCUMENTED: ICD-10-PCS | Mod: CPTII,S$GLB,, | Performed by: ORTHOPAEDIC SURGERY

## 2021-09-30 PROBLEM — R07.2 PRECORDIAL PAIN: Status: RESOLVED | Noted: 2020-09-04 | Resolved: 2021-09-30

## 2021-09-30 PROBLEM — Z71.89 ENCOUNTER FOR PSYCHOLOGICAL ASSESSMENT PRIOR TO BARIATRIC SURGERY: Status: RESOLVED | Noted: 2020-09-20 | Resolved: 2021-09-30

## 2021-10-11 ENCOUNTER — PES CALL (OUTPATIENT)
Dept: ADMINISTRATIVE | Facility: CLINIC | Age: 56
End: 2021-10-11

## 2021-10-18 ENCOUNTER — PATIENT MESSAGE (OUTPATIENT)
Dept: ADMINISTRATIVE | Facility: HOSPITAL | Age: 56
End: 2021-10-18
Payer: MEDICARE

## 2021-10-20 RX ORDER — CITALOPRAM 40 MG/1
TABLET, FILM COATED ORAL
Qty: 90 TABLET | Refills: 0 | Status: SHIPPED | OUTPATIENT
Start: 2021-10-20 | End: 2022-04-25 | Stop reason: SDUPTHER

## 2021-10-23 ENCOUNTER — PATIENT OUTREACH (OUTPATIENT)
Dept: ADMINISTRATIVE | Facility: OTHER | Age: 56
End: 2021-10-23
Payer: MEDICARE

## 2021-10-23 DIAGNOSIS — Z13.5 ENCOUNTER FOR SCREENING FOR DIABETIC RETINOPATHY: Primary | ICD-10-CM

## 2021-11-30 ENCOUNTER — TELEPHONE (OUTPATIENT)
Dept: ORTHOPEDICS | Facility: CLINIC | Age: 56
End: 2021-11-30
Payer: MEDICARE

## 2021-12-02 ENCOUNTER — TELEPHONE (OUTPATIENT)
Dept: ORTHOPEDICS | Facility: CLINIC | Age: 56
End: 2021-12-02
Payer: MEDICARE

## 2021-12-02 ENCOUNTER — PATIENT OUTREACH (OUTPATIENT)
Dept: ADMINISTRATIVE | Facility: OTHER | Age: 56
End: 2021-12-02
Payer: MEDICARE

## 2021-12-08 DIAGNOSIS — K21.9 GASTROESOPHAGEAL REFLUX DISEASE: ICD-10-CM

## 2021-12-08 DIAGNOSIS — Z98.890 HISTORY OF ARTHROSCOPY OF LEFT SHOULDER: ICD-10-CM

## 2021-12-10 RX ORDER — PANTOPRAZOLE SODIUM 40 MG/1
TABLET, DELAYED RELEASE ORAL
Qty: 90 TABLET | Refills: 1 | Status: SHIPPED | OUTPATIENT
Start: 2021-12-10 | End: 2022-04-25 | Stop reason: SDUPTHER

## 2021-12-10 RX ORDER — PRAMIPEXOLE DIHYDROCHLORIDE 1 MG/1
TABLET ORAL
Qty: 60 TABLET | Refills: 1 | Status: SHIPPED | OUTPATIENT
Start: 2021-12-10 | End: 2022-01-12

## 2021-12-13 ENCOUNTER — LAB VISIT (OUTPATIENT)
Dept: LAB | Facility: HOSPITAL | Age: 56
End: 2021-12-13
Attending: NURSE PRACTITIONER
Payer: MEDICARE

## 2021-12-13 DIAGNOSIS — Z79.4 TYPE 2 DIABETES MELLITUS WITH DIABETIC POLYNEUROPATHY, WITH LONG-TERM CURRENT USE OF INSULIN: ICD-10-CM

## 2021-12-13 DIAGNOSIS — E11.42 TYPE 2 DIABETES MELLITUS WITH DIABETIC POLYNEUROPATHY, WITH LONG-TERM CURRENT USE OF INSULIN: ICD-10-CM

## 2021-12-13 LAB
ALBUMIN/CREAT UR: 4 UG/MG (ref 0–30)
CREAT UR-MCNC: 126 MG/DL (ref 23–375)
MICROALBUMIN UR DL<=1MG/L-MCNC: 5 UG/ML

## 2021-12-13 PROCEDURE — 82570 ASSAY OF URINE CREATININE: CPT | Performed by: NURSE PRACTITIONER

## 2021-12-20 ENCOUNTER — PES CALL (OUTPATIENT)
Dept: ADMINISTRATIVE | Facility: CLINIC | Age: 56
End: 2021-12-20
Payer: MEDICARE

## 2021-12-20 ENCOUNTER — TELEPHONE (OUTPATIENT)
Dept: ORTHOPEDICS | Facility: CLINIC | Age: 56
End: 2021-12-20
Payer: MEDICARE

## 2021-12-21 ENCOUNTER — OFFICE VISIT (OUTPATIENT)
Dept: ENDOCRINOLOGY | Facility: CLINIC | Age: 56
End: 2021-12-21
Payer: MEDICARE

## 2021-12-21 VITALS
HEART RATE: 64 BPM | DIASTOLIC BLOOD PRESSURE: 64 MMHG | WEIGHT: 302 LBS | BODY MASS INDEX: 38.76 KG/M2 | HEIGHT: 74 IN | SYSTOLIC BLOOD PRESSURE: 124 MMHG | OXYGEN SATURATION: 99 %

## 2021-12-21 DIAGNOSIS — I25.10 ATHEROSCLEROSIS OF NATIVE CORONARY ARTERY OF NATIVE HEART WITHOUT ANGINA PECTORIS: ICD-10-CM

## 2021-12-21 DIAGNOSIS — E78.5 DYSLIPIDEMIA: ICD-10-CM

## 2021-12-21 DIAGNOSIS — E11.42 TYPE 2 DIABETES MELLITUS WITH DIABETIC POLYNEUROPATHY, WITH LONG-TERM CURRENT USE OF INSULIN: Primary | ICD-10-CM

## 2021-12-21 DIAGNOSIS — F19.20 ADDICTION: ICD-10-CM

## 2021-12-21 DIAGNOSIS — I42.8 OTHER CARDIOMYOPATHY: ICD-10-CM

## 2021-12-21 DIAGNOSIS — Z79.4 TYPE 2 DIABETES MELLITUS WITH DIABETIC POLYNEUROPATHY, WITH LONG-TERM CURRENT USE OF INSULIN: Primary | ICD-10-CM

## 2021-12-21 DIAGNOSIS — I10 PRIMARY HYPERTENSION: ICD-10-CM

## 2021-12-21 DIAGNOSIS — G47.33 OSA ON CPAP: ICD-10-CM

## 2021-12-21 PROCEDURE — 99999 PR PBB SHADOW E&M-EST. PATIENT-LVL III: CPT | Mod: PBBFAC,,, | Performed by: NURSE PRACTITIONER

## 2021-12-21 PROCEDURE — 4010F PR ACE/ARB THEARPY RXD/TAKEN: ICD-10-PCS | Mod: CPTII,S$GLB,, | Performed by: NURSE PRACTITIONER

## 2021-12-21 PROCEDURE — 3072F PR LOW RISK FOR RETINOPATHY: ICD-10-PCS | Mod: CPTII,S$GLB,, | Performed by: NURSE PRACTITIONER

## 2021-12-21 PROCEDURE — 3061F PR NEG MICROALBUMINURIA RESULT DOCUMENTED/REVIEW: ICD-10-PCS | Mod: CPTII,S$GLB,, | Performed by: NURSE PRACTITIONER

## 2021-12-21 PROCEDURE — 99214 PR OFFICE/OUTPT VISIT, EST, LEVL IV, 30-39 MIN: ICD-10-PCS | Mod: S$GLB,,, | Performed by: NURSE PRACTITIONER

## 2021-12-21 PROCEDURE — 3061F NEG MICROALBUMINURIA REV: CPT | Mod: CPTII,S$GLB,, | Performed by: NURSE PRACTITIONER

## 2021-12-21 PROCEDURE — 3066F PR DOCUMENTATION OF TREATMENT FOR NEPHROPATHY: ICD-10-PCS | Mod: CPTII,S$GLB,, | Performed by: NURSE PRACTITIONER

## 2021-12-21 PROCEDURE — 4010F ACE/ARB THERAPY RXD/TAKEN: CPT | Mod: CPTII,S$GLB,, | Performed by: NURSE PRACTITIONER

## 2021-12-21 PROCEDURE — 99214 OFFICE O/P EST MOD 30 MIN: CPT | Mod: S$GLB,,, | Performed by: NURSE PRACTITIONER

## 2021-12-21 PROCEDURE — 99999 PR PBB SHADOW E&M-EST. PATIENT-LVL III: ICD-10-PCS | Mod: PBBFAC,,, | Performed by: NURSE PRACTITIONER

## 2021-12-21 PROCEDURE — 3072F LOW RISK FOR RETINOPATHY: CPT | Mod: CPTII,S$GLB,, | Performed by: NURSE PRACTITIONER

## 2021-12-21 PROCEDURE — 3066F NEPHROPATHY DOC TX: CPT | Mod: CPTII,S$GLB,, | Performed by: NURSE PRACTITIONER

## 2021-12-21 RX ORDER — BLOOD-GLUCOSE TRANSMITTER
EACH MISCELLANEOUS
Qty: 1 EACH | Refills: 3 | Status: SHIPPED | OUTPATIENT
Start: 2021-12-21 | End: 2023-01-24 | Stop reason: SDUPTHER

## 2021-12-21 RX ORDER — BLOOD-GLUCOSE,RECEIVER,CONT
EACH MISCELLANEOUS
Qty: 1 EACH | Refills: 0 | Status: SHIPPED | OUTPATIENT
Start: 2021-12-21 | End: 2024-03-05

## 2021-12-21 RX ORDER — BLOOD-GLUCOSE SENSOR
EACH MISCELLANEOUS
Qty: 3 EACH | Refills: 12 | Status: SHIPPED | OUTPATIENT
Start: 2021-12-21 | End: 2023-01-12 | Stop reason: SDUPTHER

## 2022-01-03 ENCOUNTER — LAB VISIT (OUTPATIENT)
Dept: PRIMARY CARE CLINIC | Facility: OTHER | Age: 57
End: 2022-01-03
Payer: MEDICARE

## 2022-01-03 DIAGNOSIS — Z20.822 ENCOUNTER FOR LABORATORY TESTING FOR COVID-19 VIRUS: ICD-10-CM

## 2022-01-03 PROCEDURE — U0003 INFECTIOUS AGENT DETECTION BY NUCLEIC ACID (DNA OR RNA); SEVERE ACUTE RESPIRATORY SYNDROME CORONAVIRUS 2 (SARS-COV-2) (CORONAVIRUS DISEASE [COVID-19]), AMPLIFIED PROBE TECHNIQUE, MAKING USE OF HIGH THROUGHPUT TECHNOLOGIES AS DESCRIBED BY CMS-2020-01-R: HCPCS | Performed by: FAMILY MEDICINE

## 2022-01-05 DIAGNOSIS — Z98.890 HISTORY OF ARTHROSCOPY OF LEFT SHOULDER: ICD-10-CM

## 2022-01-05 DIAGNOSIS — M10.00 IDIOPATHIC GOUT, UNSPECIFIED CHRONICITY, UNSPECIFIED SITE: ICD-10-CM

## 2022-01-07 LAB
SARS-COV-2 RNA RESP QL NAA+PROBE: NORMAL
TEST PERFORMANCE INFO SPEC: NORMAL

## 2022-01-10 ENCOUNTER — TELEPHONE (OUTPATIENT)
Dept: PODIATRY | Facility: CLINIC | Age: 57
End: 2022-01-10
Payer: MEDICARE

## 2022-01-10 ENCOUNTER — PATIENT OUTREACH (OUTPATIENT)
Dept: ADMINISTRATIVE | Facility: OTHER | Age: 57
End: 2022-01-10
Payer: MEDICARE

## 2022-01-10 NOTE — PROGRESS NOTES
Health Maintenance Due   Topic Date Due    HIV Screening  Never done    Shingles Vaccine (1 of 2) Never done    Eye Exam  08/05/2021    Influenza Vaccine (1) 09/01/2021    COVID-19 Vaccine (3 - Booster for Pfizer series) 11/05/2021     Updates were requested from care everywhere.  Chart was reviewed for overdue [facility]active [facility] Encounters (DANITZA) topics (CRS, Breast Cancer Screening, Eye exam)  Health Maintenance has been updated.  LINKS immunization registry triggered.  Immunizations were reconciled.       [facility] ambulatory encounter  FAMILY PRACTICE OFFICE VISIT    CHIEF COMPLAINT:    Chief Complaint   Patient presents with   • Chest Pain     1 month f/up       SUBJECTIVE:  Faina Brand is a 64 year old female who presented requesting evaluation for the following medical problems.      1. ASHD (arteriosclerotic heart disease): The patient presents for a follow-up on this medical problem. Patient is also followed by cardiology. Patient is doing well. Denies any chest pain, shortness of breath, headaches and or nausea. Tolerating current medication regimen of Aspirin 81 mg daily, Imdur 30 mg daily, Lovenox 0.8 mL injection every 12 hours, Plavix 75 mg daily and Lasix 40 mg daily without complications. No other concerns at this time.   2. Hypertensive Heart Disease: The patient presents for a follow-up on this medical problem. Patient is doing well, however her pulse is elevated. Tolerating current medication regimen of Lisinopril 10 mg daily and Metoprolol Tartrate 50 mg twice daily without complications. No other concerns at this time.   3. COPD (chronic obstructive pulmonary disease): The patient is here for a follow up on this chronic problem. Patient is doing well.  Tolerating current medication regimen of Combivent Respimat 100-20 mcg/act 1 puff every four hours as needed with no problems. No additional concerns at this time.  4. Type 2 diabetes mellitus with cardiac complication, without long-term current use of insulin: The patient presents for a follow-up on this medical problem. Patient is doing well and her blood sugar is stable. Condition currently managed through diet. Denies any other concerns at this time.   5. Hypercholesterolemia: The patient is here for a follow up on this chronic problem. Patient is doing well. Denies any chest pain, shortness of breath, headaches and or nausea. Tolerating current medication regimen of Repatha Sureclick 140 mg/mL injection every 14 days with no problems. No  additional concerns at this time.  6. Tobacco abuse: The patient presents for follow up of this ongoing problem. Patient continues to smoke 0.5 packs of cigarettes per day and has done so for the past 40-years. No new problems or concerns at this time.   7. GERD without esophagitis: The patient is here for a follow up on this chronic problem. Patient reports she is doing much better, her abdominal pain and swelling have resolved. Condition currently managed through diet. No additional concerns at this time.                Review of systems:   Constitutional: Negative for fever and chills.   Respiratory: Negative for shortness of breath.    Cardiovascular: Negative for chest pain.   Gastrointestinal: Negative for nausea.   Genitourinary: Positive for dark colored and malodorous urine.    Neurological: Negative for headaches.      OBJECTIVE:  PROBLEM LIST:   Patient Active Problem List   Diagnosis   • Leukocytosis   • Pulmonary embolism (CMS/HCC)   • ASHD (arteriosclerotic heart disease)   • Depression   • Esophageal reflux   • Hypercholesterolemia   • HTN (hypertension)   • Tobacco use disorder   • Syncope and collapse   • H/O: CVA (cerebrovascular accident)   • Chronic anticoagulation   • Protein S deficiency (CMS/HCC)   • Dyslipidemia   • Pseudophakia of both eyes   • VCP (vocal cord palsy)   • S/P CABG x 4   • Interstitial myositis of multiple sites   • ACS (acute coronary syndrome) (CMS/HCC)   • Hypertensive urgency       PAST HISTORIES:   I have reviewed the past medical history, family history, social history, medications and allergies listed in the medical record as obtained by my nursing staff and support staff and agree with their documentation.  ALLERGIES:   Allergen Reactions   • Lipitor [Atorvastatin Calcium]      Leg pain   • Orudis [Ketoprofen] RASH   • Vytorin [Ezetimibe-Simvastatin] PRURITUS     Current Outpatient Medications   Medication Sig Dispense Refill   • metoPROLOL tartrate (LOPRESSOR) 25 MG  tablet Take 1 tablet by mouth every 12 hours. 180 tablet 1   • isosorbide mononitrate (IMDUR) 30 MG 24 hr tablet Take 1 tablet by mouth daily. 90 tablet 1   • triamcinolone (ARISTOCORT) 0.1 % cream Apply topically to affected area (foot) twice daily. 30 g 2   • aspirin 81 MG chewable tablet Chew 1 tablet by mouth daily. Do not start before July 29, 2020. 30 tablet 1   • Cholecalciferol (VITAMIN D3) 1.25 mg (50,000 units) capsule Take 1 capsule by mouth 1 day a week. For 12 weeks 12 capsule 1   • enoxaparin (LOVENOX) 80 MG/0.8ML injectable solution Inject 0.8 mLs into the skin every 12 hours. 48 mL 3   • EVOLocumab (REPATHA SURECLICK) 140 MG/ML injection Inject 1 pen into the skin every 14 days. 2 mL 0   • furosemide (LASIX) 40 MG tablet Take 1 tablet by mouth daily. 90 tablet 0   • lisinopril (ZESTRIL) 5 MG tablet Take 2 tablets by mouth daily. 30 tablet 2   • clopidogrel (PLAVIX) 75 MG tablet Take 1 tablet by mouth daily. Do not start before July 29, 2020. 30 tablet 1   • albuterol-ipratropium (COMBIVENT RESPIMAT) 100-20 MCG/ACT inhaler Inhale 1 puff into the lungs every 4 hours as needed for Shortness of Breath. 4 g 3   • Multiple Vitamins-Minerals (MULTIVITAMIN ADULT PO) Take 1 tablet by mouth daily.     • Ascorbic Acid (VITAMIN C PO) Chew 1 gummy by mouth daily     • zolpidem (AMBIEN) 5 MG tablet Take 1 tablet by mouth at bedtime as needed for Sleep. (Patient taking differently: Take 5 mg by mouth nightly. ) 30 tablet 0   • meclizine (ANTIVERT) 25 MG tablet Take one tablet by mouth three times daily as needed for dizziness 30 tablet 0   • blood glucose test strips Test blood sugar 2 times daily as directed. Diagnosis: E16.2. Meter: Contour Next  strip 11   • blood glucose lancets Test blood sugar 2 times daily as directed. Diagnosis: E16.2. Meter: Contour Next  each 11     No current facility-administered medications for this visit.      Immunization History   Administered Date(s) Administered   •  Hep B, adult 11/14/2000, 12/14/2000, 05/16/2001   • Pneumococcal polysaccharide, adult, 23 valent 06/11/2007   • Td:Adult type tetanus/diphtheria 09/21/2009   • Tetanus 09/21/2009     Past Medical History:   Diagnosis Date   • Anxiety Disorder    • ASHD (arteriosclerotic heart disease)    • Bipolar disorder (CMS/HCC)    • Cataracts, bilateral    • Chest tightness    • Colon polyps 4/9/2014    tubular adenoma   • CVA (cerebral vascular accident) (CMS/HCC) ~2012, 3/1/14, 4/24/14    slight right sided weakness, some dizziness   • Depression    • Dizzy spells    • DVT (deep venous thrombosis) (CMS/HCC) 1997   • Esophageal reflux    • Fibromyalgia    • H/O: CVA (cerebrovascular accident) 3/6/2014   • Hyperlipidemia    • Hypertension    • Leukocytosis    • Lightheadedness    • Occipital headache     chronic   • Osteoporosis, unspecified    • Protein S deficiency (CMS/HCC)    • Pulmonary embolism    • Pulmonary embolism (CMS/HCC) 1995   • S/P CABG x 4 8/11/2017   • Tobacco use disorder      Past Surgical History:   Procedure Laterality Date   • Anterior cervical discectomy w/ fusion  11/10/2015   • Appendectomy     • Cardiac catherization  07/28/2017    mid LAD 80%, circ 90%, RCA 80%-CVOR consult   • Cataract extraction w/ intraocular lens implant Right 01/12/2015    Sarita Munoz MD   • Cataract extraction w/ intraocular lens implant Left 01/05/2015    Sarita Munoz MD   • Coronary angioplasty  2/2006 Dr. Real    PTCA to in stent stenosis RCA; 50% mid LAD; LVEF 45-50%   • Coronary angioplasty with stent placement  12/2005 Dr. Crews    Taxus stents to RCA   • Echo timoteo  4/26/2014, 5/7/14    PFO   • Eye surgery  1/2015    bilateral cataract extraction with IOL implants   • Hysterectomy      total abd hysterecomy   • Ivc filter placement     • Leg/ankle surgery proc unlisted      repair left leg    • Nm alvarado per rst/strs pharmacolo  6/13/14    Negative for LexiScan; LVEF 63%   • Part excis plantar fascia  05/20/2010     Left plantar fasciectomy w/ partial rem. calcaneal spur   • Place cath in rt hrt,main pulm art  5/21/14    ? PFO closure however found out she has no PFO   • Plantar fascia surgery  08/19/2010   • Removal gallbladder      Cholecystectomy (gallbladder)   • Service to gastroenterology  10/2009    Colonoscopy-polyps   • Service to gastroenterology  4/9/2014    Colonoscopy-polyp (tubular adenoma)   • Spine surgery procedure unlisted      cervicaL fusion    • Tarsal tunnel release  06/18/2009     Social History     Tobacco Use   • Smoking status: Current Every Day Smoker     Packs/day: 0.50     Years: 40.00     Pack years: 20.00     Types: Cigarettes     Start date: 5/13/1970   • Smokeless tobacco: Never Used   • Tobacco comment: 10 per day    Substance Use Topics   • Alcohol use: No     Alcohol/week: 0.0 standard drinks     Frequency: Never     Drinks per session: 1 or 2     Binge frequency: Never   • Drug use: No     Social History     Tobacco Use   Smoking Status Current Every Day Smoker   • Packs/day: 0.50   • Years: 40.00   • Pack years: 20.00   • Types: Cigarettes   • Start date: 5/13/1970   Smokeless Tobacco Never Used   Tobacco Comment    10 per day      Social History     Substance and Sexual Activity   Alcohol Use No   • Alcohol/week: 0.0 standard drinks   • Frequency: Never   • Drinks per session: 1 or 2   • Binge frequency: Never     Family History   Problem Relation Age of Onset   • Heart disease Mother    • Cancer Mother    • Stroke Father    • Hypertension Father    • Diabetes Father    • Diabetes Brother    • Hypertension Brother    • Heart disease Brother      Health Maintenance   Topic Date Due   • Diabetes Foot Exam  03/29/1975   • DTaP/Tdap/Td Vaccine (1 - Tdap) 03/29/1976   • Shingles Vaccine (1 of 2) 03/29/2007   • Lung Cancer Screening  06/21/2018   • Breast Cancer Screening  04/12/2020   • Influenza Vaccine (1) 09/01/2020   • Diabetes Eye Exam  09/24/2020   • Diabetes A1C  01/27/2021   •  Medicare Wellness Visit  06/23/2021   • DM/CKD GFR  07/28/2021   • Colorectal Cancer Screening-Colonoscopy  04/09/2024   • Hepatitis B Vaccine  Completed   • Hepatitis C Screening  Completed   • Pneumococcal Vaccine 0-64  Completed   • Meningococcal Vaccine  Aged Out   • HPV Vaccine  Aged Out       PHYSICAL EXAM:   Physical Exam   Constitutional: She is oriented to person, place, and time and well-developed, well-nourished, and in no distress. No distress.   HENT:   Head: Normocephalic and atraumatic.   Right Ear: External ear normal.   Left Ear: External ear normal.   Eyes: Conjunctivae are normal. Right eye exhibits no discharge. Left eye exhibits no discharge. No scleral icterus.   Neck: Normal range of motion. Neck supple. No tracheal deviation present. No thyromegaly present.   Cardiovascular: Normal rate, regular rhythm and normal heart sounds. Exam reveals no gallop and no friction rub.   No murmur heard.  Pulmonary/Chest: Effort normal and breath sounds normal. No respiratory distress. She has no wheezes. She has no rhonchi. She has no rales.   Lymphadenopathy:     She has no cervical adenopathy.   Neurological: She is alert and oriented to person, place, and time. No cranial nerve deficit. Gait normal. Coordination normal.   Skin: Skin is warm and dry. She is not diaphoretic.   Psychiatric: Mood, memory, affect and judgment normal.         LAB RESULTS:   All pertinent laboratory results were reviewed.    ASSESSMENT:   1. ASHD (arteriosclerotic heart disease)    2. Hypertensive heart disease     3. COPD (chronic obstructive pulmonary disease)    4. Type 2 diabetes mellitus with cardiac complication, without long-term current use of insulin (CMS/[facility])    5. Hypercholesterolemia    6. Tobacco abuse    7. GERD without esophagitis    8. Hypertensive heart disease without heart failure    9. Dark/Malodorous urine    10. Insomnia, unspecified type        PLAN:   Orders Placed This Encounter   • Comprehensive  Metabolic Panel   • Lipid Panel With Reflex   • Electrocardiogram 12-Lead   • POCT Urine Dip Non-Auto   • metoPROLOL tartrate (LOPRESSOR) 25 MG tablet       1. ASHD (arteriosclerotic heart disease): Continue current medication regimen of Imdur 30 mg daily per cardiology in addition to the Aspirin 81 mg daily, Lovenox 0.8 mL injection every 12 hours, Plavix 75 mg daily and Lasix 40 mg daily. Will continue to monitor and make adjustments as needed   2. Hypertensive Heart Disease: ECG complete today in the office. Recheck blood pressure and pulse in 1 week. Decreasing current medication regimen of Metoprolol Tartrate 50 mg twice daily to 25 mg by mouth twice daily in addition to Lisinopril 10 mg daily. Will continue to monitor and make adjustments as needed   3. COPD (chronic obstructive pulmonary disease): Continue current medication regimen of Combivent Respimat 100-20 mcg/act 1 puff every four hours as needed. Will continue to monitor and make adjustments as needed   4. Type 2 diabetes mellitus with cardiac complication, without long-term current use of insulin: Continue current dietary regimen.    5. Hypercholesterolemia: Continue current medication regimen of Repatha Sureclick 140 mg/mL injection every 14 days. Fasting lipid panel and comprehensive metabolic panel ordered, further recommendations will be made based on results.   6. Tobacco abuse:  Will continue to encourage efforts on smoking cessation. Information on smoking cessation was provided to the patient. SEE AVS.  7. GERD without esophagitis: Continue current dietary regimen.  Will continue to monitor and make further recommendations as needed.   8. Dark/Malodorous urine: Urine dip complete today in office. No intervention is needed. Will continue to monitor and make further recommendations as needed.       Return in about 3 months (around 12/4/2020).    Instructions provided as documented in the after visit summary.    The patient indicated  understanding of the diagnosis and agreed with the plan of care.     On 9/4/2020, IDanielle MA scribed the services personally performed by Dakotah Hinson MD      The documentation recorded by the scribe accurately and completely reflects the service(s) I personally performed and the decisions made by me.  Dakotah Hinson MD

## 2022-01-12 RX ORDER — ALLOPURINOL 100 MG/1
TABLET ORAL
Qty: 30 TABLET | Refills: 0 | Status: SHIPPED | OUTPATIENT
Start: 2022-01-12 | End: 2022-02-10

## 2022-01-12 RX ORDER — PRAMIPEXOLE DIHYDROCHLORIDE 1 MG/1
TABLET ORAL
Qty: 30 TABLET | Refills: 0 | Status: SHIPPED | OUTPATIENT
Start: 2022-01-12 | End: 2022-03-17

## 2022-01-12 NOTE — TELEPHONE ENCOUNTER
I have sent 30 days of meds. He is in need of 6 month follow up with PCP or another team member. Please call to set appt.

## 2022-01-12 NOTE — TELEPHONE ENCOUNTER
called and left a message for patient to call the office and  make a follow up appointment with Dr. Carroll

## 2022-01-24 ENCOUNTER — OFFICE VISIT (OUTPATIENT)
Dept: HOME HEALTH SERVICES | Facility: CLINIC | Age: 57
End: 2022-01-24
Payer: MEDICARE

## 2022-01-24 VITALS
DIASTOLIC BLOOD PRESSURE: 73 MMHG | BODY MASS INDEX: 37.99 KG/M2 | OXYGEN SATURATION: 98 % | HEIGHT: 74 IN | WEIGHT: 296 LBS | HEART RATE: 77 BPM | SYSTOLIC BLOOD PRESSURE: 113 MMHG

## 2022-01-24 DIAGNOSIS — I25.10 ATHEROSCLEROSIS OF NATIVE CORONARY ARTERY OF NATIVE HEART WITHOUT ANGINA PECTORIS: ICD-10-CM

## 2022-01-24 DIAGNOSIS — G47.33 OSA ON CPAP: ICD-10-CM

## 2022-01-24 DIAGNOSIS — Z79.4 TYPE 2 DIABETES MELLITUS WITH DIABETIC POLYNEUROPATHY, WITH LONG-TERM CURRENT USE OF INSULIN: ICD-10-CM

## 2022-01-24 DIAGNOSIS — E78.5 HYPERLIPIDEMIA DUE TO TYPE 2 DIABETES MELLITUS: ICD-10-CM

## 2022-01-24 DIAGNOSIS — M46.06 SPINAL ENTHESOPATHY, LUMBAR REGION: ICD-10-CM

## 2022-01-24 DIAGNOSIS — Z00.00 ENCOUNTER FOR PREVENTIVE HEALTH EXAMINATION: Primary | ICD-10-CM

## 2022-01-24 DIAGNOSIS — E11.69 HYPERLIPIDEMIA DUE TO TYPE 2 DIABETES MELLITUS: ICD-10-CM

## 2022-01-24 DIAGNOSIS — Z79.4 TYPE 2 DIABETES MELLITUS WITH HYPERGLYCEMIA, WITH LONG-TERM CURRENT USE OF INSULIN: ICD-10-CM

## 2022-01-24 DIAGNOSIS — E11.65 TYPE 2 DIABETES MELLITUS WITH HYPERGLYCEMIA, WITH LONG-TERM CURRENT USE OF INSULIN: ICD-10-CM

## 2022-01-24 DIAGNOSIS — E11.42 TYPE 2 DIABETES MELLITUS WITH DIABETIC POLYNEUROPATHY, WITH LONG-TERM CURRENT USE OF INSULIN: ICD-10-CM

## 2022-01-24 DIAGNOSIS — E66.01 SEVERE OBESITY (BMI 35.0-39.9) WITH COMORBIDITY: ICD-10-CM

## 2022-01-24 DIAGNOSIS — F19.20 ADDICTION: ICD-10-CM

## 2022-01-24 DIAGNOSIS — I42.8 OTHER CARDIOMYOPATHY: ICD-10-CM

## 2022-01-24 DIAGNOSIS — I10 PRIMARY HYPERTENSION: ICD-10-CM

## 2022-01-24 DIAGNOSIS — F33.0 MILD EPISODE OF RECURRENT MAJOR DEPRESSIVE DISORDER: ICD-10-CM

## 2022-01-24 PROCEDURE — 3074F SYST BP LT 130 MM HG: CPT | Mod: CPTII,S$GLB,, | Performed by: NURSE PRACTITIONER

## 2022-01-24 PROCEDURE — 1159F MED LIST DOCD IN RCRD: CPT | Mod: CPTII,S$GLB,, | Performed by: NURSE PRACTITIONER

## 2022-01-24 PROCEDURE — 3078F PR MOST RECENT DIASTOLIC BLOOD PRESSURE < 80 MM HG: ICD-10-PCS | Mod: CPTII,S$GLB,, | Performed by: NURSE PRACTITIONER

## 2022-01-24 PROCEDURE — 3078F DIAST BP <80 MM HG: CPT | Mod: CPTII,S$GLB,, | Performed by: NURSE PRACTITIONER

## 2022-01-24 PROCEDURE — 3008F BODY MASS INDEX DOCD: CPT | Mod: CPTII,S$GLB,, | Performed by: NURSE PRACTITIONER

## 2022-01-24 PROCEDURE — 99499 RISK ADDL DX/OHS AUDIT: ICD-10-PCS | Mod: S$GLB,,, | Performed by: NURSE PRACTITIONER

## 2022-01-24 PROCEDURE — 1159F PR MEDICATION LIST DOCUMENTED IN MEDICAL RECORD: ICD-10-PCS | Mod: CPTII,S$GLB,, | Performed by: NURSE PRACTITIONER

## 2022-01-24 PROCEDURE — 1160F RVW MEDS BY RX/DR IN RCRD: CPT | Mod: CPTII,S$GLB,, | Performed by: NURSE PRACTITIONER

## 2022-01-24 PROCEDURE — G0439 PR MEDICARE ANNUAL WELLNESS SUBSEQUENT VISIT: ICD-10-PCS | Mod: S$GLB,,, | Performed by: NURSE PRACTITIONER

## 2022-01-24 PROCEDURE — 3008F PR BODY MASS INDEX (BMI) DOCUMENTED: ICD-10-PCS | Mod: CPTII,S$GLB,, | Performed by: NURSE PRACTITIONER

## 2022-01-24 PROCEDURE — 3074F PR MOST RECENT SYSTOLIC BLOOD PRESSURE < 130 MM HG: ICD-10-PCS | Mod: CPTII,S$GLB,, | Performed by: NURSE PRACTITIONER

## 2022-01-24 PROCEDURE — 1160F PR REVIEW ALL MEDS BY PRESCRIBER/CLIN PHARMACIST DOCUMENTED: ICD-10-PCS | Mod: CPTII,S$GLB,, | Performed by: NURSE PRACTITIONER

## 2022-01-24 PROCEDURE — G0439 PPPS, SUBSEQ VISIT: HCPCS | Mod: S$GLB,,, | Performed by: NURSE PRACTITIONER

## 2022-01-24 PROCEDURE — 99499 UNLISTED E&M SERVICE: CPT | Mod: S$GLB,,, | Performed by: NURSE PRACTITIONER

## 2022-01-24 RX ORDER — CHOLECALCIFEROL (VITAMIN D3) 125 MCG
5000 CAPSULE ORAL DAILY
COMMUNITY
End: 2023-04-06

## 2022-01-24 RX ORDER — ASCORBIC ACID 500 MG
500 TABLET ORAL DAILY
Status: ON HOLD | COMMUNITY
End: 2023-01-21 | Stop reason: HOSPADM

## 2022-01-24 RX ORDER — VITAMIN E 268 MG
400 CAPSULE ORAL DAILY
Status: ON HOLD | COMMUNITY
End: 2023-01-21 | Stop reason: HOSPADM

## 2022-01-24 NOTE — PROGRESS NOTES
"  Gio Forrest presented for a  Medicare AWV and comprehensive Health Risk Assessment today. The following components were reviewed and updated:    · Medical history  · Family History  · Social history  · Allergies and Current Medications  · Health Risk Assessment  · Health Maintenance  · Care Team         ** See Completed Assessments for Annual Wellness Visit within the encounter summary.**         The following assessments were completed:  · Living Situation  · CAGE  · Depression Screening  · Timed Get Up and Go  · Whisper Test  · Cognitive Function Screening  · Nutrition Screening  · ADL Screening  · PAQ Screening        Vitals:    01/24/22 1018   BP: 113/73   Pulse: 77   SpO2: 98%   Weight: 134.3 kg (296 lb)   Height: 6' 2" (1.88 m)     Body mass index is 38 kg/m².  Physical Exam  Constitutional:       Appearance: Normal appearance.   HENT:      Head: Normocephalic and atraumatic.      Nose: Nose normal.   Eyes:      Extraocular Movements: Extraocular movements intact.      Pupils: Pupils are equal, round, and reactive to light.   Cardiovascular:      Rate and Rhythm: Normal rate and regular rhythm.      Pulses: Normal pulses.      Heart sounds: Normal heart sounds.   Pulmonary:      Effort: Pulmonary effort is normal.      Breath sounds: Normal breath sounds.   Musculoskeletal:      Cervical back: Normal range of motion and neck supple.   Neurological:      General: No focal deficit present.      Mental Status: He is alert and oriented to person, place, and time.               Diagnoses and health risks identified today and associated recommendations/orders:    1. Encounter for preventive health examination  AWV completed    2. Type 2 diabetes mellitus with diabetic polyneuropathy, with long-term current use of insulin  Chronic and stable. Continue current treatment. Follow with PCP.  Supposed to be on pump.  On arrival for this visit - he did not have the pump hooked up due to not having any catheters.  I " called the supply company and asked that he be sent more  The last time he had ordered was august for 3 m (up OCT).     He states that he has been taking 20 u of long acting insulin.   Reviewed importance of maintaining good BS control    3. Severe obesity (BMI 35.0-39.9) with comorbidity  Chronic and unstable. Continue current treatment. Follow with PCP.  Does not follow DM diet - discussed importance- may be related to decreased literacy of what is importance and low income    4. Spinal enthesopathy, lumbar region  Chronic and stable. Continue current treatment. Follow with PCP.      5. Other cardiomyopathy  Chronic and stable. Continue current treatment. Follow with PCP.  follows with cardiology     6. Mild episode of recurrent major depressive disorder  Chronic and stable. Continue current treatment. Follow with PCP.  On celexa - not sure if he is taking this daily - he has old dates on his  Prescription bottles and multiple bottles    7. Primary hypertension  Chronic and stable. Continue current treatment. Follow with PCP.      8. Hyperlipidemia due to type 2 diabetes mellitus  Chronic and stable. Continue current treatment. Follow with PCP.  On medication - not sure if he is taking this medication    9. Atherosclerosis of native coronary artery of native heart without angina pectoris  Chronic and stable. Continue current treatment. Follow with PCP.      10. Type 2 diabetes mellitus with hyperglycemia, with long-term current use of insulin  Chronic and unstable. Continue current treatment. Follow with PCP.  hgba1c very high    11. TARYN on CPAP  Chronic and stable. Continue current treatment. Follow with PCP.  Not wearing cpap    12. Addiction  Addiction to gambling.  He did go to get help in [employer] but they sent him home after 4 days saying that he tested positive for COVID per patient report.         Provided Gio with a 5-10 year written screening schedule and personal prevention plan. Recommendations  were developed using the USPSTF age appropriate recommendations. Education, counseling, and referrals were provided as needed. After Visit Summary printed and given to patient which includes a list of additional screenings\tests needed.    Fu in 1 yr for AWV           Ana Paula Winkler NP    I offered to discuss advanced care planning, including how to pick a person who would make decisions for you if you were unable to make them for yourself, called a health care power of , and what kind of decisions you might make such as use of life sustaining treatments such as ventilators and tube feeding when faced with a life limiting illness recorded on a living will that they will need to know. (How you want to be cared for as you near the end of your natural life)     X Patient is interested in learning more about how to make advanced directives.  I provided them paperwork and offered to discuss this with them.

## 2022-01-24 NOTE — Clinical Note
Dr Carroll and JABARI - please read my A/P part - he is not taking his meds correctly - I would recommend home health - he is pretty much home bound - he doesn't go places.  HE needs help with making sure tht he is taking meds - not compliant - needs get med boxes set up - Uncontrolled DM - make sure that he has his pump on. I ordered his catheters but he should have run out of them in October.  Poor literacy about DM and not eating well. I think Poor income has a lot to do with it.   Would recommend [facility] - thanks

## 2022-01-26 PROBLEM — I47.10 SVT (SUPRAVENTRICULAR TACHYCARDIA): Status: RESOLVED | Noted: 2019-08-27 | Resolved: 2022-01-26

## 2022-01-26 LAB
LEFT EYE DM RETINOPATHY: NEGATIVE
RIGHT EYE DM RETINOPATHY: NEGATIVE

## 2022-01-27 NOTE — PATIENT INSTRUCTIONS
Counseling and Referral of Other Preventative  (Italic type indicates deductible and co-insurance are waived)    Patient Name: Gio Forrest  Today's Date: 1/26/2022    Health Maintenance       Date Due Completion Date    HIV Screening Never done ---    Shingles Vaccine (1 of 2) Never done ---    Eye Exam 08/05/2021 8/5/2020    Influenza Vaccine (1) 09/01/2021 10/7/2020    COVID-19 Vaccine (3 - Booster for Pfizer series) 11/05/2021 5/5/2021    Foot Exam 03/24/2022 3/24/2021 (Done)    Override on 3/24/2021: Done (Podiatry)    Override on 7/5/2019: Done    Override on 6/5/2018: Done    Hemoglobin A1c 03/13/2022 12/13/2021    Lipid Panel 04/15/2022 4/15/2021    Diabetes Urine Screening 12/13/2022 12/13/2021    High Dose Statin 01/24/2023 1/24/2022    Aspirin/Antiplatelet Therapy 01/24/2023 1/24/2022    TETANUS VACCINE 11/17/2029 11/17/2019    Colorectal Cancer Screening 09/17/2030 9/17/2020    Pneumococcal Vaccines (Age 0-64) (2 of 2 - PPSV23) 10/28/2031 3/12/2015        No orders of the defined types were placed in this encounter.    The following information is provided to all patients.  This information is to help you find resources for any of the problems found today that may be affecting your health:                Living healthy guide: www.[facility].[place].gov      Understanding Diabetes: www.diabetes.org      Eating healthy: www.cdc.gov/healthyweight      CDC home safety checklist: www.cdc.gov/steadi/patient.html      Agency on Aging: www.goea.[place].gov      Alcoholics anonymous (AA): www.aa.org      Physical Activity: www.nadya.nih.gov/fe3brrq      Tobacco use: www.quitwithusla.org

## 2022-01-30 PROBLEM — E78.5 HYPERLIPIDEMIA DUE TO TYPE 2 DIABETES MELLITUS: Status: ACTIVE | Noted: 2022-01-30

## 2022-01-30 PROBLEM — E11.69 HYPERLIPIDEMIA DUE TO TYPE 2 DIABETES MELLITUS: Status: ACTIVE | Noted: 2022-01-30

## 2022-01-31 ENCOUNTER — TELEPHONE (OUTPATIENT)
Dept: ENDOCRINOLOGY | Facility: CLINIC | Age: 57
End: 2022-01-31
Payer: MEDICARE

## 2022-01-31 NOTE — TELEPHONE ENCOUNTER
----- Message from Gabby Rooney DNP, NP sent at 1/31/2022  7:09 AM CST -----  Can we please schedule him with DM edu and follow-up w me please  ----- Message -----  From: Ana Paula Winkler NP  Sent: 1/30/2022  11:21 AM CST  To: Gabby Rooney DNP, NP, #    Dr Carroll and GABBY - please read my A/P part - he is not taking his meds correctly - I would recommend home health - he is pretty much home bound - he doesn't go places.  HE needs help with making sure tht he is taking meds - not compliant - needs get med boxes set up - Uncontrolled DM - make sure that he has his pump on. I ordered his catheters but he should have run out of them in October.  Poor literacy about DM and not eating well. I think Poor income has a lot to do with it.    Would recommend [facility] - thanks

## 2022-02-08 ENCOUNTER — PATIENT OUTREACH (OUTPATIENT)
Dept: ADMINISTRATIVE | Facility: HOSPITAL | Age: 57
End: 2022-02-08
Payer: MEDICARE

## 2022-02-09 DIAGNOSIS — M10.00 IDIOPATHIC GOUT, UNSPECIFIED CHRONICITY, UNSPECIFIED SITE: ICD-10-CM

## 2022-02-10 ENCOUNTER — PATIENT OUTREACH (OUTPATIENT)
Dept: ADMINISTRATIVE | Facility: OTHER | Age: 57
End: 2022-02-10
Payer: MEDICARE

## 2022-02-10 RX ORDER — ALLOPURINOL 100 MG/1
TABLET ORAL
Qty: 30 TABLET | Refills: 0 | Status: SHIPPED | OUTPATIENT
Start: 2022-02-10 | End: 2022-03-25

## 2022-02-10 NOTE — TELEPHONE ENCOUNTER
Please call and schedule visit in primary care with Carly in next 60 days. I have sent refill for Zyloprim. Has not been in primary since 04/21.

## 2022-02-10 NOTE — PROGRESS NOTES
LIN[place] immunization registry updated  Care Everywhere updated  Health Maintenance updated  Chart reviewed for overdue [facility] Encounters (DANITZA) health maintenance testing (CRS, Breast Ca, Diabetic Eye Exam)   Orders entered:N/A

## 2022-02-14 ENCOUNTER — OFFICE VISIT (OUTPATIENT)
Dept: ORTHOPEDICS | Facility: CLINIC | Age: 57
End: 2022-02-14
Payer: MEDICARE

## 2022-02-14 ENCOUNTER — HOSPITAL ENCOUNTER (OUTPATIENT)
Dept: RADIOLOGY | Facility: HOSPITAL | Age: 57
Discharge: HOME | End: 2022-02-14
Attending: ORTHOPAEDIC SURGERY
Payer: MEDICARE

## 2022-02-14 VITALS
BODY MASS INDEX: 37.99 KG/M2 | SYSTOLIC BLOOD PRESSURE: 106 MMHG | HEART RATE: 63 BPM | WEIGHT: 296 LBS | DIASTOLIC BLOOD PRESSURE: 71 MMHG | HEIGHT: 74 IN

## 2022-02-14 DIAGNOSIS — M19.041 DEGENERATIVE ARTHRITIS OF METACARPOPHALANGEAL JOINT OF MIDDLE FINGER OF RIGHT HAND: Primary | ICD-10-CM

## 2022-02-14 DIAGNOSIS — M65.331 TRIGGER FINGER, RIGHT MIDDLE FINGER: ICD-10-CM

## 2022-02-14 DIAGNOSIS — M19.041 DEGENERATIVE ARTHRITIS OF METACARPOPHALANGEAL JOINT OF MIDDLE FINGER OF RIGHT HAND: ICD-10-CM

## 2022-02-14 PROCEDURE — 20550 NJX 1 TENDON SHEATH/LIGAMENT: CPT | Mod: F7,S$GLB,, | Performed by: ORTHOPAEDIC SURGERY

## 2022-02-14 PROCEDURE — 73130 X-RAY EXAM OF HAND: CPT | Mod: TC,PO,RT

## 2022-02-14 PROCEDURE — 1159F PR MEDICATION LIST DOCUMENTED IN MEDICAL RECORD: ICD-10-PCS | Mod: CPTII,S$GLB,, | Performed by: ORTHOPAEDIC SURGERY

## 2022-02-14 PROCEDURE — 3008F BODY MASS INDEX DOCD: CPT | Mod: CPTII,S$GLB,, | Performed by: ORTHOPAEDIC SURGERY

## 2022-02-14 PROCEDURE — 3008F PR BODY MASS INDEX (BMI) DOCUMENTED: ICD-10-PCS | Mod: CPTII,S$GLB,, | Performed by: ORTHOPAEDIC SURGERY

## 2022-02-14 PROCEDURE — 99999 PR PBB SHADOW E&M-EST. PATIENT-LVL IV: ICD-10-PCS | Mod: PBBFAC,,, | Performed by: ORTHOPAEDIC SURGERY

## 2022-02-14 PROCEDURE — 99213 OFFICE O/P EST LOW 20 MIN: CPT | Mod: 25,S$GLB,, | Performed by: ORTHOPAEDIC SURGERY

## 2022-02-14 PROCEDURE — 1159F MED LIST DOCD IN RCRD: CPT | Mod: CPTII,S$GLB,, | Performed by: ORTHOPAEDIC SURGERY

## 2022-02-14 PROCEDURE — 99213 PR OFFICE/OUTPT VISIT, EST, LEVL III, 20-29 MIN: ICD-10-PCS | Mod: 25,S$GLB,, | Performed by: ORTHOPAEDIC SURGERY

## 2022-02-14 PROCEDURE — 73130 XR HAND COMPLETE 3 VIEW RIGHT: ICD-10-PCS | Mod: 26,RT,, | Performed by: RADIOLOGY

## 2022-02-14 PROCEDURE — 73130 X-RAY EXAM OF HAND: CPT | Mod: 26,RT,, | Performed by: RADIOLOGY

## 2022-02-14 PROCEDURE — 3074F SYST BP LT 130 MM HG: CPT | Mod: CPTII,S$GLB,, | Performed by: ORTHOPAEDIC SURGERY

## 2022-02-14 PROCEDURE — 3078F PR MOST RECENT DIASTOLIC BLOOD PRESSURE < 80 MM HG: ICD-10-PCS | Mod: CPTII,S$GLB,, | Performed by: ORTHOPAEDIC SURGERY

## 2022-02-14 PROCEDURE — 20550 TENDON SHEATH: ICD-10-PCS | Mod: F7,S$GLB,, | Performed by: ORTHOPAEDIC SURGERY

## 2022-02-14 PROCEDURE — 3074F PR MOST RECENT SYSTOLIC BLOOD PRESSURE < 130 MM HG: ICD-10-PCS | Mod: CPTII,S$GLB,, | Performed by: ORTHOPAEDIC SURGERY

## 2022-02-14 PROCEDURE — 99999 PR PBB SHADOW E&M-EST. PATIENT-LVL IV: CPT | Mod: PBBFAC,,, | Performed by: ORTHOPAEDIC SURGERY

## 2022-02-14 PROCEDURE — 3078F DIAST BP <80 MM HG: CPT | Mod: CPTII,S$GLB,, | Performed by: ORTHOPAEDIC SURGERY

## 2022-02-14 RX ORDER — TRIAMCINOLONE ACETONIDE 40 MG/ML
40 INJECTION, SUSPENSION INTRA-ARTICULAR; INTRAMUSCULAR
Status: DISCONTINUED | OUTPATIENT
Start: 2022-02-14 | End: 2022-02-14 | Stop reason: HOSPADM

## 2022-02-14 RX ADMIN — TRIAMCINOLONE ACETONIDE 40 MG: 40 INJECTION, SUSPENSION INTRA-ARTICULAR; INTRAMUSCULAR at 10:02

## 2022-02-14 NOTE — PROCEDURES
Tendon Sheath    Date/Time: 2/14/2022 10:20 AM  Performed by: Luis Alberto Payne MD  Authorized by: Luis Alberto Payne MD     Consent Done?:  Yes (Verbal)  Indications:  Pain  Site marked: the procedure site was marked    Timeout: prior to procedure the correct patient, procedure, and site was verified    Prep: patient was prepped and draped in usual sterile fashion      Local anesthetic:  Lidocaine 1% without epinephrine  Anesthetic total (ml):  0.5    Location:  Long finger  Site:  R long flexor tendon sheath  Needle size:  25 G  Medications:  40 mg triamcinolone acetonide 40 mg/mL (20mg injected)  Patient tolerance:  Patient tolerated the procedure well with no immediate complications

## 2022-02-14 NOTE — PROGRESS NOTES
Mr Forrest returns to clinic today.  Has a history of multiple triggers on his right hand.  He also has right middle finger MCP joint arthritis.  States that his right hand middle finger causing him significant pain again.  He states that the last injection in the MCP joint did improve his symptoms.  He is here today for further evaluation and treatment options    Physical exam:  Examination the right hand reveals that there are no major skin changes.  There is very mild edema over the MCP joint.  Palpation produces tenderness over the dorsal and palmar sides of the MCP joint.  He also has moderate tenderness over the middle finger A1 pulley region.  He has no other tenderness about the hand.  Flexion and extension of the finger is mildly limited due to pain at the MCP joint but there is only a very mild catching of the flexor tendon sheath upon flexion and extension.  He does report grossly intact sensation over the tips of the fingers    Radiology:  X-rays of the right hand were taken in clinic today.  The films reviewed by me.  There is noted to be degenerative disease of the MCP joint of the right middle finger.  There is no other significant pathology    Assessment:  Right middle finger MCP arthritis and recurrent triggering    Plan:    1. Today states that the pain is located more over the palmar side of the joint and at the area of the flexor tendon sheath and A1 pulley.  Therefore will place a steroid injection near the A1 pulley and tendon sheath of the right middle finger.  I feel like this could be recurrence of triggering despite having had a previous trigger release    2. If he does not have significant relief with this injection and may consider a dorsal MCP joint injection at some point in the future

## 2022-03-08 ENCOUNTER — OFFICE VISIT (OUTPATIENT)
Dept: ORTHOPEDICS | Facility: CLINIC | Age: 57
End: 2022-03-08
Payer: MEDICARE

## 2022-03-08 VITALS — WEIGHT: 296 LBS | HEIGHT: 74 IN | BODY MASS INDEX: 37.99 KG/M2

## 2022-03-08 DIAGNOSIS — M19.041 DEGENERATIVE ARTHRITIS OF METACARPOPHALANGEAL JOINT OF MIDDLE FINGER OF RIGHT HAND: Primary | ICD-10-CM

## 2022-03-08 PROCEDURE — 99999 PR PBB SHADOW E&M-EST. PATIENT-LVL IV: ICD-10-PCS | Mod: PBBFAC,,, | Performed by: PHYSICIAN ASSISTANT

## 2022-03-08 PROCEDURE — 1160F RVW MEDS BY RX/DR IN RCRD: CPT | Mod: CPTII,S$GLB,, | Performed by: PHYSICIAN ASSISTANT

## 2022-03-08 PROCEDURE — 1159F MED LIST DOCD IN RCRD: CPT | Mod: CPTII,S$GLB,, | Performed by: PHYSICIAN ASSISTANT

## 2022-03-08 PROCEDURE — 99213 PR OFFICE/OUTPT VISIT, EST, LEVL III, 20-29 MIN: ICD-10-PCS | Mod: S$GLB,,, | Performed by: PHYSICIAN ASSISTANT

## 2022-03-08 PROCEDURE — 1159F PR MEDICATION LIST DOCUMENTED IN MEDICAL RECORD: ICD-10-PCS | Mod: CPTII,S$GLB,, | Performed by: PHYSICIAN ASSISTANT

## 2022-03-08 PROCEDURE — 99213 OFFICE O/P EST LOW 20 MIN: CPT | Mod: S$GLB,,, | Performed by: PHYSICIAN ASSISTANT

## 2022-03-08 PROCEDURE — 3008F BODY MASS INDEX DOCD: CPT | Mod: CPTII,S$GLB,, | Performed by: PHYSICIAN ASSISTANT

## 2022-03-08 PROCEDURE — 1160F PR REVIEW ALL MEDS BY PRESCRIBER/CLIN PHARMACIST DOCUMENTED: ICD-10-PCS | Mod: CPTII,S$GLB,, | Performed by: PHYSICIAN ASSISTANT

## 2022-03-08 PROCEDURE — 3008F PR BODY MASS INDEX (BMI) DOCUMENTED: ICD-10-PCS | Mod: CPTII,S$GLB,, | Performed by: PHYSICIAN ASSISTANT

## 2022-03-08 PROCEDURE — 99999 PR PBB SHADOW E&M-EST. PATIENT-LVL IV: CPT | Mod: PBBFAC,,, | Performed by: PHYSICIAN ASSISTANT

## 2022-03-08 NOTE — PROGRESS NOTES
3/8/2022    HPI:  Gio Forrest is a 56 y.o. male, who presents to clinic today for continued evaluation of his arthritis of his MCP joint of his right middle finger.  States the injection at his previous visit did not significantly help.  States he is still having pain to the right middle finger MCP joint.  States he would like to discuss other treatment options such as surgical intervention.  Denies any significant change in his symptoms since his last visit.  Denies any other complaints this time.    PMHX:  Past Medical History:   Diagnosis Date    Cardiomyopathy     Depression     Diabetes     Dyslipidemia 8/12/2015    Gout 8/12/2015    Hypertension     Idiopathic gout     Lumbar pseudoarthrosis     LINDSEY (nonalcoholic steatohepatitis)     Obesity 8/12/2015    Obstructive sleep apnea 8/12/2015    Restless leg syndrome     Sebaceous cyst 4/18/2017    Type 2 diabetes mellitus 8/12/2015       PSHX:  Past Surgical History:   Procedure Laterality Date    ABLATION N/A 10/8/2020    Procedure: Ablation;  Surgeon: Rip Che III, MD;  Location: [facility] CATH;  Service: Cardiology;  Laterality: N/A;    BACK SURGERY      x2    CARDIAC CATHETERIZATION      CARDIAC ELECTROPHYSIOLOGY STUDY  10/8/2020    Procedure: Study possible ablation;  Surgeon: Rip Che III, MD;  Location: [facility] CATH;  Service: Cardiology;;    CERVICAL SPINE SURGERY      CHOLECYSTECTOMY  05/30/2018    Dr. ROGELIO Tao, [facility]     COLONOSCOPY  2008    COLONOSCOPY N/A 9/14/2017    Procedure: COLONOSCOPY;  Surgeon: Obi Beltre MD;  Location: [facility] ENDO;  Service: Endoscopy;  Laterality: N/A;    CORONARY ANGIOGRAPHY Left 5/28/2018    Procedure: Coronary angiography;  Surgeon: Rafiq Malik MD;  Location: [facility] CATH;  Service: Cardiology;  Laterality: Left;    ELBOW SURGERY Bilateral     HERNIA REPAIR      LAPAROSCOPIC APPENDECTOMY N/A 6/25/2020    Procedure: APPENDECTOMY, LAPAROSCOPIC;  Surgeon: Gordon Can MD;   Location: [facility] OR;  Service: General;  Laterality: N/A;    LAPAROSCOPIC CHOLECYSTECTOMY N/A 2018    Procedure: CHOLECYSTECTOMY, LAPAROSCOPIC;  Surgeon: Fletcher Tao MD;  Location: [facility] OR;  Service: General;  Laterality: N/A;    LEFT HEART CATHETERIZATION Left 2018    Procedure: Left heart cath;  Surgeon: Rafiq Malik MD;  Location: [facility] CATH;  Service: Cardiology;  Laterality: Left;    NECK SURGERY      SHOULDER ARTHROSCOPY      SPINE SURGERY      lumbar x2    TONSILLECTOMY      TREATMENT OF CARDIAC ARRHYTHMIA  10/8/2020    Procedure: Cardioversion or Defibrillation;  Surgeon: Rip Che III, MD;  Location: [facility] CATH;  Service: Cardiology;;    TRIGGER FINGER RELEASE Right 2021    Procedure: RELEASE, TRIGGER FINGER;  Surgeon: Luis Alberto Payne MD;  Location: [facility] OR;  Service: Orthopedics;  Laterality: Right;  Procedure: Right ring finger trigger release    Position: Supine    Anesthesia: Local    Equipment: Basic handset       FMHX:  Family History   Problem Relation Age of Onset    Diabetes Mother     Depression Mother     Liver cancer Mother     Obesity Mother     Heart disease Father     Anuerysm Father     Diabetes Father     Stroke Father     Glaucoma Paternal Grandmother     Obesity Sister        SOCHX:  Social History     Tobacco Use    Smoking status: Former Smoker     Packs/day: 0.50     Years: 6.00     Pack years: 3.00     Types: Cigarettes     Quit date:      Years since quittin.2    Smokeless tobacco: Never Used   Substance Use Topics    Alcohol use: No     Alcohol/week: 0.0 standard drinks       ALLERGIES:  Patient has no known allergies.    CURRENT MEDICATIONS:  Current Outpatient Medications on File Prior to Visit   Medication Sig Dispense Refill    allopurinoL (ZYLOPRIM) 100 MG tablet TAKE ONE TABLET BY MOUTH EVERY DAY 30 tablet 0    ascorbic acid, vitamin C, (VITAMIN C) 500 MG tablet Take 500 mg by mouth once daily.      aspirin  "(ECOTRIN) 81 MG EC tablet Take 81 mg by mouth once daily.      atorvastatin (LIPITOR) 40 MG tablet TAKE ONE TABLET BY MOUTH EVERY EVENING 90 tablet 1    blood sugar diagnostic (CONTOUR NEXT TEST STRIPS) Strp Use to Test 4 times a day.  Contour Next test strips required for Medtronic insulin pump 400 strip 4    blood-glucose meter,continuous (DEXCOM G6 ) Misc Dispense 1  1 each 0    blood-glucose sensor (DEXCOM G6 SENSOR) Oxana Dispense 3 sensors/month 3 each 12    blood-glucose transmitter (DEXCOM G6 TRANSMITTER) Oxana Dispense 1 transmitter 1 each 3    carvediloL (COREG) 25 MG tablet Take 1 tablet (25 mg total) by mouth 2 (two) times daily with meals. 180 tablet 0    cholecalciferol, vitamin D3, 125 mcg (5,000 unit) capsule Take 5,000 Units by mouth once daily.      citalopram (CELEXA) 40 MG tablet TAKE ONE TABLET BY MOUTH DAILY 90 tablet 0    docusate sodium (COLACE) 100 MG capsule Take 200 mg by mouth once daily.      gabapentin (NEURONTIN) 300 MG capsule TAKE TWO CAPSULES BY MOUTH THREE TIMES DAILY 360 capsule 5    glucosam/gluc cruz/tj-aqr-r-gluc (GLUCOSAMINE COMPLEX ORAL) Take 1 tablet by mouth.      infusion set for insulin pump (MINIMED PRO-SET INFUSION 24") ISet Changes site q2days, dispense 90day supply 45 each 4    insulin (LANTUS SOLOSTAR U-100 INSULIN) glargine 100 units/mL (3mL) SubQ pen Inject 50 Units into the skin every evening. Use if pump malfunctions 1 Box 0    insulin lispro (HUMALOG U-100 INSULIN) 100 unit/mL injection 150 units daily with insulin pump 14 mL 1    insulin lispro (HUMALOG U-100 INSULIN) 100 unit/mL injection Uses 120 units via insulin pump 40 mL 6    insulin pump syringe (MINIMED SYRINGE RESERVOIR) 3 mL Misc Changes q2days, dispense 3 month supply 45 each 4    lancets Misc To be used with Contour Next as necessary with Medtronic insulin pump, uses 4 daily 400 each 4    meloxicam (MOBIC) 15 MG tablet Take 15 mg by mouth once daily.       metFORMIN " "(GLUCOPHAGE) 1000 MG tablet Take 1 tablet (1,000 mg total) by mouth 2 (two) times daily. 180 tablet 4    ondansetron (ZOFRAN) 4 MG tablet Take 1 tablet (4 mg total) by mouth every 8 (eight) hours as needed for Nausea. 20 tablet 0    pantoprazole (PROTONIX) 40 MG tablet TAKE ONE TABLET BY MOUTH EVERY DAY 90 tablet 1    pioglitazone (ACTOS) 15 MG tablet Take 1 tablet (15 mg total) by mouth once daily. 90 tablet 4    pramipexole (MIRAPEX) 1 MG tablet TAKE TWO TABLETS BY MOUTH EVERY evening 30 tablet 0    semaglutide (OZEMPIC) 1 mg/dose (2 mg/1.5 mL) PnIj Take 1 mg weekly 2 pen 12    testosterone cypionate (DEPOTESTOTERONE CYPIONATE) 200 mg/mL injection Inject 400 mg into the muscle every 28 days.       vitamin E 400 UNIT capsule Take 400 Units by mouth once daily.      blood-glucose meter kit To check BG 2 times daily, to use with insurance preferred meter 1 each 1     No current facility-administered medications on file prior to visit.       REVIEW OF SYSTEMS:  Review of Systems Complete; Negative, unless noted above.    GENERAL PHYSICAL EXAM:   Ht 6' 2" (1.88 m)   Wt 134.3 kg (296 lb)   BMI 38.00 kg/m²    GEN: well developed, well nourished, no acute distress   PULM: No wheezing, no respiratory distress   CV: RRR    ORTHO EXAM:   Examination of the right middle finger reveals mild edema.  No erythema, ecchymosis, or skin breakdown.  Severe tenderness to palpation of the MCP joint of the right middle finger.  Severe tenderness with range of motion of the right middle finger at the MCP joint.  Reduced range of motion of the right middle finger at the MCP joint in regards to flexion.  Strength not tested secondary to pain.  Normal sensation of the right middle finger.  Capillary refill less than 2 seconds of the right middle finger.    RADIOLOGY:   X-rays of the right hand were taken approximately 3 weeks ago.  X-rays reviewed by myself in clinic today.  Imaging showed the presence of joint space narrowing " degenerative changes of the 3rd MCP joint.  No acute fracture dislocation.  No subluxation.  Scattered mild degenerative changes noted throughout the rest the joints of the hand.  Atherosclerotic vascular changes noted of the volar portion of the wrist.  No other significant bony abnormalities noted.    ASSESSMENT:   Degenerative arthritis of the 3rd MCP joint of the right hand    PLAN:  1. I discussed with Gio Forrest that considering the severity of his pain and failure of conservative treatment options, best course of action this time would be to discuss surgical intervention with Dr. Payne.  He verbally agreed with treatment plan.    2. I would like him follow-up in clinic with Dr. Payne at his earliest convenience.  He was instructed to contact the clinic for any problems or concerns in the interim.

## 2022-03-16 RX ORDER — ATORVASTATIN CALCIUM 40 MG/1
TABLET, FILM COATED ORAL
Qty: 30 TABLET | Refills: 0 | Status: SHIPPED | OUTPATIENT
Start: 2022-03-16 | End: 2022-03-22 | Stop reason: SDUPTHER

## 2022-03-16 NOTE — TELEPHONE ENCOUNTER
Patient needs primary care visit within next 30 days. Please call to make him an appt. 30 day refill of Lipitor sent.

## 2022-03-17 ENCOUNTER — LAB VISIT (OUTPATIENT)
Dept: LAB | Facility: HOSPITAL | Age: 57
End: 2022-03-17
Attending: SURGERY
Payer: MEDICARE

## 2022-03-17 DIAGNOSIS — Z79.4 TYPE 2 DIABETES MELLITUS WITH DIABETIC POLYNEUROPATHY, WITH LONG-TERM CURRENT USE OF INSULIN: ICD-10-CM

## 2022-03-17 DIAGNOSIS — E11.42 TYPE 2 DIABETES MELLITUS WITH DIABETIC POLYNEUROPATHY, WITH LONG-TERM CURRENT USE OF INSULIN: ICD-10-CM

## 2022-03-17 DIAGNOSIS — E78.5 DYSLIPIDEMIA: ICD-10-CM

## 2022-03-17 PROBLEM — R55 NEAR SYNCOPE: Status: RESOLVED | Noted: 2020-06-19 | Resolved: 2022-03-17

## 2022-03-17 LAB
ALBUMIN SERPL BCP-MCNC: 3.9 G/DL (ref 3.5–5.2)
ALP SERPL-CCNC: 104 U/L (ref 55–135)
ALT SERPL W/O P-5'-P-CCNC: 19 U/L (ref 10–44)
ANION GAP SERPL CALC-SCNC: 11 MMOL/L (ref 8–16)
AST SERPL-CCNC: 18 U/L (ref 10–40)
BILIRUB SERPL-MCNC: 0.4 MG/DL (ref 0.1–1)
BUN SERPL-MCNC: 13 MG/DL (ref 6–20)
CALCIUM SERPL-MCNC: 9.9 MG/DL (ref 8.7–10.5)
CHLORIDE SERPL-SCNC: 101 MMOL/L (ref 95–110)
CHOLEST SERPL-MCNC: 124 MG/DL (ref 120–199)
CHOLEST/HDLC SERPL: 4.3 {RATIO} (ref 2–5)
CO2 SERPL-SCNC: 30 MMOL/L (ref 23–29)
CREAT SERPL-MCNC: 1 MG/DL (ref 0.5–1.4)
EST. GFR  (AFRICAN AMERICAN): >60 ML/MIN/1.73 M^2
EST. GFR  (NON AFRICAN AMERICAN): >60 ML/MIN/1.73 M^2
ESTIMATED AVG GLUCOSE: 214 MG/DL (ref 68–131)
GLUCOSE SERPL-MCNC: 128 MG/DL (ref 70–110)
HBA1C MFR BLD: 9.1 % (ref 4–5.6)
HDLC SERPL-MCNC: 29 MG/DL (ref 40–75)
HDLC SERPL: 23.4 % (ref 20–50)
LDLC SERPL CALC-MCNC: 75 MG/DL (ref 63–159)
NONHDLC SERPL-MCNC: 95 MG/DL
POTASSIUM SERPL-SCNC: 4.6 MMOL/L (ref 3.5–5.1)
PROT SERPL-MCNC: 7.3 G/DL (ref 6–8.4)
SODIUM SERPL-SCNC: 142 MMOL/L (ref 136–145)
TRIGL SERPL-MCNC: 100 MG/DL (ref 30–150)

## 2022-03-17 PROCEDURE — 80053 COMPREHEN METABOLIC PANEL: CPT | Mod: PN | Performed by: NURSE PRACTITIONER

## 2022-03-17 PROCEDURE — 80061 LIPID PANEL: CPT | Performed by: NURSE PRACTITIONER

## 2022-03-17 PROCEDURE — 36415 COLL VENOUS BLD VENIPUNCTURE: CPT | Mod: PN | Performed by: NURSE PRACTITIONER

## 2022-03-17 PROCEDURE — 83036 HEMOGLOBIN GLYCOSYLATED A1C: CPT | Performed by: NURSE PRACTITIONER

## 2022-03-21 ENCOUNTER — PATIENT OUTREACH (OUTPATIENT)
Dept: ADMINISTRATIVE | Facility: OTHER | Age: 57
End: 2022-03-21
Payer: MEDICARE

## 2022-03-21 NOTE — PROGRESS NOTES
Health Maintenance Due   Topic Date Due    HIV Screening  Never done    Shingles Vaccine (1 of 2) Never done    Influenza Vaccine (1) 09/01/2021    COVID-19 Vaccine (3 - Booster for Pfizer series) 10/05/2021    Foot Exam  03/24/2022     Updates were requested from care everywhere.  Chart was reviewed for overdue Proactive [facility] Encounters (DANITZA) topics (CRS, Breast Cancer Screening, Eye exam)  Health Maintenance has been updated.  LINKS immunization registry triggered.  Immunizations were reconciled.

## 2022-03-22 ENCOUNTER — OFFICE VISIT (OUTPATIENT)
Dept: ENDOCRINOLOGY | Facility: CLINIC | Age: 57
End: 2022-03-22
Payer: MEDICARE

## 2022-03-22 VITALS
HEIGHT: 74 IN | DIASTOLIC BLOOD PRESSURE: 64 MMHG | WEIGHT: 295 LBS | BODY MASS INDEX: 37.86 KG/M2 | HEART RATE: 72 BPM | OXYGEN SATURATION: 97 % | SYSTOLIC BLOOD PRESSURE: 104 MMHG

## 2022-03-22 DIAGNOSIS — Z79.4 TYPE 2 DIABETES MELLITUS WITH DIABETIC POLYNEUROPATHY, WITH LONG-TERM CURRENT USE OF INSULIN: Primary | ICD-10-CM

## 2022-03-22 DIAGNOSIS — I10 ESSENTIAL HYPERTENSION: ICD-10-CM

## 2022-03-22 DIAGNOSIS — E78.5 HYPERLIPIDEMIA DUE TO TYPE 2 DIABETES MELLITUS: ICD-10-CM

## 2022-03-22 DIAGNOSIS — E11.69 HYPERLIPIDEMIA DUE TO TYPE 2 DIABETES MELLITUS: ICD-10-CM

## 2022-03-22 DIAGNOSIS — E11.42 TYPE 2 DIABETES MELLITUS WITH DIABETIC POLYNEUROPATHY, WITH LONG-TERM CURRENT USE OF INSULIN: Primary | ICD-10-CM

## 2022-03-22 DIAGNOSIS — I42.8 OTHER CARDIOMYOPATHY: ICD-10-CM

## 2022-03-22 PROCEDURE — 3008F BODY MASS INDEX DOCD: CPT | Mod: CPTII,S$GLB,, | Performed by: NURSE PRACTITIONER

## 2022-03-22 PROCEDURE — 99214 OFFICE O/P EST MOD 30 MIN: CPT | Mod: S$GLB,,, | Performed by: NURSE PRACTITIONER

## 2022-03-22 PROCEDURE — 3078F DIAST BP <80 MM HG: CPT | Mod: CPTII,S$GLB,, | Performed by: NURSE PRACTITIONER

## 2022-03-22 PROCEDURE — 99999 PR PBB SHADOW E&M-EST. PATIENT-LVL III: CPT | Mod: PBBFAC,,, | Performed by: NURSE PRACTITIONER

## 2022-03-22 PROCEDURE — 1159F MED LIST DOCD IN RCRD: CPT | Mod: CPTII,S$GLB,, | Performed by: NURSE PRACTITIONER

## 2022-03-22 PROCEDURE — 95251 CONT GLUC MNTR ANALYSIS I&R: CPT | Mod: S$GLB,,, | Performed by: NURSE PRACTITIONER

## 2022-03-22 PROCEDURE — 3046F HEMOGLOBIN A1C LEVEL >9.0%: CPT | Mod: CPTII,S$GLB,, | Performed by: NURSE PRACTITIONER

## 2022-03-22 PROCEDURE — 3046F PR MOST RECENT HEMOGLOBIN A1C LEVEL > 9.0%: ICD-10-PCS | Mod: CPTII,S$GLB,, | Performed by: NURSE PRACTITIONER

## 2022-03-22 PROCEDURE — 99999 PR PBB SHADOW E&M-EST. PATIENT-LVL III: ICD-10-PCS | Mod: PBBFAC,,, | Performed by: NURSE PRACTITIONER

## 2022-03-22 PROCEDURE — 3074F SYST BP LT 130 MM HG: CPT | Mod: CPTII,S$GLB,, | Performed by: NURSE PRACTITIONER

## 2022-03-22 PROCEDURE — 1159F PR MEDICATION LIST DOCUMENTED IN MEDICAL RECORD: ICD-10-PCS | Mod: CPTII,S$GLB,, | Performed by: NURSE PRACTITIONER

## 2022-03-22 PROCEDURE — 3074F PR MOST RECENT SYSTOLIC BLOOD PRESSURE < 130 MM HG: ICD-10-PCS | Mod: CPTII,S$GLB,, | Performed by: NURSE PRACTITIONER

## 2022-03-22 PROCEDURE — 99214 PR OFFICE/OUTPT VISIT, EST, LEVL IV, 30-39 MIN: ICD-10-PCS | Mod: S$GLB,,, | Performed by: NURSE PRACTITIONER

## 2022-03-22 PROCEDURE — 3078F PR MOST RECENT DIASTOLIC BLOOD PRESSURE < 80 MM HG: ICD-10-PCS | Mod: CPTII,S$GLB,, | Performed by: NURSE PRACTITIONER

## 2022-03-22 PROCEDURE — 95251 PR GLUCOSE MONITOR, 72 HOUR, PHYS INTERP: ICD-10-PCS | Mod: S$GLB,,, | Performed by: NURSE PRACTITIONER

## 2022-03-22 PROCEDURE — 3008F PR BODY MASS INDEX (BMI) DOCUMENTED: ICD-10-PCS | Mod: CPTII,S$GLB,, | Performed by: NURSE PRACTITIONER

## 2022-03-22 RX ORDER — SEMAGLUTIDE 1.34 MG/ML
1 INJECTION, SOLUTION SUBCUTANEOUS
Qty: 3 PEN | Refills: 4 | Status: SHIPPED | OUTPATIENT
Start: 2022-03-22 | End: 2022-06-21

## 2022-03-22 RX ORDER — ATORVASTATIN CALCIUM 40 MG/1
40 TABLET, FILM COATED ORAL NIGHTLY
Qty: 90 TABLET | Refills: 4 | Status: SHIPPED | OUTPATIENT
Start: 2022-03-22 | End: 2022-06-15

## 2022-03-22 NOTE — PROGRESS NOTES
CC: This 56 y.o. male presents for management of diabetes  and chronic conditions pending review including HTN, HLP, morbid obesity, fatty liver, vitamin d deficiency, CM, CAD     HPI: He was diagnosed with T2DM in 2005. Has never been hospitalized r/t DM.  Mother has passed away from Fatty liver and hepatic carcinoma in 2018  Family hx of DM: sister, mom and dad    Since last visit, he has stopped gambling  He is in therapy - classes Tuesday night, and counseling on Friday  Covid in jan 2022  Will be having surgery on his right hand soon  Back on pump and Dexcom G6- See downloads in Media tab  Time in range:74%  Hypoglycemia <2%- no patterns  Has only been back on track for the past 2-3 weeks  Overall small pp excursions noted, putting in carbs into his pump   Diet: Eats 2-3  Meals a day, snacks on sweets - honey bun's occasionally, PB crackers   Exercise: none  CURRENT DM MEDS: metformin 1000 mg bid, actos 15 mg qd,  Ozempic 1 mg weekly (Sunday)  Humalog in Medtronic 670g insulin pump: (enters 75 carbs with a meal, 45 with a snack )  Basal 1.8 u/h  Carb ratio 1:5  ISF 30  target 120-140  Act Ins 3 hrs   Vial/pen:  Uses pen  Glucometer type:  True Test 2018    Standards of Care:  Eye exam: 2/2022  no h/o retinopathy, [facility]    Following w Dr Bergman's in cardiology      ROS:   Gen: Appetite good, + weight loss 7 lbs lbs  Eyes: Denies visual disturbances  Resp: no SOB or HENDERSON, no cough  Cardiac: No palpitations, chest pain, no edema   GI: No nausea or vomiting, diarrhea, constipation, or abdominal pain.  /GYN: No nocturia, burning or pain.   MS/Neuro: +numbness/ tingling in BLE; Gait steady, speech clear  Other systems: negative.    PE:  GENERAL: Well developed, well nourished.appears older than age.   PSYCH: AAOx3, appropriate mood and affect, pleasant expression, conversant, appears relaxed, well groomed.   EYES: Conjunctiva, corneas clear  NECK: Supple, trachea midline   NEURO: Gait steady  SKIN:   no  acanthosis nigracans.  FOOT EXAMINATION:  3/22/2022  No foot deformity, corns or callus formation,  nails in good condiiton and well trimmed, no interspace maceration or ulceration noted.  Decreased hair growth present over toes/feet.  Protective sensation intact with 10 gram monofilament.  +2 dorsalis pedis and posterior pulses noted.    Lab Results   Component Value Date    MICALBCREAT 4.0 12/13/2021       Hemoglobin A1C   Date Value Ref Range Status   03/17/2022 9.1 (H) 4.0 - 5.6 % Final     Comment:     ADA Screening Guidelines:  5.7-6.4%  Consistent with prediabetes  >or=6.5%  Consistent with diabetes    High levels of fetal hemoglobin interfere with the HbA1C  assay. Heterozygous hemoglobin variants (HbS, HgC, etc)do  not significantly interfere with this assay.   However, presence of multiple variants may affect accuracy.     12/13/2021 9.9 (H) 4.0 - 5.6 % Final     Comment:     ADA Screening Guidelines:  5.7-6.4%  Consistent with prediabetes  >or=6.5%  Consistent with diabetes    High levels of fetal hemoglobin interfere with the HbA1C  assay. Heterozygous hemoglobin variants (HbS, HgC, etc)do  not significantly interfere with this assay.   However, presence of multiple variants may affect accuracy.     09/09/2021 10.7 (H) 4.0 - 5.6 % Final     Comment:     ADA Screening Guidelines:  5.7-6.4%  Consistent with prediabetes  >or=6.5%  Consistent with diabetes    High levels of fetal hemoglobin interfere with the HbA1C  assay. Heterozygous hemoglobin variants (HbS, HgC, etc)do  not significantly interfere with this assay.   However, presence of multiple variants may affect accuracy.          ASSESSMENT and PLAN:    1. Type 2 diabetes w hyperglycemia , DM PN  Continue metformin 1000 mg bid, Actos 15 mg qday,  Ozempic 1 mg qweekly  Continue pump and sensor- put bg in pump and put carbs into pump with every meal and snack     2. HTN - controlled, continue meds as previously prescribed and monitor.     3. HLP -  on  statin therapy,     4. TARYN-  wearing regularly, has appt on Thursday w pulmonary    5. LINDSEY- encouraged weight loss,  mother passed away w HCC    6. Obesity-  Body mass index is 37.88 kg/m².   improve diet, activity as tolerated, has back issues     7. Addiction to gambling- in counseling, has currently stopped gambling    8. CAD, CM  Follows w Dr Bergman's     Follow-up:   3 months with lab prior

## 2022-03-28 ENCOUNTER — OFFICE VISIT (OUTPATIENT)
Dept: ORTHOPEDICS | Facility: CLINIC | Age: 57
End: 2022-03-28
Payer: MEDICARE

## 2022-03-28 ENCOUNTER — PATIENT OUTREACH (OUTPATIENT)
Dept: ADMINISTRATIVE | Facility: HOSPITAL | Age: 57
End: 2022-03-28
Payer: MEDICARE

## 2022-03-28 VITALS — WEIGHT: 295 LBS | HEIGHT: 74 IN | BODY MASS INDEX: 37.86 KG/M2

## 2022-03-28 DIAGNOSIS — M19.041 DEGENERATIVE ARTHRITIS OF METACARPOPHALANGEAL JOINT OF MIDDLE FINGER OF RIGHT HAND: Primary | ICD-10-CM

## 2022-03-28 PROCEDURE — 1159F MED LIST DOCD IN RCRD: CPT | Mod: CPTII,S$GLB,, | Performed by: ORTHOPAEDIC SURGERY

## 2022-03-28 PROCEDURE — 1160F RVW MEDS BY RX/DR IN RCRD: CPT | Mod: CPTII,S$GLB,, | Performed by: ORTHOPAEDIC SURGERY

## 2022-03-28 PROCEDURE — 99214 OFFICE O/P EST MOD 30 MIN: CPT | Mod: S$GLB,,, | Performed by: ORTHOPAEDIC SURGERY

## 2022-03-28 PROCEDURE — 1159F PR MEDICATION LIST DOCUMENTED IN MEDICAL RECORD: ICD-10-PCS | Mod: CPTII,S$GLB,, | Performed by: ORTHOPAEDIC SURGERY

## 2022-03-28 PROCEDURE — 3008F BODY MASS INDEX DOCD: CPT | Mod: CPTII,S$GLB,, | Performed by: ORTHOPAEDIC SURGERY

## 2022-03-28 PROCEDURE — 99214 PR OFFICE/OUTPT VISIT, EST, LEVL IV, 30-39 MIN: ICD-10-PCS | Mod: S$GLB,,, | Performed by: ORTHOPAEDIC SURGERY

## 2022-03-28 PROCEDURE — 3046F PR MOST RECENT HEMOGLOBIN A1C LEVEL > 9.0%: ICD-10-PCS | Mod: CPTII,S$GLB,, | Performed by: ORTHOPAEDIC SURGERY

## 2022-03-28 PROCEDURE — 1160F PR REVIEW ALL MEDS BY PRESCRIBER/CLIN PHARMACIST DOCUMENTED: ICD-10-PCS | Mod: CPTII,S$GLB,, | Performed by: ORTHOPAEDIC SURGERY

## 2022-03-28 PROCEDURE — 3046F HEMOGLOBIN A1C LEVEL >9.0%: CPT | Mod: CPTII,S$GLB,, | Performed by: ORTHOPAEDIC SURGERY

## 2022-03-28 PROCEDURE — 3008F PR BODY MASS INDEX (BMI) DOCUMENTED: ICD-10-PCS | Mod: CPTII,S$GLB,, | Performed by: ORTHOPAEDIC SURGERY

## 2022-03-28 PROCEDURE — 99999 PR PBB SHADOW E&M-EST. PATIENT-LVL IV: CPT | Mod: PBBFAC,,, | Performed by: ORTHOPAEDIC SURGERY

## 2022-03-28 PROCEDURE — 99999 PR PBB SHADOW E&M-EST. PATIENT-LVL IV: ICD-10-PCS | Mod: PBBFAC,,, | Performed by: ORTHOPAEDIC SURGERY

## 2022-03-28 NOTE — H&P (VIEW-ONLY)
3/28/2022    Chief Complaint:  Chief Complaint   Patient presents with    Right Hand - Pain       HPI:  Gio Forrest is a 56 y.o. male, who presents to clinic today has a history of right middle finger MCP arthritis.  He has been treated conservatively for a long time.  He has had multiple steroid injections.  He states that the steroid injections are no longer giving him good relief.  He is here today to discuss further treatment options    PMHX:  Past Medical History:   Diagnosis Date    Cardiomyopathy     Depression     Diabetes     Dyslipidemia 8/12/2015    Gout 8/12/2015    Hypertension     Idiopathic gout     Lumbar pseudoarthrosis     LINDSEY (nonalcoholic steatohepatitis)     Obesity 8/12/2015    Obstructive sleep apnea 8/12/2015    Restless leg syndrome     Sebaceous cyst 4/18/2017    Type 2 diabetes mellitus 8/12/2015       PSHX:  Past Surgical History:   Procedure Laterality Date    ABLATION N/A 10/8/2020    Procedure: Ablation;  Surgeon: Rip Che III, MD;  Location: [facility] CATH;  Service: Cardiology;  Laterality: N/A;    BACK SURGERY      x2    CARDIAC CATHETERIZATION      CARDIAC ELECTROPHYSIOLOGY STUDY  10/8/2020    Procedure: Study possible ablation;  Surgeon: Rip Che III, MD;  Location: [facility] CATH;  Service: Cardiology;;    CERVICAL SPINE SURGERY      CHOLECYSTECTOMY  05/30/2018    Dr. ROGELIO Tao, [facility]     COLONOSCOPY  2008    COLONOSCOPY N/A 9/14/2017    Procedure: COLONOSCOPY;  Surgeon: Obi Beltre MD;  Location: [facility] ENDO;  Service: Endoscopy;  Laterality: N/A;    CORONARY ANGIOGRAPHY Left 5/28/2018    Procedure: Coronary angiography;  Surgeon: Rafiq Malik MD;  Location: [facility] CATH;  Service: Cardiology;  Laterality: Left;    ELBOW SURGERY Bilateral     HERNIA REPAIR      LAPAROSCOPIC APPENDECTOMY N/A 6/25/2020    Procedure: APPENDECTOMY, LAPAROSCOPIC;  Surgeon: Gordon Can MD;  Location: [facility] OR;  Service: General;  Laterality: N/A;     LAPAROSCOPIC CHOLECYSTECTOMY N/A 2018    Procedure: CHOLECYSTECTOMY, LAPAROSCOPIC;  Surgeon: Fletcher Tao MD;  Location: [facility] [place] OR;  Service: General;  Laterality: N/A;    LEFT HEART CATHETERIZATION Left 2018    Procedure: Left heart cath;  Surgeon: Rafiq Malik MD;  Location: [facility] [place] CATH;  Service: Cardiology;  Laterality: Left;    NECK SURGERY      SHOULDER ARTHROSCOPY      SPINE SURGERY      lumbar x2    TONSILLECTOMY      TREATMENT OF CARDIAC ARRHYTHMIA  10/8/2020    Procedure: Cardioversion or Defibrillation;  Surgeon: Rip Che III, MD;  Location: [facility] [place] CATH;  Service: Cardiology;;    TRIGGER FINGER RELEASE Right 2021    Procedure: RELEASE, TRIGGER FINGER;  Surgeon: Luis Alberto Payne MD;  Location: [facility] OR;  Service: Orthopedics;  Laterality: Right;  Procedure: Right ring finger trigger release    Position: Supine    Anesthesia: Local    Equipment: Basic handset       FMHX:  Family History   Problem Relation Age of Onset    Diabetes Mother     Depression Mother     Liver cancer Mother     Obesity Mother     Heart disease Father     Anuerysm Father     Diabetes Father     Stroke Father     Glaucoma Paternal Grandmother     Obesity Sister        SOCHX:  Social History     Tobacco Use    Smoking status: Former Smoker     Packs/day: 0.50     Years: 6.00     Pack years: 3.00     Types: Cigarettes     Quit date:      Years since quittin.2    Smokeless tobacco: Never Used   Substance Use Topics    Alcohol use: No     Alcohol/week: 0.0 standard drinks       ALLERGIES:  Patient has no known allergies.    CURRENT MEDICATIONS:  Current Outpatient Medications on File Prior to Visit   Medication Sig Dispense Refill    allopurinoL (ZYLOPRIM) 100 MG tablet TAKE ONE TABLET BY MOUTH EVERY DAY 30 tablet 0    ascorbic acid, vitamin C, (VITAMIN C) 500 MG tablet Take 500 mg by mouth once daily.      aspirin (ECOTRIN) 81 MG EC tablet Take 81 mg by mouth once daily.       "atorvastatin (LIPITOR) 40 MG tablet Take 1 tablet (40 mg total) by mouth every evening. 90 tablet 4    blood sugar diagnostic (CONTOUR NEXT TEST STRIPS) Strp Use to Test 4 times a day.  Contour Next test strips required for Medtronic insulin pump 400 strip 4    blood-glucose meter,continuous (DEXCOM G6 ) Misc Dispense 1  1 each 0    blood-glucose sensor (DEXCOM G6 SENSOR) Oxana Dispense 3 sensors/month 3 each 12    blood-glucose transmitter (DEXCOM G6 TRANSMITTER) Oxana Dispense 1 transmitter 1 each 3    carvediloL (COREG) 12.5 MG tablet Take 1 tablet (12.5 mg total) by mouth 2 (two) times daily with meals. 180 tablet 0    cholecalciferol, vitamin D3, 125 mcg (5,000 unit) capsule Take 5,000 Units by mouth once daily.      citalopram (CELEXA) 40 MG tablet TAKE ONE TABLET BY MOUTH DAILY 90 tablet 0    docusate sodium (COLACE) 100 MG capsule Take 200 mg by mouth once daily.      gabapentin (NEURONTIN) 300 MG capsule TAKE TWO CAPSULES BY MOUTH THREE TIMES DAILY 360 capsule 5    glucosam/gluc cruz/kx-ygk-g-gluc (GLUCOSAMINE COMPLEX ORAL) Take 1 tablet by mouth.      infusion set for insulin pump (MINIMED PRO-SET INFUSION 24") ISet Changes site q2days, dispense 90day supply 45 each 4    insulin (LANTUS SOLOSTAR U-100 INSULIN) glargine 100 units/mL (3mL) SubQ pen Inject 50 Units into the skin every evening. Use if pump malfunctions 1 Box 0    insulin lispro (HUMALOG U-100 INSULIN) 100 unit/mL injection 150 units daily with insulin pump 14 mL 1    insulin lispro (HUMALOG U-100 INSULIN) 100 unit/mL injection Uses 120 units via insulin pump 40 mL 6    insulin pump syringe (MINIMED SYRINGE RESERVOIR) 3 mL Misc Changes q2days, dispense 3 month supply 45 each 4    lancets Misc To be used with Contour Next as necessary with Medtronic insulin pump, uses 4 daily 400 each 4    meloxicam (MOBIC) 15 MG tablet Take 15 mg by mouth once daily.       metFORMIN (GLUCOPHAGE) 1000 MG tablet Take 1 tablet (1,000 " "mg total) by mouth 2 (two) times daily. 180 tablet 4    ondansetron (ZOFRAN) 4 MG tablet Take 1 tablet (4 mg total) by mouth every 8 (eight) hours as needed for Nausea. 20 tablet 0    pantoprazole (PROTONIX) 40 MG tablet TAKE ONE TABLET BY MOUTH EVERY DAY 90 tablet 1    pioglitazone (ACTOS) 15 MG tablet Take 1 tablet (15 mg total) by mouth once daily. 90 tablet 4    pramipexole (MIRAPEX) 1 MG tablet TAKE TWO TABLETS BY MOUTH EVERY evening 30 tablet 0    semaglutide (OZEMPIC) 1 mg/dose (4 mg/3 mL) Inject 1 mg into the skin every 7 days. 3 pen 4    testosterone cypionate (DEPOTESTOTERONE CYPIONATE) 200 mg/mL injection Inject 400 mg into the muscle every 28 days.       vitamin E 400 UNIT capsule Take 400 Units by mouth once daily.      blood-glucose meter kit To check BG 2 times daily, to use with insurance preferred meter 1 each 1     No current facility-administered medications on file prior to visit.       REVIEW OF SYSTEMS:  ROS    GENERAL PHYSICAL EXAM:   Ht 6' 2" (1.88 m)   Wt 133.8 kg (295 lb)   BMI 37.88 kg/m²    GEN: well developed, well nourished, no acute distress   HENT: Normocephalic, atraumatic   EYES: No discharge, conjunctiva normal   NECK: Supple, non-tender   PULM: No wheezing, no respiratory distress   CV: RRR   ABD: Soft, non-tender    ORTHO EXAM:   Examination of the right hand middle finger reveals that there is mild edema to the MCP joint.  There are no other major skin changes.  Palpation produces tenderness over the middle finger MCP joint.  He has limited range of motion with full extension to flex soon of approximately 60°.  He does have flexion and extension of the remainder of the joint.  He does report intact sensation in the median radial ulnar distribution.  Capillary refill is less than 2 seconds in all digits    RADIOLOGY:   X-rays of the right hand from 02/14/2022 have been reviewed.  He is noted have moderate to severe arthritic changes about the MCP joint of the middle " finger.  There are no other significant changes noted    ASSESSMENT:   Right middle finger MCP arthritis    PLAN:  1. Had a long discussion with the patient about treatment options.  I did discuss the possibility of arthroplasty of the right middle finger MCP joint.  After discussion of the risks and benefits of the procedure with the patient informed consent has been obtained    2. Will send him for preoperative cardiac clearance of the patient states he is having an echocardiogram and stress test done tomorrow    3. Will follow up with me 2 weeks postoperatively

## 2022-03-28 NOTE — PROGRESS NOTES
3/28/2022    Chief Complaint:  Chief Complaint   Patient presents with    Right Hand - Pain       HPI:  Gio Forrest is a 56 y.o. male, who presents to clinic today has a history of right middle finger MCP arthritis.  He has been treated conservatively for a long time.  He has had multiple steroid injections.  He states that the steroid injections are no longer giving him good relief.  He is here today to discuss further treatment options    PMHX:  Past Medical History:   Diagnosis Date    Cardiomyopathy     Depression     Diabetes     Dyslipidemia 8/12/2015    Gout 8/12/2015    Hypertension     Idiopathic gout     Lumbar pseudoarthrosis     LINDSEY (nonalcoholic steatohepatitis)     Obesity 8/12/2015    Obstructive sleep apnea 8/12/2015    Restless leg syndrome     Sebaceous cyst 4/18/2017    Type 2 diabetes mellitus 8/12/2015       PSHX:  Past Surgical History:   Procedure Laterality Date    ABLATION N/A 10/8/2020    Procedure: Ablation;  Surgeon: Rip Che III, MD;  Location: [facility] CATH;  Service: Cardiology;  Laterality: N/A;    BACK SURGERY      x2    CARDIAC CATHETERIZATION      CARDIAC ELECTROPHYSIOLOGY STUDY  10/8/2020    Procedure: Study possible ablation;  Surgeon: Rip Che III, MD;  Location: [facility] CATH;  Service: Cardiology;;    CERVICAL SPINE SURGERY      CHOLECYSTECTOMY  05/30/2018    Dr. ROGELIO Tao, [facility]     COLONOSCOPY  2008    COLONOSCOPY N/A 9/14/2017    Procedure: COLONOSCOPY;  Surgeon: Obi Beltre MD;  Location: [facility] ENDO;  Service: Endoscopy;  Laterality: N/A;    CORONARY ANGIOGRAPHY Left 5/28/2018    Procedure: Coronary angiography;  Surgeon: Rafiq Malik MD;  Location: [facility] CATH;  Service: Cardiology;  Laterality: Left;    ELBOW SURGERY Bilateral     HERNIA REPAIR      LAPAROSCOPIC APPENDECTOMY N/A 6/25/2020    Procedure: APPENDECTOMY, LAPAROSCOPIC;  Surgeon: Gordon Can MD;  Location: [facility] OR;  Service: General;  Laterality: N/A;     LAPAROSCOPIC CHOLECYSTECTOMY N/A 2018    Procedure: CHOLECYSTECTOMY, LAPAROSCOPIC;  Surgeon: Fletcher Tao MD;  Location: [facility] OR;  Service: General;  Laterality: N/A;    LEFT HEART CATHETERIZATION Left 2018    Procedure: Left heart cath;  Surgeon: Rafiq Malik MD;  Location: [facility] CATH;  Service: Cardiology;  Laterality: Left;    NECK SURGERY      SHOULDER ARTHROSCOPY      SPINE SURGERY      lumbar x2    TONSILLECTOMY      TREATMENT OF CARDIAC ARRHYTHMIA  10/8/2020    Procedure: Cardioversion or Defibrillation;  Surgeon: Rip Che III, MD;  Location: [facility] CATH;  Service: Cardiology;;    TRIGGER FINGER RELEASE Right 2021    Procedure: RELEASE, TRIGGER FINGER;  Surgeon: Luis Alberto Payne MD;  Location: [facility] OR;  Service: Orthopedics;  Laterality: Right;  Procedure: Right ring finger trigger release    Position: Supine    Anesthesia: Local    Equipment: Basic handset       FMHX:  Family History   Problem Relation Age of Onset    Diabetes Mother     Depression Mother     Liver cancer Mother     Obesity Mother     Heart disease Father     Anuerysm Father     Diabetes Father     Stroke Father     Glaucoma Paternal Grandmother     Obesity Sister        SOCHX:  Social History     Tobacco Use    Smoking status: Former Smoker     Packs/day: 0.50     Years: 6.00     Pack years: 3.00     Types: Cigarettes     Quit date:      Years since quittin.2    Smokeless tobacco: Never Used   Substance Use Topics    Alcohol use: No     Alcohol/week: 0.0 standard drinks       ALLERGIES:  Patient has no known allergies.    CURRENT MEDICATIONS:  Current Outpatient Medications on File Prior to Visit   Medication Sig Dispense Refill    allopurinoL (ZYLOPRIM) 100 MG tablet TAKE ONE TABLET BY MOUTH EVERY DAY 30 tablet 0    ascorbic acid, vitamin C, (VITAMIN C) 500 MG tablet Take 500 mg by mouth once daily.      aspirin (ECOTRIN) 81 MG EC tablet Take 81 mg by mouth once daily.       "atorvastatin (LIPITOR) 40 MG tablet Take 1 tablet (40 mg total) by mouth every evening. 90 tablet 4    blood sugar diagnostic (CONTOUR NEXT TEST STRIPS) Strp Use to Test 4 times a day.  Contour Next test strips required for Medtronic insulin pump 400 strip 4    blood-glucose meter,continuous (DEXCOM G6 ) Misc Dispense 1  1 each 0    blood-glucose sensor (DEXCOM G6 SENSOR) Oxana Dispense 3 sensors/month 3 each 12    blood-glucose transmitter (DEXCOM G6 TRANSMITTER) Oxana Dispense 1 transmitter 1 each 3    carvediloL (COREG) 12.5 MG tablet Take 1 tablet (12.5 mg total) by mouth 2 (two) times daily with meals. 180 tablet 0    cholecalciferol, vitamin D3, 125 mcg (5,000 unit) capsule Take 5,000 Units by mouth once daily.      citalopram (CELEXA) 40 MG tablet TAKE ONE TABLET BY MOUTH DAILY 90 tablet 0    docusate sodium (COLACE) 100 MG capsule Take 200 mg by mouth once daily.      gabapentin (NEURONTIN) 300 MG capsule TAKE TWO CAPSULES BY MOUTH THREE TIMES DAILY 360 capsule 5    glucosam/gluc cruz/ts-vmr-e-gluc (GLUCOSAMINE COMPLEX ORAL) Take 1 tablet by mouth.      infusion set for insulin pump (MINIMED PRO-SET INFUSION 24") ISet Changes site q2days, dispense 90day supply 45 each 4    insulin (LANTUS SOLOSTAR U-100 INSULIN) glargine 100 units/mL (3mL) SubQ pen Inject 50 Units into the skin every evening. Use if pump malfunctions 1 Box 0    insulin lispro (HUMALOG U-100 INSULIN) 100 unit/mL injection 150 units daily with insulin pump 14 mL 1    insulin lispro (HUMALOG U-100 INSULIN) 100 unit/mL injection Uses 120 units via insulin pump 40 mL 6    insulin pump syringe (MINIMED SYRINGE RESERVOIR) 3 mL Misc Changes q2days, dispense 3 month supply 45 each 4    lancets Misc To be used with Contour Next as necessary with Medtronic insulin pump, uses 4 daily 400 each 4    meloxicam (MOBIC) 15 MG tablet Take 15 mg by mouth once daily.       metFORMIN (GLUCOPHAGE) 1000 MG tablet Take 1 tablet (1,000 " "mg total) by mouth 2 (two) times daily. 180 tablet 4    ondansetron (ZOFRAN) 4 MG tablet Take 1 tablet (4 mg total) by mouth every 8 (eight) hours as needed for Nausea. 20 tablet 0    pantoprazole (PROTONIX) 40 MG tablet TAKE ONE TABLET BY MOUTH EVERY DAY 90 tablet 1    pioglitazone (ACTOS) 15 MG tablet Take 1 tablet (15 mg total) by mouth once daily. 90 tablet 4    pramipexole (MIRAPEX) 1 MG tablet TAKE TWO TABLETS BY MOUTH EVERY evening 30 tablet 0    semaglutide (OZEMPIC) 1 mg/dose (4 mg/3 mL) Inject 1 mg into the skin every 7 days. 3 pen 4    testosterone cypionate (DEPOTESTOTERONE CYPIONATE) 200 mg/mL injection Inject 400 mg into the muscle every 28 days.       vitamin E 400 UNIT capsule Take 400 Units by mouth once daily.      blood-glucose meter kit To check BG 2 times daily, to use with insurance preferred meter 1 each 1     No current facility-administered medications on file prior to visit.       REVIEW OF SYSTEMS:  ROS    GENERAL PHYSICAL EXAM:   Ht 6' 2" (1.88 m)   Wt 133.8 kg (295 lb)   BMI 37.88 kg/m²    GEN: well developed, well nourished, no acute distress   HENT: Normocephalic, atraumatic   EYES: No discharge, conjunctiva normal   NECK: Supple, non-tender   PULM: No wheezing, no respiratory distress   CV: RRR   ABD: Soft, non-tender    ORTHO EXAM:   Examination of the right hand middle finger reveals that there is mild edema to the MCP joint.  There are no other major skin changes.  Palpation produces tenderness over the middle finger MCP joint.  He has limited range of motion with full extension to flex soon of approximately 60°.  He does have flexion and extension of the remainder of the joint.  He does report intact sensation in the median radial ulnar distribution.  Capillary refill is less than 2 seconds in all digits    RADIOLOGY:   X-rays of the right hand from 02/14/2022 have been reviewed.  He is noted have moderate to severe arthritic changes about the MCP joint of the middle " finger.  There are no other significant changes noted    ASSESSMENT:   Right middle finger MCP arthritis    PLAN:  1. Had a long discussion with the patient about treatment options.  I did discuss the possibility of arthroplasty of the right middle finger MCP joint.  After discussion of the risks and benefits of the procedure with the patient informed consent has been obtained    2. Will send him for preoperative cardiac clearance of the patient states he is having an echocardiogram and stress test done tomorrow    3. Will follow up with me 2 weeks postoperatively

## 2022-03-31 ENCOUNTER — TELEPHONE (OUTPATIENT)
Dept: ORTHOPEDICS | Facility: CLINIC | Age: 57
End: 2022-03-31
Payer: MEDICARE

## 2022-03-31 NOTE — TELEPHONE ENCOUNTER
Called pt back. No answer. LVM. Forwarding mesg to 's staff for them to call pt back tomorrow. Thanks, Argenis

## 2022-03-31 NOTE — TELEPHONE ENCOUNTER
----- Message from Thiago Best sent at 3/31/2022  3:55 PM CDT -----  Regarding: needs to ad Sx, call pt   Contact: pt   needs to [facility] Sx, call pt

## 2022-04-04 DIAGNOSIS — M19.049 ARTHRITIS OF FINGER: ICD-10-CM

## 2022-04-04 DIAGNOSIS — M19.041 DEGENERATIVE ARTHRITIS OF METACARPOPHALANGEAL JOINT OF MIDDLE FINGER OF RIGHT HAND: Primary | ICD-10-CM

## 2022-04-04 RX ORDER — MUPIROCIN 20 MG/G
OINTMENT TOPICAL
Status: CANCELLED | OUTPATIENT
Start: 2022-04-04

## 2022-04-11 ENCOUNTER — TELEPHONE (OUTPATIENT)
Dept: ORTHOPEDICS | Facility: CLINIC | Age: 57
End: 2022-04-11
Payer: MEDICARE

## 2022-04-11 ENCOUNTER — TELEPHONE (OUTPATIENT)
Dept: FAMILY MEDICINE | Facility: CLINIC | Age: 57
End: 2022-04-11
Payer: MEDICARE

## 2022-04-11 ENCOUNTER — ANESTHESIA EVENT (OUTPATIENT)
Dept: SURGERY | Facility: HOSPITAL | Age: 57
End: 2022-04-11
Payer: MEDICARE

## 2022-04-11 NOTE — TELEPHONE ENCOUNTER
----- Message from Pam Benitez sent at 4/11/2022  2:46 PM CDT -----  Contact: Self  Pt received a call from the insurance company in regards to his surgery scheduled tomorrow 4/12, and stating it is still pending. Pt needs to speak to someone regarding if he should still keep this appt or change the date. Please call back at [phone] ([phone]) to advise. Thank You.

## 2022-04-11 NOTE — TELEPHONE ENCOUNTER
Returned call to pt, pt stated he still wants to have his surgery done tomorrow and he hasnt been called with a time for his surgery. Informed pt the s center will call him with a time today.

## 2022-04-11 NOTE — TELEPHONE ENCOUNTER
----- Message from Pam Benitez sent at 4/11/2022  2:44 PM CDT -----  Contact: Self  Type:  Sooner Appointment Request    Caller is requesting a sooner appointment.  Caller declined first available appointment listed below.  Caller will not accept being placed on the waitlist and is requesting a message be sent to doctor.    Name of Caller:  Patient  When is the first available appointment?  6/16  Symptoms:  F/U  Best Call Back Number:  [phone] (home)   Additional Information:  Pt had to cancel the 4/11 appt and needs to reschedule sooner than 6/16, for his f/u. Thank You

## 2022-04-12 ENCOUNTER — HOSPITAL ENCOUNTER (OUTPATIENT)
Dept: RADIOLOGY | Facility: HOSPITAL | Age: 57
Discharge: HOME | End: 2022-04-12
Attending: ORTHOPAEDIC SURGERY
Payer: MEDICARE

## 2022-04-12 ENCOUNTER — ANESTHESIA (OUTPATIENT)
Dept: SURGERY | Facility: HOSPITAL | Age: 57
End: 2022-04-12
Payer: MEDICARE

## 2022-04-12 ENCOUNTER — HOSPITAL ENCOUNTER (OUTPATIENT)
Facility: HOSPITAL | Age: 57
Discharge: HOME | End: 2022-04-12
Attending: ORTHOPAEDIC SURGERY | Admitting: ORTHOPAEDIC SURGERY
Payer: MEDICARE

## 2022-04-12 DIAGNOSIS — M79.644 FINGER PAIN, RIGHT: ICD-10-CM

## 2022-04-12 DIAGNOSIS — M19.041 DEGENERATIVE ARTHRITIS OF METACARPOPHALANGEAL JOINT OF MIDDLE FINGER OF RIGHT HAND: ICD-10-CM

## 2022-04-12 DIAGNOSIS — M19.049 ARTHRITIS OF FINGER: ICD-10-CM

## 2022-04-12 LAB — GLUCOSE SERPL-MCNC: 164 MG/DL (ref 70–110)

## 2022-04-12 PROCEDURE — 37000009 HC ANESTHESIA EA ADD 15 MINS: Mod: PO | Performed by: ORTHOPAEDIC SURGERY

## 2022-04-12 PROCEDURE — 63600175 PHARM REV CODE 636 W HCPCS: Mod: PO | Performed by: ANESTHESIOLOGY

## 2022-04-12 PROCEDURE — 82962 GLUCOSE BLOOD TEST: CPT | Mod: PO | Performed by: ORTHOPAEDIC SURGERY

## 2022-04-12 PROCEDURE — 25000003 PHARM REV CODE 250: Mod: PO | Performed by: ANESTHESIOLOGY

## 2022-04-12 PROCEDURE — 76942 PR U/S GUIDANCE FOR NEEDLE GUIDANCE: ICD-10-PCS | Mod: 26,,, | Performed by: ANESTHESIOLOGY

## 2022-04-12 PROCEDURE — D9220A PRA ANESTHESIA: Mod: ANES,,, | Performed by: ANESTHESIOLOGY

## 2022-04-12 PROCEDURE — 25000003 PHARM REV CODE 250: Mod: PO | Performed by: NURSE ANESTHETIST, CERTIFIED REGISTERED

## 2022-04-12 PROCEDURE — D9220A PRA ANESTHESIA: ICD-10-PCS | Mod: ANES,,, | Performed by: ANESTHESIOLOGY

## 2022-04-12 PROCEDURE — 76000 FLUOROSCOPY <1 HR PHYS/QHP: CPT | Mod: TC,PO,59

## 2022-04-12 PROCEDURE — 63600175 PHARM REV CODE 636 W HCPCS: Mod: PO | Performed by: NURSE ANESTHETIST, CERTIFIED REGISTERED

## 2022-04-12 PROCEDURE — 26531 PR ARTHROPLASTY MC-P JT,IMPLANT: ICD-10-PCS | Mod: F7,,, | Performed by: ORTHOPAEDIC SURGERY

## 2022-04-12 PROCEDURE — 26531 REVISE KNUCKLE WITH IMPLANT: CPT | Mod: F7,,, | Performed by: ORTHOPAEDIC SURGERY

## 2022-04-12 PROCEDURE — 25000003 PHARM REV CODE 250: Mod: PO | Performed by: PHYSICIAN ASSISTANT

## 2022-04-12 PROCEDURE — C1776 JOINT DEVICE (IMPLANTABLE): HCPCS | Mod: PO | Performed by: ORTHOPAEDIC SURGERY

## 2022-04-12 PROCEDURE — D9220A PRA ANESTHESIA: Mod: CRNA,,, | Performed by: NURSE ANESTHETIST, CERTIFIED REGISTERED

## 2022-04-12 PROCEDURE — 64415 NJX AA&/STRD BRCH PLXS IMG: CPT | Mod: 59,RT,, | Performed by: ANESTHESIOLOGY

## 2022-04-12 PROCEDURE — 27201423 OPTIME MED/SURG SUP & DEVICES STERILE SUPPLY: Mod: PO | Performed by: ORTHOPAEDIC SURGERY

## 2022-04-12 PROCEDURE — 64415 NJX AA&/STRD BRCH PLXS IMG: CPT | Mod: PO | Performed by: ANESTHESIOLOGY

## 2022-04-12 PROCEDURE — C9290 INJ, BUPIVACAINE LIPOSOME: HCPCS | Mod: PO | Performed by: ANESTHESIOLOGY

## 2022-04-12 PROCEDURE — 37000008 HC ANESTHESIA 1ST 15 MINUTES: Mod: PO | Performed by: ORTHOPAEDIC SURGERY

## 2022-04-12 PROCEDURE — 76942 ECHO GUIDE FOR BIOPSY: CPT | Mod: 26,,, | Performed by: ANESTHESIOLOGY

## 2022-04-12 PROCEDURE — 64415 PR NERVE BLOCK INJ, ANES/STEROID, BRACHIAL PLEXUS, INCL IMAG GUIDANCE: ICD-10-PCS | Mod: 59,RT,, | Performed by: ANESTHESIOLOGY

## 2022-04-12 PROCEDURE — 71000033 HC RECOVERY, INTIAL HOUR: Mod: PO | Performed by: ORTHOPAEDIC SURGERY

## 2022-04-12 PROCEDURE — 63600175 PHARM REV CODE 636 W HCPCS: Mod: PO | Performed by: PHYSICIAN ASSISTANT

## 2022-04-12 PROCEDURE — D9220A PRA ANESTHESIA: ICD-10-PCS | Mod: CRNA,,, | Performed by: NURSE ANESTHETIST, CERTIFIED REGISTERED

## 2022-04-12 PROCEDURE — 36000709 HC OR TIME LEV III EA ADD 15 MIN: Mod: PO | Performed by: ORTHOPAEDIC SURGERY

## 2022-04-12 PROCEDURE — 36000708 HC OR TIME LEV III 1ST 15 MIN: Mod: PO | Performed by: ORTHOPAEDIC SURGERY

## 2022-04-12 PROCEDURE — 27200651 HC AIRWAY, LMA: Mod: PO | Performed by: ANESTHESIOLOGY

## 2022-04-12 RX ORDER — LIDOCAINE HCL/PF 100 MG/5ML
SYRINGE (ML) INTRAVENOUS
Status: DISCONTINUED | OUTPATIENT
Start: 2022-04-12 | End: 2022-04-12

## 2022-04-12 RX ORDER — FENTANYL CITRATE 50 UG/ML
50 INJECTION, SOLUTION INTRAMUSCULAR; INTRAVENOUS
Status: COMPLETED | OUTPATIENT
Start: 2022-04-12 | End: 2022-04-12

## 2022-04-12 RX ORDER — ONDANSETRON 8 MG/1
8 TABLET, ORALLY DISINTEGRATING ORAL EVERY 8 HOURS PRN
Qty: 3 TABLET | Refills: 0 | OUTPATIENT
Start: 2022-04-12 | End: 2022-05-27

## 2022-04-12 RX ORDER — MUPIROCIN 20 MG/G
OINTMENT TOPICAL
Status: DISCONTINUED | OUTPATIENT
Start: 2022-04-12 | End: 2022-04-12 | Stop reason: HOSPADM

## 2022-04-12 RX ORDER — KETOROLAC TROMETHAMINE 30 MG/ML
INJECTION, SOLUTION INTRAMUSCULAR; INTRAVENOUS
Status: DISCONTINUED | OUTPATIENT
Start: 2022-04-12 | End: 2022-04-12

## 2022-04-12 RX ORDER — ACETAMINOPHEN 10 MG/ML
INJECTION, SOLUTION INTRAVENOUS
Status: DISCONTINUED | OUTPATIENT
Start: 2022-04-12 | End: 2022-04-12

## 2022-04-12 RX ORDER — OXYCODONE HYDROCHLORIDE 5 MG/1
5 TABLET ORAL ONCE AS NEEDED
Status: COMPLETED | OUTPATIENT
Start: 2022-04-12 | End: 2022-04-12

## 2022-04-12 RX ORDER — PROPOFOL 10 MG/ML
VIAL (ML) INTRAVENOUS
Status: DISCONTINUED | OUTPATIENT
Start: 2022-04-12 | End: 2022-04-12

## 2022-04-12 RX ORDER — BUPIVACAINE HYDROCHLORIDE 5 MG/ML
INJECTION, SOLUTION EPIDURAL; INTRACAUDAL
Status: COMPLETED | OUTPATIENT
Start: 2022-04-12 | End: 2022-04-12

## 2022-04-12 RX ORDER — OXYCODONE HYDROCHLORIDE 5 MG/1
5 TABLET ORAL EVERY 4 HOURS PRN
Qty: 12 TABLET | Refills: 0 | Status: SHIPPED | OUTPATIENT
Start: 2022-04-12 | End: 2022-04-20 | Stop reason: SDUPTHER

## 2022-04-12 RX ORDER — SODIUM CHLORIDE 0.9 % (FLUSH) 0.9 %
3 SYRINGE (ML) INJECTION
Status: DISCONTINUED | OUTPATIENT
Start: 2022-04-12 | End: 2022-04-12 | Stop reason: HOSPADM

## 2022-04-12 RX ORDER — DEXAMETHASONE SODIUM PHOSPHATE 4 MG/ML
INJECTION, SOLUTION INTRA-ARTICULAR; INTRALESIONAL; INTRAMUSCULAR; INTRAVENOUS; SOFT TISSUE
Status: DISCONTINUED | OUTPATIENT
Start: 2022-04-12 | End: 2022-04-12

## 2022-04-12 RX ORDER — FENTANYL CITRATE 50 UG/ML
25 INJECTION, SOLUTION INTRAMUSCULAR; INTRAVENOUS EVERY 5 MIN PRN
Status: DISCONTINUED | OUTPATIENT
Start: 2022-04-12 | End: 2022-04-12 | Stop reason: HOSPADM

## 2022-04-12 RX ORDER — EPHEDRINE SULFATE 50 MG/ML
INJECTION, SOLUTION INTRAVENOUS
Status: DISCONTINUED | OUTPATIENT
Start: 2022-04-12 | End: 2022-04-12

## 2022-04-12 RX ORDER — KETAMINE HCL IN 0.9 % NACL 50 MG/5 ML
SYRINGE (ML) INTRAVENOUS
Status: DISCONTINUED | OUTPATIENT
Start: 2022-04-12 | End: 2022-04-12

## 2022-04-12 RX ORDER — LIDOCAINE HYDROCHLORIDE 10 MG/ML
1 INJECTION, SOLUTION EPIDURAL; INFILTRATION; INTRACAUDAL; PERINEURAL ONCE
Status: COMPLETED | OUTPATIENT
Start: 2022-04-12 | End: 2022-04-12

## 2022-04-12 RX ORDER — MIDAZOLAM HYDROCHLORIDE 1 MG/ML
2 INJECTION INTRAMUSCULAR; INTRAVENOUS ONCE
Status: COMPLETED | OUTPATIENT
Start: 2022-04-12 | End: 2022-04-12

## 2022-04-12 RX ORDER — ONDANSETRON 2 MG/ML
INJECTION INTRAMUSCULAR; INTRAVENOUS
Status: DISCONTINUED | OUTPATIENT
Start: 2022-04-12 | End: 2022-04-12

## 2022-04-12 RX ORDER — FENTANYL CITRATE 50 UG/ML
INJECTION, SOLUTION INTRAMUSCULAR; INTRAVENOUS
Status: DISCONTINUED | OUTPATIENT
Start: 2022-04-12 | End: 2022-04-12

## 2022-04-12 RX ORDER — METOCLOPRAMIDE HYDROCHLORIDE 5 MG/ML
10 INJECTION INTRAMUSCULAR; INTRAVENOUS EVERY 10 MIN PRN
Status: DISCONTINUED | OUTPATIENT
Start: 2022-04-12 | End: 2022-04-12 | Stop reason: HOSPADM

## 2022-04-12 RX ORDER — SODIUM CHLORIDE, SODIUM LACTATE, POTASSIUM CHLORIDE, CALCIUM CHLORIDE 600; 310; 30; 20 MG/100ML; MG/100ML; MG/100ML; MG/100ML
INJECTION, SOLUTION INTRAVENOUS CONTINUOUS
Status: DISCONTINUED | OUTPATIENT
Start: 2022-04-12 | End: 2022-04-12 | Stop reason: HOSPADM

## 2022-04-12 RX ADMIN — ACETAMINOPHEN 1000 MG: 10 INJECTION, SOLUTION INTRAVENOUS at 03:04

## 2022-04-12 RX ADMIN — KETOROLAC TROMETHAMINE 30 MG: 30 INJECTION, SOLUTION INTRAMUSCULAR at 03:04

## 2022-04-12 RX ADMIN — EPHEDRINE SULFATE 5 MG: 50 INJECTION INTRAVENOUS at 04:04

## 2022-04-12 RX ADMIN — FENTANYL CITRATE 50 MCG: 50 INJECTION, SOLUTION INTRAMUSCULAR; INTRAVENOUS at 01:04

## 2022-04-12 RX ADMIN — OXYCODONE 5 MG: 5 TABLET ORAL at 05:04

## 2022-04-12 RX ADMIN — LIDOCAINE HYDROCHLORIDE 10 MG: 10 INJECTION, SOLUTION EPIDURAL; INFILTRATION; INTRACAUDAL; PERINEURAL at 01:04

## 2022-04-12 RX ADMIN — SODIUM CHLORIDE, SODIUM LACTATE, POTASSIUM CHLORIDE, AND CALCIUM CHLORIDE: .6; .31; .03; .02 INJECTION, SOLUTION INTRAVENOUS at 04:04

## 2022-04-12 RX ADMIN — Medication 25 MG: at 03:04

## 2022-04-12 RX ADMIN — MUPIROCIN: 20 OINTMENT TOPICAL at 01:04

## 2022-04-12 RX ADMIN — FENTANYL CITRATE 25 MCG: 50 INJECTION, SOLUTION INTRAMUSCULAR; INTRAVENOUS at 04:04

## 2022-04-12 RX ADMIN — DEXTROSE 2 G: 50 INJECTION, SOLUTION INTRAVENOUS at 03:04

## 2022-04-12 RX ADMIN — MIDAZOLAM 2 MG: 1 INJECTION INTRAMUSCULAR; INTRAVENOUS at 01:04

## 2022-04-12 RX ADMIN — EPHEDRINE SULFATE 5 MG: 50 INJECTION INTRAVENOUS at 03:04

## 2022-04-12 RX ADMIN — FENTANYL CITRATE 50 MCG: 50 INJECTION, SOLUTION INTRAMUSCULAR; INTRAVENOUS at 03:04

## 2022-04-12 RX ADMIN — PROPOFOL 250 MG: 10 INJECTION, EMULSION INTRAVENOUS at 03:04

## 2022-04-12 RX ADMIN — ONDANSETRON 4 MG: 2 INJECTION INTRAMUSCULAR; INTRAVENOUS at 04:04

## 2022-04-12 RX ADMIN — BUPIVACAINE 15 ML: 13.3 INJECTION, SUSPENSION, LIPOSOMAL INFILTRATION at 01:04

## 2022-04-12 RX ADMIN — BUPIVACAINE HYDROCHLORIDE 15 ML: 5 INJECTION, SOLUTION EPIDURAL; INTRACAUDAL; PERINEURAL at 01:04

## 2022-04-12 RX ADMIN — DEXAMETHASONE SODIUM PHOSPHATE 4 MG: 4 INJECTION, SOLUTION INTRAMUSCULAR; INTRAVENOUS at 03:04

## 2022-04-12 RX ADMIN — SODIUM CHLORIDE, SODIUM LACTATE, POTASSIUM CHLORIDE, AND CALCIUM CHLORIDE: .6; .31; .03; .02 INJECTION, SOLUTION INTRAVENOUS at 01:04

## 2022-04-12 RX ADMIN — LIDOCAINE HYDROCHLORIDE 100 MG: 20 INJECTION, SOLUTION INTRAVENOUS at 03:04

## 2022-04-12 NOTE — OP NOTE
Gio Forrest  1965    DATE OF SURGERY: 4/12/2022     PRE-OPERATIVE DIAGNOSIS:  Right middle finger metacarpophalangeal joint arthritis    POST-OPERATIVE DIAGNOSIS:  Right middle finger metacarpophalangeal joint arthritis     ANESTHESIA TYPE:  General with a single-shot supraclavicular block    BLOOD LOSS:  Less than 10 cc    TOURNIQUET TIME:  34 minutes    SURGEON: Dr Payne    ASSISTANT: Shauna Green    PROCEDURE:  Right middle finger metacarpophalangeal joint arthroplasty    IMPLANTS:  [place] silicone MCP arthroplasty implant size 30     SPECIMENS:  None    INDICATION:     Mr. Forrest has a history of severe metacarpophalangeal joint arthritis of the right middle finger.  He was treated conservatively with anti-inflammatories and steroid injections.  He continued to have pain and limitation of function and therefore I discussed the possibility of joint arthroplasty.  Informed consent was then obtained.    PROCEDURE IN DETAIL:     Mr. Forrest was transported to the operating room and was placed supine on the operating room table. All appropriate points were padded. The right arm and hand was prepped and draped in the normal sterile fashion. Time out was called. The correct patient, correct operative site, correct procedure, antibiotic administration which consisted of 2 g of Ancef, and allergies to medications which are to Patient has no known allergies.  were reviewed. Time in was then called.     Attention was turned to the right middle finger.  A 5 cm incision was made over the dorsum of the metacarpophalangeal joint.  The incision was carried through the skin.  Subcutaneous tissues were dissected with tenotomy scissors.  The extensor mechanism was split in the midline.  The joint capsule was visualized.  The joint capsule was also incised longitudinally.  Joint surfaces were evaluated.  There is noted to be severe arthritis.  At this point the proximal starting point was established on the  metacarpal.  Starting point was confirmed under fluoroscopy.  The cutting guide was then placed down the shaft of the metacarpal.  With excellent positioning of the cutting guide a distal cut was made on the metacarpal.  This was removed with the collateral ligament remaining intact.  Attention was then turned to the proximal phalanx.  A cutting guide was position down the shaft of the proximal phalanx.  With excellent positioning of the cutting guide approximately 2-3 mm of proximal bone was resected.  This allowed for maintenance of the collateral ligaments.  The proximal phalanx was then broached sequentially up to a size 30 which had an excellent fit.  The metacarpal was then broached successfully up to a size 30 which also had an excellent fit.  The wound was then copiously irrigated.  A trial implant was implanted.  Finger was taken through range of motion there was noted to be excellent stability of the finger with full range of motion in approximately 20° of hyperextension.  The trial implant was then removed.  The wound was again irrigated.  The formal implant was selected in was implanted.  It also had very good fill of the canals and was stable.  There is noted to be full range of motion of the finger with 10-15 degrees of hyperextension noted.  At this point final fluoroscopic images were obtained.  There is noted be good positioning of the implant noted.  This point the tourniquet was let down.  Hemostasis was obtained.  Joint capsule was repaired with 3-0 Ethibond suture.  The extensor mechanism was repaired with 3-0 Vicryl suture.  The skin was then closed with combination of 3-0 Vicryl subcutaneous sutures and 3-0 nylon superficial sutures.  The wound was dressed with Xeroform, gauze padding, cast padding and a short-arm resting hand splint was placed.  Finger was kept in approximately 50-60 degrees of flexion at the MCP joint.    The patient was awakened from anesthesia and was transported to the  recovery room in stable condition. All lap, needle, sponge, and equipment counts were correct at the end of the case.    POST-OPERATIVE PLAN:     Patient will keep the splint in place full time until he follows up with me in 2 weeks.  At that time I will go to a Velcro brace and begin range of motion.

## 2022-04-12 NOTE — ANESTHESIA PREPROCEDURE EVALUATION
2022  Gio Forrest is a 56 y.o., male.      Pre-op Assessment    I have reviewed the Patient Summary Reports.     I have reviewed the Nursing Notes. I have reviewed the NPO Status.   I have reviewed the Medications.     Review of Systems  Anesthesia Hx:  No problems with previous Anesthesia    Social:  Former Smoker Smoking Status: Former Smoker - 3 pack years  Quit Smokin90  Smokeless Tobacco Status: Never Used  Alcohol use: No  Drug use: No       Cardiovascular:   Hypertension CAD   hyperlipidemia HENDERSON    Pulmonary:   Shortness of breath Sleep Apnea    Hepatic/GI:   Liver Disease, Hepatitis, C    Musculoskeletal:   Arthritis     Neurological:   Neuromuscular Disease,    Endocrine:   Diabetes    Psych:   Psychiatric History          Physical Exam  General: Cooperative, Alert, Well nourished and Oriented  Obesity  Airway:  Mallampati: II / II  Mouth Opening: Normal  TM Distance: 4 - 6 cm  Tongue: Normal    Dental:  Intact    Chest/Lungs:  Clear to auscultation, Normal Respiratory Rate    Heart:  Rate: Normal  Rhythm: Regular Rhythm  Sounds: Normal        Anesthesia Plan  Type of Anesthesia, risks & benefits discussed:    Anesthesia Type: Gen ETT  Intra-op Monitoring Plan: Standard ASA Monitors  Induction:  IV  Informed Consent: Informed consent signed with the Patient and all parties understand the risks and agree with anesthesia plan.  All questions answered.   ASA Score: 3    Ready For Surgery From Anesthesia Perspective.     .

## 2022-04-12 NOTE — ANESTHESIA PROCEDURE NOTES
Peripheral Block    Patient location during procedure: pre-op   Block not for primary anesthetic.  Reason for block: at surgeon's request and post-op pain management   Post-op Pain Location: Right middle finger MCP arthritis, Right middle finger MCP joint arthroplasty (Right Finger)   Start time: 4/12/2022 1:40 PM  Timeout: 4/12/2022 1:40 PM   End time: 4/12/2022 1:55 PM    Staffing  Authorizing Provider: Андрей Ocasio MD  Performing Provider: Андрей Ocasio MD    Preanesthetic Checklist  Completed: patient identified, IV checked, site marked, risks and benefits discussed, surgical consent, monitors and equipment checked, pre-op evaluation and timeout performed  Peripheral Block  Patient position: supine  Prep: ChloraPrep  Patient monitoring: cardiac monitor, heart rate, continuous pulse ox, continuous capnometry and frequent blood pressure checks  Block type: supraclavicular  Laterality: right  Injection technique: single shot  Needle  Needle type: Stimuplex   Needle gauge: 21 G  Needle localization: ultrasound guidance   -ultrasound image captured on disc.  Assessment  Injection assessment: negative parasthesia, local visualized surrounding nerve and negative aspiration  Paresthesia pain: none  Heart rate change: no  Slow fractionated injection: yes  Pain Tolerance: comfortable throughout block and no complaints  Medications:    Medications: bupivacaine (pf) (MARCAINE) injection 0.5% - Perineural   15 mL - 4/12/2022 1:45:00 PM    Additional Notes  EXPAREL

## 2022-04-12 NOTE — TRANSFER OF CARE
"Anesthesia Transfer of Care Note    Patient: Gio Forrest    Procedure(s) Performed: Procedure(s) (LRB):  Right middle finger MCP joint arthroplasty (Right)    Patient location: PACU    Anesthesia Type: general    Transport from OR: Transported from OR on room air with adequate spontaneous ventilation    Post pain: adequate analgesia    Post assessment: no apparent anesthetic complications and tolerated procedure well    Post vital signs: stable    Level of consciousness: sedated    Nausea/Vomiting: no nausea/vomiting    Complications: none    Transfer of care protocol was followed      Last vitals:   Visit Vitals  /62 (BP Location: Left arm, Patient Position: Lying)   Pulse (!) 58   Temp 36.7 °C (98.1 °F) (Skin)   Resp 16   Ht 6' 2" (1.88 m)   Wt 133.8 kg (295 lb)   SpO2 100%   BMI 37.88 kg/m²     "

## 2022-04-12 NOTE — PATIENT INSTRUCTIONS
Procedure:  Right middle finger metacarpophalangeal joint arthroplasty    1. Keep the splint clean, dry, and in place until follow-up. Do not take it off and do not get it wet.    2. Please keep the right upper extremity elevated for the 1st 24-48 hours to prevent further swelling    3. Flexion and extension of the exposed fingers is allowed but do not attempt to lift or push off with the right arm or hand    4. Pain medication and nausea medication have been prescribed. Please take them as necessary.    5. If there are any questions or concerns please call Dr. Payne's office at [phone]    6. Follow-up with Dr. Payne in 2 weeks

## 2022-04-13 VITALS
DIASTOLIC BLOOD PRESSURE: 52 MMHG | OXYGEN SATURATION: 95 % | WEIGHT: 295 LBS | BODY MASS INDEX: 37.86 KG/M2 | TEMPERATURE: 98 F | SYSTOLIC BLOOD PRESSURE: 110 MMHG | HEART RATE: 64 BPM | RESPIRATION RATE: 18 BRPM | HEIGHT: 74 IN

## 2022-04-13 NOTE — ANESTHESIA POSTPROCEDURE EVALUATION
Anesthesia Post Evaluation    Patient: Gio Forrest    Procedure(s) Performed: Procedure(s) (LRB):  Right middle finger MCP joint arthroplasty (Right)    Final Anesthesia Type: general      Patient location during evaluation: PACU  Patient participation: Yes- Able to Participate  Level of consciousness: awake and alert and oriented  Post-procedure vital signs: reviewed and stable  Pain management: adequate  Airway patency: patent    PONV status at discharge: No PONV  Anesthetic complications: no      Cardiovascular status: blood pressure returned to baseline  Respiratory status: unassisted, spontaneous ventilation and room air  Hydration status: euvolemic  Follow-up not needed.          Vitals Value Taken Time   /52 04/12/22 1727   Temp 36.7 °C (98.1 °F) 04/12/22 1658   Pulse 64 04/12/22 1727   Resp 18 04/12/22 1729   SpO2 95 % 04/12/22 1727         Event Time   Out of Recovery 17:27:55         Pain/Yvonne Score: Pain Rating Prior to Med Admin: 3 (4/12/2022  5:29 PM)  Yvonne Score: 7 (4/12/2022  4:58 PM)

## 2022-04-19 NOTE — DISCHARGE SUMMARY
[facility] - Surgery  Discharge Note  Short Stay    Procedure(s) (LRB):  Right middle finger MCP joint arthroplasty (Right)    OUTCOME: Patient tolerated treatment/procedure well without complication and is now ready for discharge.    DISPOSITION: Home or Self Care    FINAL DIAGNOSIS:  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    FOLLOWUP: In clinic    DISCHARGE INSTRUCTIONS:    Discharge Procedure Orders   Diet general     Activity as tolerated     Keep surgical extremity elevated     Lifting restrictions   Order Comments: Please do not lift or push off with the right arm or hand     Leave dressing on - Keep it clean, dry, and intact until clinic visit     Call MD for:  temperature >100.4     Call MD for:  persistent nausea and vomiting     Call MD for:  severe uncontrolled pain     Call MD for:  difficulty breathing, headache or visual disturbances     Call MD for:  redness, tenderness, or signs of infection (pain, swelling, redness, odor or green/yellow discharge around incision site)     Call MD for:  hives     Call MD for:  persistent dizziness or light-headedness     Call MD for:  extreme fatigue         Clinical Reference Documents Added to Patient Instructions       Document    OXYCODONE, ADULT (ENGLISH)          TIME SPENT ON DISCHARGE: 15 minutes

## 2022-04-20 ENCOUNTER — TELEPHONE (OUTPATIENT)
Dept: ORTHOPEDICS | Facility: CLINIC | Age: 57
End: 2022-04-20
Payer: MEDICARE

## 2022-04-20 DIAGNOSIS — M19.041 DEGENERATIVE ARTHRITIS OF METACARPOPHALANGEAL JOINT OF MIDDLE FINGER OF RIGHT HAND: ICD-10-CM

## 2022-04-20 DIAGNOSIS — Z98.890 STATUS POST SURGERY: Primary | ICD-10-CM

## 2022-04-20 RX ORDER — OXYCODONE HYDROCHLORIDE 5 MG/1
5 TABLET ORAL EVERY 8 HOURS PRN
Qty: 10 TABLET | Refills: 0 | Status: SHIPPED | OUTPATIENT
Start: 2022-04-20 | End: 2022-08-25

## 2022-04-20 NOTE — TELEPHONE ENCOUNTER
----- Message from Hanna Sanches LPN sent at 4/20/2022 11:51 AM CDT -----  Requesting refill after CMC arthroplasty sx   ----- Message -----  From: Rosaline Vieyra  Sent: 4/20/2022  11:42 AM CDT  To: [facility] Staff    Type:  RX Refill Request    Who Called:  Gio     Refill or New Rx    RX Name and Strength:oxyCODONE (ROXICODONE) 5 MG immediate release tablet    Preferred Pharmacy with phone number:[place] [address]    Local or Mail Order:local     Ordering Provider:Luis Alberto Payne MD     Best Call Back Number:[phone]

## 2022-04-21 ENCOUNTER — TELEPHONE (OUTPATIENT)
Dept: ORTHOPEDICS | Facility: CLINIC | Age: 57
End: 2022-04-21
Payer: MEDICARE

## 2022-04-21 NOTE — TELEPHONE ENCOUNTER
Returned call to pt informing a refill was sent to [facility] pharmacy in [facility] yesterday. Advised to return call to clinic.

## 2022-04-21 NOTE — TELEPHONE ENCOUNTER
----- Message from Ayesha Gutierrez sent at 4/21/2022  9:53 AM CDT -----  Regarding: Self/  [phone]  Type: RX Refill Request    Who Called:  Patient    Refill or New Rx:  Refill    RX Name and Strength:  oxyCODONE (ROXICODONE) 5 MG immediate release tablet    Preferred Pharmacy with phone number:  [facility] in [place], [place].  [phone]    Local or Mail Order:  Local    Ordering Provider:  BRANDON Payne    Would the patient rather a call back or a response via [facility]?  Call back    Best Call Back Number:  [phone]    Additional Information:   Patient is still in pain and needing refill today please.  Thank you

## 2022-04-25 ENCOUNTER — OFFICE VISIT (OUTPATIENT)
Dept: PRIMARY CARE CLINIC | Facility: CLINIC | Age: 57
End: 2022-04-25
Payer: MEDICARE

## 2022-04-25 VITALS
RESPIRATION RATE: 18 BRPM | TEMPERATURE: 98 F | BODY MASS INDEX: 39.06 KG/M2 | WEIGHT: 304.25 LBS | DIASTOLIC BLOOD PRESSURE: 68 MMHG | HEART RATE: 82 BPM | OXYGEN SATURATION: 96 % | SYSTOLIC BLOOD PRESSURE: 100 MMHG

## 2022-04-25 DIAGNOSIS — E66.01 SEVERE OBESITY (BMI 35.0-39.9) WITH COMORBIDITY: ICD-10-CM

## 2022-04-25 DIAGNOSIS — M10.00 IDIOPATHIC GOUT, UNSPECIFIED CHRONICITY, UNSPECIFIED SITE: ICD-10-CM

## 2022-04-25 DIAGNOSIS — Z00.00 ANNUAL PHYSICAL EXAM: Primary | ICD-10-CM

## 2022-04-25 DIAGNOSIS — B35.0 TINEA BARBAE: ICD-10-CM

## 2022-04-25 DIAGNOSIS — Z79.4 TYPE 2 DIABETES MELLITUS WITH HYPERGLYCEMIA, WITH LONG-TERM CURRENT USE OF INSULIN: ICD-10-CM

## 2022-04-25 DIAGNOSIS — K21.9 GASTROESOPHAGEAL REFLUX DISEASE: ICD-10-CM

## 2022-04-25 DIAGNOSIS — E11.65 TYPE 2 DIABETES MELLITUS WITH HYPERGLYCEMIA, WITH LONG-TERM CURRENT USE OF INSULIN: ICD-10-CM

## 2022-04-25 DIAGNOSIS — I10 ESSENTIAL HYPERTENSION: ICD-10-CM

## 2022-04-25 DIAGNOSIS — F33.0 MILD EPISODE OF RECURRENT MAJOR DEPRESSIVE DISORDER: ICD-10-CM

## 2022-04-25 DIAGNOSIS — Z98.890 HISTORY OF ARTHROSCOPY OF LEFT SHOULDER: ICD-10-CM

## 2022-04-25 DIAGNOSIS — G25.81 RESTLESS LEG SYNDROME: ICD-10-CM

## 2022-04-25 DIAGNOSIS — F63.0 ADDICTIVE GAMBLING: ICD-10-CM

## 2022-04-25 DIAGNOSIS — G47.33 OSA ON CPAP: ICD-10-CM

## 2022-04-25 PROCEDURE — 3046F HEMOGLOBIN A1C LEVEL >9.0%: CPT | Mod: CPTII,S$GLB,, | Performed by: NURSE PRACTITIONER

## 2022-04-25 PROCEDURE — 3078F PR MOST RECENT DIASTOLIC BLOOD PRESSURE < 80 MM HG: ICD-10-PCS | Mod: CPTII,S$GLB,, | Performed by: NURSE PRACTITIONER

## 2022-04-25 PROCEDURE — 3008F PR BODY MASS INDEX (BMI) DOCUMENTED: ICD-10-PCS | Mod: CPTII,S$GLB,, | Performed by: NURSE PRACTITIONER

## 2022-04-25 PROCEDURE — 3074F SYST BP LT 130 MM HG: CPT | Mod: CPTII,S$GLB,, | Performed by: NURSE PRACTITIONER

## 2022-04-25 PROCEDURE — 3008F BODY MASS INDEX DOCD: CPT | Mod: CPTII,S$GLB,, | Performed by: NURSE PRACTITIONER

## 2022-04-25 PROCEDURE — 3046F PR MOST RECENT HEMOGLOBIN A1C LEVEL > 9.0%: ICD-10-PCS | Mod: CPTII,S$GLB,, | Performed by: NURSE PRACTITIONER

## 2022-04-25 PROCEDURE — 3074F PR MOST RECENT SYSTOLIC BLOOD PRESSURE < 130 MM HG: ICD-10-PCS | Mod: CPTII,S$GLB,, | Performed by: NURSE PRACTITIONER

## 2022-04-25 PROCEDURE — 99214 OFFICE O/P EST MOD 30 MIN: CPT | Mod: S$GLB,,, | Performed by: NURSE PRACTITIONER

## 2022-04-25 PROCEDURE — 3078F DIAST BP <80 MM HG: CPT | Mod: CPTII,S$GLB,, | Performed by: NURSE PRACTITIONER

## 2022-04-25 PROCEDURE — 99214 PR OFFICE/OUTPT VISIT, EST, LEVL IV, 30-39 MIN: ICD-10-PCS | Mod: S$GLB,,, | Performed by: NURSE PRACTITIONER

## 2022-04-25 RX ORDER — CITALOPRAM 40 MG/1
40 TABLET, FILM COATED ORAL DAILY
Qty: 30 TABLET | Refills: 5 | Status: SHIPPED | OUTPATIENT
Start: 2022-04-25 | End: 2023-01-18

## 2022-04-25 RX ORDER — OXYCODONE AND ACETAMINOPHEN 10; 325 MG/1; MG/1
1 TABLET ORAL 3 TIMES DAILY PRN
COMMUNITY
Start: 2022-03-22 | End: 2022-04-25

## 2022-04-25 RX ORDER — PANTOPRAZOLE SODIUM 40 MG/1
40 TABLET, DELAYED RELEASE ORAL DAILY
Qty: 30 TABLET | Refills: 5 | Status: SHIPPED | OUTPATIENT
Start: 2022-04-25 | End: 2022-12-12

## 2022-04-25 RX ORDER — PRAMIPEXOLE DIHYDROCHLORIDE 1 MG/1
2 TABLET ORAL NIGHTLY
Qty: 60 TABLET | Refills: 5 | Status: SHIPPED | OUTPATIENT
Start: 2022-04-25 | End: 2022-05-25

## 2022-04-25 RX ORDER — KETOCONAZOLE 20 MG/G
CREAM TOPICAL 2 TIMES DAILY
Qty: 60 G | Refills: 1 | Status: SHIPPED | OUTPATIENT
Start: 2022-04-25 | End: 2022-05-25

## 2022-04-25 RX ORDER — ALLOPURINOL 100 MG/1
100 TABLET ORAL DAILY
Qty: 30 TABLET | Refills: 5 | Status: SHIPPED | OUTPATIENT
Start: 2022-04-25 | End: 2022-10-03

## 2022-04-25 NOTE — PROGRESS NOTES
"Subjective:       Patient ID: Gio Forrest is a 56 y.o. male.    Chief Complaint: Follow-up  Patient was last seen by me on 04/16/2021.  Last seen by PCP; Carly on 10/15/2020.  He is accompanied by his wife.  HPI Here for annual exam. He is discussing his current efforts to enter into a facility for his gambling problems and states concerned that his "arms and legs move all night in bed" and want to assure that the facility will allow him to continue medication for restless leg.   Vitals:    04/25/22 1305   BP: 100/68   Pulse: 82   Resp: 18   Temp: 97.6 °F (36.4 °C)     Review of Systems    Lab Results   Component Value Date    HGBA1C 9.1 (H) 03/17/2022     He states he is preparing to go to a addictive facility in the next few weeks for gambling. He states concerned about his medication and being on Mirapex due to the possible side effects.    Objective:      Physical Exam  Vitals and nursing note reviewed.   Constitutional:       General: He is awake.      Appearance: Normal appearance. He is well-developed and well-groomed. He is obese.   HENT:      Head: Normocephalic and atraumatic.        Comments: Small reddened area with slight circular appearance to bearded area. States notices after shaving beard off yesterday     Right Ear: Hearing, tympanic membrane, ear canal and external ear normal.      Left Ear: Hearing, tympanic membrane, ear canal and external ear normal.      Nose: Nose normal.      Mouth/Throat:      Lips: Pink.      Mouth: Mucous membranes are moist.      Pharynx: Oropharynx is clear. Uvula midline.   Cardiovascular:      Rate and Rhythm: Normal rate and regular rhythm.      Heart sounds: Normal heart sounds.   Pulmonary:      Effort: Pulmonary effort is normal.      Breath sounds: Normal breath sounds and air entry.   Abdominal:      General: Abdomen is protuberant. Bowel sounds are increased.      Palpations: Abdomen is soft.   Musculoskeletal:         General: Normal range of motion.    "     Arms:       Cervical back: Normal range of motion and neck supple.      Right lower leg: No edema.      Left lower leg: No edema.      Comments: Surgical dressing just above fingers and up to lower arm on his right; post procedure per Dr. Payne   Lymphadenopathy:      Head:      Right side of head: No submental, submandibular, tonsillar, preauricular, posterior auricular or occipital adenopathy.      Left side of head: No submental, submandibular, tonsillar, preauricular, posterior auricular or occipital adenopathy.      Cervical: No cervical adenopathy.   Skin:     General: Skin is warm and dry.      Findings: Rash present.      Comments: See photo of beard region   Neurological:      General: No focal deficit present.      Mental Status: He is alert and oriented to person, place, and time.   Psychiatric:         Attention and Perception: Attention and perception normal.         Mood and Affect: Mood and affect normal.         Speech: Speech normal.         Behavior: Behavior normal. Behavior is cooperative.         Thought Content: Thought content normal.         Cognition and Memory: Cognition and memory normal.         Judgment: Judgment normal.                 Assessment & Plan:       Annual physical exam    Type 2 diabetes mellitus with hyperglycemia, with long-term current use of insulin    Essential hypertension    Addictive gambling    Restless leg syndrome  -     pramipexole (MIRAPEX) 1 MG tablet; Take 2 tablets (2 mg total) by mouth every evening.  Dispense: 60 tablet; Refill: 5    TARYN on CPAP    Tinea barbae  -     ketoconazole (NIZORAL) 2 % cream; Apply topically 2 (two) times daily.  Dispense: 60 g; Refill: 1    Severe obesity (BMI 35.0-39.9) with comorbidity    History of arthroscopy of left shoulder    Idiopathic gout, unspecified chronicity, unspecified site  -     allopurinoL (ZYLOPRIM) 100 MG tablet; Take 1 tablet (100 mg total) by mouth once daily.  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  Dispense: 30 tablet; Refill: 5    Gastroesophageal reflux disease  -     pantoprazole (PROTONIX) 40 MG tablet; Take 1 tablet (40 mg total) by mouth once daily.  Dispense: 30 tablet; Refill: 5          Medication List with Changes/Refills   New Medications    KETOCONAZOLE (NIZORAL) 2 % CREAM    Apply topically 2 (two) times daily.   Current Medications    ASCORBIC ACID, VITAMIN C, (VITAMIN C) 500 MG TABLET    Take 500 mg by mouth once daily.    ASPIRIN (ECOTRIN) 81 MG EC TABLET    Take 81 mg by mouth once daily.    ATORVASTATIN (LIPITOR) 40 MG TABLET    Take 1 tablet (40 mg total) by mouth every evening.    BLOOD SUGAR DIAGNOSTIC (CONTOUR NEXT TEST STRIPS) STRP    Use to Test 4 times a day.  Contour Next test strips required for [employer] insulin pump    BLOOD-GLUCOSE METER KIT    To check BG 2 times daily, to use with insurance preferred meter    BLOOD-GLUCOSE METER,CONTINUOUS (DEXCOM G6 ) MISC    Dispense 1     BLOOD-GLUCOSE SENSOR (DEXCOM G6 SENSOR) MARK    Dispense 3 sensors/month    BLOOD-GLUCOSE TRANSMITTER (DEXCOM G6 TRANSMITTER) MARK    Dispense 1 transmitter    CARVEDILOL (COREG) 12.5 MG TABLET    Take 1 tablet (12.5 mg total) by mouth 2 (two) times daily with meals.    CHOLECALCIFEROL, VITAMIN D3, 125 MCG (5,000 UNIT) CAPSULE    Take 5,000 Units by mouth once daily.    DOCUSATE SODIUM (COLACE) 100 MG CAPSULE    Take 200 mg by mouth once daily.    GABAPENTIN (NEURONTIN) 300 MG CAPSULE    TAKE TWO CAPSULES BY MOUTH THREE TIMES DAILY    GLUCOSAM/GLUC PICKETT/[id number] (GLUCOSAMINE COMPLEX ORAL)    Take 1 tablet by mouth.    INFUSION SET FOR INSULIN PUMP (MINIMED PRO-SET INFUSION 24") ISET    Changes site q2days, dispense 90day supply    INSULIN (LANTUS SOLOSTAR U-100 INSULIN) GLARGINE 100 UNITS/ML (3ML) SUBQ PEN    Inject 50 Units into the skin every evening. " Use if pump malfunctions    INSULIN LISPRO (HUMALOG U-100 INSULIN) 100 UNIT/ML INJECTION    150 units daily with insulin pump    INSULIN LISPRO (HUMALOG U-100 INSULIN) 100 UNIT/ML INJECTION    Uses 120 units via insulin pump    INSULIN PUMP SYRINGE (MINIMED SYRINGE RESERVOIR) 3 ML MISC    Changes q2days, dispense 3 month supply    LANCETS MISC    To be used with Contour Next as necessary with [employer] insulin pump, uses 4 daily    MELOXICAM (MOBIC) 15 MG TABLET    Take 15 mg by mouth once daily.     METFORMIN (GLUCOPHAGE) 1000 MG TABLET    Take 1 tablet (1,000 mg total) by mouth 2 (two) times daily.    ONDANSETRON (ZOFRAN) 4 MG TABLET    Take 1 tablet (4 mg total) by mouth every 8 (eight) hours as needed for Nausea.    ONDANSETRON (ZOFRAN-ODT) 8 MG TBDL    Take 1 tablet (8 mg total) by mouth every 8 (eight) hours as needed (Nausea).    OXYCODONE (ROXICODONE) 5 MG IMMEDIATE RELEASE TABLET    Take 1 tablet (5 mg total) by mouth every 8 (eight) hours as needed for Pain.    PIOGLITAZONE (ACTOS) 15 MG TABLET    Take 1 tablet (15 mg total) by mouth once daily.    SEMAGLUTIDE (OZEMPIC) 1 MG/DOSE (4 MG/3 ML)    Inject 1 mg into the skin every 7 days.    VITAMIN E 400 UNIT CAPSULE    Take 400 Units by mouth once daily.   Changed and/or Refilled Medications    Modified Medication Previous Medication    ALLOPURINOL (ZYLOPRIM) 100 MG TABLET allopurinoL (ZYLOPRIM) 100 MG tablet       Take 1 tablet (100 mg total) by mouth once daily.    TAKE ONE TABLET BY MOUTH EVERY DAY    CITALOPRAM (CELEXA) 40 MG TABLET citalopram (CELEXA) 40 MG tablet       Take 1 tablet (40 mg total) by mouth once daily.    TAKE ONE TABLET BY MOUTH DAILY    PANTOPRAZOLE (PROTONIX) 40 MG TABLET pantoprazole (PROTONIX) 40 MG tablet       Take 1 tablet (40 mg total) by mouth once daily.    TAKE ONE TABLET BY MOUTH EVERY DAY    PRAMIPEXOLE (MIRAPEX) 1 MG TABLET pramipexole (MIRAPEX) 1 MG tablet       Take 2 tablets (2 mg total) by mouth every evening.    TAKE  TWO TABLETS BY MOUTH EVERY evening   Discontinued Medications    OXYCODONE-ACETAMINOPHEN (PERCOCET)  MG PER TABLET    Take 1 tablet by mouth 3 (three) times daily as needed.    TESTOSTERONE CYPIONATE (DEPOTESTOTERONE CYPIONATE) 200 MG/ML INJECTION    Inject 400 mg into the muscle every 28 days.      Follow up in about 4 months (around 8/25/2022), or if symptoms worsen or fail to improve.

## 2022-04-27 ENCOUNTER — HOSPITAL ENCOUNTER (OUTPATIENT)
Dept: RADIOLOGY | Facility: HOSPITAL | Age: 57
Discharge: HOME | End: 2022-04-27
Attending: ORTHOPAEDIC SURGERY
Payer: MEDICARE

## 2022-04-27 ENCOUNTER — PATIENT OUTREACH (OUTPATIENT)
Dept: ADMINISTRATIVE | Facility: OTHER | Age: 57
End: 2022-04-27
Payer: MEDICARE

## 2022-04-27 ENCOUNTER — OFFICE VISIT (OUTPATIENT)
Dept: ORTHOPEDICS | Facility: CLINIC | Age: 57
End: 2022-04-27
Payer: MEDICARE

## 2022-04-27 VITALS — WEIGHT: 304.25 LBS | HEIGHT: 74 IN | BODY MASS INDEX: 39.05 KG/M2

## 2022-04-27 DIAGNOSIS — M19.041 DEGENERATIVE ARTHRITIS OF METACARPOPHALANGEAL JOINT OF MIDDLE FINGER OF RIGHT HAND: Primary | ICD-10-CM

## 2022-04-27 DIAGNOSIS — M19.041 DEGENERATIVE ARTHRITIS OF METACARPOPHALANGEAL JOINT OF MIDDLE FINGER OF RIGHT HAND: ICD-10-CM

## 2022-04-27 PROCEDURE — 99999 PR PBB SHADOW E&M-EST. PATIENT-LVL IV: ICD-10-PCS | Mod: PBBFAC,,, | Performed by: ORTHOPAEDIC SURGERY

## 2022-04-27 PROCEDURE — 3046F HEMOGLOBIN A1C LEVEL >9.0%: CPT | Mod: CPTII,S$GLB,, | Performed by: ORTHOPAEDIC SURGERY

## 2022-04-27 PROCEDURE — 73130 XR HAND COMPLETE 3 VIEW RIGHT: ICD-10-PCS | Mod: 26,RT,, | Performed by: RADIOLOGY

## 2022-04-27 PROCEDURE — 3046F PR MOST RECENT HEMOGLOBIN A1C LEVEL > 9.0%: ICD-10-PCS | Mod: CPTII,S$GLB,, | Performed by: ORTHOPAEDIC SURGERY

## 2022-04-27 PROCEDURE — 99024 POSTOP FOLLOW-UP VISIT: CPT | Mod: S$GLB,,, | Performed by: ORTHOPAEDIC SURGERY

## 2022-04-27 PROCEDURE — 99024 PR POST-OP FOLLOW-UP VISIT: ICD-10-PCS | Mod: S$GLB,,, | Performed by: ORTHOPAEDIC SURGERY

## 2022-04-27 PROCEDURE — 99999 PR PBB SHADOW E&M-EST. PATIENT-LVL IV: CPT | Mod: PBBFAC,,, | Performed by: ORTHOPAEDIC SURGERY

## 2022-04-27 PROCEDURE — 73130 X-RAY EXAM OF HAND: CPT | Mod: TC,PO,RT

## 2022-04-27 PROCEDURE — 3008F BODY MASS INDEX DOCD: CPT | Mod: CPTII,S$GLB,, | Performed by: ORTHOPAEDIC SURGERY

## 2022-04-27 PROCEDURE — 1159F MED LIST DOCD IN RCRD: CPT | Mod: CPTII,S$GLB,, | Performed by: ORTHOPAEDIC SURGERY

## 2022-04-27 PROCEDURE — 73130 X-RAY EXAM OF HAND: CPT | Mod: 26,RT,, | Performed by: RADIOLOGY

## 2022-04-27 PROCEDURE — 1159F PR MEDICATION LIST DOCUMENTED IN MEDICAL RECORD: ICD-10-PCS | Mod: CPTII,S$GLB,, | Performed by: ORTHOPAEDIC SURGERY

## 2022-04-27 PROCEDURE — 3008F PR BODY MASS INDEX (BMI) DOCUMENTED: ICD-10-PCS | Mod: CPTII,S$GLB,, | Performed by: ORTHOPAEDIC SURGERY

## 2022-04-27 NOTE — PROGRESS NOTES
Chart was reviewed for overdue Proactive [facility] Encounters (DANITZA)  topics  Updates were requested from care everywhere  Health Maintenance has been updated  LINKS immunization registry triggered      
Detail Level: Zone

## 2022-04-27 NOTE — PROGRESS NOTES
Mr Forrest return to clinic today.  He is 2 weeks status post right middle finger MCP joint arthroplasty.  He is overall doing well.  He states that he is going to a rehab program in [place] tomorrow    Physical exam:  Examination of the right hand reveals the incision is healing well.  There are sutures in place.  He has only mild edema.  He does have sensation intact and capillary refill less than 2 seconds    Radiology:  X-rays of the right hand were taken in clinic today.  The films reviewed by me.  He is noted have a well-positioned MCP arthroplasty.  There are no other changes noted    Assessment:  Status post right middle finger MCP joint arthroplasty    Plan:    1. Sutures were removed today and Steri-Strips are placed    2. He was placed into a Velcro radial gutter splint    3. Will set him up with therapy to begin the post MCP arthroplasty protocol    4. Will follow up in 3 weeks for repeat evaluation with an x-ray of the right hand

## 2022-04-29 ENCOUNTER — TELEPHONE (OUTPATIENT)
Dept: PAIN MEDICINE | Facility: CLINIC | Age: 57
End: 2022-04-29
Payer: MEDICARE

## 2022-04-29 NOTE — TELEPHONE ENCOUNTER
Gayatri Martines, not sure why you called pt. Please call him again . Let me know if you need anything

## 2022-04-29 NOTE — TELEPHONE ENCOUNTER
----- Message from Rosaline Vieyra sent at 4/29/2022 11:36 AM CDT -----  Type:  Patient Returning Call    Who Called:Pt wife     Who Left Message for Patient:Bobbi     Does the patient know what this is regarding?: upcoming     Best Call Back Number:[phone]

## 2022-05-05 DIAGNOSIS — M19.041 DEGENERATIVE ARTHRITIS OF METACARPOPHALANGEAL JOINT OF MIDDLE FINGER OF RIGHT HAND: Primary | ICD-10-CM

## 2022-05-09 ENCOUNTER — TELEPHONE (OUTPATIENT)
Dept: ORTHOPEDICS | Facility: CLINIC | Age: 57
End: 2022-05-09
Payer: MEDICARE

## 2022-05-09 NOTE — TELEPHONE ENCOUNTER
----- Message from Cade Baig sent at 5/9/2022 12:47 PM CDT -----  Contact: pt at  [phone]  Type: Needs Medical Advice  Who Called: pt  Best Call Back Number: [phone]  Additional Information: pt is calling the office to get orders for home therapy. Please call back and advise.

## 2022-05-10 ENCOUNTER — TELEPHONE (OUTPATIENT)
Dept: FAMILY MEDICINE | Facility: CLINIC | Age: 57
End: 2022-05-10
Payer: MEDICARE

## 2022-05-10 DIAGNOSIS — E11.42 TYPE 2 DIABETES MELLITUS WITH DIABETIC POLYNEUROPATHY, WITH LONG-TERM CURRENT USE OF INSULIN: Primary | ICD-10-CM

## 2022-05-10 DIAGNOSIS — Z79.4 TYPE 2 DIABETES MELLITUS WITH DIABETIC POLYNEUROPATHY, WITH LONG-TERM CURRENT USE OF INSULIN: Primary | ICD-10-CM

## 2022-05-10 NOTE — TELEPHONE ENCOUNTER
----- Message from Ivon Carrasco sent at 5/10/2022  1:49 PM CDT -----  Regarding: advice  Contact: self  Type: Needs Medical Advice  Who Called:  wife- Gela Forrest   Symptoms (please be specific):    How long has patient had these symptoms:    Pharmacy name and phone #:    Best Call Back Number: [phone]  Additional Information: Patient need a referral to see neurologist with [facility] for restless leg syndrome. Fax [phone]

## 2022-05-12 ENCOUNTER — OFFICE VISIT (OUTPATIENT)
Dept: PODIATRY | Facility: CLINIC | Age: 57
End: 2022-05-12
Payer: MEDICARE

## 2022-05-12 ENCOUNTER — CLINICAL SUPPORT (OUTPATIENT)
Dept: REHABILITATION | Facility: HOSPITAL | Age: 57
End: 2022-05-12
Attending: ORTHOPAEDIC SURGERY
Payer: MEDICARE

## 2022-05-12 DIAGNOSIS — E66.01 SEVERE OBESITY (BMI 35.0-39.9) WITH COMORBIDITY: ICD-10-CM

## 2022-05-12 DIAGNOSIS — M19.041 DEGENERATIVE ARTHRITIS OF METACARPOPHALANGEAL JOINT OF MIDDLE FINGER OF RIGHT HAND: ICD-10-CM

## 2022-05-12 DIAGNOSIS — Z78.9 DECREASED ACTIVITIES OF DAILY LIVING (ADL): ICD-10-CM

## 2022-05-12 DIAGNOSIS — M79.641 RIGHT HAND PAIN: Primary | ICD-10-CM

## 2022-05-12 DIAGNOSIS — B35.1 ONYCHOMYCOSIS DUE TO DERMATOPHYTE: ICD-10-CM

## 2022-05-12 DIAGNOSIS — R20.2 PARESTHESIA OF FOOT, BILATERAL: ICD-10-CM

## 2022-05-12 DIAGNOSIS — E11.42 DIABETIC POLYNEUROPATHY ASSOCIATED WITH TYPE 2 DIABETES MELLITUS: Primary | ICD-10-CM

## 2022-05-12 DIAGNOSIS — R29.898 DECREASED GRIP STRENGTH OF RIGHT HAND: ICD-10-CM

## 2022-05-12 DIAGNOSIS — M25.641 DECREASED RANGE OF MOTION OF FINGER OF RIGHT HAND: ICD-10-CM

## 2022-05-12 DIAGNOSIS — R29.898 DECREASED PINCH STRENGTH: ICD-10-CM

## 2022-05-12 PROCEDURE — 97110 THERAPEUTIC EXERCISES: CPT | Mod: PO

## 2022-05-12 PROCEDURE — 99999 PR PBB SHADOW E&M-EST. PATIENT-LVL IV: CPT | Mod: PBBFAC,,, | Performed by: PODIATRIST

## 2022-05-12 PROCEDURE — 11721 PR DEBRIDEMENT OF NAILS, 6 OR MORE: ICD-10-PCS | Mod: Q9,S$GLB,, | Performed by: PODIATRIST

## 2022-05-12 PROCEDURE — 3046F PR MOST RECENT HEMOGLOBIN A1C LEVEL > 9.0%: ICD-10-PCS | Mod: CPTII,S$GLB,, | Performed by: PODIATRIST

## 2022-05-12 PROCEDURE — 1159F MED LIST DOCD IN RCRD: CPT | Mod: CPTII,S$GLB,, | Performed by: PODIATRIST

## 2022-05-12 PROCEDURE — 99213 PR OFFICE/OUTPT VISIT, EST, LEVL III, 20-29 MIN: ICD-10-PCS | Mod: 25,S$GLB,, | Performed by: PODIATRIST

## 2022-05-12 PROCEDURE — 3046F HEMOGLOBIN A1C LEVEL >9.0%: CPT | Mod: CPTII,S$GLB,, | Performed by: PODIATRIST

## 2022-05-12 PROCEDURE — 11721 DEBRIDE NAIL 6 OR MORE: CPT | Mod: Q9,S$GLB,, | Performed by: PODIATRIST

## 2022-05-12 PROCEDURE — 1159F PR MEDICATION LIST DOCUMENTED IN MEDICAL RECORD: ICD-10-PCS | Mod: CPTII,S$GLB,, | Performed by: PODIATRIST

## 2022-05-12 PROCEDURE — 99999 PR PBB SHADOW E&M-EST. PATIENT-LVL IV: ICD-10-PCS | Mod: PBBFAC,,, | Performed by: PODIATRIST

## 2022-05-12 PROCEDURE — 99213 OFFICE O/P EST LOW 20 MIN: CPT | Mod: 25,S$GLB,, | Performed by: PODIATRIST

## 2022-05-12 PROCEDURE — 97166 OT EVAL MOD COMPLEX 45 MIN: CPT | Mod: PO

## 2022-05-12 NOTE — PLAN OF CARE
[facility] Occupational Therapy  Initial Evaluation     Date: 5/12/2022  Patient: Gio Forrest  Chart Number: [id number]    Therapy Diagnosis: R  hand pain, decreased ROM R hand, decreased /pinch/ADL  Physician: Luis Alberto Payne MD     Physician Orders: Please begin therapy to the right hand.  Include post MCP joint arthroplasty protocol.  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   Precautions: Standard and Diabetes and post op precautions; weightbearing 1-2#     Date of  Surgery: 4/12/22   S/P: 4 weeks on 5/10/22     Subjective     Involved Side: Right   Dominant Side: Right   Date of Onset: chronic-began approx 2019   Mechanism of Injury/ History of Current Condition:   Onset of pain and swelling a few years prior. Pt had trigger fingers that were released. Over time, symptoms at MF increased despite injections and therapy. Sx was recommended. Pt presents without brace and states that MD told him he did not need to wear all the time.       Imaging: X rays: Postsurgical change with a joint prosthesis is suspected at the 3rd metacarpal head with osteolysis or postsurgical change at the metatarsal head with absence of the native articular surface.    Previous Therapy: none since surgery     Patient's Goals for Therapy:  To make a full fist again     Pain:  Functional Pain Scale Rating 0-10:   5/10 now  3/10 at best  6/10 at  worst  Location:  R MF PIPJ, MPJ hurts much less.   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     Current FunctionalStatus   Home/Living environment : lives with their spouse      Limitation of Functional Status as follows:   ADLs/IADLs: Difficulty with opening jars, ,    - Feeding: Difficulty olding utensils, glass, cutting food     - Bathing: I       - Dressing/Grooming: I     - Driving: I      Leisure: Fishing      Past Medical History/Physical Systems Review:   Gio Forrest  has a past medical history of Cardiomyopathy, Depression, Diabetes, Dyslipidemia, Gout, Hypertension, Idiopathic gout, Lumbar pseudoarthrosis, LINDSEY (nonalcoholic steatohepatitis), Obesity, Obstructive sleep apnea, Restless leg syndrome, Sebaceous cyst, and Type 2 diabetes mellitus.    Gio Forrest  has a past surgical history that includes Hernia repair; Shoulder arthroscopy; Cervical spine surgery; Spine surgery; Colonoscopy (2008); Tonsillectomy; Elbow surgery (Bilateral); Colonoscopy (N/A, 9/14/2017); Back surgery; Cardiac catheterization; Coronary angiography (Left, 5/28/2018); Left heart catheterization (Left, 5/28/2018); Laparoscopic cholecystectomy (N/A, 5/30/2018); Cholecystectomy (05/30/2018); Neck surgery; Laparoscopic appendectomy (N/A, 6/25/2020); Ablation (N/A, 10/8/2020); Treatment of cardiac arrhythmia (10/8/2020); Cardiac electrophysiology study (10/8/2020); Trigger finger release (Right, 9/9/2021); and Arthroplasty of joint of finger (Right, 4/12/2022).    Gio has a current medication list which includes the following prescription(s): allopurinol, ascorbic acid (vitamin c), aspirin, atorvastatin, blood sugar diagnostic, blood-glucose meter, dexcom g6 ,  dexcom g6 sensor, dexcom g6 transmitter, carvedilol, cholecalciferol (vitamin d3), citalopram, docusate sodium, gabapentin, glucosamine hcl,sulf/acetylglu, infusion set for insulin pump, lantus solostar u-100 insulin, insulin lispro, insulin lispro, insulin pump syringe, ketoconazole, lancets, meloxicam, metformin, ondansetron, ondansetron, oxycodone, pantoprazole, pioglitazone, pramipexole, ozempic, and vitamin e.    Review of patient's allergies indicates:  No Known Allergies       Objective     Observation/Inspection:  Presents without brace. Mild extension lag present.   Sensation: Pt denies parestheasias. Intact to light touch.    Scar No  tenderness to palpation. Scar is  mildly  thickened and raised, with mild  adherence. Incision is closed but distal end still has small scab.        Edema:            (in cm) L R   Proximal Wrist Crease     MPs 23.5 24.8   Long Proximal Phalanx 8.2 8.8            Range of Motion: right Active  (Ext/Flex) Index Middle Ring Small   MCP Jt 18/68° 28/60° /° /°   PIP Jt 3/89° 9/80° ° /°   DIP Jt 0/45° 10/46° /° /°   TRENT 131° 139 ° ° °   Tip to DPC  2.5° 3.75° 3.5° 3.5°                              Manual Muscle Test:  Not tested                                       and Pinch Strength: not tested at this time due to post operative status.      Special Tests: none performed          CMS Impairment/Limitation/Restriction for Quick DASH Survey    Therapist reviewed Quick DASH scores for Gio Forrest on 5/12/2022.   Quick DASH documents entered into [employer] - see Media section.    The Quick DASH Questionnaire- The following scores are based on patient reported assessment at the time of the initial occupational therapy evaluation:    Activity:  1. Open a tight jar: severe Difficulty  2. Do heavy household chores: unable Difficulty  3. Carry a shopping bag or briefcase: no Difficulty  4. Wash your back: no Difficulty  5.Use a knife to cut food:moderate Difficulty  6.. Recreational  activities requiring force through arm: moderate  Difficulty    7. Social Limitation:  Quite a bitLimited  8. Work/ADL Limitation: moderate Limited    Severity of Symptoms (over the past week)  9. Pain: severe  10. Tingling: none    11. Sleeping Limitation: no Difficulty    Limitation Score: 43.2 %         Treatment     Treatment Time In: 12:13 pm  Treatment Time Out: 12:28 pm  Total Treatment time separate from Evaluation time: 15 min     Gio received the following manual therapy techniques for 5 minutes:   -Retrograde massage to R digits/hand/wrist x 3 min to stimulate lymphatics to decrease pain,  edema and increase AROM and functional use.    -Performed scar massage to incision for 2 minutes to decrease adhesions and improve tensile glide.       Gio received therapeutic exercises for 10 minutes including:  -joint blocks to each finger 10x  -active wave to 60 degrees MP flexion with passive extension  -active fist to 60 degrees MP flexion with passive extension     Home Exercise Program/Education:  Issued HEP (see patient instructions in EMR) and educated on modality use for pain management . Exercises were reviewed and Gio was able to demonstrate them prior to the end of the session.   Pt received a written copy of exercises to perform at home. Gio demonstrated good  understanding of the education provided.  Pt was advised to perform these exercises free of pain, and to stop performing them if pain occurs.    Patient/Family Education: role of OT, goals for OT, scheduling/cancellations - pt verbalized understanding. Discussed insurance limitations with patient.    Additional Education provided: resuming splint wear between exercises to reduce extension lag     Assessment     Gio Forrest is a 56 y.o. male referred to outpatient occupational/hand therapy and presents with a medical diagnosis of 4 weekss/p R MF MPJ arthroplasty, resulting in  pain, edema, decreased A/PROM, strength, and  functional use of R dominant UE and demonstrates limitations as described in the chart below.     The patient's rehab potential is Good.     Anticipated barriers to occupational therapy: diabetes, has worn brace intermittently  Pt has no cultural, educational or language barriers to learning provided.    Profile and History Assessment of Occupational Performance Level of Clinical Decision Making Complexity Score   Occupational Profile:   Gio Forrest is a 56 y.o. male who lives with their spouse and is currently disabled. Gio Forrest has difficulty with  feeding  housework/household chores and fishing, grasping,lifting  affecting his/her daily functional abilities. His/her main goal for therapy is make a full fist again.     Comorbidities:    has a past medical history of Cardiomyopathy, Depression, Diabetes, Dyslipidemia, Gout, Hypertension, Idiopathic gout, Lumbar pseudoarthrosis, LINDSEY (nonalcoholic steatohepatitis), Obesity, Obstructive sleep apnea, Restless leg syndrome, Sebaceous cyst, and Type 2 diabetes mellitus.    Medical and Therapy History Review:   Expanded               Performance Deficits    Physical:  Joint Mobility  Joint Stability  Muscle Power/Strength  Muscle Endurance  Skin Integrity/Scar Formation  Edema   Strength  Pinch Strength  Fine Motor Coordination  Pain    Cognitive:  Safety Awareness/Insight to Disability    Psychosocial:    No Deficits     Clinical Decision Making:  moderate    Assessment Process:  Detailed Assessments    Modification/Need for Assistance:  Not Necessary    Intervention Selection:  Several Treatment Options       moderate  Based on PMHX, co morbidities , data from assessments and functional level of assistance required with task and clinical presentation directly impacting function.       The following goals were discussed with the patient and patient is in agreement with them as to be addressed in the treatment plan.     Goals:   Short Term Goals: (4  weeks)  1. Pt will be independent with HEP in 2 visits.  2. Pt will report decreased pain to a 3-4/10 at worst.   3. Pt will increase R IF and MF TRENT by 10-20  degrees to enable dressing, grooming activities.  4. Pt will make exhibit full composite flexion at uninvolved fingers to enable grasping and squeezing objects for self-care.    Long Term Goals: (by discharge)  1. Pt will report decreased pain to 1-2/10 with ADLs.   2. Pt will exhibit 230 TRENT at R MF to facilitate functional grasp.  3. Pt will exhibit R  strength at 75% of L comparative measures to enable firm grasp on utensils, tools, etc.   5. Pt will exhibit 9-12# of functional pinch strength to allow writing,opening containers, and turning keys.  6. Pt will exhibit a significant increase (20-30 points) in the Quick DASH assessment.  7. Pt will return to PLOF.   8. Minimize extension lag at MF.     Plan     Certification Period/Plan of care expiration: 5/12/2022 to 6/7/22.    Outpatient Occupational Therapy 3 times weekly for 8 weeks to include the following interventions:     Modalities to decrease pain (moist heat, paraffin, fluidotherapy, US ), edema control, scar mobilization, soft tissue mobilization, manual therapy/joint mobilizations,A/PROM, therapeutic exercises/activities, strengthening,, orthotic fitting/fabrication/training PRN, joint protections/energry conservation/adaptive equipment/activity modification HEP/education as well as any other treatments deemed necessary based on the patient's needs or progress.    Updates Next Visit: review HEP,     XAVIER Oconnor, CHT

## 2022-05-13 NOTE — PROGRESS NOTES
Subjective:      Patient ID: Gio Forrest is a 56 y.o. male.    Chief Complaint: Diabetic Foot Exam (Vera Danilo 4-25-22; A1c 9.1 3-17-22)    Gio is a 56 y.o. male who presents to the clinic for evaluation and treatment of diabetic feet. Gio has a past medical history of Cardiomyopathy, Depression, Diabetes, Dyslipidemia (08/12/2015), Gout (08/12/2015), Hypertension, Idiopathic gout, Lumbar pseudoarthrosis, LINDSEY (nonalcoholic steatohepatitis), Obesity (08/12/2015), Obstructive sleep apnea (08/12/2015), Restless leg syndrome, Sebaceous cyst (04/18/2017), and Type 2 diabetes mellitus (08/12/2015). Patient relates having toenails that are in need of trimming.  Denies experiencing pain from the nails with wearing shoe gear.  Has not attempted to self treat.  Notes paresthesias of bilateral lower extremity are worsening.  States symptoms are present throughout the day.  Has been taking gabapentin with no obvious improvement noted.  Continues to struggle with his blood glucose. Denies any additional pedal complaints.    PCP: Matthew Carroll MD  Date Last Seen: 4/22  Hemoglobin A1C   Date Value Ref Range Status   03/17/2022 9.1 (H) 4.0 - 5.6 % Final     Comment:     ADA Screening Guidelines:  5.7-6.4%  Consistent with prediabetes  >or=6.5%  Consistent with diabetes    High levels of fetal hemoglobin interfere with the HbA1C  assay. Heterozygous hemoglobin variants (HbS, HgC, etc)do  not significantly interfere with this assay.   However, presence of multiple variants may affect accuracy.     12/13/2021 9.9 (H) 4.0 - 5.6 % Final     Comment:     ADA Screening Guidelines:  5.7-6.4%  Consistent with prediabetes  >or=6.5%  Consistent with diabetes    High levels of fetal hemoglobin interfere with the HbA1C  assay. Heterozygous hemoglobin variants (HbS, HgC, etc)do  not significantly interfere with this assay.   However, presence of multiple variants may affect accuracy.     09/09/2021 10.7 (H) 4.0 - 5.6 % Final      Comment:     ADA Screening Guidelines:  5.7-6.4%  Consistent with prediabetes  >or=6.5%  Consistent with diabetes    High levels of fetal hemoglobin interfere with the HbA1C  assay. Heterozygous hemoglobin variants (HbS, HgC, etc)do  not significantly interfere with this assay.   However, presence of multiple variants may affect accuracy.             Past Medical History:   Diagnosis Date    Cardiomyopathy     Depression     Diabetes     Dyslipidemia 08/12/2015    Gout 08/12/2015    Hypertension     Idiopathic gout     Lumbar pseudoarthrosis     LINDSEY (nonalcoholic steatohepatitis)     Obesity 08/12/2015    Obstructive sleep apnea 08/12/2015    Restless leg syndrome     Sebaceous cyst 04/18/2017    Type 2 diabetes mellitus 08/12/2015       Past Surgical History:   Procedure Laterality Date    ABLATION N/A 10/8/2020    Procedure: Ablation;  Surgeon: Rip Che III, MD;  Location: [facility] CATH;  Service: Cardiology;  Laterality: N/A;    ARTHROPLASTY OF JOINT OF FINGER Right 4/12/2022    Procedure: Right middle finger MCP joint arthroplasty;  Surgeon: Luis Alberto Payne MD;  Location: [facility] OR;  Service: Orthopedics;  Laterality: Right;  Anesthesia:  General with a single-shot supraclavicular block    BACK SURGERY      x2    CARDIAC CATHETERIZATION      CARDIAC ELECTROPHYSIOLOGY STUDY  10/8/2020    Procedure: Study possible ablation;  Surgeon: Rip Che III, MD;  Location: [facility] CATH;  Service: Cardiology;;    CERVICAL SPINE SURGERY      CHOLECYSTECTOMY  05/30/2018    Dr. ROGELIO Tao, [facility]     COLONOSCOPY  2008    COLONOSCOPY N/A 9/14/2017    Procedure: COLONOSCOPY;  Surgeon: Obi Beltre MD;  Location: [facility] ENDO;  Service: Endoscopy;  Laterality: N/A;    CORONARY ANGIOGRAPHY Left 5/28/2018    Procedure: Coronary angiography;  Surgeon: Rafiq Malik MD;  Location: [facility] CATH;  Service: Cardiology;  Laterality: Left;    ELBOW SURGERY Bilateral     HERNIA REPAIR       LAPAROSCOPIC APPENDECTOMY N/A 2020    Procedure: APPENDECTOMY, LAPAROSCOPIC;  Surgeon: Gordon Can MD;  Location: [facility] OR;  Service: General;  Laterality: N/A;    LAPAROSCOPIC CHOLECYSTECTOMY N/A 2018    Procedure: CHOLECYSTECTOMY, LAPAROSCOPIC;  Surgeon: Fletcher Tao MD;  Location: [facility] OR;  Service: General;  Laterality: N/A;    LEFT HEART CATHETERIZATION Left 2018    Procedure: Left heart cath;  Surgeon: Rafiq Malik MD;  Location: [facility] CATH;  Service: Cardiology;  Laterality: Left;    NECK SURGERY      SHOULDER ARTHROSCOPY      SPINE SURGERY      lumbar x2    TONSILLECTOMY      TREATMENT OF CARDIAC ARRHYTHMIA  10/8/2020    Procedure: Cardioversion or Defibrillation;  Surgeon: Rip Che III, MD;  Location: [facility] CATH;  Service: Cardiology;;    TRIGGER FINGER RELEASE Right 2021    Procedure: RELEASE, TRIGGER FINGER;  Surgeon: Luis Alberto Payne MD;  Location: [facility] OR;  Service: Orthopedics;  Laterality: Right;  Procedure: Right ring finger trigger release    Position: Supine    Anesthesia: Local    Equipment: Basic handset       Family History   Problem Relation Age of Onset    Diabetes Mother     Depression Mother     Liver cancer Mother     Obesity Mother     Heart disease Father     Anuerysm Father     Diabetes Father     Stroke Father     Glaucoma Paternal Grandmother     Obesity Sister        Social History     Socioeconomic History    Marital status:     Number of children: 1   Tobacco Use    Smoking status: Former Smoker     Packs/day: 0.50     Years: 6.00     Pack years: 3.00     Types: Cigarettes     Quit date:      Years since quittin.3    Smokeless tobacco: Never Used   Substance and Sexual Activity    Alcohol use: No     Alcohol/week: 0.0 standard drinks    Drug use: No    Sexual activity: Yes     Partners: Female   Social History Narrative              Social Determinants of Health     Financial Resource  Strain: Medium Risk    Difficulty of Paying Living Expenses: Somewhat hard   Food Insecurity: Food Insecurity Present    Worried About Running Out of Food in the Last Year: Often true    Ran Out of Food in the Last Year: Often true   Transportation Needs: Unmet Transportation Needs    Lack of Transportation (Medical): Yes    Lack of Transportation (Non-Medical): Yes   Physical Activity: Insufficiently Active    Days of Exercise per Week: 4 days    Minutes of Exercise per Session: 10 min   Stress: Stress Concern Present    Feeling of Stress : To some extent   Social Connections: Socially Isolated    Frequency of Communication with Friends and Family: Once a week    Frequency of Social Gatherings with Friends and Family: Once a week    Attends Buddhism Services: Never    Active Member of Clubs or Organizations: No    Attends Club or Organization Meetings: Never    Marital Status:        Current Outpatient Medications   Medication Sig Dispense Refill    allopurinoL (ZYLOPRIM) 100 MG tablet Take 1 tablet (100 mg total) by mouth once daily. 30 tablet 5    ascorbic acid, vitamin C, (VITAMIN C) 500 MG tablet Take 500 mg by mouth once daily.      aspirin (ECOTRIN) 81 MG EC tablet Take 81 mg by mouth once daily.      atorvastatin (LIPITOR) 40 MG tablet Take 1 tablet (40 mg total) by mouth every evening. 90 tablet 4    blood sugar diagnostic (CONTOUR NEXT TEST STRIPS) Strp Use to Test 4 times a day.  Contour Next test strips required for Medtronic insulin pump 400 strip 4    blood-glucose meter,continuous (DEXCOM G6 ) Misc Dispense 1  1 each 0    blood-glucose sensor (DEXCOM G6 SENSOR) Oxana Dispense 3 sensors/month 3 each 12    blood-glucose transmitter (DEXCOM G6 TRANSMITTER) Oxana Dispense 1 transmitter 1 each 3    carvediloL (COREG) 12.5 MG tablet Take 1 tablet (12.5 mg total) by mouth 2 (two) times daily with meals. 180 tablet 0    cholecalciferol, vitamin D3, 125 mcg (5,000  "unit) capsule Take 5,000 Units by mouth once daily.      citalopram (CELEXA) 40 MG tablet Take 1 tablet (40 mg total) by mouth once daily. 30 tablet 5    docusate sodium (COLACE) 100 MG capsule Take 200 mg by mouth once daily.      gabapentin (NEURONTIN) 300 MG capsule TAKE TWO CAPSULES BY MOUTH THREE TIMES DAILY 360 capsule 5    glucosam/gluc cruz/nj-dqd-h-gluc (GLUCOSAMINE COMPLEX ORAL) Take 1 tablet by mouth.      infusion set for insulin pump (MINIMED PRO-SET INFUSION 24") ISet Changes site q2days, dispense 90day supply 45 each 4    insulin (LANTUS SOLOSTAR U-100 INSULIN) glargine 100 units/mL (3mL) SubQ pen Inject 50 Units into the skin every evening. Use if pump malfunctions 1 Box 0    insulin lispro (HUMALOG U-100 INSULIN) 100 unit/mL injection 150 units daily with insulin pump 14 mL 1    insulin lispro (HUMALOG U-100 INSULIN) 100 unit/mL injection Uses 120 units via insulin pump 40 mL 6    insulin pump syringe (MINIMED SYRINGE RESERVOIR) 3 mL Misc Changes q2days, dispense 3 month supply 45 each 4    ketoconazole (NIZORAL) 2 % cream Apply topically 2 (two) times daily. 60 g 1    lancets Misc To be used with Contour Next as necessary with Skype insulin pump, uses 4 daily 400 each 4    meloxicam (MOBIC) 15 MG tablet Take 15 mg by mouth once daily.       metFORMIN (GLUCOPHAGE) 1000 MG tablet Take 1 tablet (1,000 mg total) by mouth 2 (two) times daily. 180 tablet 4    ondansetron (ZOFRAN) 4 MG tablet Take 1 tablet (4 mg total) by mouth every 8 (eight) hours as needed for Nausea. 20 tablet 0    ondansetron (ZOFRAN-ODT) 8 MG TbDL Take 1 tablet (8 mg total) by mouth every 8 (eight) hours as needed (Nausea). 3 tablet 0    oxyCODONE (ROXICODONE) 5 MG immediate release tablet Take 1 tablet (5 mg total) by mouth every 8 (eight) hours as needed for Pain. 10 tablet 0    pantoprazole (PROTONIX) 40 MG tablet Take 1 tablet (40 mg total) by mouth once daily. 30 tablet 5    pioglitazone (ACTOS) 15 MG tablet " Take 1 tablet (15 mg total) by mouth once daily. 90 tablet 4    pramipexole (MIRAPEX) 1 MG tablet Take 2 tablets (2 mg total) by mouth every evening. 60 tablet 5    semaglutide (OZEMPIC) 1 mg/dose (4 mg/3 mL) Inject 1 mg into the skin every 7 days. 3 pen 4    vitamin E 400 UNIT capsule Take 400 Units by mouth once daily.      blood-glucose meter kit To check BG 2 times daily, to use with insurance preferred meter 1 each 1     No current facility-administered medications for this visit.       Review of patient's allergies indicates:  No Known Allergies      Review of Systems   Constitutional: Negative for chills and fever.   Cardiovascular: Negative for claudication and leg swelling.   Skin: Positive for color change and nail changes.   Musculoskeletal: Positive for joint pain and joint swelling. Negative for muscle cramps, muscle weakness and myalgias.   Neurological: Positive for numbness and paresthesias.   Psychiatric/Behavioral: Negative for altered mental status.           Objective:      Physical Exam  Constitutional:       General: He is not in acute distress.     Appearance: He is well-developed. He is not diaphoretic.   Cardiovascular:      Pulses:           Dorsalis pedis pulses are 2+ on the right side and 2+ on the left side.        Posterior tibial pulses are 2+ on the right side and 2+ on the left side.      Comments: CFT is < 3 seconds bilateral.  Pedal hair growth is decreased bilateral.  No varicosities noted bilateral.  No lower extremity edema noted bilateral. Toes are cool to touch bilateral.    Musculoskeletal:         General: Tenderness present.      Left lower leg: No edema.      Comments: Muscle strength 5/5 in all muscle groups bilateral.  No tenderness nor crepitation with ROM of foot/ankle joints bilateral.  No pain with palpation of bilateral foot and ankle.  Bilateral pes planus foot type.  Bilateral hallux abducto valgus.  Bilateral semi-reducible contracture of toe 2-5.      Skin:     General: Skin is warm and dry.      Capillary Refill: Capillary refill takes 2 to 3 seconds.      Coloration: Skin is not pale.      Findings: No abrasion, bruising, burn, ecchymosis, erythema, laceration, lesion or petechiae.      Comments: Pedal skin appears thin bilateral.  Toenails x 10 appear thickened by 2 mm, elongated by 4 mm, and discolored with subungual debris. Examination of the skin reveals no evidence of significant maceration, rashes, open lesions, suspicious appearing nevi or other concerning lesions.    Neurological:      Mental Status: He is alert and oriented to person, place, and time.      Sensory: Sensory deficit present.      Motor: No weakness or atrophy.      Comments: Protective sensation per Jean-Love monofilament is decreased bilateral.  Vibratory sensation is intact bilateral.  Light touch is intact bilateral.               Assessment:       Encounter Diagnoses   Name Primary?    Diabetic polyneuropathy associated with type 2 diabetes mellitus Yes    Paresthesia of foot, bilateral     Onychomycosis due to dermatophyte     Severe obesity (BMI 35.0-39.9) with comorbidity          Plan:       Gio was seen today for diabetic foot exam.    Diagnoses and all orders for this visit:    Diabetic polyneuropathy associated with type 2 diabetes mellitus  -     Ambulatory referral/consult to Neurology; Future    Paresthesia of foot, bilateral  -     Ambulatory referral/consult to Neurology; Future    Onychomycosis due to dermatophyte    Severe obesity (BMI 35.0-39.9) with comorbidity      I counseled the patient on his conditions, their implications and medical management.    Referral written for Neurology, as gabapentin is no longer helpful in managing lower extremity paresthesias.      Discussed the importance of diet and exercise as they pertain to diabetes management and maintaining a healthy BMI.    Shoe inspection. Diabetic Foot Education. Patient reminded of the  importance of good nutrition and blood sugar control to help prevent podiatric complications of diabetes. Patient instructed on proper foot hygeine. We discussed wearing proper shoe gear, daily foot inspections, never walking without protective shoe gear, never putting sharp instruments to feet    With patient's permission, nails were aggressively reduced and debrided x 10 to their soft tissue attachment mechanically and with electric , removing all offending nail and debris.  Patient relates relief following the procedure. He will continue to monitor the areas daily, inspect his feet, wear protective shoe gear when ambulatory, moisturizer to maintain skin integrity and follow in this office in approximately 3 months, sooner p.r.n.    Rj Nuñez DPM

## 2022-05-14 PROBLEM — Z78.9 DIFFICULTY WITH CPAP USE: Status: ACTIVE | Noted: 2018-03-02

## 2022-05-14 PROBLEM — G47.30 HYPERSOMNIA WITH SLEEP APNEA: Status: ACTIVE | Noted: 2022-05-14

## 2022-05-14 PROBLEM — J34.3 NASAL TURBINATE HYPERTROPHY: Status: ACTIVE | Noted: 2018-01-31

## 2022-05-14 PROBLEM — G47.10 HYPERSOMNIA WITH SLEEP APNEA: Status: ACTIVE | Noted: 2022-05-14

## 2022-05-14 PROBLEM — I11.9 BENIGN HYPERTENSIVE HEART DISEASE: Status: ACTIVE | Noted: 2022-05-14

## 2022-05-14 PROBLEM — G47.19 EXCESSIVE DAYTIME SLEEPINESS: Status: ACTIVE | Noted: 2018-03-02

## 2022-05-14 PROBLEM — N40.1 BENIGN PROSTATIC HYPERPLASIA WITH LOWER URINARY TRACT SYMPTOMS: Status: ACTIVE | Noted: 2020-03-08

## 2022-05-14 PROBLEM — G25.81 RESTLESS LEGS: Status: ACTIVE | Noted: 2020-03-08

## 2022-05-18 ENCOUNTER — HOSPITAL ENCOUNTER (OUTPATIENT)
Dept: RADIOLOGY | Facility: HOSPITAL | Age: 57
Discharge: HOME | End: 2022-05-18
Attending: ORTHOPAEDIC SURGERY
Payer: MEDICARE

## 2022-05-18 ENCOUNTER — CLINICAL SUPPORT (OUTPATIENT)
Dept: REHABILITATION | Facility: HOSPITAL | Age: 57
End: 2022-05-18
Attending: ORTHOPAEDIC SURGERY
Payer: MEDICARE

## 2022-05-18 ENCOUNTER — OFFICE VISIT (OUTPATIENT)
Dept: ORTHOPEDICS | Facility: CLINIC | Age: 57
End: 2022-05-18
Payer: MEDICARE

## 2022-05-18 DIAGNOSIS — Z78.9 DECREASED ACTIVITIES OF DAILY LIVING (ADL): ICD-10-CM

## 2022-05-18 DIAGNOSIS — M19.041 DEGENERATIVE ARTHRITIS OF METACARPOPHALANGEAL JOINT OF MIDDLE FINGER OF RIGHT HAND: ICD-10-CM

## 2022-05-18 DIAGNOSIS — M25.641 DECREASED RANGE OF MOTION OF FINGER OF RIGHT HAND: Primary | ICD-10-CM

## 2022-05-18 DIAGNOSIS — M79.641 RIGHT HAND PAIN: ICD-10-CM

## 2022-05-18 DIAGNOSIS — M19.041 DEGENERATIVE ARTHRITIS OF METACARPOPHALANGEAL JOINT OF MIDDLE FINGER OF RIGHT HAND: Primary | ICD-10-CM

## 2022-05-18 DIAGNOSIS — R29.898 DECREASED GRIP STRENGTH OF RIGHT HAND: ICD-10-CM

## 2022-05-18 DIAGNOSIS — R29.898 DECREASED PINCH STRENGTH: ICD-10-CM

## 2022-05-18 PROCEDURE — 73130 X-RAY EXAM OF HAND: CPT | Mod: 26,RT,, | Performed by: RADIOLOGY

## 2022-05-18 PROCEDURE — 1160F PR REVIEW ALL MEDS BY PRESCRIBER/CLIN PHARMACIST DOCUMENTED: ICD-10-PCS | Mod: CPTII,S$GLB,, | Performed by: ORTHOPAEDIC SURGERY

## 2022-05-18 PROCEDURE — 3046F PR MOST RECENT HEMOGLOBIN A1C LEVEL > 9.0%: ICD-10-PCS | Mod: CPTII,S$GLB,, | Performed by: ORTHOPAEDIC SURGERY

## 2022-05-18 PROCEDURE — 73130 XR HAND COMPLETE 3 VIEW RIGHT: ICD-10-PCS | Mod: 26,RT,, | Performed by: RADIOLOGY

## 2022-05-18 PROCEDURE — 73130 X-RAY EXAM OF HAND: CPT | Mod: TC,PO,RT

## 2022-05-18 PROCEDURE — 3046F HEMOGLOBIN A1C LEVEL >9.0%: CPT | Mod: CPTII,S$GLB,, | Performed by: ORTHOPAEDIC SURGERY

## 2022-05-18 PROCEDURE — 99999 PR PBB SHADOW E&M-EST. PATIENT-LVL III: ICD-10-PCS | Mod: PBBFAC,,, | Performed by: ORTHOPAEDIC SURGERY

## 2022-05-18 PROCEDURE — 1159F PR MEDICATION LIST DOCUMENTED IN MEDICAL RECORD: ICD-10-PCS | Mod: CPTII,S$GLB,, | Performed by: ORTHOPAEDIC SURGERY

## 2022-05-18 PROCEDURE — 1159F MED LIST DOCD IN RCRD: CPT | Mod: CPTII,S$GLB,, | Performed by: ORTHOPAEDIC SURGERY

## 2022-05-18 PROCEDURE — 97140 MANUAL THERAPY 1/> REGIONS: CPT | Mod: PO

## 2022-05-18 PROCEDURE — 97022 WHIRLPOOL THERAPY: CPT | Mod: PO

## 2022-05-18 PROCEDURE — 97110 THERAPEUTIC EXERCISES: CPT | Mod: PO

## 2022-05-18 PROCEDURE — 1160F RVW MEDS BY RX/DR IN RCRD: CPT | Mod: CPTII,S$GLB,, | Performed by: ORTHOPAEDIC SURGERY

## 2022-05-18 PROCEDURE — 99999 PR PBB SHADOW E&M-EST. PATIENT-LVL III: CPT | Mod: PBBFAC,,, | Performed by: ORTHOPAEDIC SURGERY

## 2022-05-18 PROCEDURE — 99024 PR POST-OP FOLLOW-UP VISIT: ICD-10-PCS | Mod: S$GLB,,, | Performed by: ORTHOPAEDIC SURGERY

## 2022-05-18 PROCEDURE — 99024 POSTOP FOLLOW-UP VISIT: CPT | Mod: S$GLB,,, | Performed by: ORTHOPAEDIC SURGERY

## 2022-05-18 NOTE — PROGRESS NOTES
Occupational Therapy Daily Treatment Note     Name: Gio Forrest  [facility] Number: [id number]    Therapy Diagnosis:   Encounter Diagnoses   Name Primary?    Decreased range of motion of finger of right hand Yes    Right hand pain     Decreased  strength of right hand     Decreased pinch strength     Decreased activities of daily living (ADL)      Physician: Luis Alberto Payne MD    Physician orders: Please begin therapy to the right hand.  Include post MCP joint arthroplasty protocol.  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   Precautions: Standard and Diabetes and post op precautions; weightbearing 1-2#      Date of  Surgery: 4/12/22                              S/P: 4 weeks on 5/10/22        Subjective     Pt reports: No new symptoms or complaints  he was compliant with HEP.   Response to previous treatment:Very goood  Functional change: None reported today    Pain:  Functional Pain Scale Rating 0-10:   4/10 now  2/10 at best  5/10 at worst  Location:  R MF PIPJ, MPJ hurts much less.   Description: Burning and Tight  Aggravating Factors: bending and use   Easing Factors: rest and wearing brace     Objective   MEASUREMENTS  Edema:  5/18/2022          (in cm) L R   Proximal Wrist Crease       MPs 23.0 24.0   Long Proximal Phalanx 8.2 8.8     5/18/2022: MCP ext/flex: -15 (+11) / 66 degrees (+6) (and 68 degrees post all tx today)    TREATMENT  Gio received the following supervised modalities  after being cleared for contradictions for 15 minutes:   -Fluidotheray at 115 degrees to R hand/wrist.    Gio received the following direct contact modalities after being cleared for contraindications for 0 minutes:  -NT    Gio received the following manual therapy techniques for 8 minutes:   -Scar massage, gentle scar mob and retrograde massage 8 min total.    Gio received therapeutic exercises for 15 minutes including:  - straight to hook 2x20  - staright to wave 2x20  - straight to composite 2x20  - fingerlifts 2x20  - fingerspreads 2x20    Gio participated in dynamic functional therapeutic activities to improve functional performance for 2  minutes, including:  -  Isospheres 2 min    Home Exercises and Education Provided     Education provided:   -  Cont per previous and can make composite fist as tolerated at this time.    Written Home Exercises Provided:  Patient instructed to cont prior HEP.    Exercises were reviewed and Gio was able to demonstrate them prior to the end of the session.  Gio demonstrated good  understanding of the education provided.     See EMR under Media for exercises provided today and/or prior visit.        Assessment     Pt would continue to benefit from skilled OT. Excellent response to intiial tx; increase ext/flex AROM and decreased edema / decreased pain  Cont per current poc.     - Progress towards goals:  STG #1 has been met.    Gio is progressing well towards his goals . Pt continues with good rehab potential.     Pt will continue to benefit from skilled outpatient occupational therapy to address the deficits listed in the problem list on initial evaluation in order to maximize pt's level of independence in the home and community.     Anticipated barriers to occupational therapy: None    Pt's spiritual, cultural and educational needs considered and pt agreeable to plan of care and goals.    Goals:   Short Term Goals: (4 weeks)  1. Pt will be  independent with HEP in 2 visits.  2. Pt will report decreased pain to a 3-4/10 at worst.   3. Pt will increase R IF and MF TRENT by 10-20  degrees to enable dressing, grooming activities.  4. Pt will make exhibit full composite flexion at uninvolved fingers to enable grasping and squeezing objects for self-care.     Long Term Goals: (by discharge)  1. Pt will report decreased pain to 1-2/10 with ADLs.   2. Pt will exhibit 230 TRENT at R MF to facilitate functional grasp.  3. Pt will exhibit R  strength at 75% of L comparative measures to enable firm grasp on utensils, tools, etc.   5. Pt will exhibit 9-12# of functional pinch strength to allow writing,opening containers, and turning keys.  6. Pt will exhibit a significant increase (20-30 points) in the Quick DASH assessment.  7. Pt will return to PLOF.   8. Minimize extension lag at MF.     Plan   Continue Occupational Therapy 3 times per week through current poc expiration date of 7/7/2022, in order to to decrease pain and edema, and increase A/PROM, strength, and functional use of R upper extremity.    Updates/Grading for next session:  Progress with OT as tolerated.      XAVIER Brock, JAZMINT

## 2022-05-18 NOTE — PROGRESS NOTES
Mr Forrset returns to clinic today has a history of right middle finger MCP arthroplasty.  He is overall doing much better.  He has started working with therapy.  He has no new complaints.    Physical exam:  Examination the right hand middle finger reveals that the incision is well healed.  There is no edema.  Palpation produces minimal to no tenderness.  Is able to flex to within half a cm the distal palmar crease and extend to within 5° of full extension.  He does have sensation intact capillary refill is less than 2 seconds    Radiology:  X-rays of the right hand were taken in clinic today.  The films have been reviewed by me.  There is evidence of the arthroplasty to the MCP joint of the middle finger.  The joint remains well aligned    Assessment:  Status post right middle finger MCP joint arthroplasty    Plan:    1. He will continue to work with therapy    2. Will begin to wean out of the Velcro splint    3. Will follow up with me in 5-6 weeks for repeat evaluation with an x-ray of the right hand

## 2022-05-20 ENCOUNTER — CLINICAL SUPPORT (OUTPATIENT)
Dept: REHABILITATION | Facility: HOSPITAL | Age: 57
End: 2022-05-20
Attending: ORTHOPAEDIC SURGERY
Payer: MEDICARE

## 2022-05-20 DIAGNOSIS — M79.641 RIGHT HAND PAIN: ICD-10-CM

## 2022-05-20 DIAGNOSIS — Z78.9 DECREASED ACTIVITIES OF DAILY LIVING (ADL): ICD-10-CM

## 2022-05-20 DIAGNOSIS — M25.641 DECREASED RANGE OF MOTION OF FINGER OF RIGHT HAND: Primary | ICD-10-CM

## 2022-05-20 DIAGNOSIS — R29.898 DECREASED PINCH STRENGTH: ICD-10-CM

## 2022-05-20 DIAGNOSIS — R29.898 DECREASED GRIP STRENGTH OF RIGHT HAND: ICD-10-CM

## 2022-05-20 PROCEDURE — 97110 THERAPEUTIC EXERCISES: CPT | Mod: PO

## 2022-05-20 NOTE — PROGRESS NOTES
Occupational Therapy Daily Treatment Note     Name: Gio Forrest  [employer] Number: [id number]    Therapy Diagnosis:   Encounter Diagnoses   Name Primary?    Decreased range of motion of finger of right hand Yes    Right hand pain     Decreased  strength of right hand     Decreased pinch strength     Decreased activities of daily living (ADL)      Physician: Luis Alberto Payne MD    Physician orders: Please begin therapy to the right hand.  Include post MCP joint arthroplasty protocol.  Please limit weight-bearing to 1-2 lb     Frequency:  3 times per week     Duration:  6 weeks    Per MD note from 5/18/2022  Plan:     1. He will continue to work with therapy     2. Will begin to wean out of the Velcro splint     3. 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   Precautions: Standard and Diabetes and post op precautions; weightbearing 1-2#      Date of  Surgery: 4/12/22                              S/P: 5 weeks and 3 days on 5/20/22        Subjective     Pt reports: No new symptoms or complaints  he was compliant with HEP.   Response to previous treatment:Very goood  Functional change: None reported today    Pain:  Functional Pain Scale Rating 0-10:   2/10 now  0/10 at best  3/10 at worst  Location:  R MF PIPJ, MPJ hurts much less.   Description: Burning and Tight  Aggravating Factors: bending and use   Easing Factors: rest and wearing brace     Objective   MEASUREMENTS  Edema:  5/18/2022          (in  cm) L R   Proximal Wrist Crease       MPs 23.0 24.0   Long Proximal Phalanx 8.2 8.8     5/20/2022: MCP ext/flex: -10 (+5) / 70 degrees (+4)    TREATMENT  Gio received the following supervised modalities after being cleared for contradictions for moist heat, see therex below    Gio received the following direct contact modalities after being cleared for contraindications for 0 minutes:  -NT    Gio received the following manual therapy techniques for 0 minutes:   -Scar massage, gentle scar mob and retrograde massage 0 min total.    Gio received therapeutic exercises for 25 minutes including:  - 5 min composite fist; 5 min composite ext with gentle propping on 2# DB  - straight to hook 2x20  - staright to wave 2x20  - straight to composite 2x20  - fingerlifts 2x20  - fingerspreads 2x20    Gio participated in dynamic functional therapeutic activities to improve functional performance for 0 minutes, including:  -  Isospheres 2 min (NT)    Home Exercises and Education Provided     Education provided:   -  Cont per previous and can make composite fist as tolerated at this time.    Written Home Exercises Provided:  Patient instructed to cont prior HEP.    Exercises were reviewed and Gio was able to demonstrate them prior to the end of the session.  Gio demonstrated good  understanding of the education provided.     See EMR under Media for exercises provided today and/or prior visit.        Assessment     Pt would continue to benefit from skilled OT decreased pain and increased AROM continues. Pt has been weaning out of his brace and is still following a 2# wt limit when out of his brace.     - Progress towards goals:  STG #1 has been met.    Gio is progressing well towards his goals . Pt continues with good rehab potential.     Pt will continue to benefit from skilled outpatient occupational therapy to address the deficits listed in the problem list on initial evaluation in order to maximize  pt's level of independence in the home and community.     Anticipated barriers to occupational therapy: None    Pt's spiritual, cultural and educational needs considered and pt agreeable to plan of care and goals.    Goals:   Short Term Goals: (4 weeks)  1. Pt will be independent with HEP in 2 visits.  2. Pt will report decreased pain to a 3-4/10 at worst.   3. Pt will increase R IF and MF TRENT by 10-20  degrees to enable dressing, grooming activities.  4. Pt will make exhibit full composite flexion at uninvolved fingers to enable grasping and squeezing objects for self-care.     Long Term Goals: (by discharge)  1. Pt will report decreased pain to 1-2/10 with ADLs.   2. Pt will exhibit 230 TRENT at R MF to facilitate functional grasp.  3. Pt will exhibit R  strength at 75% of L comparative measures to enable firm grasp on utensils, tools, etc.   5. Pt will exhibit 9-12# of functional pinch strength to allow writing,opening containers, and turning keys.  6. Pt will exhibit a significant increase (20-30 points) in the Quick DASH assessment.  7. Pt will return to PLOF.   8. Minimize extension lag at MF.     Plan   Continue Occupational Therapy 3 times per week through current poc expiration date of 7/7/2022, in order to to decrease pain and edema, and increase A/PROM, strength, and functional use of R upper extremity.    Updates/Grading for next session:  Progress with OT as tolerated.      XAVIER Brock, JAZMINT

## 2022-05-23 ENCOUNTER — TELEPHONE (OUTPATIENT)
Dept: ORTHOPEDICS | Facility: CLINIC | Age: 57
End: 2022-05-23
Payer: MEDICARE

## 2022-05-23 DIAGNOSIS — M25.551 BILATERAL HIP PAIN: Primary | ICD-10-CM

## 2022-05-23 DIAGNOSIS — M25.552 BILATERAL HIP PAIN: Primary | ICD-10-CM

## 2022-05-24 ENCOUNTER — CLINICAL SUPPORT (OUTPATIENT)
Dept: REHABILITATION | Facility: HOSPITAL | Age: 57
End: 2022-05-24
Attending: ORTHOPAEDIC SURGERY
Payer: MEDICARE

## 2022-05-24 ENCOUNTER — TELEPHONE (OUTPATIENT)
Dept: ORTHOPEDICS | Facility: CLINIC | Age: 57
End: 2022-05-24
Payer: MEDICARE

## 2022-05-24 DIAGNOSIS — M25.641 DECREASED RANGE OF MOTION OF FINGER OF RIGHT HAND: Primary | ICD-10-CM

## 2022-05-24 DIAGNOSIS — M79.641 RIGHT HAND PAIN: ICD-10-CM

## 2022-05-24 DIAGNOSIS — R29.898 DECREASED GRIP STRENGTH OF RIGHT HAND: ICD-10-CM

## 2022-05-24 DIAGNOSIS — Z78.9 DECREASED ACTIVITIES OF DAILY LIVING (ADL): ICD-10-CM

## 2022-05-24 DIAGNOSIS — R29.898 DECREASED PINCH STRENGTH: ICD-10-CM

## 2022-05-24 PROCEDURE — 97022 WHIRLPOOL THERAPY: CPT | Mod: PO

## 2022-05-24 PROCEDURE — 97110 THERAPEUTIC EXERCISES: CPT | Mod: PO

## 2022-05-24 PROCEDURE — 97140 MANUAL THERAPY 1/> REGIONS: CPT | Mod: PO

## 2022-05-24 NOTE — PROGRESS NOTES
Occupational Therapy Daily Treatment Note     Name: Gio Forrest  [facility] Number: [id number]    Therapy Diagnosis:   Encounter Diagnoses   Name Primary?    Decreased range of motion of finger of right hand Yes    Right hand pain     Decreased  strength of right hand     Decreased pinch strength     Decreased activities of daily living (ADL)      Physician: Luis Alberto Payne MD    Physician orders: Please begin therapy to the right hand.  Include post MCP joint arthroplasty protocol.  Please limit weight-bearing to 1-2 lb     Frequency:  3 times per week     Duration:  6 weeks    Per MD note from 5/18/2022  Plan:     1. He will continue to work with therapy     2. Will begin to wean out of the Velcro splint     3. 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   Precautions: Standard and Diabetes and post op precautions; weightbearing 1-2#      Date of  Surgery: 4/12/22                              S/P: 6 weeks on 5/24/22        Subjective     Pt reports: No new symptoms or complaints  he was compliant with HEP.   Response to previous treatment:Very goood  Functional change: None reported today    Pain:  Functional Pain Scale Rating 0-10:   2/10 now  0/10 at best  3/10 at worst  Location:  R MF PIPJ, MPJ hurts much less.   Description: Burning and Tight  Aggravating Factors: bending and use   Easing Factors: rest and wearing brace     Objective   MEASUREMENTS  Edema:  5/18/2022          (in cm) L R    Proximal Wrist Crease       MPs 23.0 24.0   Long Proximal Phalanx 8.2 8.8     5/24/2022: MCP ext/flex: -8 (+2) / 75 degrees (+5)    TREATMENT  Gio received the following supervised modalities after being cleared for contradictions for:  Fluidotherapy: 15 min to R hand/wrist at 115 degrees.    Gio received the following direct contact modalities after being cleared for contraindications for 0 minutes:  -NT    Gio received the following manual therapy techniques for 8 minutes:   -Scar massage, gentle scar mob and retrograde massage 8 min total.    Gio received therapeutic exercises for 15 minutes including:  - 5 min composite fist; 5 min composite ext with gentle propping on 2# DB (NT)  - straight to hook 2x20 with pencil to block   - straight to wave 2x20 with pencil to block   - straight to composite 2x20  - fingerlifts 2x20  - fingerspreads 2x20    Gio participated in dynamic functional therapeutic activities to improve functional performance for 2 minutes, including:  -  Isospheres 2 min     Home Exercises and Education Provided     Education provided:   -  Cont per previous and can make composite fist as tolerated at this time.    Written Home Exercises Provided:  Patient instructed to cont prior HEP.    Exercises were reviewed and Gio was able to demonstrate them prior to the end of the session.  Gio demonstrated good  understanding of the education provided.     See EMR under Media for exercises provided today and/or prior visit.        Assessment     Pt would continue to benefit from skilled OT; Continued improvement with AROM flex and ext today Cont per current poc     - Progress towards goals:  STG #1 has been met.    Gio is progressing well towards his goals . Pt continues with good rehab potential.     Pt will continue to benefit from skilled outpatient occupational therapy to address the deficits listed in the problem list on initial evaluation in order to maximize pt's  level of independence in the home and community.     Anticipated barriers to occupational therapy: None    Pt's spiritual, cultural and educational needs considered and pt agreeable to plan of care and goals.    Goals:   Short Term Goals: (4 weeks)  1. Pt will be independent with HEP in 2 visits.  2. Pt will report decreased pain to a 3-4/10 at worst.   3. Pt will increase R IF and MF TRENT by 10-20  degrees to enable dressing, grooming activities.  4. Pt will make exhibit full composite flexion at uninvolved fingers to enable grasping and squeezing objects for self-care.     Long Term Goals: (by discharge)  1. Pt will report decreased pain to 1-2/10 with ADLs.   2. Pt will exhibit 230 TRENT at R MF to facilitate functional grasp.  3. Pt will exhibit R  strength at 75% of L comparative measures to enable firm grasp on utensils, tools, etc.   5. Pt will exhibit 9-12# of functional pinch strength to allow writing,opening containers, and turning keys.  6. Pt will exhibit a significant increase (20-30 points) in the Quick DASH assessment.  7. Pt will return to PLOF.   8. Minimize extension lag at MF.     Plan   Continue Occupational Therapy 3 times per week through current poc expiration date of 7/7/2022, in order to to decrease pain and edema, and increase A/PROM, strength, and functional use of R upper extremity.    Updates/Grading for next session:  Progress with OT as tolerated.      XAVIER Brock, JAZMINT

## 2022-05-25 ENCOUNTER — TELEPHONE (OUTPATIENT)
Dept: PAIN MEDICINE | Facility: CLINIC | Age: 57
End: 2022-05-25

## 2022-05-25 ENCOUNTER — HOSPITAL ENCOUNTER (OUTPATIENT)
Dept: RADIOLOGY | Facility: HOSPITAL | Age: 57
Discharge: HOME | End: 2022-05-25
Attending: NURSE PRACTITIONER
Payer: MEDICARE

## 2022-05-25 ENCOUNTER — OFFICE VISIT (OUTPATIENT)
Dept: PAIN MEDICINE | Facility: CLINIC | Age: 57
End: 2022-05-25
Payer: MEDICARE

## 2022-05-25 VITALS
DIASTOLIC BLOOD PRESSURE: 77 MMHG | SYSTOLIC BLOOD PRESSURE: 116 MMHG | WEIGHT: 309 LBS | HEART RATE: 83 BPM | HEIGHT: 74 IN | BODY MASS INDEX: 39.66 KG/M2

## 2022-05-25 DIAGNOSIS — E66.01 SEVERE OBESITY (BMI 35.0-39.9) WITH COMORBIDITY: ICD-10-CM

## 2022-05-25 DIAGNOSIS — M25.551 BILATERAL HIP PAIN: ICD-10-CM

## 2022-05-25 DIAGNOSIS — M96.1 POSTLAMINECTOMY SYNDROME OF LUMBAR REGION: ICD-10-CM

## 2022-05-25 DIAGNOSIS — M47.896 OTHER SPONDYLOSIS, LUMBAR REGION: Primary | ICD-10-CM

## 2022-05-25 DIAGNOSIS — G89.4 CHRONIC PAIN DISORDER: ICD-10-CM

## 2022-05-25 DIAGNOSIS — E11.65 TYPE 2 DIABETES MELLITUS WITH HYPERGLYCEMIA, WITH LONG-TERM CURRENT USE OF INSULIN: ICD-10-CM

## 2022-05-25 DIAGNOSIS — M25.552 BILATERAL HIP PAIN: ICD-10-CM

## 2022-05-25 DIAGNOSIS — M50.30 DDD (DEGENERATIVE DISC DISEASE), CERVICAL: ICD-10-CM

## 2022-05-25 DIAGNOSIS — E11.42 TYPE 2 DIABETES MELLITUS WITH DIABETIC POLYNEUROPATHY, WITH LONG-TERM CURRENT USE OF INSULIN: ICD-10-CM

## 2022-05-25 DIAGNOSIS — Z79.4 TYPE 2 DIABETES MELLITUS WITH DIABETIC POLYNEUROPATHY, WITH LONG-TERM CURRENT USE OF INSULIN: ICD-10-CM

## 2022-05-25 DIAGNOSIS — M51.36 DDD (DEGENERATIVE DISC DISEASE), LUMBAR: ICD-10-CM

## 2022-05-25 DIAGNOSIS — Z79.4 TYPE 2 DIABETES MELLITUS WITH HYPERGLYCEMIA, WITH LONG-TERM CURRENT USE OF INSULIN: ICD-10-CM

## 2022-05-25 PROCEDURE — 99999 PR PBB SHADOW E&M-EST. PATIENT-LVL IV: ICD-10-PCS | Mod: PBBFAC,,, | Performed by: ANESTHESIOLOGY

## 2022-05-25 PROCEDURE — 3046F PR MOST RECENT HEMOGLOBIN A1C LEVEL > 9.0%: ICD-10-PCS | Mod: CPTII,S$GLB,, | Performed by: ANESTHESIOLOGY

## 2022-05-25 PROCEDURE — 3074F SYST BP LT 130 MM HG: CPT | Mod: CPTII,S$GLB,, | Performed by: ANESTHESIOLOGY

## 2022-05-25 PROCEDURE — 3008F PR BODY MASS INDEX (BMI) DOCUMENTED: ICD-10-PCS | Mod: CPTII,S$GLB,, | Performed by: ANESTHESIOLOGY

## 2022-05-25 PROCEDURE — 73521 X-RAY EXAM HIPS BI 2 VIEWS: CPT | Mod: TC,PO

## 2022-05-25 PROCEDURE — 3078F DIAST BP <80 MM HG: CPT | Mod: CPTII,S$GLB,, | Performed by: ANESTHESIOLOGY

## 2022-05-25 PROCEDURE — 3008F BODY MASS INDEX DOCD: CPT | Mod: CPTII,S$GLB,, | Performed by: ANESTHESIOLOGY

## 2022-05-25 PROCEDURE — 99204 PR OFFICE/OUTPT VISIT, NEW, LEVL IV, 45-59 MIN: ICD-10-PCS | Mod: S$GLB,,, | Performed by: ANESTHESIOLOGY

## 2022-05-25 PROCEDURE — 3078F PR MOST RECENT DIASTOLIC BLOOD PRESSURE < 80 MM HG: ICD-10-PCS | Mod: CPTII,S$GLB,, | Performed by: ANESTHESIOLOGY

## 2022-05-25 PROCEDURE — 99999 PR PBB SHADOW E&M-EST. PATIENT-LVL IV: CPT | Mod: PBBFAC,,, | Performed by: ANESTHESIOLOGY

## 2022-05-25 PROCEDURE — 73521 X-RAY EXAM HIPS BI 2 VIEWS: CPT | Mod: 26,,, | Performed by: RADIOLOGY

## 2022-05-25 PROCEDURE — 3074F PR MOST RECENT SYSTOLIC BLOOD PRESSURE < 130 MM HG: ICD-10-PCS | Mod: CPTII,S$GLB,, | Performed by: ANESTHESIOLOGY

## 2022-05-25 PROCEDURE — 1159F MED LIST DOCD IN RCRD: CPT | Mod: CPTII,S$GLB,, | Performed by: ANESTHESIOLOGY

## 2022-05-25 PROCEDURE — 73521 XR HIPS BILATERAL 2 VIEW INCL AP PELVIS: ICD-10-PCS | Mod: 26,,, | Performed by: RADIOLOGY

## 2022-05-25 PROCEDURE — 1159F PR MEDICATION LIST DOCUMENTED IN MEDICAL RECORD: ICD-10-PCS | Mod: CPTII,S$GLB,, | Performed by: ANESTHESIOLOGY

## 2022-05-25 PROCEDURE — 3046F HEMOGLOBIN A1C LEVEL >9.0%: CPT | Mod: CPTII,S$GLB,, | Performed by: ANESTHESIOLOGY

## 2022-05-25 PROCEDURE — 99204 OFFICE O/P NEW MOD 45 MIN: CPT | Mod: S$GLB,,, | Performed by: ANESTHESIOLOGY

## 2022-05-25 RX ORDER — GABAPENTIN 600 MG/1
600 TABLET ORAL 3 TIMES DAILY
Qty: 90 TABLET | Refills: 2 | Status: SHIPPED | OUTPATIENT
Start: 2022-05-25 | End: 2022-08-03 | Stop reason: SDUPTHER

## 2022-05-25 NOTE — PROGRESS NOTES
This note was completed with dictation software and grammatical errors may exist.    Referring Physician: Danyell Segura    PCP: BLANK Carroll MD      CC: low back pain    HPI:   Gio Forrest is a 56 y.o. male presents to us with lower back pain.  Pain has been present for over 10 years.  He has history of multiple spine surgery in his history of fusion from L4 the S1.  He continues have constant aching, burning, stabbing pain in his lower back.  Pain does not radiate.  Pain worsens standing, bending walking.  Pain improves with rest.  He has tried physical therapy minimal benefit.  He states undergoing lumbar MB RFA procedure in the past with moderate benefit.  This was performed a year ago.  He desires repeat procedure with us.  He is managed with Percocet 10 mg q.8 hours by Dr. Dhillon in the past.  States being unable to follow-up with Dr. Dhillon due to financial issues.  He was taking gabapentin but stopped due to his recent admission in to family rehab.  He also has uncontrolled diabetes currently on insulin.  Recent hemoglobin A1c was over 9.  He denies any worsening weakness.  No bowel bladder changes    ROS:  CONSTITUTIONAL: No fevers, chills, night sweats, wt. loss, appetite changes  SKIN: no rashes or itching  ENT: No headaches, head trauma, vision changes, or eye pain  LYMPH NODES: None noticed   CV: No chest pain, palpitations.   RESP: No shortness of breath, dyspnea on exertion, cough, wheezing, or hemoptysis  GI: No nausea, emesis, diarrhea, constipation, melena, hematochezia, pain.    : No dysuria, hematuria, urgency, or frequency   HEME: No easy bruising, bleeding problems  PSYCHIATRIC: No depression, anxiety, psychosis, hallucinations.  NEURO: No seizures, memory loss, dizziness or difficulty sleeping  MSK:  Positive HPI      Past Medical History:   Diagnosis Date    Cardiomyopathy     Depression     Diabetes     Dyslipidemia 08/12/2015    Gout 08/12/2015    Hypertension      Idiopathic gout     Lumbar pseudoarthrosis     LINDSEY (nonalcoholic steatohepatitis)     Obesity 08/12/2015    Obstructive sleep apnea 08/12/2015    Restless leg syndrome     Sebaceous cyst 04/18/2017    Type 2 diabetes mellitus 08/12/2015     Past Surgical History:   Procedure Laterality Date    ABLATION N/A 10/8/2020    Procedure: Ablation;  Surgeon: Rip Che III, MD;  Location: [facility] CATH;  Service: Cardiology;  Laterality: N/A;    ARTHROPLASTY OF JOINT OF FINGER Right 4/12/2022    Procedure: Right middle finger MCP joint arthroplasty;  Surgeon: Luis Alberto Payne MD;  Location: [facility] OR;  Service: Orthopedics;  Laterality: Right;  Anesthesia:  General with a single-shot supraclavicular block    BACK SURGERY      x2    CARDIAC CATHETERIZATION      CARDIAC ELECTROPHYSIOLOGY STUDY  10/8/2020    Procedure: Study possible ablation;  Surgeon: Rip Che III, MD;  Location: [facility] CATH;  Service: Cardiology;;    CERVICAL SPINE SURGERY      CHOLECYSTECTOMY  05/30/2018    Dr. ROGELIO Tao, [facility]     COLONOSCOPY  2008    COLONOSCOPY N/A 9/14/2017    Procedure: COLONOSCOPY;  Surgeon: Obi Beltre MD;  Location: [facility] ENDO;  Service: Endoscopy;  Laterality: N/A;    CORONARY ANGIOGRAPHY Left 5/28/2018    Procedure: Coronary angiography;  Surgeon: Rafiq Malik MD;  Location: [facility] CATH;  Service: Cardiology;  Laterality: Left;    ELBOW SURGERY Bilateral     HERNIA REPAIR      LAPAROSCOPIC APPENDECTOMY N/A 6/25/2020    Procedure: APPENDECTOMY, LAPAROSCOPIC;  Surgeon: Gordon Can MD;  Location: [facility] OR;  Service: General;  Laterality: N/A;    LAPAROSCOPIC CHOLECYSTECTOMY N/A 5/30/2018    Procedure: CHOLECYSTECTOMY, LAPAROSCOPIC;  Surgeon: Fletcher Tao MD;  Location: [facility] OR;  Service: General;  Laterality: N/A;    LEFT HEART CATHETERIZATION Left 5/28/2018    Procedure: Left heart cath;  Surgeon: Rafiq Malik MD;  Location: [facility] CATH;  Service: Cardiology;  Laterality: Left;     NECK SURGERY      SHOULDER ARTHROSCOPY      SPINE SURGERY      lumbar x2    TONSILLECTOMY      TREATMENT OF CARDIAC ARRHYTHMIA  10/8/2020    Procedure: Cardioversion or Defibrillation;  Surgeon: Rip Che III, MD;  Location: [facility];  Service: Cardiology;;    TRIGGER FINGER RELEASE Right 2021    Procedure: RELEASE, TRIGGER FINGER;  Surgeon: Luis Alberto Payne MD;  [facility] OR;  Service: Orthopedics;  Laterality: Right;  Procedure: Right ring finger trigger release    Position: Supine    Anesthesia: Local    Equipment: Basic handset     Family History   Problem Relation Age of Onset    Diabetes Mother     Depression Mother     Liver cancer Mother     Obesity Mother     Heart disease Father     Anuerysm Father     Diabetes Father     Stroke Father     Glaucoma Paternal Grandmother     Obesity Sister      Social History     Socioeconomic History    Marital status:     Number of children: 1   Tobacco Use    Smoking status: Former Smoker     Packs/day: 0.50     Years: 6.00     Pack years: 3.00     Types: Cigarettes     Quit date:      Years since quittin.4    Smokeless tobacco: Never Used   Substance and Sexual Activity    Alcohol use: No     Alcohol/week: 0.0 standard drinks    Drug use: No    Sexual activity: Yes     Partners: Female   Social History Narrative              Social Determinants of Health     Financial Resource Strain: Medium Risk    Difficulty of Paying Living Expenses: Somewhat hard   Food Insecurity: Food Insecurity Present    Worried About Running Out of Food in the Last Year: Often true    Ran Out of Food in the Last Year: Often true   Transportation Needs: Unmet Transportation Needs    Lack of Transportation (Medical): Yes    Lack of Transportation (Non-Medical): Yes   Physical Activity: Insufficiently Active    Days of Exercise per Week: 4 days    Minutes of Exercise per Session: 10 min   Stress: Stress Concern Present     "Feeling of Stress : To some extent   Social Connections: Socially Isolated    Frequency of Communication with Friends and Family: Once a week    Frequency of Social Gatherings with Friends and Family: Once a week    Attends Judaism Services: Never    Active Member of Clubs or Organizations: No    Attends Club or Organization Meetings: Never    Marital Status:          Medications/Allergies: See med card    Vitals:    05/25/22 1006   BP: 116/77   Pulse: 83   Weight: (!) 140.2 kg (309 lb)   Height: 6' 2" (1.88 m)   PainSc:   6   PainLoc: Back         Physical exam:    GENERAL: A and O x3, the patient appears well groomed and is in no acute distress.  Skin: No rashes or obvious lesions  HEENT: normocephalic, atraumatic  CARDIOVASCULAR:  Palpable peripheral pulses  LUNGS: easy work of breathing  ABDOMEN: soft, nontender   UPPER EXTREMITIES: Normal alignment, normal range of motion, no atrophy, no skin changes,  hair growth and nail growth normal and equal bilaterally. No swelling, no tenderness.    LOWER EXTREMITIES:  Normal alignment, normal range of motion, no atrophy, no skin changes,  hair growth and nail growth normal and equal bilaterally. No swelling, no tenderness.  CERVICAL SPINE:  Cervical spine: ROM is limited in flexion, extension and lateral rotation with moderate increased pain.  Spurling's maneuver causes no neck pain to either side.  Myofascial exam: No Tenderness to palpation across cervical paraspinous region bilaterally.    LUMBAR SPINE  Lumbar spine: ROM is limited with flexion extension and oblique extension with moderate increased pain.    Teodoro's test causes no increased pain on either side.    Supine straight leg raise is negative bilaterally.    Internal and external rotation of the hip causes no increased pain on either side.  Myofascial exam: No tenderness to palpation across lumbar paraspinous muscles.    MOTOR: Tone and bulk: normal bilateral upper and lower Strength: normal "   SENSATION: Light touch and pinprick intact bilaterally  REFLEXES: normal, symmetric, nonbrisk.  Toes down, no clonus. No hoffmans.  GAIT: normal rise, base, steps, and arm swing.        Imaging:  MRI L-spine 2018  There is a normal lumbar lordosis.  Postoperative changes from discectomies and L4-L5 L5-S1 with stabilization with pedicle screws at L4, L5, S1 levels is again noted.  There is no marrow edema to suggest acute fractures or marrow replacing processes throughout the lumbar spine.  The tip of the conus is at T12-L1 discitis.  Paraspinal soft tissues are unremarkable.     L5-S1: Postoperative changes from a discectomy at the L5-S1 disc space with presence of a spacer within the disc space.  There no dislocation of the spacer is noted.  There is a broad-based posterior osteophyte and disc bulge complex with effacement of the anterior epidural fat.  There is a right L5-S1 laminectomy and partial facetectomy.  Susceptibility artifacts from presence of pedicle screws at L5 and S1 noted.  There is no significant central canal spinal stenosis.  There is facet arthropathy.     L4-L5: Postoperative changes from L4-L5 discectomy without dislocation of the spacer.  Dorsal stabilization with pedicle screws at L4 and L5 stable since the study performed in November of 2016.  No significant central canal spinal stenosis.  The neural foramina demonstrates no significant foraminal stenosis bilaterally.     L3-L4: Disc dehydration with a global anterior and posterior disc bulges with mild compression of the thecal sac.  There is bilateral L3-L4 facet arthropathy.  There is mild central canal spinal stenosis when compared with the level above and below.     L2-L3: Disc dehydration with marginal anterior spondylotic osteophytes.  There is a broad-based global anterior and posterior bulging of the annulus.  No soft tissue disc herniations.  There is mild bilateral foraminal stenosis.  There is facet arthropathy.     L1-L2: No  disc herniations or spinal stenosis or foraminal stenosis.      CT C-spine 2020  Patient has had a previous fusion at C5-6 and C6-7. The odontoid is intact no acute fractures are seen.  The alignment is within normal limits    Assessment:  Patient presents with lower back pain.  1. Other spondylosis, lumbar region    2. DDD (degenerative disc disease), lumbar    3. DDD (degenerative disc disease), cervical    4. Postlaminectomy syndrome of lumbar region    5. Chronic pain disorder    6. Severe obesity (BMI 35.0-39.9) with comorbidity    7. Type 2 diabetes mellitus with hyperglycemia, with long-term current use of insulin    8. Type 2 diabetes mellitus with diabetic polyneuropathy, with long-term current use of insulin          Plan:  1. I have stressed the importance of physical activity and exercise to improve overall health  2. Reviewed pertinent imaging and records with patient  3. Request past records  4. Schedule repeat lumbar MB RFA procedure to help with his lower back pain  5. Restart gabapentin at 600mg TID for anti-neuropathic adjunct.  Encourage better diabetic control  6. Follow up after procedure        Thank you for referring this interesting patient, and I look forward to continuing to collaborate in his care.

## 2022-05-25 NOTE — TELEPHONE ENCOUNTER
----- Message from Karyna Betts sent at 5/25/2022 12:59 PM CDT -----  Contact: [phone]  Type:  Patient Returning Call    Who Called: Pt  Who Left Message for Patient:  NA  Does the patient know what this is regarding?:  No  Best Call Back Number:  [phone]    Additional Information:  Pt had missed call and was not sure if it was your office.

## 2022-05-25 NOTE — H&P (VIEW-ONLY)
This note was completed with dictation software and grammatical errors may exist.    Referring Physician: Danyell Segura    PCP: BLANK Carroll MD      CC: low back pain    HPI:   Gio Forrest is a 56 y.o. male presents to us with lower back pain.  Pain has been present for over 10 years.  He has history of multiple spine surgery in his history of fusion from L4 the S1.  He continues have constant aching, burning, stabbing pain in his lower back.  Pain does not radiate.  Pain worsens standing, bending walking.  Pain improves with rest.  He has tried physical therapy minimal benefit.  He states undergoing lumbar MB RFA procedure in the past with moderate benefit.  This was performed a year ago.  He desires repeat procedure with us.  He is managed with Percocet 10 mg q.8 hours by Dr. Dhillon in the past.  States being unable to follow-up with Dr. Dhillon due to financial issues.  He was taking gabapentin but stopped due to his recent admission in to family rehab.  He also has uncontrolled diabetes currently on insulin.  Recent hemoglobin A1c was over 9.  He denies any worsening weakness.  No bowel bladder changes    ROS:  CONSTITUTIONAL: No fevers, chills, night sweats, wt. loss, appetite changes  SKIN: no rashes or itching  ENT: No headaches, head trauma, vision changes, or eye pain  LYMPH NODES: None noticed   CV: No chest pain, palpitations.   RESP: No shortness of breath, dyspnea on exertion, cough, wheezing, or hemoptysis  GI: No nausea, emesis, diarrhea, constipation, melena, hematochezia, pain.    : No dysuria, hematuria, urgency, or frequency   HEME: No easy bruising, bleeding problems  PSYCHIATRIC: No depression, anxiety, psychosis, hallucinations.  NEURO: No seizures, memory loss, dizziness or difficulty sleeping  MSK:  Positive HPI      Past Medical History:   Diagnosis Date    Cardiomyopathy     Depression     Diabetes     Dyslipidemia 08/12/2015    Gout 08/12/2015    Hypertension      Idiopathic gout     Lumbar pseudoarthrosis     LINDSEY (nonalcoholic steatohepatitis)     Obesity 08/12/2015    Obstructive sleep apnea 08/12/2015    Restless leg syndrome     Sebaceous cyst 04/18/2017    Type 2 diabetes mellitus 08/12/2015     Past Surgical History:   Procedure Laterality Date    ABLATION N/A 10/8/2020    Procedure: Ablation;  Surgeon: Rip Che III, MD;  Location: [facility] CATH;  Service: Cardiology;  Laterality: N/A;    ARTHROPLASTY OF JOINT OF FINGER Right 4/12/2022    Procedure: Right middle finger MCP joint arthroplasty;  Surgeon: Luis Alberto Payne MD;  Location: [facility] OR;  Service: Orthopedics;  Laterality: Right;  Anesthesia:  General with a single-shot supraclavicular block    BACK SURGERY      x2    CARDIAC CATHETERIZATION      CARDIAC ELECTROPHYSIOLOGY STUDY  10/8/2020    Procedure: Study possible ablation;  Surgeon: Rip Che III, MD;  Location: [facility] CATH;  Service: Cardiology;;    CERVICAL SPINE SURGERY      CHOLECYSTECTOMY  05/30/2018    Dr. ROGELIO Tao, [facility]     COLONOSCOPY  2008    COLONOSCOPY N/A 9/14/2017    Procedure: COLONOSCOPY;  Surgeon: Obi Beltre MD;  Location: [facility] ENDO;  Service: Endoscopy;  Laterality: N/A;    CORONARY ANGIOGRAPHY Left 5/28/2018    Procedure: Coronary angiography;  Surgeon: Rafiq Malik MD;  Location: [facility] CATH;  Service: Cardiology;  Laterality: Left;    ELBOW SURGERY Bilateral     HERNIA REPAIR      LAPAROSCOPIC APPENDECTOMY N/A 6/25/2020    Procedure: APPENDECTOMY, LAPAROSCOPIC;  Surgeon: Gordon Can MD;  Location: [facility] OR;  Service: General;  Laterality: N/A;    LAPAROSCOPIC CHOLECYSTECTOMY N/A 5/30/2018    Procedure: CHOLECYSTECTOMY, LAPAROSCOPIC;  Surgeon: Fletcher Tao MD;  Location: [facility] OR;  Service: General;  Laterality: N/A;    LEFT HEART CATHETERIZATION Left 5/28/2018    Procedure: Left heart cath;  Surgeon: Rafiq Malik MD;  Location: [facility] CATH;  Service: Cardiology;  Laterality: Left;     NECK SURGERY      SHOULDER ARTHROSCOPY      SPINE SURGERY      lumbar x2    TONSILLECTOMY      TREATMENT OF CARDIAC ARRHYTHMIA  10/8/2020    Procedure: Cardioversion or Defibrillation;  Surgeon: Rip Che III, MD;  [facility];  Service: Cardiology;;    TRIGGER FINGER RELEASE Right 2021    Procedure: RELEASE, TRIGGER FINGER;  Surgeon: Luis Alberto Payne MD;  Location: [facility] OR;  Service: Orthopedics;  Laterality: Right;  Procedure: Right ring finger trigger release    Position: Supine    Anesthesia: Local    Equipment: Basic handset     Family History   Problem Relation Age of Onset    Diabetes Mother     Depression Mother     Liver cancer Mother     Obesity Mother     Heart disease Father     Anuerysm Father     Diabetes Father     Stroke Father     Glaucoma Paternal Grandmother     Obesity Sister      Social History     Socioeconomic History    Marital status:     Number of children: 1   Tobacco Use    Smoking status: Former Smoker     Packs/day: 0.50     Years: 6.00     Pack years: 3.00     Types: Cigarettes     Quit date:      Years since quittin.4    Smokeless tobacco: Never Used   Substance and Sexual Activity    Alcohol use: No     Alcohol/week: 0.0 standard drinks    Drug use: No    Sexual activity: Yes     Partners: Female   Social History Narrative              Social Determinants of Health     Financial Resource Strain: Medium Risk    Difficulty of Paying Living Expenses: Somewhat hard   Food Insecurity: Food Insecurity Present    Worried About Running Out of Food in the Last Year: Often true    Ran Out of Food in the Last Year: Often true   Transportation Needs: Unmet Transportation Needs    Lack of Transportation (Medical): Yes    Lack of Transportation (Non-Medical): Yes   Physical Activity: Insufficiently Active    Days of Exercise per Week: 4 days    Minutes of Exercise per Session: 10 min   Stress: Stress Concern Present     "Feeling of Stress : To some extent   Social Connections: Socially Isolated    Frequency of Communication with Friends and Family: Once a week    Frequency of Social Gatherings with Friends and Family: Once a week    Attends [employer] Services: Never    Active Member of Clubs or Organizations: No    Attends Club or Organization Meetings: Never    Marital Status:          Medications/Allergies: See med card    Vitals:    05/25/22 1006   BP: 116/77   Pulse: 83   Weight: (!) 140.2 kg (309 lb)   Height: 6' 2" (1.88 m)   PainSc:   6   PainLoc: Back         Physical exam:    GENERAL: A and O x3, the patient appears well groomed and is in no acute distress.  Skin: No rashes or obvious lesions  HEENT: normocephalic, atraumatic  CARDIOVASCULAR:  Palpable peripheral pulses  LUNGS: easy work of breathing  ABDOMEN: soft, nontender   UPPER EXTREMITIES: Normal alignment, normal range of motion, no atrophy, no skin changes,  hair growth and nail growth normal and equal bilaterally. No swelling, no tenderness.    LOWER EXTREMITIES:  Normal alignment, normal range of motion, no atrophy, no skin changes,  hair growth and nail growth normal and equal bilaterally. No swelling, no tenderness.  CERVICAL SPINE:  Cervical spine: ROM is limited in flexion, extension and lateral rotation with moderate increased pain.  Spurling's maneuver causes no neck pain to either side.  Myofascial exam: No Tenderness to palpation across cervical paraspinous region bilaterally.    LUMBAR SPINE  Lumbar spine: ROM is limited with flexion extension and oblique extension with moderate increased pain.    Teodoro's test causes no increased pain on either side.    Supine straight leg raise is negative bilaterally.    Internal and external rotation of the hip causes no increased pain on either side.  Myofascial exam: No tenderness to palpation across lumbar paraspinous muscles.    MOTOR: Tone and bulk: normal bilateral upper and lower Strength: normal "   SENSATION: Light touch and pinprick intact bilaterally  REFLEXES: normal, symmetric, nonbrisk.  Toes down, no clonus. No hoffmans.  GAIT: normal rise, base, steps, and arm swing.        Imaging:  MRI L-spine 2018  There is a normal lumbar lordosis.  Postoperative changes from discectomies and L4-L5 L5-S1 with stabilization with pedicle screws at L4, L5, S1 levels is again noted.  There is no marrow edema to suggest acute fractures or marrow replacing processes throughout the lumbar spine.  The tip of the conus is at T12-L1 discitis.  Paraspinal soft tissues are unremarkable.     L5-S1: Postoperative changes from a discectomy at the L5-S1 disc space with presence of a spacer within the disc space.  There no dislocation of the spacer is noted.  There is a broad-based posterior osteophyte and disc bulge complex with effacement of the anterior epidural fat.  There is a right L5-S1 laminectomy and partial facetectomy.  Susceptibility artifacts from presence of pedicle screws at L5 and S1 noted.  There is no significant central canal spinal stenosis.  There is facet arthropathy.     L4-L5: Postoperative changes from L4-L5 discectomy without dislocation of the spacer.  Dorsal stabilization with pedicle screws at L4 and L5 stable since the study performed in November of 2016.  No significant central canal spinal stenosis.  The neural foramina demonstrates no significant foraminal stenosis bilaterally.     L3-L4: Disc dehydration with a global anterior and posterior disc bulges with mild compression of the thecal sac.  There is bilateral L3-L4 facet arthropathy.  There is mild central canal spinal stenosis when compared with the level above and below.     L2-L3: Disc dehydration with marginal anterior spondylotic osteophytes.  There is a broad-based global anterior and posterior bulging of the annulus.  No soft tissue disc herniations.  There is mild bilateral foraminal stenosis.  There is facet arthropathy.     L1-L2: No  disc herniations or spinal stenosis or foraminal stenosis.      CT C-spine 2020  Patient has had a previous fusion at C5-6 and C6-7. The odontoid is intact no acute fractures are seen.  The alignment is within normal limits    Assessment:  Patient presents with lower back pain.  1. Other spondylosis, lumbar region    2. DDD (degenerative disc disease), lumbar    3. DDD (degenerative disc disease), cervical    4. Postlaminectomy syndrome of lumbar region    5. Chronic pain disorder    6. Severe obesity (BMI 35.0-39.9) with comorbidity    7. Type 2 diabetes mellitus with hyperglycemia, with long-term current use of insulin    8. Type 2 diabetes mellitus with diabetic polyneuropathy, with long-term current use of insulin          Plan:  1. I have stressed the importance of physical activity and exercise to improve overall health  2. Reviewed pertinent imaging and records with patient  3. Request past records  4. Schedule repeat lumbar MB RFA procedure to help with his lower back pain  5. Restart gabapentin at 600mg TID for anti-neuropathic adjunct.  Encourage better diabetic control  6. Follow up after procedure        Thank you for referring this interesting patient, and I look forward to continuing to collaborate in his care.

## 2022-05-30 PROBLEM — E83.42 HYPOMAGNESEMIA: Status: ACTIVE | Noted: 2022-05-30

## 2022-05-30 PROBLEM — J96.21 ACUTE AND CHRONIC RESPIRATORY FAILURE WITH HYPOXIA: Status: ACTIVE | Noted: 2022-05-30

## 2022-05-30 PROBLEM — J18.9 PNA (PNEUMONIA): Status: ACTIVE | Noted: 2022-05-30

## 2022-06-07 ENCOUNTER — TELEPHONE (OUTPATIENT)
Dept: REHABILITATION | Facility: HOSPITAL | Age: 57
End: 2022-06-07
Payer: MEDICARE

## 2022-06-08 ENCOUNTER — CLINICAL SUPPORT (OUTPATIENT)
Dept: REHABILITATION | Facility: HOSPITAL | Age: 57
End: 2022-06-08
Payer: MEDICARE

## 2022-06-08 DIAGNOSIS — M79.641 RIGHT HAND PAIN: ICD-10-CM

## 2022-06-08 DIAGNOSIS — R29.898 DECREASED GRIP STRENGTH OF RIGHT HAND: ICD-10-CM

## 2022-06-08 DIAGNOSIS — Z78.9 DECREASED ACTIVITIES OF DAILY LIVING (ADL): ICD-10-CM

## 2022-06-08 DIAGNOSIS — M25.641 DECREASED RANGE OF MOTION OF FINGER OF RIGHT HAND: Primary | ICD-10-CM

## 2022-06-08 DIAGNOSIS — R29.898 DECREASED PINCH STRENGTH: ICD-10-CM

## 2022-06-08 PROCEDURE — 97530 THERAPEUTIC ACTIVITIES: CPT | Mod: PO

## 2022-06-08 PROCEDURE — 97022 WHIRLPOOL THERAPY: CPT | Mod: PO

## 2022-06-08 PROCEDURE — 97110 THERAPEUTIC EXERCISES: CPT | Mod: PO

## 2022-06-08 NOTE — PROGRESS NOTES
Occupational Therapy Daily Treatment Note     Name: Gio Forrest  [facility] Number: [id number]    Therapy Diagnosis:   Encounter Diagnoses   Name Primary?    Decreased range of motion of finger of right hand Yes    Right hand pain     Decreased  strength of right hand     Decreased pinch strength     Decreased activities of daily living (ADL)      Physician: Luis Alberto Payne MD    Physician orders: Please begin therapy to the right hand.  Include post MCP joint arthroplasty protocol.  Please limit weight-bearing to 1-2 lb     Frequency:  3 times per week     Duration:  6 weeks    Per MD note from 5/18/2022  Plan:     1. He will continue to work with therapy     2. Will begin to wean out of the Velcro splint     3. 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   Precautions: Standard and Diabetes and post op precautions; weightbearing 1-2#      Date of  Surgery: 4/12/22                              S/P: 8 weeks and 1 day on 6/08/2022        Subjective     Pt reports: No new symptoms or complaints  he was compliant with HEP.   Response to previous treatment:Very goood  Functional change: None reported today    Pain:  Functional Pain Scale Rating 0-10:   2/10 now  0/10 at best  3/10 at worst  Location:  R MF PIPJ, MPJ hurts much less.   Description: Burning and Tight  Aggravating Factors: bending and use   Easing Factors: rest and wearing brace     Objective   MEASUREMENTS  Edema:  5/18/2022          (in  cm) L R   Proximal Wrist Crease       MPs 23.0 24.0   Long Proximal Phalanx 8.2 8.8     6/8/2022 MCP ext/flex:  - 5 / 80 degrees (vs 90 degrees on L)  6/08/2022 Position II    R 40#/55# vs 80#/88# on L.    TREATMENT  Gio received the following supervised modalities after being cleared for contradictions for:  Fluidotherapy: 15 min to R hand/wrist at 115 degrees.    Gio received the following direct contact modalities after being cleared for contraindications for 0 minutes:  -NT    Gio received the following manual therapy techniques for 0 minutes:   -Scar massage, gentle scar mob and retrograde massage 0 min total.    Gio received therapeutic exercises for 10 minutes including:  - Tendon Gliding Exercises: Full sequence 3x20  - Long to composite 3x20  - fingerlifts 2x20    Gio participated in dynamic functional therapeutic activities to improve functional performance for 15 minutes, including:  -  Putty ex grasp roll-outs and pinches 3x10 each with Green.      Home Exercises and Education Provided     Education provided:   -  Cont per previous and add putty act as tolerated.    Written Home Exercises Provided:  Patient instructed to cont prior HEP, add putty act as tolerated.      Exercises were reviewed and Gio was able to demonstrate them prior to the end of the session.  Gio demonstrated good  understanding of the education provided.     See EMR under Media for exercises provided today and/or prior visit.        Assessment     Pt progressing very well. Cont per current poc.     - Progress towards goals:  STG #1 has been met.    Gio is progressing well towards his goals . Pt continues with good rehab potential.     Pt will continue to benefit from skilled outpatient occupational therapy to address the deficits listed in the problem list on initial evaluation in order to maximize pt's level of independence in the home and community.     Anticipated barriers to occupational  therapy: None    Pt's spiritual, cultural and educational needs considered and pt agreeable to plan of care and goals.    Goals:   Short Term Goals: (4 weeks)  1. Pt will be independent with HEP in 2 visits.  2. Pt will report decreased pain to a 3-4/10 at worst.   3. Pt will increase R IF and MF TRENT by 10-20  degrees to enable dressing, grooming activities.  4. Pt will make exhibit full composite flexion at uninvolved fingers to enable grasping and squeezing objects for self-care.     Long Term Goals: (by discharge)  1. Pt will report decreased pain to 1-2/10 with ADLs.   2. Pt will exhibit 230 TRENT at R MF to facilitate functional grasp.  3. Pt will exhibit R  strength at 75% of L comparative measures to enable firm grasp on utensils, tools, etc.   5. Pt will exhibit 9-12# of functional pinch strength to allow writing,opening containers, and turning keys.  6. Pt will exhibit a significant increase (20-30 points) in the Quick DASH assessment.  7. Pt will return to PLOF.   8. Minimize extension lag at MF.     Plan   Continue Occupational Therapy 3 times per week through current poc expiration date of 7/7/2022, in order to to decrease pain and edema, and increase A/PROM, strength, and functional use of R upper extremity.    Updates/Grading for next session:  Progress with OT as tolerated.      XAVIER Brock, JAZMINT

## 2022-06-09 ENCOUNTER — TELEPHONE (OUTPATIENT)
Dept: BARIATRICS | Facility: CLINIC | Age: 57
End: 2022-06-09
Payer: MEDICARE

## 2022-06-09 NOTE — TELEPHONE ENCOUNTER
----- Message from Ivette Veronica sent at 6/9/2022  2:18 PM CDT -----  Regarding: pt called  Name of Who is Calling: IVONE LAI [[id number]]      What is the request in detail: pt is requesting to start the process up again with his procedure . Please advise       Can the clinic reply by [facility]: No      What Number to Call Back if not in [facility]: [phone]

## 2022-06-09 NOTE — TELEPHONE ENCOUNTER
----- Message from Sara Kent sent at 6/9/2022  3:35 PM CDT -----  Contact: Patient  Type: Patient Call Back         Who called: Patient         What is the request in detail: returning missed call to be sched for an appt; please advise         Can the clinic reply by [facility]? Call back         Would the patient rather a call back or a response via [facility]? Call back         Best call back number: [phone]         Additional Information: prefers morning; prefers [facility] if possible; anytime except 6/13; please advise           Thank You

## 2022-06-10 ENCOUNTER — LAB VISIT (OUTPATIENT)
Dept: LAB | Facility: HOSPITAL | Age: 57
End: 2022-06-10
Attending: NURSE PRACTITIONER
Payer: MEDICARE

## 2022-06-10 ENCOUNTER — CLINICAL SUPPORT (OUTPATIENT)
Dept: REHABILITATION | Facility: HOSPITAL | Age: 57
End: 2022-06-10
Payer: MEDICARE

## 2022-06-10 DIAGNOSIS — M25.641 DECREASED RANGE OF MOTION OF FINGER OF RIGHT HAND: Primary | ICD-10-CM

## 2022-06-10 DIAGNOSIS — E11.42 TYPE 2 DIABETES MELLITUS WITH DIABETIC POLYNEUROPATHY, WITH LONG-TERM CURRENT USE OF INSULIN: ICD-10-CM

## 2022-06-10 DIAGNOSIS — G25.81 RESTLESS LEGS: Primary | ICD-10-CM

## 2022-06-10 DIAGNOSIS — R29.898 DECREASED PINCH STRENGTH: ICD-10-CM

## 2022-06-10 DIAGNOSIS — R29.898 DECREASED GRIP STRENGTH OF RIGHT HAND: ICD-10-CM

## 2022-06-10 DIAGNOSIS — Z79.4 TYPE 2 DIABETES MELLITUS WITH DIABETIC POLYNEUROPATHY, WITH LONG-TERM CURRENT USE OF INSULIN: ICD-10-CM

## 2022-06-10 DIAGNOSIS — E11.42 DIABETIC POLYNEUROPATHY: ICD-10-CM

## 2022-06-10 DIAGNOSIS — M79.641 RIGHT HAND PAIN: ICD-10-CM

## 2022-06-10 DIAGNOSIS — Z78.9 DECREASED ACTIVITIES OF DAILY LIVING (ADL): ICD-10-CM

## 2022-06-10 LAB
ALBUMIN SERPL BCP-MCNC: 3.8 G/DL (ref 3.5–5.2)
ALP SERPL-CCNC: 99 U/L (ref 55–135)
ALT SERPL W/O P-5'-P-CCNC: 28 U/L (ref 10–44)
ANION GAP SERPL CALC-SCNC: 10 MMOL/L (ref 8–16)
AST SERPL-CCNC: 23 U/L (ref 10–40)
BILIRUB SERPL-MCNC: 0.5 MG/DL (ref 0.1–1)
BUN SERPL-MCNC: 13 MG/DL (ref 6–20)
C3 SERPL-MCNC: 145 MG/DL (ref 50–180)
C4 SERPL-MCNC: 21 MG/DL (ref 11–44)
CALCIUM SERPL-MCNC: 9.5 MG/DL (ref 8.7–10.5)
CHLORIDE SERPL-SCNC: 105 MMOL/L (ref 95–110)
CO2 SERPL-SCNC: 25 MMOL/L (ref 23–29)
CREAT SERPL-MCNC: 0.9 MG/DL (ref 0.5–1.4)
ERYTHROCYTE [SEDIMENTATION RATE] IN BLOOD BY WESTERGREN METHOD: 26 MM/HR (ref 0–23)
EST. GFR  (AFRICAN AMERICAN): >60 ML/MIN/1.73 M^2
EST. GFR  (NON AFRICAN AMERICAN): >60 ML/MIN/1.73 M^2
ESTIMATED AVG GLUCOSE: 192 MG/DL (ref 68–131)
FERRITIN SERPL-MCNC: 83 NG/ML (ref 20–300)
GLUCOSE SERPL-MCNC: 178 MG/DL (ref 70–110)
HBA1C MFR BLD: 8.3 % (ref 4–5.6)
IRON SERPL-MCNC: 60 UG/DL (ref 45–160)
POTASSIUM SERPL-SCNC: 4.7 MMOL/L (ref 3.5–5.1)
PROT SERPL-MCNC: 6.8 G/DL (ref 6–8.4)
SATURATED IRON: 13 % (ref 20–50)
SODIUM SERPL-SCNC: 140 MMOL/L (ref 136–145)
TOTAL IRON BINDING CAPACITY: 462 UG/DL (ref 250–450)
TRANSFERRIN SERPL-MCNC: 312 MG/DL (ref 200–375)
TSH SERPL DL<=0.005 MIU/L-ACNC: 2.87 UIU/ML (ref 0.4–4)

## 2022-06-10 PROCEDURE — 86160 COMPLEMENT ANTIGEN: CPT | Performed by: NURSE PRACTITIONER

## 2022-06-10 PROCEDURE — 84443 ASSAY THYROID STIM HORMONE: CPT | Performed by: NURSE PRACTITIONER

## 2022-06-10 PROCEDURE — 84165 PATHOLOGIST INTERPRETATION SPE: ICD-10-PCS | Mod: 26,,, | Performed by: PATHOLOGY

## 2022-06-10 PROCEDURE — 84165 PROTEIN E-PHORESIS SERUM: CPT | Mod: 26,,, | Performed by: PATHOLOGY

## 2022-06-10 PROCEDURE — 85652 RBC SED RATE AUTOMATED: CPT | Performed by: NURSE PRACTITIONER

## 2022-06-10 PROCEDURE — 36415 COLL VENOUS BLD VENIPUNCTURE: CPT | Mod: PO | Performed by: NURSE PRACTITIONER

## 2022-06-10 PROCEDURE — 84156 ASSAY OF PROTEIN URINE: CPT | Performed by: NURSE PRACTITIONER

## 2022-06-10 PROCEDURE — 83921 ORGANIC ACID SINGLE QUANT: CPT | Performed by: NURSE PRACTITIONER

## 2022-06-10 PROCEDURE — 84166 PROTEIN E-PHORESIS/URINE/CSF: CPT | Performed by: NURSE PRACTITIONER

## 2022-06-10 PROCEDURE — 86803 HEPATITIS C AB TEST: CPT | Performed by: NURSE PRACTITIONER

## 2022-06-10 PROCEDURE — 84466 ASSAY OF TRANSFERRIN: CPT | Performed by: NURSE PRACTITIONER

## 2022-06-10 PROCEDURE — 86235 NUCLEAR ANTIGEN ANTIBODY: CPT | Performed by: NURSE PRACTITIONER

## 2022-06-10 PROCEDURE — 86038 ANTINUCLEAR ANTIBODIES: CPT | Performed by: NURSE PRACTITIONER

## 2022-06-10 PROCEDURE — 82728 ASSAY OF FERRITIN: CPT | Performed by: NURSE PRACTITIONER

## 2022-06-10 PROCEDURE — 86160 COMPLEMENT ANTIGEN: CPT | Mod: 59 | Performed by: NURSE PRACTITIONER

## 2022-06-10 PROCEDURE — 97018 PARAFFIN BATH THERAPY: CPT | Mod: PO

## 2022-06-10 PROCEDURE — 97530 THERAPEUTIC ACTIVITIES: CPT | Mod: PO

## 2022-06-10 PROCEDURE — 84425 ASSAY OF VITAMIN B-1: CPT | Performed by: NURSE PRACTITIONER

## 2022-06-10 PROCEDURE — 80053 COMPREHEN METABOLIC PANEL: CPT | Performed by: NURSE PRACTITIONER

## 2022-06-10 PROCEDURE — 83036 HEMOGLOBIN GLYCOSYLATED A1C: CPT | Performed by: NURSE PRACTITIONER

## 2022-06-10 PROCEDURE — 86235 NUCLEAR ANTIGEN ANTIBODY: CPT | Mod: 59 | Performed by: NURSE PRACTITIONER

## 2022-06-10 PROCEDURE — 84165 PROTEIN E-PHORESIS SERUM: CPT | Performed by: NURSE PRACTITIONER

## 2022-06-10 NOTE — PROGRESS NOTES
Occupational Therapy Daily Treatment Note     Name: Gio Forrest  [facility] Number: [id number]    Therapy Diagnosis:   Encounter Diagnoses   Name Primary?    Decreased range of motion of finger of right hand Yes    Right hand pain     Decreased  strength of right hand     Decreased pinch strength     Decreased activities of daily living (ADL)      Physician: Luis Alberto Payne MD    Physician orders: Please begin therapy to the right hand.  Include post MCP joint arthroplasty protocol.  Please limit weight-bearing to 1-2 lb     Frequency:  3 times per week     Duration:  6 weeks    Per MD note from 5/18/2022  Plan:     1. He will continue to work with therapy     2. Will begin to wean out of the Velcro splint     3. 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   Precautions: Standard and Diabetes and post op precautions; weightbearing 1-2#      Date of  Surgery: 4/12/22                              S/P: 8 weeks and 1 day on 6/08/2022        Subjective     Pt reports: No new symptoms or complaints  he was compliant with HEP.   Response to previous treatment:Very goood  Functional change: None reported today    Pain:  Functional Pain Scale Rating 0-10:   2/10 now  0/10 at best  3/10 at worst  Location:  R MF PIPJ, MPJ hurts much less.   Description: Burning and Tight  Aggravating Factors: bending and use   Easing Factors: rest and wearing brace     Objective   MEASUREMENTS  Edema:  5/18/2022           (in cm) L R   Proximal Wrist Crease       MPs 23.0 24.0   Long Proximal Phalanx 8.2 8.8     6/8/2022 MCP ext/flex:  - 5 / 80 degrees (vs 90 degrees on L)  6/08/2022 Position II    R 40#/55# vs 80#/88# on L.    TREATMENT  Gio received the following supervised modalities after being cleared for contradictions for:  Paraffin 123 degrees 10 min R hand iin composite fist.    Gio received the following direct contact modalities after being cleared for contraindications for 0 minutes:  -NT    Gio received the following manual therapy techniques for 0 minutes:   -Scar massage, gentle scar mob and retrograde massage 0 min total.    Gio received therapeutic exercises for 2 minutes including:  - Tendon Gliding Exercises: Full sequence 3x20  - fingerlifts 2x20    Gio participated in dynamic functional therapeutic activities to improve functional performance for 13 minutes, including:  Roll outs over blue flexbar x 3 min  AAROM wrist ext 2# DB 2x10 10 sec holds  Wrist wheel flex/ext 2 min  Grasp and rolls between hands 3x10 each with wax post removal    Home Exercises and Education Provided     Education provided:   -  Cont per previous    Written Home Exercises Provided:  Patient instructed to cont prior HEP.  Exercises were reviewed and Gio was able to demonstrate them prior to the end of the session.  Gio demonstrated good  understanding of the education provided.     See EMR under Media for exercises provided today and/or prior visit.        Assessment     Pt progressing very well. Cont per current poc.     - Progress towards goals:  STG #1 has been met.    Gio is progressing well towards his goals . Pt continues with good rehab potential.     Pt will continue to benefit from skilled outpatient occupational therapy to address the deficits listed in the problem list on initial evaluation in order to maximize pt's level of independence in the home and community.     Anticipated barriers to  occupational therapy: None    Pt's spiritual, cultural and educational needs considered and pt agreeable to plan of care and goals.    Goals:   Short Term Goals: (4 weeks)  1. Pt will be independent with HEP in 2 visits.  2. Pt will report decreased pain to a 3-4/10 at worst.   3. Pt will increase R IF and MF TRENT by 10-20  degrees to enable dressing, grooming activities.  4. Pt will make exhibit full composite flexion at uninvolved fingers to enable grasping and squeezing objects for self-care.     Long Term Goals: (by discharge)  1. Pt will report decreased pain to 1-2/10 with ADLs.   2. Pt will exhibit 230 TRENT at R MF to facilitate functional grasp.  3. Pt will exhibit R  strength at 75% of L comparative measures to enable firm grasp on utensils, tools, etc.   5. Pt will exhibit 9-12# of functional pinch strength to allow writing,opening containers, and turning keys.  6. Pt will exhibit a significant increase (20-30 points) in the Quick DASH assessment.  7. Pt will return to PLOF.   8. Minimize extension lag at MF.     Plan   Continue Occupational Therapy 3 times per week through current poc expiration date of 7/7/2022, in order to to decrease pain and edema, and increase A/PROM, strength, and functional use of R upper extremity.    Updates/Grading for next session:  Progress with OT as tolerated.      XAVIER Brock, JAZMINT

## 2022-06-13 LAB
ALBUMIN SERPL ELPH-MCNC: 3.85 G/DL (ref 3.35–5.55)
ALPHA1 GLOB SERPL ELPH-MCNC: 0.31 G/DL (ref 0.17–0.41)
ALPHA2 GLOB SERPL ELPH-MCNC: 0.78 G/DL (ref 0.43–0.99)
ANA SER-ACNC: NORMAL
ANTI-SSA ANTIBODY: 0.04 RATIO (ref 0–0.99)
ANTI-SSA INTERPRETATION: NEGATIVE
ANTI-SSB ANTIBODY: 0.05 RATIO (ref 0–0.99)
ANTI-SSB INTERPRETATION: NEGATIVE
B-GLOBULIN SERPL ELPH-MCNC: 0.92 G/DL (ref 0.5–1.1)
GAMMA GLOB SERPL ELPH-MCNC: 0.95 G/DL (ref 0.67–1.58)
HCV AB SERPL QL IA: NEGATIVE
PROT SERPL-MCNC: 6.8 G/DL (ref 6–8.4)

## 2022-06-14 LAB
METHYLMALONATE SERPL-SCNC: 0.23 UMOL/L
PATHOLOGIST INTERPRETATION SPE: NORMAL

## 2022-06-14 NOTE — DISCHARGE INSTRUCTIONS
Before leaving please make sure you have all your personal belongings such as glasses, purses, wallets, keys, cell phone, jewelry, jackets etc     Radiofrequency of Nerves     This procedure may take several weeks to relieve pain You may get some pain relief from the local anesthetic initally.     A steroid may also be injected to help with pain relief. Steroids can have side effect of flushed face and nervous feeling.     If Diabetic, monitor your glucose carefully due to steroids could raise blood sugar.     Wait until tomorrow to resume any blood thinners (aspirin, Plavix, Coumadin) but you may resume all your other medications today unless otherwise instructed by your MD.     You can resume your normal diet. If nauseated start with a bland diet and gradually increase to normal diet.     Due to having anesthesia do not drive, drink alcohol, or make legal decisions for 24 hours.     Do not lift over 10lbs for the first 24 hours, gradually increase your activities as tolerated.     No heat at the injection sites, including heating pads, hot tubs, saunas, swimming or tub baths for 2 full days.     For the next 2 days ,you may Use ice pack for mild swelling and for comfort , and apply for 20 minutes at a time.     You may shower today if not drowsy. Do not allow shower water to beat on injection sites for 2 Full days.    When to call your doctor   Call right away if you notice any of the following symptoms:   Severe pain or headache that is unrelieved with medication   Fever > 100.4 degrees For chills   Severe redness or swelling around the injection site   Chest pain or Shortness of breath   Continuous Vomiting   Increasing numbness or weakness in arms or legs lasting greater than 8 hours   If you are diabetic, a steroid injection can increase your blood sugar so moniter it carefully.     After Surgery:   Always be aware that any surgery can cause these symptoms:   ?   Pain- Medication can be prescribed for pain  to decrease your pain but may not completely take your pain away. Over the Counter pain medicine my be enough and you can always use Ice and rest to help ease pain.   ?   Bleeding- a little bleeding after a surgery is usually within normal. If there is a lot of blood you need to notify your MD. Emergency treatments of bleeding are cold application, elevation of the bleeding site and compression.   ?   Infection- Infection after surgery is NOT a normal occurrence. Signs of infection are fever, swelling, hot to touch the incision. If this occurs notify your MD immediately.   ?   Nausea- this can be common after a surgery especially if you have had anesthesia medicine or are taking pain medicine. The steroid the Dr injected can have a side effect of Nausea. Staying on clear liquids, bland foods, gingerale, or over the counter anti nausea medicines can help. If you vomit more than once, notify your MD. Anti Nausea medicines can be prescribed.

## 2022-06-14 NOTE — PROGRESS NOTES
Occupational Therapy Daily Treatment Note     Name: Gio Forrest  [facility] Number: [id number]    Therapy Diagnosis:   Encounter Diagnoses   Name Primary?    Decreased range of motion of finger of right hand Yes    Right hand pain     Decreased  strength of right hand     Decreased pinch strength     Decreased activities of daily living (ADL)      Physician: Luis Alberto Payne MD    Physician orders: Please begin therapy to the right hand.  Include post MCP joint arthroplasty protocol.  Please limit weight-bearing to 1-2 lb     Frequency:  3 times per week     Duration:  6 weeks    Per MD note from 5/18/2022  Plan:     1. He will continue to work with therapy     2. Will begin to wean out of the Velcro splint     3. 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   Precautions: Standard and Diabetes and post op precautions; weightbearing 1-2#      Date of  Surgery: 4/12/22                              S/P: 8 weeks and 1 day on 6/08/2022        Subjective     Pt reports: No new symptoms or complaints  he was compliant with HEP.   Response to previous treatment:Very goood  Functional change: None reported today    Pain:  Functional Pain Scale Rating 0-10:   1/10 now  0/10 at best  2/10 at worst  Location:  R MF PIPJ, MPJ hurts much less.   Description: Burning and Tight  Aggravating Factors: bending and use   Easing Factors: rest and wearing brace     Objective    MEASUREMENTS  Edema:  5/18/2022          (in cm) L R   Proximal Wrist Crease       MPs 23.0 24.0   Long Proximal Phalanx 8.2 8.8     6/15/2022 MCP ext/flex:  - 5 (NT) / 82 (+2) degrees (vs 90 degrees on L)    6/08/2022 Position II    R 40#/55# vs 80#/88# on L.    TREATMENT  Gio received the following supervised modalities after being cleared for contradictions for Fluidotherapy 15 min at 115 degrees    Gio received the following direct contact modalities after being cleared for contraindications for 0 minutes:  -NT    Gio received the following manual therapy techniques for 0 minutes:   -Scar massage, gentle scar mob and retrograde massage 0 min total.    Gio received therapeutic exercises for 2 minutes including:  - Tendon Gliding Exercises: Full sequence 3x20  - fingerlifts 2x20    Gio participated in dynamic functional therapeutic activities to improve functional performance for 23 minutes, including:  Flexbar palm up/palm down 3x10 each with red.   Wrist wheel flex/ext 2 min  Roll outs over red flexbar 2 min   Hand ex yellow, tan and green 5x10    Home Exercises and Education Provided     Education provided:   -  Cont per previous    Written Home Exercises Provided:  Patient instructed to cont prior HEP.  Exercises were reviewed and Gio was able to demonstrate them prior to the end of the session.  Gio demonstrated good  understanding of the education provided.     See EMR under Media for exercises provided today and/or prior visit.        Assessment     Pt progressing very well. Cont per current poc.     - Progress towards goals:  STG #1 has been met.    Gio is progressing well towards his goals . Pt continues with good rehab potential.     Pt will continue to benefit from skilled outpatient occupational therapy to address the deficits listed in the problem list on initial evaluation in order to maximize pt's level of independence in the home and community.     Anticipated  barriers to occupational therapy: None    Pt's spiritual, cultural and educational needs considered and pt agreeable to plan of care and goals.    Goals:   Short Term Goals: (4 weeks)  1. Pt will be independent with HEP in 2 visits.  2. Pt will report decreased pain to a 3-4/10 at worst.   3. Pt will increase R IF and MF TRENT by 10-20  degrees to enable dressing, grooming activities.  4. Pt will make exhibit full composite flexion at uninvolved fingers to enable grasping and squeezing objects for self-care.     Long Term Goals: (by discharge)  1. Pt will report decreased pain to 1-2/10 with ADLs.   2. Pt will exhibit 230 TRENT at R MF to facilitate functional grasp.  3. Pt will exhibit R  strength at 75% of L comparative measures to enable firm grasp on utensils, tools, etc.   5. Pt will exhibit 9-12# of functional pinch strength to allow writing,opening containers, and turning keys.  6. Pt will exhibit a significant increase (20-30 points) in the Quick DASH assessment.  7. Pt will return to PLOF.   8. Minimize extension lag at MF.     Plan   Continue Occupational Therapy 3 times per week through current poc expiration date of 7/7/2022, in order to to decrease pain and edema, and increase A/PROM, strength, and functional use of R upper extremity.    Updates/Grading for next session:  Progress with OT as tolerated.      XAVIER Brock, JAZMINT

## 2022-06-15 ENCOUNTER — CLINICAL SUPPORT (OUTPATIENT)
Dept: REHABILITATION | Facility: HOSPITAL | Age: 57
End: 2022-06-15
Payer: MEDICARE

## 2022-06-15 DIAGNOSIS — M25.641 DECREASED RANGE OF MOTION OF FINGER OF RIGHT HAND: Primary | ICD-10-CM

## 2022-06-15 DIAGNOSIS — R29.898 DECREASED GRIP STRENGTH OF RIGHT HAND: ICD-10-CM

## 2022-06-15 DIAGNOSIS — Z78.9 DECREASED ACTIVITIES OF DAILY LIVING (ADL): ICD-10-CM

## 2022-06-15 DIAGNOSIS — M79.641 RIGHT HAND PAIN: ICD-10-CM

## 2022-06-15 DIAGNOSIS — R29.898 DECREASED PINCH STRENGTH: ICD-10-CM

## 2022-06-15 LAB
PROT UR-MCNC: <7 MG/DL (ref 0–15)
VIT B1 BLD-MCNC: 107 UG/L (ref 38–122)

## 2022-06-15 PROCEDURE — 84166 PATHOLOGIST INTERPRETATION UPE: ICD-10-PCS | Mod: 26,,, | Performed by: PATHOLOGY

## 2022-06-15 PROCEDURE — 97022 WHIRLPOOL THERAPY: CPT | Mod: PO

## 2022-06-15 PROCEDURE — 84166 PROTEIN E-PHORESIS/URINE/CSF: CPT | Mod: 26,,, | Performed by: PATHOLOGY

## 2022-06-15 PROCEDURE — 97530 THERAPEUTIC ACTIVITIES: CPT | Mod: PO

## 2022-06-16 DIAGNOSIS — M79.641 RIGHT HAND PAIN: Primary | ICD-10-CM

## 2022-06-16 LAB
PATHOLOGIST INTERPRETATION UPE: NORMAL
PROT PATTERN UR ELPH-IMP: NORMAL

## 2022-06-17 ENCOUNTER — HOSPITAL ENCOUNTER (OUTPATIENT)
Facility: AMBULARY SURGERY CENTER | Age: 57
Discharge: HOME | End: 2022-06-17
Attending: ANESTHESIOLOGY | Admitting: ANESTHESIOLOGY
Payer: MEDICARE

## 2022-06-17 ENCOUNTER — TELEPHONE (OUTPATIENT)
Dept: REHABILITATION | Facility: HOSPITAL | Age: 57
End: 2022-06-17
Payer: MEDICARE

## 2022-06-17 DIAGNOSIS — M47.896 OTHER SPONDYLOSIS, LUMBAR REGION: Primary | ICD-10-CM

## 2022-06-17 PROCEDURE — 64635 DESTROY LUMB/SAC FACET JNT: CPT | Mod: 50,,, | Performed by: ANESTHESIOLOGY

## 2022-06-17 PROCEDURE — 64635 PR DESTROY LUMB/SAC FACET JNT: ICD-10-PCS | Mod: 50,,, | Performed by: ANESTHESIOLOGY

## 2022-06-17 PROCEDURE — 64635 DESTROY LUMB/SAC FACET JNT: CPT | Mod: RT

## 2022-06-17 PROCEDURE — 64636 DESTROY L/S FACET JNT ADDL: CPT | Mod: 50,,, | Performed by: ANESTHESIOLOGY

## 2022-06-17 PROCEDURE — 64635 DESTROY LUMB/SAC FACET JNT: CPT | Mod: LT | Performed by: ANESTHESIOLOGY

## 2022-06-17 PROCEDURE — 64636 DESTROY L/S FACET JNT ADDL: CPT | Mod: LT | Performed by: ANESTHESIOLOGY

## 2022-06-17 PROCEDURE — 64636 PR DESTROY L/S FACET JNT ADDL: ICD-10-PCS | Mod: 50,,, | Performed by: ANESTHESIOLOGY

## 2022-06-17 RX ORDER — LIDOCAINE HYDROCHLORIDE 20 MG/ML
INJECTION, SOLUTION EPIDURAL; INFILTRATION; INTRACAUDAL; PERINEURAL
Status: DISCONTINUED | OUTPATIENT
Start: 2022-06-17 | End: 2022-06-17 | Stop reason: HOSPADM

## 2022-06-17 RX ORDER — MIDAZOLAM HYDROCHLORIDE 1 MG/ML
INJECTION INTRAMUSCULAR; INTRAVENOUS
Status: DISCONTINUED | OUTPATIENT
Start: 2022-06-17 | End: 2022-06-17 | Stop reason: HOSPADM

## 2022-06-17 RX ORDER — BUPIVACAINE HYDROCHLORIDE 2.5 MG/ML
INJECTION, SOLUTION EPIDURAL; INFILTRATION; INTRACAUDAL
Status: DISCONTINUED | OUTPATIENT
Start: 2022-06-17 | End: 2022-06-17 | Stop reason: HOSPADM

## 2022-06-17 RX ORDER — METHYLPREDNISOLONE ACETATE 80 MG/ML
INJECTION, SUSPENSION INTRA-ARTICULAR; INTRALESIONAL; INTRAMUSCULAR; SOFT TISSUE
Status: DISCONTINUED | OUTPATIENT
Start: 2022-06-17 | End: 2022-06-17 | Stop reason: HOSPADM

## 2022-06-17 RX ORDER — FENTANYL CITRATE 50 UG/ML
INJECTION, SOLUTION INTRAMUSCULAR; INTRAVENOUS
Status: DISCONTINUED | OUTPATIENT
Start: 2022-06-17 | End: 2022-06-17 | Stop reason: HOSPADM

## 2022-06-17 RX ORDER — SODIUM CHLORIDE, SODIUM LACTATE, POTASSIUM CHLORIDE, CALCIUM CHLORIDE 600; 310; 30; 20 MG/100ML; MG/100ML; MG/100ML; MG/100ML
INJECTION, SOLUTION INTRAVENOUS ONCE AS NEEDED
Status: COMPLETED | OUTPATIENT
Start: 2022-06-17 | End: 2022-06-17

## 2022-06-17 RX ORDER — LIDOCAINE HYDROCHLORIDE 10 MG/ML
INJECTION, SOLUTION EPIDURAL; INFILTRATION; INTRACAUDAL; PERINEURAL
Status: DISCONTINUED | OUTPATIENT
Start: 2022-06-17 | End: 2022-06-17 | Stop reason: HOSPADM

## 2022-06-17 RX ADMIN — SODIUM CHLORIDE, SODIUM LACTATE, POTASSIUM CHLORIDE, CALCIUM CHLORIDE: 600; 310; 30; 20 INJECTION, SOLUTION INTRAVENOUS at 10:06

## 2022-06-17 NOTE — OP NOTE
PROCEDURE DATE: 6/17/2022    PROCEDURE:  Radiofrequency ablation of the L3,4,5 medial branch nerves on the bilateral-side utilizing fluoroscopy    DIAGNOSIS:  Other lumbar spondylosis  Post op Diagnosis: Same    PHYSICIAN: Thiago Garcia MD    MEDICATIONS INJECTED:  From a mixture of 6ml of 0.25% bupivicaine and 80mg of methylprednisone,  1ml of this solution was injected at each level.    LOCAL ANESTHETIC USED: Lidocaine 1%, 2 ml given at each site.    SEDATION MEDICATIONS: RN IV sedation    ESTIMATED BLOOD LOSS:  None    COMPLICATIONS:  None    TECHNIQUE:  A time out was taken to identify patient and procedure side prior to starting the procedure. Laying in a prone position, the patient was prepped and draped in the usual sterile fashion using ChloraPrep and sterile towels.  The levels were determined under fluoroscopic guidance and then marked.  Local anesthetic was given by raising a wheal at the skin over each site and then infiltrated approximately 2cm deeper.  A 20-gauge  100 mm RF needle was introduced to the anatomic location of the bilateral L3,4,5 medial branch nerves.  Motor stimulation up to 2 Volts at each level confirmed no motor nerve involvement.  Impedance was less than 800 ohms at each level.  1ml of 2% lidocaine was instilled prior to lesioning.  Ablation was performed per level utilizing radiofrequency generator 80°C for 60 seconds.  Needles were then rotated 90° and a second lesion was performed.  The above noted medication was then injected slowly. The patient tolerated the procedure well.     The patient was monitored after the procedure.  Patient was given post procedure and discharge instructions to follow at home.  The patient was discharged in a stable condition

## 2022-06-17 NOTE — PLAN OF CARE
Patient awake and alert and says he is ready to go home. Patient's spouse says she is ready to take pt home and she is driving. Patient's vital signs and injection sites stable. Patient denies pain, nausea, weakness or dizziness. Patient is able to stand up and ambulate to wheelchair with very minimal assistance from nurse. All patient belongings have been returned to patient.

## 2022-06-17 NOTE — DISCHARGE SUMMARY
[facility]  Discharge Note  Short Stay    Procedure(s) (LRB):  Radiofrequency Ablation, Nerve, Spinal, Lumbar, Medial Branch, 1 Level L3,4,5 (Bilateral)    OUTCOME: Patient tolerated treatment/procedure well without complication and is now ready for discharge.    DISPOSITION: Home or Self Care    FINAL DIAGNOSIS:  <principal problem not specified>    FOLLOWUP: In clinic    DISCHARGE INSTRUCTIONS:    Discharge Procedure Orders   Notify your health care provider if you experience any of the following:  temperature >100.4     Notify your health care provider if you experience any of the following:  severe uncontrolled pain     Notify your health care provider if you experience any of the following:  redness, tenderness, or signs of infection (pain, swelling, redness, odor or green/yellow discharge around incision site)     Activity as tolerated        TIME SPENT ON DISCHARGE: 30 minutes

## 2022-06-20 NOTE — PROGRESS NOTES
Occupational Therapy Daily Treatment Note     Name: Gio Forrest  [facility] Number: [id number]    Therapy Diagnosis:   Encounter Diagnoses   Name Primary?    Decreased range of motion of finger of right hand Yes    Right hand pain     Decreased  strength of right hand     Decreased pinch strength     Decreased activities of daily living (ADL)      Physician: Luis Alberto Payne MD    Physician orders: Please begin therapy to the right hand.  Include post MCP joint arthroplasty protocol.  Please limit weight-bearing to 1-2 lb     Frequency:  3 times per week     Duration:  6 weeks    Per MD note from 5/18/2022  Plan:     1. He will continue to work with therapy     2. Will begin to wean out of the Velcro splint     3. 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   Precautions: Standard and Diabetes and post op precautions; weightbearing 1-2#      Date of  Surgery: 4/12/22                              S/P: 10 weeks on 6/21/2022        Subjective     Pt reports: No new symptoms or complaints  he was compliant with HEP.   Response to previous treatment:Very goood  Functional change: None reported today    Pain:  Functional Pain Scale Rating 0-10:   1/10 now  0/10 at best  2/10 at worst  Location:  R MF PIPJ, MPJ hurts much less.   Description: Burning and Tight  Aggravating Factors: bending and use   Easing Factors: rest and wearing brace     Objective    MEASUREMENTS  Edema:  5/18/2022          (in cm) L R   Proximal Wrist Crease       MPs 23.0 24.0   Long Proximal Phalanx 8.2 8.8     6/15/2022 MCP ext/flex:  - 5 (NT) / 82 (+2) degrees (vs 90 degrees on L)    6/08/2022 Position II    R 40#/55# vs 80#/88# on L.    TREATMENT  Gio received the following supervised modalities after being cleared for contradictions for paraffin x 10 minutes     Gio received the following direct contact modalities after being cleared for contraindications for 0 minutes:  -NT    Gio received the following manual therapy techniques for 5 minutes:   -Scar massage, gentle scar mob and retrograde massage 5 min total.    Gio received therapeutic exercises for 0 minutes including:  - Tendon Gliding Exercises: Full sequence 3x20 (NT)   - fingerlifts 2x20  (NT)     Goi participated in dynamic functional therapeutic activities to improve functional performance for 37 minutes, including:   Flexbar palm up/palm down 3x10 each with green   Wrist wheel flex/ext 2 min   Roll outs over green flexbar 2 min   Hand ex yellow, tan and green 5x10  -intrinsic + isometrics with green sponge 30 reps     Home Exercises and Education Provided     Education provided:   -  Cont per previous    Written Home Exercises Provided:  Patient instructed to cont prior HEP.  Exercises were reviewed and Gio was able to demonstrate them prior to the end of the session.  Gio demonstrated good  understanding of the education provided.     See EMR under Media for exercises provided today and/or prior visit.        Assessment     Pt with excellent AROM at MPJ and good fist formation. Progressed resistance levels on flexbar.     - Progress towards goals:  STG #1 has been met.    Gio is progressing well towards his goals . Pt continues with good rehab potential.     Pt will continue to benefit from skilled outpatient occupational therapy to address the deficits listed in the problem list on  initial evaluation in order to maximize pt's level of independence in the home and community.     Anticipated barriers to occupational therapy: None    Pt's spiritual, cultural and educational needs considered and pt agreeable to plan of care and goals.    Goals:   Short Term Goals: (4 weeks)  1. Pt will be independent with HEP in 2 visits.  2. Pt will report decreased pain to a 3-4/10 at worst.   3. Pt will increase R IF and MF TRENT by 10-20  degrees to enable dressing, grooming activities.  4. Pt will make exhibit full composite flexion at uninvolved fingers to enable grasping and squeezing objects for self-care.     Long Term Goals: (by discharge)  1. Pt will report decreased pain to 1-2/10 with ADLs.   2. Pt will exhibit 230 TRENT at R MF to facilitate functional grasp.  3. Pt will exhibit R  strength at 75% of L comparative measures to enable firm grasp on utensils, tools, etc.   5. Pt will exhibit 9-12# of functional pinch strength to allow writing,opening containers, and turning keys.  6. Pt will exhibit a significant increase (20-30 points) in the Quick DASH assessment.  7. Pt will return to PLOF.   8. Minimize extension lag at MF.     Plan   Continue Occupational Therapy 3 times per week through current poc expiration date of 7/7/2022, in order to to decrease pain and edema, and increase A/PROM, strength, and functional use of R upper extremity.    Updates/Grading for next session:  Progress with OT as tolerated.    XAVIER Oconnor, JAZMINT

## 2022-06-21 ENCOUNTER — OFFICE VISIT (OUTPATIENT)
Dept: ORTHOPEDICS | Facility: CLINIC | Age: 57
End: 2022-06-21
Payer: MEDICARE

## 2022-06-21 ENCOUNTER — TELEPHONE (OUTPATIENT)
Dept: ENDOCRINOLOGY | Facility: CLINIC | Age: 57
End: 2022-06-21

## 2022-06-21 ENCOUNTER — OFFICE VISIT (OUTPATIENT)
Dept: ENDOCRINOLOGY | Facility: CLINIC | Age: 57
End: 2022-06-21
Payer: MEDICARE

## 2022-06-21 ENCOUNTER — CLINICAL SUPPORT (OUTPATIENT)
Dept: REHABILITATION | Facility: HOSPITAL | Age: 57
End: 2022-06-21
Payer: MEDICARE

## 2022-06-21 VITALS
WEIGHT: 309 LBS | WEIGHT: 307 LBS | HEIGHT: 74 IN | BODY MASS INDEX: 39.4 KG/M2 | OXYGEN SATURATION: 97 % | TEMPERATURE: 98 F | RESPIRATION RATE: 18 BRPM | HEART RATE: 62 BPM | BODY MASS INDEX: 39.66 KG/M2 | HEIGHT: 74 IN | SYSTOLIC BLOOD PRESSURE: 112 MMHG | DIASTOLIC BLOOD PRESSURE: 74 MMHG

## 2022-06-21 VITALS
HEART RATE: 66 BPM | DIASTOLIC BLOOD PRESSURE: 64 MMHG | BODY MASS INDEX: 39.46 KG/M2 | HEIGHT: 74 IN | OXYGEN SATURATION: 97 % | WEIGHT: 307.44 LBS | SYSTOLIC BLOOD PRESSURE: 108 MMHG

## 2022-06-21 DIAGNOSIS — M70.61 GREATER TROCHANTERIC BURSITIS OF BOTH HIPS: ICD-10-CM

## 2022-06-21 DIAGNOSIS — R29.898 DECREASED GRIP STRENGTH OF RIGHT HAND: ICD-10-CM

## 2022-06-21 DIAGNOSIS — Z78.9 DECREASED ACTIVITIES OF DAILY LIVING (ADL): ICD-10-CM

## 2022-06-21 DIAGNOSIS — M25.641 DECREASED RANGE OF MOTION OF FINGER OF RIGHT HAND: Primary | ICD-10-CM

## 2022-06-21 DIAGNOSIS — E78.5 HYPERLIPIDEMIA DUE TO TYPE 2 DIABETES MELLITUS: ICD-10-CM

## 2022-06-21 DIAGNOSIS — Z79.4 TYPE 2 DIABETES MELLITUS WITH DIABETIC POLYNEUROPATHY, WITH LONG-TERM CURRENT USE OF INSULIN: Primary | ICD-10-CM

## 2022-06-21 DIAGNOSIS — M25.552 BILATERAL HIP PAIN: Primary | ICD-10-CM

## 2022-06-21 DIAGNOSIS — E11.42 TYPE 2 DIABETES MELLITUS WITH DIABETIC POLYNEUROPATHY, WITH LONG-TERM CURRENT USE OF INSULIN: Primary | ICD-10-CM

## 2022-06-21 DIAGNOSIS — G47.33 OSA ON CPAP: ICD-10-CM

## 2022-06-21 DIAGNOSIS — I10 ESSENTIAL HYPERTENSION: ICD-10-CM

## 2022-06-21 DIAGNOSIS — E11.69 HYPERLIPIDEMIA DUE TO TYPE 2 DIABETES MELLITUS: ICD-10-CM

## 2022-06-21 DIAGNOSIS — R29.898 DECREASED PINCH STRENGTH: ICD-10-CM

## 2022-06-21 DIAGNOSIS — M25.551 BILATERAL HIP PAIN: Primary | ICD-10-CM

## 2022-06-21 DIAGNOSIS — M79.641 RIGHT HAND PAIN: ICD-10-CM

## 2022-06-21 DIAGNOSIS — I25.10 ATHEROSCLEROSIS OF NATIVE CORONARY ARTERY OF NATIVE HEART WITHOUT ANGINA PECTORIS: ICD-10-CM

## 2022-06-21 DIAGNOSIS — Z46.81 INSULIN PUMP FITTING OR ADJUSTMENT: ICD-10-CM

## 2022-06-21 DIAGNOSIS — M70.62 GREATER TROCHANTERIC BURSITIS OF BOTH HIPS: ICD-10-CM

## 2022-06-21 PROCEDURE — 3052F HG A1C>EQUAL 8.0%<EQUAL 9.0%: CPT | Mod: CPTII,S$GLB,, | Performed by: NURSE PRACTITIONER

## 2022-06-21 PROCEDURE — 1111F PR DISCHARGE MEDS RECONCILED W/ CURRENT OUTPATIENT MED LIST: ICD-10-PCS | Mod: CPTII,S$GLB,, | Performed by: NURSE PRACTITIONER

## 2022-06-21 PROCEDURE — 1159F PR MEDICATION LIST DOCUMENTED IN MEDICAL RECORD: ICD-10-PCS | Mod: CPTII,S$GLB,, | Performed by: NURSE PRACTITIONER

## 2022-06-21 PROCEDURE — 99999 PR PBB SHADOW E&M-EST. PATIENT-LVL IV: ICD-10-PCS | Mod: PBBFAC,,, | Performed by: NURSE PRACTITIONER

## 2022-06-21 PROCEDURE — 3008F BODY MASS INDEX DOCD: CPT | Mod: CPTII,S$GLB,, | Performed by: NURSE PRACTITIONER

## 2022-06-21 PROCEDURE — 99214 PR OFFICE/OUTPT VISIT, EST, LEVL IV, 30-39 MIN: ICD-10-PCS | Mod: S$GLB,,, | Performed by: NURSE PRACTITIONER

## 2022-06-21 PROCEDURE — 20610 DRAIN/INJ JOINT/BURSA W/O US: CPT | Mod: 50,S$GLB,, | Performed by: NURSE PRACTITIONER

## 2022-06-21 PROCEDURE — 97018 PARAFFIN BATH THERAPY: CPT | Mod: PO

## 2022-06-21 PROCEDURE — 3008F PR BODY MASS INDEX (BMI) DOCUMENTED: ICD-10-PCS | Mod: CPTII,S$GLB,, | Performed by: NURSE PRACTITIONER

## 2022-06-21 PROCEDURE — 1111F DSCHRG MED/CURRENT MED MERGE: CPT | Mod: CPTII,S$GLB,, | Performed by: NURSE PRACTITIONER

## 2022-06-21 PROCEDURE — 3078F PR MOST RECENT DIASTOLIC BLOOD PRESSURE < 80 MM HG: ICD-10-PCS | Mod: CPTII,S$GLB,, | Performed by: NURSE PRACTITIONER

## 2022-06-21 PROCEDURE — 1160F PR REVIEW ALL MEDS BY PRESCRIBER/CLIN PHARMACIST DOCUMENTED: ICD-10-PCS | Mod: CPTII,S$GLB,, | Performed by: NURSE PRACTITIONER

## 2022-06-21 PROCEDURE — 1159F MED LIST DOCD IN RCRD: CPT | Mod: CPTII,S$GLB,, | Performed by: NURSE PRACTITIONER

## 2022-06-21 PROCEDURE — 99999 PR PBB SHADOW E&M-EST. PATIENT-LVL IV: CPT | Mod: PBBFAC,,, | Performed by: NURSE PRACTITIONER

## 2022-06-21 PROCEDURE — 1160F RVW MEDS BY RX/DR IN RCRD: CPT | Mod: CPTII,S$GLB,, | Performed by: NURSE PRACTITIONER

## 2022-06-21 PROCEDURE — 99214 OFFICE O/P EST MOD 30 MIN: CPT | Mod: S$GLB,,, | Performed by: NURSE PRACTITIONER

## 2022-06-21 PROCEDURE — 3078F DIAST BP <80 MM HG: CPT | Mod: CPTII,S$GLB,, | Performed by: NURSE PRACTITIONER

## 2022-06-21 PROCEDURE — 20610 LARGE JOINT ASPIRATION/INJECTION: BILATERAL GREATER TROCHANTERIC BURSA: ICD-10-PCS | Mod: 50,S$GLB,, | Performed by: NURSE PRACTITIONER

## 2022-06-21 PROCEDURE — 99213 PR OFFICE/OUTPT VISIT, EST, LEVL III, 20-29 MIN: ICD-10-PCS | Mod: 25,S$GLB,, | Performed by: NURSE PRACTITIONER

## 2022-06-21 PROCEDURE — 99999 PR PBB SHADOW E&M-EST. PATIENT-LVL V: CPT | Mod: PBBFAC,,, | Performed by: NURSE PRACTITIONER

## 2022-06-21 PROCEDURE — 99213 OFFICE O/P EST LOW 20 MIN: CPT | Mod: 25,S$GLB,, | Performed by: NURSE PRACTITIONER

## 2022-06-21 PROCEDURE — 3052F PR MOST RECENT HEMOGLOBIN A1C LEVEL 8.0 - < 9.0%: ICD-10-PCS | Mod: CPTII,S$GLB,, | Performed by: NURSE PRACTITIONER

## 2022-06-21 PROCEDURE — 3074F PR MOST RECENT SYSTOLIC BLOOD PRESSURE < 130 MM HG: ICD-10-PCS | Mod: CPTII,S$GLB,, | Performed by: NURSE PRACTITIONER

## 2022-06-21 PROCEDURE — 3074F SYST BP LT 130 MM HG: CPT | Mod: CPTII,S$GLB,, | Performed by: NURSE PRACTITIONER

## 2022-06-21 PROCEDURE — 97110 THERAPEUTIC EXERCISES: CPT | Mod: PO

## 2022-06-21 PROCEDURE — 99999 PR PBB SHADOW E&M-EST. PATIENT-LVL V: ICD-10-PCS | Mod: PBBFAC,,, | Performed by: NURSE PRACTITIONER

## 2022-06-21 RX ORDER — TRIAMCINOLONE ACETONIDE 40 MG/ML
20 INJECTION, SUSPENSION INTRA-ARTICULAR; INTRAMUSCULAR
Status: DISCONTINUED | OUTPATIENT
Start: 2022-06-21 | End: 2022-06-21 | Stop reason: HOSPADM

## 2022-06-21 RX ORDER — SEMAGLUTIDE 2.68 MG/ML
2 INJECTION, SOLUTION SUBCUTANEOUS
Qty: 9 ML | Refills: 4 | Status: ON HOLD | OUTPATIENT
Start: 2022-06-21 | End: 2023-01-21 | Stop reason: HOSPADM

## 2022-06-21 RX ADMIN — TRIAMCINOLONE ACETONIDE 20 MG: 40 INJECTION, SUSPENSION INTRA-ARTICULAR; INTRAMUSCULAR at 09:06

## 2022-06-21 NOTE — PROGRESS NOTES
CC: This 56 y.o. male presents for management of diabetes  and chronic conditions pending review including HTN, HLP, morbid obesity, fatty liver, vitamin d deficiency, CM, CAD     HPI: He was diagnosed with T2DM in 2005. Has never been hospitalized r/t DM.  Mother has passed away from Fatty liver and hepatic carcinoma in 2018  Family hx of DM: sister, mom and dad    Arrives 20 minutes late to his appt  Forgot his dexcom reader at home so unable to see any data  A1C improved since last visit but not at goal  PNA- admitted to the hospital- reports being on oral steroids at this time (end of May)  Father passed away ~ 3 weeks ago  Also with rhizotomy and steroid injection this past Friday  Plans to also follow up w Dr Devine    Pump downloaded see in Media tab  bg more 200-250srange  Diet: Eats 3  Meals a day, snacks - likes sweets  Has been entering in CHO and bg into pump  Tries to enter in prior to eating  Rare hypoglycemia per his report  Exercise: none  CURRENT DM MEDS: metformin 1000 mg bid, actos 15 mg qd,  Ozempic 1 mg weekly (Sunday)  Humalog in [employer] 670g insulin pump: (enters 75 carbs with a meal, 45 with a snack )  Basal 1.8 u/h  Carb ratio 1:5  ISF 30  target 120-140  Act Ins 3 hrs   Vial/pen:  Uses pen  Glucometer type:  True Test 2018    Standards of Care:  Eye exam: 2/2022  no h/o retinopathy, [facility]    Following w Dr Bergman's in cardiology      ROS:   Gen: Appetite good, + weight gain 12 lbs    Eyes: Denies visual disturbances  Resp: no SOB or HENDERSON, no cough  Cardiac: No palpitations, chest pain, no edema   GI: No nausea or vomiting, diarrhea, constipation, or abdominal pain.  /GYN: No nocturia, burning or pain.   MS/Neuro: +numbness/ tingling in BLE; Gait steady, speech clear  Other systems: negative.    PE:  GENERAL: Well developed, well nourished.appears older than age.   PSYCH: AAOx3, appropriate mood and affect, pleasant expression, conversant, appears relaxed, well groomed.    EYES: Conjunctiva, corneas clear  NECK: Supple, trachea midline   NEURO: Gait steady  SKIN:   no acanthosis nigracans.  FOOT EXAMINATION:  3/22/2022  No foot deformity, corns or callus formation,  nails in good condiiton and well trimmed, no interspace maceration or ulceration noted.  Decreased hair growth present over toes/feet.  Protective sensation intact with 10 gram monofilament.  +2 dorsalis pedis and posterior pulses noted.    Lab Results   Component Value Date    MICALBCREAT 4.0 12/13/2021       Hemoglobin A1C   Date Value Ref Range Status   06/10/2022 8.3 (H) 4.0 - 5.6 % Final     Comment:     ADA Screening Guidelines:  5.7-6.4%  Consistent with prediabetes  >or=6.5%  Consistent with diabetes    High levels of fetal hemoglobin interfere with the HbA1C  assay. Heterozygous hemoglobin variants (HbS, HgC, etc)do  not significantly interfere with this assay.   However, presence of multiple variants may affect accuracy.     03/17/2022 9.1 (H) 4.0 - 5.6 % Final     Comment:     ADA Screening Guidelines:  5.7-6.4%  Consistent with prediabetes  >or=6.5%  Consistent with diabetes    High levels of fetal hemoglobin interfere with the HbA1C  assay. Heterozygous hemoglobin variants (HbS, HgC, etc)do  not significantly interfere with this assay.   However, presence of multiple variants may affect accuracy.     12/13/2021 9.9 (H) 4.0 - 5.6 % Final     Comment:     ADA Screening Guidelines:  5.7-6.4%  Consistent with prediabetes  >or=6.5%  Consistent with diabetes    High levels of fetal hemoglobin interfere with the HbA1C  assay. Heterozygous hemoglobin variants (HbS, HgC, etc)do  not significantly interfere with this assay.   However, presence of multiple variants may affect accuracy.          ASSESSMENT and PLAN:    1. Type 2 diabetes w hyperglycemia , DM PN  Continue metformin 1000 mg bid, Actos 15 mg qday, Increase Ozempic to 2 mg qweekly  Change Carb ratio to 4 in pump  Continue pump and sensor- put bg in pump and  put carbs into pump with every meal and snack     2. HTN - controlled, continue meds as previously prescribed and monitor.     3. HLP -  on statin therapy,     4. TARYN-  wearing regularly     5. LINDSEY- encouraged weight loss,  mother passed away w HCC    6. Obesity-  Body mass index is 39.47 kg/m².   improve diet, activity as tolerated, goal exercise 30 mins a day, 5 days a week; limit snacking      7. Addiction to gambling- in counseling, has currently stopped gambling    8. CAD, CM  Follows w Dr Bergman's     9. Insulin pump adjustment as above     Follow-up:   3 months with lab prior

## 2022-06-21 NOTE — PROGRESS NOTES
Chief Complaint   Patient presents with    Right Hip - Pain    Left Hip - Pain      HPI:   This is a 56 y.o. who presents to clinic today for bilateral hip pain for the past 1 years after no known trauma. Complains of pain while sleeping on side and when descending stairs. Pain is deep. No numbness or tingling. No associated signs or symptoms. He is IDDM.       Past Medical History:   Diagnosis Date    Cardiomyopathy     Depression     Diabetes     Dyslipidemia 08/12/2015    Gout 08/12/2015    Hypertension     Idiopathic gout     Lumbar pseudoarthrosis     LINDSEY (nonalcoholic steatohepatitis)     Obesity 08/12/2015    Obstructive sleep apnea 08/12/2015    Restless leg syndrome     Sebaceous cyst 04/18/2017    Type 2 diabetes mellitus 08/12/2015     Past Surgical History:   Procedure Laterality Date    ABLATION N/A 10/8/2020    Procedure: Ablation;  Surgeon: Rip Che III, MD;  Location: [facility] CATH;  Service: Cardiology;  Laterality: N/A;    ARTHROPLASTY OF JOINT OF FINGER Right 4/12/2022    Procedure: Right middle finger MCP joint arthroplasty;  Surgeon: Luis Alberto Payne MD;  Location: [facility] OR;  Service: Orthopedics;  Laterality: Right;  Anesthesia:  General with a single-shot supraclavicular block    BACK SURGERY      x2    CARDIAC CATHETERIZATION      CARDIAC ELECTROPHYSIOLOGY STUDY  10/8/2020    Procedure: Study possible ablation;  Surgeon: Rip Che III, MD;  Location: [facility] CATH;  Service: Cardiology;;    CERVICAL SPINE SURGERY      CHOLECYSTECTOMY  05/30/2018    Dr. ROGELIO Tao, [facility]     COLONOSCOPY  2008    COLONOSCOPY N/A 9/14/2017    Procedure: COLONOSCOPY;  Surgeon: Obi Beltre MD;  Location: [facility] ENDO;  Service: Endoscopy;  Laterality: N/A;    CORONARY ANGIOGRAPHY Left 5/28/2018    Procedure: Coronary angiography;  Surgeon: Rafiq Malik MD;  Location: [facility] CATH;  Service: Cardiology;  Laterality: Left;    ELBOW SURGERY Bilateral     HERNIA REPAIR       LAPAROSCOPIC APPENDECTOMY N/A 6/25/2020    Procedure: APPENDECTOMY, LAPAROSCOPIC;  Surgeon: Gordon Can MD;  Location: [facility] OR;  Service: General;  Laterality: N/A;    LAPAROSCOPIC CHOLECYSTECTOMY N/A 5/30/2018    Procedure: CHOLECYSTECTOMY, LAPAROSCOPIC;  Surgeon: Fletcher Tao MD;  Location: [facility] OR;  Service: General;  Laterality: N/A;    LEFT HEART CATHETERIZATION Left 5/28/2018    Procedure: Left heart cath;  Surgeon: Rafiq Malik MD;  Location: [facility] CATH;  Service: Cardiology;  Laterality: Left;    NECK SURGERY      RADIOFREQUENCY ABLATION OF LUMBAR MEDIAL BRANCH NERVE AT SINGLE LEVEL Bilateral 6/17/2022    Procedure: Radiofrequency Ablation, Nerve, Spinal, Lumbar, Medial Branch, 1 Level L3,4,5;  Surgeon: Thiago Garcia MD;  Location: [facility] OR;  Service: Pain Management;  Laterality: Bilateral;    SHOULDER ARTHROSCOPY      SPINE SURGERY      lumbar x2    TONSILLECTOMY      TREATMENT OF CARDIAC ARRHYTHMIA  10/8/2020    Procedure: Cardioversion or Defibrillation;  Surgeon: Rip Che III, MD;  Location: [facility] CATH;  Service: Cardiology;;    TRIGGER FINGER RELEASE Right 9/9/2021    Procedure: RELEASE, TRIGGER FINGER;  Surgeon: Luis Alberto Payne MD;  Location: [facility] OR;  Service: Orthopedics;  Laterality: Right;  Procedure: Right ring finger trigger release    Position: Supine    Anesthesia: Local    Equipment: Basic handset     Current Outpatient Medications on File Prior to Visit   Medication Sig Dispense Refill    albuterol-ipratropium (DUO-NEB) 2.5 mg-0.5 mg/3 mL nebulizer solution Take 3 mLs by nebulization every 4 (four) hours as needed for Wheezing. Rescue 75 mL 0    allopurinoL (ZYLOPRIM) 100 MG tablet Take 1 tablet (100 mg total) by mouth once daily. 30 tablet 5    ascorbic acid, vitamin C, (VITAMIN C) 500 MG tablet Take 500 mg by mouth once daily.      aspirin (ECOTRIN) 81 MG EC tablet Take 81 mg by mouth once daily.      atorvastatin (LIPITOR) 40 MG tablet take one Tablet  "by mouth once daily in the evening 90 tablet 4    blood sugar diagnostic (CONTOUR NEXT TEST STRIPS) Strp Use to Test 4 times a day.  Contour Next test strips required for Medtronic insulin pump 400 strip 4    blood-glucose meter,continuous (DEXCOM G6 ) Misc Dispense 1  1 each 0    blood-glucose sensor (DEXCOM G6 SENSOR) Oxana Dispense 3 sensors/month 3 each 12    blood-glucose transmitter (DEXCOM G6 TRANSMITTER) Oxana Dispense 1 transmitter 1 each 3    carvediloL (COREG) 12.5 MG tablet TAKE ONE TABLET (12.5MG) BY MOUTH TWICE DAILY with meals 180 tablet 3    cholecalciferol, vitamin D3, 125 mcg (5,000 unit) capsule Take 5,000 Units by mouth once daily.      citalopram (CELEXA) 40 MG tablet Take 1 tablet (40 mg total) by mouth once daily. 30 tablet 5    docusate sodium (COLACE) 100 MG capsule Take 200 mg by mouth once daily.      fluticasone furoate-vilanteroL (BREO) 100-25 mcg/dose diskus inhaler Inhale 1 puff into the lungs once daily. Controller 14 each 0    gabapentin (NEURONTIN) 600 MG tablet Take 1 tablet (600 mg total) by mouth 3 (three) times daily. 90 tablet 2    glucosam/gluc cruz/et-fzd-e-gluc (GLUCOSAMINE COMPLEX ORAL) Take 1 tablet by mouth once daily.      infusion set for insulin pump (MINIMED PRO-SET INFUSION 24") ISet Changes site q2days, dispense 90day supply 45 each 4    insulin (LANTUS SOLOSTAR U-100 INSULIN) glargine 100 units/mL (3mL) SubQ pen Inject 50 Units into the skin every evening. Use if pump malfunctions (Patient taking differently: Inject 50 Units into the skin nightly as needed (Use if pump malfunctions).) 1 Box 0    insulin lispro (HUMALOG U-100 INSULIN) 100 unit/mL injection 150 units daily with insulin pump 14 mL 1    insulin pump syringe (MINIMED SYRINGE RESERVOIR) 3 mL Misc Changes q2days, dispense 3 month supply 45 each 4    ketoconazole (NIZORAL) 2 % cream APPLY ONE GRAM Topically TWICE DAILY 60 g 1    Lactobacillus rhamnosus GG (CULTURELLE) 10 " billion cell capsule Take 1 capsule by mouth once daily. 15 capsule 0    lancets Misc To be used with Contour Next as necessary with Medtronic insulin pump, uses 4 daily 400 each 4    magnesium oxide (MAG-OX) 400 mg (241.3 mg magnesium) tablet Take 1 tablet (400 mg total) by mouth once daily. 30 tablet 1    meloxicam (MOBIC) 15 MG tablet Take 15 mg by mouth once daily.       metFORMIN (GLUCOPHAGE) 1000 MG tablet Take 1 tablet (1,000 mg total) by mouth 2 (two) times daily. 180 tablet 4    ondansetron (ZOFRAN-ODT) 4 MG TbDL Take 2 tablets (8 mg total) by mouth every 6 (six) hours as needed (nausea/vomiting). 10 tablet 0    oxyCODONE (ROXICODONE) 5 MG immediate release tablet Take 1 tablet (5 mg total) by mouth every 8 (eight) hours as needed for Pain. 10 tablet 0    oxyCODONE-acetaminophen (PERCOCET) 5-325 mg per tablet Take 1 tablet by mouth every 4 (four) hours as needed for Pain. 6 tablet 0    pantoprazole (PROTONIX) 40 MG tablet Take 1 tablet (40 mg total) by mouth once daily. 30 tablet 5    pioglitazone (ACTOS) 15 MG tablet Take 1 tablet (15 mg total) by mouth once daily. 90 tablet 4    polyethylene glycol (GLYCOLAX) 17 gram PwPk Take 17 g by mouth daily as needed (constipation). 30 packet 0    pramipexole (MIRAPEX) 1 MG tablet TAKE TWO TABLETS BY MOUTH EVERY EVENING (Patient taking differently: Take 2 mg by mouth every evening.) 60 tablet 2    semaglutide (OZEMPIC) 2 mg/dose (8 mg/3 mL) PnIj Inject 2 mg into the skin every 7 days. 9 mL 4    vitamin E 400 UNIT capsule Take 400 Units by mouth once daily.      blood-glucose meter kit To check BG 2 times daily, to use with insurance preferred meter 1 each 1    [DISCONTINUED] semaglutide (OZEMPIC) 1 mg/dose (4 mg/3 mL) Inject 1 mg into the skin every 7 days. 3 pen 4     No current facility-administered medications on file prior to visit.     Review of patient's allergies indicates:  No Known Allergies  Family History   Problem Relation Age of Onset     Diabetes Mother     Depression Mother     Liver cancer Mother     Obesity Mother     Heart disease Father     Anuerysm Father     Diabetes Father     Stroke Father     Glaucoma Paternal Grandmother     Obesity Sister      Social History     Socioeconomic History    Marital status:     Number of children: 1   Tobacco Use    Smoking status: Former Smoker     Packs/day: 0.50     Years: 6.00     Pack years: 3.00     Types: Cigarettes     Quit date:      Years since quittin.4    Smokeless tobacco: Never Used   Substance and Sexual Activity    Alcohol use: No     Alcohol/week: 0.0 standard drinks    Drug use: No    Sexual activity: Yes     Partners: Female   Social History Narrative              Social Determinants of Health     Financial Resource Strain: Medium Risk    Difficulty of Paying Living Expenses: Somewhat hard   Food Insecurity: Food Insecurity Present    Worried About Running Out of Food in the Last Year: Often true    Ran Out of Food in the Last Year: Often true   Transportation Needs: Unmet Transportation Needs    Lack of Transportation (Medical): Yes    Lack of Transportation (Non-Medical): Yes   Physical Activity: Insufficiently Active    Days of Exercise per Week: 4 days    Minutes of Exercise per Session: 10 min   Stress: Stress Concern Present    Feeling of Stress : To some extent   Social Connections: Socially Isolated    Frequency of Communication with Friends and Family: Once a week    Frequency of Social Gatherings with Friends and Family: Once a week    Attends [employer] Services: Never    Active Member of Clubs or Organizations: No    Attends Club or Organization Meetings: Never    Marital Status:        Review of Systems:  Constitutional:  Denies fever or chills   Eyes:  Denies change in visual acuity   HENT:  Denies nasal congestion or sore throat   Respiratory:  Denies cough or shortness of breath   Cardiovascular:  Denies chest  pain or edema   GI:  Denies abdominal pain, nausea, vomiting, bloody stools or diarrhea   :  Denies dysuria   Integument:  Denies rash   Neurologic:  Denies headache, focal weakness or sensory changes   Endocrine:  Denies polyuria or polydipsia   Lymphatic:  Denies swollen glands   Psychiatric:  Denies depression or anxiety     Physical Exam:   Constitutional:  Well developed, well nourished, no acute distress, non-toxic appearance   Integument:  Well hydrated, no rash   Lymphatic:  No lymphadenopathy noted   Neurologic:  Alert & oriented x 3  Psychiatric:  Speech and behavior appropriate     Bilateral Hip Exam    bilateral Hip Exam   bilateral hip exam performed same as contralateral hip and is normal.     bilateral Hip Exam   Tenderness   The patient is experiencing tenderness in the greater trochanter.  Range of Motion   The patient has normal hip ROM.  Muscle Strength   Abduction: 4/5   Other   Erythema: absent  Sensation: normal  Pulse: present    X-rays were personally reviewed by me and findings discussed with the patient.  2 views of the bilateral hip show no fractures or dislocations    Bilateral hip pain  -     Large Joint Aspiration/Injection: bilateral greater trochanteric bursa    Greater trochanteric bursitis of both hips  -     Large Joint Aspiration/Injection: bilateral greater trochanteric bursa        Since patient has IDDM with insulin pump, he was given kenalog 20 mg to bilateral knee instead of the whole dose of 40 mg.     Using an aseptic technique, I injected 5 cc of lidocaine 1% without and 0.5 cc of kenalog 40mg into the bilateral Hip. The patient tolerated this well. I will have him return to clinic as needed.

## 2022-06-21 NOTE — TELEPHONE ENCOUNTER
"Attempted PA for Ozempic 2 mg via Snapsheet, KEY [id number].  States "Cannot find matching patient with Name and Date of Birth provided".  Unable to reach pt so I called his pharmacy, spoke sammy/ [facility], verified pt's insurance info.  All correct.  Pt will need to contact his insurance company.  Pharmacy will let pt know if he does not return my call.    "

## 2022-06-21 NOTE — PROCEDURES
Large Joint Aspiration/Injection: bilateral greater trochanteric bursa    Date/Time: 6/21/2022 9:00 AM  Performed by: AQUILINO Santos  Authorized by: AQUILINO Santos     Consent Done?:  Yes (Verbal)  Indications:  Pain  Timeout: prior to procedure the correct patient, procedure, and site was verified    Prep: patient was prepped and draped in usual sterile fashion      Local anesthesia used?: Yes    Local anesthetic:  Lidocaine 1% without epinephrine  Anesthetic total (ml):  5      Details:  Needle Size:  21 G  Approach:  Lateral  Location:  Hip  Laterality:  Bilateral  Site:  Bilateral greater trochanteric bursa  Medications (Right):  20 mg triamcinolone acetonide 40 mg/mL  Medications (Left):  20 mg triamcinolone acetonide 40 mg/mL  Patient tolerance:  Patient tolerated the procedure well with no immediate complications

## 2022-06-22 ENCOUNTER — OFFICE VISIT (OUTPATIENT)
Dept: ORTHOPEDICS | Facility: CLINIC | Age: 57
End: 2022-06-22
Payer: MEDICARE

## 2022-06-22 ENCOUNTER — HOSPITAL ENCOUNTER (OUTPATIENT)
Dept: RADIOLOGY | Facility: HOSPITAL | Age: 57
Discharge: HOME | End: 2022-06-22
Attending: ORTHOPAEDIC SURGERY
Payer: MEDICARE

## 2022-06-22 ENCOUNTER — CLINICAL SUPPORT (OUTPATIENT)
Dept: REHABILITATION | Facility: HOSPITAL | Age: 57
End: 2022-06-22
Payer: MEDICARE

## 2022-06-22 DIAGNOSIS — M79.641 RIGHT HAND PAIN: ICD-10-CM

## 2022-06-22 DIAGNOSIS — M25.641 DECREASED RANGE OF MOTION OF FINGER OF RIGHT HAND: Primary | ICD-10-CM

## 2022-06-22 DIAGNOSIS — R29.898 DECREASED PINCH STRENGTH: ICD-10-CM

## 2022-06-22 DIAGNOSIS — M19.041 DEGENERATIVE ARTHRITIS OF METACARPOPHALANGEAL JOINT OF MIDDLE FINGER OF RIGHT HAND: Primary | ICD-10-CM

## 2022-06-22 DIAGNOSIS — R29.898 DECREASED GRIP STRENGTH OF RIGHT HAND: ICD-10-CM

## 2022-06-22 DIAGNOSIS — Z78.9 DECREASED ACTIVITIES OF DAILY LIVING (ADL): ICD-10-CM

## 2022-06-22 PROCEDURE — 73130 XR HAND COMPLETE 3 VIEW RIGHT: ICD-10-PCS | Mod: 26,RT,, | Performed by: RADIOLOGY

## 2022-06-22 PROCEDURE — 3052F HG A1C>EQUAL 8.0%<EQUAL 9.0%: CPT | Mod: CPTII,S$GLB,, | Performed by: ORTHOPAEDIC SURGERY

## 2022-06-22 PROCEDURE — 1159F MED LIST DOCD IN RCRD: CPT | Mod: CPTII,S$GLB,, | Performed by: ORTHOPAEDIC SURGERY

## 2022-06-22 PROCEDURE — 99024 PR POST-OP FOLLOW-UP VISIT: ICD-10-PCS | Mod: S$GLB,,, | Performed by: ORTHOPAEDIC SURGERY

## 2022-06-22 PROCEDURE — 3052F PR MOST RECENT HEMOGLOBIN A1C LEVEL 8.0 - < 9.0%: ICD-10-PCS | Mod: CPTII,S$GLB,, | Performed by: ORTHOPAEDIC SURGERY

## 2022-06-22 PROCEDURE — 1159F PR MEDICATION LIST DOCUMENTED IN MEDICAL RECORD: ICD-10-PCS | Mod: CPTII,S$GLB,, | Performed by: ORTHOPAEDIC SURGERY

## 2022-06-22 PROCEDURE — 99999 PR PBB SHADOW E&M-EST. PATIENT-LVL III: CPT | Mod: PBBFAC,,, | Performed by: ORTHOPAEDIC SURGERY

## 2022-06-22 PROCEDURE — 99024 POSTOP FOLLOW-UP VISIT: CPT | Mod: S$GLB,,, | Performed by: ORTHOPAEDIC SURGERY

## 2022-06-22 PROCEDURE — 73130 X-RAY EXAM OF HAND: CPT | Mod: TC,PO,RT

## 2022-06-22 PROCEDURE — 73130 X-RAY EXAM OF HAND: CPT | Mod: 26,RT,, | Performed by: RADIOLOGY

## 2022-06-22 PROCEDURE — 1111F PR DISCHARGE MEDS RECONCILED W/ CURRENT OUTPATIENT MED LIST: ICD-10-PCS | Mod: CPTII,S$GLB,, | Performed by: ORTHOPAEDIC SURGERY

## 2022-06-22 PROCEDURE — 99999 PR PBB SHADOW E&M-EST. PATIENT-LVL III: ICD-10-PCS | Mod: PBBFAC,,, | Performed by: ORTHOPAEDIC SURGERY

## 2022-06-22 PROCEDURE — 97110 THERAPEUTIC EXERCISES: CPT | Mod: PO

## 2022-06-22 PROCEDURE — 1111F DSCHRG MED/CURRENT MED MERGE: CPT | Mod: CPTII,S$GLB,, | Performed by: ORTHOPAEDIC SURGERY

## 2022-06-22 NOTE — PLAN OF CARE
Occupational Therapy D/C and  Daily Treatment Note     Name: Gio Forrest  [facility] Number: [id number]    Therapy Diagnosis:   Encounter Diagnoses   Name Primary?    Decreased range of motion of finger of right hand Yes    Right hand pain     Decreased  strength of right hand     Decreased pinch strength     Decreased activities of daily living (ADL)      Physician: Luis Alberto Payne MD    Physician orders: Please begin therapy to the right hand.  Include post MCP joint arthroplasty protocol.  Please limit weight-bearing to 1-2 lb     Frequency:  3 times per week     Duration:  6 weeks    Per MD note from 5/18/2022  Plan:     1. He will continue to work with therapy     2. Will begin to wean out of the Velcro splint     3. 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   Precautions: Standard and Diabetes and post op precautions; weightbearing 1-2#      Date of  Surgery: 4/12/22                              S/P: 10 weeks on 6/21/2022        Subjective     Pt reports: No new symptoms or complaints  he was compliant with HEP.   Response to previous treatment:Very goood  Functional change: None reported today    Pain:  Functional Pain Scale Rating 0-10:   1/10 now  0/10 at best  2/10 at worst  Location:  R MF PIPJ, MPJ hurts much less.   Description: Burning and Tight  Aggravating Factors: bending and use    Easing Factors: rest and wearing brace     Objective   MEASUREMENTS    CMS Impairment/Limitation/Restriction for Quick DASH Survey    Therapist reviewed Quick DASH scores for Gio Forrest on 2022.   Quick DASH documents entered into [employer] - see Media section for original.    The Quick DASH Questionnaire- The following scores are based on patient reported assessment at the time Discharge from occupational therapy.    Activity:  1. Open a tight jar: 2 Difficulty  2. Do heavy household chores: 1 Difficulty  3. Carry a shopping bag or briefcase: 1 Difficulty  4. Wash your back: 1 Difficulty  5.Use a knife to cut food: 1 Difficulty  6.. Recreational activities requiring force through arm: 1 Difficulty  7. Social Limitation: 21Limited  8. Work/ADL Limitation: 2 Limited  Severity of Symptoms (over the past week)  9. Pain: 2  10. [place]lin  11. Sleeping Limitation: 1 Difficulty    Limitation Score: 6.81%%    Scale  1= NO (Difficulty or Limitatons)  2= Mild (Difficulty/Limitations)  3= Moderate (Difficulty/Limitations)  4= Severe (Difficulty/Limitations)  5= Extreme/Unable and/or can't sleep (Difficulty/Limitations)             Edema:  2022          (in cm) L R   Proximal Wrist Crease       MPs 23.0 24.0   Long Proximal Phalanx 8.2 8.8      MCP ext/flex:  - 0 (+5) / 85 (+5) degrees (vs 90 degrees on L)    Long finger flexion AROM pre heat  MCP: 80  PIP: 95  DIP: 75   TRENT = 250     2022 Position II    R 70# vs 80#/88# on L.  Lateral pinch = 19#    TREATMENT  Gio received the following supervised modalities after being cleared for contradictions fo moist heat (see therex below)     Gio received the following direct contact modalities after being cleared for contraindications for 0 minutes:  -NT    Gio received the following manual therapy techniques for 0 minutes:   -Scar massage, gentle scar mob and retrograde massage 0 min total.    Gio received therapeutic exercises for 38  minutes including:  - Tendon Gliding Exercises: Full sequence 2x20   - fingerlifts 2x20   - finger spreads 2x20  - /pinch testing  - AROM check    Home Exercises and Education Provided     Education provided:   -  Cont per previous    Written Home Exercises Provided:  Patient instructed to cont prior HEP.  Exercises were reviewed and Gio was able to demonstrate them prior to the end of the session.  Gio demonstrated good  understanding of the education provided.     See EMR under Media for exercises provided today and/or prior visit.        Assessment       Pt has met all goals, is pleased with progress, and is ready for D/C at this time.      Anticipated barriers to occupational therapy: None    Pt's spiritual, cultural and educational needs considered and pt agreeable to plan of care and goals.    Goals:   Short Term Goals: (4 weeks) ALL GOALS MET  1. Pt will be independent with HEP in 2 visits.  2. Pt will report decreased pain to a 3-4/10 at worst.   3. Pt will increase R IF and MF TRENT by 10-20  degrees to enable dressing, grooming activities.  4. Pt will make exhibit full composite flexion at uninvolved fingers to enable grasping and squeezing objects for self-care.     Long Term Goals: (by discharge) (ALL GOALS MET 6.22/2022)  1. Pt will report decreased pain to 1-2/10 with ADLs.   2. Pt will exhibit 230 TRENT at R MF to facilitate functional grasp.  3. Pt will exhibit R  strength at 75% of L comparative measures to enable firm grasp on utensils, tools, etc.   5. Pt will exhibit 9-12# of functional pinch strength to allow writing,opening containers, and turning keys.  6. Pt will exhibit a significant increase (20-30 points) in the Quick DASH assessment.  7. Pt will return to PLOF.   8. Minimize extension lag at MF.    Plan   Pt for D/C from OT services this date. To to cont HEP as tolerated and will follow up with referring provider for any further tx needs.      Rip Mitchell,  LOTR/CHT

## 2022-06-22 NOTE — PROGRESS NOTES
Mr Forrest returns to clinic today.  He is status post right middle finger MCP joint arthroplasty.  He is now doing very well.  He states that he has his last therapy visit today.    Physical exam:  Examination the right hand middle finger reveals that the wounds are now well healed.  There is no edema.  Palpation does not produce tenderness.  He is able to flex to the distal palmar crease and fully extend.  He is neurovascularly intact.    Radiology:  X-rays of the right hand were taken in clinic today.  He is noted to have evidence of the MCP joint arthroplasty.  The implant remains well positioned and aligned    Assessment:  Status post right middle finger MCP joint arthroplasty    Plan:    1. Will finish his last therapy visit today and continue was home program    2. He can begin to increase activity as tolerated    3. Will follow up with me in 6 months with an x-ray of the right hand

## 2022-06-24 DIAGNOSIS — E11.42 TYPE 2 DIABETES MELLITUS WITH DIABETIC POLYNEUROPATHY, WITH LONG-TERM CURRENT USE OF INSULIN: ICD-10-CM

## 2022-06-24 DIAGNOSIS — Z79.4 TYPE 2 DIABETES MELLITUS WITH DIABETIC POLYNEUROPATHY, WITH LONG-TERM CURRENT USE OF INSULIN: ICD-10-CM

## 2022-06-24 RX ORDER — INSULIN GLARGINE 100 [IU]/ML
50 INJECTION, SOLUTION SUBCUTANEOUS NIGHTLY PRN
Qty: 15 ML | Refills: 0 | Status: SHIPPED | OUTPATIENT
Start: 2022-06-24 | End: 2022-09-27

## 2022-06-24 NOTE — TELEPHONE ENCOUNTER
----- Message from Hamlet Reed sent at 6/24/2022  8:44 AM CDT -----  Regarding: Call Back  Who Called: pt          What is the reason for the call: Calling in regards to needing prior authorization for medication semaglutide (OZEMPIC) 2 mg/dose (8 mg/3 mL) PnIj . Please contact          Can patient be contacted on [employer]: No          Call back number: [phone]

## 2022-06-24 NOTE — TELEPHONE ENCOUNTER
PA through cover my meds for ozempic 2 mg attempted.    IVONE LAI (Key: [id number])    The demographic data for this request is being submitted to [employer].

## 2022-07-18 ENCOUNTER — TELEPHONE (OUTPATIENT)
Dept: NEUROLOGY | Facility: CLINIC | Age: 57
End: 2022-07-18
Payer: MEDICARE

## 2022-07-18 NOTE — TELEPHONE ENCOUNTER
Called patient to reschedule Neurology appointment.  Left voicemail message to call [phone].

## 2022-07-19 ENCOUNTER — TELEPHONE (OUTPATIENT)
Dept: NEUROLOGY | Facility: CLINIC | Age: 57
End: 2022-07-19
Payer: MEDICARE

## 2022-07-19 NOTE — TELEPHONE ENCOUNTER
Called patient to schedule Neurology appointment.  Patient spouse stated he has seen an externally.

## 2022-07-21 ENCOUNTER — TELEPHONE (OUTPATIENT)
Dept: BARIATRICS | Facility: CLINIC | Age: 57
End: 2022-07-21

## 2022-07-21 ENCOUNTER — OFFICE VISIT (OUTPATIENT)
Dept: BARIATRICS | Facility: CLINIC | Age: 57
End: 2022-07-21
Payer: MEDICARE

## 2022-07-21 VITALS
BODY MASS INDEX: 38.42 KG/M2 | RESPIRATION RATE: 16 BRPM | DIASTOLIC BLOOD PRESSURE: 59 MMHG | WEIGHT: 309 LBS | HEIGHT: 75 IN | HEART RATE: 71 BPM | SYSTOLIC BLOOD PRESSURE: 106 MMHG | TEMPERATURE: 97 F

## 2022-07-21 DIAGNOSIS — E66.01 MORBID OBESITY: Primary | ICD-10-CM

## 2022-07-21 PROCEDURE — 3078F PR MOST RECENT DIASTOLIC BLOOD PRESSURE < 80 MM HG: ICD-10-PCS | Mod: CPTII,S$GLB,, | Performed by: SURGERY

## 2022-07-21 PROCEDURE — 3008F PR BODY MASS INDEX (BMI) DOCUMENTED: ICD-10-PCS | Mod: CPTII,S$GLB,, | Performed by: SURGERY

## 2022-07-21 PROCEDURE — 3008F BODY MASS INDEX DOCD: CPT | Mod: CPTII,S$GLB,, | Performed by: SURGERY

## 2022-07-21 PROCEDURE — 3052F PR MOST RECENT HEMOGLOBIN A1C LEVEL 8.0 - < 9.0%: ICD-10-PCS | Mod: CPTII,S$GLB,, | Performed by: SURGERY

## 2022-07-21 PROCEDURE — 99213 PR OFFICE/OUTPT VISIT, EST, LEVL III, 20-29 MIN: ICD-10-PCS | Mod: S$GLB,,, | Performed by: SURGERY

## 2022-07-21 PROCEDURE — 99999 PR PBB SHADOW E&M-EST. PATIENT-LVL V: ICD-10-PCS | Mod: PBBFAC,,, | Performed by: SURGERY

## 2022-07-21 PROCEDURE — 99213 OFFICE O/P EST LOW 20 MIN: CPT | Mod: S$GLB,,, | Performed by: SURGERY

## 2022-07-21 PROCEDURE — 3078F DIAST BP <80 MM HG: CPT | Mod: CPTII,S$GLB,, | Performed by: SURGERY

## 2022-07-21 PROCEDURE — 3074F SYST BP LT 130 MM HG: CPT | Mod: CPTII,S$GLB,, | Performed by: SURGERY

## 2022-07-21 PROCEDURE — 1159F PR MEDICATION LIST DOCUMENTED IN MEDICAL RECORD: ICD-10-PCS | Mod: CPTII,S$GLB,, | Performed by: SURGERY

## 2022-07-21 PROCEDURE — 99999 PR PBB SHADOW E&M-EST. PATIENT-LVL V: CPT | Mod: PBBFAC,,, | Performed by: SURGERY

## 2022-07-21 PROCEDURE — 3052F HG A1C>EQUAL 8.0%<EQUAL 9.0%: CPT | Mod: CPTII,S$GLB,, | Performed by: SURGERY

## 2022-07-21 PROCEDURE — 1159F MED LIST DOCD IN RCRD: CPT | Mod: CPTII,S$GLB,, | Performed by: SURGERY

## 2022-07-21 PROCEDURE — 3074F PR MOST RECENT SYSTOLIC BLOOD PRESSURE < 130 MM HG: ICD-10-PCS | Mod: CPTII,S$GLB,, | Performed by: SURGERY

## 2022-07-21 NOTE — TELEPHONE ENCOUNTER
Attempted to call pt to inform him that requested form was faxed to Dr. Rip Che (fax #[phone]). No answer.     ----- Message from Kalie Basilio sent at 7/21/2022  1:08 PM CDT -----  Regarding: Patient call back  Please contact patient. He needs a form saying what type of surgery, type of anesthesia, and if there is medication they need to hold. This form needs to be sent to Rip Che MD with  [facility] for clearance. Thank you

## 2022-07-21 NOTE — PROGRESS NOTES
Medically Supervised Weight Loss Documentation    Date of Visit: 07/21/2022    Patient: Gio Forrest    Current Weight: 304  Current BMI: Body mass index is 39.14 kg/m².  Weight Change: 0     Last Weight: 304    Beginning Weight: 306      Vitals:   Vitals:    07/21/22 1010   BP: (!) 106/59   Pulse: 71   Resp: 16   Temp: 97.1 °F (36.2 °C)       Comorbidities:   Past Medical History:   Diagnosis Date    Cardiomyopathy     Depression     Diabetes     Dyslipidemia 08/12/2015    Gout 08/12/2015    Hypertension     Idiopathic gout     Lumbar pseudoarthrosis     LINDSEY (nonalcoholic steatohepatitis)     Obesity 08/12/2015    Obstructive sleep apnea 08/12/2015    Restless leg syndrome     Sebaceous cyst 04/18/2017    Type 2 diabetes mellitus 08/12/2015       Medications:  Current Outpatient Medications on File Prior to Visit   Medication Sig Dispense Refill    allopurinoL (ZYLOPRIM) 100 MG tablet Take 1 tablet (100 mg total) by mouth once daily. 30 tablet 5    ascorbic acid, vitamin C, (VITAMIN C) 500 MG tablet Take 500 mg by mouth once daily.      aspirin (ECOTRIN) 81 MG EC tablet Take 81 mg by mouth once daily.      atorvastatin (LIPITOR) 40 MG tablet take one Tablet by mouth once daily in the evening 90 tablet 4    blood sugar diagnostic (CONTOUR NEXT TEST STRIPS) Strp Use to Test 4 times a day.  Contour Next test strips required for Medtronic insulin pump 400 strip 4    blood-glucose meter,continuous (DEXCOM G6 ) Misc Dispense 1  1 each 0    blood-glucose sensor (DEXCOM G6 SENSOR) Oxana Dispense 3 sensors/month 3 each 12    blood-glucose transmitter (DEXCOM G6 TRANSMITTER) Oxana Dispense 1 transmitter 1 each 3    carvediloL (COREG) 12.5 MG tablet TAKE ONE TABLET (12.5MG) BY MOUTH TWICE DAILY with meals 180 tablet 3    cholecalciferol, vitamin D3, 125 mcg (5,000 unit) capsule Take 5,000 Units by mouth once daily.      citalopram (CELEXA) 40 MG tablet Take 1 tablet (40 mg total)  "by mouth once daily. 30 tablet 5    docusate sodium (COLACE) 100 MG capsule Take 200 mg by mouth once daily.      gabapentin (NEURONTIN) 600 MG tablet Take 1 tablet (600 mg total) by mouth 3 (three) times daily. 90 tablet 2    glucosam/gluc cruz/jq-lks-a-gluc (GLUCOSAMINE COMPLEX ORAL) Take 1 tablet by mouth once daily.      infusion set for insulin pump (MINIMED PRO-SET INFUSION 24") ISet Changes site q2days, dispense 90day supply 45 each 4    insulin (LANTUS SOLOSTAR U-100 INSULIN) glargine 100 units/mL (3mL) SubQ pen Inject 50 Units into the skin nightly as needed (Use if pump malfunctions). 15 mL 0    insulin lispro (HUMALOG U-100 INSULIN) 100 unit/mL injection 150 units daily with insulin pump 14 mL 1    insulin pump syringe (MINIMED SYRINGE RESERVOIR) 3 mL Misc Changes q2days, dispense 3 month supply 45 each 4    lancets Misc To be used with Contour Next as necessary with Medtronic insulin pump, uses 4 daily 400 each 4    meloxicam (MOBIC) 15 MG tablet Take 15 mg by mouth once daily.       metFORMIN (GLUCOPHAGE) 1000 MG tablet Take 1 tablet (1,000 mg total) by mouth 2 (two) times daily. 180 tablet 4    pantoprazole (PROTONIX) 40 MG tablet Take 1 tablet (40 mg total) by mouth once daily. 30 tablet 5    pioglitazone (ACTOS) 15 MG tablet Take 1 tablet (15 mg total) by mouth once daily. 90 tablet 4    semaglutide (OZEMPIC) 2 mg/dose (8 mg/3 mL) PnIj Inject 2 mg into the skin every 7 days. 9 mL 4    vitamin E 400 UNIT capsule Take 400 Units by mouth once daily.      albuterol-ipratropium (DUO-NEB) 2.5 mg-0.5 mg/3 mL nebulizer solution Take 3 mLs by nebulization every 4 (four) hours as needed for Wheezing. Rescue (Patient not taking: Reported on 7/21/2022) 75 mL 0    blood-glucose meter kit To check BG 2 times daily, to use with insurance preferred meter 1 each 1    fluticasone furoate-vilanteroL (BREO) 100-25 mcg/dose diskus inhaler Inhale 1 puff into the lungs once daily. Controller (Patient not " taking: Reported on 7/21/2022) 14 each 0    ketoconazole (NIZORAL) 2 % cream APPLY ONE GRAM Topically TWICE DAILY (Patient not taking: Reported on 7/21/2022) 60 g 1    Lactobacillus rhamnosus GG (CULTURELLE) 10 billion cell capsule Take 1 capsule by mouth once daily. (Patient not taking: Reported on 7/21/2022) 15 capsule 0    magnesium oxide (MAG-OX) 400 mg (241.3 mg magnesium) tablet Take 1 tablet (400 mg total) by mouth once daily. (Patient not taking: Reported on 7/21/2022) 30 tablet 1    ondansetron (ZOFRAN-ODT) 4 MG TbDL Take 2 tablets (8 mg total) by mouth every 6 (six) hours as needed (nausea/vomiting). (Patient not taking: Reported on 7/21/2022) 10 tablet 0    oxyCODONE (ROXICODONE) 5 MG immediate release tablet Take 1 tablet (5 mg total) by mouth every 8 (eight) hours as needed for Pain. (Patient not taking: Reported on 7/21/2022) 10 tablet 0    oxyCODONE-acetaminophen (PERCOCET) 5-325 mg per tablet Take 1 tablet by mouth every 4 (four) hours as needed for Pain. (Patient not taking: Reported on 7/21/2022) 6 tablet 0    pramipexole (MIRAPEX) 1 MG tablet TAKE TWO TABLETS BY MOUTH EVERY EVENING (Patient taking differently: Take 2 mg by mouth every evening.) 60 tablet 2     No current facility-administered medications on file prior to visit.         Body comp:  Fat Percent:  50.1 %  Fat Mass:  154.8 lb  FFM:  154.2 lb  BMR: 2218 kcal      Diet Education Discussed:    Fell off diet plan    Exercise/Activity Discussed:    na    Behavior or Diet Goals for this patient:    Get back on track with dieting, we talked about following our plan.  He should start exercising at least 20 minutes 3 times per week     We talked about the different surgeries and he is considering Gastric sleeve.    MSD 0/3     He will need:     Labs  EKG  UGI   dietary consult- continue  psych consult - follow up therapy   Seminar     I will obtain the following clearances prior to surgery: cardiology     : total time spent for visit:  20 minutes

## 2022-07-29 ENCOUNTER — CLINICAL SUPPORT (OUTPATIENT)
Dept: BARIATRICS | Facility: CLINIC | Age: 57
End: 2022-07-29
Payer: MEDICARE

## 2022-07-29 VITALS — BODY MASS INDEX: 39.17 KG/M2 | HEIGHT: 75 IN | WEIGHT: 315 LBS

## 2022-07-29 DIAGNOSIS — I10 ESSENTIAL HYPERTENSION: ICD-10-CM

## 2022-07-29 DIAGNOSIS — Z79.4 TYPE 2 DIABETES MELLITUS WITH HYPERGLYCEMIA, WITH LONG-TERM CURRENT USE OF INSULIN: ICD-10-CM

## 2022-07-29 DIAGNOSIS — Z71.3 DIETARY COUNSELING AND SURVEILLANCE: ICD-10-CM

## 2022-07-29 DIAGNOSIS — E78.2 MIXED HYPERLIPIDEMIA: ICD-10-CM

## 2022-07-29 DIAGNOSIS — E11.65 TYPE 2 DIABETES MELLITUS WITH HYPERGLYCEMIA, WITH LONG-TERM CURRENT USE OF INSULIN: ICD-10-CM

## 2022-07-29 DIAGNOSIS — E66.01 OBESITY, CLASS III, BMI 40-49.9 (MORBID OBESITY): ICD-10-CM

## 2022-07-29 PROCEDURE — 97803 PR MED NUTR THER, SUBSQ, INDIV, EA 15 MIN: ICD-10-PCS | Mod: S$GLB,,, | Performed by: DIETITIAN, REGISTERED

## 2022-07-29 PROCEDURE — 97803 MED NUTRITION INDIV SUBSEQ: CPT | Mod: S$GLB,,, | Performed by: DIETITIAN, REGISTERED

## 2022-07-29 PROCEDURE — 99999 PR PBB SHADOW E&M-EST. PATIENT-LVL III: ICD-10-PCS | Mod: PBBFAC,,, | Performed by: DIETITIAN, REGISTERED

## 2022-07-29 PROCEDURE — 99999 PR PBB SHADOW E&M-EST. PATIENT-LVL III: CPT | Mod: PBBFAC,,, | Performed by: DIETITIAN, REGISTERED

## 2022-07-29 NOTE — PROGRESS NOTES
NUTRITIONAL CONSULT    Referring Physician: Dr. Devine  Reason for MNT Referral: Initial assessment for sleeve gastrectomy work-up    PAST MEDICAL HISTORY:   56 y.o. male presents with a BMI of Body mass index is 40.38 kg/m²..  Past attempts at weight loss include: Unsupervised:  Decreased eating;  Supervised:  None;  Diet pills:  None;  Exercise attempts:  Swimming     Weight history:   At current weight:  17 years  Obese for over 17 years.  More than 35 pounds overweight for over 17 years.  More than 100 pounds overweight for 17 years.  Maximum weight reached:  320 lb  Most weight lost was 20 lb through decreased eating for 3 to 6 months.  He describes His eating habits as sweet eater, snacker/grazer.    Past Medical History:   Diagnosis Date    Cardiomyopathy     Depression     Diabetes     Dyslipidemia 08/12/2015    Gout 08/12/2015    Hypertension     Idiopathic gout     Lumbar pseudoarthrosis     LINDSEY (nonalcoholic steatohepatitis)     Obesity 08/12/2015    Obstructive sleep apnea 08/12/2015    Restless leg syndrome     Sebaceous cyst 04/18/2017    Type 2 diabetes mellitus 08/12/2015       CLINICAL DATA:  56 y.o.-year-old White male.  Height: 6'2.5  Weight: 318 lbs  IBW: 183 lbs  BMI: 40.38  The patient's goal weight (50 % EBW): 291 lbs    Goal for Bariatric Surgery: to improve health    NUTRITION & HEALTH HISTORY:  Greatest challenge: sweets    Current diet recall: Breakfast (midday): skips, eggs + toast + grits, pancakes, coffee only  Lunch: peanut butter and jelly sandwich, sandwich made with lunch meat  Dinner: protein + side dish + vegetable, protien + side dish  Snacks on sweets:    Current Diet:  Meal pattern: irregular, skipping meals  Protein supplements: 0  Snacking: excessive / day  Vegetables: Likes a variety. Eats 2-3 times per week.  Fruits: Likes a variety. Eats rarely.  Beverages: water, diet tea and decaf coffee, tea with 1/2 sugar and 1/2 Splenda  Dining out: Monthly. Mostly  take-out.  Cooking at home: Daily. Mostly baked and grilled meat, fish, starchy CHO and vegetables.    Exercise:  Past exercise: None    Current exercise: None  Restrictions to exercise: pain    Vitamins / Minerals / Herbs:   Men's MVI, vitamin E, vitamin D, B complex  Doctor told him to add an OTC iron supplement to vitamin regimen, but he has yet to do so.    Food Allergies:   NKFA    Social:  Disabled.  Lives with wife.  Grocery shopping and food prep by wife.  Patient believes the household will be supportive after surgery.  Alcohol: None.  Smoking: None.    ASSESSMENT:  · Patient reports attempts at weight loss, only to regain lost weight.  · Patient demonstrated knowledge of healthy eating behaviors and exercise patterns; admits to not eating healthy and not exercising at this point.  · Patient states willingness to change lifestyle and make behavior modifications.  · Expect fair to poor compliance after surgery at this time because he lives on a fixed income restricting food choices and availability needed for lifestyle change after surgery.    Insurance requires medically supervised diet prior to consideration for bariatric surgery.    BARIATRIC DIET DISCUSSION:  Discussed diet after surgery and related to patients food record.  Reviewed diet progression before and after surgery.  Stressed importance of exercise and its role in achieving weight loss goals.  Answered all questions.    RECOMMENDATIONS:  Patient is a potential candidate for bariatric surgery.    Needs additional visit(s) with RD.    PLAN:  Resume work-up for surgery.  Continue to review Bariatric Nutrition Guidebook at home and call with any questions.  Work on Bariatric Nutrition Checklist.  Work on expanding variety of vegetables.  Work on gradually cutting back on starchy CHO in the diet.  Begin trying various protein supplements to determine preference.    SESSION TIME:  60 minutes

## 2022-08-01 ENCOUNTER — PATIENT MESSAGE (OUTPATIENT)
Dept: PSYCHIATRY | Facility: CLINIC | Age: 57
End: 2022-08-01
Payer: MEDICARE

## 2022-08-02 ENCOUNTER — PATIENT MESSAGE (OUTPATIENT)
Dept: PSYCHIATRY | Facility: CLINIC | Age: 57
End: 2022-08-02
Payer: MEDICARE

## 2022-08-03 ENCOUNTER — OFFICE VISIT (OUTPATIENT)
Dept: PAIN MEDICINE | Facility: CLINIC | Age: 57
End: 2022-08-03
Payer: MEDICARE

## 2022-08-03 VITALS
WEIGHT: 315 LBS | HEIGHT: 74 IN | BODY MASS INDEX: 40.43 KG/M2 | SYSTOLIC BLOOD PRESSURE: 136 MMHG | HEART RATE: 73 BPM | DIASTOLIC BLOOD PRESSURE: 73 MMHG

## 2022-08-03 DIAGNOSIS — E66.01 SEVERE OBESITY (BMI 35.0-39.9) WITH COMORBIDITY: ICD-10-CM

## 2022-08-03 DIAGNOSIS — M47.896 OTHER SPONDYLOSIS, LUMBAR REGION: Primary | ICD-10-CM

## 2022-08-03 DIAGNOSIS — M96.1 POSTLAMINECTOMY SYNDROME OF LUMBAR REGION: ICD-10-CM

## 2022-08-03 PROCEDURE — 99999 PR PBB SHADOW E&M-EST. PATIENT-LVL IV: CPT | Mod: PBBFAC,,, | Performed by: PHYSICIAN ASSISTANT

## 2022-08-03 PROCEDURE — 3075F PR MOST RECENT SYSTOLIC BLOOD PRESS GE 130-139MM HG: ICD-10-PCS | Mod: CPTII,S$GLB,, | Performed by: PHYSICIAN ASSISTANT

## 2022-08-03 PROCEDURE — 3008F BODY MASS INDEX DOCD: CPT | Mod: CPTII,S$GLB,, | Performed by: PHYSICIAN ASSISTANT

## 2022-08-03 PROCEDURE — 1159F MED LIST DOCD IN RCRD: CPT | Mod: CPTII,S$GLB,, | Performed by: PHYSICIAN ASSISTANT

## 2022-08-03 PROCEDURE — 99214 PR OFFICE/OUTPT VISIT, EST, LEVL IV, 30-39 MIN: ICD-10-PCS | Mod: S$GLB,,, | Performed by: PHYSICIAN ASSISTANT

## 2022-08-03 PROCEDURE — 3075F SYST BP GE 130 - 139MM HG: CPT | Mod: CPTII,S$GLB,, | Performed by: PHYSICIAN ASSISTANT

## 2022-08-03 PROCEDURE — 1159F PR MEDICATION LIST DOCUMENTED IN MEDICAL RECORD: ICD-10-PCS | Mod: CPTII,S$GLB,, | Performed by: PHYSICIAN ASSISTANT

## 2022-08-03 PROCEDURE — 3052F HG A1C>EQUAL 8.0%<EQUAL 9.0%: CPT | Mod: CPTII,S$GLB,, | Performed by: PHYSICIAN ASSISTANT

## 2022-08-03 PROCEDURE — 99999 PR PBB SHADOW E&M-EST. PATIENT-LVL IV: ICD-10-PCS | Mod: PBBFAC,,, | Performed by: PHYSICIAN ASSISTANT

## 2022-08-03 PROCEDURE — 3078F PR MOST RECENT DIASTOLIC BLOOD PRESSURE < 80 MM HG: ICD-10-PCS | Mod: CPTII,S$GLB,, | Performed by: PHYSICIAN ASSISTANT

## 2022-08-03 PROCEDURE — 1160F PR REVIEW ALL MEDS BY PRESCRIBER/CLIN PHARMACIST DOCUMENTED: ICD-10-PCS | Mod: CPTII,S$GLB,, | Performed by: PHYSICIAN ASSISTANT

## 2022-08-03 PROCEDURE — 99214 OFFICE O/P EST MOD 30 MIN: CPT | Mod: S$GLB,,, | Performed by: PHYSICIAN ASSISTANT

## 2022-08-03 PROCEDURE — 3078F DIAST BP <80 MM HG: CPT | Mod: CPTII,S$GLB,, | Performed by: PHYSICIAN ASSISTANT

## 2022-08-03 PROCEDURE — 3008F PR BODY MASS INDEX (BMI) DOCUMENTED: ICD-10-PCS | Mod: CPTII,S$GLB,, | Performed by: PHYSICIAN ASSISTANT

## 2022-08-03 PROCEDURE — 3052F PR MOST RECENT HEMOGLOBIN A1C LEVEL 8.0 - < 9.0%: ICD-10-PCS | Mod: CPTII,S$GLB,, | Performed by: PHYSICIAN ASSISTANT

## 2022-08-03 PROCEDURE — 1160F RVW MEDS BY RX/DR IN RCRD: CPT | Mod: CPTII,S$GLB,, | Performed by: PHYSICIAN ASSISTANT

## 2022-08-03 RX ORDER — GABAPENTIN 600 MG/1
600 TABLET ORAL 3 TIMES DAILY
Qty: 90 TABLET | Refills: 2 | Status: SHIPPED | OUTPATIENT
Start: 2022-08-03 | End: 2022-08-05 | Stop reason: SDUPTHER

## 2022-08-03 NOTE — PROGRESS NOTES
Referring Physician: No ref. provider found    PCP: BLANK Carroll MD      CC: low back pain    Interval History:  Gio Forrest is a 56 y.o. male with chronic low back pain who presents today for f/u s/p lumbar MB RFA at L3, 4, 5 that provided > 60% relief. He is currently taking Gabapentin 600 mg TID with benefit. Denies SEs. He also has uncontrolled diabetes currently on insulin.  He reports some depression surrounding the death of his father 2 months ago. He has lost motivation. He was previously taking Celexa but this was discontinued when he went to a program for outpatient gambling addiction. He denies any worsening weakness.  No bowel bladder changes  Pt has been seen in the clinic before, however pt is new to me.     History below per Dr. Garcia    HPI:   Gio Forrest is a 56 y.o. male presents to us with lower back pain.  Pain has been present for over 10 years.  He has history of multiple spine surgery in his history of fusion from L4 the S1.  He continues have constant aching, burning, stabbing pain in his lower back.  Pain does not radiate.  Pain worsens standing, bending walking.  Pain improves with rest.  He has tried physical therapy minimal benefit.  He states undergoing lumbar MB RFA procedure in the past with moderate benefit.  This was performed a year ago.  He desires repeat procedure with us.  He is managed with Percocet 10 mg q.8 hours by Dr. Dhillon in the past.  States being unable to follow-up with Dr. Dhillon due to financial issues.  He was taking gabapentin but stopped due to his recent admission in to family rehab.  He also has uncontrolled diabetes currently on insulin.  Recent hemoglobin A1c was over 9.  He denies any worsening weakness.  No bowel bladder changes    ROS:  CONSTITUTIONAL: No fevers, chills, night sweats, wt. loss, appetite changes  SKIN: no rashes or itching  ENT: No headaches, head trauma, vision changes, or eye pain  LYMPH NODES: None noticed   CV: No  chest pain, palpitations.   RESP: No shortness of breath, dyspnea on exertion, cough, wheezing, or hemoptysis  GI: No nausea, emesis, diarrhea, constipation, melena, hematochezia, pain.    : No dysuria, hematuria, urgency, or frequency   HEME: No easy bruising, bleeding problems  PSYCHIATRIC: No depression, anxiety, psychosis, hallucinations.  NEURO: No seizures, memory loss, dizziness or difficulty sleeping  MSK:  Positive HPI      Past Medical History:   Diagnosis Date    Cardiomyopathy     Depression     Diabetes     Dyslipidemia 08/12/2015    Gout 08/12/2015    Hypertension     Idiopathic gout     Lumbar pseudoarthrosis     LINDSEY (nonalcoholic steatohepatitis)     Obesity 08/12/2015    Obstructive sleep apnea 08/12/2015    Restless leg syndrome     Sebaceous cyst 04/18/2017    Type 2 diabetes mellitus 08/12/2015     Past Surgical History:   Procedure Laterality Date    ABLATION N/A 10/8/2020    Procedure: Ablation;  Surgeon: Rip Che III, MD;  Location: [facility] CATH;  Service: Cardiology;  Laterality: N/A;    ARTHROPLASTY OF JOINT OF FINGER Right 4/12/2022    Procedure: Right middle finger MCP joint arthroplasty;  Surgeon: Luis Alberto Payne MD;  Location: [facility] OR;  Service: Orthopedics;  Laterality: Right;  Anesthesia:  General with a single-shot supraclavicular block    BACK SURGERY      x2    CARDIAC CATHETERIZATION      CARDIAC ELECTROPHYSIOLOGY STUDY  10/8/2020    Procedure: Study possible ablation;  Surgeon: Rip Che III, MD;  Location: [facility] CATH;  Service: Cardiology;;    CERVICAL SPINE SURGERY      CHOLECYSTECTOMY  05/30/2018    Dr. ROGELIO Tao, [facility]     COLONOSCOPY  2008    COLONOSCOPY N/A 9/14/2017    Procedure: COLONOSCOPY;  Surgeon: Obi Beltre MD;  Location: [facility] ENDO;  Service: Endoscopy;  Laterality: N/A;    CORONARY ANGIOGRAPHY Left 5/28/2018    Procedure: Coronary angiography;  Surgeon: Rafiq Malik MD;  Location: [facility] CATH;  Service: Cardiology;   Laterality: Left;    ELBOW SURGERY Bilateral     HERNIA REPAIR      LAPAROSCOPIC APPENDECTOMY N/A 6/25/2020    Procedure: APPENDECTOMY, LAPAROSCOPIC;  Surgeon: Gordon Can MD;  Location: [facility] OR;  Service: General;  Laterality: N/A;    LAPAROSCOPIC CHOLECYSTECTOMY N/A 5/30/2018    Procedure: CHOLECYSTECTOMY, LAPAROSCOPIC;  Surgeon: Fletcher Tao MD;  Location: [facility] OR;  Service: General;  Laterality: N/A;    LEFT HEART CATHETERIZATION Left 5/28/2018    Procedure: Left heart cath;  Surgeon: Rafiq Malik MD;  [facility];  Service: Cardiology;  Laterality: Left;    NECK SURGERY      RADIOFREQUENCY ABLATION OF LUMBAR MEDIAL BRANCH NERVE AT SINGLE LEVEL Bilateral 6/17/2022    Procedure: Radiofrequency Ablation, Nerve, Spinal, Lumbar, Medial Branch, 1 Level L3,4,5;  Surgeon: Thiago Garcia MD;  Location: [facility] OR;  Service: Pain Management;  Laterality: Bilateral;    SHOULDER ARTHROSCOPY      SPINE SURGERY      lumbar x2    TONSILLECTOMY      TREATMENT OF CARDIAC ARRHYTHMIA  10/8/2020    Procedure: Cardioversion or Defibrillation;  Surgeon: Rip Che III, MD;  [facility];  Service: Cardiology;;    TRIGGER FINGER RELEASE Right 9/9/2021    Procedure: RELEASE, TRIGGER FINGER;  Surgeon: Luis Alberto Payne MD;  Location: [facility] OR;  Service: Orthopedics;  Laterality: Right;  Procedure: Right ring finger trigger release    Position: Supine    Anesthesia: Local    Equipment: Basic handset     Family History   Problem Relation Age of Onset    Diabetes Mother     Depression Mother     Liver cancer Mother     Obesity Mother     Heart disease Father     Anuerysm Father     Diabetes Father     Stroke Father     Glaucoma Paternal Grandmother     Obesity Sister      Social History     Socioeconomic History    Marital status:     Number of children: 1   Tobacco Use    Smoking status: Former Smoker     Packs/day: 0.50     Years: 6.00     Pack years: 3.00     Types:  "Cigarettes     Quit date:      Years since quittin.6    Smokeless tobacco: Never Used   Substance and Sexual Activity    Alcohol use: No     Alcohol/week: 0.0 standard drinks    Drug use: No    Sexual activity: Yes     Partners: Female   Social History Narrative              Social Determinants of Health     Financial Resource Strain: Medium Risk    Difficulty of Paying Living Expenses: Somewhat hard   Food Insecurity: Food Insecurity Present    Worried About Running Out of Food in the Last Year: Often true    Ran Out of Food in the Last Year: Often true   Transportation Needs: Unmet Transportation Needs    Lack of Transportation (Medical): Yes    Lack of Transportation (Non-Medical): Yes   Physical Activity: Insufficiently Active    Days of Exercise per Week: 4 days    Minutes of Exercise per Session: 10 min   Stress: Stress Concern Present    Feeling of Stress : To some extent   Social Connections: Socially Isolated    Frequency of Communication with Friends and Family: Once a week    Frequency of Social Gatherings with Friends and Family: Once a week    Attends [employer] Services: Never    Active Member of Clubs or Organizations: No    Attends Club or Organization Meetings: Never    Marital Status:          Medications/Allergies: See med card    Vitals:    22 1303   BP: 136/73   Pulse: 73   Weight: (!) 144.2 kg (318 lb)   Height: 6' 2" (1.88 m)   PainSc:   4   PainLoc: Back     Physical exam:    GENERAL: A and O x3, the patient appears well groomed and is in no acute distress.  Skin: No rashes or obvious lesions  HEENT: normocephalic, atraumatic  CARDIOVASCULAR:  RRR  LUNGS: non labored breathing  ABDOMEN: soft, nontender   UPPER EXTREMITIES: Normal alignment, normal range of motion, no atrophy, no skin changes,  hair growth and nail growth normal and equal bilaterally. No swelling, no tenderness.    LOWER EXTREMITIES:  Normal alignment, normal range of motion, " no atrophy, no skin changes,  hair growth and nail growth normal and equal bilaterally. No swelling, no tenderness.  CERVICAL SPINE:  Cervical spine: ROM is limited in flexion, extension and lateral rotation with moderate increased pain.  Spurling's maneuver causes no neck pain to either side.  Myofascial exam: No Tenderness to palpation across cervical paraspinous region bilaterally.    LUMBAR SPINE  Lumbar spine: ROM is limited with flexion extension and oblique extension with moderate increased pain.    Teodoro's test causes no increased pain on either side.    Supine straight leg raise is negative bilaterally.    Internal and external rotation of the hip causes no increased pain on either side.  Myofascial exam: moderate tenderness to palpation across lumbar paraspinous muscles.    MOTOR: Tone and bulk: normal bilateral upper and lower Strength: normal   SENSATION: Light touch and pinprick intact bilaterally  REFLEXES: normal, symmetric, nonbrisk.  Toes down, no clonus. No hoffmans.  GAIT: normal rise, base, steps, and arm swing.      Imaging:  MRI L-spine 2018  There is a normal lumbar lordosis.  Postoperative changes from discectomies and L4-L5 L5-S1 with stabilization with pedicle screws at L4, L5, S1 levels is again noted.  There is no marrow edema to suggest acute fractures or marrow replacing processes throughout the lumbar spine.  The tip of the conus is at T12-L1 discitis.  Paraspinal soft tissues are unremarkable.     L5-S1: Postoperative changes from a discectomy at the L5-S1 disc space with presence of a spacer within the disc space.  There no dislocation of the spacer is noted.  There is a broad-based posterior osteophyte and disc bulge complex with effacement of the anterior epidural fat.  There is a right L5-S1 laminectomy and partial facetectomy.  Susceptibility artifacts from presence of pedicle screws at L5 and S1 noted.  There is no significant central canal spinal stenosis.  There is facet  arthropathy.     L4-L5: Postoperative changes from L4-L5 discectomy without dislocation of the spacer.  Dorsal stabilization with pedicle screws at L4 and L5 stable since the study performed in November of 2016.  No significant central canal spinal stenosis.  The neural foramina demonstrates no significant foraminal stenosis bilaterally.     L3-L4: Disc dehydration with a global anterior and posterior disc bulges with mild compression of the thecal sac.  There is bilateral L3-L4 facet arthropathy.  There is mild central canal spinal stenosis when compared with the level above and below.     L2-L3: Disc dehydration with marginal anterior spondylotic osteophytes.  There is a broad-based global anterior and posterior bulging of the annulus.  No soft tissue disc herniations.  There is mild bilateral foraminal stenosis.  There is facet arthropathy.     L1-L2: No disc herniations or spinal stenosis or foraminal stenosis.      CT C-spine 2020  Patient has had a previous fusion at C5-6 and C6-7. The odontoid is intact no acute fractures are seen.  The alignment is within normal limits    Assessment:  Gio Forrest is a 56 y.o. male with  back pain.  1. Other spondylosis, lumbar region    2. Postlaminectomy syndrome of lumbar region    3. Severe obesity (BMI 35.0-39.9) with comorbidity      Plan:  1. I have stressed the importance of physical activity and exercise to improve overall health  2. Reviewed pertinent imaging and records with patient  3. Records requested LCV. We have not received the results.   4. Monitor progress from repeat lumbar MB RFA procedure to help with his lower back pain  5. Gabapentin 600mg TID for anti-neuropathic adjunct.  Encourage better diabetic control  6. Restart Celexa. Consider addition of TCA  7. F/u for Bariatric surgery  8. Follow up prn

## 2022-08-08 ENCOUNTER — TELEPHONE (OUTPATIENT)
Dept: PSYCHIATRY | Facility: CLINIC | Age: 57
End: 2022-08-08
Payer: MEDICARE

## 2022-08-08 NOTE — TELEPHONE ENCOUNTER
Called pt no answer no voicemail     ----- Message from Adilia Bonner MA sent at 8/8/2022  9:48 AM CDT -----  Regarding: Bariatric  Please call and schedule patient for bariatric eval with Dr. Thompson.  Let me know if patient schedules, this patient is from the spreadsheet so no referral needed.

## 2022-08-18 ENCOUNTER — OFFICE VISIT (OUTPATIENT)
Dept: PODIATRY | Facility: CLINIC | Age: 57
End: 2022-08-18
Payer: MEDICARE

## 2022-08-18 VITALS — BODY MASS INDEX: 40.43 KG/M2 | HEIGHT: 74 IN | WEIGHT: 315 LBS

## 2022-08-18 DIAGNOSIS — E11.42 DIABETIC POLYNEUROPATHY ASSOCIATED WITH TYPE 2 DIABETES MELLITUS: Primary | ICD-10-CM

## 2022-08-18 DIAGNOSIS — E66.01 MORBID OBESITY WITH BMI OF 40.0-44.9, ADULT: ICD-10-CM

## 2022-08-18 DIAGNOSIS — B35.1 ONYCHOMYCOSIS DUE TO DERMATOPHYTE: ICD-10-CM

## 2022-08-18 PROCEDURE — 1159F MED LIST DOCD IN RCRD: CPT | Mod: CPTII,S$GLB,, | Performed by: PODIATRIST

## 2022-08-18 PROCEDURE — 99499 UNLISTED E&M SERVICE: CPT | Mod: S$GLB,,, | Performed by: PODIATRIST

## 2022-08-18 PROCEDURE — 99999 PR PBB SHADOW E&M-EST. PATIENT-LVL III: CPT | Mod: PBBFAC,,, | Performed by: PODIATRIST

## 2022-08-18 PROCEDURE — 3052F HG A1C>EQUAL 8.0%<EQUAL 9.0%: CPT | Mod: CPTII,S$GLB,, | Performed by: PODIATRIST

## 2022-08-18 PROCEDURE — 99999 PR PBB SHADOW E&M-EST. PATIENT-LVL III: ICD-10-PCS | Mod: PBBFAC,,, | Performed by: PODIATRIST

## 2022-08-18 PROCEDURE — 99499 NO LOS: ICD-10-PCS | Mod: S$GLB,,, | Performed by: PODIATRIST

## 2022-08-18 PROCEDURE — 3052F PR MOST RECENT HEMOGLOBIN A1C LEVEL 8.0 - < 9.0%: ICD-10-PCS | Mod: CPTII,S$GLB,, | Performed by: PODIATRIST

## 2022-08-18 PROCEDURE — 11721 DEBRIDE NAIL 6 OR MORE: CPT | Mod: Q9,S$GLB,, | Performed by: PODIATRIST

## 2022-08-18 PROCEDURE — 3008F BODY MASS INDEX DOCD: CPT | Mod: CPTII,S$GLB,, | Performed by: PODIATRIST

## 2022-08-18 PROCEDURE — 3008F PR BODY MASS INDEX (BMI) DOCUMENTED: ICD-10-PCS | Mod: CPTII,S$GLB,, | Performed by: PODIATRIST

## 2022-08-18 PROCEDURE — 11721 PR DEBRIDEMENT OF NAILS, 6 OR MORE: ICD-10-PCS | Mod: Q9,S$GLB,, | Performed by: PODIATRIST

## 2022-08-18 PROCEDURE — 1159F PR MEDICATION LIST DOCUMENTED IN MEDICAL RECORD: ICD-10-PCS | Mod: CPTII,S$GLB,, | Performed by: PODIATRIST

## 2022-08-19 NOTE — PROGRESS NOTES
Subjective:      Patient ID: Gio Forrest is a 56 y.o. male.    Chief Complaint: Nail Care, Diabetes Mellitus, and Diabetic Foot Exam (Danilo 4/25/22)    Gio is a 56 y.o. male who presents to the clinic for evaluation and treatment of diabetic feet. Gio has a past medical history of Cardiomyopathy, Depression, Diabetes, Dyslipidemia (08/12/2015), Gout (08/12/2015), Hypertension, Idiopathic gout, Lumbar pseudoarthrosis, LINDSEY (nonalcoholic steatohepatitis), Obesity (08/12/2015), Obstructive sleep apnea (08/12/2015), Restless leg syndrome, Sebaceous cyst (04/18/2017), and Type 2 diabetes mellitus (08/12/2015). Patient relates having toenails that are in need of trimming.  Denies experiencing pain from the nails with wearing shoe gear.  Has not attempted to self treat. Notes some improvement in his blood glucose since the last exam. Denies any additional pedal complaints.    PCP: Matthew Carroll MD  Date Last Seen: 4/22  Hemoglobin A1C   Date Value Ref Range Status   06/10/2022 8.3 (H) 4.0 - 5.6 % Final     Comment:     ADA Screening Guidelines:  5.7-6.4%  Consistent with prediabetes  >or=6.5%  Consistent with diabetes    High levels of fetal hemoglobin interfere with the HbA1C  assay. Heterozygous hemoglobin variants (HbS, HgC, etc)do  not significantly interfere with this assay.   However, presence of multiple variants may affect accuracy.     03/17/2022 9.1 (H) 4.0 - 5.6 % Final     Comment:     ADA Screening Guidelines:  5.7-6.4%  Consistent with prediabetes  >or=6.5%  Consistent with diabetes    High levels of fetal hemoglobin interfere with the HbA1C  assay. Heterozygous hemoglobin variants (HbS, HgC, etc)do  not significantly interfere with this assay.   However, presence of multiple variants may affect accuracy.     12/13/2021 9.9 (H) 4.0 - 5.6 % Final     Comment:     ADA Screening Guidelines:  5.7-6.4%  Consistent with prediabetes  >or=6.5%  Consistent with diabetes    High levels of fetal  hemoglobin interfere with the HbA1C  assay. Heterozygous hemoglobin variants (HbS, HgC, etc)do  not significantly interfere with this assay.   However, presence of multiple variants may affect accuracy.             Past Medical History:   Diagnosis Date    Cardiomyopathy     Depression     Diabetes     Dyslipidemia 08/12/2015    Gout 08/12/2015    Hypertension     Idiopathic gout     Lumbar pseudoarthrosis     LINDSEY (nonalcoholic steatohepatitis)     Obesity 08/12/2015    Obstructive sleep apnea 08/12/2015    Restless leg syndrome     Sebaceous cyst 04/18/2017    Type 2 diabetes mellitus 08/12/2015       Past Surgical History:   Procedure Laterality Date    ABLATION N/A 10/8/2020    Procedure: Ablation;  Surgeon: Rip Che III, MD;  Location: [facility] CATH;  Service: Cardiology;  Laterality: N/A;    ARTHROPLASTY OF JOINT OF FINGER Right 4/12/2022    Procedure: Right middle finger MCP joint arthroplasty;  Surgeon: Luis Alberto Payne MD;  Location: [facility] OR;  Service: Orthopedics;  Laterality: Right;  Anesthesia:  General with a single-shot supraclavicular block    BACK SURGERY      x2    CARDIAC CATHETERIZATION      CARDIAC ELECTROPHYSIOLOGY STUDY  10/8/2020    Procedure: Study possible ablation;  Surgeon: Rip Che III, MD;  Location: [facility] CATH;  Service: Cardiology;;    CERVICAL SPINE SURGERY      CHOLECYSTECTOMY  05/30/2018    Dr. ROGELIO Tao, [facility]     COLONOSCOPY  2008    COLONOSCOPY N/A 9/14/2017    Procedure: COLONOSCOPY;  Surgeon: Obi Beltre MD;  Location: [facility] ENDO;  Service: Endoscopy;  Laterality: N/A;    CORONARY ANGIOGRAPHY Left 5/28/2018    Procedure: Coronary angiography;  Surgeon: Rafiq Malik MD;  Location: [facility] CATH;  Service: Cardiology;  Laterality: Left;    ELBOW SURGERY Bilateral     HERNIA REPAIR      LAPAROSCOPIC APPENDECTOMY N/A 6/25/2020    Procedure: APPENDECTOMY, LAPAROSCOPIC;  Surgeon: Gordon Can MD;  Location: [facility] OR;  Service: General;   Laterality: N/A;    LAPAROSCOPIC CHOLECYSTECTOMY N/A 2018    Procedure: CHOLECYSTECTOMY, LAPAROSCOPIC;  Surgeon: Fletcher Tao MD;  Location: [facility] OR;  Service: General;  Laterality: N/A;    LEFT HEART CATHETERIZATION Left 2018    Procedure: Left heart cath;  Surgeon: Rafiq Malik MD;  Location: [facility] CATH;  Service: Cardiology;  Laterality: Left;    NECK SURGERY      RADIOFREQUENCY ABLATION OF LUMBAR MEDIAL BRANCH NERVE AT SINGLE LEVEL Bilateral 2022    Procedure: Radiofrequency Ablation, Nerve, Spinal, Lumbar, Medial Branch, 1 Level L3,4,5;  Surgeon: Thiago Garcia MD;  Location: [facility] OR;  Service: Pain Management;  Laterality: Bilateral;    SHOULDER ARTHROSCOPY      SPINE SURGERY      lumbar x2    TONSILLECTOMY      TREATMENT OF CARDIAC ARRHYTHMIA  10/8/2020    Procedure: Cardioversion or Defibrillation;  Surgeon: Rip Che III, MD;  Location: [facility] CATH;  Service: Cardiology;;    TRIGGER FINGER RELEASE Right 2021    Procedure: RELEASE, TRIGGER FINGER;  Surgeon: Luis Alberto Payne MD;  Location: [facility] OR;  Service: Orthopedics;  Laterality: Right;  Procedure: Right ring finger trigger release    Position: Supine    Anesthesia: Local    Equipment: Basic handset       Family History   Problem Relation Age of Onset    Diabetes Mother     Depression Mother     Liver cancer Mother     Obesity Mother     Heart disease Father     Anuerysm Father     Diabetes Father     Stroke Father     Glaucoma Paternal Grandmother     Obesity Sister        Social History     Socioeconomic History    Marital status:     Number of children: 1   Tobacco Use    Smoking status: Former Smoker     Packs/day: 0.50     Years: 6.00     Pack years: 3.00     Types: Cigarettes     Quit date:      Years since quittin.6    Smokeless tobacco: Never Used   Substance and Sexual Activity    Alcohol use: No     Alcohol/week: 0.0 standard drinks    Drug use: No    Sexual activity:  Yes     Partners: Female   Social History Narrative              Social Determinants of Health     Financial Resource Strain: Medium Risk    Difficulty of Paying Living Expenses: Somewhat hard   Food Insecurity: Food Insecurity Present    Worried About Running Out of Food in the Last Year: Often true    Ran Out of Food in the Last Year: Often true   Transportation Needs: Unmet Transportation Needs    Lack of Transportation (Medical): Yes    Lack of Transportation (Non-Medical): Yes   Physical Activity: Insufficiently Active    Days of Exercise per Week: 4 days    Minutes of Exercise per Session: 10 min   Stress: Stress Concern Present    Feeling of Stress : To some extent   Social Connections: Socially Isolated    Frequency of Communication with Friends and Family: Once a week    Frequency of Social Gatherings with Friends and Family: Once a week    Attends [employer] Services: Never    Active Member of Clubs or Organizations: No    Attends Club or Organization Meetings: Never    Marital Status:        Current Outpatient Medications   Medication Sig Dispense Refill    allopurinoL (ZYLOPRIM) 100 MG tablet Take 1 tablet (100 mg total) by mouth once daily. 30 tablet 5    ascorbic acid, vitamin C, (VITAMIN C) 500 MG tablet Take 500 mg by mouth once daily.      aspirin (ECOTRIN) 81 MG EC tablet Take 81 mg by mouth once daily.      atorvastatin (LIPITOR) 40 MG tablet take one Tablet by mouth once daily in the evening 90 tablet 4    blood sugar diagnostic (CONTOUR NEXT TEST STRIPS) Strp Use to Test 4 times a day.  Contour Next test strips required for Medtronic insulin pump 400 strip 4    blood-glucose meter,continuous (DEXCOM G6 ) Misc Dispense 1  1 each 0    blood-glucose sensor (DEXCOM G6 SENSOR) Oxana Dispense 3 sensors/month 3 each 12    blood-glucose transmitter (DEXCOM G6 TRANSMITTER) Oxana Dispense 1 transmitter 1 each 3    carvediloL (COREG) 12.5 MG tablet  "TAKE ONE TABLET (12.5MG) BY MOUTH TWICE DAILY with meals 180 tablet 3    cholecalciferol, vitamin D3, 125 mcg (5,000 unit) capsule Take 5,000 Units by mouth once daily.      citalopram (CELEXA) 40 MG tablet Take 1 tablet (40 mg total) by mouth once daily. 30 tablet 5    docusate sodium (COLACE) 100 MG capsule Take 200 mg by mouth once daily.      gabapentin (NEURONTIN) 600 MG tablet take one Tablet by mouth three times a day 90 tablet 2    glucosam/gluc cruz/yh-sus-u-gluc (GLUCOSAMINE COMPLEX ORAL) Take 1 tablet by mouth once daily.      infusion set for insulin pump (MINIMED PRO-SET INFUSION 24") ISet Changes site q2days, dispense 90day supply 45 each 4    insulin (LANTUS SOLOSTAR U-100 INSULIN) glargine 100 units/mL (3mL) SubQ pen Inject 50 Units into the skin nightly as needed (Use if pump malfunctions). 15 mL 0    insulin lispro (HUMALOG U-100 INSULIN) 100 unit/mL injection 150 units daily with insulin pump 14 mL 1    insulin pump syringe (MINIMED SYRINGE RESERVOIR) 3 mL Misc Changes q2days, dispense 3 month supply 45 each 4    ketoconazole (NIZORAL) 2 % cream APPLY ONE GRAM Topically TWICE DAILY 60 g 1    Lactobacillus rhamnosus GG (CULTURELLE) 10 billion cell capsule Take 1 capsule by mouth once daily. 15 capsule 0    lancets Misc To be used with Contour Next as necessary with Medtronic insulin pump, uses 4 daily 400 each 4    meloxicam (MOBIC) 15 MG tablet Take 15 mg by mouth once daily.       metFORMIN (GLUCOPHAGE) 1000 MG tablet Take 1 tablet (1,000 mg total) by mouth 2 (two) times daily. 180 tablet 4    oxyCODONE (ROXICODONE) 5 MG immediate release tablet Take 1 tablet (5 mg total) by mouth every 8 (eight) hours as needed for Pain. 10 tablet 0    oxyCODONE-acetaminophen (PERCOCET) 5-325 mg per tablet Take 1 tablet by mouth every 4 (four) hours as needed for Pain. 6 tablet 0    pantoprazole (PROTONIX) 40 MG tablet Take 1 tablet (40 mg total) by mouth once daily. 30 tablet 5    pioglitazone " (ACTOS) 15 MG tablet Take 1 tablet (15 mg total) by mouth once daily. 90 tablet 4    semaglutide (OZEMPIC) 2 mg/dose (8 mg/3 mL) PnIj Inject 2 mg into the skin every 7 days. 9 mL 4    vitamin E 400 UNIT capsule Take 400 Units by mouth once daily.      blood-glucose meter kit To check BG 2 times daily, to use with insurance preferred meter 1 each 1    pramipexole (MIRAPEX) 1 MG tablet TAKE TWO TABLETS BY MOUTH EVERY EVENING (Patient not taking: Reported on 8/18/2022) 60 tablet 2     No current facility-administered medications for this visit.       Review of patient's allergies indicates:  No Known Allergies      Review of Systems   Constitutional: Negative for chills and fever.   Cardiovascular: Negative for claudication and leg swelling.   Skin: Positive for color change and nail changes.   Musculoskeletal: Positive for joint pain and joint swelling. Negative for muscle cramps, muscle weakness and myalgias.   Neurological: Positive for numbness and paresthesias.   Psychiatric/Behavioral: Negative for altered mental status.           Objective:      Physical Exam  Constitutional:       General: He is not in acute distress.     Appearance: He is well-developed. He is not diaphoretic.   Cardiovascular:      Pulses:           Dorsalis pedis pulses are 2+ on the right side and 2+ on the left side.        Posterior tibial pulses are 2+ on the right side and 2+ on the left side.      Comments: CFT is < 3 seconds bilateral.  Pedal hair growth is decreased bilateral.  No varicosities noted bilateral.  No lower extremity edema noted bilateral. Toes are cool to touch bilateral.    Musculoskeletal:         General: Tenderness present.      Left lower leg: No edema.      Comments: Muscle strength 5/5 in all muscle groups bilateral.  No tenderness nor crepitation with ROM of foot/ankle joints bilateral.  No pain with palpation of bilateral foot and ankle.  Bilateral pes planus foot type.  Bilateral hallux abducto valgus.   Bilateral semi-reducible contracture of toe 2-5.     Skin:     General: Skin is warm and dry.      Capillary Refill: Capillary refill takes 2 to 3 seconds.      Coloration: Skin is not pale.      Findings: No abrasion, bruising, burn, ecchymosis, erythema, laceration, lesion or petechiae.      Comments: Pedal skin appears thin bilateral.  Toenails x 10 appear thickened by 2 mm, elongated by 4 mm, and discolored with subungual debris. Examination of the skin reveals no evidence of significant maceration, rashes, open lesions, suspicious appearing nevi or other concerning lesions.    Neurological:      Mental Status: He is alert and oriented to person, place, and time.      Sensory: Sensory deficit present.      Motor: No weakness or atrophy.      Comments: Protective sensation per [employer] monofilament is decreased bilateral.  Vibratory sensation is intact bilateral.  Light touch is intact bilateral.               Assessment:       Encounter Diagnoses   Name Primary?    Diabetic polyneuropathy associated with type 2 diabetes mellitus Yes    Onychomycosis due to dermatophyte     Morbid obesity with BMI of 40.0-44.9, adult          Plan:       Gio was seen today for nail care, diabetes mellitus and diabetic foot exam.    Diagnoses and all orders for this visit:    Diabetic polyneuropathy associated with type 2 diabetes mellitus    Onychomycosis due to dermatophyte    Morbid obesity with BMI of 40.0-44.9, adult      I counseled the patient on his conditions, their implications and medical management.    Discussed the importance of diet and exercise as they pertain to diabetes management and maintaining a healthy BMI.    Shoe inspection. Diabetic Foot Education. Patient reminded of the importance of good nutrition and blood sugar control to help prevent podiatric complications of diabetes. Patient instructed on proper foot hygeine. We discussed wearing proper shoe gear, daily foot inspections, never  walking without protective shoe gear, never putting sharp instruments to feet    With patient's permission, nails were aggressively reduced and debrided x 10 to their soft tissue attachment mechanically and with electric , removing all offending nail and debris.  Patient relates relief following the procedure. He will continue to monitor the areas daily, inspect his feet, wear protective shoe gear when ambulatory, moisturizer to maintain skin integrity and follow in this office in approximately 3 months, sooner p.r.n.    Rj Nuñez DPM

## 2022-08-25 ENCOUNTER — LAB VISIT (OUTPATIENT)
Dept: LAB | Facility: HOSPITAL | Age: 57
End: 2022-08-25
Attending: FAMILY MEDICINE
Payer: MEDICARE

## 2022-08-25 ENCOUNTER — OFFICE VISIT (OUTPATIENT)
Dept: FAMILY MEDICINE | Facility: CLINIC | Age: 57
End: 2022-08-25
Payer: MEDICARE

## 2022-08-25 VITALS
BODY MASS INDEX: 40.43 KG/M2 | OXYGEN SATURATION: 95 % | SYSTOLIC BLOOD PRESSURE: 126 MMHG | HEIGHT: 74 IN | WEIGHT: 315 LBS | DIASTOLIC BLOOD PRESSURE: 78 MMHG | HEART RATE: 89 BPM

## 2022-08-25 DIAGNOSIS — E11.65 TYPE 2 DIABETES MELLITUS WITH HYPERGLYCEMIA, WITH LONG-TERM CURRENT USE OF INSULIN: ICD-10-CM

## 2022-08-25 DIAGNOSIS — Z79.4 TYPE 2 DIABETES MELLITUS WITH HYPERGLYCEMIA, WITH LONG-TERM CURRENT USE OF INSULIN: ICD-10-CM

## 2022-08-25 DIAGNOSIS — Z01.818 PREOP EXAMINATION: Primary | ICD-10-CM

## 2022-08-25 DIAGNOSIS — I10 ESSENTIAL HYPERTENSION: ICD-10-CM

## 2022-08-25 LAB
ALBUMIN SERPL BCP-MCNC: 4 G/DL (ref 3.5–5.2)
ALP SERPL-CCNC: 94 U/L (ref 55–135)
ALT SERPL W/O P-5'-P-CCNC: 20 U/L (ref 10–44)
ANION GAP SERPL CALC-SCNC: 11 MMOL/L (ref 8–16)
AST SERPL-CCNC: 16 U/L (ref 10–40)
BILIRUB SERPL-MCNC: 0.6 MG/DL (ref 0.1–1)
BUN SERPL-MCNC: 12 MG/DL (ref 6–20)
CALCIUM SERPL-MCNC: 9.8 MG/DL (ref 8.7–10.5)
CHLORIDE SERPL-SCNC: 103 MMOL/L (ref 95–110)
CO2 SERPL-SCNC: 27 MMOL/L (ref 23–29)
CREAT SERPL-MCNC: 0.9 MG/DL (ref 0.5–1.4)
ERYTHROCYTE [DISTWIDTH] IN BLOOD BY AUTOMATED COUNT: 13.4 % (ref 11.5–14.5)
EST. GFR  (NO RACE VARIABLE): >60 ML/MIN/1.73 M^2
ESTIMATED AVG GLUCOSE: 157 MG/DL (ref 68–131)
GLUCOSE SERPL-MCNC: 161 MG/DL (ref 70–110)
HBA1C MFR BLD: 7.1 % (ref 4–5.6)
HCT VFR BLD AUTO: 38.3 % (ref 40–54)
HGB BLD-MCNC: 12.9 G/DL (ref 14–18)
MCH RBC QN AUTO: 29.3 PG (ref 27–31)
MCHC RBC AUTO-ENTMCNC: 33.7 G/DL (ref 32–36)
MCV RBC AUTO: 87 FL (ref 82–98)
PLATELET # BLD AUTO: 203 K/UL (ref 150–450)
PMV BLD AUTO: 10.7 FL (ref 9.2–12.9)
POTASSIUM SERPL-SCNC: 4.5 MMOL/L (ref 3.5–5.1)
PROT SERPL-MCNC: 7.3 G/DL (ref 6–8.4)
RBC # BLD AUTO: 4.4 M/UL (ref 4.6–6.2)
SODIUM SERPL-SCNC: 141 MMOL/L (ref 136–145)
WBC # BLD AUTO: 7.34 K/UL (ref 3.9–12.7)

## 2022-08-25 PROCEDURE — 99214 OFFICE O/P EST MOD 30 MIN: CPT | Mod: S$GLB,,, | Performed by: FAMILY MEDICINE

## 2022-08-25 PROCEDURE — 1159F MED LIST DOCD IN RCRD: CPT | Mod: CPTII,S$GLB,, | Performed by: FAMILY MEDICINE

## 2022-08-25 PROCEDURE — 85027 COMPLETE CBC AUTOMATED: CPT | Performed by: FAMILY MEDICINE

## 2022-08-25 PROCEDURE — 3052F HG A1C>EQUAL 8.0%<EQUAL 9.0%: CPT | Mod: CPTII,S$GLB,, | Performed by: FAMILY MEDICINE

## 2022-08-25 PROCEDURE — 3078F PR MOST RECENT DIASTOLIC BLOOD PRESSURE < 80 MM HG: ICD-10-PCS | Mod: CPTII,S$GLB,, | Performed by: FAMILY MEDICINE

## 2022-08-25 PROCEDURE — 3008F PR BODY MASS INDEX (BMI) DOCUMENTED: ICD-10-PCS | Mod: CPTII,S$GLB,, | Performed by: FAMILY MEDICINE

## 2022-08-25 PROCEDURE — 99214 PR OFFICE/OUTPT VISIT, EST, LEVL IV, 30-39 MIN: ICD-10-PCS | Mod: S$GLB,,, | Performed by: FAMILY MEDICINE

## 2022-08-25 PROCEDURE — 80053 COMPREHEN METABOLIC PANEL: CPT | Performed by: FAMILY MEDICINE

## 2022-08-25 PROCEDURE — 3052F PR MOST RECENT HEMOGLOBIN A1C LEVEL 8.0 - < 9.0%: ICD-10-PCS | Mod: CPTII,S$GLB,, | Performed by: FAMILY MEDICINE

## 2022-08-25 PROCEDURE — 99999 PR PBB SHADOW E&M-EST. PATIENT-LVL III: CPT | Mod: PBBFAC,,, | Performed by: FAMILY MEDICINE

## 2022-08-25 PROCEDURE — 3008F BODY MASS INDEX DOCD: CPT | Mod: CPTII,S$GLB,, | Performed by: FAMILY MEDICINE

## 2022-08-25 PROCEDURE — 36415 COLL VENOUS BLD VENIPUNCTURE: CPT | Mod: PO | Performed by: FAMILY MEDICINE

## 2022-08-25 PROCEDURE — 1159F PR MEDICATION LIST DOCUMENTED IN MEDICAL RECORD: ICD-10-PCS | Mod: CPTII,S$GLB,, | Performed by: FAMILY MEDICINE

## 2022-08-25 PROCEDURE — 3074F PR MOST RECENT SYSTOLIC BLOOD PRESSURE < 130 MM HG: ICD-10-PCS | Mod: CPTII,S$GLB,, | Performed by: FAMILY MEDICINE

## 2022-08-25 PROCEDURE — 99999 PR PBB SHADOW E&M-EST. PATIENT-LVL III: ICD-10-PCS | Mod: PBBFAC,,, | Performed by: FAMILY MEDICINE

## 2022-08-25 PROCEDURE — 3074F SYST BP LT 130 MM HG: CPT | Mod: CPTII,S$GLB,, | Performed by: FAMILY MEDICINE

## 2022-08-25 PROCEDURE — 3078F DIAST BP <80 MM HG: CPT | Mod: CPTII,S$GLB,, | Performed by: FAMILY MEDICINE

## 2022-08-25 PROCEDURE — 83036 HEMOGLOBIN GLYCOSYLATED A1C: CPT | Performed by: FAMILY MEDICINE

## 2022-08-25 RX ORDER — SEMAGLUTIDE 1.34 MG/ML
1 INJECTION, SOLUTION SUBCUTANEOUS
COMMUNITY
Start: 2022-08-04 | End: 2023-01-18

## 2022-08-25 RX ORDER — GABAPENTIN 800 MG/1
TABLET ORAL
COMMUNITY
End: 2022-08-25

## 2022-08-25 NOTE — PROGRESS NOTES
Subjective:       Patient ID: Gio Forrest is a 56 y.o. male.    Chief Complaint: Follow-up (Letter of recommendation for surgery)    Here for pre op bariatric surgery with Dr. Devine.  In his normal state of health with no complaints.  No history of anesthesia issues.       Review of Systems   Constitutional: Negative for chills and fever.   Respiratory: Negative for cough, chest tightness and shortness of breath.    Cardiovascular: Negative for chest pain, palpitations and leg swelling.   Endocrine: Negative for cold intolerance and heat intolerance.   Psychiatric/Behavioral: Negative for decreased concentration. The patient is not nervous/anxious.        Objective:      Physical Exam  Vitals and nursing note reviewed.   Constitutional:       Appearance: He is well-developed.   HENT:      Head: Normocephalic and atraumatic.   Cardiovascular:      Rate and Rhythm: Normal rate and regular rhythm.      Heart sounds: Normal heart sounds.   Pulmonary:      Effort: Pulmonary effort is normal.      Breath sounds: Normal breath sounds.         Assessment:       1. Preop examination    2. Type 2 diabetes mellitus with hyperglycemia, with long-term current use of insulin    3. Essential hypertension        Plan:       Preop examination    Type 2 diabetes mellitus with hyperglycemia, with long-term current use of insulin  -     Comprehensive Metabolic Panel; Future; Expected date: 08/25/2022  -     CBC Without Differential; Future; Expected date: 08/25/2022  -     Hemoglobin A1C; Future; Expected date: 08/25/2022    Essential hypertension        To get cards clearance as well but had recent negative stress.  Update labs  Low risk for surgery with standard precautions so cleared for bariatric surgery  Will monitor chronic medical issues and continue current plan of care.    Follow up in about 6 months (around 2/25/2023), or if symptoms worsen or fail to improve.

## 2022-08-26 ENCOUNTER — OFFICE VISIT (OUTPATIENT)
Dept: OTOLARYNGOLOGY | Facility: CLINIC | Age: 57
End: 2022-08-26
Payer: MEDICARE

## 2022-08-26 ENCOUNTER — OFFICE VISIT (OUTPATIENT)
Dept: BARIATRICS | Facility: CLINIC | Age: 57
End: 2022-08-26
Payer: MEDICARE

## 2022-08-26 ENCOUNTER — CLINICAL SUPPORT (OUTPATIENT)
Dept: AUDIOLOGY | Facility: CLINIC | Age: 57
End: 2022-08-26
Payer: MEDICARE

## 2022-08-26 VITALS
SYSTOLIC BLOOD PRESSURE: 111 MMHG | BODY MASS INDEX: 38.77 KG/M2 | RESPIRATION RATE: 16 BRPM | TEMPERATURE: 98 F | WEIGHT: 311.81 LBS | TEMPERATURE: 99 F | BODY MASS INDEX: 42.11 KG/M2 | DIASTOLIC BLOOD PRESSURE: 59 MMHG | HEIGHT: 75 IN | HEIGHT: 72 IN | HEART RATE: 86 BPM | WEIGHT: 310.88 LBS

## 2022-08-26 DIAGNOSIS — I10 ESSENTIAL HYPERTENSION: ICD-10-CM

## 2022-08-26 DIAGNOSIS — E66.01 MORBID OBESITY: Primary | ICD-10-CM

## 2022-08-26 DIAGNOSIS — H61.21 RIGHT EAR IMPACTED CERUMEN: ICD-10-CM

## 2022-08-26 DIAGNOSIS — H93.8X9 EAR POPPING, UNSPECIFIED LATERALITY: Primary | ICD-10-CM

## 2022-08-26 DIAGNOSIS — E11.65 TYPE 2 DIABETES MELLITUS WITH HYPERGLYCEMIA, WITH LONG-TERM CURRENT USE OF INSULIN: ICD-10-CM

## 2022-08-26 DIAGNOSIS — H93.8X1 EAR POPPING, RIGHT: Primary | ICD-10-CM

## 2022-08-26 DIAGNOSIS — Z79.4 TYPE 2 DIABETES MELLITUS WITH HYPERGLYCEMIA, WITH LONG-TERM CURRENT USE OF INSULIN: ICD-10-CM

## 2022-08-26 PROCEDURE — 99213 PR OFFICE/OUTPT VISIT, EST, LEVL III, 20-29 MIN: ICD-10-PCS | Mod: 25,S$GLB,, | Performed by: NURSE PRACTITIONER

## 2022-08-26 PROCEDURE — 3074F PR MOST RECENT SYSTOLIC BLOOD PRESSURE < 130 MM HG: ICD-10-PCS | Mod: CPTII,S$GLB,, | Performed by: SURGERY

## 2022-08-26 PROCEDURE — 3074F SYST BP LT 130 MM HG: CPT | Mod: CPTII,S$GLB,, | Performed by: SURGERY

## 2022-08-26 PROCEDURE — 99999 PR PBB SHADOW E&M-EST. PATIENT-LVL IV: CPT | Mod: PBBFAC,,, | Performed by: NURSE PRACTITIONER

## 2022-08-26 PROCEDURE — 3051F PR MOST RECENT HEMOGLOBIN A1C LEVEL 7.0 - < 8.0%: ICD-10-PCS | Mod: CPTII,S$GLB,, | Performed by: SURGERY

## 2022-08-26 PROCEDURE — 1160F PR REVIEW ALL MEDS BY PRESCRIBER/CLIN PHARMACIST DOCUMENTED: ICD-10-PCS | Mod: CPTII,S$GLB,, | Performed by: NURSE PRACTITIONER

## 2022-08-26 PROCEDURE — 3008F PR BODY MASS INDEX (BMI) DOCUMENTED: ICD-10-PCS | Mod: CPTII,S$GLB,, | Performed by: SURGERY

## 2022-08-26 PROCEDURE — 99213 OFFICE O/P EST LOW 20 MIN: CPT | Mod: 25,S$GLB,, | Performed by: NURSE PRACTITIONER

## 2022-08-26 PROCEDURE — 3008F PR BODY MASS INDEX (BMI) DOCUMENTED: ICD-10-PCS | Mod: CPTII,S$GLB,, | Performed by: NURSE PRACTITIONER

## 2022-08-26 PROCEDURE — 1159F PR MEDICATION LIST DOCUMENTED IN MEDICAL RECORD: ICD-10-PCS | Mod: CPTII,S$GLB,, | Performed by: NURSE PRACTITIONER

## 2022-08-26 PROCEDURE — 3051F HG A1C>EQUAL 7.0%<8.0%: CPT | Mod: CPTII,S$GLB,, | Performed by: NURSE PRACTITIONER

## 2022-08-26 PROCEDURE — 99999 PR PBB SHADOW E&M-EST. PATIENT-LVL IV: ICD-10-PCS | Mod: PBBFAC,,, | Performed by: NURSE PRACTITIONER

## 2022-08-26 PROCEDURE — 1159F PR MEDICATION LIST DOCUMENTED IN MEDICAL RECORD: ICD-10-PCS | Mod: CPTII,S$GLB,, | Performed by: SURGERY

## 2022-08-26 PROCEDURE — 3008F BODY MASS INDEX DOCD: CPT | Mod: CPTII,S$GLB,, | Performed by: NURSE PRACTITIONER

## 2022-08-26 PROCEDURE — 69210 REMOVE IMPACTED EAR WAX UNI: CPT | Mod: S$GLB,,, | Performed by: NURSE PRACTITIONER

## 2022-08-26 PROCEDURE — 3051F PR MOST RECENT HEMOGLOBIN A1C LEVEL 7.0 - < 8.0%: ICD-10-PCS | Mod: CPTII,S$GLB,, | Performed by: NURSE PRACTITIONER

## 2022-08-26 PROCEDURE — 1159F MED LIST DOCD IN RCRD: CPT | Mod: CPTII,S$GLB,, | Performed by: NURSE PRACTITIONER

## 2022-08-26 PROCEDURE — 99999 PR PBB SHADOW E&M-EST. PATIENT-LVL III: CPT | Mod: PBBFAC,,, | Performed by: SURGERY

## 2022-08-26 PROCEDURE — 99213 PR OFFICE/OUTPT VISIT, EST, LEVL III, 20-29 MIN: ICD-10-PCS | Mod: S$GLB,,, | Performed by: SURGERY

## 2022-08-26 PROCEDURE — 69210 PR REMOVAL IMPACTED CERUMEN REQUIRING INSTRUMENTATION, UNILATERAL: ICD-10-PCS | Mod: S$GLB,,, | Performed by: NURSE PRACTITIONER

## 2022-08-26 PROCEDURE — 3008F BODY MASS INDEX DOCD: CPT | Mod: CPTII,S$GLB,, | Performed by: SURGERY

## 2022-08-26 PROCEDURE — 99213 OFFICE O/P EST LOW 20 MIN: CPT | Mod: S$GLB,,, | Performed by: SURGERY

## 2022-08-26 PROCEDURE — 1160F RVW MEDS BY RX/DR IN RCRD: CPT | Mod: CPTII,S$GLB,, | Performed by: NURSE PRACTITIONER

## 2022-08-26 PROCEDURE — 92567 PR TYMPA2METRY: ICD-10-PCS | Mod: S$GLB,,, | Performed by: AUDIOLOGIST

## 2022-08-26 PROCEDURE — 92567 TYMPANOMETRY: CPT | Mod: S$GLB,,, | Performed by: AUDIOLOGIST

## 2022-08-26 PROCEDURE — 3078F DIAST BP <80 MM HG: CPT | Mod: CPTII,S$GLB,, | Performed by: SURGERY

## 2022-08-26 PROCEDURE — 3051F HG A1C>EQUAL 7.0%<8.0%: CPT | Mod: CPTII,S$GLB,, | Performed by: SURGERY

## 2022-08-26 PROCEDURE — 3078F PR MOST RECENT DIASTOLIC BLOOD PRESSURE < 80 MM HG: ICD-10-PCS | Mod: CPTII,S$GLB,, | Performed by: SURGERY

## 2022-08-26 PROCEDURE — 99999 PR PBB SHADOW E&M-EST. PATIENT-LVL III: ICD-10-PCS | Mod: PBBFAC,,, | Performed by: SURGERY

## 2022-08-26 PROCEDURE — 1159F MED LIST DOCD IN RCRD: CPT | Mod: CPTII,S$GLB,, | Performed by: SURGERY

## 2022-08-26 RX ORDER — TOPIRAMATE 25 MG/1
25 TABLET ORAL 2 TIMES DAILY
Qty: 60 TABLET | Refills: 0 | Status: SHIPPED | OUTPATIENT
Start: 2022-08-26 | End: 2022-09-23 | Stop reason: SDUPTHER

## 2022-08-26 NOTE — PROGRESS NOTES
Tympanometry completed per order from Anjali Parker NP.    Type A tympanogram obtained AU.    Patient referred back to Anjali Parker NP for follow-up evaluation.

## 2022-08-26 NOTE — PROGRESS NOTES
"Subjective:       Patient ID: Gio Forerst is a 56 y.o. male.    Chief Complaint: right ear issues    HPI   Patient was last seen by me >3 years ago for aural pruritis. Recommended coconut oil. Patient returns today for crackling/popping sounds AU. Suspects wax build up. Wants to ensure no infection or treatable issue.     Review of Systems   Constitutional: Negative.    HENT: Negative.    Eyes: Negative.    Respiratory: Negative.    Cardiovascular: Negative.    Gastrointestinal: Negative.    Musculoskeletal: Negative.    Integumentary:  Negative.   Neurological: Negative.    Hematological: Negative.    Psychiatric/Behavioral: Negative.          Objective:      Physical Exam  Vitals and nursing note reviewed.   Constitutional:       General: He is not in acute distress.     Appearance: He is well-developed. He is not ill-appearing or diaphoretic.   HENT:      Head: Normocephalic and atraumatic.      Right Ear: Hearing, tympanic membrane, ear canal and external ear normal. No middle ear effusion. Tympanic membrane is not erythematous. Tympanic membrane has normal mobility.      Left Ear: Hearing, tympanic membrane, ear canal and external ear normal.  No middle ear effusion. Tympanic membrane is not erythematous. Tympanic membrane has normal mobility.      Ears:      Comments: Type "A" tympanograms AU consistent with well-pneumatized mesotympanums and absence of middle ear effusions     Nose: Nose normal.   Eyes:      General: Lids are normal. No scleral icterus.        Right eye: No discharge.         Left eye: No discharge.   Neck:      Trachea: Trachea normal. No tracheal deviation.   Cardiovascular:      Rate and Rhythm: Normal rate.   Pulmonary:      Effort: Pulmonary effort is normal. No respiratory distress.      Breath sounds: No stridor. No wheezing.   Musculoskeletal:         General: Normal range of motion.      Cervical back: Normal range of motion and neck supple.   Skin:     General: Skin is warm and " dry.      Coloration: Skin is not pale.   Neurological:      Mental Status: He is alert and oriented to person, place, and time.      Coordination: Coordination normal.      Gait: Gait normal.   Psychiatric:         Speech: Speech normal.         Behavior: Behavior normal. Behavior is cooperative.         Thought Content: Thought content normal.         Judgment: Judgment normal.      SEPARATE PROCEDURE IN OFFICE:   Procedure: Removal of impacted cerumen, Right   Pre Procedure Diagnosis: Cerumen Impaction   Post Procedure Diagnosis: Cerumen Impaction   Verbal informed consent in regards to risk of trauma to ear canal, ear drum or hearing, discomfort during procedure and/or inability to remove cerumen impaction in one session or unforeseen events or complications.   No anesthesia.     Procedure in detail:   Ear canal visualized bilateral with appropriate size ear speculum utilizing Operating Head Binocular Otomicroscope   Utilizing the following:  Ring curet, delicate alligator forceps, and/or suction cannula was used. The impacted cerumen of the ear canal was removed atraumatically. The TM and EAC were then inspected and found to be clear of wax. See description of TMs/EACs in PE above.   Complications: No   Condition: Improved/Good     Assessment:     Right cerumen impaction removed      Plan:     Reassured no TARMA. If crackling/popping continues, will suggest Flonase+Astelin for ETD.     Return to clinic as needed for further ENT concerns

## 2022-08-26 NOTE — PROGRESS NOTES
Medically Supervised Weight Loss Documentation    Date of Visit: 08/26/2022    Patient: Gio Forrest    Current Weight: 311  Current BMI: Body mass index is 39.5 kg/m².  Weight Change: +5    Last Weight: 304    Beginning Weight: 306      Vitals:   Vitals:    08/26/22 0903   BP: (!) 111/59   Pulse: 86   Resp: 16   Temp: 97.8 °F (36.6 °C)       Comorbidities:   Past Medical History:   Diagnosis Date    Cardiomyopathy     Depression     Diabetes     Dyslipidemia 08/12/2015    Gout 08/12/2015    Hypertension     Idiopathic gout     Lumbar pseudoarthrosis     LINDSEY (nonalcoholic steatohepatitis)     Obesity 08/12/2015    Obstructive sleep apnea 08/12/2015    Restless leg syndrome     Sebaceous cyst 04/18/2017    Type 2 diabetes mellitus 08/12/2015       Medications:  Current Outpatient Medications on File Prior to Visit   Medication Sig Dispense Refill    allopurinoL (ZYLOPRIM) 100 MG tablet Take 1 tablet (100 mg total) by mouth once daily. 30 tablet 5    ascorbic acid, vitamin C, (VITAMIN C) 500 MG tablet Take 500 mg by mouth once daily.      aspirin (ECOTRIN) 81 MG EC tablet Take 81 mg by mouth once daily.      atorvastatin (LIPITOR) 40 MG tablet take one Tablet by mouth once daily in the evening 90 tablet 4    blood sugar diagnostic (CONTOUR NEXT TEST STRIPS) Strp Use to Test 4 times a day.  Contour Next test strips required for Medtronic insulin pump (Patient taking differently: Use to Test 4 times a day.  Contour Next test strips required for Medtronic insulin pump) 400 strip 4    blood-glucose meter kit To check BG 2 times daily, to use with insurance preferred meter 1 each 1    blood-glucose meter,continuous (DEXCOM G6 ) Misc Dispense 1  1 each 0    blood-glucose sensor (DEXCOM G6 SENSOR) Oxana Dispense 3 sensors/month 3 each 12    blood-glucose transmitter (DEXCOM G6 TRANSMITTER) Oxana Dispense 1 transmitter 1 each 3    carvediloL (COREG) 12.5 MG tablet TAKE ONE TABLET  "(12.5MG) BY MOUTH TWICE DAILY with meals 180 tablet 3    cholecalciferol, vitamin D3, 125 mcg (5,000 unit) capsule Take 5,000 Units by mouth once daily.      citalopram (CELEXA) 40 MG tablet Take 1 tablet (40 mg total) by mouth once daily. 30 tablet 5    docusate sodium (COLACE) 100 MG capsule Take 200 mg by mouth once daily.      gabapentin (NEURONTIN) 600 MG tablet take one Tablet by mouth three times a day 90 tablet 2    glucosam/gluc cruz/qb-xhz-h-gluc (GLUCOSAMINE COMPLEX ORAL) Take 1 tablet by mouth once daily.      infusion set for insulin pump (MINIMED PRO-SET INFUSION 24") ISet Changes site q2days, dispense 90day supply 45 each 4    insulin (LANTUS SOLOSTAR U-100 INSULIN) glargine 100 units/mL (3mL) SubQ pen Inject 50 Units into the skin nightly as needed (Use if pump malfunctions). 15 mL 0    insulin lispro (HUMALOG U-100 INSULIN) 100 unit/mL injection 150 units daily with insulin pump 14 mL 1    insulin pump syringe (MINIMED SYRINGE RESERVOIR) 3 mL Misc Changes q2days, dispense 3 month supply 45 each 4    ketoconazole (NIZORAL) 2 % cream APPLY ONE GRAM Topically TWICE DAILY 60 g 1    Lactobacillus rhamnosus GG (CULTURELLE) 10 billion cell capsule Take 1 capsule by mouth once daily. 15 capsule 0    lancets Misc To be used with Contour Next as necessary with Medtronic insulin pump, uses 4 daily 400 each 4    meloxicam (MOBIC) 15 MG tablet Take 15 mg by mouth once daily.       metFORMIN (GLUCOPHAGE) 1000 MG tablet Take 1 tablet (1,000 mg total) by mouth 2 (two) times daily. 180 tablet 4    OZEMPIC 1 mg/dose (4 mg/3 mL) Inject 1 mg into the skin every 7 days.      pantoprazole (PROTONIX) 40 MG tablet Take 1 tablet (40 mg total) by mouth once daily. 30 tablet 5    pioglitazone (ACTOS) 15 MG tablet Take 1 tablet (15 mg total) by mouth once daily. 90 tablet 4    semaglutide (OZEMPIC) 2 mg/dose (8 mg/3 mL) PnIj Inject 2 mg into the skin every 7 days. 9 mL 4    vitamin E 400 UNIT capsule Take 400 " Units by mouth once daily.       No current facility-administered medications on file prior to visit.         Body comp:  Fat Percent:  51 %  Fat Mass:  159 lb  FFM:  152.8 lb  BMR: 2206 kcal      Diet Education Discussed:    Breakfast:  Eggs, hawkins, sausage- tries to keep small portion; toast, grits, or oat meal  Lunch:  If out of town picks up something  Dinner:  Bake meat and vegetable  Cut down on sweets but didn't stop    Exercise/Activity Discussed:    None     Behavior or Diet Goals for this patient:    Avoid sweets  Will try topamax to help with this  Needs exercise routine at least 20 minutes 3 times per week     We talked about the different surgeries and he is considering Gastric sleeve.     MSD      He will need:     Labs  EKG  UGI - pending  dietary consult- continue  psych consult - follow up therapy   Seminar     I will obtain the following clearances prior to surgery: cardiology - stress done       : total time spent for visit: 20 minutes

## 2022-09-01 ENCOUNTER — HOSPITAL ENCOUNTER (OUTPATIENT)
Dept: RADIOLOGY | Facility: HOSPITAL | Age: 57
Discharge: HOME | End: 2022-09-01
Attending: SURGERY
Payer: MEDICARE

## 2022-09-01 DIAGNOSIS — E66.01 MORBID OBESITY: ICD-10-CM

## 2022-09-01 PROCEDURE — 74240 FL UPPER GI TO INCLUDE ESOPHAGRAM: ICD-10-PCS | Mod: 26,,, | Performed by: RADIOLOGY

## 2022-09-01 PROCEDURE — 25500020 PHARM REV CODE 255: Mod: PO | Performed by: SURGERY

## 2022-09-01 PROCEDURE — A9698 NON-RAD CONTRAST MATERIALNOC: HCPCS | Mod: PO | Performed by: SURGERY

## 2022-09-01 PROCEDURE — 74240 X-RAY XM UPR GI TRC 1CNTRST: CPT | Mod: 26,,, | Performed by: RADIOLOGY

## 2022-09-01 PROCEDURE — 74240 X-RAY XM UPR GI TRC 1CNTRST: CPT | Mod: TC,FY,PO

## 2022-09-01 RX ADMIN — BARIUM SULFATE 355 ML: 0.6 SUSPENSION ORAL at 09:09

## 2022-09-01 RX ADMIN — BARIUM SULFATE 340 ML: 980 POWDER, FOR SUSPENSION ORAL at 09:09

## 2022-09-06 ENCOUNTER — PATIENT MESSAGE (OUTPATIENT)
Dept: PSYCHIATRY | Facility: CLINIC | Age: 57
End: 2022-09-06
Payer: MEDICARE

## 2022-09-12 ENCOUNTER — OFFICE VISIT (OUTPATIENT)
Dept: PSYCHIATRY | Facility: CLINIC | Age: 57
End: 2022-09-12
Payer: COMMERCIAL

## 2022-09-12 DIAGNOSIS — F41.1 GAD (GENERALIZED ANXIETY DISORDER): Primary | ICD-10-CM

## 2022-09-12 DIAGNOSIS — F33.1 MODERATE EPISODE OF RECURRENT MAJOR DEPRESSIVE DISORDER: ICD-10-CM

## 2022-09-12 PROCEDURE — 99999 PR PBB SHADOW E&M-EST. PATIENT-LVL II: CPT | Mod: PBBFAC,,, | Performed by: PSYCHOLOGIST

## 2022-09-12 PROCEDURE — 3051F PR MOST RECENT HEMOGLOBIN A1C LEVEL 7.0 - < 8.0%: ICD-10-PCS | Mod: CPTII,S$GLB,, | Performed by: PSYCHOLOGIST

## 2022-09-12 PROCEDURE — 99999 PR PBB SHADOW E&M-EST. PATIENT-LVL II: ICD-10-PCS | Mod: PBBFAC,,, | Performed by: PSYCHOLOGIST

## 2022-09-12 PROCEDURE — 90791 PR PSYCHIATRIC DIAGNOSTIC EVALUATION: ICD-10-PCS | Mod: S$GLB,,, | Performed by: PSYCHOLOGIST

## 2022-09-12 PROCEDURE — 90791 PSYCH DIAGNOSTIC EVALUATION: CPT | Mod: S$GLB,,, | Performed by: PSYCHOLOGIST

## 2022-09-12 PROCEDURE — 3051F HG A1C>EQUAL 7.0%<8.0%: CPT | Mod: CPTII,S$GLB,, | Performed by: PSYCHOLOGIST
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
## 2022-09-16 ENCOUNTER — LAB VISIT (OUTPATIENT)
Dept: LAB | Facility: HOSPITAL | Age: 57
End: 2022-09-16
Attending: NURSE PRACTITIONER
Payer: MEDICARE

## 2022-09-16 DIAGNOSIS — Z79.4 TYPE 2 DIABETES MELLITUS WITH DIABETIC POLYNEUROPATHY, WITH LONG-TERM CURRENT USE OF INSULIN: ICD-10-CM

## 2022-09-16 DIAGNOSIS — E11.42 TYPE 2 DIABETES MELLITUS WITH DIABETIC POLYNEUROPATHY, WITH LONG-TERM CURRENT USE OF INSULIN: ICD-10-CM

## 2022-09-16 LAB
ALBUMIN/CREAT UR: 5.3 UG/MG (ref 0–30)
CREAT UR-MCNC: 150 MG/DL (ref 23–375)
MICROALBUMIN UR DL<=1MG/L-MCNC: 8 UG/ML

## 2022-09-16 PROCEDURE — 82043 UR ALBUMIN QUANTITATIVE: CPT | Performed by: NURSE PRACTITIONER

## 2022-09-16 PROCEDURE — 82570 ASSAY OF URINE CREATININE: CPT | Performed by: NURSE PRACTITIONER

## 2022-09-23 ENCOUNTER — OFFICE VISIT (OUTPATIENT)
Dept: BARIATRICS | Facility: CLINIC | Age: 57
End: 2022-09-23
Payer: MEDICARE

## 2022-09-23 VITALS
DIASTOLIC BLOOD PRESSURE: 69 MMHG | HEART RATE: 65 BPM | SYSTOLIC BLOOD PRESSURE: 125 MMHG | TEMPERATURE: 97 F | BODY MASS INDEX: 38.69 KG/M2 | WEIGHT: 311.19 LBS | RESPIRATION RATE: 16 BRPM | HEIGHT: 75 IN

## 2022-09-23 DIAGNOSIS — G47.33 OSA ON CPAP: ICD-10-CM

## 2022-09-23 DIAGNOSIS — E11.65 TYPE 2 DIABETES MELLITUS WITH HYPERGLYCEMIA, WITH LONG-TERM CURRENT USE OF INSULIN: ICD-10-CM

## 2022-09-23 DIAGNOSIS — I10 ESSENTIAL HYPERTENSION: ICD-10-CM

## 2022-09-23 DIAGNOSIS — Z79.4 TYPE 2 DIABETES MELLITUS WITH HYPERGLYCEMIA, WITH LONG-TERM CURRENT USE OF INSULIN: ICD-10-CM

## 2022-09-23 DIAGNOSIS — E66.01 MORBID OBESITY: Primary | ICD-10-CM

## 2022-09-23 DIAGNOSIS — K76.0 FATTY LIVER: ICD-10-CM

## 2022-09-23 PROCEDURE — 99999 PR PBB SHADOW E&M-EST. PATIENT-LVL IV: CPT | Mod: PBBFAC,,, | Performed by: SURGERY

## 2022-09-23 PROCEDURE — 3061F PR NEG MICROALBUMINURIA RESULT DOCUMENTED/REVIEW: ICD-10-PCS | Mod: CPTII,S$GLB,, | Performed by: SURGERY

## 2022-09-23 PROCEDURE — 3066F NEPHROPATHY DOC TX: CPT | Mod: CPTII,S$GLB,, | Performed by: SURGERY

## 2022-09-23 PROCEDURE — 3008F PR BODY MASS INDEX (BMI) DOCUMENTED: ICD-10-PCS | Mod: CPTII,S$GLB,, | Performed by: SURGERY

## 2022-09-23 PROCEDURE — 3066F PR DOCUMENTATION OF TREATMENT FOR NEPHROPATHY: ICD-10-PCS | Mod: CPTII,S$GLB,, | Performed by: SURGERY

## 2022-09-23 PROCEDURE — 3074F SYST BP LT 130 MM HG: CPT | Mod: CPTII,S$GLB,, | Performed by: SURGERY

## 2022-09-23 PROCEDURE — 3051F HG A1C>EQUAL 7.0%<8.0%: CPT | Mod: CPTII,S$GLB,, | Performed by: SURGERY

## 2022-09-23 PROCEDURE — 3051F PR MOST RECENT HEMOGLOBIN A1C LEVEL 7.0 - < 8.0%: ICD-10-PCS | Mod: CPTII,S$GLB,, | Performed by: SURGERY

## 2022-09-23 PROCEDURE — 3008F BODY MASS INDEX DOCD: CPT | Mod: CPTII,S$GLB,, | Performed by: SURGERY

## 2022-09-23 PROCEDURE — 3078F PR MOST RECENT DIASTOLIC BLOOD PRESSURE < 80 MM HG: ICD-10-PCS | Mod: CPTII,S$GLB,, | Performed by: SURGERY

## 2022-09-23 PROCEDURE — 1159F PR MEDICATION LIST DOCUMENTED IN MEDICAL RECORD: ICD-10-PCS | Mod: CPTII,S$GLB,, | Performed by: SURGERY

## 2022-09-23 PROCEDURE — 1159F MED LIST DOCD IN RCRD: CPT | Mod: CPTII,S$GLB,, | Performed by: SURGERY

## 2022-09-23 PROCEDURE — 3061F NEG MICROALBUMINURIA REV: CPT | Mod: CPTII,S$GLB,, | Performed by: SURGERY

## 2022-09-23 PROCEDURE — 3078F DIAST BP <80 MM HG: CPT | Mod: CPTII,S$GLB,, | Performed by: SURGERY

## 2022-09-23 PROCEDURE — 99999 PR PBB SHADOW E&M-EST. PATIENT-LVL IV: ICD-10-PCS | Mod: PBBFAC,,, | Performed by: SURGERY

## 2022-09-23 PROCEDURE — 99213 PR OFFICE/OUTPT VISIT, EST, LEVL III, 20-29 MIN: ICD-10-PCS | Mod: S$GLB,,, | Performed by: SURGERY

## 2022-09-23 PROCEDURE — 3074F PR MOST RECENT SYSTOLIC BLOOD PRESSURE < 130 MM HG: ICD-10-PCS | Mod: CPTII,S$GLB,, | Performed by: SURGERY

## 2022-09-23 PROCEDURE — 99213 OFFICE O/P EST LOW 20 MIN: CPT | Mod: S$GLB,,, | Performed by: SURGERY

## 2022-09-23 RX ORDER — TOPIRAMATE 25 MG/1
25 TABLET ORAL 2 TIMES DAILY
Qty: 60 TABLET | Refills: 0 | Status: SHIPPED | OUTPATIENT
Start: 2022-09-23 | End: 2022-11-21 | Stop reason: SDUPTHER

## 2022-09-23 NOTE — PROGRESS NOTES
Medically Supervised Weight Loss Documentation    Date of Visit: 09/23/2022    Patient: Gio Forrest    Current Weight: 311  Current BMI: Body mass index is 39.42 kg/m².  Weight Change: +5 pounds    Last Weight: 311    Beginning Weight: 306      Vitals:   Vitals:    09/23/22 0855   BP: 125/69   Pulse: 65   Resp: 16   Temp: 97.3 °F (36.3 °C)       Comorbidities:   Past Medical History:   Diagnosis Date    Cardiomyopathy     Depression     Diabetes     Dyslipidemia 08/12/2015    Gout 08/12/2015    Hypertension     Idiopathic gout     Lumbar pseudoarthrosis     LINDSEY (nonalcoholic steatohepatitis)     Obesity 08/12/2015    Obstructive sleep apnea 08/12/2015    Restless leg syndrome     Sebaceous cyst 04/18/2017    Type 2 diabetes mellitus 08/12/2015       Medications:  Current Outpatient Medications on File Prior to Visit   Medication Sig Dispense Refill    allopurinoL (ZYLOPRIM) 100 MG tablet Take 1 tablet (100 mg total) by mouth once daily. 30 tablet 5    ascorbic acid, vitamin C, (VITAMIN C) 500 MG tablet Take 500 mg by mouth once daily.      atorvastatin (LIPITOR) 40 MG tablet take one Tablet by mouth once daily in the evening 90 tablet 4    blood sugar diagnostic (CONTOUR NEXT TEST STRIPS) Strp Use to Test 4 times a day.  Contour Next test strips required for Medtronic insulin pump (Patient taking differently: Use to Test 4 times a day.  Contour Next test strips required for Medtronic insulin pump) 400 strip 4    blood-glucose meter,continuous (DEXCOM G6 ) Misc Dispense 1  1 each 0    blood-glucose sensor (DEXCOM G6 SENSOR) Oxana Dispense 3 sensors/month 3 each 12    blood-glucose transmitter (DEXCOM G6 TRANSMITTER) Oxana Dispense 1 transmitter 1 each 3    carvediloL (COREG) 12.5 MG tablet TAKE ONE TABLET (12.5MG) BY MOUTH TWICE DAILY with meals 180 tablet 3    cholecalciferol, vitamin D3, 125 mcg (5,000 unit) capsule Take 5,000 Units by mouth once daily.      docusate sodium (COLACE) 100 MG  "capsule Take 200 mg by mouth once daily.      gabapentin (NEURONTIN) 600 MG tablet take one Tablet by mouth three times a day 90 tablet 2    glucosam/gluc cruz/nx-sej-l-gluc (GLUCOSAMINE COMPLEX ORAL) Take 1 tablet by mouth once daily.      infusion set for insulin pump (MINIMED PRO-SET INFUSION 24") ISet Changes site q2days, dispense 90day supply 45 each 4    insulin (LANTUS SOLOSTAR U-100 INSULIN) glargine 100 units/mL (3mL) SubQ pen Inject 50 Units into the skin nightly as needed (Use if pump malfunctions). 15 mL 0    insulin lispro (HUMALOG U-100 INSULIN) 100 unit/mL injection INJECT ONE unit FOR EVERY FIVE grams of carbs AND ONE unit FOR EVERY 25 points of glucose over 150. Max daily DOSE of 150 units total. 40 mL 6    insulin pump syringe (MINIMED SYRINGE RESERVOIR) 3 mL Misc Changes q2days, dispense 3 month supply 45 each 4    ketoconazole (NIZORAL) 2 % cream APPLY ONE GRAM Topically TWICE DAILY 60 g 1    Lactobacillus rhamnosus GG (CULTURELLE) 10 billion cell capsule Take 1 capsule by mouth once daily. 15 capsule 0    lancets Misc To be used with Contour Next as necessary with Medtronic insulin pump, uses 4 daily 400 each 4    meloxicam (MOBIC) 15 MG tablet Take 15 mg by mouth once daily.       metFORMIN (GLUCOPHAGE) 1000 MG tablet Take 1 tablet (1,000 mg total) by mouth 2 (two) times daily. 180 tablet 4    multivitamin (THERAGRAN) per tablet Take 1 tablet by mouth once daily.      pantoprazole (PROTONIX) 40 MG tablet Take 1 tablet (40 mg total) by mouth once daily. 30 tablet 5    pioglitazone (ACTOS) 15 MG tablet Take 1 tablet (15 mg total) by mouth once daily. 90 tablet 4    semaglutide (OZEMPIC) 2 mg/dose (8 mg/3 mL) PnIj Inject 2 mg into the skin every 7 days. 9 mL 4    topiramate (TOPAMAX) 25 MG tablet Take 1 tablet (25 mg total) by mouth 2 (two) times daily. 60 tablet 0    vitamin D (VITAMIN D3) 1000 units Tab Take 1,000 Units by mouth once daily.      vitamin E 400 UNIT capsule Take 400 Units by mouth " once daily.      aspirin (ECOTRIN) 81 MG EC tablet Take 81 mg by mouth once daily.      blood-glucose meter kit To check BG 2 times daily, to use with insurance preferred meter 1 each 1    citalopram (CELEXA) 40 MG tablet Take 1 tablet (40 mg total) by mouth once daily. (Patient not taking: Reported on 9/23/2022) 30 tablet 5    OZEMPIC 1 mg/dose (4 mg/3 mL) Inject 1 mg into the skin every 7 days.       No current facility-administered medications on file prior to visit.         Body comp:  Fat Percent:  48.7 %  Fat Mass:  151.6 lb  FFM:  159.6 lb  BMR: 2288 kcal      Diet Education Discussed:    Breakfast:  oatmeal; eggs scrambled- cheese and hawkins  Lunch:  protein shake- not going out of town as much-eats out if goes somewhere (1 slice of pizza sort of thing- cutting back)  Dinner:  meat and vegetables    Got protein shakes (sounds like premier)  Started buying fruits and other foods     Exercise/Activity Discussed:    No routine yet    Behavior or Diet Goals for this patient:    Doesn't seem like topamax made a big difference but he seems to be making better choices while on this and he did lose fat mass  Will continue  Continue low carb dieting  Exercise at least 20 minutes 3 times per week    MSD      He will need:     Labs  EKG  UGI - pending  dietary consult- continue  psych consult - follow up therapy   Seminar     I will obtain the following clearances prior to surgery: cardiology - stress done    : total time spent for visit: 20 minutes

## 2022-09-27 ENCOUNTER — OFFICE VISIT (OUTPATIENT)
Dept: ENDOCRINOLOGY | Facility: CLINIC | Age: 57
End: 2022-09-27
Payer: MEDICARE

## 2022-09-27 VITALS
HEIGHT: 74 IN | HEART RATE: 80 BPM | DIASTOLIC BLOOD PRESSURE: 76 MMHG | BODY MASS INDEX: 40.42 KG/M2 | RESPIRATION RATE: 18 BRPM | SYSTOLIC BLOOD PRESSURE: 120 MMHG | WEIGHT: 314.94 LBS

## 2022-09-27 DIAGNOSIS — Z96.41 INSULIN PUMP IN PLACE: ICD-10-CM

## 2022-09-27 DIAGNOSIS — E11.42 TYPE 2 DIABETES MELLITUS WITH DIABETIC POLYNEUROPATHY, WITH LONG-TERM CURRENT USE OF INSULIN: Primary | ICD-10-CM

## 2022-09-27 DIAGNOSIS — K76.0 FATTY LIVER: ICD-10-CM

## 2022-09-27 DIAGNOSIS — I10 ESSENTIAL HYPERTENSION: ICD-10-CM

## 2022-09-27 DIAGNOSIS — I25.10 ATHEROSCLEROSIS OF NATIVE CORONARY ARTERY OF NATIVE HEART WITHOUT ANGINA PECTORIS: ICD-10-CM

## 2022-09-27 DIAGNOSIS — E78.5 HYPERLIPIDEMIA DUE TO TYPE 2 DIABETES MELLITUS: ICD-10-CM

## 2022-09-27 DIAGNOSIS — Z79.4 TYPE 2 DIABETES MELLITUS WITH DIABETIC POLYNEUROPATHY, WITH LONG-TERM CURRENT USE OF INSULIN: Primary | ICD-10-CM

## 2022-09-27 DIAGNOSIS — E11.69 HYPERLIPIDEMIA DUE TO TYPE 2 DIABETES MELLITUS: ICD-10-CM

## 2022-09-27 DIAGNOSIS — G47.33 OSA ON CPAP: ICD-10-CM

## 2022-09-27 PROCEDURE — 1159F PR MEDICATION LIST DOCUMENTED IN MEDICAL RECORD: ICD-10-PCS | Mod: CPTII,S$GLB,, | Performed by: NURSE PRACTITIONER

## 2022-09-27 PROCEDURE — 3078F PR MOST RECENT DIASTOLIC BLOOD PRESSURE < 80 MM HG: ICD-10-PCS | Mod: CPTII,S$GLB,, | Performed by: NURSE PRACTITIONER

## 2022-09-27 PROCEDURE — 3061F NEG MICROALBUMINURIA REV: CPT | Mod: CPTII,S$GLB,, | Performed by: NURSE PRACTITIONER

## 2022-09-27 PROCEDURE — 3078F DIAST BP <80 MM HG: CPT | Mod: CPTII,S$GLB,, | Performed by: NURSE PRACTITIONER

## 2022-09-27 PROCEDURE — 3061F PR NEG MICROALBUMINURIA RESULT DOCUMENTED/REVIEW: ICD-10-PCS | Mod: CPTII,S$GLB,, | Performed by: NURSE PRACTITIONER

## 2022-09-27 PROCEDURE — 3051F PR MOST RECENT HEMOGLOBIN A1C LEVEL 7.0 - < 8.0%: ICD-10-PCS | Mod: CPTII,S$GLB,, | Performed by: NURSE PRACTITIONER

## 2022-09-27 PROCEDURE — 95251 CONT GLUC MNTR ANALYSIS I&R: CPT | Mod: S$GLB,,, | Performed by: NURSE PRACTITIONER

## 2022-09-27 PROCEDURE — 3008F BODY MASS INDEX DOCD: CPT | Mod: CPTII,S$GLB,, | Performed by: NURSE PRACTITIONER

## 2022-09-27 PROCEDURE — 99214 PR OFFICE/OUTPT VISIT, EST, LEVL IV, 30-39 MIN: ICD-10-PCS | Mod: S$GLB,,, | Performed by: NURSE PRACTITIONER

## 2022-09-27 PROCEDURE — 95251 PR GLUCOSE MONITOR, 72 HOUR, PHYS INTERP: ICD-10-PCS | Mod: S$GLB,,, | Performed by: NURSE PRACTITIONER

## 2022-09-27 PROCEDURE — 3066F NEPHROPATHY DOC TX: CPT | Mod: CPTII,S$GLB,, | Performed by: NURSE PRACTITIONER

## 2022-09-27 PROCEDURE — 3066F PR DOCUMENTATION OF TREATMENT FOR NEPHROPATHY: ICD-10-PCS | Mod: CPTII,S$GLB,, | Performed by: NURSE PRACTITIONER

## 2022-09-27 PROCEDURE — 1159F MED LIST DOCD IN RCRD: CPT | Mod: CPTII,S$GLB,, | Performed by: NURSE PRACTITIONER

## 2022-09-27 PROCEDURE — 3051F HG A1C>EQUAL 7.0%<8.0%: CPT | Mod: CPTII,S$GLB,, | Performed by: NURSE PRACTITIONER

## 2022-09-27 PROCEDURE — 3074F PR MOST RECENT SYSTOLIC BLOOD PRESSURE < 130 MM HG: ICD-10-PCS | Mod: CPTII,S$GLB,, | Performed by: NURSE PRACTITIONER

## 2022-09-27 PROCEDURE — 3074F SYST BP LT 130 MM HG: CPT | Mod: CPTII,S$GLB,, | Performed by: NURSE PRACTITIONER

## 2022-09-27 PROCEDURE — 99999 PR PBB SHADOW E&M-EST. PATIENT-LVL III: CPT | Mod: PBBFAC,,, | Performed by: NURSE PRACTITIONER

## 2022-09-27 PROCEDURE — 3008F PR BODY MASS INDEX (BMI) DOCUMENTED: ICD-10-PCS | Mod: CPTII,S$GLB,, | Performed by: NURSE PRACTITIONER

## 2022-09-27 PROCEDURE — 99999 PR PBB SHADOW E&M-EST. PATIENT-LVL III: ICD-10-PCS | Mod: PBBFAC,,, | Performed by: NURSE PRACTITIONER

## 2022-09-27 PROCEDURE — 99214 OFFICE O/P EST MOD 30 MIN: CPT | Mod: S$GLB,,, | Performed by: NURSE PRACTITIONER

## 2022-09-27 RX ORDER — PIOGLITAZONEHYDROCHLORIDE 15 MG/1
15 TABLET ORAL DAILY
Qty: 90 TABLET | Refills: 4 | Status: ON HOLD | OUTPATIENT
Start: 2022-09-27 | End: 2023-01-21 | Stop reason: HOSPADM

## 2022-09-27 RX ORDER — INSULIN GLARGINE 100 [IU]/ML
INJECTION, SOLUTION SUBCUTANEOUS
Qty: 15 ML | Refills: 0 | Status: ON HOLD
Start: 2022-09-27 | End: 2023-01-21 | Stop reason: HOSPADM

## 2022-09-27 RX ORDER — METFORMIN HYDROCHLORIDE 1000 MG/1
1000 TABLET ORAL 2 TIMES DAILY
Qty: 180 TABLET | Refills: 4 | Status: ON HOLD | OUTPATIENT
Start: 2022-09-27 | End: 2023-01-21 | Stop reason: HOSPADM

## 2022-09-27 NOTE — PATIENT INSTRUCTIONS
Low Carb Snacks  Snacks can be an important part of a balanced, healthy meal plan. They allow you to eat more frequently, feeling full and satisfied throughout the day. Also, they allow you to spread carbohydrates evenly, which may stabilize blood sugars.  Plus, snacks are enjoyable!     The amount of carbohydrate needed at snacks varies. Generally, about 15-30 grams of carbohydrate per snack is recommended.  Below you will find some tasty treats.       0-5 gm carb  Crystal Light  Vitamin Water Zero  Herbal tea, unsweetened  2 tsp peanut butter on celery  1./2 cup sugar-free jell-o  1 sugar-free popsicle  ¼ cup blueberries  8oz Blue Misti unsweetened almond milk  5 baby carrots & celery sticks, cucumbers, bell peppers dipped in ¼ cup salsa, 2Tbsp light ranch dressing or 2Tbsp plain Greek yogurt  10 Goldfish crackers  ½ oz low-fat cheese or string cheese  1 closed handful of nuts, unsalted  1 Tbsp of sunflower seeds, unsalted  1 cup Smart Pop popcorn  1 whole grain brown rice cake        15 gm carb  1 small piece of fruit or ½ banana or 1/2 cup lite canned fruit  3 elizabeth cracker squares  3 cups Smart Pop popcorn, top spray butter, Moser lite salt or cinnamon and Truvia  5 Vanilla Wafers  ½ cup low fat, no added sugar ice cream or frozen yogurt (Blue bell, Blue Bunny, Weight Watchers, Skinny Cow)  ½ turkey, ham, or chicken sandwich  ½ c fruit with ½ c Cottage cheese  4-6 unsalted wheat crackers with 1 oz low fat cheese or 1 tbsp peanut butter   30-45 goldfish crackers (depending on flavor)   7-8 Yarsanism mini brown rice cakes (caramel, apple cinnamon, chocolate)   12 Yarsanism mini brown rice cakes (cheddar, bbq, ranch)   1/3 cup hummus dip with raw veg  1/2 whole wheat chris, 1Tbsp hummus  Mini Pizza (1/2 whole wheat English muffin, low-fat  cheese, tomato sauce)  100 calorie snack pack (Oreo, Chips Ahoy, Ritz Mix, Baked Cheetos)  4-6 oz. light or Greek Style yogurt (Chobani, Yoplait, Keke, [facility])  ½ cup  sugar-free pudding    6 in. wheat tortilla or chris oven toasted chips (topped with spray butter flavoring, cinnamon, Truvia OR spray butter, garlic powder, chili powder)   18 BBQ Popchips (available at [employer], [employer] Foods, Fresh Market)  Celery with peanut butter  Celery with tuna salad  Hard boiled eggs (no yolk)  Dill pickles and 2% cheddar cheese (no kidding, it's a great combo)  Jerky (beef or turkey -- try to find low-sugar varieties)  2% Cheese sticks, such as string cheese  Sugar-free Jello, alone or with cottage cheese and a sprinkling of nuts. Make sugar-free lime Jello with part coconut milk -- For a large package, dissolve the powder in a cup of boiling water, add a can of coconut milk, and then add the rest of the water. Stir well.  2% Cheese with a few apple slices  4-ounce plain or sugar-free yogurt with berries and flax seed meal  Lettuce Roll-ups -- Roll luncheon meat, egg salad, tuna or other filling and veggies in lettuce leaves  Lunch Meat Roll-ups -- Roll cheese or veggies in lunch meat (read the labels for carbs on the lunch meat)  Spread bean dip, spinach dip, or other low-carb dip or spread on the lunch meat or lettuce and then roll it up  Raw veggies and spinach dip, or other low-carb dip  Product Review: Atkins Advantage Bars  Parmesan Crisps -- Good when you want a crunchy snack.

## 2022-09-27 NOTE — PROGRESS NOTES
CC: This 56 y.o. male presents for management of diabetes  and chronic conditions pending review including HTN, HLP, morbid obesity, fatty liver, vitamin d deficiency, CM, CAD     HPI: He was diagnosed with T2DM in 2005. Has never been hospitalized r/t DM.  Mother has passed away from Fatty liver and hepatic carcinoma in 2018  Family hx of DM: sister, mom and dad  Father passed away 5/2022    No acute events since his last visit  He is following w Dr Devine for possible bariatric sx  Pump and Dexcom downloaded see in Media tab  Time in range 73%  Hypoglycemia: None  Does have pp excursions normally not going above 240  Occasional missed CHO entry    Diet: Eats 3  Meals a day, snacks - bananas, crackers    Exercise: none  CURRENT DM MEDS: metformin 1000 mg bid, actos 15 mg qd,  Ozempic 2 mg weekly (Sunday)  Humalog in Medtronic 670g insulin pump: (enters 75 carbs with a meal, 45 with a snack )  Basal 1.8 u/h  Carb ratio 1:4  ISF 30  target 120-140  Act Ins 3 hrs   Vial/pen:  Uses pen  Glucometer type:  True Test 2018    Standards of Care:  Eye exam: 2/2022  no h/o retinopathy, [facility]    Following w Dr Bergman's in cardiology      ROS:   Gen: Appetite good, + weight gain 7 lbs    Eyes: Denies visual disturbances  Resp: no SOB or HENDERSON, no cough  Cardiac: No palpitations, chest pain, no edema   GI: No nausea or vomiting, diarrhea, constipation, or abdominal pain.  /GYN: No nocturia, burning or pain.   MS/Neuro: +numbness/ tingling in BLE; Gait steady, speech clear  Other systems: negative.    PE:  GENERAL: Well developed, well nourished.appears older than age.   PSYCH: AAOx3, appropriate mood and affect, pleasant expression, conversant, appears relaxed, well groomed.   EYES: Conjunctiva, corneas clear  NECK: Supple, trachea midline   NEURO: Gait steady  SKIN:   no acanthosis nigracans.  FOOT EXAMINATION:  3/22/2022  No foot deformity, corns or callus formation,  nails in good condiiton and well trimmed, no  interspace maceration or ulceration noted.  Decreased hair growth present over toes/feet.  Protective sensation intact with 10 gram monofilament.  +2 dorsalis pedis and posterior pulses noted.    Lab Results   Component Value Date    MICALBCREAT 5.3 09/16/2022       Hemoglobin A1C   Date Value Ref Range Status   09/16/2022 7.0 (H) 4.0 - 5.6 % Final     Comment:     ADA Screening Guidelines:  5.7-6.4%  Consistent with prediabetes  >or=6.5%  Consistent with diabetes    High levels of fetal hemoglobin interfere with the HbA1C  assay. Heterozygous hemoglobin variants (HbS, HgC, etc)do  not significantly interfere with this assay.   However, presence of multiple variants may affect accuracy.     08/25/2022 7.1 (H) 4.0 - 5.6 % Final     Comment:     ADA Screening Guidelines:  5.7-6.4%  Consistent with prediabetes  >or=6.5%  Consistent with diabetes    High levels of fetal hemoglobin interfere with the HbA1C  assay. Heterozygous hemoglobin variants (HbS, HgC, etc)do  not significantly interfere with this assay.   However, presence of multiple variants may affect accuracy.     06/10/2022 8.3 (H) 4.0 - 5.6 % Final     Comment:     ADA Screening Guidelines:  5.7-6.4%  Consistent with prediabetes  >or=6.5%  Consistent with diabetes    High levels of fetal hemoglobin interfere with the HbA1C  assay. Heterozygous hemoglobin variants (HbS, HgC, etc)do  not significantly interfere with this assay.   However, presence of multiple variants may affect accuracy.          ASSESSMENT and PLAN:    1. Type 2 diabetes w hyperglycemia , DM PN  Continue metformin 1000 mg bid, Actos 15 mg qday, Continue Ozempic 2 mg qweekly  Continue pump and sensor- put bg in pump and put carbs into pump with every meal and snack   Has lantus at home if pump were to malfunction; take 50 u qd if pump malfunctions  Discussed rule of 15 for treatment of hypoglycemia  Gave low carb snack list    2. HTN - controlled, continue meds as previously prescribed and  monitor.     3. HLP -  on statin therapy,     4. TARYN-  wearing regularly     5. LINDSEY- encouraged weight loss,  mother passed away w HCC    6. Obesity-  Body mass index is 40.43 kg/m².   improve diet, activity as tolerated, goal exercise 30 mins a day, 5 days a week; limit snacking - gave low CHO snack list, continue to follow w Dr Devine      7. Addiction to gambling-  has currently stopped gambling    8. CAD, CM  Follows w Dr Bergman's     9. Insulin pump as above     Follow-up:   3 months with lab prior

## 2022-09-28 ENCOUNTER — TELEPHONE (OUTPATIENT)
Dept: OTOLARYNGOLOGY | Facility: CLINIC | Age: 57
End: 2022-09-28
Payer: MEDICARE

## 2022-09-28 NOTE — TELEPHONE ENCOUNTER
----- Message from Lauren Hector sent at 9/28/2022  3:03 PM CDT -----  Contact: Patient  Type:  Patient Call          Who Called:patient         Does the patient know what this is regarding?:requesting a sooner appt ;Pt said his ear is bothering him ;please advise           Would the patient rather a call back or a response via [facility]? Both           Best Call Back Number:[phone]             Additional Information:

## 2022-09-29 ENCOUNTER — TELEPHONE (OUTPATIENT)
Dept: ENDOCRINOLOGY | Facility: CLINIC | Age: 57
End: 2022-09-29
Payer: MEDICARE

## 2022-09-29 NOTE — TELEPHONE ENCOUNTER
----- Message from Jaleesa Robb RN sent at 9/28/2022  4:44 PM CDT -----  Contact: Patient    ----- Message -----  From: Lauren Hector  Sent: 9/28/2022   3:07 PM CDT  To: Nevin HARDEN Staff    Type:  Patient Call          Who Called: patient         Does the patient know what this is regarding?: Pt resting a PA be sent to [facility] for his insuline pump ;please advise           Would the patient rather a call back or a response via [facility]? Both           Best Call Back Number:[phone]             Additional Information:

## 2022-10-04 ENCOUNTER — OFFICE VISIT (OUTPATIENT)
Dept: OTOLARYNGOLOGY | Facility: CLINIC | Age: 57
End: 2022-10-04
Payer: MEDICARE

## 2022-10-04 ENCOUNTER — IMMUNIZATION (OUTPATIENT)
Dept: PHARMACY | Facility: CLINIC | Age: 57
End: 2022-10-04

## 2022-10-04 ENCOUNTER — IMMUNIZATION (OUTPATIENT)
Dept: PHARMACY | Facility: CLINIC | Age: 57
End: 2022-10-04
Payer: MEDICARE

## 2022-10-04 VITALS — HEIGHT: 74 IN | TEMPERATURE: 99 F | WEIGHT: 313.06 LBS | BODY MASS INDEX: 40.18 KG/M2

## 2022-10-04 DIAGNOSIS — H65.04 ACUTE SEROUS OTITIS MEDIA, RECURRENT, RIGHT EAR: Primary | ICD-10-CM

## 2022-10-04 PROCEDURE — 99214 PR OFFICE/OUTPT VISIT, EST, LEVL IV, 30-39 MIN: ICD-10-PCS | Mod: S$GLB,,, | Performed by: NURSE PRACTITIONER

## 2022-10-04 PROCEDURE — 3061F PR NEG MICROALBUMINURIA RESULT DOCUMENTED/REVIEW: ICD-10-PCS | Mod: CPTII,S$GLB,, | Performed by: NURSE PRACTITIONER

## 2022-10-04 PROCEDURE — 99999 PR PBB SHADOW E&M-EST. PATIENT-LVL IV: CPT | Mod: PBBFAC,,, | Performed by: NURSE PRACTITIONER

## 2022-10-04 PROCEDURE — 3066F PR DOCUMENTATION OF TREATMENT FOR NEPHROPATHY: ICD-10-PCS | Mod: CPTII,S$GLB,, | Performed by: NURSE PRACTITIONER

## 2022-10-04 PROCEDURE — 99999 PR PBB SHADOW E&M-EST. PATIENT-LVL IV: ICD-10-PCS | Mod: PBBFAC,,, | Performed by: NURSE PRACTITIONER

## 2022-10-04 PROCEDURE — 3051F HG A1C>EQUAL 7.0%<8.0%: CPT | Mod: CPTII,S$GLB,, | Performed by: NURSE PRACTITIONER

## 2022-10-04 PROCEDURE — 3066F NEPHROPATHY DOC TX: CPT | Mod: CPTII,S$GLB,, | Performed by: NURSE PRACTITIONER

## 2022-10-04 PROCEDURE — 99214 OFFICE O/P EST MOD 30 MIN: CPT | Mod: S$GLB,,, | Performed by: NURSE PRACTITIONER

## 2022-10-04 PROCEDURE — 3051F PR MOST RECENT HEMOGLOBIN A1C LEVEL 7.0 - < 8.0%: ICD-10-PCS | Mod: CPTII,S$GLB,, | Performed by: NURSE PRACTITIONER

## 2022-10-04 PROCEDURE — 1159F PR MEDICATION LIST DOCUMENTED IN MEDICAL RECORD: ICD-10-PCS | Mod: CPTII,S$GLB,, | Performed by: NURSE PRACTITIONER

## 2022-10-04 PROCEDURE — 3061F NEG MICROALBUMINURIA REV: CPT | Mod: CPTII,S$GLB,, | Performed by: NURSE PRACTITIONER

## 2022-10-04 PROCEDURE — 1159F MED LIST DOCD IN RCRD: CPT | Mod: CPTII,S$GLB,, | Performed by: NURSE PRACTITIONER

## 2022-10-04 PROCEDURE — 3008F BODY MASS INDEX DOCD: CPT | Mod: CPTII,S$GLB,, | Performed by: NURSE PRACTITIONER

## 2022-10-04 PROCEDURE — 3008F PR BODY MASS INDEX (BMI) DOCUMENTED: ICD-10-PCS | Mod: CPTII,S$GLB,, | Performed by: NURSE PRACTITIONER

## 2022-10-04 RX ORDER — AZELASTINE 1 MG/ML
1 SPRAY, METERED NASAL 2 TIMES DAILY
Qty: 30 ML | Refills: 12 | Status: ON HOLD | OUTPATIENT
Start: 2022-10-04 | End: 2023-01-21 | Stop reason: HOSPADM

## 2022-10-04 RX ORDER — FLUTICASONE PROPIONATE 50 MCG
1 SPRAY, SUSPENSION (ML) NASAL 2 TIMES DAILY
Qty: 16 G | Refills: 12 | Status: ON HOLD | OUTPATIENT
Start: 2022-10-04 | End: 2023-01-21 | Stop reason: HOSPADM

## 2022-10-04 NOTE — PROGRESS NOTES
Subjective:       Patient ID: Gio Forrest is a 56 y.o. male.    Chief Complaint: No chief complaint on file.    HPI   Patient was last seen by me 6 weeks ago for crackling/popping sounds AU; he had clear middle ears at that time. Patient states that approximately two weeks after that visit, his right ear filled up again. Feels like fluid in his right ear; feels like a hollow drum, can feel fluid shifting when he changes position.     Review of Systems   Constitutional: Negative.    HENT: Negative.     Eyes: Negative.    Respiratory: Negative.     Cardiovascular: Negative.    Gastrointestinal: Negative.    Musculoskeletal: Negative.    Integumentary:  Negative.   Neurological: Negative.    Hematological: Negative.    Psychiatric/Behavioral: Negative.         Objective:      Physical Exam  Vitals and nursing note reviewed.   Constitutional:       General: He is not in acute distress.     Appearance: He is well-developed. He is not ill-appearing or diaphoretic.   HENT:      Head: Normocephalic and atraumatic.      Right Ear: Hearing, ear canal and external ear normal. A middle ear effusion is present. Tympanic membrane is not erythematous.      Left Ear: Hearing, tympanic membrane, ear canal and external ear normal.  No middle ear effusion. Tympanic membrane is not erythematous.      Nose: Nose normal.   Eyes:      General: Lids are normal. No scleral icterus.        Right eye: No discharge.         Left eye: No discharge.   Neck:      Trachea: Trachea normal. No tracheal deviation.   Cardiovascular:      Rate and Rhythm: Normal rate.   Pulmonary:      Effort: Pulmonary effort is normal. No respiratory distress.      Breath sounds: No stridor. No wheezing.   Musculoskeletal:         General: Normal range of motion.      Cervical back: Normal range of motion and neck supple.   Skin:     General: Skin is warm and dry.      Coloration: Skin is not pale.   Neurological:      Mental Status: He is alert and oriented to  person, place, and time.      Coordination: Coordination normal.      Gait: Gait normal.   Psychiatric:         Speech: Speech normal.         Behavior: Behavior normal. Behavior is cooperative.         Thought Content: Thought content normal.         Judgment: Judgment normal.      Assessment:     Right TAMRA, non-suppurative      Plan:          You have fluid behind your ear drum.    Antibiotics are only effective at resolving infection; however fluid can sometimes remain for 4-12 weeks.    There are only two ways to get rid of fluid stuck behind the ear drum:  (1) The conservative approach -- nasal sprays twice daily (Flonase & Astelin), nasal valsalva/popping, and time (sometimes several weeks).   (2) The invasive approach -- your ear drum is pierced with a sharp tool and the fluid is suctioned out by one of our ear surgeons. This procedure is done in the office and provides instant relief; however there is the risk that the ear drum may not heal properly or that the fluid may return requiring repeating the procedure or putting a drainage tube in your ear drum.   Flonase & Astelin BID.  Nasal valsalva several times a day.   Briefly discussed myringotomy vs tympanostomy tubes.   Return to clinic in 6 weeks if not significantly improved. Will need NP scope of right ET orifice.

## 2022-10-04 NOTE — PATIENT INSTRUCTIONS
"EUSTACHIAN TUBE INSTRUCTIONS:  Nasal valsalva:  Pinch nose and with closed mouth try to "pop" air into ears through the back of the nose. Attempt this several times a day. The more popping you have, the more likely the fluid will resolve.     Flonase / fluticasone (steroid spray) is best for stuffy, pressure, fullness.  Astelin / azelastine (antihistamine spray) is best for itchy, drippy, sneezy.    Use as directed, spraying 1-2 times in each nostril each day. It may take 2-3 days to 2-3 weeks to begin seeing improvement. This medication needs to be taken consistently to see results. Overall, this is a well-tolerated medication with low side effects. The benefit of nasal steroids as opposed to oral steroids is that the nasal steroid spray works primarily in the nose. Common side effects can include: headache, nasal dryness, minor nose bleed.  Rare side effects may include:  septal perforation, elevation in eye pressure, dry eyes, change in smell, allergic reaction.  Notify your provider if you have any concerns or experience these symptoms.     Nasal spray instructions:  Blow nose first gently to clean. Keep chin level with the floor (do not tilt head forward or back). Insert nasal spray taking caution to direct it AWAY from the middle wall inside the nose (septum) to avoid irritating nasal septum which could cause nosebleed.  Do not tilt spray up but rather flat and out along the roof of your mouth to spray. Angle the tip of the spray out slightly toward the direction of the ears; then spray. Do not take quick vigorous sniff but rather slow gentle inhalation while waiting for medication to absorb into nasal passages. Then administer second spray in same way.     These are conservative approaches to treating Eustachian tube congestion^^^.  However if this does not help relieve your symptoms, you may want to consider returning to see one of our ENT surgeons to discuss the possibility of procedures such as " myringotomy, placement of PE tubes, or Eustachian tube balloon dilation.

## 2022-10-07 ENCOUNTER — TELEPHONE (OUTPATIENT)
Dept: FAMILY MEDICINE | Facility: CLINIC | Age: 57
End: 2022-10-07
Payer: MEDICARE

## 2022-10-07 NOTE — TELEPHONE ENCOUNTER
If not in epic can use script pad for whatever is needed and send to [facility]; not understanding why they don't use pharmacy for this

## 2022-10-07 NOTE — TELEPHONE ENCOUNTER
----- Message from Argenis Urbina sent at 10/7/2022 11:42 AM CDT -----  Patient Call Back    Who Called: [employer]/ [employer]    What is the reqeust in detail:JENNIFER CALLING ON THE PT BEHALF TO GET A REFILL ON DIABETIC PUMP SUPPLIES. BUT IN ORDER FOR THE REFILL TO BE FILLED [employer] NEED A ORDER. PLS ADVISE.     Can the clinic reply by [facility]?    Best Call Back Number: [phone], FAX: [phone]

## 2022-10-12 ENCOUNTER — TELEPHONE (OUTPATIENT)
Dept: ENDOCRINOLOGY | Facility: CLINIC | Age: 57
End: 2022-10-12
Payer: MEDICARE

## 2022-10-12 RX ORDER — INSULIN PUMP SYRINGE, 3 ML
EACH MISCELLANEOUS
Qty: 45 EACH | Refills: 4 | Status: SHIPPED | OUTPATIENT
Start: 2022-10-12 | End: 2023-01-24

## 2022-10-26 ENCOUNTER — OFFICE VISIT (OUTPATIENT)
Dept: BARIATRICS | Facility: CLINIC | Age: 57
End: 2022-10-26
Payer: MEDICARE

## 2022-10-26 ENCOUNTER — TELEPHONE (OUTPATIENT)
Dept: PSYCHIATRY | Facility: CLINIC | Age: 57
End: 2022-10-26
Payer: MEDICARE

## 2022-10-26 VITALS
BODY MASS INDEX: 38.22 KG/M2 | RESPIRATION RATE: 16 BRPM | TEMPERATURE: 98 F | DIASTOLIC BLOOD PRESSURE: 76 MMHG | WEIGHT: 307.38 LBS | HEART RATE: 93 BPM | SYSTOLIC BLOOD PRESSURE: 120 MMHG | HEIGHT: 75 IN

## 2022-10-26 DIAGNOSIS — E66.01 MORBID OBESITY: Primary | ICD-10-CM

## 2022-10-26 DIAGNOSIS — Z79.4 TYPE 2 DIABETES MELLITUS WITH HYPERGLYCEMIA, WITH LONG-TERM CURRENT USE OF INSULIN: ICD-10-CM

## 2022-10-26 DIAGNOSIS — G47.33 OSA ON CPAP: ICD-10-CM

## 2022-10-26 DIAGNOSIS — I10 ESSENTIAL HYPERTENSION: ICD-10-CM

## 2022-10-26 DIAGNOSIS — E11.65 TYPE 2 DIABETES MELLITUS WITH HYPERGLYCEMIA, WITH LONG-TERM CURRENT USE OF INSULIN: ICD-10-CM

## 2022-10-26 PROCEDURE — 99213 OFFICE O/P EST LOW 20 MIN: CPT | Mod: S$GLB,,, | Performed by: SURGERY

## 2022-10-26 PROCEDURE — 3061F NEG MICROALBUMINURIA REV: CPT | Mod: CPTII,S$GLB,, | Performed by: SURGERY

## 2022-10-26 PROCEDURE — 3078F PR MOST RECENT DIASTOLIC BLOOD PRESSURE < 80 MM HG: ICD-10-PCS | Mod: CPTII,S$GLB,, | Performed by: SURGERY

## 2022-10-26 PROCEDURE — 3066F NEPHROPATHY DOC TX: CPT | Mod: CPTII,S$GLB,, | Performed by: SURGERY

## 2022-10-26 PROCEDURE — 3074F PR MOST RECENT SYSTOLIC BLOOD PRESSURE < 130 MM HG: ICD-10-PCS | Mod: CPTII,S$GLB,, | Performed by: SURGERY

## 2022-10-26 PROCEDURE — 3051F PR MOST RECENT HEMOGLOBIN A1C LEVEL 7.0 - < 8.0%: ICD-10-PCS | Mod: CPTII,S$GLB,, | Performed by: SURGERY

## 2022-10-26 PROCEDURE — 99213 PR OFFICE/OUTPT VISIT, EST, LEVL III, 20-29 MIN: ICD-10-PCS | Mod: S$GLB,,, | Performed by: SURGERY

## 2022-10-26 PROCEDURE — 3051F HG A1C>EQUAL 7.0%<8.0%: CPT | Mod: CPTII,S$GLB,, | Performed by: SURGERY

## 2022-10-26 PROCEDURE — 3066F PR DOCUMENTATION OF TREATMENT FOR NEPHROPATHY: ICD-10-PCS | Mod: CPTII,S$GLB,, | Performed by: SURGERY

## 2022-10-26 PROCEDURE — 99999 PR PBB SHADOW E&M-EST. PATIENT-LVL V: CPT | Mod: PBBFAC,,, | Performed by: SURGERY

## 2022-10-26 PROCEDURE — 1159F MED LIST DOCD IN RCRD: CPT | Mod: CPTII,S$GLB,, | Performed by: SURGERY

## 2022-10-26 PROCEDURE — 3074F SYST BP LT 130 MM HG: CPT | Mod: CPTII,S$GLB,, | Performed by: SURGERY

## 2022-10-26 PROCEDURE — 3078F DIAST BP <80 MM HG: CPT | Mod: CPTII,S$GLB,, | Performed by: SURGERY

## 2022-10-26 PROCEDURE — 99999 PR PBB SHADOW E&M-EST. PATIENT-LVL V: ICD-10-PCS | Mod: PBBFAC,,, | Performed by: SURGERY

## 2022-10-26 PROCEDURE — 3061F PR NEG MICROALBUMINURIA RESULT DOCUMENTED/REVIEW: ICD-10-PCS | Mod: CPTII,S$GLB,, | Performed by: SURGERY

## 2022-10-26 PROCEDURE — 1159F PR MEDICATION LIST DOCUMENTED IN MEDICAL RECORD: ICD-10-PCS | Mod: CPTII,S$GLB,, | Performed by: SURGERY

## 2022-10-26 NOTE — PROGRESS NOTES
Medically Supervised Weight Loss Documentation    Date of Visit: 10/26/2022    Patient: Gio Forrest    Current Weight:307  Current BMI: Body mass index is 38.94 kg/m².  Weight Change: +1    Last Weight: 311    Beginning Weight: 306      Vitals:   Vitals:    10/26/22 1154   BP: 120/76   Pulse: 93   Resp: 16   Temp: 97.6 °F (36.4 °C)       Comorbidities:   Past Medical History:   Diagnosis Date    Cardiomyopathy     Depression     Diabetes     Dyslipidemia 08/12/2015    Gout 08/12/2015    Hypertension     Idiopathic gout     Lumbar pseudoarthrosis     LINDSEY (nonalcoholic steatohepatitis)     Obesity 08/12/2015    Obstructive sleep apnea 08/12/2015    Restless leg syndrome     Sebaceous cyst 04/18/2017    Type 2 diabetes mellitus 08/12/2015       Medications:  Current Outpatient Medications on File Prior to Visit   Medication Sig Dispense Refill    allopurinoL (ZYLOPRIM) 100 MG tablet TAKE ONE TABLET BY MOUTH DAILY 30 tablet 3    ascorbic acid, vitamin C, (VITAMIN C) 500 MG tablet Take 500 mg by mouth once daily.      aspirin (ECOTRIN) 81 MG EC tablet Take 81 mg by mouth once daily.      atorvastatin (LIPITOR) 40 MG tablet take one Tablet by mouth once daily in the evening 90 tablet 4    azelastine (ASTELIN) 137 mcg (0.1 %) nasal spray 1 spray (137 mcg total) by Nasal route 2 (two) times daily. 30 mL 12    blood sugar diagnostic (CONTOUR NEXT TEST STRIPS) Strp Use to Test 4 times a day.  Contour Next test strips required for Medtronic insulin pump (Patient taking differently: Use to Test 4 times a day.  Contour Next test strips required for Medtronic insulin pump) 400 strip 4    blood-glucose meter kit To check BG 2 times daily, to use with insurance preferred meter 1 each 1    blood-glucose meter,continuous (DEXCOM G6 ) Misc Dispense 1  1 each 0    blood-glucose sensor (DEXCOM G6 SENSOR) Oxana Dispense 3 sensors/month 3 each 12    blood-glucose transmitter (DEXCOM G6 TRANSMITTER) Oxana Dispense 1  "transmitter 1 each 3    carvediloL (COREG) 12.5 MG tablet TAKE ONE TABLET (12.5MG) BY MOUTH TWICE DAILY with meals 180 tablet 3    cholecalciferol, vitamin D3, 125 mcg (5,000 unit) capsule Take 5,000 Units by mouth once daily.      docusate sodium (COLACE) 100 MG capsule Take 200 mg by mouth once daily.      fluticasone propionate (FLONASE) 50 mcg/actuation nasal spray 1 spray (50 mcg total) by Each Nostril route 2 (two) times daily. 16 g 12    gabapentin (NEURONTIN) 600 MG tablet take one Tablet by mouth three times a day 90 tablet 2    glucosam/gluc cruz/dy-zcq-r-gluc (GLUCOSAMINE COMPLEX ORAL) Take 1 tablet by mouth once daily.      infusion set for insulin pump (MINIMED PRO-SET INFUSION 24") ISet Changes site q2days, dispense 90day supply 45 each 4    insulin (LANTUS SOLOSTAR U-100 INSULIN) glargine 100 units/mL SubQ pen Take 50 u qd, only use if pump malfunctions 15 mL 0    insulin lispro (HUMALOG U-100 INSULIN) 100 unit/mL injection INJECT ONE unit FOR EVERY FIVE grams of carbs AND ONE unit FOR EVERY 25 points of glucose over 150. Max daily DOSE of 150 units total. 40 mL 6    insulin pump syringe (MINIMED SYRINGE RESERVOIR) 3 mL Misc Changes q2days, dispense 3 month supply 45 each 4    ketoconazole (NIZORAL) 2 % cream APPLY ONE GRAM Topically TWICE DAILY 60 g 1    Lactobacillus rhamnosus GG (CULTURELLE) 10 billion cell capsule Take 1 capsule by mouth once daily. 15 capsule 0    lancets Misc To be used with Contour Next as necessary with Medtronic insulin pump, uses 4 daily 400 each 4    meloxicam (MOBIC) 15 MG tablet Take 15 mg by mouth once daily.       metFORMIN (GLUCOPHAGE) 1000 MG tablet Take 1 tablet (1,000 mg total) by mouth 2 (two) times daily. 180 tablet 4    multivitamin (THERAGRAN) per tablet Take 1 tablet by mouth once daily.      OZEMPIC 1 mg/dose (4 mg/3 mL) Inject 1 mg into the skin every 7 days.      pantoprazole (PROTONIX) 40 MG tablet Take 1 tablet (40 mg total) by mouth once daily. 30 tablet 5 "    pioglitazone (ACTOS) 15 MG tablet Take 1 tablet (15 mg total) by mouth once daily. 90 tablet 4    semaglutide (OZEMPIC) 2 mg/dose (8 mg/3 mL) PnIj Inject 2 mg into the skin every 7 days. 9 mL 4    topiramate (TOPAMAX) 25 MG tablet Take 1 tablet (25 mg total) by mouth 2 (two) times daily. 60 tablet 0    varicella-zoster gE-AS01B, PF, (SHINGRIX) 50 mcg/0.5 mL injection Inject into the muscle. 1 each 1    vitamin D (VITAMIN D3) 1000 units Tab Take 1,000 Units by mouth once daily.      vitamin E 400 UNIT capsule Take 400 Units by mouth once daily.      citalopram (CELEXA) 40 MG tablet Take 1 tablet (40 mg total) by mouth once daily. (Patient not taking: No sig reported) 30 tablet 5     No current facility-administered medications on file prior to visit.         Body comp:      Diet Education Discussed:    Doing better with diet plan  Using protein shakes and sticking to meat sand vegetables    Exercise/Activity Discussed:    No routine yet    Behavior or Diet Goals for this patient:    Diet plan is doing much better  Needs exercise routine    Needs establish with therapist.  We had talked about a surgical date in November but after speaking with psychiatry he still needs follow up . Will plan to see him again in 1 month.  They recommended of establishing with counselor for at least 3 months.     MSD      He will need:     Labs  EKG  UGI - pending  dietary consult- continue  psych consult - follow up therapy   Seminar     I will obtain the following clearances prior to surgery: cardiology - stress done       : total time spent for visit: 20 minutes

## 2022-10-27 ENCOUNTER — TELEPHONE (OUTPATIENT)
Dept: BARIATRICS | Facility: CLINIC | Age: 57
End: 2022-10-27
Payer: MEDICARE

## 2022-10-27 ENCOUNTER — TELEPHONE (OUTPATIENT)
Dept: SURGERY | Facility: HOSPITAL | Age: 57
End: 2022-10-27
Payer: MEDICARE

## 2022-10-27 NOTE — TELEPHONE ENCOUNTER
----- Message from Rosie Quesada sent at 10/27/2022 12:26 PM CDT -----  Contact: Patient  Type:  Patient Returning Call    Who Called: Patient  Who Left Message for Patient:  Dr. Devine  Does the patient know what this is regarding?: n/a  Best Call Back Number: [phone]  Additional Information: Patient returning Phone call, Please call Patient.

## 2022-10-27 NOTE — TELEPHONE ENCOUNTER
Left message today and yesterday for patient to call me.  Gave number to my desk.  Spoke with psychiatry.  It is a strict requirement that he establish with a therapist before pursuing bariatric surgery.  I will try to discuss this with him.   Female Completion Statement: After discussing her treatment course we decided to discontinue isotretinoin therapy at this time. I explained that she would need to continue her birth control methods for at least one month after the last dosage. She should also get a pregnancy test one month after the last dose. She shouldn't donate blood for one month after the last dose. She should call with any new symptoms of depression.

## 2022-10-27 NOTE — TELEPHONE ENCOUNTER
called pt back to let him know that the doctor will be calling him to be by his phone. Pt understood and gave me a number for doctor to call.

## 2022-10-28 ENCOUNTER — OFFICE VISIT (OUTPATIENT)
Dept: PAIN MEDICINE | Facility: CLINIC | Age: 57
End: 2022-10-28
Payer: MEDICARE

## 2022-10-28 VITALS
HEART RATE: 66 BPM | SYSTOLIC BLOOD PRESSURE: 107 MMHG | DIASTOLIC BLOOD PRESSURE: 65 MMHG | BODY MASS INDEX: 38.85 KG/M2 | WEIGHT: 306.69 LBS | RESPIRATION RATE: 12 BRPM

## 2022-10-28 DIAGNOSIS — M70.60 GREATER TROCHANTERIC BURSITIS, UNSPECIFIED LATERALITY: Primary | ICD-10-CM

## 2022-10-28 DIAGNOSIS — M25.559 HIP PAIN: ICD-10-CM

## 2022-10-28 PROCEDURE — 1159F MED LIST DOCD IN RCRD: CPT | Mod: CPTII,S$GLB,, | Performed by: PHYSICIAN ASSISTANT

## 2022-10-28 PROCEDURE — 3066F NEPHROPATHY DOC TX: CPT | Mod: CPTII,S$GLB,, | Performed by: PHYSICIAN ASSISTANT

## 2022-10-28 PROCEDURE — 96372 PR INJECTION,THERAP/PROPH/DIAG2ST, IM OR SUBCUT: ICD-10-PCS | Mod: S$GLB,,, | Performed by: PHYSICIAN ASSISTANT

## 2022-10-28 PROCEDURE — 3061F NEG MICROALBUMINURIA REV: CPT | Mod: CPTII,S$GLB,, | Performed by: PHYSICIAN ASSISTANT

## 2022-10-28 PROCEDURE — 99999 PR PBB SHADOW E&M-EST. PATIENT-LVL IV: ICD-10-PCS | Mod: PBBFAC,,, | Performed by: PHYSICIAN ASSISTANT

## 2022-10-28 PROCEDURE — 3078F DIAST BP <80 MM HG: CPT | Mod: CPTII,S$GLB,, | Performed by: PHYSICIAN ASSISTANT

## 2022-10-28 PROCEDURE — 99213 OFFICE O/P EST LOW 20 MIN: CPT | Mod: 25,S$GLB,, | Performed by: PHYSICIAN ASSISTANT

## 2022-10-28 PROCEDURE — 3074F SYST BP LT 130 MM HG: CPT | Mod: CPTII,S$GLB,, | Performed by: PHYSICIAN ASSISTANT

## 2022-10-28 PROCEDURE — 3051F PR MOST RECENT HEMOGLOBIN A1C LEVEL 7.0 - < 8.0%: ICD-10-PCS | Mod: CPTII,S$GLB,, | Performed by: PHYSICIAN ASSISTANT

## 2022-10-28 PROCEDURE — 3066F PR DOCUMENTATION OF TREATMENT FOR NEPHROPATHY: ICD-10-PCS | Mod: CPTII,S$GLB,, | Performed by: PHYSICIAN ASSISTANT

## 2022-10-28 PROCEDURE — 3051F HG A1C>EQUAL 7.0%<8.0%: CPT | Mod: CPTII,S$GLB,, | Performed by: PHYSICIAN ASSISTANT

## 2022-10-28 PROCEDURE — 99213 PR OFFICE/OUTPT VISIT, EST, LEVL III, 20-29 MIN: ICD-10-PCS | Mod: 25,S$GLB,, | Performed by: PHYSICIAN ASSISTANT

## 2022-10-28 PROCEDURE — 3074F PR MOST RECENT SYSTOLIC BLOOD PRESSURE < 130 MM HG: ICD-10-PCS | Mod: CPTII,S$GLB,, | Performed by: PHYSICIAN ASSISTANT

## 2022-10-28 PROCEDURE — 3078F PR MOST RECENT DIASTOLIC BLOOD PRESSURE < 80 MM HG: ICD-10-PCS | Mod: CPTII,S$GLB,, | Performed by: PHYSICIAN ASSISTANT

## 2022-10-28 PROCEDURE — 99999 PR PBB SHADOW E&M-EST. PATIENT-LVL IV: CPT | Mod: PBBFAC,,, | Performed by: PHYSICIAN ASSISTANT

## 2022-10-28 PROCEDURE — 3061F PR NEG MICROALBUMINURIA RESULT DOCUMENTED/REVIEW: ICD-10-PCS | Mod: CPTII,S$GLB,, | Performed by: PHYSICIAN ASSISTANT

## 2022-10-28 PROCEDURE — 96372 THER/PROPH/DIAG INJ SC/IM: CPT | Mod: S$GLB,,, | Performed by: PHYSICIAN ASSISTANT

## 2022-10-28 PROCEDURE — 1159F PR MEDICATION LIST DOCUMENTED IN MEDICAL RECORD: ICD-10-PCS | Mod: CPTII,S$GLB,, | Performed by: PHYSICIAN ASSISTANT

## 2022-10-28 RX ORDER — KETOROLAC TROMETHAMINE 30 MG/ML
30 INJECTION, SOLUTION INTRAMUSCULAR; INTRAVENOUS ONCE
Status: COMPLETED | OUTPATIENT
Start: 2022-10-28 | End: 2022-10-28

## 2022-10-28 RX ADMIN — KETOROLAC TROMETHAMINE 30 MG: 30 INJECTION, SOLUTION INTRAMUSCULAR; INTRAVENOUS at 09:10

## 2022-10-28 NOTE — PROGRESS NOTES
Referring Physician: No ref. provider found    PCP: BLANK Carroll MD      CC: low back pain    Interval History:  Gio Forrest is a 57 y.o. male with chronic low back pain who presents today for evaluation of bilateral lateral hip pain. This has been ongoing for several years. He does have mild bilateral hip joint space narrowing. He had b/l GTB injections with orthopedics at the end of June. Reports mild improvement for a short time. He also has diabetes currently on insulin. Most recent HA1C 7. Currently pain is worse from walking for a long period at a fair last weekend. He continues to benefit from  lumbar MB RFA at L3, 4, 5.  He is currently taking Gabapentin 600 mg TID with benefit. Denies SEs. History of gambling addiction. He denies any worsening weakness.  No bowel bladder changes. Pain today is rated 7/10.      HPI:   Gio Forrest is a 57 y.o. male presents to us with lower back pain.  Pain has been present for over 10 years.  He has history of multiple spine surgery in his history of fusion from L4 the S1.  He continues have constant aching, burning, stabbing pain in his lower back.  Pain does not radiate.  Pain worsens standing, bending walking.  Pain improves with rest.  He has tried physical therapy minimal benefit.  He states undergoing lumbar MB RFA procedure in the past with moderate benefit.  This was performed a year ago.  He desires repeat procedure with us.  He is managed with Percocet 10 mg q.8 hours by Dr. Dhillon in the past.  States being unable to follow-up with Dr. Dhillon due to financial issues.  He was taking gabapentin but stopped due to his recent admission in to family rehab.  He also has uncontrolled diabetes currently on insulin.  Recent hemoglobin A1c was over 9.  He denies any worsening weakness.  No bowel bladder changes    ROS:  CONSTITUTIONAL: No fevers, chills, night sweats, wt. loss, appetite changes  SKIN: no rashes or itching  ENT: No headaches, head  trauma, vision changes, or eye pain  LYMPH NODES: None noticed   CV: No chest pain, palpitations.   RESP: No shortness of breath, dyspnea on exertion, cough, wheezing, or hemoptysis  GI: No nausea, emesis, diarrhea, constipation, melena, hematochezia, pain.    : No dysuria, hematuria, urgency, or frequency   HEME: No easy bruising, bleeding problems  PSYCHIATRIC: No depression, anxiety, psychosis, hallucinations.  NEURO: No seizures, memory loss, dizziness or difficulty sleeping  MSK:  Positive HPI      Past Medical History:   Diagnosis Date    Cardiomyopathy     Depression     Diabetes     Dyslipidemia 08/12/2015    Gout 08/12/2015    Hypertension     Idiopathic gout     Lumbar pseudoarthrosis     LINDSEY (nonalcoholic steatohepatitis)     Obesity 08/12/2015    Obstructive sleep apnea 08/12/2015    Restless leg syndrome     Sebaceous cyst 04/18/2017    Type 2 diabetes mellitus 08/12/2015     Past Surgical History:   Procedure Laterality Date    ABLATION N/A 10/8/2020    Procedure: Ablation;  Surgeon: Rip Che III, MD;  Location: [facility] CATH;  Service: Cardiology;  Laterality: N/A;    ARTHROPLASTY OF JOINT OF FINGER Right 4/12/2022    Procedure: Right middle finger MCP joint arthroplasty;  Surgeon: Luis Alberto Payne MD;  Location: [facility] OR;  Service: Orthopedics;  Laterality: Right;  Anesthesia:  General with a single-shot supraclavicular block    BACK SURGERY      x2    CARDIAC CATHETERIZATION      CARDIAC ELECTROPHYSIOLOGY STUDY  10/8/2020    Procedure: Study possible ablation;  Surgeon: Rip Che III, MD;  Location: [facility] CATH;  Service: Cardiology;;    CERVICAL SPINE SURGERY      CHOLECYSTECTOMY  05/30/2018    Dr. ROGELIO Tao, [facility]     COLONOSCOPY  2008    COLONOSCOPY N/A 9/14/2017    Procedure: COLONOSCOPY;  Surgeon: Obi Beltre MD;  Location: [facility] ENDO;  Service: Endoscopy;  Laterality: N/A;    CORONARY ANGIOGRAPHY Left 5/28/2018    Procedure: Coronary angiography;  Surgeon: Rafiq Malik  MD;  Location: [facility] CATH;  Service: Cardiology;  Laterality: Left;    ELBOW SURGERY Bilateral     HERNIA REPAIR      LAPAROSCOPIC APPENDECTOMY N/A 6/25/2020    Procedure: APPENDECTOMY, LAPAROSCOPIC;  Surgeon: Gordon Can MD;  Location: [facility] OR;  Service: General;  Laterality: N/A;    LAPAROSCOPIC CHOLECYSTECTOMY N/A 5/30/2018    Procedure: CHOLECYSTECTOMY, LAPAROSCOPIC;  Surgeon: Fletcher Tao MD;  Location: [facility] OR;  Service: General;  Laterality: N/A;    LEFT HEART CATHETERIZATION Left 5/28/2018    Procedure: Left heart cath;  Surgeon: Rafiq Malik MD;  Location: [facility] CATH;  Service: Cardiology;  Laterality: Left;    NECK SURGERY      RADIOFREQUENCY ABLATION OF LUMBAR MEDIAL BRANCH NERVE AT SINGLE LEVEL Bilateral 6/17/2022    Procedure: Radiofrequency Ablation, Nerve, Spinal, Lumbar, Medial Branch, 1 Level L3,4,5;  Surgeon: Thiago Garcia MD;  Location: [facility] OR;  Service: Pain Management;  Laterality: Bilateral;    SHOULDER ARTHROSCOPY      SPINE SURGERY      lumbar x2    TONSILLECTOMY      TREATMENT OF CARDIAC ARRHYTHMIA  10/8/2020    Procedure: Cardioversion or Defibrillation;  Surgeon: Rip Che III, MD;  Location: [facility] CATH;  Service: Cardiology;;    TRIGGER FINGER RELEASE Right 9/9/2021    Procedure: RELEASE, TRIGGER FINGER;  Surgeon: Luis Alberto Payne MD;  Location: [facility] OR;  Service: Orthopedics;  Laterality: Right;  Procedure: Right ring finger trigger release    Position: Supine    Anesthesia: Local    Equipment: Basic handset     Family History   Problem Relation Age of Onset    Diabetes Mother     Depression Mother     Liver cancer Mother     Obesity Mother     Heart disease Father     Anuerysm Father     Diabetes Father     Stroke Father     Glaucoma Paternal Grandmother     Obesity Sister      Social History     Socioeconomic History    Marital status:     Number of children: 1   Tobacco Use    Smoking status: Former     Packs/day: 0.50     Years: 6.00     Pack years: 3.00      Types: Cigarettes     Quit date:      Years since quittin.8    Smokeless tobacco: Never   Substance and Sexual Activity    Alcohol use: No     Alcohol/week: 0.0 standard drinks    Drug use: No    Sexual activity: Yes     Partners: Female   Social History Narrative              Social Determinants of Health     Financial Resource Strain: Medium Risk    Difficulty of Paying Living Expenses: Somewhat hard   Food Insecurity: Food Insecurity Present    Worried About Running Out of Food in the Last Year: Often true    Ran Out of Food in the Last Year: Often true   Transportation Needs: Unmet Transportation Needs    Lack of Transportation (Medical): Yes    Lack of Transportation (Non-Medical): Yes   Physical Activity: Insufficiently Active    Days of Exercise per Week: 4 days    Minutes of Exercise per Session: 10 min   Stress: Stress Concern Present    Feeling of Stress : To some extent   Social Connections: Socially Isolated    Frequency of Communication with Friends and Family: Once a week    Frequency of Social Gatherings with Friends and Family: Once a week    Attends Holiness Services: Never    Active Member of Clubs or Organizations: No    Attends Club or Organization Meetings: Never    Marital Status:          Medications/Allergies: See med card    Vitals:    10/28/22 0822   BP: 107/65   Pulse: 66   Resp: 12   Weight: (!) 139.1 kg (306 lb 10.6 oz)   PainSc:   7     Physical exam:    GENERAL: A and O x3, the patient appears well groomed and is in no acute distress.  Skin: No rashes or obvious lesions  HEENT: normocephalic, atraumatic  CARDIOVASCULAR:  RRR  LUNGS: non labored breathing  ABDOMEN: soft, nontender   UPPER EXTREMITIES: Normal alignment, normal range of motion, no atrophy, no skin changes,  hair growth and nail growth normal and equal bilaterally. No swelling, no tenderness.    LOWER EXTREMITIES:  Normal alignment, normal range of motion, no atrophy, no skin changes,  hair  growth and nail growth normal and equal bilaterally. No swelling, no tenderness.  CERVICAL SPINE:  Cervical spine: ROM is limited in flexion, extension and lateral rotation with moderate increased pain.  Spurling's maneuver causes no neck pain to either side.  Myofascial exam: No Tenderness to palpation across cervical paraspinous region bilaterally.    LUMBAR SPINE  Lumbar spine: ROM is limited with flexion extension and oblique extension with moderate increased pain.    Teodoro's test causes no increased pain on either side.    Supine straight leg raise is negative bilaterally.    Internal and external rotation of the hip causes no increased pain on either side.  Myofascial exam: moderate tenderness to palpation across lumbar paraspinous muscles.    MOTOR: Tone and bulk: normal bilateral upper and lower Strength: normal   SENSATION: Light touch and pinprick intact bilaterally  REFLEXES: normal, symmetric, nonbrisk.  Toes down, no clonus. No hoffmans.  GAIT: normal rise, base, steps, and arm swing.      Imaging:  MRI L-spine 2018  There is a normal lumbar lordosis.  Postoperative changes from discectomies and L4-L5 L5-S1 with stabilization with pedicle screws at L4, L5, S1 levels is again noted.  There is no marrow edema to suggest acute fractures or marrow replacing processes throughout the lumbar spine.  The tip of the conus is at T12-L1 discitis.  Paraspinal soft tissues are unremarkable.     L5-S1: Postoperative changes from a discectomy at the L5-S1 disc space with presence of a spacer within the disc space.  There no dislocation of the spacer is noted.  There is a broad-based posterior osteophyte and disc bulge complex with effacement of the anterior epidural fat.  There is a right L5-S1 laminectomy and partial facetectomy.  Susceptibility artifacts from presence of pedicle screws at L5 and S1 noted.  There is no significant central canal spinal stenosis.  There is facet arthropathy.     L4-L5: Postoperative  changes from L4-L5 discectomy without dislocation of the spacer.  Dorsal stabilization with pedicle screws at L4 and L5 stable since the study performed in November of 2016.  No significant central canal spinal stenosis.  The neural foramina demonstrates no significant foraminal stenosis bilaterally.     L3-L4: Disc dehydration with a global anterior and posterior disc bulges with mild compression of the thecal sac.  There is bilateral L3-L4 facet arthropathy.  There is mild central canal spinal stenosis when compared with the level above and below.     L2-L3: Disc dehydration with marginal anterior spondylotic osteophytes.  There is a broad-based global anterior and posterior bulging of the annulus.  No soft tissue disc herniations.  There is mild bilateral foraminal stenosis.  There is facet arthropathy.     L1-L2: No disc herniations or spinal stenosis or foraminal stenosis.      CT C-spine 2020  Patient has had a previous fusion at C5-6 and C6-7. The odontoid is intact no acute fractures are seen.  The alignment is within normal limits    Assessment:  Gio Forrest is a 57 y.o. male with  back pain.  1. Greater trochanteric bursitis, unspecified laterality    2. Hip pain      Plan:  1. I have stressed the importance of physical activity and exercise to improve overall health  2. Reviewed pertinent imaging and records with patient  3. IM Toradol 30 mg   4. Monitor progress from repeat lumbar MB RFA procedure to help with his lower back pain  5. Gabapentin 600mg TID for anti-neuropathic adjunct.  Continue better diabetic control  6. Ordered PT to focus on hip area Will be beneifical to prevent further muscle atrophy and increase mobility and strength   7. F/u for Bariatric surgery  8. If symptoms do not improve f/u with orthopedics

## 2022-11-02 ENCOUNTER — PATIENT MESSAGE (OUTPATIENT)
Dept: PSYCHIATRY | Facility: CLINIC | Age: 57
End: 2022-11-02
Payer: MEDICARE

## 2022-11-03 ENCOUNTER — OFFICE VISIT (OUTPATIENT)
Dept: PSYCHIATRY | Facility: CLINIC | Age: 57
End: 2022-11-03
Payer: COMMERCIAL

## 2022-11-03 DIAGNOSIS — F63.0 PATHOLOGICAL GAMBLING: ICD-10-CM

## 2022-11-03 DIAGNOSIS — F41.1 GAD (GENERALIZED ANXIETY DISORDER): Primary | ICD-10-CM

## 2022-11-03 PROCEDURE — 1159F PR MEDICATION LIST DOCUMENTED IN MEDICAL RECORD: ICD-10-PCS | Mod: CPTII,S$GLB,, | Performed by: SOCIAL WORKER

## 2022-11-03 PROCEDURE — 3066F PR DOCUMENTATION OF TREATMENT FOR NEPHROPATHY: ICD-10-PCS | Mod: CPTII,S$GLB,, | Performed by: SOCIAL WORKER

## 2022-11-03 PROCEDURE — 3061F PR NEG MICROALBUMINURIA RESULT DOCUMENTED/REVIEW: ICD-10-PCS | Mod: CPTII,S$GLB,, | Performed by: SOCIAL WORKER

## 2022-11-03 PROCEDURE — 3051F HG A1C>EQUAL 7.0%<8.0%: CPT | Mod: CPTII,S$GLB,, | Performed by: SOCIAL WORKER

## 2022-11-03 PROCEDURE — 3061F NEG MICROALBUMINURIA REV: CPT | Mod: CPTII,S$GLB,, | Performed by: SOCIAL WORKER

## 2022-11-03 PROCEDURE — 90791 PSYCH DIAGNOSTIC EVALUATION: CPT | Mod: S$GLB,,, | Performed by: SOCIAL WORKER

## 2022-11-03 PROCEDURE — 3066F NEPHROPATHY DOC TX: CPT | Mod: CPTII,S$GLB,, | Performed by: SOCIAL WORKER

## 2022-11-03 PROCEDURE — 3051F PR MOST RECENT HEMOGLOBIN A1C LEVEL 7.0 - < 8.0%: ICD-10-PCS | Mod: CPTII,S$GLB,, | Performed by: SOCIAL WORKER

## 2022-11-03 PROCEDURE — 90791 PR PSYCHIATRIC DIAGNOSTIC EVALUATION: ICD-10-PCS | Mod: S$GLB,,, | Performed by: SOCIAL WORKER

## 2022-11-03 PROCEDURE — 1159F MED LIST DOCD IN RCRD: CPT | Mod: CPTII,S$GLB,, | Performed by: SOCIAL WORKER
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
## 2022-11-08 ENCOUNTER — OFFICE VISIT (OUTPATIENT)
Dept: OTOLARYNGOLOGY | Facility: CLINIC | Age: 57
End: 2022-11-08
Payer: MEDICARE

## 2022-11-08 VITALS — HEIGHT: 75 IN | WEIGHT: 315 LBS | BODY MASS INDEX: 39.17 KG/M2

## 2022-11-08 DIAGNOSIS — K21.9 LARYNGOPHARYNGEAL REFLUX (LPR): ICD-10-CM

## 2022-11-08 DIAGNOSIS — H65.491 CHRONIC OTITIS MEDIA OF RIGHT EAR WITH EFFUSION: Primary | ICD-10-CM

## 2022-11-08 DIAGNOSIS — H90.11 CONDUCTIVE HEARING LOSS OF RIGHT EAR, UNSPECIFIED HEARING STATUS ON CONTRALATERAL SIDE: ICD-10-CM

## 2022-11-08 PROCEDURE — 99214 OFFICE O/P EST MOD 30 MIN: CPT | Mod: 25,S$GLB,, | Performed by: STUDENT IN AN ORGANIZED HEALTH CARE EDUCATION/TRAINING PROGRAM

## 2022-11-08 PROCEDURE — 99999 PR PBB SHADOW E&M-EST. PATIENT-LVL IV: CPT | Mod: PBBFAC,,, | Performed by: STUDENT IN AN ORGANIZED HEALTH CARE EDUCATION/TRAINING PROGRAM

## 2022-11-08 PROCEDURE — 99214 PR OFFICE/OUTPT VISIT, EST, LEVL IV, 30-39 MIN: ICD-10-PCS | Mod: 25,S$GLB,, | Performed by: STUDENT IN AN ORGANIZED HEALTH CARE EDUCATION/TRAINING PROGRAM

## 2022-11-08 PROCEDURE — 3061F NEG MICROALBUMINURIA REV: CPT | Mod: CPTII,S$GLB,, | Performed by: STUDENT IN AN ORGANIZED HEALTH CARE EDUCATION/TRAINING PROGRAM

## 2022-11-08 PROCEDURE — 69420 INCISION OF EARDRUM: CPT | Mod: RT,S$GLB,, | Performed by: STUDENT IN AN ORGANIZED HEALTH CARE EDUCATION/TRAINING PROGRAM

## 2022-11-08 PROCEDURE — 3051F PR MOST RECENT HEMOGLOBIN A1C LEVEL 7.0 - < 8.0%: ICD-10-PCS | Mod: CPTII,S$GLB,, | Performed by: STUDENT IN AN ORGANIZED HEALTH CARE EDUCATION/TRAINING PROGRAM

## 2022-11-08 PROCEDURE — 3008F PR BODY MASS INDEX (BMI) DOCUMENTED: ICD-10-PCS | Mod: CPTII,S$GLB,, | Performed by: STUDENT IN AN ORGANIZED HEALTH CARE EDUCATION/TRAINING PROGRAM

## 2022-11-08 PROCEDURE — 3051F HG A1C>EQUAL 7.0%<8.0%: CPT | Mod: CPTII,S$GLB,, | Performed by: STUDENT IN AN ORGANIZED HEALTH CARE EDUCATION/TRAINING PROGRAM

## 2022-11-08 PROCEDURE — 69420 PR INCISION EARDRUM,ASPIR: ICD-10-PCS | Mod: RT,S$GLB,, | Performed by: STUDENT IN AN ORGANIZED HEALTH CARE EDUCATION/TRAINING PROGRAM

## 2022-11-08 PROCEDURE — 3008F BODY MASS INDEX DOCD: CPT | Mod: CPTII,S$GLB,, | Performed by: STUDENT IN AN ORGANIZED HEALTH CARE EDUCATION/TRAINING PROGRAM

## 2022-11-08 PROCEDURE — 3066F PR DOCUMENTATION OF TREATMENT FOR NEPHROPATHY: ICD-10-PCS | Mod: CPTII,S$GLB,, | Performed by: STUDENT IN AN ORGANIZED HEALTH CARE EDUCATION/TRAINING PROGRAM

## 2022-11-08 PROCEDURE — 3061F PR NEG MICROALBUMINURIA RESULT DOCUMENTED/REVIEW: ICD-10-PCS | Mod: CPTII,S$GLB,, | Performed by: STUDENT IN AN ORGANIZED HEALTH CARE EDUCATION/TRAINING PROGRAM

## 2022-11-08 PROCEDURE — 99999 PR PBB SHADOW E&M-EST. PATIENT-LVL IV: ICD-10-PCS | Mod: PBBFAC,,, | Performed by: STUDENT IN AN ORGANIZED HEALTH CARE EDUCATION/TRAINING PROGRAM

## 2022-11-08 PROCEDURE — 31231 NASAL ENDOSCOPY DX: CPT | Mod: 51,S$GLB,, | Performed by: STUDENT IN AN ORGANIZED HEALTH CARE EDUCATION/TRAINING PROGRAM

## 2022-11-08 PROCEDURE — 31231 PR NASAL ENDOSCOPY, DX: ICD-10-PCS | Mod: 51,S$GLB,, | Performed by: STUDENT IN AN ORGANIZED HEALTH CARE EDUCATION/TRAINING PROGRAM

## 2022-11-08 PROCEDURE — 3066F NEPHROPATHY DOC TX: CPT | Mod: CPTII,S$GLB,, | Performed by: STUDENT IN AN ORGANIZED HEALTH CARE EDUCATION/TRAINING PROGRAM

## 2022-11-08 PROCEDURE — 1159F MED LIST DOCD IN RCRD: CPT | Mod: CPTII,S$GLB,, | Performed by: STUDENT IN AN ORGANIZED HEALTH CARE EDUCATION/TRAINING PROGRAM

## 2022-11-08 PROCEDURE — 1159F PR MEDICATION LIST DOCUMENTED IN MEDICAL RECORD: ICD-10-PCS | Mod: CPTII,S$GLB,, | Performed by: STUDENT IN AN ORGANIZED HEALTH CARE EDUCATION/TRAINING PROGRAM

## 2022-11-08 RX ORDER — FAMOTIDINE 40 MG/1
40 TABLET, FILM COATED ORAL NIGHTLY
Qty: 30 TABLET | Refills: 11 | Status: SHIPPED | OUTPATIENT
Start: 2022-11-08 | End: 2024-01-09

## 2022-11-08 NOTE — PROGRESS NOTES
Subjective:       Patient ID: Gio Forrest is a 57 y.o. male.    Chief Complaint: Ear Fullness (Right ear fluid)    Ear Fullness      Anjali 10/4/22: Patient was last seen by me 6 weeks ago for crackling/popping sounds AU; he had clear middle ears at that time. Patient states that approximately two weeks after that visit, his right ear filled up again. Feels like fluid in his right ear; feels like a hollow drum, can feel fluid shifting when he changes position.     11/8/22: RTC to see me, has been doing flonase and astelin BID. Cannot pop his right ear or left ear but only hears sounds on right. Sounds muffled, feels like there is fluid. Wears CPAP, 10 years, ear issues only recently. Has some weight fluctuation. Not recent. No new medicines. Had PNA in May, right before these issues.   Audio 2017 with mild mixed loss on right.   Did have ear issues as a kid.   Had MMA years ago for TARYN.   Also reports lots of throat phlegm, worse at night. Nasal sprays may have helped a minor amount. Take protonix at night.     Review of Systems   Constitutional: Negative.    HENT: Negative.     Eyes: Negative.    Respiratory: Negative.     Cardiovascular: Negative.    Gastrointestinal: Negative.    Musculoskeletal: Negative.    Integumentary:  Negative.   Neurological: Negative.    Hematological: Negative.    Psychiatric/Behavioral: Negative.         Objective:      Physical Exam  Vitals and nursing note reviewed.   Constitutional:       General: He is not in acute distress.     Appearance: He is well-developed. He is not ill-appearing or diaphoretic.   HENT:      Head: Normocephalic and atraumatic.      Right Ear: Hearing, ear canal and external ear normal. A middle ear effusion is present. Tympanic membrane is not erythematous.      Left Ear: Hearing, tympanic membrane, ear canal and external ear normal.  No middle ear effusion. Tympanic membrane is not erythematous.      Ears:        Comments: [place] to right.      Nose:  Nose normal.   Eyes:      General: Lids are normal. No scleral icterus.        Right eye: No discharge.         Left eye: No discharge.   Neck:      Trachea: Trachea normal. No tracheal deviation.   Cardiovascular:      Rate and Rhythm: Normal rate.   Pulmonary:      Effort: Pulmonary effort is normal. No respiratory distress.      Breath sounds: No stridor. No wheezing.   Musculoskeletal:         General: Normal range of motion.      Cervical back: Normal range of motion and neck supple.   Skin:     General: Skin is warm and dry.      Coloration: Skin is not pale.   Neurological:      Mental Status: He is alert and oriented to person, place, and time.      Coordination: Coordination normal.      Gait: Gait normal.   Psychiatric:         Speech: Speech normal.         Behavior: Behavior normal. Behavior is cooperative.         Thought Content: Thought content normal.         Judgment: Judgment normal.        Procedure: Nasal Endoscopy  Indications: unilateral ETD  Verbal consent obtained  Anesthesia: topical lidocaine and phenylephrine spray    Procedure: Rigid 0 degree endoscope was passed into each nare to nasopharynx showing findings below.     Septum is midline. Nasal mucosa moist. Turbinates are normal size and respond to decongestant appropriately. Right maxillary sinus and sphenoethmoidal recess without purulence or polyps. Left maxillary sinus and sphenoethmoidal recess without purulence or polyps. No adenoid hypertrophy. Fossa of Rosenmüller clear bilaterally    Gio Forrest   [id number],   : 1965      Procedure date:2022  Patient's medications, allergies, past medical, surgical, social and family histories were reviewed and updated as appropriate.    Pre-procedure diagnosis: Chronic otitis media of right ear with effusion [H65.491]     Procedure: right  myringotomy and aspiration     Procedure in detail: Risks, benefits, and alternatives of the procedure were discussed with the patient, and  consent was obtained to perform for the right ear.  The procedure required a high level of expertise and use of an operating microscope and multiple micro-instruments.     With the patient in the supine position, the operating microscope was used to examine the ear(s)  A variety of sterile, micro-instruments were utilized to remove the cerumen atraumatically. I applied a small amount of phenol topically (local analgesic) to the tympanic membrane.  The tympanic membrane was incised radially in the appropriate location.  I suctioned the middle ear through the incision. This completed the procedure.  I performed the procedure. The patient tolerated the procedure well and there were no complications.    Findings:   Right ear : serous effusion    Left ear  : air      Assessment:     Right TAMRA, retraction, non-suppurative  Conductive hearing loss right   LPR      Plan:          Scope today clear  Myringotomy today to assist on right  Recheck 3-4 weeks with audio, see me prior  Vaseline covered cotton ball in right ear to shower for this week.    Continue at least flonase once to twice daily    Take protonix in AM, on empty stomach  Add pepcid or famotidine at night.     Lisset Tao MD

## 2022-11-08 NOTE — PATIENT INSTRUCTIONS
Vaseline covered cotton ball to right ear for next week to shower.   Flonase daily to twice daily. Try to pop ears.     Reflux-   Take protonix in AM, on empty stomach  Add pepcid/famotidine at night.

## 2022-11-17 ENCOUNTER — OFFICE VISIT (OUTPATIENT)
Dept: PSYCHIATRY | Facility: CLINIC | Age: 57
End: 2022-11-17
Payer: COMMERCIAL

## 2022-11-17 DIAGNOSIS — F33.1 MODERATE EPISODE OF RECURRENT MAJOR DEPRESSIVE DISORDER: ICD-10-CM

## 2022-11-17 DIAGNOSIS — F63.0 PATHOLOGICAL GAMBLING: ICD-10-CM

## 2022-11-17 DIAGNOSIS — F41.1 GAD (GENERALIZED ANXIETY DISORDER): Primary | ICD-10-CM

## 2022-11-17 PROCEDURE — 3051F HG A1C>EQUAL 7.0%<8.0%: CPT | Mod: CPTII,S$GLB,, | Performed by: SOCIAL WORKER

## 2022-11-17 PROCEDURE — 3066F NEPHROPATHY DOC TX: CPT | Mod: CPTII,S$GLB,, | Performed by: SOCIAL WORKER

## 2022-11-17 PROCEDURE — 90834 PSYTX W PT 45 MINUTES: CPT | Mod: S$GLB,,, | Performed by: SOCIAL WORKER

## 2022-11-17 PROCEDURE — 1159F MED LIST DOCD IN RCRD: CPT | Mod: CPTII,S$GLB,, | Performed by: SOCIAL WORKER

## 2022-11-17 PROCEDURE — 1159F PR MEDICATION LIST DOCUMENTED IN MEDICAL RECORD: ICD-10-PCS | Mod: CPTII,S$GLB,, | Performed by: SOCIAL WORKER

## 2022-11-17 PROCEDURE — 3066F PR DOCUMENTATION OF TREATMENT FOR NEPHROPATHY: ICD-10-PCS | Mod: CPTII,S$GLB,, | Performed by: SOCIAL WORKER

## 2022-11-17 PROCEDURE — 3061F NEG MICROALBUMINURIA REV: CPT | Mod: CPTII,S$GLB,, | Performed by: SOCIAL WORKER

## 2022-11-17 PROCEDURE — 90834 PR PSYCHOTHERAPY W/PATIENT, 45 MIN: ICD-10-PCS | Mod: S$GLB,,, | Performed by: SOCIAL WORKER

## 2022-11-17 PROCEDURE — 3061F PR NEG MICROALBUMINURIA RESULT DOCUMENTED/REVIEW: ICD-10-PCS | Mod: CPTII,S$GLB,, | Performed by: SOCIAL WORKER

## 2022-11-17 PROCEDURE — 3051F PR MOST RECENT HEMOGLOBIN A1C LEVEL 7.0 - < 8.0%: ICD-10-PCS | Mod: CPTII,S$GLB,, | Performed by: SOCIAL WORKER
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
## 2022-11-21 DIAGNOSIS — E66.01 MORBID OBESITY: ICD-10-CM

## 2022-11-21 RX ORDER — TOPIRAMATE 25 MG/1
25 TABLET ORAL 2 TIMES DAILY
Qty: 60 TABLET | Refills: 0 | Status: ON HOLD | OUTPATIENT
Start: 2022-11-21 | End: 2023-01-21 | Stop reason: HOSPADM

## 2022-11-29 ENCOUNTER — OFFICE VISIT (OUTPATIENT)
Dept: OTOLARYNGOLOGY | Facility: CLINIC | Age: 57
End: 2022-11-29
Payer: MEDICARE

## 2022-11-29 VITALS — BODY MASS INDEX: 39.17 KG/M2 | WEIGHT: 315 LBS | HEIGHT: 75 IN

## 2022-11-29 DIAGNOSIS — H69.91 ETD (EUSTACHIAN TUBE DYSFUNCTION), RIGHT: ICD-10-CM

## 2022-11-29 DIAGNOSIS — H90.11 CONDUCTIVE HEARING LOSS OF RIGHT EAR, UNSPECIFIED HEARING STATUS ON CONTRALATERAL SIDE: Primary | ICD-10-CM

## 2022-11-29 DIAGNOSIS — H65.491 CHRONIC OTITIS MEDIA OF RIGHT EAR WITH EFFUSION: ICD-10-CM

## 2022-11-29 PROCEDURE — 1159F PR MEDICATION LIST DOCUMENTED IN MEDICAL RECORD: ICD-10-PCS | Mod: CPTII,S$GLB,, | Performed by: STUDENT IN AN ORGANIZED HEALTH CARE EDUCATION/TRAINING PROGRAM

## 2022-11-29 PROCEDURE — 1159F MED LIST DOCD IN RCRD: CPT | Mod: CPTII,S$GLB,, | Performed by: STUDENT IN AN ORGANIZED HEALTH CARE EDUCATION/TRAINING PROGRAM

## 2022-11-29 PROCEDURE — 3051F HG A1C>EQUAL 7.0%<8.0%: CPT | Mod: CPTII,S$GLB,, | Performed by: STUDENT IN AN ORGANIZED HEALTH CARE EDUCATION/TRAINING PROGRAM

## 2022-11-29 PROCEDURE — 69433 CREATE EARDRUM OPENING: CPT | Mod: RT,S$GLB,, | Performed by: STUDENT IN AN ORGANIZED HEALTH CARE EDUCATION/TRAINING PROGRAM

## 2022-11-29 PROCEDURE — 3008F BODY MASS INDEX DOCD: CPT | Mod: CPTII,S$GLB,, | Performed by: STUDENT IN AN ORGANIZED HEALTH CARE EDUCATION/TRAINING PROGRAM

## 2022-11-29 PROCEDURE — 99999 PR PBB SHADOW E&M-EST. PATIENT-LVL IV: CPT | Mod: PBBFAC,,, | Performed by: STUDENT IN AN ORGANIZED HEALTH CARE EDUCATION/TRAINING PROGRAM

## 2022-11-29 PROCEDURE — 3066F PR DOCUMENTATION OF TREATMENT FOR NEPHROPATHY: ICD-10-PCS | Mod: CPTII,S$GLB,, | Performed by: STUDENT IN AN ORGANIZED HEALTH CARE EDUCATION/TRAINING PROGRAM

## 2022-11-29 PROCEDURE — 3061F NEG MICROALBUMINURIA REV: CPT | Mod: CPTII,S$GLB,, | Performed by: STUDENT IN AN ORGANIZED HEALTH CARE EDUCATION/TRAINING PROGRAM

## 2022-11-29 PROCEDURE — 69433 PR CREATE EARDRUM OPENING,LOCAL ANESTH: ICD-10-PCS | Mod: RT,S$GLB,, | Performed by: STUDENT IN AN ORGANIZED HEALTH CARE EDUCATION/TRAINING PROGRAM

## 2022-11-29 PROCEDURE — 99999 PR PBB SHADOW E&M-EST. PATIENT-LVL IV: ICD-10-PCS | Mod: PBBFAC,,, | Performed by: STUDENT IN AN ORGANIZED HEALTH CARE EDUCATION/TRAINING PROGRAM

## 2022-11-29 PROCEDURE — 3061F PR NEG MICROALBUMINURIA RESULT DOCUMENTED/REVIEW: ICD-10-PCS | Mod: CPTII,S$GLB,, | Performed by: STUDENT IN AN ORGANIZED HEALTH CARE EDUCATION/TRAINING PROGRAM

## 2022-11-29 PROCEDURE — 3051F PR MOST RECENT HEMOGLOBIN A1C LEVEL 7.0 - < 8.0%: ICD-10-PCS | Mod: CPTII,S$GLB,, | Performed by: STUDENT IN AN ORGANIZED HEALTH CARE EDUCATION/TRAINING PROGRAM

## 2022-11-29 PROCEDURE — 99213 OFFICE O/P EST LOW 20 MIN: CPT | Mod: 25,S$GLB,, | Performed by: STUDENT IN AN ORGANIZED HEALTH CARE EDUCATION/TRAINING PROGRAM

## 2022-11-29 PROCEDURE — 3066F NEPHROPATHY DOC TX: CPT | Mod: CPTII,S$GLB,, | Performed by: STUDENT IN AN ORGANIZED HEALTH CARE EDUCATION/TRAINING PROGRAM

## 2022-11-29 PROCEDURE — 99213 PR OFFICE/OUTPT VISIT, EST, LEVL III, 20-29 MIN: ICD-10-PCS | Mod: 25,S$GLB,, | Performed by: STUDENT IN AN ORGANIZED HEALTH CARE EDUCATION/TRAINING PROGRAM

## 2022-11-29 PROCEDURE — 3008F PR BODY MASS INDEX (BMI) DOCUMENTED: ICD-10-PCS | Mod: CPTII,S$GLB,, | Performed by: STUDENT IN AN ORGANIZED HEALTH CARE EDUCATION/TRAINING PROGRAM

## 2022-11-29 RX ORDER — OFLOXACIN 3 MG/ML
5 SOLUTION AURICULAR (OTIC) 2 TIMES DAILY
Qty: 10 ML | Refills: 3 | Status: SHIPPED | OUTPATIENT
Start: 2022-11-29 | End: 2022-12-06

## 2022-11-29 NOTE — PROGRESS NOTES
Subjective:       Patient ID: Gio Forrest is a 57 y.o. male.    Chief Complaint: Follow-up    Ear Fullness     Follow-up     Anjali 10/4/22: Patient was last seen by me 6 weeks ago for crackling/popping sounds AU; he had clear middle ears at that time. Patient states that approximately two weeks after that visit, his right ear filled up again. Feels like fluid in his right ear; feels like a hollow drum, can feel fluid shifting when he changes position.     11/8/22: RTC to see me, has been doing flonase and astelin BID. Cannot pop his right ear or left ear but only hears sounds on right. Sounds muffled, feels like there is fluid. Wears CPAP, 10 years, ear issues only recently. Has some weight fluctuation. Not recent. No new medicines. Had PNA in May, right before these issues.   Audio 2017 with mild mixed loss on right.   Did have ear issues as a kid.   Had MMA years ago for TARYN.   Also reports lots of throat phlegm, worse at night. Nasal sprays may have helped a minor amount. Take protonix at night.     11/29/22: Right ear feels somewhat better. He still cannot pop right, left can. Hearing less muffled. Usign flonase. Dual reflex regimen has helped with phlegm.     Review of Systems   Constitutional: Negative.    HENT: Negative.     Eyes: Negative.    Respiratory: Negative.     Cardiovascular: Negative.    Gastrointestinal: Negative.    Musculoskeletal: Negative.    Integumentary:  Negative.   Neurological: Negative.    Hematological: Negative.    Psychiatric/Behavioral: Negative.         Objective:      Physical Exam  Vitals and nursing note reviewed.   Constitutional:       General: He is not in acute distress.     Appearance: He is well-developed. He is not ill-appearing or diaphoretic.   HENT:      Head: Normocephalic and atraumatic.      Right Ear: Hearing, ear canal and external ear normal. A middle ear effusion is present. Tympanic membrane is not erythematous.      Left Ear: Hearing, tympanic membrane,  ear canal and external ear normal.  No middle ear effusion. Tympanic membrane is not erythematous.      Ears:        Comments: [place] to right.      Nose: Nose normal.   Eyes:      General: Lids are normal. No scleral icterus.        Right eye: No discharge.         Left eye: No discharge.   Neck:      Trachea: Trachea normal. No tracheal deviation.   Cardiovascular:      Rate and Rhythm: Normal rate.   Pulmonary:      Effort: Pulmonary effort is normal. No respiratory distress.      Breath sounds: No stridor. No wheezing.   Musculoskeletal:         General: Normal range of motion.      Cervical back: Normal range of motion and neck supple.   Skin:     General: Skin is warm and dry.      Coloration: Skin is not pale.   Neurological:      Mental Status: He is alert and oriented to person, place, and time.      Coordination: Coordination normal.      Gait: Gait normal.   Psychiatric:         Speech: Speech normal.         Behavior: Behavior normal. Behavior is cooperative.         Thought Content: Thought content normal.         Judgment: Judgment normal.        Prior scope:     Septum is midline. Nasal mucosa moist. Turbinates are normal size and respond to decongestant appropriately. Right maxillary sinus and sphenoethmoidal recess without purulence or polyps. Left maxillary sinus and sphenoethmoidal recess without purulence or polyps. No adenoid hypertrophy. Fossa of Rosenmüller clear bilaterally    Gio Forrest   [id number],   : 1965      Procedure date:2022  Patient's medications, allergies, past medical, surgical, social and family histories were reviewed and updated as appropriate.    Pre-procedure diagnosis: ear effusion    Procedure: right myringotomy and tympanostomy  tube    Procedure in detail: Risks, benefits, and alternatives of the procedure were discussed with the patient, and consent was obtained to perform for the right ear.  The procedure required a high level of expertise and use of  an operating microscope and multiple micro-instruments.     With the patient in the supine position, the operating microscope was used to examine the ear(s)  A variety of sterile, micro-instruments were utilized to remove the cerumen atraumatically. I applied a small amount of phenol topically (local analgesic) to the tympanic membrane.  The tympanic membrane was incised radially in the appropriate location.  I suctioned the middle ear through the incision. Reyes tube was placed. This completed the procedure.  I performed the procedure. The patient tolerated the procedure well and there were no complications.    Findings:   Right ear : serous effusion    Left ear  : air      Assessment:     Right TAMRA, retraction, non-suppurative  Conductive hearing loss right   LPR      Plan:          Scope prior clear  Myringotomy 11/8, effusion returned with retraction. Ear tube today.   Continue at least flonase once to twice daily  3 weeks with audio  Drops right ear BID 7 days    Take protonix in AM, on empty stomach. Pepcid or famotidine at night.     Lisset Tao MD

## 2022-12-05 ENCOUNTER — OFFICE VISIT (OUTPATIENT)
Dept: BARIATRICS | Facility: CLINIC | Age: 57
End: 2022-12-05
Payer: MEDICARE

## 2022-12-05 VITALS
HEIGHT: 75 IN | WEIGHT: 310.81 LBS | TEMPERATURE: 98 F | BODY MASS INDEX: 38.64 KG/M2 | DIASTOLIC BLOOD PRESSURE: 73 MMHG | HEART RATE: 70 BPM | SYSTOLIC BLOOD PRESSURE: 131 MMHG | RESPIRATION RATE: 16 BRPM

## 2022-12-05 DIAGNOSIS — G47.33 OSA ON CPAP: ICD-10-CM

## 2022-12-05 DIAGNOSIS — E11.65 TYPE 2 DIABETES MELLITUS WITH HYPERGLYCEMIA, WITH LONG-TERM CURRENT USE OF INSULIN: ICD-10-CM

## 2022-12-05 DIAGNOSIS — K21.9 GASTROESOPHAGEAL REFLUX DISEASE, UNSPECIFIED WHETHER ESOPHAGITIS PRESENT: ICD-10-CM

## 2022-12-05 DIAGNOSIS — I10 ESSENTIAL HYPERTENSION: ICD-10-CM

## 2022-12-05 DIAGNOSIS — E78.2 MIXED HYPERLIPIDEMIA: ICD-10-CM

## 2022-12-05 DIAGNOSIS — Z79.4 TYPE 2 DIABETES MELLITUS WITH HYPERGLYCEMIA, WITH LONG-TERM CURRENT USE OF INSULIN: ICD-10-CM

## 2022-12-05 DIAGNOSIS — E66.01 MORBID OBESITY: Primary | ICD-10-CM

## 2022-12-05 PROCEDURE — 3008F BODY MASS INDEX DOCD: CPT | Mod: CPTII,S$GLB,, | Performed by: NURSE PRACTITIONER

## 2022-12-05 PROCEDURE — 3075F PR MOST RECENT SYSTOLIC BLOOD PRESS GE 130-139MM HG: ICD-10-PCS | Mod: CPTII,S$GLB,, | Performed by: NURSE PRACTITIONER

## 2022-12-05 PROCEDURE — 99215 PR OFFICE/OUTPT VISIT, EST, LEVL V, 40-54 MIN: ICD-10-PCS | Mod: S$GLB,,, | Performed by: NURSE PRACTITIONER

## 2022-12-05 PROCEDURE — 3066F PR DOCUMENTATION OF TREATMENT FOR NEPHROPATHY: ICD-10-PCS | Mod: CPTII,S$GLB,, | Performed by: NURSE PRACTITIONER

## 2022-12-05 PROCEDURE — 3051F HG A1C>EQUAL 7.0%<8.0%: CPT | Mod: CPTII,S$GLB,, | Performed by: NURSE PRACTITIONER

## 2022-12-05 PROCEDURE — 3078F DIAST BP <80 MM HG: CPT | Mod: CPTII,S$GLB,, | Performed by: NURSE PRACTITIONER

## 2022-12-05 PROCEDURE — 3061F NEG MICROALBUMINURIA REV: CPT | Mod: CPTII,S$GLB,, | Performed by: NURSE PRACTITIONER

## 2022-12-05 PROCEDURE — 1160F PR REVIEW ALL MEDS BY PRESCRIBER/CLIN PHARMACIST DOCUMENTED: ICD-10-PCS | Mod: CPTII,S$GLB,, | Performed by: NURSE PRACTITIONER

## 2022-12-05 PROCEDURE — 3075F SYST BP GE 130 - 139MM HG: CPT | Mod: CPTII,S$GLB,, | Performed by: NURSE PRACTITIONER

## 2022-12-05 PROCEDURE — 3008F PR BODY MASS INDEX (BMI) DOCUMENTED: ICD-10-PCS | Mod: CPTII,S$GLB,, | Performed by: NURSE PRACTITIONER

## 2022-12-05 PROCEDURE — 3078F PR MOST RECENT DIASTOLIC BLOOD PRESSURE < 80 MM HG: ICD-10-PCS | Mod: CPTII,S$GLB,, | Performed by: NURSE PRACTITIONER

## 2022-12-05 PROCEDURE — 99999 PR PBB SHADOW E&M-EST. PATIENT-LVL V: ICD-10-PCS | Mod: PBBFAC,,, | Performed by: NURSE PRACTITIONER

## 2022-12-05 PROCEDURE — 1159F PR MEDICATION LIST DOCUMENTED IN MEDICAL RECORD: ICD-10-PCS | Mod: CPTII,S$GLB,, | Performed by: NURSE PRACTITIONER

## 2022-12-05 PROCEDURE — 99999 PR PBB SHADOW E&M-EST. PATIENT-LVL V: CPT | Mod: PBBFAC,,, | Performed by: NURSE PRACTITIONER

## 2022-12-05 PROCEDURE — 1160F RVW MEDS BY RX/DR IN RCRD: CPT | Mod: CPTII,S$GLB,, | Performed by: NURSE PRACTITIONER

## 2022-12-05 PROCEDURE — 99499 UNLISTED E&M SERVICE: CPT | Mod: S$GLB,,, | Performed by: NURSE PRACTITIONER

## 2022-12-05 PROCEDURE — 99215 OFFICE O/P EST HI 40 MIN: CPT | Mod: S$GLB,,, | Performed by: NURSE PRACTITIONER

## 2022-12-05 PROCEDURE — 3061F PR NEG MICROALBUMINURIA RESULT DOCUMENTED/REVIEW: ICD-10-PCS | Mod: CPTII,S$GLB,, | Performed by: NURSE PRACTITIONER

## 2022-12-05 PROCEDURE — 3051F PR MOST RECENT HEMOGLOBIN A1C LEVEL 7.0 - < 8.0%: ICD-10-PCS | Mod: CPTII,S$GLB,, | Performed by: NURSE PRACTITIONER

## 2022-12-05 PROCEDURE — 1159F MED LIST DOCD IN RCRD: CPT | Mod: CPTII,S$GLB,, | Performed by: NURSE PRACTITIONER

## 2022-12-05 PROCEDURE — 99499 RISK ADDL DX/OHS AUDIT: ICD-10-PCS | Mod: S$GLB,,, | Performed by: NURSE PRACTITIONER

## 2022-12-05 PROCEDURE — 3066F NEPHROPATHY DOC TX: CPT | Mod: CPTII,S$GLB,, | Performed by: NURSE PRACTITIONER

## 2022-12-05 NOTE — PROGRESS NOTES
Medically Supervised Weight Loss Documentation    Chief Complaint   Patient presents with    Follow-up    Obesity    Nutrition Counseling       History of Present Illness:  Patient is a 57 y.o. male who is referred for evaluation of surgical treatment of morbid obesity. Patient has a Body mass index is 39.37 kg/m². kg/m².  He has known comorbidities of depression, diabetes mellitus, dyslipidemia, hypertension, and obstructive sleep apnea. Patient has attended the bariatric seminar and is most interested in gastric sleeve surgery surgery evaluation of surgical treatment of morbid obesity.       Comorbidities:   Past Medical History:   Diagnosis Date    Cardiomyopathy     Depression     Diabetes     Dyslipidemia 08/12/2015    Gout 08/12/2015    Hypertension     Idiopathic gout     Lumbar pseudoarthrosis     LINDSEY (nonalcoholic steatohepatitis)     Obesity 08/12/2015    Obstructive sleep apnea 08/12/2015    Restless leg syndrome     Sebaceous cyst 04/18/2017    Type 2 diabetes mellitus 08/12/2015     Past Surgical History:   Procedure Laterality Date    ABLATION N/A 10/8/2020    Procedure: Ablation;  Surgeon: Rip Che III, MD;  Location: [facility] CATH;  Service: Cardiology;  Laterality: N/A;    ARTHROPLASTY OF JOINT OF FINGER Right 4/12/2022    Procedure: Right middle finger MCP joint arthroplasty;  Surgeon: Luis Alberto Payne MD;  Location: [facility] OR;  Service: Orthopedics;  Laterality: Right;  Anesthesia:  General with a single-shot supraclavicular block    BACK SURGERY      x2    CARDIAC CATHETERIZATION      CARDIAC ELECTROPHYSIOLOGY STUDY  10/8/2020    Procedure: Study possible ablation;  Surgeon: Rip Che III, MD;  Location: [facility] CATH;  Service: Cardiology;;    CERVICAL SPINE SURGERY      CHOLECYSTECTOMY  05/30/2018    Dr. ROGELIO Tao, [facility]     COLONOSCOPY  2008    COLONOSCOPY N/A 9/14/2017    Procedure: COLONOSCOPY;  Surgeon: Obi Beltre MD;  Location: [facility] ENDO;  Service: Endoscopy;  Laterality: N/A;     CORONARY ANGIOGRAPHY Left 5/28/2018    Procedure: Coronary angiography;  Surgeon: Rafiq Malik MD;  Location: [facility] CATH;  Service: Cardiology;  Laterality: Left;    ELBOW SURGERY Bilateral     HERNIA REPAIR      LAPAROSCOPIC APPENDECTOMY N/A 6/25/2020    Procedure: APPENDECTOMY, LAPAROSCOPIC;  Surgeon: Gordon Can MD;  Location: [facility] OR;  Service: General;  Laterality: N/A;    LAPAROSCOPIC CHOLECYSTECTOMY N/A 5/30/2018    Procedure: CHOLECYSTECTOMY, LAPAROSCOPIC;  Surgeon: Fletcher Tao MD;  Location: [facility] OR;  Service: General;  Laterality: N/A;    LEFT HEART CATHETERIZATION Left 5/28/2018    Procedure: Left heart cath;  Surgeon: Rafiq Malik MD;  Location: [facility] CATH;  Service: Cardiology;  Laterality: Left;    NECK SURGERY      RADIOFREQUENCY ABLATION OF LUMBAR MEDIAL BRANCH NERVE AT SINGLE LEVEL Bilateral 6/17/2022    Procedure: Radiofrequency Ablation, Nerve, Spinal, Lumbar, Medial Branch, 1 Level L3,4,5;  Surgeon: Thiago Garcia MD;  Location: [facility] OR;  Service: Pain Management;  Laterality: Bilateral;    SHOULDER ARTHROSCOPY      SPINE SURGERY      lumbar x2    TONSILLECTOMY      TREATMENT OF CARDIAC ARRHYTHMIA  10/8/2020    Procedure: Cardioversion or Defibrillation;  Surgeon: Rip Che III, MD;  Location: [facility] CATH;  Service: Cardiology;;    TRIGGER FINGER RELEASE Right 9/9/2021    Procedure: RELEASE, TRIGGER FINGER;  Surgeon: Luis Alberto Payne MD;  Location: [facility] OR;  Service: Orthopedics;  Laterality: Right;  Procedure: Right ring finger trigger release    Position: Supine    Anesthesia: Local    Equipment: Basic handset     Family History   Problem Relation Age of Onset    Diabetes Mother     Depression Mother     Liver cancer Mother     Obesity Mother     Heart disease Father     Anuerysm Father     Diabetes Father     Stroke Father     Glaucoma Paternal Grandmother     Obesity Sister      Social History     Tobacco Use    Smoking status: Former     Packs/day: 0.50      Years: 6.00     Pack years: 3.00     Types: Cigarettes     Quit date:      Years since quittin.9    Smokeless tobacco: Never   Substance Use Topics    Alcohol use: No     Alcohol/week: 0.0 standard drinks    Drug use: No        Review of patient's allergies indicates:  No Known Allergies    Medications:  Current Outpatient Medications on File Prior to Visit   Medication Sig Dispense Refill    allopurinoL (ZYLOPRIM) 100 MG tablet TAKE ONE TABLET BY MOUTH DAILY 30 tablet 3    ascorbic acid, vitamin C, (VITAMIN C) 500 MG tablet Take 500 mg by mouth once daily.      aspirin (ECOTRIN) 81 MG EC tablet Take 81 mg by mouth once daily.      atorvastatin (LIPITOR) 40 MG tablet take one Tablet by mouth once daily in the evening 90 tablet 4    azelastine (ASTELIN) 137 mcg (0.1 %) nasal spray 1 spray (137 mcg total) by Nasal route 2 (two) times daily. 30 mL 12    blood sugar diagnostic (CONTOUR NEXT TEST STRIPS) Strp Use to Test 4 times a day.  Contour Next test strips required for Medtronic insulin pump (Patient taking differently: Use to Test 4 times a day.  Contour Next test strips required for Medtronic insulin pump) 400 strip 4    blood-glucose meter kit To check BG 2 times daily, to use with insurance preferred meter 1 each 1    blood-glucose meter,continuous (DEXCOM G6 ) Misc Dispense 1  1 each 0    blood-glucose sensor (DEXCOM G6 SENSOR) Oxana Dispense 3 sensors/month 3 each 12    blood-glucose transmitter (DEXCOM G6 TRANSMITTER) Oxana Dispense 1 transmitter 1 each 3    carvediloL (COREG) 12.5 MG tablet TAKE ONE TABLET (12.5MG) BY MOUTH TWICE DAILY with meals 180 tablet 3    cholecalciferol, vitamin D3, 125 mcg (5,000 unit) capsule Take 5,000 Units by mouth once daily.      citalopram (CELEXA) 40 MG tablet Take 1 tablet (40 mg total) by mouth once daily. 30 tablet 5    docusate sodium (COLACE) 100 MG capsule Take 200 mg by mouth once daily.      famotidine (PEPCID) 40 MG tablet Take 1 tablet (40 mg  "total) by mouth every evening. 30 tablet 11    fluticasone propionate (FLONASE) 50 mcg/actuation nasal spray 1 spray (50 mcg total) by Each Nostril route 2 (two) times daily. 16 g 12    gabapentin (NEURONTIN) 600 MG tablet take one Tablet by mouth three times a day 90 tablet 2    glucosam/gluc cruz/mr-hxc-m-gluc (GLUCOSAMINE COMPLEX ORAL) Take 1 tablet by mouth once daily.      infusion set for insulin pump (MINIMED PRO-SET INFUSION 24") ISet Changes site q2days, dispense 90day supply 45 each 4    insulin (LANTUS SOLOSTAR U-100 INSULIN) glargine 100 units/mL SubQ pen Take 50 u qd, only use if pump malfunctions 15 mL 0    insulin lispro (HUMALOG U-100 INSULIN) 100 unit/mL injection INJECT ONE unit FOR EVERY FIVE grams of carbs AND ONE unit FOR EVERY 25 points of glucose over 150. Max daily DOSE of 150 units total. 40 mL 6    insulin pump syringe (MINIMED SYRINGE RESERVOIR) 3 mL Misc Changes q2days, dispense 3 month supply 45 each 4    ketoconazole (NIZORAL) 2 % cream APPLY ONE GRAM Topically TWICE DAILY 60 g 1    Lactobacillus rhamnosus GG (CULTURELLE) 10 billion cell capsule Take 1 capsule by mouth once daily. 15 capsule 0    lancets Misc To be used with Contour Next as necessary with Medtronic insulin pump, uses 4 daily 400 each 4    meloxicam (MOBIC) 15 MG tablet Take 15 mg by mouth once daily.       metFORMIN (GLUCOPHAGE) 1000 MG tablet Take 1 tablet (1,000 mg total) by mouth 2 (two) times daily. 180 tablet 4    multivitamin (THERAGRAN) per tablet Take 1 tablet by mouth once daily.      ofloxacin (FLOXIN) 0.3 % otic solution Place 5 drops into the right ear 2 (two) times daily. for 7 days 10 mL 3    OZEMPIC 1 mg/dose (4 mg/3 mL) Inject 1 mg into the skin every 7 days.      pantoprazole (PROTONIX) 40 MG tablet Take 1 tablet (40 mg total) by mouth once daily. 30 tablet 5    pioglitazone (ACTOS) 15 MG tablet Take 1 tablet (15 mg total) by mouth once daily. 90 tablet 4    semaglutide (OZEMPIC) 2 mg/dose (8 mg/3 mL) " PnIj Inject 2 mg into the skin every 7 days. 9 mL 4    topiramate (TOPAMAX) 25 MG tablet Take 1 tablet (25 mg total) by mouth 2 (two) times daily. 60 tablet 0    varicella-zoster gE-AS01B, PF, (SHINGRIX) 50 mcg/0.5 mL injection Inject into the muscle. 1 each 1    vitamin D (VITAMIN D3) 1000 units Tab Take 1,000 Units by mouth once daily.      vitamin E 400 UNIT capsule Take 400 Units by mouth once daily.       No current facility-administered medications on file prior to visit.         Body mass index is 39.37 kg/m².     Beginning Weight: 306 lbs    Last Weight: 307 lbs    Current Weight: 310 lbs   Weight Change: +4 lbs      Body comp:  Fat Percent:  49.5 %  Fat Mass:  153.8 lb  FFM:  157 lb  BMR: 2253 kcal    Wt Readings from Last 5 Encounters:   12/05/22 (!) 141 kg (310 lb 12.8 oz)   11/29/22 (!) 144.7 kg (319 lb 0.1 oz)   11/08/22 (!) 143.8 kg (317 lb 0.3 oz)   10/28/22 (!) 139.1 kg (306 lb 10.6 oz)   10/26/22 (!) 139.4 kg (307 lb 6.4 oz)         Chart review:  Primary Care Physician: Carly      Lab review:  Most Recent Data:  CBC:   Lab Results   Component Value Date    WBC 7.34 08/25/2022    HGB 12.9 (L) 08/25/2022    HCT 38.3 (L) 08/25/2022     08/25/2022    MCV 87 08/25/2022    RDW 13.4 08/25/2022     BMP:   Lab Results   Component Value Date     08/25/2022    K 4.5 08/25/2022     08/25/2022    CO2 27 08/25/2022    BUN 12 08/25/2022    CREATININE 0.9 08/25/2022     (H) 08/25/2022    CALCIUM 9.8 08/25/2022    MG 1.6 05/31/2022    PHOS 3.4 05/31/2022     LFTs:   Lab Results   Component Value Date    PROT 7.3 08/25/2022    ALBUMIN 4.0 08/25/2022    BILITOT 0.6 08/25/2022    AST 16 08/25/2022    ALKPHOS 94 08/25/2022    ALT 20 08/25/2022     Coags:   Lab Results   Component Value Date    INR 1.1 06/17/2020     FLP:   Lab Results   Component Value Date    CHOL 124 03/17/2022    HDL 29 (L) 03/17/2022    LDLCALC 75.0 03/17/2022    TRIG 100 03/17/2022    CHOLHDL 23.4 03/17/2022     DM:  "  Lab Results   Component Value Date    HGBA1C 7.0 (H) 09/16/2022    HGBA1C 7.1 (H) 08/25/2022    HGBA1C 8.3 (H) 06/10/2022    LDLCALC 75.0 03/17/2022    CREATININE 0.9 08/25/2022     Thyroid:   Lab Results   Component Value Date    TSH 2.868 06/10/2022     Anemia:   Lab Results   Component Value Date    IRON 60 06/10/2022    TIBC 462 (H) 06/10/2022    FERRITIN 83 06/10/2022    PRARAYLK39 1348 (H) 06/23/2020     Cardiac:   Lab Results   Component Value Date    TROPONINI <0.012 05/29/2022     Urinalysis:   Lab Results   Component Value Date    COLORU Yellow 05/29/2022    SPECGRAV 1.010 05/29/2022    NITRITE Negative 05/29/2022    KETONESU Negative 05/29/2022    UROBILINOGEN 1.0 05/29/2022    WBCUA 0 10/10/2020    WBCUA 0 10/10/2020         Radiology review: Images independently reviewed and interpreted.    UGI-   1. Mild gastroesophageal reflux.  2. Mild esophageal dysmotility.      Other Results:  EKG (my interpretation): unchanged from previous tracings, sinus tachycardia.        Review of Systems:  Review of Systems    Physical:     Vital Signs (Most Recent)  Temp: 98.3 °F (36.8 °C) (12/05/22 1433)  Pulse: 70 (12/05/22 1433)  Resp: 16 (12/05/22 1433)  BP: 131/73 (12/05/22 1433)  6' 2.5" (1.892 m)  (!) 141 kg (310 lb 12.8 oz)     Physical Exam:  Physical Exam    ASSESSMENT/PLAN:        1. Morbid obesity        2. BMI 40.0-44.9, adult        3. TARYN on CPAP        4. Type 2 diabetes mellitus with hyperglycemia, with long-term current use of insulin        5. Essential hypertension        6. Mixed hyperlipidemia            Continue with Dr. Devine's established plan on initial evaluation       MSD 5/3     Patient will need:     Labs- done  EKG- done  UGI- done  dietary consult- done  psych consult- done  Seminar- done    Needs establish with therapist. Dr. Devine discussed a surgical date in November but after speaking with psychiatry he still needs follow up . Will plan to see him monthly Psychiatry recommended of " establishing with counselor for at least 3 months.      Will obtain the following clearances prior to surgery:   Cardiology- done      Diet Education Discussed:  Recommend high protein, low carb meals- mainly meats and vegetables.    Breakfast:  1 egg with ham, cheese on white bread with chocolate milk  Dinner:  chicken, meatballs with cocktail angel luis, mac and cheese, ice cream 1 cup  Snack: vegetable beef soup with crackers  Water: >48oz  8oz diet soda  Decaf tea with splenda/sugar      MVI: mens one per day, vitamin D,E,C, B- Complex      Exercise/Activity Discussed: Recommend Cardiovascular exercise, get HR over 100 for 20 minutes 3 times per week-   Limited because of back & neck pain      Plan for this patient:  Diet adherence   Transition to low calorie bread (45 sergio)   Fairlife milk/ chocolate milk   Decrease sugar and simple carbohydrates   Start tracking intake-  Tanita scale results reviewed with patient  Exercise/Activity adherence   Increase as tolerated  Fall reviewed with patient  Continue multivitamins- discussion of post-operative life long MVI need, including Calcium, and a B-Complex  Discussion of Gambling addiction and continual follow up with psychiatry- sent message to Dr. Thompson about continual follow up        This includes face to face time and non-face to face time preparing to see the patient (eg, review of tests), obtaining and/or reviewing separately obtained history, documenting clinical information in the electronic or other health record, independently interpreting results and communicating results to the patient/family/caregiver, or care coordinator.

## 2022-12-06 ENCOUNTER — PATIENT MESSAGE (OUTPATIENT)
Dept: BARIATRICS | Facility: CLINIC | Age: 57
End: 2022-12-06
Payer: MEDICARE

## 2022-12-06 ENCOUNTER — PES CALL (OUTPATIENT)
Dept: ADMINISTRATIVE | Facility: CLINIC | Age: 57
End: 2022-12-06
Payer: MEDICARE

## 2022-12-07 ENCOUNTER — TELEPHONE (OUTPATIENT)
Dept: BARIATRICS | Facility: CLINIC | Age: 57
End: 2022-12-07
Payer: MEDICARE

## 2022-12-07 NOTE — TELEPHONE ENCOUNTER
Retruned call to pt, confirmed that the UGI scheduled on 12/12/22 was scheduled incorrectly. He had an UGI in July    ----- Message from Amber Robles sent at 12/7/2022 11:10 AM CST -----  Contact: Patient  Type:  Needs Medical Advice    Who Called:  Patient       Would the patient rather a call back or a response via [facility]? Call     Best Call Back Number: [phone]    Additional Information:  Patient would like to speak with the nurse about the test for Upper GI. Patient had it schedule for 12/12 and now it is cancelled and wants to know why. Patient is confused.     Please call to advise

## 2022-12-13 ENCOUNTER — OFFICE VISIT (OUTPATIENT)
Dept: PSYCHIATRY | Facility: CLINIC | Age: 57
End: 2022-12-13
Payer: COMMERCIAL

## 2022-12-13 DIAGNOSIS — F63.0 PATHOLOGICAL GAMBLING: ICD-10-CM

## 2022-12-13 DIAGNOSIS — F33.1 MODERATE EPISODE OF RECURRENT MAJOR DEPRESSIVE DISORDER: ICD-10-CM

## 2022-12-13 DIAGNOSIS — F41.1 GAD (GENERALIZED ANXIETY DISORDER): Primary | ICD-10-CM

## 2022-12-13 PROCEDURE — 3066F PR DOCUMENTATION OF TREATMENT FOR NEPHROPATHY: ICD-10-PCS | Mod: CPTII,S$GLB,, | Performed by: SOCIAL WORKER

## 2022-12-13 PROCEDURE — 3066F NEPHROPATHY DOC TX: CPT | Mod: CPTII,S$GLB,, | Performed by: SOCIAL WORKER

## 2022-12-13 PROCEDURE — 1159F MED LIST DOCD IN RCRD: CPT | Mod: CPTII,S$GLB,, | Performed by: SOCIAL WORKER

## 2022-12-13 PROCEDURE — 3061F PR NEG MICROALBUMINURIA RESULT DOCUMENTED/REVIEW: ICD-10-PCS | Mod: CPTII,S$GLB,, | Performed by: SOCIAL WORKER

## 2022-12-13 PROCEDURE — 3051F PR MOST RECENT HEMOGLOBIN A1C LEVEL 7.0 - < 8.0%: ICD-10-PCS | Mod: CPTII,S$GLB,, | Performed by: SOCIAL WORKER

## 2022-12-13 PROCEDURE — 90834 PSYTX W PT 45 MINUTES: CPT | Mod: S$GLB,,, | Performed by: SOCIAL WORKER

## 2022-12-13 PROCEDURE — 90834 PR PSYCHOTHERAPY W/PATIENT, 45 MIN: ICD-10-PCS | Mod: S$GLB,,, | Performed by: SOCIAL WORKER

## 2022-12-13 PROCEDURE — 3061F NEG MICROALBUMINURIA REV: CPT | Mod: CPTII,S$GLB,, | Performed by: SOCIAL WORKER

## 2022-12-13 PROCEDURE — 3051F HG A1C>EQUAL 7.0%<8.0%: CPT | Mod: CPTII,S$GLB,, | Performed by: SOCIAL WORKER

## 2022-12-13 PROCEDURE — 1159F PR MEDICATION LIST DOCUMENTED IN MEDICAL RECORD: ICD-10-PCS | Mod: CPTII,S$GLB,, | Performed by: SOCIAL WORKER

## 2022-12-13 NOTE — PROGRESS NOTES
Individual Psychotherapy (PhD/LCSW)    12/13/2022    Site:  [facility], [place]         Therapeutic Intervention: Met with patient.  Outpatient - Behavior modifying psychotherapy 45 min - CPT code 51774    Chief complaint/reason for encounter: anger and interpersonal     Interval history and content of current session: Patient in this date. He was last seen on 11/17/22. Patient reports he missed a session due to illness, he had a virus, and shared he is much better today. Patient reports he is having problems with his anger level. He states  he has attempted to use his tools to prevent himself from exploding, but does not do so and does not think about using them until after he lashes out. His primary reason for his anger is his sister in law and other family members living next door to his home on family property. He indicates they are disrespectful of his propriety, allowing their dogs to  get into the trash and roam on his property. This makes him highly upset as it has been going on for 29 years. Counselor pointed out he has been dealing with this behavior from his in-laws for a long time and they are likely not changing any time soon. He does not have a practical solution to it as he is not able to move or get a fence, so his only solution is acceptance and changing his response. Patient agreed. He was reminded about deep breathing, thought stopping. He was also encouraged to look for reasons to be grateful and work on acceptance. He agreed to try.     Patient reports he remains abstinent from drinking and gambling.    Treatment plan:  Target symptoms:  anger, interpersonal  Why chosen therapy is appropriate versus another modality: evidence based practice  Outcome monitoring methods: self-report, observation  Therapeutic intervention type: behavior modifying psychotherapy, supportive psychotherapy    Risk parameters:  Patient reports no suicidal ideation  Patient reports no homicidal ideation  Patient reports  no self-injurious behavior  Patient reports no violent behavior    Verbal deficits: None    Patient's response to intervention:  The patient's response to intervention is accepting.    Progress toward goals and other mental status changes:  The patient's progress toward goals is fair .    Diagnosis:     ICD-10-CM ICD-9-CM   1. KENNEDY (generalized anxiety disorder)  F41.1 300.02   2. Pathological gambling  F63.0 312.31   3. Moderate episode of recurrent major depressive disorder  F33.1 296.32       Plan:  individual psychotherapy and medication management by physician    Return to clinic: 2 weeks    Length of Service (minutes): 45

## 2022-12-14 ENCOUNTER — OFFICE VISIT (OUTPATIENT)
Dept: ORTHOPEDICS | Facility: CLINIC | Age: 57
End: 2022-12-14
Payer: MEDICARE

## 2022-12-14 ENCOUNTER — HOSPITAL ENCOUNTER (OUTPATIENT)
Dept: RADIOLOGY | Facility: HOSPITAL | Age: 57
Discharge: HOME | End: 2022-12-14
Attending: ORTHOPAEDIC SURGERY
Payer: MEDICARE

## 2022-12-14 DIAGNOSIS — M19.041 DEGENERATIVE ARTHRITIS OF METACARPOPHALANGEAL JOINT OF MIDDLE FINGER OF RIGHT HAND: ICD-10-CM

## 2022-12-14 DIAGNOSIS — M19.041 DEGENERATIVE ARTHRITIS OF METACARPOPHALANGEAL JOINT OF MIDDLE FINGER OF RIGHT HAND: Primary | ICD-10-CM

## 2022-12-14 PROCEDURE — 99999 PR PBB SHADOW E&M-EST. PATIENT-LVL III: ICD-10-PCS | Mod: PBBFAC,,, | Performed by: ORTHOPAEDIC SURGERY

## 2022-12-14 PROCEDURE — 99999 PR PBB SHADOW E&M-EST. PATIENT-LVL III: CPT | Mod: PBBFAC,,, | Performed by: ORTHOPAEDIC SURGERY

## 2022-12-14 PROCEDURE — 3051F PR MOST RECENT HEMOGLOBIN A1C LEVEL 7.0 - < 8.0%: ICD-10-PCS | Mod: CPTII,S$GLB,, | Performed by: ORTHOPAEDIC SURGERY

## 2022-12-14 PROCEDURE — 73130 XR HAND COMPLETE 3 VIEW RIGHT: ICD-10-PCS | Mod: 26,RT,, | Performed by: RADIOLOGY

## 2022-12-14 PROCEDURE — 3051F HG A1C>EQUAL 7.0%<8.0%: CPT | Mod: CPTII,S$GLB,, | Performed by: ORTHOPAEDIC SURGERY

## 2022-12-14 PROCEDURE — 3066F NEPHROPATHY DOC TX: CPT | Mod: CPTII,S$GLB,, | Performed by: ORTHOPAEDIC SURGERY

## 2022-12-14 PROCEDURE — 99213 PR OFFICE/OUTPT VISIT, EST, LEVL III, 20-29 MIN: ICD-10-PCS | Mod: S$GLB,,, | Performed by: ORTHOPAEDIC SURGERY

## 2022-12-14 PROCEDURE — 73130 X-RAY EXAM OF HAND: CPT | Mod: TC,PO,RT

## 2022-12-14 PROCEDURE — 3066F PR DOCUMENTATION OF TREATMENT FOR NEPHROPATHY: ICD-10-PCS | Mod: CPTII,S$GLB,, | Performed by: ORTHOPAEDIC SURGERY

## 2022-12-14 PROCEDURE — 73130 X-RAY EXAM OF HAND: CPT | Mod: 26,RT,, | Performed by: RADIOLOGY

## 2022-12-14 PROCEDURE — 3061F NEG MICROALBUMINURIA REV: CPT | Mod: CPTII,S$GLB,, | Performed by: ORTHOPAEDIC SURGERY

## 2022-12-14 PROCEDURE — 1159F MED LIST DOCD IN RCRD: CPT | Mod: CPTII,S$GLB,, | Performed by: ORTHOPAEDIC SURGERY

## 2022-12-14 PROCEDURE — 99213 OFFICE O/P EST LOW 20 MIN: CPT | Mod: S$GLB,,, | Performed by: ORTHOPAEDIC SURGERY

## 2022-12-14 PROCEDURE — 3061F PR NEG MICROALBUMINURIA RESULT DOCUMENTED/REVIEW: ICD-10-PCS | Mod: CPTII,S$GLB,, | Performed by: ORTHOPAEDIC SURGERY

## 2022-12-14 PROCEDURE — 1159F PR MEDICATION LIST DOCUMENTED IN MEDICAL RECORD: ICD-10-PCS | Mod: CPTII,S$GLB,, | Performed by: ORTHOPAEDIC SURGERY

## 2022-12-14 NOTE — PROGRESS NOTES
Mr Forrest returns to clinic today.  Has a history of right middle finger MCP joint arthroplasty.  He states he is doing very well.  He is no complaints     Physical exam:  Examination the right hand reveals that the wound is well healed.  There is no significant edema.  Palpation does not produce tenderness.  He is able make a full composite fist and extend the finger.  He is neurovascularly intact.      Radiology:  X-rays of the right hand were taken in clinic today.  The films reviewed by me.  There is evidence of the arthroplasty of the MCP joint of the middle finger.  This remains well fixed.  There is no other pathology.      Assessment: Status post right middle finger MCP joint arthroplasty     Plan:    1. Can continue activity as tolerated    2.  Will follow up with me on a p.r.n. basis

## 2022-12-20 ENCOUNTER — OFFICE VISIT (OUTPATIENT)
Dept: PODIATRY | Facility: CLINIC | Age: 57
End: 2022-12-20
Payer: MEDICARE

## 2022-12-20 VITALS — HEIGHT: 75 IN | WEIGHT: 310.88 LBS | BODY MASS INDEX: 38.65 KG/M2

## 2022-12-20 DIAGNOSIS — E66.01 SEVERE OBESITY (BMI 35.0-39.9) WITH COMORBIDITY: ICD-10-CM

## 2022-12-20 DIAGNOSIS — B35.1 ONYCHOMYCOSIS DUE TO DERMATOPHYTE: ICD-10-CM

## 2022-12-20 DIAGNOSIS — E11.42 DIABETIC POLYNEUROPATHY ASSOCIATED WITH TYPE 2 DIABETES MELLITUS: Primary | ICD-10-CM

## 2022-12-20 PROCEDURE — 99499 UNLISTED E&M SERVICE: CPT | Mod: S$GLB,,, | Performed by: PODIATRIST

## 2022-12-20 PROCEDURE — 11721 PR DEBRIDEMENT OF NAILS, 6 OR MORE: ICD-10-PCS | Mod: Q9,S$GLB,, | Performed by: PODIATRIST

## 2022-12-20 PROCEDURE — 3066F PR DOCUMENTATION OF TREATMENT FOR NEPHROPATHY: ICD-10-PCS | Mod: CPTII,S$GLB,, | Performed by: PODIATRIST

## 2022-12-20 PROCEDURE — 3051F HG A1C>EQUAL 7.0%<8.0%: CPT | Mod: CPTII,S$GLB,, | Performed by: PODIATRIST

## 2022-12-20 PROCEDURE — 99999 PR PBB SHADOW E&M-EST. PATIENT-LVL II: CPT | Mod: PBBFAC,,, | Performed by: PODIATRIST

## 2022-12-20 PROCEDURE — 3008F BODY MASS INDEX DOCD: CPT | Mod: CPTII,S$GLB,, | Performed by: PODIATRIST

## 2022-12-20 PROCEDURE — 3066F NEPHROPATHY DOC TX: CPT | Mod: CPTII,S$GLB,, | Performed by: PODIATRIST

## 2022-12-20 PROCEDURE — 11721 DEBRIDE NAIL 6 OR MORE: CPT | Mod: Q9,S$GLB,, | Performed by: PODIATRIST

## 2022-12-20 PROCEDURE — 3061F PR NEG MICROALBUMINURIA RESULT DOCUMENTED/REVIEW: ICD-10-PCS | Mod: CPTII,S$GLB,, | Performed by: PODIATRIST

## 2022-12-20 PROCEDURE — 99999 PR PBB SHADOW E&M-EST. PATIENT-LVL II: ICD-10-PCS | Mod: PBBFAC,,, | Performed by: PODIATRIST

## 2022-12-20 PROCEDURE — 1159F PR MEDICATION LIST DOCUMENTED IN MEDICAL RECORD: ICD-10-PCS | Mod: CPTII,S$GLB,, | Performed by: PODIATRIST

## 2022-12-20 PROCEDURE — 3008F PR BODY MASS INDEX (BMI) DOCUMENTED: ICD-10-PCS | Mod: CPTII,S$GLB,, | Performed by: PODIATRIST

## 2022-12-20 PROCEDURE — 1159F MED LIST DOCD IN RCRD: CPT | Mod: CPTII,S$GLB,, | Performed by: PODIATRIST

## 2022-12-20 PROCEDURE — 3051F PR MOST RECENT HEMOGLOBIN A1C LEVEL 7.0 - < 8.0%: ICD-10-PCS | Mod: CPTII,S$GLB,, | Performed by: PODIATRIST

## 2022-12-20 PROCEDURE — 99499 NO LOS: ICD-10-PCS | Mod: S$GLB,,, | Performed by: PODIATRIST

## 2022-12-20 PROCEDURE — 3061F NEG MICROALBUMINURIA REV: CPT | Mod: CPTII,S$GLB,, | Performed by: PODIATRIST

## 2022-12-20 NOTE — PROGRESS NOTES
Subjective:      Patient ID: Gio Forrest is a 57 y.o. male.    Chief Complaint: No chief complaint on file.    Gio is a 57 y.o. male who presents to the clinic for evaluation and treatment of diabetic feet. Gio has a past medical history of Cardiomyopathy, Depression, Diabetes, Dyslipidemia (08/12/2015), Gout (08/12/2015), Hypertension, Idiopathic gout, Lumbar pseudoarthrosis, LINDSEY (nonalcoholic steatohepatitis), Obesity (08/12/2015), Obstructive sleep apnea (08/12/2015), Restless leg syndrome, Sebaceous cyst (04/18/2017), and Type 2 diabetes mellitus (08/12/2015). Patient relates having toenails that are in need of trimming.  Denies experiencing pain from the nails with wearing shoe gear.  Has not attempted to self treat.  Continues to maintain decent control over his blood glucose.  Denies any additional pedal complaints.    Endocrinology: Gabby Rooney DNP, NP  Last visit: 9/22  Hemoglobin A1C   Date Value Ref Range Status   09/16/2022 7.0 (H) 4.0 - 5.6 % Final     Comment:     ADA Screening Guidelines:  5.7-6.4%  Consistent with prediabetes  >or=6.5%  Consistent with diabetes    High levels of fetal hemoglobin interfere with the HbA1C  assay. Heterozygous hemoglobin variants (HbS, HgC, etc)do  not significantly interfere with this assay.   However, presence of multiple variants may affect accuracy.     08/25/2022 7.1 (H) 4.0 - 5.6 % Final     Comment:     ADA Screening Guidelines:  5.7-6.4%  Consistent with prediabetes  >or=6.5%  Consistent with diabetes    High levels of fetal hemoglobin interfere with the HbA1C  assay. Heterozygous hemoglobin variants (HbS, HgC, etc)do  not significantly interfere with this assay.   However, presence of multiple variants may affect accuracy.     06/10/2022 8.3 (H) 4.0 - 5.6 % Final     Comment:     ADA Screening Guidelines:  5.7-6.4%  Consistent with prediabetes  >or=6.5%  Consistent with diabetes    High levels of fetal hemoglobin interfere with the HbA1C  assay.  Heterozygous hemoglobin variants (HbS, HgC, etc)do  not significantly interfere with this assay.   However, presence of multiple variants may affect accuracy.             Past Medical History:   Diagnosis Date    Cardiomyopathy     Depression     Diabetes     Dyslipidemia 08/12/2015    Gout 08/12/2015    Hypertension     Idiopathic gout     Lumbar pseudoarthrosis     LINDSEY (nonalcoholic steatohepatitis)     Obesity 08/12/2015    Obstructive sleep apnea 08/12/2015    Restless leg syndrome     Sebaceous cyst 04/18/2017    Type 2 diabetes mellitus 08/12/2015       Past Surgical History:   Procedure Laterality Date    ABLATION N/A 10/8/2020    Procedure: Ablation;  Surgeon: Rip Che III, MD;  Location: [facility] CATH;  Service: Cardiology;  Laterality: N/A;    ARTHROPLASTY OF JOINT OF FINGER Right 4/12/2022    Procedure: Right middle finger MCP joint arthroplasty;  Surgeon: Luis Alberto Payne MD;  Location: [facility] OR;  Service: Orthopedics;  Laterality: Right;  Anesthesia:  General with a single-shot supraclavicular block    BACK SURGERY      x2    CARDIAC CATHETERIZATION      CARDIAC ELECTROPHYSIOLOGY STUDY  10/8/2020    Procedure: Study possible ablation;  Surgeon: Rip Che III, MD;  Location: [facility] CATH;  Service: Cardiology;;    CERVICAL SPINE SURGERY      CHOLECYSTECTOMY  05/30/2018    Dr. ROGELIO Tao, [facility]     COLONOSCOPY  2008    COLONOSCOPY N/A 9/14/2017    Procedure: COLONOSCOPY;  Surgeon: Obi Beltre MD;  Location: [facility] ENDO;  Service: Endoscopy;  Laterality: N/A;    CORONARY ANGIOGRAPHY Left 5/28/2018    Procedure: Coronary angiography;  Surgeon: Rafiq Malik MD;  Location: [facility] CATH;  Service: Cardiology;  Laterality: Left;    ELBOW SURGERY Bilateral     HERNIA REPAIR      LAPAROSCOPIC APPENDECTOMY N/A 6/25/2020    Procedure: APPENDECTOMY, LAPAROSCOPIC;  Surgeon: Gordon Can MD;  Location: [facility] OR;  Service: General;  Laterality: N/A;    LAPAROSCOPIC CHOLECYSTECTOMY N/A 5/30/2018     Procedure: CHOLECYSTECTOMY, LAPAROSCOPIC;  Surgeon: Fletcher Tao MD;  Location: [facility] OR;  Service: General;  Laterality: N/A;    LEFT HEART CATHETERIZATION Left 2018    Procedure: Left heart cath;  Surgeon: Rafiq Malik MD;  Location: [facility];  Service: Cardiology;  Laterality: Left;    NECK SURGERY      RADIOFREQUENCY ABLATION OF LUMBAR MEDIAL BRANCH NERVE AT SINGLE LEVEL Bilateral 2022    Procedure: Radiofrequency Ablation, Nerve, Spinal, Lumbar, Medial Branch, 1 Level L3,4,5;  Surgeon: Thiago Garcia MD;  Location: [facility] OR;  Service: Pain Management;  Laterality: Bilateral;    SHOULDER ARTHROSCOPY      SPINE SURGERY      lumbar x2    TONSILLECTOMY      TREATMENT OF CARDIAC ARRHYTHMIA  10/8/2020    Procedure: Cardioversion or Defibrillation;  Surgeon: Rip Che III, MD;  Location: [facility] CATH;  Service: Cardiology;;    TRIGGER FINGER RELEASE Right 2021    Procedure: RELEASE, TRIGGER FINGER;  Surgeon: Luis Alberto Payne MD;  Location: [facility] OR;  Service: Orthopedics;  Laterality: Right;  Procedure: Right ring finger trigger release    Position: Supine    Anesthesia: Local    Equipment: Basic handset       Family History   Problem Relation Age of Onset    Diabetes Mother     Depression Mother     Liver cancer Mother     Obesity Mother     Heart disease Father     Anuerysm Father     Diabetes Father     Stroke Father     Glaucoma Paternal Grandmother     Obesity Sister        Social History     Socioeconomic History    Marital status:     Number of children: 1   Tobacco Use    Smoking status: Former     Packs/day: 0.50     Years: 6.00     Pack years: 3.00     Types: Cigarettes     Quit date:      Years since quittin.9    Smokeless tobacco: Never   Substance and Sexual Activity    Alcohol use: No     Alcohol/week: 0.0 standard drinks    Drug use: No    Sexual activity: Yes     Partners: Female   Social History Narrative              Social Determinants of  Health     Financial Resource Strain: Medium Risk    Difficulty of Paying Living Expenses: Somewhat hard   Food Insecurity: Food Insecurity Present    Worried About Running Out of Food in the Last Year: Often true    Ran Out of Food in the Last Year: Often true   Transportation Needs: Unmet Transportation Needs    Lack of Transportation (Medical): Yes    Lack of Transportation (Non-Medical): Yes   Physical Activity: Insufficiently Active    Days of Exercise per Week: 4 days    Minutes of Exercise per Session: 10 min   Stress: Stress Concern Present    Feeling of Stress : To some extent   Social Connections: Socially Isolated    Frequency of Communication with Friends and Family: Once a week    Frequency of Social Gatherings with Friends and Family: Once a week    Attends Islam Services: Never    Active Member of Clubs or Organizations: No    Attends Club or Organization Meetings: Never    Marital Status:        Current Outpatient Medications   Medication Sig Dispense Refill    allopurinoL (ZYLOPRIM) 100 MG tablet TAKE ONE TABLET BY MOUTH DAILY 30 tablet 3    ascorbic acid, vitamin C, (VITAMIN C) 500 MG tablet Take 500 mg by mouth once daily.      aspirin (ECOTRIN) 81 MG EC tablet Take 81 mg by mouth once daily.      atorvastatin (LIPITOR) 40 MG tablet take one Tablet by mouth once daily in the evening 90 tablet 4    azelastine (ASTELIN) 137 mcg (0.1 %) nasal spray 1 spray (137 mcg total) by Nasal route 2 (two) times daily. 30 mL 12    blood sugar diagnostic (CONTOUR NEXT TEST STRIPS) Strp Use to Test 4 times a day.  Contour Next test strips required for Medtronic insulin pump (Patient taking differently: Use to Test 4 times a day.  Contour Next test strips required for Medtronic insulin pump) 400 strip 4    blood-glucose meter kit To check BG 2 times daily, to use with insurance preferred meter 1 each 1    blood-glucose meter,continuous (DEXCOM G6 ) Misc Dispense 1  1 each 0     "blood-glucose sensor (DEXCOM G6 SENSOR) Oxana Dispense 3 sensors/month 3 each 12    blood-glucose transmitter (DEXCOM G6 TRANSMITTER) Oxana Dispense 1 transmitter 1 each 3    carvediloL (COREG) 12.5 MG tablet TAKE ONE TABLET (12.5MG) BY MOUTH TWICE DAILY with meals 180 tablet 3    cholecalciferol, vitamin D3, 125 mcg (5,000 unit) capsule Take 5,000 Units by mouth once daily.      citalopram (CELEXA) 40 MG tablet Take 1 tablet (40 mg total) by mouth once daily. 30 tablet 5    docusate sodium (COLACE) 100 MG capsule Take 200 mg by mouth once daily.      famotidine (PEPCID) 40 MG tablet Take 1 tablet (40 mg total) by mouth every evening. 30 tablet 11    fluticasone propionate (FLONASE) 50 mcg/actuation nasal spray 1 spray (50 mcg total) by Each Nostril route 2 (two) times daily. 16 g 12    gabapentin (NEURONTIN) 600 MG tablet take one Tablet by mouth three times a day 90 tablet 2    glucosam/gluc cruz/bc-qth-m-gluc (GLUCOSAMINE COMPLEX ORAL) Take 1 tablet by mouth once daily.      infusion set for insulin pump (MINIMED PRO-SET INFUSION 24") ISet Changes site q2days, dispense 90day supply 45 each 4    insulin (LANTUS SOLOSTAR U-100 INSULIN) glargine 100 units/mL SubQ pen Take 50 u qd, only use if pump malfunctions 15 mL 0    insulin lispro (HUMALOG U-100 INSULIN) 100 unit/mL injection INJECT ONE unit FOR EVERY FIVE grams of carbs AND ONE unit FOR EVERY 25 points of glucose over 150. Max daily DOSE of 150 units total. 40 mL 6    insulin pump syringe (MINIMWidgetbox SYRINGE RESERVOIR) 3 mL Misc Changes q2days, dispense 3 month supply 45 each 4    ketoconazole (NIZORAL) 2 % cream APPLY ONE GRAM Topically TWICE DAILY 60 g 1    Lactobacillus rhamnosus GG (CULTURELLE) 10 billion cell capsule Take 1 capsule by mouth once daily. 15 capsule 0    lancets Misc To be used with Contour Next as necessary with Medtronic insulin pump, uses 4 daily 400 each 4    meloxicam (MOBIC) 15 MG tablet Take 15 mg by mouth once daily.       metFORMIN " (GLUCOPHAGE) 1000 MG tablet Take 1 tablet (1,000 mg total) by mouth 2 (two) times daily. 180 tablet 4    multivitamin (THERAGRAN) per tablet Take 1 tablet by mouth once daily.      OZEMPIC 1 mg/dose (4 mg/3 mL) Inject 1 mg into the skin every 7 days.      pantoprazole (PROTONIX) 40 MG tablet TAKE ONE TABLET BY MOUTH EVERY DAY 90 tablet 1    pioglitazone (ACTOS) 15 MG tablet Take 1 tablet (15 mg total) by mouth once daily. 90 tablet 4    semaglutide (OZEMPIC) 2 mg/dose (8 mg/3 mL) PnIj Inject 2 mg into the skin every 7 days. 9 mL 4    topiramate (TOPAMAX) 25 MG tablet Take 1 tablet (25 mg total) by mouth 2 (two) times daily. 60 tablet 0    varicella-zoster gE-AS01B, PF, (SHINGRIX) 50 mcg/0.5 mL injection Inject into the muscle. 1 each 1    vitamin D (VITAMIN D3) 1000 units Tab Take 1,000 Units by mouth once daily.      vitamin E 400 UNIT capsule Take 400 Units by mouth once daily.       No current facility-administered medications for this visit.       Review of patient's allergies indicates:  No Known Allergies      Review of Systems   Constitutional: Negative for chills and fever.   Cardiovascular:  Negative for claudication and leg swelling.   Skin:  Positive for color change and nail changes.   Musculoskeletal:  Positive for joint pain and joint swelling. Negative for muscle cramps, muscle weakness and myalgias.   Neurological:  Positive for numbness and paresthesias.   Psychiatric/Behavioral:  Negative for altered mental status.          Objective:      Physical Exam  Constitutional:       General: He is not in acute distress.     Appearance: He is well-developed. He is not diaphoretic.   Cardiovascular:      Pulses:           Dorsalis pedis pulses are 2+ on the right side and 2+ on the left side.        Posterior tibial pulses are 2+ on the right side and 2+ on the left side.      Comments: CFT is < 3 seconds bilateral.  Pedal hair growth is decreased bilateral.  No varicosities noted bilateral.  No lower  extremity edema noted bilateral. Toes are cool to touch bilateral.    Musculoskeletal:         General: Tenderness present.      Left lower leg: No edema.      Comments: Muscle strength 5/5 in all muscle groups bilateral.  No tenderness nor crepitation with ROM of foot/ankle joints bilateral.  No pain with palpation of bilateral foot and ankle.  Bilateral pes planus foot type.  Bilateral hallux abducto valgus.  Bilateral semi-reducible contracture of toe 2-5.     Skin:     General: Skin is warm and dry.      Capillary Refill: Capillary refill takes 2 to 3 seconds.      Coloration: Skin is not pale.      Findings: No abrasion, bruising, burn, ecchymosis, erythema, laceration, lesion or petechiae.      Comments: Pedal skin appears thin bilateral.  Toenails x 10 appear thickened by 2 mm, elongated by 4 mm, and discolored with subungual debris. Examination of the skin reveals no evidence of significant maceration, rashes, open lesions, suspicious appearing nevi or other concerning lesions.    Neurological:      Mental Status: He is alert and oriented to person, place, and time.      Sensory: Sensory deficit present.      Motor: No weakness or atrophy.      Comments: Protective sensation per [place]-Love monofilament is decreased bilateral.  Vibratory sensation is intact bilateral.  Light touch is intact bilateral.             Assessment:       Encounter Diagnoses   Name Primary?    Diabetic polyneuropathy associated with type 2 diabetes mellitus Yes    Onychomycosis due to dermatophyte     Severe obesity (BMI 35.0-39.9) with comorbidity          Plan:       Diagnoses and all orders for this visit:    Diabetic polyneuropathy associated with type 2 diabetes mellitus    Onychomycosis due to dermatophyte    Severe obesity (BMI 35.0-39.9) with comorbidity      I counseled the patient on his conditions, their implications and medical management.    Discussed the importance of diet and exercise as they pertain to diabetes  management and maintaining a healthy BMI.    Shoe inspection. Diabetic Foot Education. Patient reminded of the importance of good nutrition and blood sugar control to help prevent podiatric complications of diabetes. Patient instructed on proper foot hygeine. We discussed wearing proper shoe gear, daily foot inspections, never walking without protective shoe gear, never putting sharp instruments to feet    With patient's permission, nails were aggressively reduced and debrided x 10 to their soft tissue attachment mechanically and with electric , removing all offending nail and debris.  Patient relates relief following the procedure. He will continue to monitor the areas daily, inspect his feet, wear protective shoe gear when ambulatory, moisturizer to maintain skin integrity and follow in this office in approximately 3 months, sooner p.r.n.    Rj Nuñez DPM

## 2022-12-27 ENCOUNTER — TELEPHONE (OUTPATIENT)
Dept: OTOLARYNGOLOGY | Facility: CLINIC | Age: 57
End: 2022-12-27
Payer: MEDICARE

## 2022-12-27 NOTE — TELEPHONE ENCOUNTER
----- Message from Gema Ramsey sent at 12/27/2022  2:25 PM CST -----  Regarding: needs rto r/s his audiogram  Type: Needs Medical Advice  Who Called:  Gio Gilbert Call Back Number: [phone]    Additional Information: Missed appt this morning, needs to r/s audiogram

## 2022-12-27 NOTE — TELEPHONE ENCOUNTER
S/w pt that states he forgot about his appt this morning. Pt rescheduled to 1/274 at 930, confirmed appt.

## 2023-01-03 ENCOUNTER — OFFICE VISIT (OUTPATIENT)
Dept: PSYCHIATRY | Facility: CLINIC | Age: 58
End: 2023-01-03
Payer: COMMERCIAL

## 2023-01-03 DIAGNOSIS — F41.1 GAD (GENERALIZED ANXIETY DISORDER): Primary | ICD-10-CM

## 2023-01-03 DIAGNOSIS — F33.1 MODERATE EPISODE OF RECURRENT MAJOR DEPRESSIVE DISORDER: ICD-10-CM

## 2023-01-03 PROCEDURE — 90834 PSYTX W PT 45 MINUTES: CPT | Mod: S$GLB,,, | Performed by: SOCIAL WORKER

## 2023-01-03 PROCEDURE — 1159F PR MEDICATION LIST DOCUMENTED IN MEDICAL RECORD: ICD-10-PCS | Mod: CPTII,S$GLB,, | Performed by: SOCIAL WORKER

## 2023-01-03 PROCEDURE — 1159F MED LIST DOCD IN RCRD: CPT | Mod: CPTII,S$GLB,, | Performed by: SOCIAL WORKER

## 2023-01-03 PROCEDURE — 90834 PR PSYCHOTHERAPY W/PATIENT, 45 MIN: ICD-10-PCS | Mod: S$GLB,,, | Performed by: SOCIAL WORKER

## 2023-01-03 NOTE — PROGRESS NOTES
Individual Psychotherapy (PhD/LCSW)    1/3/2023    Site:  [facility]         Therapeutic Intervention: Met with patient.  Outpatient - Behavior modifying psychotherapy 45 min - CPT code 94834    Chief complaint/reason for encounter: anger and interpersonal     Interval history and content of current session: Patient in this date. He was last seen on 12/13/22. Patient reported he is doing well. He shared he is feeling less anxious and depressed. He stated he continues to feel situationally triggered by his neighbor and has decided to build a fence as he is aware he cannot change this or control his sister-in-law's significant other. Patient reported he has moved into acceptance about this and feels making the choice to build the fence is the best choice for him. Counselor agreed with this.    Patient shared Dr. Devine is ready to move forward with his bariatric surgery, patient is also interested in doing so. He shared he is prepared to stick with the dietary requirements. He is excited at the possibility of the surgery as he has diabetes and knows this surgery will improve his physical health.     Patient remains sober from alcohol and other drugs. He is also free from gambling.     Treatment plan:  Target symptoms:  anger, interpersonal  Why chosen therapy is appropriate versus another modality: evidence based practice  Outcome monitoring methods: self-report, observation  Therapeutic intervention type: behavior modifying psychotherapy, supportive psychotherapy    Risk parameters:  Patient reports no suicidal ideation  Patient reports no homicidal ideation  Patient reports no self-injurious behavior  Patient reports no violent behavior    Verbal deficits: None    Patient's response to intervention:  The patient's response to intervention is accepting, motivated.    Progress toward goals and other mental status changes:  The patient's progress toward goals is good.    Diagnosis:     ICD-10-CM ICD-9-CM   1.  KENNEDY (generalized anxiety disorder)  F41.1 300.02   2. Moderate episode of recurrent major depressive disorder  F33.1 296.32       Plan:  individual psychotherapy and medication management by physician    Return to clinic: 2 weeks    Length of Service (minutes): 45

## 2023-01-06 ENCOUNTER — OFFICE VISIT (OUTPATIENT)
Dept: BARIATRICS | Facility: CLINIC | Age: 58
End: 2023-01-06
Payer: MEDICARE

## 2023-01-06 VITALS
DIASTOLIC BLOOD PRESSURE: 67 MMHG | BODY MASS INDEX: 38.77 KG/M2 | HEART RATE: 80 BPM | SYSTOLIC BLOOD PRESSURE: 127 MMHG | RESPIRATION RATE: 16 BRPM | WEIGHT: 311.81 LBS | TEMPERATURE: 98 F | HEIGHT: 75 IN

## 2023-01-06 DIAGNOSIS — K21.9 GASTROESOPHAGEAL REFLUX DISEASE, UNSPECIFIED WHETHER ESOPHAGITIS PRESENT: ICD-10-CM

## 2023-01-06 DIAGNOSIS — E78.2 MIXED HYPERLIPIDEMIA: ICD-10-CM

## 2023-01-06 DIAGNOSIS — Z79.4 TYPE 2 DIABETES MELLITUS WITH HYPERGLYCEMIA, WITH LONG-TERM CURRENT USE OF INSULIN: ICD-10-CM

## 2023-01-06 DIAGNOSIS — I10 ESSENTIAL HYPERTENSION: ICD-10-CM

## 2023-01-06 DIAGNOSIS — G47.33 OSA ON CPAP: ICD-10-CM

## 2023-01-06 DIAGNOSIS — E66.01 MORBID OBESITY: Primary | ICD-10-CM

## 2023-01-06 DIAGNOSIS — E11.65 TYPE 2 DIABETES MELLITUS WITH HYPERGLYCEMIA, WITH LONG-TERM CURRENT USE OF INSULIN: ICD-10-CM

## 2023-01-06 DIAGNOSIS — E66.01 SEVERE OBESITY (BMI 35.0-39.9) WITH COMORBIDITY: ICD-10-CM

## 2023-01-06 PROCEDURE — 3008F BODY MASS INDEX DOCD: CPT | Mod: CPTII,S$GLB,, | Performed by: NURSE PRACTITIONER

## 2023-01-06 PROCEDURE — 1160F RVW MEDS BY RX/DR IN RCRD: CPT | Mod: CPTII,S$GLB,, | Performed by: NURSE PRACTITIONER

## 2023-01-06 PROCEDURE — 99999 PR PBB SHADOW E&M-EST. PATIENT-LVL V: CPT | Mod: PBBFAC,,, | Performed by: NURSE PRACTITIONER

## 2023-01-06 PROCEDURE — 1159F MED LIST DOCD IN RCRD: CPT | Mod: CPTII,S$GLB,, | Performed by: NURSE PRACTITIONER

## 2023-01-06 PROCEDURE — 1159F PR MEDICATION LIST DOCUMENTED IN MEDICAL RECORD: ICD-10-PCS | Mod: CPTII,S$GLB,, | Performed by: NURSE PRACTITIONER

## 2023-01-06 PROCEDURE — 99215 OFFICE O/P EST HI 40 MIN: CPT | Mod: S$GLB,,, | Performed by: NURSE PRACTITIONER

## 2023-01-06 PROCEDURE — 3074F SYST BP LT 130 MM HG: CPT | Mod: CPTII,S$GLB,, | Performed by: NURSE PRACTITIONER

## 2023-01-06 PROCEDURE — 3078F PR MOST RECENT DIASTOLIC BLOOD PRESSURE < 80 MM HG: ICD-10-PCS | Mod: CPTII,S$GLB,, | Performed by: NURSE PRACTITIONER

## 2023-01-06 PROCEDURE — 3078F DIAST BP <80 MM HG: CPT | Mod: CPTII,S$GLB,, | Performed by: NURSE PRACTITIONER

## 2023-01-06 PROCEDURE — 1160F PR REVIEW ALL MEDS BY PRESCRIBER/CLIN PHARMACIST DOCUMENTED: ICD-10-PCS | Mod: CPTII,S$GLB,, | Performed by: NURSE PRACTITIONER

## 2023-01-06 PROCEDURE — 99999 PR PBB SHADOW E&M-EST. PATIENT-LVL V: ICD-10-PCS | Mod: PBBFAC,,, | Performed by: NURSE PRACTITIONER

## 2023-01-06 PROCEDURE — 99215 PR OFFICE/OUTPT VISIT, EST, LEVL V, 40-54 MIN: ICD-10-PCS | Mod: S$GLB,,, | Performed by: NURSE PRACTITIONER

## 2023-01-06 PROCEDURE — 3074F PR MOST RECENT SYSTOLIC BLOOD PRESSURE < 130 MM HG: ICD-10-PCS | Mod: CPTII,S$GLB,, | Performed by: NURSE PRACTITIONER

## 2023-01-06 PROCEDURE — 3008F PR BODY MASS INDEX (BMI) DOCUMENTED: ICD-10-PCS | Mod: CPTII,S$GLB,, | Performed by: NURSE PRACTITIONER

## 2023-01-06 NOTE — PROGRESS NOTES
Bariatric  History & Physical    SUBJECTIVE:     Chief Complaint   Patient presents with    Follow-up    Obesity       History of Present Illness:    Patient is a 57 y.o. male who is referred for evaluation of surgical treatment of morbid obesity. His Body mass index is 39.5 kg/m². He has known comorbidities of depression, diabetes mellitus, dyslipidemia, hypertension, and obstructive sleep apnea. He has attended the bariatric seminar and is most interested in gastric sleeve surgery.    Review of patient's allergies indicates:  No Known Allergies    Past Medical History:   Diagnosis Date    Cardiomyopathy     Depression     Diabetes     Dyslipidemia 08/12/2015    Gout 08/12/2015    Hypertension     Idiopathic gout     Lumbar pseudoarthrosis     LINDSEY (nonalcoholic steatohepatitis)     Obesity 08/12/2015    Obstructive sleep apnea 08/12/2015    Restless leg syndrome     Sebaceous cyst 04/18/2017    Type 2 diabetes mellitus 08/12/2015     Past Surgical History:   Procedure Laterality Date    ABLATION N/A 10/8/2020    Procedure: Ablation;  Surgeon: Rip Che III, MD;  Location: [facility] CATH;  Service: Cardiology;  Laterality: N/A;    ARTHROPLASTY OF JOINT OF FINGER Right 4/12/2022    Procedure: Right middle finger MCP joint arthroplasty;  Surgeon: Luis Alberto Payne MD;  Location: [facility] OR;  Service: Orthopedics;  Laterality: Right;  Anesthesia:  General with a single-shot supraclavicular block    BACK SURGERY      x2    CARDIAC CATHETERIZATION      CARDIAC ELECTROPHYSIOLOGY STUDY  10/8/2020    Procedure: Study possible ablation;  Surgeon: Rip Che III, MD;  Location: [facility] CATH;  Service: Cardiology;;    CERVICAL SPINE SURGERY      CHOLECYSTECTOMY  05/30/2018    Dr. ROGELIO Tao, [facility]     COLONOSCOPY  2008    COLONOSCOPY N/A 9/14/2017    Procedure: COLONOSCOPY;  Surgeon: Obi Beltre MD;  Location: [facility] ENDO;  Service: Endoscopy;  Laterality: N/A;    CORONARY ANGIOGRAPHY Left 5/28/2018     Procedure: Coronary angiography;  Surgeon: Rafiq Malik MD;  Location: [facility] CATH;  Service: Cardiology;  Laterality: Left;    ELBOW SURGERY Bilateral     HERNIA REPAIR      LAPAROSCOPIC APPENDECTOMY N/A 6/25/2020    Procedure: APPENDECTOMY, LAPAROSCOPIC;  Surgeon: Gordon Can MD;  Location: [facility] OR;  Service: General;  Laterality: N/A;    LAPAROSCOPIC CHOLECYSTECTOMY N/A 5/30/2018    Procedure: CHOLECYSTECTOMY, LAPAROSCOPIC;  Surgeon: Fletcher Tao MD;  Location: [facility] OR;  Service: General;  Laterality: N/A;    LEFT HEART CATHETERIZATION Left 5/28/2018    Procedure: Left heart cath;  Surgeon: Rafiq Malik MD;  Location: [facility] CATH;  Service: Cardiology;  Laterality: Left;    NECK SURGERY      RADIOFREQUENCY ABLATION OF LUMBAR MEDIAL BRANCH NERVE AT SINGLE LEVEL Bilateral 6/17/2022    Procedure: Radiofrequency Ablation, Nerve, Spinal, Lumbar, Medial Branch, 1 Level L3,4,5;  Surgeon: Thiago Garcia MD;  Location: [facility] OR;  Service: Pain Management;  Laterality: Bilateral;    SHOULDER ARTHROSCOPY      SPINE SURGERY      lumbar x2    TONSILLECTOMY      TREATMENT OF CARDIAC ARRHYTHMIA  10/8/2020    Procedure: Cardioversion or Defibrillation;  Surgeon: Rip Che III, MD;  Location: [facility] CATH;  Service: Cardiology;;    TRIGGER FINGER RELEASE Right 9/9/2021    Procedure: RELEASE, TRIGGER FINGER;  Surgeon: Luis lAberto Payne MD;  Location: [facility] OR;  Service: Orthopedics;  Laterality: Right;  Procedure: Right ring finger trigger release    Position: Supine    Anesthesia: Local    Equipment: Basic handset     Family History   Problem Relation Age of Onset    Diabetes Mother     Depression Mother     Liver cancer Mother     Obesity Mother     Heart disease Father     Anuerysm Father     Diabetes Father     Stroke Father     Glaucoma Paternal Grandmother     Obesity Sister      Social History     Tobacco Use    Smoking status: Former     Packs/day: 0.50     Years: 6.00      Pack years: 3.00     Types: Cigarettes     Quit date:      Years since quittin.0    Smokeless tobacco: Never   Substance Use Topics    Alcohol use: No     Alcohol/week: 0.0 standard drinks    Drug use: No        Current Outpatient Medications   Medication Sig Dispense Refill    allopurinoL (ZYLOPRIM) 100 MG tablet TAKE ONE TABLET BY MOUTH DAILY 30 tablet 3    ascorbic acid, vitamin C, (VITAMIN C) 500 MG tablet Take 500 mg by mouth once daily.      aspirin (ECOTRIN) 81 MG EC tablet Take 81 mg by mouth once daily.      atorvastatin (LIPITOR) 40 MG tablet take one Tablet by mouth once daily in the evening 90 tablet 4    azelastine (ASTELIN) 137 mcg (0.1 %) nasal spray 1 spray (137 mcg total) by Nasal route 2 (two) times daily. 30 mL 12    blood sugar diagnostic (CONTOUR NEXT TEST STRIPS) Strp Use to Test 4 times a day.  Contour Next test strips required for Medtronic insulin pump (Patient taking differently: Use to Test 4 times a day.  Contour Next test strips required for Medtronic insulin pump) 400 strip 4    blood-glucose meter kit To check BG 2 times daily, to use with insurance preferred meter 1 each 1    blood-glucose meter,continuous (DEXCOM G6 ) Misc Dispense 1  1 each 0    blood-glucose sensor (DEXCOM G6 SENSOR) Oxana Dispense 3 sensors/month 3 each 12    blood-glucose transmitter (DEXCOM G6 TRANSMITTER) Oxana Dispense 1 transmitter 1 each 3    carvediloL (COREG) 12.5 MG tablet TAKE ONE TABLET (12.5MG) BY MOUTH TWICE DAILY with meals 180 tablet 3    cholecalciferol, vitamin D3, 125 mcg (5,000 unit) capsule Take 5,000 Units by mouth once daily.      citalopram (CELEXA) 40 MG tablet Take 1 tablet (40 mg total) by mouth once daily. 30 tablet 5    docusate sodium (COLACE) 100 MG capsule Take 200 mg by mouth once daily.      famotidine (PEPCID) 40 MG tablet Take 1 tablet (40 mg total) by mouth every evening. 30 tablet 11    fluticasone propionate (FLONASE) 50 mcg/actuation  "nasal spray 1 spray (50 mcg total) by Each Nostril route 2 (two) times daily. 16 g 12    gabapentin (NEURONTIN) 600 MG tablet take one Tablet by mouth three times a day 90 tablet 2    glucosam/gluc cruz/vo-cst-i-gluc (GLUCOSAMINE COMPLEX ORAL) Take 1 tablet by mouth once daily.      infusion set for insulin pump (MINIMED PRO-SET INFUSION 24") ISet Changes site q2days, dispense 90day supply 45 each 4    insulin (LANTUS SOLOSTAR U-100 INSULIN) glargine 100 units/mL SubQ pen Take 50 u qd, only use if pump malfunctions 15 mL 0    insulin lispro (HUMALOG U-100 INSULIN) 100 unit/mL injection INJECT ONE unit FOR EVERY FIVE grams of carbs AND ONE unit FOR EVERY 25 points of glucose over 150. Max daily DOSE of 150 units total. 40 mL 6    insulin pump syringe (MINIMED SYRINGE RESERVOIR) 3 mL Misc Changes q2days, dispense 3 month supply 45 each 4    ketoconazole (NIZORAL) 2 % cream APPLY ONE GRAM Topically TWICE DAILY 60 g 1    Lactobacillus rhamnosus GG (CULTURELLE) 10 billion cell capsule Take 1 capsule by mouth once daily. 15 capsule 0    lancets Misc To be used with Contour Next as necessary with Medtronic insulin pump, uses 4 daily 400 each 4    meloxicam (MOBIC) 15 MG tablet Take 15 mg by mouth once daily.       metFORMIN (GLUCOPHAGE) 1000 MG tablet Take 1 tablet (1,000 mg total) by mouth 2 (two) times daily. 180 tablet 4    multivitamin (THERAGRAN) per tablet Take 1 tablet by mouth once daily.      OZEMPIC 1 mg/dose (4 mg/3 mL) Inject 1 mg into the skin every 7 days.      pantoprazole (PROTONIX) 40 MG tablet TAKE ONE TABLET BY MOUTH EVERY DAY 90 tablet 1    pioglitazone (ACTOS) 15 MG tablet Take 1 tablet (15 mg total) by mouth once daily. 90 tablet 4    semaglutide (OZEMPIC) 2 mg/dose (8 mg/3 mL) PnIj Inject 2 mg into the skin every 7 days. 9 mL 4    topiramate (TOPAMAX) 25 MG tablet Take 1 tablet (25 mg total) by mouth 2 (two) times daily. 60 tablet 0    varicella-zoster gE-AS01B, PF, (SHINGRIX) 50 " mcg/0.5 mL injection Inject into the muscle. 1 each 1    vitamin D (VITAMIN D3) 1000 units Tab Take 1,000 Units by mouth once daily.      vitamin E 400 UNIT capsule Take 400 Units by mouth once daily.       No current facility-administered medications for this visit.     joi mass index is 39.5 kg/m².     Beginning Weight: 306 lbs    Last Weight: 310 lbs    Current Weight: 311 lbs   Weight Change: +5 lbs      Body comp:  Fat Percent:  51.8%  Fat Mass:  161.6 lb  FFM:  150 lb  BMR: 2175 kcal    Wt Readings from Last 10 Encounters:   01/06/23 (!) 141.4 kg (311 lb 12.8 oz)   12/20/22 (!) 141 kg (310 lb 13.6 oz)   12/05/22 (!) 141 kg (310 lb 12.8 oz)   11/29/22 (!) 144.7 kg (319 lb 0.1 oz)   11/08/22 (!) 143.8 kg (317 lb 0.3 oz)   10/28/22 (!) 139.1 kg (306 lb 10.6 oz)   10/26/22 (!) 139.4 kg (307 lb 6.4 oz)   10/04/22 (!) 142 kg (313 lb 0.9 oz)   09/27/22 (!) 142.8 kg (314 lb 14.8 oz)   09/23/22 (!) 141.2 kg (311 lb 3.2 oz)         Diet Education Discussed:  Recommend high protein, low carb meals- mainly meats and vegetables.    Breakfast:  1 egg with ham, cheese on white bread with chocolate milk  Dinner:  chicken, meatballs with cocktail angel luis, mac and cheese, ice cream 1 cup  Snack: vegetable beef soup with crackers  Water: >48oz  8oz diet soda  Decaf tea with splenda/sugar    MVI: mens one per day    Review of Systems:  Review of Systems   Constitutional:  Positive for activity change, appetite change and fatigue. Negative for chills, diaphoresis and fever.   HENT:  Negative for rhinorrhea, sinus pressure and tinnitus.    Eyes:  Negative for visual disturbance.   Respiratory:  Positive for chest tightness. Negative for cough, choking, shortness of breath, wheezing and stridor.    Cardiovascular:  Positive for chest pain, palpitations and leg swelling.   Gastrointestinal:  Positive for constipation. Negative for abdominal distention, abdominal pain, blood in stool, diarrhea, nausea, rectal pain and vomiting.  "  Endocrine: Negative for cold intolerance and heat intolerance.   Genitourinary:  Negative for difficulty urinating and flank pain.   Musculoskeletal:  Positive for arthralgias, back pain, joint swelling, neck pain and neck stiffness. Negative for gait problem.   Skin:  Negative for color change and rash.   Neurological:  Positive for numbness. Negative for dizziness, tremors, seizures and syncope.   Hematological:  Does not bruise/bleed easily.   Psychiatric/Behavioral:  Negative for confusion and decreased concentration.    All other systems reviewed and are negative.    OBJECTIVE:     Vital Signs (Most Recent)  Temp: 98 °F (36.7 °C) (01/06/23 1019)  Pulse: 80 (01/06/23 1019)  Resp: 16 (01/06/23 1019)  BP: 127/67 (01/06/23 1019)  6' 2.5" (1.892 m)  (!) 141.4 kg (311 lb 12.8 oz)       Chart review:  Primary Care Physician: Carly      Lab review:  Most Recent Data:  CBC:   Lab Results   Component Value Date    WBC 7.34 08/25/2022    HGB 12.9 (L) 08/25/2022    HCT 38.3 (L) 08/25/2022     08/25/2022    MCV 87 08/25/2022    RDW 13.4 08/25/2022     BMP:   Lab Results   Component Value Date     08/25/2022    K 4.5 08/25/2022     08/25/2022    CO2 27 08/25/2022    BUN 12 08/25/2022    CREATININE 0.9 08/25/2022     (H) 08/25/2022    CALCIUM 9.8 08/25/2022    MG 1.6 05/31/2022    PHOS 3.4 05/31/2022     LFTs:   Lab Results   Component Value Date    PROT 7.3 08/25/2022    ALBUMIN 4.0 08/25/2022    BILITOT 0.6 08/25/2022    AST 16 08/25/2022    ALKPHOS 94 08/25/2022    ALT 20 08/25/2022     Coags:   Lab Results   Component Value Date    INR 1.1 06/17/2020     FLP:   Lab Results   Component Value Date    CHOL 124 03/17/2022    HDL 29 (L) 03/17/2022    LDLCALC 75.0 03/17/2022    TRIG 100 03/17/2022    CHOLHDL 23.4 03/17/2022     DM:   Lab Results   Component Value Date    HGBA1C 7.0 (H) 09/16/2022    HGBA1C 7.1 (H) 08/25/2022    HGBA1C 8.3 (H) 06/10/2022    LDLCALC 75.0 03/17/2022    CREATININE 0.9 " 08/25/2022     Thyroid:   Lab Results   Component Value Date    TSH 2.868 06/10/2022     Anemia:   Lab Results   Component Value Date    IRON 60 06/10/2022    TIBC 462 (H) 06/10/2022    FERRITIN 83 06/10/2022    SBHRNOYO10 1348 (H) 06/23/2020     Cardiac:   Lab Results   Component Value Date    TROPONINI <0.012 05/29/2022     Urinalysis:   Lab Results   Component Value Date    COLORU Yellow 05/29/2022    SPECGRAV 1.010 05/29/2022    NITRITE Negative 05/29/2022    KETONESU Negative 05/29/2022    UROBILINOGEN 1.0 05/29/2022    WBCUA 0 10/10/2020    WBCUA 0 10/10/2020         Radiology review: Images independently reviewed and interpreted.    UGI-   1. Mild gastroesophageal reflux.  2. Mild esophageal dysmotility.      Other Results:  EKG (my interpretation): unchanged from previous tracings, sinus tachycardia.  ECHO (3/2022)    The EKG portion of this study is negative for ischemia.    Normal myocardial perfusion scan.    The gated perfusion images showed an ejection fraction of 56%.    LV cavity size is normal .    TTE (3/2022)  Normal systolic function.  The estimated ejection fraction is 55%.  Normal left ventricular diastolic function.  Normal right ventricular size with normal right ventricular systolic function.      Physical Exam:  Physical Exam  Vitals and nursing note reviewed.   Constitutional:       General: He is not in acute distress.     Appearance: Normal appearance. He is obese. He is not ill-appearing or diaphoretic.   HENT:      Head: Normocephalic and atraumatic.      Right Ear: External ear normal.      Left Ear: External ear normal.      Nose: Nose normal. No congestion or rhinorrhea.      Mouth/Throat:      Mouth: Mucous membranes are moist.      Pharynx: Oropharynx is clear.   Eyes:      General: No scleral icterus.        Right eye: No discharge.         Left eye: No discharge.      Conjunctiva/sclera: Conjunctivae normal.   Cardiovascular:      Rate and Rhythm: Normal rate and regular  "rhythm.      Pulses: Normal pulses.      Heart sounds: Normal heart sounds. No murmur heard.  Pulmonary:      Effort: Pulmonary effort is normal. No respiratory distress.      Breath sounds: No stridor. No wheezing.   Chest:      Chest wall: No tenderness.   Abdominal:      General: Bowel sounds are normal. There is no distension.      Palpations: Abdomen is soft. There is no mass.      Tenderness: There is no abdominal tenderness.      Hernia: No hernia is present.      Comments: GIRTH 58"   Musculoskeletal:         General: No swelling, tenderness, deformity or signs of injury. Normal range of motion.      Cervical back: Normal range of motion and neck supple. No rigidity or tenderness.      Right lower leg: No edema.      Left lower leg: No edema.   Skin:     General: Skin is warm and dry.      Capillary Refill: Capillary refill takes less than 2 seconds.      Coloration: Skin is not jaundiced or pale.      Findings: No bruising, erythema, lesion or rash.   Neurological:      General: No focal deficit present.      Mental Status: He is alert and oriented to person, place, and time. Mental status is at baseline.      Motor: No weakness.      Coordination: Coordination normal.      Gait: Gait normal.   Psychiatric:         Mood and Affect: Mood normal.         Thought Content: Thought content normal.         Judgment: Judgment normal.     ASSESSMENT/PLAN:        Labs: done  UGI: done  EKG:  done  Psych consult: done- Jay- completed the additional requirements  Dietary consult: done  Seminar: done    Will obtain the following clearances prior to surgery:   Cardiology- done      1. Morbid obesity        2. Severe obesity (BMI 35.0-39.9) with comorbidity        3. Essential hypertension        4. Type 2 diabetes mellitus with hyperglycemia, with long-term current use of insulin        5. TARYN on CPAP        6. Mixed hyperlipidemia        7. Gastroesophageal reflux disease, unspecified whether esophagitis present  "               Plan:  Gio Forrest has morbid obesity as their Body mass index is 39.5 kg/m². He would benefit from weight loss surgery and has chosen gastric sleeve surgery as the preferred procedure. He understands that this is a tool and lifestyle change will be necessary to keep weight off. I went over possible complications of the chosen surgery with the patient and he is agreeable to surgery.    He has been instructed to start the pre operative diet.   Pre op Diet    Breakfast- protein shake  Lunch- protein shake  Dinner- 3 ounces of meat and vegetables (from list previously given)  NO SNACKING  Only water to drink     He surgical date is January 20, 2023 at [facility]      The patient has been taught the post operative diet and understands that he will need to stay on this diet to prevent complication.  They are aware of the post operative follow up and return to see me after surgery to treat/prevent/identify any complications.  he has been advised to attend support groups post operatively.

## 2023-01-06 NOTE — H&P (VIEW-ONLY)
Bariatric  History & Physical    SUBJECTIVE:     Chief Complaint   Patient presents with    Follow-up    Obesity       History of Present Illness:    Patient is a 57 y.o. male who is referred for evaluation of surgical treatment of morbid obesity. His Body mass index is 39.5 kg/m². He has known comorbidities of depression, diabetes mellitus, dyslipidemia, hypertension, and obstructive sleep apnea. He has attended the bariatric seminar and is most interested in gastric sleeve surgery.    Review of patient's allergies indicates:  No Known Allergies    Past Medical History:   Diagnosis Date    Cardiomyopathy     Depression     Diabetes     Dyslipidemia 08/12/2015    Gout 08/12/2015    Hypertension     Idiopathic gout     Lumbar pseudoarthrosis     LINDSEY (nonalcoholic steatohepatitis)     Obesity 08/12/2015    Obstructive sleep apnea 08/12/2015    Restless leg syndrome     Sebaceous cyst 04/18/2017    Type 2 diabetes mellitus 08/12/2015     Past Surgical History:   Procedure Laterality Date    ABLATION N/A 10/8/2020    Procedure: Ablation;  Surgeon: Rip Che III, MD;  Location: [facility] CATH;  Service: Cardiology;  Laterality: N/A;    ARTHROPLASTY OF JOINT OF FINGER Right 4/12/2022    Procedure: Right middle finger MCP joint arthroplasty;  Surgeon: Luis Alberto Payne MD;  Location: [facility] OR;  Service: Orthopedics;  Laterality: Right;  Anesthesia:  General with a single-shot supraclavicular block    BACK SURGERY      x2    CARDIAC CATHETERIZATION      CARDIAC ELECTROPHYSIOLOGY STUDY  10/8/2020    Procedure: Study possible ablation;  Surgeon: Rip Che III, MD;  Location: [facility] CATH;  Service: Cardiology;;    CERVICAL SPINE SURGERY      CHOLECYSTECTOMY  05/30/2018    Dr. ROGELIO Tao, [facility]     COLONOSCOPY  2008    COLONOSCOPY N/A 9/14/2017    Procedure: COLONOSCOPY;  Surgeon: Obi Beltre MD;  Location: [facility] ENDO;  Service: Endoscopy;  Laterality: N/A;    CORONARY ANGIOGRAPHY Left 5/28/2018     Procedure: Coronary angiography;  Surgeon: Rafiq Malik MD;  Location: [facility] CATH;  Service: Cardiology;  Laterality: Left;    ELBOW SURGERY Bilateral     HERNIA REPAIR      LAPAROSCOPIC APPENDECTOMY N/A 6/25/2020    Procedure: APPENDECTOMY, LAPAROSCOPIC;  Surgeon: Gordon Can MD;  Location: [facility] OR;  Service: General;  Laterality: N/A;    LAPAROSCOPIC CHOLECYSTECTOMY N/A 5/30/2018    Procedure: CHOLECYSTECTOMY, LAPAROSCOPIC;  Surgeon: Fletcher Tao MD;  Location: [facility] OR;  Service: General;  Laterality: N/A;    LEFT HEART CATHETERIZATION Left 5/28/2018    Procedure: Left heart cath;  Surgeon: Rafiq Malik MD;  Location: [facility] CATH;  Service: Cardiology;  Laterality: Left;    NECK SURGERY      RADIOFREQUENCY ABLATION OF LUMBAR MEDIAL BRANCH NERVE AT SINGLE LEVEL Bilateral 6/17/2022    Procedure: Radiofrequency Ablation, Nerve, Spinal, Lumbar, Medial Branch, 1 Level L3,4,5;  Surgeon: Thiago Garcia MD;  Location: [facility] OR;  Service: Pain Management;  Laterality: Bilateral;    SHOULDER ARTHROSCOPY      SPINE SURGERY      lumbar x2    TONSILLECTOMY      TREATMENT OF CARDIAC ARRHYTHMIA  10/8/2020    Procedure: Cardioversion or Defibrillation;  Surgeon: Rip Che III, MD;  Location: [facility] CATH;  Service: Cardiology;;    TRIGGER FINGER RELEASE Right 9/9/2021    Procedure: RELEASE, TRIGGER FINGER;  Surgeon: Luis Alberto Payne MD;  Location: [facility] OR;  Service: Orthopedics;  Laterality: Right;  Procedure: Right ring finger trigger release    Position: Supine    Anesthesia: Local    Equipment: Basic handset     Family History   Problem Relation Age of Onset    Diabetes Mother     Depression Mother     Liver cancer Mother     Obesity Mother     Heart disease Father     Anuerysm Father     Diabetes Father     Stroke Father     Glaucoma Paternal Grandmother     Obesity Sister      Social History     Tobacco Use    Smoking status: Former     Packs/day: 0.50     Years: 6.00      Pack years: 3.00     Types: Cigarettes     Quit date:      Years since quittin.0    Smokeless tobacco: Never   Substance Use Topics    Alcohol use: No     Alcohol/week: 0.0 standard drinks    Drug use: No        Current Outpatient Medications   Medication Sig Dispense Refill    allopurinoL (ZYLOPRIM) 100 MG tablet TAKE ONE TABLET BY MOUTH DAILY 30 tablet 3    ascorbic acid, vitamin C, (VITAMIN C) 500 MG tablet Take 500 mg by mouth once daily.      aspirin (ECOTRIN) 81 MG EC tablet Take 81 mg by mouth once daily.      atorvastatin (LIPITOR) 40 MG tablet take one Tablet by mouth once daily in the evening 90 tablet 4    azelastine (ASTELIN) 137 mcg (0.1 %) nasal spray 1 spray (137 mcg total) by Nasal route 2 (two) times daily. 30 mL 12    blood sugar diagnostic (CONTOUR NEXT TEST STRIPS) Strp Use to Test 4 times a day.  Contour Next test strips required for Medtronic insulin pump (Patient taking differently: Use to Test 4 times a day.  Contour Next test strips required for Medtronic insulin pump) 400 strip 4    blood-glucose meter kit To check BG 2 times daily, to use with insurance preferred meter 1 each 1    blood-glucose meter,continuous (DEXCOM G6 ) Misc Dispense 1  1 each 0    blood-glucose sensor (DEXCOM G6 SENSOR) Oxana Dispense 3 sensors/month 3 each 12    blood-glucose transmitter (DEXCOM G6 TRANSMITTER) Oxana Dispense 1 transmitter 1 each 3    carvediloL (COREG) 12.5 MG tablet TAKE ONE TABLET (12.5MG) BY MOUTH TWICE DAILY with meals 180 tablet 3    cholecalciferol, vitamin D3, 125 mcg (5,000 unit) capsule Take 5,000 Units by mouth once daily.      citalopram (CELEXA) 40 MG tablet Take 1 tablet (40 mg total) by mouth once daily. 30 tablet 5    docusate sodium (COLACE) 100 MG capsule Take 200 mg by mouth once daily.      famotidine (PEPCID) 40 MG tablet Take 1 tablet (40 mg total) by mouth every evening. 30 tablet 11    fluticasone propionate (FLONASE) 50 mcg/actuation  "nasal spray 1 spray (50 mcg total) by Each Nostril route 2 (two) times daily. 16 g 12    gabapentin (NEURONTIN) 600 MG tablet take one Tablet by mouth three times a day 90 tablet 2    glucosam/gluc cruz/xf-lcf-u-gluc (GLUCOSAMINE COMPLEX ORAL) Take 1 tablet by mouth once daily.      infusion set for insulin pump (MINIMED PRO-SET INFUSION 24") ISet Changes site q2days, dispense 90day supply 45 each 4    insulin (LANTUS SOLOSTAR U-100 INSULIN) glargine 100 units/mL SubQ pen Take 50 u qd, only use if pump malfunctions 15 mL 0    insulin lispro (HUMALOG U-100 INSULIN) 100 unit/mL injection INJECT ONE unit FOR EVERY FIVE grams of carbs AND ONE unit FOR EVERY 25 points of glucose over 150. Max daily DOSE of 150 units total. 40 mL 6    insulin pump syringe (MINIMED SYRINGE RESERVOIR) 3 mL Misc Changes q2days, dispense 3 month supply 45 each 4    ketoconazole (NIZORAL) 2 % cream APPLY ONE GRAM Topically TWICE DAILY 60 g 1    Lactobacillus rhamnosus GG (CULTURELLE) 10 billion cell capsule Take 1 capsule by mouth once daily. 15 capsule 0    lancets Misc To be used with Contour Next as necessary with Medtronic insulin pump, uses 4 daily 400 each 4    meloxicam (MOBIC) 15 MG tablet Take 15 mg by mouth once daily.       metFORMIN (GLUCOPHAGE) 1000 MG tablet Take 1 tablet (1,000 mg total) by mouth 2 (two) times daily. 180 tablet 4    multivitamin (THERAGRAN) per tablet Take 1 tablet by mouth once daily.      OZEMPIC 1 mg/dose (4 mg/3 mL) Inject 1 mg into the skin every 7 days.      pantoprazole (PROTONIX) 40 MG tablet TAKE ONE TABLET BY MOUTH EVERY DAY 90 tablet 1    pioglitazone (ACTOS) 15 MG tablet Take 1 tablet (15 mg total) by mouth once daily. 90 tablet 4    semaglutide (OZEMPIC) 2 mg/dose (8 mg/3 mL) PnIj Inject 2 mg into the skin every 7 days. 9 mL 4    topiramate (TOPAMAX) 25 MG tablet Take 1 tablet (25 mg total) by mouth 2 (two) times daily. 60 tablet 0    varicella-zoster gE-AS01B, PF, (SHINGRIX) 50 " mcg/0.5 mL injection Inject into the muscle. 1 each 1    vitamin D (VITAMIN D3) 1000 units Tab Take 1,000 Units by mouth once daily.      vitamin E 400 UNIT capsule Take 400 Units by mouth once daily.       No current facility-administered medications for this visit.     joi mass index is 39.5 kg/m².     Beginning Weight: 306 lbs    Last Weight: 310 lbs    Current Weight: 311 lbs   Weight Change: +5 lbs      Body comp:  Fat Percent:  51.8%  Fat Mass:  161.6 lb  FFM:  150 lb  BMR: 2175 kcal    Wt Readings from Last 10 Encounters:   01/06/23 (!) 141.4 kg (311 lb 12.8 oz)   12/20/22 (!) 141 kg (310 lb 13.6 oz)   12/05/22 (!) 141 kg (310 lb 12.8 oz)   11/29/22 (!) 144.7 kg (319 lb 0.1 oz)   11/08/22 (!) 143.8 kg (317 lb 0.3 oz)   10/28/22 (!) 139.1 kg (306 lb 10.6 oz)   10/26/22 (!) 139.4 kg (307 lb 6.4 oz)   10/04/22 (!) 142 kg (313 lb 0.9 oz)   09/27/22 (!) 142.8 kg (314 lb 14.8 oz)   09/23/22 (!) 141.2 kg (311 lb 3.2 oz)         Diet Education Discussed:  Recommend high protein, low carb meals- mainly meats and vegetables.    Breakfast:  1 egg with ham, cheese on white bread with chocolate milk  Dinner:  chicken, meatballs with cocktail angel luis, mac and cheese, ice cream 1 cup  Snack: vegetable beef soup with crackers  Water: >48oz  8oz diet soda  Decaf tea with splenda/sugar    MVI: mens one per day    Review of Systems:  Review of Systems   Constitutional:  Positive for activity change, appetite change and fatigue. Negative for chills, diaphoresis and fever.   HENT:  Negative for rhinorrhea, sinus pressure and tinnitus.    Eyes:  Negative for visual disturbance.   Respiratory:  Positive for chest tightness. Negative for cough, choking, shortness of breath, wheezing and stridor.    Cardiovascular:  Positive for chest pain, palpitations and leg swelling.   Gastrointestinal:  Positive for constipation. Negative for abdominal distention, abdominal pain, blood in stool, diarrhea, nausea, rectal pain and vomiting.  "  Endocrine: Negative for cold intolerance and heat intolerance.   Genitourinary:  Negative for difficulty urinating and flank pain.   Musculoskeletal:  Positive for arthralgias, back pain, joint swelling, neck pain and neck stiffness. Negative for gait problem.   Skin:  Negative for color change and rash.   Neurological:  Positive for numbness. Negative for dizziness, tremors, seizures and syncope.   Hematological:  Does not bruise/bleed easily.   Psychiatric/Behavioral:  Negative for confusion and decreased concentration.    All other systems reviewed and are negative.    OBJECTIVE:     Vital Signs (Most Recent)  Temp: 98 °F (36.7 °C) (01/06/23 1019)  Pulse: 80 (01/06/23 1019)  Resp: 16 (01/06/23 1019)  BP: 127/67 (01/06/23 1019)  6' 2.5" (1.892 m)  (!) 141.4 kg (311 lb 12.8 oz)       Chart review:  Primary Care Physician: Carly      Lab review:  Most Recent Data:  CBC:   Lab Results   Component Value Date    WBC 7.34 08/25/2022    HGB 12.9 (L) 08/25/2022    HCT 38.3 (L) 08/25/2022     08/25/2022    MCV 87 08/25/2022    RDW 13.4 08/25/2022     BMP:   Lab Results   Component Value Date     08/25/2022    K 4.5 08/25/2022     08/25/2022    CO2 27 08/25/2022    BUN 12 08/25/2022    CREATININE 0.9 08/25/2022     (H) 08/25/2022    CALCIUM 9.8 08/25/2022    MG 1.6 05/31/2022    PHOS 3.4 05/31/2022     LFTs:   Lab Results   Component Value Date    PROT 7.3 08/25/2022    ALBUMIN 4.0 08/25/2022    BILITOT 0.6 08/25/2022    AST 16 08/25/2022    ALKPHOS 94 08/25/2022    ALT 20 08/25/2022     Coags:   Lab Results   Component Value Date    INR 1.1 06/17/2020     FLP:   Lab Results   Component Value Date    CHOL 124 03/17/2022    HDL 29 (L) 03/17/2022    LDLCALC 75.0 03/17/2022    TRIG 100 03/17/2022    CHOLHDL 23.4 03/17/2022     DM:   Lab Results   Component Value Date    HGBA1C 7.0 (H) 09/16/2022    HGBA1C 7.1 (H) 08/25/2022    HGBA1C 8.3 (H) 06/10/2022    LDLCALC 75.0 03/17/2022    CREATININE 0.9 " 08/25/2022     Thyroid:   Lab Results   Component Value Date    TSH 2.868 06/10/2022     Anemia:   Lab Results   Component Value Date    IRON 60 06/10/2022    TIBC 462 (H) 06/10/2022    FERRITIN 83 06/10/2022    EHONPWHB89 1348 (H) 06/23/2020     Cardiac:   Lab Results   Component Value Date    TROPONINI <0.012 05/29/2022     Urinalysis:   Lab Results   Component Value Date    COLORU Yellow 05/29/2022    SPECGRAV 1.010 05/29/2022    NITRITE Negative 05/29/2022    KETONESU Negative 05/29/2022    UROBILINOGEN 1.0 05/29/2022    WBCUA 0 10/10/2020    WBCUA 0 10/10/2020         Radiology review: Images independently reviewed and interpreted.    UGI-   1. Mild gastroesophageal reflux.  2. Mild esophageal dysmotility.      Other Results:  EKG (my interpretation): unchanged from previous tracings, sinus tachycardia.  ECHO (3/2022)    The EKG portion of this study is negative for ischemia.    Normal myocardial perfusion scan.    The gated perfusion images showed an ejection fraction of 56%.    LV cavity size is normal .    TTE (3/2022)  Normal systolic function.  The estimated ejection fraction is 55%.  Normal left ventricular diastolic function.  Normal right ventricular size with normal right ventricular systolic function.      Physical Exam:  Physical Exam  Vitals and nursing note reviewed.   Constitutional:       General: He is not in acute distress.     Appearance: Normal appearance. He is obese. He is not ill-appearing or diaphoretic.   HENT:      Head: Normocephalic and atraumatic.      Right Ear: External ear normal.      Left Ear: External ear normal.      Nose: Nose normal. No congestion or rhinorrhea.      Mouth/Throat:      Mouth: Mucous membranes are moist.      Pharynx: Oropharynx is clear.   Eyes:      General: No scleral icterus.        Right eye: No discharge.         Left eye: No discharge.      Conjunctiva/sclera: Conjunctivae normal.   Cardiovascular:      Rate and Rhythm: Normal rate and regular  "rhythm.      Pulses: Normal pulses.      Heart sounds: Normal heart sounds. No murmur heard.  Pulmonary:      Effort: Pulmonary effort is normal. No respiratory distress.      Breath sounds: No stridor. No wheezing.   Chest:      Chest wall: No tenderness.   Abdominal:      General: Bowel sounds are normal. There is no distension.      Palpations: Abdomen is soft. There is no mass.      Tenderness: There is no abdominal tenderness.      Hernia: No hernia is present.      Comments: GIRTH 58"   Musculoskeletal:         General: No swelling, tenderness, deformity or signs of injury. Normal range of motion.      Cervical back: Normal range of motion and neck supple. No rigidity or tenderness.      Right lower leg: No edema.      Left lower leg: No edema.   Skin:     General: Skin is warm and dry.      Capillary Refill: Capillary refill takes less than 2 seconds.      Coloration: Skin is not jaundiced or pale.      Findings: No bruising, erythema, lesion or rash.   Neurological:      General: No focal deficit present.      Mental Status: He is alert and oriented to person, place, and time. Mental status is at baseline.      Motor: No weakness.      Coordination: Coordination normal.      Gait: Gait normal.   Psychiatric:         Mood and Affect: Mood normal.         Thought Content: Thought content normal.         Judgment: Judgment normal.     ASSESSMENT/PLAN:        Labs: done  UGI: done  EKG:  done  Psych consult: done- Jay- completed the additional requirements  Dietary consult: done  Seminar: done    Will obtain the following clearances prior to surgery:   Cardiology- done      1. Morbid obesity        2. Severe obesity (BMI 35.0-39.9) with comorbidity        3. Essential hypertension        4. Type 2 diabetes mellitus with hyperglycemia, with long-term current use of insulin        5. TARYN on CPAP        6. Mixed hyperlipidemia        7. Gastroesophageal reflux disease, unspecified whether esophagitis present  "               Plan:  Gio Forrest has morbid obesity as their Body mass index is 39.5 kg/m². He would benefit from weight loss surgery and has chosen gastric sleeve surgery as the preferred procedure. He understands that this is a tool and lifestyle change will be necessary to keep weight off. I went over possible complications of the chosen surgery with the patient and he is agreeable to surgery.    He has been instructed to start the pre operative diet.   Pre op Diet    Breakfast- protein shake  Lunch- protein shake  Dinner- 3 ounces of meat and vegetables (from list previously given)  NO SNACKING  Only water to drink     He surgical date is January 20, 2023 at [facility]      The patient has been taught the post operative diet and understands that he will need to stay on this diet to prevent complication.  They are aware of the post operative follow up and return to see me after surgery to treat/prevent/identify any complications.  he has been advised to attend support groups post operatively.

## 2023-01-06 NOTE — PATIENT INSTRUCTIONS
Pre op Diet- start today    Breakfast- protein shake  Lunch- protein shake  Dinner- 3 ounces of meat and vegetables (from list)    NO SNACKING  Only water to drink     Take stool softeners 2 pills in AM and PM   Constipation- take Doculax as directed by bottle  Stop vitamins on 1/13/2023

## 2023-01-10 DIAGNOSIS — E66.01 MORBID OBESITY: ICD-10-CM

## 2023-01-11 DIAGNOSIS — H90.11 CONDUCTIVE HEARING LOSS OF RIGHT EAR, UNSPECIFIED HEARING STATUS ON CONTRALATERAL SIDE: Primary | ICD-10-CM

## 2023-01-11 RX ORDER — HEPARIN SODIUM 5000 [USP'U]/ML
5000 INJECTION, SOLUTION INTRAVENOUS; SUBCUTANEOUS EVERY 8 HOURS
Status: CANCELLED | OUTPATIENT
Start: 2023-01-11

## 2023-01-11 RX ORDER — SODIUM CHLORIDE 9 MG/ML
INJECTION, SOLUTION INTRAVENOUS CONTINUOUS
Status: CANCELLED | OUTPATIENT
Start: 2023-01-11

## 2023-01-11 RX ORDER — PANTOPRAZOLE SODIUM 40 MG/10ML
40 INJECTION, POWDER, LYOPHILIZED, FOR SOLUTION INTRAVENOUS DAILY
Status: CANCELLED | OUTPATIENT
Start: 2023-01-11

## 2023-01-12 DIAGNOSIS — Z79.4 TYPE 2 DIABETES MELLITUS WITH DIABETIC POLYNEUROPATHY, WITH LONG-TERM CURRENT USE OF INSULIN: ICD-10-CM

## 2023-01-12 DIAGNOSIS — E11.42 TYPE 2 DIABETES MELLITUS WITH DIABETIC POLYNEUROPATHY, WITH LONG-TERM CURRENT USE OF INSULIN: ICD-10-CM

## 2023-01-12 RX ORDER — BLOOD-GLUCOSE SENSOR
EACH MISCELLANEOUS
Qty: 3 EACH | Refills: 12 | OUTPATIENT
Start: 2023-01-12 | End: 2023-01-24 | Stop reason: SDUPTHER

## 2023-01-12 RX ORDER — TOPIRAMATE 25 MG/1
25 TABLET ORAL 2 TIMES DAILY
Qty: 60 TABLET | Refills: 0 | OUTPATIENT
Start: 2023-01-12 | End: 2023-02-11

## 2023-01-12 NOTE — TELEPHONE ENCOUNTER
S/w pt. States needs Dexcom G6 sensors sent to [facility]. States he is haviong weight  loss surgery on 1/20 and seeing you on 1/24    Rx pended for printing

## 2023-01-12 NOTE — TELEPHONE ENCOUNTER
----- Message from Tania Robles sent at 1/12/2023 12:50 PM CST -----  Contact: Pt  Type:  RX Refill Request    Who Called: Pt  Refill or New Rx:Refill  RX Name and Strength:blood-glucose sensor (DEXCOM G6 SENSOR) Device  How is the patient currently taking it? (ex. 1XDay):As directed     Preferred Pharmacy with phone number: [facility]    Ordering Provider:Gabby Rooney MD  Would the patient rather a call back or a response via [facility]? Call  Best Call Back Number:[phone]    Additional Information: pt states he need the Dexcom senor from [facility]. Please call with info.   Pt states he's having weight loss surgery on 1/20 and will see Dr. Rooney on the 1/24/23. But please call.

## 2023-01-13 ENCOUNTER — TELEPHONE (OUTPATIENT)
Dept: SURGERY | Facility: CLINIC | Age: 58
End: 2023-01-13
Payer: MEDICARE

## 2023-01-13 ENCOUNTER — PATIENT MESSAGE (OUTPATIENT)
Dept: PSYCHIATRY | Facility: CLINIC | Age: 58
End: 2023-01-13
Payer: MEDICARE

## 2023-01-13 NOTE — TELEPHONE ENCOUNTER
----- Message from Karyna Betts sent at 1/13/2023  4:21 PM CST -----  Contact: [phone]  Type: Needs Medical Advice  Who Called:  Pt   Best Call Back Number: [phone]    Additional Information: Pt requesting a call back about his surgery on Jan 20th. Pt has questions about his diet before that date.

## 2023-01-17 ENCOUNTER — LAB VISIT (OUTPATIENT)
Dept: LAB | Facility: HOSPITAL | Age: 58
End: 2023-01-17
Attending: NURSE PRACTITIONER
Payer: MEDICARE

## 2023-01-17 ENCOUNTER — OFFICE VISIT (OUTPATIENT)
Dept: PSYCHIATRY | Facility: CLINIC | Age: 58
End: 2023-01-17
Payer: COMMERCIAL

## 2023-01-17 DIAGNOSIS — Z79.4 TYPE 2 DIABETES MELLITUS WITH DIABETIC POLYNEUROPATHY, WITH LONG-TERM CURRENT USE OF INSULIN: ICD-10-CM

## 2023-01-17 DIAGNOSIS — F33.1 MODERATE EPISODE OF RECURRENT MAJOR DEPRESSIVE DISORDER: ICD-10-CM

## 2023-01-17 DIAGNOSIS — F41.1 GAD (GENERALIZED ANXIETY DISORDER): Primary | ICD-10-CM

## 2023-01-17 DIAGNOSIS — E11.42 TYPE 2 DIABETES MELLITUS WITH DIABETIC POLYNEUROPATHY, WITH LONG-TERM CURRENT USE OF INSULIN: ICD-10-CM

## 2023-01-17 LAB
ESTIMATED AVG GLUCOSE: 157 MG/DL (ref 68–131)
HBA1C MFR BLD: 7.1 % (ref 4–5.6)

## 2023-01-17 PROCEDURE — 90834 PSYTX W PT 45 MINUTES: CPT | Mod: S$GLB,,, | Performed by: SOCIAL WORKER

## 2023-01-17 PROCEDURE — 83036 HEMOGLOBIN GLYCOSYLATED A1C: CPT | Performed by: NURSE PRACTITIONER

## 2023-01-17 PROCEDURE — 1159F PR MEDICATION LIST DOCUMENTED IN MEDICAL RECORD: ICD-10-PCS | Mod: CPTII,S$GLB,, | Performed by: SOCIAL WORKER

## 2023-01-17 PROCEDURE — 36415 COLL VENOUS BLD VENIPUNCTURE: CPT | Mod: PO | Performed by: NURSE PRACTITIONER

## 2023-01-17 PROCEDURE — 1159F MED LIST DOCD IN RCRD: CPT | Mod: CPTII,S$GLB,, | Performed by: SOCIAL WORKER

## 2023-01-17 PROCEDURE — 90834 PR PSYCHOTHERAPY W/PATIENT, 45 MIN: ICD-10-PCS | Mod: S$GLB,,, | Performed by: SOCIAL WORKER

## 2023-01-17 NOTE — PROGRESS NOTES
Individual Psychotherapy (PhD/LCSW)    1/17/2023    Site:  [facility]         Therapeutic Intervention: Met with patient.  Outpatient - Behavior modifying psychotherapy 45 min - CPT code 86657    Chief complaint/reason for encounter: anger, anxiety, and interpersonal     Interval history and content of current session: Patient in this date. He was last seen on 1/3/23. Patient reported he is doing much better in terms of his mood, with less anger since last session. Patient shared he was scheduled for his bariatric surgery and will have it on 1/20/23. He indicated he has had no problems following the diet thus far and is so excited to have this surgery as he feels it will impact his health in a positive way. Patient reports he has been more tolerate with everyone even his sister-in-law/neighbors not having problems with them, but still planning to get the fence as soon as he can afford doing so. 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 This will enable him enough time to complete his surgery, his wife is also having surgery in March. Overall, he reports he is in a good space mentally and physically this day and looking forward to the positive changes ahead. Support and encouragement were offered.    Treatment plan:  Target symptoms: anxiety , anger and interpersonal  Why chosen therapy is appropriate versus another modality: evidence based practice  Outcome monitoring methods: self-report, observation  Therapeutic intervention type: behavior modifying psychotherapy, supportive psychotherapy    Risk parameters:  Patient reports no suicidal ideation  Patient reports no homicidal ideation  Patient reports no self-injurious behavior  Patient reports no violent behavior    Verbal deficits: None    Patient's response to intervention:  The patient's response to intervention is accepting, motivated.    Progress toward goals and  other mental status changes:  The patient's progress toward goals is good.    Diagnosis:     ICD-10-CM ICD-9-CM   1. KENNEDY (generalized anxiety disorder)  F41.1 300.02   2. Moderate episode of recurrent major depressive disorder  F33.1 296.32       Plan:  individual psychotherapy and medication management by physician    Return to clinic: 2 weeks    Length of Service (minutes): 45

## 2023-01-18 ENCOUNTER — CLINICAL SUPPORT (OUTPATIENT)
Dept: BARIATRICS | Facility: CLINIC | Age: 58
End: 2023-01-18
Payer: MEDICARE

## 2023-01-18 ENCOUNTER — TELEPHONE (OUTPATIENT)
Dept: BARIATRICS | Facility: CLINIC | Age: 58
End: 2023-01-18

## 2023-01-18 ENCOUNTER — HOSPITAL ENCOUNTER (OUTPATIENT)
Dept: PREADMISSION TESTING | Facility: HOSPITAL | Age: 58
Discharge: HOME | DRG: 621 | End: 2023-01-18
Attending: SURGERY
Payer: MEDICARE

## 2023-01-18 ENCOUNTER — HOSPITAL ENCOUNTER (OUTPATIENT)
Dept: RADIOLOGY | Facility: HOSPITAL | Age: 58
Discharge: HOME | DRG: 621 | End: 2023-01-18
Payer: MEDICARE

## 2023-01-18 VITALS
RESPIRATION RATE: 14 BRPM | HEART RATE: 75 BPM | BODY MASS INDEX: 40.07 KG/M2 | HEIGHT: 74 IN | WEIGHT: 312.19 LBS | DIASTOLIC BLOOD PRESSURE: 76 MMHG | TEMPERATURE: 98 F | OXYGEN SATURATION: 94 % | SYSTOLIC BLOOD PRESSURE: 105 MMHG

## 2023-01-18 DIAGNOSIS — Z01.818 PREOP TESTING: Primary | ICD-10-CM

## 2023-01-18 DIAGNOSIS — E66.01 MORBID OBESITY: Primary | ICD-10-CM

## 2023-01-18 DIAGNOSIS — Z01.818 PREOP TESTING: ICD-10-CM

## 2023-01-18 LAB
ALBUMIN SERPL BCP-MCNC: 4.5 G/DL (ref 3.5–5.2)
ALP SERPL-CCNC: 84 U/L (ref 55–135)
ALT SERPL W/O P-5'-P-CCNC: 35 U/L (ref 10–44)
ANION GAP SERPL CALC-SCNC: 11 MMOL/L (ref 8–16)
AST SERPL-CCNC: 32 U/L (ref 10–40)
BASOPHILS # BLD AUTO: 0.04 K/UL (ref 0–0.2)
BASOPHILS NFR BLD: 0.9 % (ref 0–1.9)
BILIRUB SERPL-MCNC: 0.4 MG/DL (ref 0.1–1)
BUN SERPL-MCNC: 15 MG/DL (ref 6–20)
CALCIUM SERPL-MCNC: 9.9 MG/DL (ref 8.7–10.5)
CHLORIDE SERPL-SCNC: 101 MMOL/L (ref 95–110)
CO2 SERPL-SCNC: 29 MMOL/L (ref 23–29)
CREAT SERPL-MCNC: 1.1 MG/DL (ref 0.5–1.4)
DIFFERENTIAL METHOD: ABNORMAL
EOSINOPHIL # BLD AUTO: 0.1 K/UL (ref 0–0.5)
EOSINOPHIL NFR BLD: 2.3 % (ref 0–8)
ERYTHROCYTE [DISTWIDTH] IN BLOOD BY AUTOMATED COUNT: 13.2 % (ref 11.5–14.5)
EST. GFR  (NO RACE VARIABLE): >60 ML/MIN/1.73 M^2
GLUCOSE SERPL-MCNC: 106 MG/DL (ref 70–110)
HCT VFR BLD AUTO: 40.2 % (ref 40–54)
HGB BLD-MCNC: 13 G/DL (ref 14–18)
IMM GRANULOCYTES # BLD AUTO: 0.01 K/UL (ref 0–0.04)
IMM GRANULOCYTES NFR BLD AUTO: 0.2 % (ref 0–0.5)
LYMPHOCYTES # BLD AUTO: 1.4 K/UL (ref 1–4.8)
LYMPHOCYTES NFR BLD: 30.1 % (ref 18–48)
MCH RBC QN AUTO: 28.8 PG (ref 27–31)
MCHC RBC AUTO-ENTMCNC: 32.3 G/DL (ref 32–36)
MCV RBC AUTO: 89 FL (ref 82–98)
MONOCYTES # BLD AUTO: 0.6 K/UL (ref 0.3–1)
MONOCYTES NFR BLD: 13 % (ref 4–15)
NEUTROPHILS # BLD AUTO: 2.5 K/UL (ref 1.8–7.7)
NEUTROPHILS NFR BLD: 53.5 % (ref 38–73)
NRBC BLD-RTO: 0 /100 WBC
PLATELET # BLD AUTO: 217 K/UL (ref 150–450)
PMV BLD AUTO: 9.9 FL (ref 9.2–12.9)
POTASSIUM SERPL-SCNC: 4.4 MMOL/L (ref 3.5–5.1)
PROT SERPL-MCNC: 8 G/DL (ref 6–8.4)
RBC # BLD AUTO: 4.52 M/UL (ref 4.6–6.2)
SODIUM SERPL-SCNC: 141 MMOL/L (ref 136–145)
WBC # BLD AUTO: 4.69 K/UL (ref 3.9–12.7)

## 2023-01-18 PROCEDURE — 93010 EKG 12-LEAD: ICD-10-PCS | Mod: ,,, | Performed by: SPECIALIST

## 2023-01-18 PROCEDURE — 85025 COMPLETE CBC W/AUTO DIFF WBC: CPT | Performed by: SURGERY

## 2023-01-18 PROCEDURE — 93005 ELECTROCARDIOGRAM TRACING: CPT | Performed by: SPECIALIST

## 2023-01-18 PROCEDURE — 99499 NO LOS: ICD-10-PCS | Mod: S$GLB,,, | Performed by: SURGERY

## 2023-01-18 PROCEDURE — 93010 ELECTROCARDIOGRAM REPORT: CPT | Mod: ,,, | Performed by: SPECIALIST

## 2023-01-18 PROCEDURE — 36415 COLL VENOUS BLD VENIPUNCTURE: CPT | Performed by: SURGERY

## 2023-01-18 PROCEDURE — 99499 UNLISTED E&M SERVICE: CPT | Mod: S$GLB,,, | Performed by: SURGERY

## 2023-01-18 PROCEDURE — 71046 X-RAY EXAM CHEST 2 VIEWS: CPT | Mod: TC

## 2023-01-18 PROCEDURE — 80053 COMPREHEN METABOLIC PANEL: CPT | Performed by: SURGERY

## 2023-01-18 NOTE — TELEPHONE ENCOUNTER
Returned call as requested below. I was told that there's is no one in their office by that name.     Sarai Nicholson Staff  Caller: Unspecified (Today,  4:39 PM)  Who Called:[employer]           What is the reqeust in detail: Requesting call back to discuss prior authorization for upcoming procedure on 01/20. Please advise.           Can the clinic reply by [facility]?no           Best Call Back Number:[phone] and fax at [phone]           Additional Information:

## 2023-01-18 NOTE — DISCHARGE INSTRUCTIONS
INSTRUCTIONS  To confirm your doctor has scheduled your surgery for:    COVID TESTING SCHEDULED:     Morning of surgery please check in with registration near Parking Garage Entrance then proceed to Outpatient Surgery Department.    Preop nurses will call the afternoon prior to surgery between 4:00 and 6:00 PM with your final arrival time.  PLEASE NOTE:  The surgery schedule has many variables which may affect the time of your surgery case. Family members should be available if your surgery time changes. Plan to be here the day of your procedure between 4-6 hours.    TAKE ONLY THESE MEDICATIONS WITH A SMALL SIP OF WATER THE MORNING OF SURGERY: see list    DO NOT TAKE THESE MEDICATIONS 5-7 DAYS PRIOR to your procedure per your surgeon's request: ASPIRIN, ALEVE, BC powder, FAITH SELTZER, IBUPROFEN, FISH OIL, VITAMIN E, OR HERBALS   (May take Tylenol)    If you are prescribed any types of blood thinners (Aspirin, Coumadin, Plavix, Pradaxa, Xarelto, Aggrenox, Effient, Eliquis, Savasya, Brilinta or any other), please ask your surgeon how many days before scheduled procedure should you stop taking them. You may also need to verify with prescribing physician if it is OK to stop your blood thinners.      INSTRUCTIONS IMPORTANT!!  Do not eat or drink anything after midnight.  ONLY if you are diabetic, check your sugar in the morning before your procedure.  Do not smoke, vape or drink alcoholic beverages 24 hours prior to your procedure.  Shower the night before AND the morning of your procedure with a Chlorhexidine wash such as hibiclens or Dial antibacterial soap from neck down. You may use your own shampoo and face wash. This helps your skin to be as bacteria free as possible.  If you wear contact lenses, dentures, hearing aids or glasses, bring a container to put them in and give to a family member.    Please leave all jewelry, piercings and valuables at home.  DO NOT remove hair from the surgery site.   If your condition  changes such as fever, cough, etc, please notify your surgeon.   ONLY if you have been diagnosed with sleep apnea please bring your C-PAP machine.  ONLY if you wear home oxygen please bring your portable oxygen tank the day of your procedure.   ONLY for patients requiring bowel prep, written instructions will be given by your doctor's office.  ONLY if you have a neuro stimulator, please bring the controller with you the morning of surgery  Make arrangements in advance for transportation home by a responsible adult.  You must make arrangements for transportation, TAXI'S, UBER'S OR LYFTS ARE NOT ALLOWED.        If you have any questions about these instructions, call Pre-Op Admit  Nursing at [phone] or the Pre-Op Day Surgery Unit at [phone].

## 2023-01-18 NOTE — PROGRESS NOTES
Confirmed with patient that he is having a sleeve gastrectomy on  01/20/2023 at [facility].    Weight 305.8 lb  Fat%  45.2 %  Fat mass 138.2 lb   .6 lb  TBW  130.8 lb  TBW% 42.8%  BMI  38.7        Preop diet reviewed with patient.   Reminded of liquids only (water and protein shakes) on the day before surgery.   Reviewed progression of diet after surgery    ~Clear liquid diet for the 1st week post op with a goal of 64oz of fluid intake daily  ~encouraged protein intake to facilitate the healing process, this can include protein moy, gatorade zero with protein, protein broth, etc  ~provided patient with (7) Liquicel protein packs to consume 1 pack daily x7 days of post-op week 1  ~avoid food/liquids with temperature extreme of too hot or too cold, as this may cause spasms of the stomach leading to increased pain and complications with the healing process  ~Full liquid diet for weeks 2 and 3 post-op with a goal of 60 gm of protein daily   ~begin bariatric vitamins on 1st day of post-op week 2  ~emphasized smooth, liquid consistency of intake. Any chunks or bits of food may cause complications of healing.   Reviewed the importance of  ~post op activity as tolerated  ~ incentive spirometry as provided by the respiratory therapist at the hospital   ~adequate oral fluid intake/hydration  ~written copy of ambulation, and hydration tracking tool provided  Reviewed post-op wound/incision care (written copy provided)   ~may shower 48 hours after surgery, no rubbing or scrubbing and pat to dry   ~no tub baths, swimming pool, hot tub until cleared at 2 week post-op visit   ~may remove outer bandage after 1st shower, steri-strips remain until they fall off  ~educated patient of signs and symptoms of infection and importance of   reporting them (redness, swelling, drainage, fever)  List of approved post-op over-the-counter meds provided   ~emphasized NO NSAIDS  Reviewed post-op constipation protocol, written copy  provided.    Patient verbalized complete understanding.

## 2023-01-19 ENCOUNTER — TELEPHONE (OUTPATIENT)
Dept: BARIATRICS | Facility: CLINIC | Age: 58
End: 2023-01-19
Payer: MEDICARE

## 2023-01-19 NOTE — TELEPHONE ENCOUNTER
Called pt per request of Paula Osorio NP to inform him that the prior auth has been obtained for his surgery. Pt voiced understanding.

## 2023-01-20 ENCOUNTER — ANESTHESIA EVENT (OUTPATIENT)
Dept: SURGERY | Facility: HOSPITAL | Age: 58
DRG: 621 | End: 2023-01-20
Payer: MEDICARE

## 2023-01-20 ENCOUNTER — ANESTHESIA (OUTPATIENT)
Dept: SURGERY | Facility: HOSPITAL | Age: 58
DRG: 621 | End: 2023-01-20
Payer: MEDICARE

## 2023-01-20 ENCOUNTER — HOSPITAL ENCOUNTER (INPATIENT)
Facility: HOSPITAL | Age: 58
LOS: 2 days | Discharge: HOME | DRG: 621 | End: 2023-01-22
Attending: SURGERY | Admitting: SURGERY
Payer: MEDICARE

## 2023-01-20 DIAGNOSIS — K21.9 GASTROESOPHAGEAL REFLUX DISEASE, UNSPECIFIED WHETHER ESOPHAGITIS PRESENT: ICD-10-CM

## 2023-01-20 DIAGNOSIS — E66.01 MORBID OBESITY: Primary | ICD-10-CM

## 2023-01-20 DIAGNOSIS — Z79.4 TYPE 2 DIABETES MELLITUS WITH HYPERGLYCEMIA, WITH LONG-TERM CURRENT USE OF INSULIN: ICD-10-CM

## 2023-01-20 DIAGNOSIS — I10 ESSENTIAL HYPERTENSION: ICD-10-CM

## 2023-01-20 DIAGNOSIS — E66.01 SEVERE OBESITY (BMI 35.0-39.9) WITH COMORBIDITY: ICD-10-CM

## 2023-01-20 DIAGNOSIS — G47.33 OSA ON CPAP: ICD-10-CM

## 2023-01-20 DIAGNOSIS — E11.65 TYPE 2 DIABETES MELLITUS WITH HYPERGLYCEMIA, WITH LONG-TERM CURRENT USE OF INSULIN: ICD-10-CM

## 2023-01-20 DIAGNOSIS — E78.2 MIXED HYPERLIPIDEMIA: ICD-10-CM

## 2023-01-20 PROCEDURE — 63600175 PHARM REV CODE 636 W HCPCS: Performed by: SURGERY

## 2023-01-20 PROCEDURE — 25000003 PHARM REV CODE 250: Performed by: NURSE ANESTHETIST, CERTIFIED REGISTERED

## 2023-01-20 PROCEDURE — 63600175 PHARM REV CODE 636 W HCPCS: Performed by: NURSE ANESTHETIST, CERTIFIED REGISTERED

## 2023-01-20 PROCEDURE — 36000710: Performed by: SURGERY

## 2023-01-20 PROCEDURE — 63600175 PHARM REV CODE 636 W HCPCS: Performed by: NURSE PRACTITIONER

## 2023-01-20 PROCEDURE — 36000711: Performed by: SURGERY

## 2023-01-20 PROCEDURE — 37000008 HC ANESTHESIA 1ST 15 MINUTES: Performed by: SURGERY

## 2023-01-20 PROCEDURE — 63600175 PHARM REV CODE 636 W HCPCS: Performed by: ANESTHESIOLOGY

## 2023-01-20 PROCEDURE — 88305 TISSUE EXAM BY PATHOLOGIST: CPT | Mod: TC

## 2023-01-20 PROCEDURE — 25000003 PHARM REV CODE 250: Performed by: ANESTHESIOLOGY

## 2023-01-20 PROCEDURE — 43775 PR LAP, GAST RESTRICT PROC, LONGITUDINAL GASTRECTOMY: ICD-10-PCS | Mod: ,,, | Performed by: SURGERY

## 2023-01-20 PROCEDURE — 71000039 HC RECOVERY, EACH ADD'L HOUR: Performed by: SURGERY

## 2023-01-20 PROCEDURE — D9220A PRA ANESTHESIA: ICD-10-PCS | Mod: CRNA,,, | Performed by: NURSE ANESTHETIST, CERTIFIED REGISTERED

## 2023-01-20 PROCEDURE — 82962 GLUCOSE BLOOD TEST: CPT

## 2023-01-20 PROCEDURE — 25000003 PHARM REV CODE 250: Performed by: SURGERY

## 2023-01-20 PROCEDURE — 43775 LAP SLEEVE GASTRECTOMY: CPT | Mod: ,,, | Performed by: SURGERY

## 2023-01-20 PROCEDURE — 37000009 HC ANESTHESIA EA ADD 15 MINS: Performed by: SURGERY

## 2023-01-20 PROCEDURE — 71000033 HC RECOVERY, INTIAL HOUR: Performed by: SURGERY

## 2023-01-20 PROCEDURE — 82962 GLUCOSE BLOOD TEST: CPT | Performed by: SURGERY

## 2023-01-20 PROCEDURE — 25000003 PHARM REV CODE 250

## 2023-01-20 PROCEDURE — 27201423 OPTIME MED/SURG SUP & DEVICES STERILE SUPPLY: Performed by: SURGERY

## 2023-01-20 PROCEDURE — D9220A PRA ANESTHESIA: ICD-10-PCS | Mod: ANES,,, | Performed by: ANESTHESIOLOGY

## 2023-01-20 PROCEDURE — 21400001 HC TELEMETRY ROOM

## 2023-01-20 PROCEDURE — D9220A PRA ANESTHESIA: Mod: CRNA,,, | Performed by: NURSE ANESTHETIST, CERTIFIED REGISTERED

## 2023-01-20 PROCEDURE — D9220A PRA ANESTHESIA: Mod: ANES,,, | Performed by: ANESTHESIOLOGY

## 2023-01-20 RX ORDER — HYDROMORPHONE HYDROCHLORIDE 1 MG/ML
0.2 INJECTION, SOLUTION INTRAMUSCULAR; INTRAVENOUS; SUBCUTANEOUS EVERY 5 MIN PRN
Status: DISCONTINUED | OUTPATIENT
Start: 2023-01-20 | End: 2023-01-20 | Stop reason: HOSPADM

## 2023-01-20 RX ORDER — SUCCINYLCHOLINE CHLORIDE 20 MG/ML
INJECTION INTRAMUSCULAR; INTRAVENOUS
Status: DISCONTINUED | OUTPATIENT
Start: 2023-01-20 | End: 2023-01-20

## 2023-01-20 RX ORDER — ONDANSETRON 2 MG/ML
8 INJECTION INTRAMUSCULAR; INTRAVENOUS EVERY 6 HOURS PRN
Status: DISCONTINUED | OUTPATIENT
Start: 2023-01-20 | End: 2023-01-22 | Stop reason: HOSPADM

## 2023-01-20 RX ORDER — ONDANSETRON 2 MG/ML
4 INJECTION INTRAMUSCULAR; INTRAVENOUS DAILY PRN
Status: DISCONTINUED | OUTPATIENT
Start: 2023-01-20 | End: 2023-01-20 | Stop reason: HOSPADM

## 2023-01-20 RX ORDER — ENOXAPARIN SODIUM 100 MG/ML
40 INJECTION SUBCUTANEOUS EVERY 24 HOURS
Status: DISCONTINUED | OUTPATIENT
Start: 2023-01-21 | End: 2023-01-22 | Stop reason: HOSPADM

## 2023-01-20 RX ORDER — PROPOFOL 10 MG/ML
VIAL (ML) INTRAVENOUS
Status: DISCONTINUED | OUTPATIENT
Start: 2023-01-20 | End: 2023-01-20

## 2023-01-20 RX ORDER — FENTANYL CITRATE 50 UG/ML
INJECTION, SOLUTION INTRAMUSCULAR; INTRAVENOUS
Status: DISCONTINUED | OUTPATIENT
Start: 2023-01-20 | End: 2023-01-20

## 2023-01-20 RX ORDER — PANTOPRAZOLE SODIUM 40 MG/1
40 TABLET, DELAYED RELEASE ORAL
Status: DISCONTINUED | OUTPATIENT
Start: 2023-01-20 | End: 2023-01-22 | Stop reason: HOSPADM

## 2023-01-20 RX ORDER — DEXMEDETOMIDINE HYDROCHLORIDE 100 UG/ML
130 INJECTION, SOLUTION INTRAVENOUS ONCE
Status: DISCONTINUED | OUTPATIENT
Start: 2023-01-20 | End: 2023-01-22 | Stop reason: HOSPADM

## 2023-01-20 RX ORDER — HYDROMORPHONE HYDROCHLORIDE 1 MG/ML
1 INJECTION, SOLUTION INTRAMUSCULAR; INTRAVENOUS; SUBCUTANEOUS EVERY 6 HOURS PRN
Status: DISCONTINUED | OUTPATIENT
Start: 2023-01-20 | End: 2023-01-22 | Stop reason: HOSPADM

## 2023-01-20 RX ORDER — SODIUM CHLORIDE 9 MG/ML
INJECTION, SOLUTION INTRAVENOUS CONTINUOUS
Status: DISCONTINUED | OUTPATIENT
Start: 2023-01-20 | End: 2023-01-22 | Stop reason: HOSPADM

## 2023-01-20 RX ORDER — DEXMEDETOMIDINE HYDROCHLORIDE 100 UG/ML
INJECTION, SOLUTION INTRAVENOUS
Status: DISCONTINUED | OUTPATIENT
Start: 2023-01-20 | End: 2023-01-20

## 2023-01-20 RX ORDER — PANTOPRAZOLE SODIUM 40 MG/10ML
40 INJECTION, POWDER, LYOPHILIZED, FOR SOLUTION INTRAVENOUS DAILY
Status: DISCONTINUED | OUTPATIENT
Start: 2023-01-20 | End: 2023-01-20 | Stop reason: HOSPADM

## 2023-01-20 RX ORDER — SCOLOPAMINE TRANSDERMAL SYSTEM 1 MG/1
1 PATCH, EXTENDED RELEASE TRANSDERMAL
Status: DISCONTINUED | OUTPATIENT
Start: 2023-01-20 | End: 2023-01-20 | Stop reason: HOSPADM

## 2023-01-20 RX ORDER — FAMOTIDINE 10 MG/ML
INJECTION INTRAVENOUS
Status: DISCONTINUED | OUTPATIENT
Start: 2023-01-20 | End: 2023-01-20

## 2023-01-20 RX ORDER — ROCURONIUM BROMIDE 10 MG/ML
INJECTION, SOLUTION INTRAVENOUS
Status: DISCONTINUED | OUTPATIENT
Start: 2023-01-20 | End: 2023-01-20

## 2023-01-20 RX ORDER — DIPHENHYDRAMINE HYDROCHLORIDE 50 MG/ML
12.5 INJECTION INTRAMUSCULAR; INTRAVENOUS EVERY 4 HOURS PRN
Status: DISCONTINUED | OUTPATIENT
Start: 2023-01-20 | End: 2023-01-22 | Stop reason: HOSPADM

## 2023-01-20 RX ORDER — DIPHENHYDRAMINE HYDROCHLORIDE 50 MG/ML
6.25 INJECTION INTRAMUSCULAR; INTRAVENOUS ONCE AS NEEDED
Status: DISCONTINUED | OUTPATIENT
Start: 2023-01-20 | End: 2023-01-20 | Stop reason: HOSPADM

## 2023-01-20 RX ORDER — ONDANSETRON 2 MG/ML
INJECTION INTRAMUSCULAR; INTRAVENOUS
Status: DISCONTINUED | OUTPATIENT
Start: 2023-01-20 | End: 2023-01-20

## 2023-01-20 RX ORDER — HEPARIN SODIUM 5000 [USP'U]/ML
5000 INJECTION, SOLUTION INTRAVENOUS; SUBCUTANEOUS EVERY 8 HOURS
Status: DISCONTINUED | OUTPATIENT
Start: 2023-01-20 | End: 2023-01-20 | Stop reason: HOSPADM

## 2023-01-20 RX ORDER — NALOXONE HCL 0.4 MG/ML
0.02 VIAL (ML) INJECTION ONCE
Status: DISCONTINUED | OUTPATIENT
Start: 2023-01-20 | End: 2023-01-22 | Stop reason: HOSPADM

## 2023-01-20 RX ORDER — CARVEDILOL 12.5 MG/1
12.5 TABLET ORAL 2 TIMES DAILY
Status: DISCONTINUED | OUTPATIENT
Start: 2023-01-20 | End: 2023-01-22 | Stop reason: HOSPADM

## 2023-01-20 RX ORDER — CEFAZOLIN SODIUM 2 G/50ML
2 SOLUTION INTRAVENOUS
Status: COMPLETED | OUTPATIENT
Start: 2023-01-20 | End: 2023-01-21

## 2023-01-20 RX ORDER — ACETAMINOPHEN 10 MG/ML
INJECTION, SOLUTION INTRAVENOUS
Status: DISCONTINUED | OUTPATIENT
Start: 2023-01-20 | End: 2023-01-20

## 2023-01-20 RX ORDER — OXYCODONE HYDROCHLORIDE 5 MG/1
10 TABLET ORAL EVERY 4 HOURS PRN
Status: DISCONTINUED | OUTPATIENT
Start: 2023-01-20 | End: 2023-01-22 | Stop reason: HOSPADM

## 2023-01-20 RX ORDER — KETAMINE HYDROCHLORIDE 50 MG/ML
INJECTION, SOLUTION INTRAMUSCULAR; INTRAVENOUS
Status: DISCONTINUED | OUTPATIENT
Start: 2023-01-20 | End: 2023-01-20

## 2023-01-20 RX ORDER — SODIUM CHLORIDE 0.9 G/100ML
IRRIGANT IRRIGATION
Status: DISCONTINUED | OUTPATIENT
Start: 2023-01-20 | End: 2023-01-20 | Stop reason: HOSPADM

## 2023-01-20 RX ORDER — HYDROCODONE BITARTRATE AND ACETAMINOPHEN 7.5; 325 MG/1; MG/1
1 TABLET ORAL EVERY 4 HOURS PRN
Qty: 20 TABLET | Refills: 0 | Status: SHIPPED | OUTPATIENT
Start: 2023-01-20 | End: 2023-03-09

## 2023-01-20 RX ORDER — FENTANYL CITRATE 50 UG/ML
25 INJECTION, SOLUTION INTRAMUSCULAR; INTRAVENOUS EVERY 5 MIN PRN
Status: COMPLETED | OUTPATIENT
Start: 2023-01-20 | End: 2023-01-20

## 2023-01-20 RX ORDER — LIDOCAINE HYDROCHLORIDE 10 MG/ML
INJECTION, SOLUTION INTRAVENOUS
Status: DISCONTINUED | OUTPATIENT
Start: 2023-01-20 | End: 2023-01-20

## 2023-01-20 RX ORDER — OXYCODONE HYDROCHLORIDE 5 MG/1
5 TABLET ORAL
Status: DISCONTINUED | OUTPATIENT
Start: 2023-01-20 | End: 2023-01-20 | Stop reason: HOSPADM

## 2023-01-20 RX ORDER — BUPIVACAINE HYDROCHLORIDE 2.5 MG/ML
INJECTION, SOLUTION EPIDURAL; INFILTRATION; INTRACAUDAL
Status: DISCONTINUED | OUTPATIENT
Start: 2023-01-20 | End: 2023-01-20 | Stop reason: HOSPADM

## 2023-01-20 RX ORDER — GABAPENTIN 300 MG/1
300 CAPSULE ORAL
Status: DISCONTINUED | OUTPATIENT
Start: 2023-01-20 | End: 2023-01-20

## 2023-01-20 RX ORDER — PHENYLEPHRINE HYDROCHLORIDE 10 MG/ML
INJECTION INTRAVENOUS
Status: DISCONTINUED | OUTPATIENT
Start: 2023-01-20 | End: 2023-01-20

## 2023-01-20 RX ORDER — SODIUM CHLORIDE, SODIUM LACTATE, POTASSIUM CHLORIDE, CALCIUM CHLORIDE 600; 310; 30; 20 MG/100ML; MG/100ML; MG/100ML; MG/100ML
INJECTION, SOLUTION INTRAVENOUS CONTINUOUS
Status: DISCONTINUED | OUTPATIENT
Start: 2023-01-20 | End: 2023-01-22 | Stop reason: HOSPADM

## 2023-01-20 RX ORDER — ONDANSETRON 4 MG/1
4 TABLET, ORALLY DISINTEGRATING ORAL EVERY 6 HOURS PRN
Qty: 30 TABLET | Refills: 0 | Status: SHIPPED | OUTPATIENT
Start: 2023-01-20 | End: 2024-01-09

## 2023-01-20 RX ORDER — MIDAZOLAM HYDROCHLORIDE 1 MG/ML
INJECTION, SOLUTION INTRAMUSCULAR; INTRAVENOUS
Status: DISCONTINUED | OUTPATIENT
Start: 2023-01-20 | End: 2023-01-20

## 2023-01-20 RX ADMIN — HYDROMORPHONE HYDROCHLORIDE 0.2 MG: 1 INJECTION, SOLUTION INTRAMUSCULAR; INTRAVENOUS; SUBCUTANEOUS at 02:01

## 2023-01-20 RX ADMIN — DEXMEDETOMIDINE HYDROCHLORIDE 20 MCG: 100 INJECTION, SOLUTION INTRAVENOUS at 11:01

## 2023-01-20 RX ADMIN — SODIUM CHLORIDE: 0.9 INJECTION, SOLUTION INTRAVENOUS at 10:01

## 2023-01-20 RX ADMIN — PHENYLEPHRINE HYDROCHLORIDE 200 MCG: 10 INJECTION INTRAVENOUS at 11:01

## 2023-01-20 RX ADMIN — ROCURONIUM BROMIDE 40 MG: 10 INJECTION, SOLUTION INTRAVENOUS at 11:01

## 2023-01-20 RX ADMIN — SUGAMMADEX 200 MG: 100 INJECTION, SOLUTION INTRAVENOUS at 12:01

## 2023-01-20 RX ADMIN — HEPARIN SODIUM 5000 UNITS: 5000 INJECTION INTRAVENOUS; SUBCUTANEOUS at 10:01

## 2023-01-20 RX ADMIN — ACETAMINOPHEN 1000 MG: 10 INJECTION, SOLUTION INTRAVENOUS at 11:01

## 2023-01-20 RX ADMIN — DEXMEDETOMIDINE HYDROCHLORIDE 30 MCG: 100 INJECTION, SOLUTION INTRAVENOUS at 11:01

## 2023-01-20 RX ADMIN — CEFAZOLIN SODIUM 2 G: 2 SOLUTION INTRAVENOUS at 04:01

## 2023-01-20 RX ADMIN — FENTANYL CITRATE 25 MCG: 50 INJECTION, SOLUTION INTRAMUSCULAR; INTRAVENOUS at 02:01

## 2023-01-20 RX ADMIN — LIDOCAINE HYDROCHLORIDE 100 MG: 10 INJECTION, SOLUTION INTRAVENOUS at 11:01

## 2023-01-20 RX ADMIN — PROPOFOL 160 MG: 10 INJECTION, EMULSION INTRAVENOUS at 11:01

## 2023-01-20 RX ADMIN — FENTANYL CITRATE 25 MCG: 50 INJECTION, SOLUTION INTRAMUSCULAR; INTRAVENOUS at 12:01

## 2023-01-20 RX ADMIN — CARVEDILOL 12.5 MG: 12.5 TABLET, FILM COATED ORAL at 09:01

## 2023-01-20 RX ADMIN — MIDAZOLAM 2 MG: 1 INJECTION INTRAMUSCULAR; INTRAVENOUS at 11:01

## 2023-01-20 RX ADMIN — PANTOPRAZOLE SODIUM 40 MG: 40 TABLET, DELAYED RELEASE ORAL at 04:01

## 2023-01-20 RX ADMIN — SODIUM CHLORIDE: 0.9 INJECTION, SOLUTION INTRAVENOUS at 11:01

## 2023-01-20 RX ADMIN — ONDANSETRON 4 MG: 2 INJECTION INTRAMUSCULAR; INTRAVENOUS at 11:01

## 2023-01-20 RX ADMIN — FENTANYL CITRATE 100 MCG: 50 INJECTION INTRAMUSCULAR; INTRAVENOUS at 11:01

## 2023-01-20 RX ADMIN — OXYCODONE HYDROCHLORIDE 10 MG: 5 TABLET ORAL at 09:01

## 2023-01-20 RX ADMIN — Medication 100 MG: at 11:01

## 2023-01-20 RX ADMIN — ROCURONIUM BROMIDE 5 MG: 10 INJECTION, SOLUTION INTRAVENOUS at 11:01

## 2023-01-20 RX ADMIN — HYDROMORPHONE HYDROCHLORIDE 0.2 MG: 1 INJECTION, SOLUTION INTRAMUSCULAR; INTRAVENOUS; SUBCUTANEOUS at 03:01

## 2023-01-20 RX ADMIN — CEFAZOLIN SODIUM 3 ML: 2 SOLUTION INTRAVENOUS at 11:01

## 2023-01-20 RX ADMIN — FENTANYL CITRATE 25 MCG: 50 INJECTION, SOLUTION INTRAMUSCULAR; INTRAVENOUS at 01:01

## 2023-01-20 RX ADMIN — KETAMINE HYDROCHLORIDE 50 MG: 50 INJECTION INTRAMUSCULAR; INTRAVENOUS at 11:01

## 2023-01-20 RX ADMIN — OXYCODONE HYDROCHLORIDE 10 MG: 5 TABLET ORAL at 05:01

## 2023-01-20 RX ADMIN — SCOPOLAMINE 1 PATCH: 1 PATCH TRANSDERMAL at 10:01

## 2023-01-20 RX ADMIN — FIBRINOGEN HUMAN, HUMAN THROMBIN: 90; 500 SOLUTION TOPICAL at 11:01

## 2023-01-20 RX ADMIN — HYDROMORPHONE HYDROCHLORIDE 1 MG: 1 INJECTION, SOLUTION INTRAMUSCULAR; INTRAVENOUS; SUBCUTANEOUS at 11:01

## 2023-01-20 RX ADMIN — OXYCODONE HYDROCHLORIDE 5 MG: 5 TABLET ORAL at 01:01

## 2023-01-20 RX ADMIN — ONDANSETRON 4 MG: 2 INJECTION INTRAMUSCULAR; INTRAVENOUS at 01:01

## 2023-01-20 RX ADMIN — FAMOTIDINE 20 MG: 10 INJECTION, SOLUTION INTRAVENOUS at 11:01

## 2023-01-20 RX ADMIN — SODIUM CHLORIDE, SODIUM LACTATE, POTASSIUM CHLORIDE, AND CALCIUM CHLORIDE: .6; .31; .03; .02 INJECTION, SOLUTION INTRAVENOUS at 03:01

## 2023-01-20 NOTE — TRANSFER OF CARE
"Anesthesia Transfer of Care Note    Patient: Gio Forrest    Procedure(s) Performed: Procedure(s) (LRB):  GASTRECTOMY, SLEEVE, LAPAROSCOPIC (N/A)    Patient location: PACU    Anesthesia Type: general    Transport from OR: Transported from OR on room air with adequate spontaneous ventilation    Post pain: adequate analgesia    Post assessment: no apparent anesthetic complications    Post vital signs: stable    Level of consciousness: awake    Nausea/Vomiting: no nausea/vomiting    Complications: none    Transfer of care protocol was followed      Last vitals:   Visit Vitals  /70 (BP Location: Right arm, Patient Position: Sitting)   Temp 36.5 °C (97.7 °F) (Oral)   Resp 18   Ht 6' 2" (1.88 m)   Wt 136.1 kg (300 lb)   SpO2 95%   BMI 38.52 kg/m²     "

## 2023-01-20 NOTE — ANESTHESIA PREPROCEDURE EVALUATION
01/20/2023  Gio Forrest is a 57 y.o., male.      Results for orders placed or performed during the hospital encounter of 01/18/23   EKG 12-lead    Collection Time: 01/18/23  1:54 PM    Narrative    Test Reason : Z01.818,    Vent. Rate : 070 BPM     Atrial Rate : 070 BPM     P-R Int : 210 ms          QRS Dur : 086 ms      QT Int : 376 ms       P-R-T Axes : 030 034 059 degrees     QTc Int : 406 ms    Sinus rhythm with 1st degree A-V block  Otherwise normal ECG  When compared with ECG of 29-MAY-2022 16:11,  Vent. rate has decreased BY  38 BPM    Referred By:             Confirmed By:              Lab Results   Component Value Date    WBC 4.69 01/18/2023    HGB 13.0 (L) 01/18/2023    HCT 40.2 01/18/2023    MCV 89 01/18/2023     01/18/2023     BMP  Lab Results   Component Value Date     01/18/2023    K 4.4 01/18/2023     01/18/2023    CO2 29 01/18/2023    BUN 15 01/18/2023    CREATININE 1.1 01/18/2023    CALCIUM 9.9 01/18/2023    ANIONGAP 11 01/18/2023     01/18/2023     (H) 08/25/2022     (H) 06/10/2022       Results for orders placed during the hospital encounter of 03/29/22    Echo Saline Bubble? No    Interpretation Summary  · Normal systolic function.  · The estimated ejection fraction is 55%.  · Normal left ventricular diastolic function.  · Normal right ventricular size with normal right ventricular systolic function.        Pre-op Assessment    I have reviewed the Patient Summary Reports.     I have reviewed the Nursing Notes. I have reviewed the NPO Status.   I have reviewed the Medications.     Review of Systems  Anesthesia Hx:  No problems with previous Anesthesia  History of prior surgery of interest to airway management or planning: cervical fusion. Denies Family Hx of Anesthesia complications.   Denies Personal Hx of Anesthesia complications.    Social:  Former Smoker, No Alcohol Use    Hematology/Oncology:  Hematology Normal   Oncology Normal     EENT/Dental:EENT/Dental Normal   Cardiovascular:   Hypertension, well controlled CAD asymptomatic Dysrhythmias  hyperlipidemia HENDERSON ECG has been reviewed. Hx SVT patient is status post ablation approximately 5 years ago    Patient followed by Dr. Drew - cleared for procedure   Pulmonary:   Pneumonia Shortness of breath Sleep Apnea, CPAP H/O acute and chronic respiratory failure with hypoxia   Education provided regarding risk of obstructive sleep apnea     Renal/:   BPH    Hepatic/GI:   GERD, well controlled Liver Disease,    Musculoskeletal:   Arthritis  Gout    Lumbar pseudoarthrosis    Chronic pain of right hand Spine Disorders: cervical and lumbar Chronic Pain    Neurological:   Neuromuscular Disease, diabetic polyneuropathy    Spinal enthesopathy, lumbar region  Peripheral Neuropathy  Movement Disorder Dx, Restless Leg Syndrome   Endocrine:   Diabetes, poorly controlled, type 2, using insulin Glucose 127 at the 9:00 a.m. Obesity / BMI > 30  Dermatological:  Skin Normal    Psych:   Psychiatric History depression          Physical Exam  General: Well nourished, Cooperative, Alert and Oriented    Airway:  Mallampati: III   Mouth Opening: Normal  TM Distance: Normal  Tongue: Normal  Neck ROM: Extension Decreased    Dental:  Dentures, Edentulous    Chest/Lungs:  Clear to auscultation, Normal Respiratory Rate    Heart:  Rate: Normal  Rhythm: Regular Rhythm  Sounds: Normal        Anesthesia Plan  Type of Anesthesia, risks & benefits discussed:    Anesthesia Type: Gen ETT  Intra-op Monitoring Plan: Standard ASA Monitors  Post Op Pain Control Plan: multimodal analgesia and IV/PO Opioids PRN  Induction:  IV  Airway Plan: Direct and Video, Post-Induction  Informed Consent: Informed consent signed with the Patient and all parties understand the risks and agree with anesthesia plan.  All questions answered.   ASA  Score: 3  Anesthesia Plan Notes:     GETA  No Decadron   No Neurontin ( Patient self administered 600 mg po @ 0630 )  Minimal Versed & Fentanyl  Benadryl 6.25 mg iv ,Zofran 4 mg iv, Pepcid 20 mg iv   Ofirmev 1000 mg iv, Precedex 130 mcg post induction, Ketamine 50 mg iv, Sugammadex   TARYN Precautions: Extubate patient awake with HOB elevated and oral airway in place     Ready For Surgery From Anesthesia Perspective.     .

## 2023-01-20 NOTE — ANESTHESIA POSTPROCEDURE EVALUATION
Anesthesia Post Evaluation    Patient: Gio Forrest    Procedure(s) Performed: Procedure(s) (LRB):  GASTRECTOMY, SLEEVE, LAPAROSCOPIC (N/A)    Final Anesthesia Type: general      Patient location during evaluation: PACU  Patient participation: Yes- Able to Participate  Level of consciousness: awake and alert and oriented  Post-procedure vital signs: reviewed and stable  Pain management: adequate  Airway patency: patent    PONV status at discharge: No PONV  Anesthetic complications: no      Cardiovascular status: blood pressure returned to baseline, hemodynamically stable and stable  Respiratory status: unassisted, spontaneous ventilation and room air  Hydration status: euvolemic  Follow-up not needed.          Vitals Value Taken Time   /63 01/20/23 1500   Temp 36.4 °C (97.5 °F) 01/20/23 1415   Pulse 57 01/20/23 1502   Resp 15 01/20/23 1502   SpO2 100 % 01/20/23 1502   Vitals shown include unvalidated device data.      No case tracking events are documented in the log.      Pain/Yvonne Score: Pain Rating Prior to Med Admin: 8 (1/20/2023  2:35 PM)  Yvonne Score: 9 (1/20/2023  2:30 PM)

## 2023-01-20 NOTE — ANESTHESIA POSTPROCEDURE EVALUATION
Anesthesia Post Evaluation    Patient: Gio Forrest    Procedure(s) Performed: Procedure(s) (LRB):  GASTRECTOMY, SLEEVE, LAPAROSCOPIC (N/A)    Final Anesthesia Type: general      Patient location during evaluation: PACU  Patient participation: Yes- Able to Participate  Level of consciousness: awake and alert  Post-procedure vital signs: reviewed and stable  Pain management: adequate  Airway patency: patent  TARYN mitigation strategies: Multimodal analgesia, Extubation while patient is awake and Extubation and recovery carried out in lateral, semiupright, or other nonsupine position  PONV status at discharge: No PONV  Anesthetic complications: no      Cardiovascular status: blood pressure returned to baseline  Respiratory status: spontaneous ventilation, unassisted and room air  Hydration status: euvolemic  Follow-up not needed.  Comments: Pt received TARYN education preop.          Vitals Value Taken Time   /63 01/20/23 1500   Temp 36.4 °C (97.5 °F) 01/20/23 1415   Pulse 57 01/20/23 1502   Resp 15 01/20/23 1502   SpO2 100 % 01/20/23 1502   Vitals shown include unvalidated device data.      No case tracking events are documented in the log.      Pain/Yvonne Score: Pain Rating Prior to Med Admin: 8 (1/20/2023  2:35 PM)  Yvonne Score: 9 (1/20/2023  2:30 PM)

## 2023-01-20 NOTE — OP NOTE
Laparoscopic Sleeve Gastrectomy Procedure Note    Date of procedure:   01/20/2023    Indications: Morbid obesity unresponsive to medical treatment.    Pre-operative Diagnosis:   1. Morbid obesity          2. Severe obesity (BMI 35.0-39.9) with comorbidity          3. Essential hypertension          4. Type 2 diabetes mellitus with hyperglycemia, with long-term current use of insulin          5. TARYN on CPAP          6. Mixed hyperlipidemia          7. Gastroesophageal reflux disease, unspecified whether esophagitis present         Post-operative Diagnosis: Same    Surgeon: Bharat Devine MD    Assistants: Sofia Howe CFA    No qualified resident was available to assist in this case    Anesthesia: General endotracheal anesthesia    ASA Class: 3    Procedure Details   The patient was seen in the Holding Room. The risks, benefits, complications, treatment options, and expected outcomes were discussed with the patient. The possibilities of reaction to medication, pulmonary aspiration, perforation of viscus, bleeding, recurrent infection, the need for additional procedures, failure to diagnose a condition, and creating a complication requiring transfusion or operation were discussed with the patient. The patient concurred with the proposed plan, giving informed consent. The site of surgery properly noted/marked. The patient was taken to Operating Room 6 identified as Gio Forrest and the procedure verified as Sleeve Gastrectomy. A Time Out was held and the above information confirmed.    Full general anesthesia was induced with orotracheal intubation. The patient was prepped and draped in a supine position.  Foot board was placed. Appropriate antibiotics were given intravenously.    The 5 mm [employer] medical optiview was used to enter into the abdominal cavity under direct vision in the right upper quadrant. The abdomen was insufflated.    There were no untoward findings on diagnostic laparoscopy. Trocars were  placed under direct vision.    The Donal liver retractor was then placed through a high epigastric incision site and positioned to hold the liver.    The procedure was started by identifying an area approx. 5 cm proximal to the pylorus. The voyant was then used to take down the short gastrics up to the left sarkis which was identified and cleared.    The sleeve was created using a Titan stapler, starting 5 cm from pylorus up to 1 cm from the ge junctions.  40 fr bougie used as sizer.    A provacative leak test, looking for air bubbles while the sleeve is insufflated and clamped, was preformed and did not reveal any leaks.     Hemostasis was achieved.  tiseel was sprayed on the staple line.    The specimen was removed out of the left upper quadrant port and the site was closed with a zero vicryl.     Marcaine was injected at each port site.    The wounds were heavily irrigated. The skin was closed with 4-0 suture. Sterile dressings were applied.    Instrument, sponge, and needle counts were correct prior to wound closure and at the conclusion of the case.     Findings:  normal anatomy    Estimated Blood Loss: 20.0 cc    Drains: none    Total IV Fluids: 1000 ml    Specimens: gastrectomy    Implants: none    Complications:  None; patient tolerated the procedure well.    Disposition: PACU - hemodynamically stable.    Condition: stable    Attending Attestation: I was present and scrubbed for the entire procedure.

## 2023-01-20 NOTE — NURSING
Patient arrived on the unit via stretcher accompanied by his spouse and nurse in stable condition but drowsy. Vital signs WDL. SCD's connected hydration encouraged. Call light in reach will continue to monitor

## 2023-01-20 NOTE — PLAN OF CARE
Problem: Fluid Imbalance (Pneumonia)  Goal: Fluid Balance  Outcome: Ongoing, Progressing     Problem: Diabetes Comorbidity  Goal: Blood Glucose Level Within Targeted Range  Outcome: Ongoing, Progressing

## 2023-01-20 NOTE — ANESTHESIA PROCEDURE NOTES
Intubation    Date/Time: 1/20/2023 11:07 AM  Performed by: Ruth Cervantes CRNA  Authorized by: Ernesto Li MD     Intubation:     Induction:  Intravenous    Intubated:  Postinduction    Mask Ventilation:  Easy mask    Attempts:  1    Attempted By:  CRNA    Method of Intubation:  Direct    Blade:  Noble 3    Laryngeal View Grade: Grade I - full view of cords      Difficult Airway Encountered?: No      Complications:  None    Airway Device:  Oral endotracheal tube    Airway Device Size:  7.5    Style/Cuff Inflation:  Cuffed (inflated to minimal occlusive pressure)    Tube secured:  21    Secured at:  The lips    Placement Verified By:  Capnometry    Complicating Factors:  None    Findings Post-Intubation:  BS equal bilateral and atraumatic/condition of teeth unchanged

## 2023-01-21 LAB
ANION GAP SERPL CALC-SCNC: 5 MMOL/L (ref 8–16)
BASOPHILS # BLD AUTO: 0.04 K/UL (ref 0–0.2)
BASOPHILS NFR BLD: 0.5 % (ref 0–1.9)
BUN SERPL-MCNC: 12 MG/DL (ref 6–20)
CALCIUM SERPL-MCNC: 8.9 MG/DL (ref 8.7–10.5)
CHLORIDE SERPL-SCNC: 102 MMOL/L (ref 95–110)
CO2 SERPL-SCNC: 31 MMOL/L (ref 23–29)
CREAT SERPL-MCNC: 1.1 MG/DL (ref 0.5–1.4)
DIFFERENTIAL METHOD: ABNORMAL
EOSINOPHIL # BLD AUTO: 0.1 K/UL (ref 0–0.5)
EOSINOPHIL NFR BLD: 1.8 % (ref 0–8)
ERYTHROCYTE [DISTWIDTH] IN BLOOD BY AUTOMATED COUNT: 13.1 % (ref 11.5–14.5)
EST. GFR  (NO RACE VARIABLE): >60 ML/MIN/1.73 M^2
GLUCOSE SERPL-MCNC: 114 MG/DL (ref 70–110)
GLUCOSE SERPL-MCNC: 118 MG/DL (ref 70–110)
GLUCOSE SERPL-MCNC: 127 MG/DL (ref 70–110)
GLUCOSE SERPL-MCNC: 181 MG/DL (ref 70–110)
HCT VFR BLD AUTO: 39.3 % (ref 40–54)
HGB BLD-MCNC: 13.1 G/DL (ref 14–18)
IMM GRANULOCYTES # BLD AUTO: 0.03 K/UL (ref 0–0.04)
IMM GRANULOCYTES NFR BLD AUTO: 0.4 % (ref 0–0.5)
LYMPHOCYTES # BLD AUTO: 2.2 K/UL (ref 1–4.8)
LYMPHOCYTES NFR BLD: 28.1 % (ref 18–48)
MAGNESIUM SERPL-MCNC: 1.6 MG/DL (ref 1.6–2.6)
MCH RBC QN AUTO: 29.9 PG (ref 27–31)
MCHC RBC AUTO-ENTMCNC: 33.3 G/DL (ref 32–36)
MCV RBC AUTO: 90 FL (ref 82–98)
MONOCYTES # BLD AUTO: 0.8 K/UL (ref 0.3–1)
MONOCYTES NFR BLD: 10.6 % (ref 4–15)
NEUTROPHILS # BLD AUTO: 4.7 K/UL (ref 1.8–7.7)
NEUTROPHILS NFR BLD: 58.6 % (ref 38–73)
NRBC BLD-RTO: 0 /100 WBC
PHOSPHATE SERPL-MCNC: 3.5 MG/DL (ref 2.7–4.5)
PLATELET # BLD AUTO: 187 K/UL (ref 150–450)
PMV BLD AUTO: 10.1 FL (ref 9.2–12.9)
POTASSIUM SERPL-SCNC: 4.4 MMOL/L (ref 3.5–5.1)
RBC # BLD AUTO: 4.38 M/UL (ref 4.6–6.2)
SODIUM SERPL-SCNC: 138 MMOL/L (ref 136–145)
WBC # BLD AUTO: 7.96 K/UL (ref 3.9–12.7)

## 2023-01-21 PROCEDURE — 80048 BASIC METABOLIC PNL TOTAL CA: CPT | Performed by: SURGERY

## 2023-01-21 PROCEDURE — 83735 ASSAY OF MAGNESIUM: CPT | Performed by: SURGERY

## 2023-01-21 PROCEDURE — 21400001 HC TELEMETRY ROOM

## 2023-01-21 PROCEDURE — 85025 COMPLETE CBC W/AUTO DIFF WBC: CPT | Performed by: SURGERY

## 2023-01-21 PROCEDURE — 25000003 PHARM REV CODE 250: Performed by: SURGERY

## 2023-01-21 PROCEDURE — 36415 COLL VENOUS BLD VENIPUNCTURE: CPT | Performed by: SURGERY

## 2023-01-21 PROCEDURE — 84100 ASSAY OF PHOSPHORUS: CPT | Performed by: SURGERY

## 2023-01-21 PROCEDURE — 63600175 PHARM REV CODE 636 W HCPCS: Performed by: SURGERY

## 2023-01-21 RX ORDER — GLUCAGON 1 MG
1 KIT INJECTION
Status: DISCONTINUED | OUTPATIENT
Start: 2023-01-21 | End: 2023-01-22 | Stop reason: HOSPADM

## 2023-01-21 RX ORDER — INSULIN ASPART 100 [IU]/ML
1-10 INJECTION, SOLUTION INTRAVENOUS; SUBCUTANEOUS EVERY 6 HOURS PRN
Status: DISCONTINUED | OUTPATIENT
Start: 2023-01-21 | End: 2023-01-22 | Stop reason: HOSPADM

## 2023-01-21 RX ADMIN — HYDROMORPHONE HYDROCHLORIDE 1 MG: 1 INJECTION, SOLUTION INTRAMUSCULAR; INTRAVENOUS; SUBCUTANEOUS at 03:01

## 2023-01-21 RX ADMIN — OXYCODONE HYDROCHLORIDE 10 MG: 5 TABLET ORAL at 01:01

## 2023-01-21 RX ADMIN — CEFAZOLIN SODIUM 2 G: 2 SOLUTION INTRAVENOUS at 06:01

## 2023-01-21 RX ADMIN — SODIUM CHLORIDE, SODIUM LACTATE, POTASSIUM CHLORIDE, AND CALCIUM CHLORIDE: .6; .31; .03; .02 INJECTION, SOLUTION INTRAVENOUS at 01:01

## 2023-01-21 RX ADMIN — HYDROMORPHONE HYDROCHLORIDE 1 MG: 1 INJECTION, SOLUTION INTRAMUSCULAR; INTRAVENOUS; SUBCUTANEOUS at 09:01

## 2023-01-21 RX ADMIN — SODIUM CHLORIDE, SODIUM LACTATE, POTASSIUM CHLORIDE, AND CALCIUM CHLORIDE: .6; .31; .03; .02 INJECTION, SOLUTION INTRAVENOUS at 09:01

## 2023-01-21 RX ADMIN — ENOXAPARIN SODIUM 40 MG: 100 INJECTION SUBCUTANEOUS at 06:01

## 2023-01-21 RX ADMIN — CARVEDILOL 12.5 MG: 12.5 TABLET, FILM COATED ORAL at 08:01

## 2023-01-21 RX ADMIN — CEFAZOLIN SODIUM 2 G: 2 SOLUTION INTRAVENOUS at 01:01

## 2023-01-21 RX ADMIN — PANTOPRAZOLE SODIUM 40 MG: 40 TABLET, DELAYED RELEASE ORAL at 06:01

## 2023-01-21 RX ADMIN — OXYCODONE HYDROCHLORIDE 10 MG: 5 TABLET ORAL at 08:01

## 2023-01-21 RX ADMIN — OXYCODONE HYDROCHLORIDE 10 MG: 5 TABLET ORAL at 06:01

## 2023-01-21 RX ADMIN — HYDROMORPHONE HYDROCHLORIDE 1 MG: 1 INJECTION, SOLUTION INTRAMUSCULAR; INTRAVENOUS; SUBCUTANEOUS at 06:01

## 2023-01-21 NOTE — PLAN OF CARE
[facility]  Initial Discharge Assessment       Primary Care Provider: BLANK Carroll MD    Admission Diagnosis: Morbid obesity [E66.01]  Severe obesity (BMI 35.0-39.9) with comorbidity [E66.01]  Essential hypertension [I10]  Type 2 diabetes mellitus with hyperglycemia, with long-term current use of insulin [E11.65, Z79.4]  TARYN on CPAP [G47.33, Z99.89]  Mixed hyperlipidemia [E78.2]  Gastroesophageal reflux disease, unspecified whether esophagitis present [K21.9]  Body mass index (BMI) 39.0-39.9, adult [Z68.39]    Admission Date: 1/20/2023  Expected Discharge Date: 1/21/2023    Cm met with pt at bedside.  Verified information on face sheet.  No dialysis, no coumadin, no nebulizer.  Spouse will transport home upon discharge.  No needs.    Discharge Barriers Identified: None    Payor: [employer]ICARE / Plan: [employer] / Product Type: Medicare Advantage /     Extended Emergency Contact Information  Primary Emergency Contact: Crystal Forrest  Address: [address] [place] [address]  Home Phone: [phone]  Mobile Phone: [phone]  Relation: Spouse  Secondary Emergency Contact: Cynthia Gonzalez   [facility]  Home Phone: [phone]  Relation: Sister    Discharge Plan A: Home with family  Discharge Plan B: Home with family      [facility] - [place] - [address] [place] [address]  Phone: [phone] Fax: [phone]    [id number] [address] [facility] [address]  [address]  [facility] [address]  Phone: [phone] Fax: [phone]    [facility] [address] [address]  Phone: [phone] Fax: [phone]      Initial Assessment (most recent)       Adult Discharge Assessment - 01/21/23 1403          Discharge Assessment    Assessment  Type Discharge Planning Assessment     Confirmed/corrected address, phone number and insurance Yes     Confirmed Demographics Correct on Facesheet     Source of Information patient     Does patient/caregiver understand observation status --   n/a    Communicated STONE with patient/caregiver Date not available/Unable to determine     Reason For Admission Severe obesity (BMI 35.0-39.9) with comorbidity     People in Home child(natasha), dependent     Facility Arrived From: home     Do you expect to return to your current living situation? Yes     Do you have help at home or someone to help you manage your care at home? Yes     Who are your caregiver(s) and their phone number(s)? Crystal Forrest -spouse     Prior to hospitilization cognitive status: Unable to Assess     Current cognitive status: Alert/Oriented     Walking or Climbing Stairs ambulation difficulty, requires equipment     Mobility Management rw     Dressing/Bathing --   no issue    Equipment Currently Used at Home CPAP;walker, rolling     Patient currently being followed by outpatient case management? No     Do you currently have service(s) that help you manage your care at home? No     Do you take prescription medications? Yes     Do you have prescription coverage? Yes     Coverage Payor:  [employer]IC[employer] - [facility]     Do you have any problems affording any of your prescribed medications? No     Is the patient taking medications as prescribed? yes     Who is going to help you get home at discharge? spouse     How do you get to doctors appointments? car, drives self     Are you on dialysis? No     Do you take coumadin? No     Discharge Plan A Home with family     Discharge Plan B Home with family     DME Needed Upon Discharge  none     Discharge Plan discussed with: Patient     Discharge Barriers Identified None

## 2023-01-21 NOTE — PLAN OF CARE
Patient cleared to discharge by case management.  Patient discharging home self care no needs.       01/21/23 1409   Final Note   Assessment Type Final Discharge Note   Anticipated Discharge Disposition Home   Post-Acute Status   Discharge Delays None known at this time

## 2023-01-21 NOTE — PLAN OF CARE
Problem: Fluid Imbalance (Pneumonia)  Goal: Fluid Balance  Outcome: Ongoing, Progressing     Problem: Infection (Pneumonia)  Goal: Resolution of Infection Signs and Symptoms  Outcome: Ongoing, Progressing     Problem: Respiratory Compromise (Pneumonia)  Goal: Effective Oxygenation and Ventilation  Outcome: Ongoing, Progressing     Problem: Adult Inpatient Plan of Care  Goal: Plan of Care Review  Outcome: Ongoing, Progressing  Goal: Patient-Specific Goal (Individualized)  Outcome: Ongoing, Progressing  Goal: Absence of Hospital-Acquired Illness or Injury  Outcome: Ongoing, Progressing  Goal: Optimal Comfort and Wellbeing  Outcome: Ongoing, Progressing  Goal: Readiness for Transition of Care  Outcome: Ongoing, Progressing     Problem: Diabetes Comorbidity  Goal: Blood Glucose Level Within Targeted Range  Outcome: Ongoing, Progressing     Problem: Bariatric Environmental Safety  Goal: Safety Maintained with Care  Outcome: Ongoing, Progressing     Problem: Fall Injury Risk  Goal: Absence of Fall and Fall-Related Injury  Outcome: Ongoing, Progressing

## 2023-01-22 VITALS
TEMPERATURE: 97 F | DIASTOLIC BLOOD PRESSURE: 78 MMHG | OXYGEN SATURATION: 94 % | BODY MASS INDEX: 38.76 KG/M2 | WEIGHT: 302 LBS | SYSTOLIC BLOOD PRESSURE: 128 MMHG | HEART RATE: 64 BPM | RESPIRATION RATE: 20 BRPM | HEIGHT: 74 IN

## 2023-01-22 LAB
GLUCOSE SERPL-MCNC: 100 MG/DL (ref 70–110)
GLUCOSE SERPL-MCNC: 112 MG/DL (ref 70–110)

## 2023-01-22 PROCEDURE — 63600175 PHARM REV CODE 636 W HCPCS: Performed by: SURGERY

## 2023-01-22 PROCEDURE — 25000003 PHARM REV CODE 250: Performed by: SURGERY

## 2023-01-22 RX ADMIN — SODIUM CHLORIDE, SODIUM LACTATE, POTASSIUM CHLORIDE, AND CALCIUM CHLORIDE: .6; .31; .03; .02 INJECTION, SOLUTION INTRAVENOUS at 05:01

## 2023-01-22 RX ADMIN — PANTOPRAZOLE SODIUM 40 MG: 40 TABLET, DELAYED RELEASE ORAL at 05:01

## 2023-01-22 RX ADMIN — CARVEDILOL 12.5 MG: 12.5 TABLET, FILM COATED ORAL at 08:01

## 2023-01-22 RX ADMIN — OXYCODONE HYDROCHLORIDE 10 MG: 5 TABLET ORAL at 08:01

## 2023-01-22 NOTE — DISCHARGE SUMMARY
[facility]  General Surgery  Discharge Summary      Patient Name: Gio Forrest  MRN: [id number]  Admission Date: 1/20/2023  Hospital Length of Stay: 2 days  Discharge Date and Time:  01/22/2023 2:04 PM  Attending Physician: Mary Ellis MD   Discharging Provider: Alfonso Murray MD  Primary Care Provider: BLANK Carroll MD     HPI:  Patient presented for elective sleeve gastrectomy    Procedure(s) (LRB):  GASTRECTOMY, SLEEVE, LAPAROSCOPIC (N/A)     Hospital Course:  Patient did well after elective procedure.  He was started on clear liquids.  He remained afebrile and not tachycardic.  Pain was reasonably well controlled.  Tolerating sips of clear liquids    Vitals were stable   Afebrile  Abdomen is benign    Consults:   Consults (From admission, onward)          Status Ordering Provider     Inpatient consult to Hospitalist  Once        Provider:  Bashir Ayala MD    Acknowledged MARY ELLIS     Inpatient consult to Respiratory Care  Once        Provider:  (Not yet assigned)    Acknowledged MARY ELLIS     Inpatient consult to Respiratory Care  Once        Provider:  (Not yet assigned)    Acknowledged CINDY BROWN            Significant Diagnostic Studies:  See epic for full record    Pending Diagnostic Studies:       Procedure Component Value Units Date/Time    Specimen to Pathology - Surgery [[id number]] Collected: 01/20/23 1156    Order Status: Sent Lab Status: No result     Specimen: Tissue           Final Active Diagnoses:    Diagnosis Date Noted POA    PRINCIPAL PROBLEM:  Severe obesity (BMI 35.0-39.9) with comorbidity [E66.01] 06/23/2020 Yes    Essential hypertension [I10] 02/12/2016 Yes    Type 2 diabetes mellitus with hyperglycemia, with long-term current use of insulin [E11.65, Z79.4] 08/12/2015 Not Applicable    TARYN on CPAP [G47.33, Z99.89] 08/12/2015 Not Applicable      Problems Resolved During this Admission:      Discharged Condition: good    Disposition:  Home or Self Care    Follow Up:   Follow-up Information       Bharat Devine MD. Call in 2 week(s).    Specialties: General Surgery, Bariatrics, Surgery  Contact information:  [address]  [facility] [id number] [phone]                           Patient Instructions:      Diet clear liquid     Diet clear liquid     Lifting restrictions     Notify your health care provider if you experience any of the following:  temperature >100.4     Notify your health care provider if you experience any of the following:  persistent nausea and vomiting or diarrhea     Notify your health care provider if you experience any of the following:  severe uncontrolled pain     Notify your health care provider if you experience any of the following:  redness, tenderness, or signs of infection (pain, swelling, redness, odor or green/yellow discharge around incision site)     Remove dressing in 24 hours     Lifting restrictions   Order Comments: No more than 10 lbs     No driving until:   Order Comments: Off narcotics     Notify your health care provider if you experience any of the following:  increased confusion or weakness     Notify your health care provider if you experience any of the following:  persistent dizziness, light-headedness, or visual disturbances     Notify your health care provider if you experience any of the following:  worsening rash     Notify your health care provider if you experience any of the following:  severe persistent headache     Notify your health care provider if you experience any of the following:  difficulty breathing or increased cough     Notify your health care provider if you experience any of the following:  redness, tenderness, or signs of infection (pain, swelling, redness, odor or green/yellow discharge around incision site)     Notify your health care provider if you experience any of the following:  severe uncontrolled pain     Notify your health care provider if you experience  any of the following:  persistent nausea and vomiting or diarrhea     Notify your health care provider if you experience any of the following:  temperature >100.4     No dressing needed   Order Comments: Okay to shower.  No swimming or soaking for 2 weeks.     Medications:  Reconciled Home Medications:      Medication List        START taking these medications      HYDROcodone-acetaminophen 7.5-325 mg per tablet  Commonly known as: NORCO  Take 1 tablet by mouth every 4 (four) hours as needed for Pain.     insulin detemir U-100 100 unit/mL (3 mL) Inpn pen  Commonly known as: Levemir FLEXTOUCH  Inject 12 Units into the skin every evening.  Replaces: insulin glargine 100 units/mL SubQ pen     ondansetron 4 MG Tbdl  Commonly known as: ZOFRAN-ODT  Take 1 tablet (4 mg total) by mouth every 6 (six) hours as needed (nv).            CONTINUE taking these medications      atorvastatin 40 MG tablet  Commonly known as: LIPITOR  take one Tablet by mouth once daily in the evening     blood sugar diagnostic Strp  Commonly known as: CONTOUR NEXT TEST STRIPS  Use to Test 4 times a day.  Contour Next test strips required for PromoteU insulin pump     blood-glucose meter kit  To check BG 2 times daily, to use with insurance preferred meter     carvediloL 12.5 MG tablet  Commonly known as: COREG  TAKE ONE TABLET (12.5MG) BY MOUTH TWICE DAILY with meals     * cholecalciferol (vitamin D3) 125 mcg (5,000 unit) capsule  Take 5,000 Units by mouth once daily.     * vitamin D 1000 units Tab  Commonly known as: VITAMIN D3  Take 1,000 Units by mouth once daily.     DEXCOM G6  Misc  Generic drug: blood-glucose meter,continuous  Dispense 1      DEXCOM G6 SENSOR Oxana  Generic drug: blood-glucose sensor  Dispense 3 sensors/month     DEXCOM G6 TRANSMITTER Oxana  Generic drug: blood-glucose transmitter  Dispense 1 transmitter     docusate sodium 100 MG capsule  Commonly known as: COLACE  Take 200 mg by mouth once daily.     famotidine  "40 MG tablet  Commonly known as: PEPCID  Take 1 tablet (40 mg total) by mouth every evening.     gabapentin 600 MG tablet  Commonly known as: NEURONTIN  take one Tablet by mouth three times a day     infusion set for insulin pump Iset  Commonly known as: MINIMED PRO-SET INFUSION 24"  Changes site q2days, dispense 90day supply     insulin lispro 100 unit/mL injection  Commonly known as: HumaLOG U-100 Insulin  INJECT ONE unit FOR EVERY FIVE grams of carbs AND ONE unit FOR EVERY 25 points of glucose over 150. Max daily DOSE of 150 units total.     MINIMED SYRINGE RESERVOIR 3 mL Misc  Generic drug: insulin pump syringe  Changes q2days, dispense 3 month supply           * This list has 2 medication(s) that are the same as other medications prescribed for you. Read the directions carefully, and ask your doctor or other care provider to review them with you.                STOP taking these medications      allopurinoL 100 MG tablet  Commonly known as: ZYLOPRIM     ascorbic acid (vitamin C) 500 MG tablet  Commonly known as: VITAMIN C     aspirin 81 MG EC tablet  Commonly known as: ECOTRIN     azelastine 137 mcg (0.1 %) nasal spray  Commonly known as: ASTELIN     fluticasone propionate 50 mcg/actuation nasal spray  Commonly known as: FLONASE     GLUCOSAMINE COMPLEX ORAL     insulin glargine 100 units/mL SubQ pen  Commonly known as: LANTUS SOLOSTAR U-100 INSULIN  Replaced by: insulin detemir U-100 100 unit/mL (3 mL) Inpn pen     ketoconazole 2 % cream  Commonly known as: NIZORAL     Lactobacillus rhamnosus GG 10 billion cell capsule  Commonly known as: CULTURELLE     lancets Misc     meloxicam 15 MG tablet  Commonly known as: MOBIC     metFORMIN 1000 MG tablet  Commonly known as: GLUCOPHAGE     multivitamin per tablet  Commonly known as: THERAGRAN     OZEMPIC 2 mg/dose (8 mg/3 mL) Pnij  Generic drug: semaglutide     pantoprazole 40 MG tablet  Commonly known as: PROTONIX     pioglitazone 15 MG tablet  Commonly known as: " ACTOS     topiramate 25 MG tablet  Commonly known as: TOPAMAX     vitamin E 400 UNIT capsule              Alfonso Murray MD  General Surgery  [facility]

## 2023-01-22 NOTE — DISCHARGE INSTRUCTIONS
Discharge paperwork reviewed with patient. Pt to notify MD if temp >100.4, N/V/D, severe uncontrolled pain, or infection occurs. No issues/complaints

## 2023-01-24 ENCOUNTER — OFFICE VISIT (OUTPATIENT)
Dept: ENDOCRINOLOGY | Facility: CLINIC | Age: 58
End: 2023-01-24
Payer: MEDICARE

## 2023-01-24 VITALS
HEART RATE: 84 BPM | SYSTOLIC BLOOD PRESSURE: 124 MMHG | BODY MASS INDEX: 38.86 KG/M2 | OXYGEN SATURATION: 94 % | DIASTOLIC BLOOD PRESSURE: 70 MMHG | HEIGHT: 74 IN | WEIGHT: 302.81 LBS

## 2023-01-24 DIAGNOSIS — E66.01 SEVERE OBESITY (BMI 35.0-39.9) WITH COMORBIDITY: ICD-10-CM

## 2023-01-24 DIAGNOSIS — Z79.4 TYPE 2 DIABETES MELLITUS WITH DIABETIC POLYNEUROPATHY, WITH LONG-TERM CURRENT USE OF INSULIN: Primary | ICD-10-CM

## 2023-01-24 DIAGNOSIS — I42.8 OTHER CARDIOMYOPATHY: ICD-10-CM

## 2023-01-24 DIAGNOSIS — E11.42 TYPE 2 DIABETES MELLITUS WITH DIABETIC POLYNEUROPATHY, WITH LONG-TERM CURRENT USE OF INSULIN: Primary | ICD-10-CM

## 2023-01-24 DIAGNOSIS — E78.2 MIXED HYPERLIPIDEMIA: ICD-10-CM

## 2023-01-24 DIAGNOSIS — I10 ESSENTIAL HYPERTENSION: ICD-10-CM

## 2023-01-24 DIAGNOSIS — I25.10 ATHEROSCLEROSIS OF NATIVE CORONARY ARTERY OF NATIVE HEART WITHOUT ANGINA PECTORIS: ICD-10-CM

## 2023-01-24 PROCEDURE — 3051F PR MOST RECENT HEMOGLOBIN A1C LEVEL 7.0 - < 8.0%: ICD-10-PCS | Mod: CPTII,S$GLB,, | Performed by: NURSE PRACTITIONER

## 2023-01-24 PROCEDURE — 1111F PR DISCHARGE MEDS RECONCILED W/ CURRENT OUTPATIENT MED LIST: ICD-10-PCS | Mod: CPTII,S$GLB,, | Performed by: NURSE PRACTITIONER

## 2023-01-24 PROCEDURE — 3074F SYST BP LT 130 MM HG: CPT | Mod: CPTII,S$GLB,, | Performed by: NURSE PRACTITIONER

## 2023-01-24 PROCEDURE — 3008F BODY MASS INDEX DOCD: CPT | Mod: CPTII,S$GLB,, | Performed by: NURSE PRACTITIONER

## 2023-01-24 PROCEDURE — 99999 PR PBB SHADOW E&M-EST. PATIENT-LVL III: ICD-10-PCS | Mod: PBBFAC,,, | Performed by: NURSE PRACTITIONER

## 2023-01-24 PROCEDURE — 99213 OFFICE O/P EST LOW 20 MIN: CPT | Mod: S$GLB,,, | Performed by: NURSE PRACTITIONER

## 2023-01-24 PROCEDURE — 95251 PR GLUCOSE MONITOR, 72 HOUR, PHYS INTERP: ICD-10-PCS | Mod: S$GLB,,, | Performed by: NURSE PRACTITIONER

## 2023-01-24 PROCEDURE — 1111F DSCHRG MED/CURRENT MED MERGE: CPT | Mod: CPTII,S$GLB,, | Performed by: NURSE PRACTITIONER

## 2023-01-24 PROCEDURE — 3051F HG A1C>EQUAL 7.0%<8.0%: CPT | Mod: CPTII,S$GLB,, | Performed by: NURSE PRACTITIONER

## 2023-01-24 PROCEDURE — 3074F PR MOST RECENT SYSTOLIC BLOOD PRESSURE < 130 MM HG: ICD-10-PCS | Mod: CPTII,S$GLB,, | Performed by: NURSE PRACTITIONER

## 2023-01-24 PROCEDURE — 3078F DIAST BP <80 MM HG: CPT | Mod: CPTII,S$GLB,, | Performed by: NURSE PRACTITIONER

## 2023-01-24 PROCEDURE — 95251 CONT GLUC MNTR ANALYSIS I&R: CPT | Mod: S$GLB,,, | Performed by: NURSE PRACTITIONER

## 2023-01-24 PROCEDURE — 3078F PR MOST RECENT DIASTOLIC BLOOD PRESSURE < 80 MM HG: ICD-10-PCS | Mod: CPTII,S$GLB,, | Performed by: NURSE PRACTITIONER

## 2023-01-24 PROCEDURE — 99213 PR OFFICE/OUTPT VISIT, EST, LEVL III, 20-29 MIN: ICD-10-PCS | Mod: S$GLB,,, | Performed by: NURSE PRACTITIONER

## 2023-01-24 PROCEDURE — 99999 PR PBB SHADOW E&M-EST. PATIENT-LVL III: CPT | Mod: PBBFAC,,, | Performed by: NURSE PRACTITIONER

## 2023-01-24 PROCEDURE — 1159F MED LIST DOCD IN RCRD: CPT | Mod: CPTII,S$GLB,, | Performed by: NURSE PRACTITIONER

## 2023-01-24 PROCEDURE — 1159F PR MEDICATION LIST DOCUMENTED IN MEDICAL RECORD: ICD-10-PCS | Mod: CPTII,S$GLB,, | Performed by: NURSE PRACTITIONER

## 2023-01-24 PROCEDURE — 3008F PR BODY MASS INDEX (BMI) DOCUMENTED: ICD-10-PCS | Mod: CPTII,S$GLB,, | Performed by: NURSE PRACTITIONER

## 2023-01-24 RX ORDER — BLOOD-GLUCOSE SENSOR
EACH MISCELLANEOUS
Qty: 3 EACH | Refills: 12 | Status: SHIPPED | OUTPATIENT
Start: 2023-01-24 | End: 2024-03-05

## 2023-01-24 RX ORDER — BLOOD-GLUCOSE TRANSMITTER
EACH MISCELLANEOUS
Qty: 1 EACH | Refills: 3 | Status: SHIPPED | OUTPATIENT
Start: 2023-01-24 | End: 2024-03-05

## 2023-01-24 NOTE — PROGRESS NOTES
CC: This 57 y.o. male presents for management of diabetes  and chronic conditions pending review including HTN, HLP, morbid obesity, fatty liver, vitamin d deficiency, CM, CAD     HPI: He was diagnosed with T2DM in 2005. Has never been hospitalized r/t DM.  Mother has passed away from Fatty liver and hepatic carcinoma in 2018  Family hx of DM: sister, mom and dad  Father passed away 5/2022    Arrives 15 mins late for his appt  Had gastric sleeve on Friday, currently on clear liquids- 64 oz a day   Off pump since Thursday, He is currently on no diabetes meds  Also stopped the Dexcom on Thursday  See Dexcom download until then in Media tba tab  Time in range 90%  Hypoglycemia < 1%  Discharged from hospital on Sunday- can see these- bg were 100-180  Did not check bg yesterday  Checked this am- 160   Exercise: none  CURRENT DM MEDS:  none  Glucometer type: True Metrix  Standards of Care:  Eye exam: 2/2022  no h/o retinopathy, [facility]    Following w Dr Bergman's in cardiology      ROS:   Gen: Appetite good, weight loss 12 lbs  Eyes: Denies visual disturbances  Resp: no SOB or HENDERSON, no cough  Cardiac: No palpitations, chest pain, no edema   GI: No nausea or vomiting, diarrhea, constipation, or abdominal pain.  /GYN: No nocturia, burning or pain.   MS/Neuro: +numbness/ tingling in BLE; Gait steady, speech clear  Other systems: negative.    PE:  GENERAL: Well developed, well nourished.appears older than age.   PSYCH: AAOx3, appropriate mood and affect, pleasant expression, conversant, appears relaxed, well groomed.   EYES: Conjunctiva, corneas clear  NECK: Supple, trachea midline   NEURO: Gait steady  SKIN:   no acanthosis nigracans.  FOOT EXAMINATION:  3/22/2022  No foot deformity, corns or callus formation,  nails in good condiiton and well trimmed, no interspace maceration or ulceration noted.  Decreased hair growth present over toes/feet.  Protective sensation intact with 10 gram monofilament.  +2 dorsalis  pedis and posterior pulses noted.    Lab Results   Component Value Date    MICALBCREAT 5.3 09/16/2022       Hemoglobin A1C   Date Value Ref Range Status   01/17/2023 7.1 (H) 4.0 - 5.6 % Final     Comment:     ADA Screening Guidelines:  5.7-6.4%  Consistent with prediabetes  >or=6.5%  Consistent with diabetes    High levels of fetal hemoglobin interfere with the HbA1C  assay. Heterozygous hemoglobin variants (HbS, HgC, etc)do  not significantly interfere with this assay.   However, presence of multiple variants may affect accuracy.     09/16/2022 7.0 (H) 4.0 - 5.6 % Final     Comment:     ADA Screening Guidelines:  5.7-6.4%  Consistent with prediabetes  >or=6.5%  Consistent with diabetes    High levels of fetal hemoglobin interfere with the HbA1C  assay. Heterozygous hemoglobin variants (HbS, HgC, etc)do  not significantly interfere with this assay.   However, presence of multiple variants may affect accuracy.     08/25/2022 7.1 (H) 4.0 - 5.6 % Final     Comment:     ADA Screening Guidelines:  5.7-6.4%  Consistent with prediabetes  >or=6.5%  Consistent with diabetes    High levels of fetal hemoglobin interfere with the HbA1C  assay. Heterozygous hemoglobin variants (HbS, HgC, etc)do  not significantly interfere with this assay.   However, presence of multiple variants may affect accuracy.          ASSESSMENT and PLAN:    1. Type 2 diabetes w hyperglycemia , DM PN  No DM meds for now  Put Dexcom back on  Will try and see if he can get Dexcom G6 abigail on phone so he can share with the clinic  Notify me for hyperglycemic issues once Dexcom resumed  Diet to advance to full liquids Friday so anticipate possible small excursions    2. HTN - controlled, continue meds as previously prescribed and monitor.     3. HLP -  Resume statin therapy,     4. LINDSEY-  continue weight loss  mother passed away w HCC    5. Obesity-  Body mass index is 38.88 kg/m².   Post gastric sleeve 1/20, following w Dr Devine      6. CAD, CM  Follows w  Dr Bergman's       Follow-up:  in 1 month

## 2023-01-27 ENCOUNTER — OFFICE VISIT (OUTPATIENT)
Dept: OTOLARYNGOLOGY | Facility: CLINIC | Age: 58
End: 2023-01-27
Payer: MEDICARE

## 2023-01-27 ENCOUNTER — CLINICAL SUPPORT (OUTPATIENT)
Dept: ENDOCRINOLOGY | Facility: CLINIC | Age: 58
End: 2023-01-27
Payer: MEDICARE

## 2023-01-27 ENCOUNTER — TELEPHONE (OUTPATIENT)
Dept: ENDOCRINOLOGY | Facility: CLINIC | Age: 58
End: 2023-01-27

## 2023-01-27 ENCOUNTER — CLINICAL SUPPORT (OUTPATIENT)
Dept: AUDIOLOGY | Facility: CLINIC | Age: 58
End: 2023-01-27
Payer: MEDICARE

## 2023-01-27 VITALS — HEIGHT: 74 IN | BODY MASS INDEX: 38.88 KG/M2 | WEIGHT: 302.94 LBS

## 2023-01-27 DIAGNOSIS — H90.11 CONDUCTIVE HEARING LOSS OF RIGHT EAR, UNSPECIFIED HEARING STATUS ON CONTRALATERAL SIDE: ICD-10-CM

## 2023-01-27 DIAGNOSIS — K21.9 LARYNGOPHARYNGEAL REFLUX (LPR): ICD-10-CM

## 2023-01-27 DIAGNOSIS — H69.91 ETD (EUSTACHIAN TUBE DYSFUNCTION), RIGHT: ICD-10-CM

## 2023-01-27 DIAGNOSIS — H90.11 CONDUCTIVE HEARING LOSS OF RIGHT EAR, UNSPECIFIED HEARING STATUS ON CONTRALATERAL SIDE: Primary | ICD-10-CM

## 2023-01-27 DIAGNOSIS — H90.11 CONDUCTIVE HEARING LOSS OF RIGHT EAR WITH UNRESTRICTED HEARING OF LEFT EAR: Primary | ICD-10-CM

## 2023-01-27 PROCEDURE — 99999 PR PBB SHADOW E&M-EST. PATIENT-LVL I: ICD-10-PCS | Mod: PBBFAC,,,

## 2023-01-27 PROCEDURE — 3008F BODY MASS INDEX DOCD: CPT | Mod: CPTII,S$GLB,, | Performed by: STUDENT IN AN ORGANIZED HEALTH CARE EDUCATION/TRAINING PROGRAM

## 2023-01-27 PROCEDURE — 92567 PR TYMPA2METRY: ICD-10-PCS | Mod: S$GLB,,, | Performed by: AUDIOLOGIST

## 2023-01-27 PROCEDURE — 99213 OFFICE O/P EST LOW 20 MIN: CPT | Mod: S$GLB,,, | Performed by: STUDENT IN AN ORGANIZED HEALTH CARE EDUCATION/TRAINING PROGRAM

## 2023-01-27 PROCEDURE — 1111F PR DISCHARGE MEDS RECONCILED W/ CURRENT OUTPATIENT MED LIST: ICD-10-PCS | Mod: CPTII,S$GLB,, | Performed by: STUDENT IN AN ORGANIZED HEALTH CARE EDUCATION/TRAINING PROGRAM

## 2023-01-27 PROCEDURE — 1159F MED LIST DOCD IN RCRD: CPT | Mod: CPTII,S$GLB,, | Performed by: STUDENT IN AN ORGANIZED HEALTH CARE EDUCATION/TRAINING PROGRAM

## 2023-01-27 PROCEDURE — 1111F DSCHRG MED/CURRENT MED MERGE: CPT | Mod: CPTII,S$GLB,, | Performed by: STUDENT IN AN ORGANIZED HEALTH CARE EDUCATION/TRAINING PROGRAM

## 2023-01-27 PROCEDURE — 3008F PR BODY MASS INDEX (BMI) DOCUMENTED: ICD-10-PCS | Mod: CPTII,S$GLB,, | Performed by: STUDENT IN AN ORGANIZED HEALTH CARE EDUCATION/TRAINING PROGRAM

## 2023-01-27 PROCEDURE — 99999 PR PBB SHADOW E&M-EST. PATIENT-LVL III: ICD-10-PCS | Mod: PBBFAC,,, | Performed by: STUDENT IN AN ORGANIZED HEALTH CARE EDUCATION/TRAINING PROGRAM

## 2023-01-27 PROCEDURE — 3051F HG A1C>EQUAL 7.0%<8.0%: CPT | Mod: CPTII,S$GLB,, | Performed by: STUDENT IN AN ORGANIZED HEALTH CARE EDUCATION/TRAINING PROGRAM

## 2023-01-27 PROCEDURE — 99213 PR OFFICE/OUTPT VISIT, EST, LEVL III, 20-29 MIN: ICD-10-PCS | Mod: S$GLB,,, | Performed by: STUDENT IN AN ORGANIZED HEALTH CARE EDUCATION/TRAINING PROGRAM

## 2023-01-27 PROCEDURE — 99999 PR PBB SHADOW E&M-EST. PATIENT-LVL III: CPT | Mod: PBBFAC,,, | Performed by: STUDENT IN AN ORGANIZED HEALTH CARE EDUCATION/TRAINING PROGRAM

## 2023-01-27 PROCEDURE — 92557 PR COMPREHENSIVE HEARING TEST: ICD-10-PCS | Mod: S$GLB,,, | Performed by: AUDIOLOGIST

## 2023-01-27 PROCEDURE — 92567 TYMPANOMETRY: CPT | Mod: S$GLB,,, | Performed by: AUDIOLOGIST

## 2023-01-27 PROCEDURE — 99999 PR PBB SHADOW E&M-EST. PATIENT-LVL I: CPT | Mod: PBBFAC,,,

## 2023-01-27 PROCEDURE — 1159F PR MEDICATION LIST DOCUMENTED IN MEDICAL RECORD: ICD-10-PCS | Mod: CPTII,S$GLB,, | Performed by: STUDENT IN AN ORGANIZED HEALTH CARE EDUCATION/TRAINING PROGRAM

## 2023-01-27 PROCEDURE — 92557 COMPREHENSIVE HEARING TEST: CPT | Mod: S$GLB,,, | Performed by: AUDIOLOGIST

## 2023-01-27 PROCEDURE — 3051F PR MOST RECENT HEMOGLOBIN A1C LEVEL 7.0 - < 8.0%: ICD-10-PCS | Mod: CPTII,S$GLB,, | Performed by: STUDENT IN AN ORGANIZED HEALTH CARE EDUCATION/TRAINING PROGRAM

## 2023-01-27 NOTE — PROGRESS NOTES
Patient returned to clinic today to upload Dexcom.      The Dexcom will be scanned and downloaded. All reports will be imported into the patient's electronic medical record.     Endocrine provider will complete data interpretation and make recommendations; will forward recommendations to the ordering provider for follow up with patient.

## 2023-01-27 NOTE — PROGRESS NOTES
Subjective:       Patient ID: Gio Forrest is a 57 y.o. male.    Chief Complaint: Follow-up and Hearing Loss      Ear Fullness     Follow-up     Anjali 10/4/22: Patient was last seen by me 6 weeks ago for crackling/popping sounds AU; he had clear middle ears at that time. Patient states that approximately two weeks after that visit, his right ear filled up again. Feels like fluid in his right ear; feels like a hollow drum, can feel fluid shifting when he changes position.     11/8/22: RTC to see me, has been doing flonase and astelin BID. Cannot pop his right ear or left ear but only hears sounds on right. Sounds muffled, feels like there is fluid. Wears CPAP, 10 years, ear issues only recently. Has some weight fluctuation. Not recent. No new medicines. Had PNA in May, right before these issues.   Audio 2017 with mild mixed loss on right.   Did have ear issues as a kid.   Had MMA years ago for TARYN.   Also reports lots of throat phlegm, worse at night. Nasal sprays may have helped a minor amount. Take protonix at night.     11/29/22: Right ear feels somewhat better. He still cannot pop right, left can. Hearing less muffled. Using flonase. Dual reflex regimen has helped with phlegm. Right ear tube placed.     1/27/23: RTC. Ear has been ok, not draining, not painful, hearing better. Phlegm seems better. Taking pepcid at night. Took him off protonix. Not using flonase. Has not needed. Just has gastric sleeve.     Review of Systems   Constitutional: Negative.    HENT: Negative.     Eyes: Negative.    Respiratory: Negative.     Cardiovascular: Negative.    Gastrointestinal: Negative.    Musculoskeletal: Negative.    Integumentary:  Negative.   Neurological: Negative.    Hematological: Negative.    Psychiatric/Behavioral: Negative.         Objective:      Physical Exam  Vitals and nursing note reviewed.   Constitutional:       General: He is not in acute distress.     Appearance: He is well-developed. He is not  ill-appearing or diaphoretic.   HENT:      Head: Normocephalic and atraumatic.      Right Ear: Hearing, ear canal and external ear normal. No middle ear effusion. A PE tube is present. Tympanic membrane is retracted (retracted but with tube, no effusion). Tympanic membrane is not erythematous.      Left Ear: Hearing, tympanic membrane, ear canal and external ear normal.  No middle ear effusion. Tympanic membrane is not erythematous.      Nose: Nose normal.   Eyes:      General: Lids are normal. No scleral icterus.        Right eye: No discharge.         Left eye: No discharge.   Neck:      Trachea: Trachea normal. No tracheal deviation.   Cardiovascular:      Rate and Rhythm: Normal rate.   Pulmonary:      Effort: Pulmonary effort is normal. No respiratory distress.      Breath sounds: No stridor. No wheezing.   Musculoskeletal:         General: Normal range of motion.      Cervical back: Normal range of motion and neck supple.   Skin:     General: Skin is warm and dry.      Coloration: Skin is not pale.   Neurological:      Mental Status: He is alert and oriented to person, place, and time.      Coordination: Coordination normal.      Gait: Gait normal.   Psychiatric:         Speech: Speech normal.         Behavior: Behavior normal. Behavior is cooperative.         Thought Content: Thought content normal.         Judgment: Judgment normal.        Prior scope:     Septum is midline. Nasal mucosa moist. Turbinates are normal size and respond to decongestant appropriately. Right maxillary sinus and sphenoethmoidal recess without purulence or polyps. Left maxillary sinus and sphenoethmoidal recess without purulence or polyps. No adenoid hypertrophy. Fossa of Rosenmüller clear bilaterally    Audio:       Assessment:     Tube placed 11/29/22 for chronic R TAMRA with recurrence after myringotomy. Scope was clear. Still with retraction but tube patent, valsalva -.   Conductive hearing loss right improved, slight asymmetry.    LPR      Plan:           Drops right ear BID 7 days as needed for drainage  Stable reflux on pepcid at night.   Try to pop ears a couple of times a day    6 months to recheck ear    Lisset Tao MD

## 2023-01-27 NOTE — PATIENT INSTRUCTIONS
Drops right ear twice a 7 days as needed for drainage or clogging  Stable reflux on pepcid at night. Keep doing this.   Try to pop ears a couple of times a day

## 2023-01-27 NOTE — TELEPHONE ENCOUNTER
"S/w pt. Informed of Gabby Rooney DNP message of:     "These look great! He's starting full liquids today, he should let me know if he starts to have bg elevations "     Pt verbalized understanding.   "

## 2023-01-31 ENCOUNTER — OFFICE VISIT (OUTPATIENT)
Dept: PSYCHIATRY | Facility: CLINIC | Age: 58
End: 2023-01-31
Payer: COMMERCIAL

## 2023-01-31 DIAGNOSIS — F41.1 GAD (GENERALIZED ANXIETY DISORDER): Primary | ICD-10-CM

## 2023-01-31 PROCEDURE — 90834 PSYTX W PT 45 MINUTES: CPT | Mod: S$GLB,,, | Performed by: SOCIAL WORKER

## 2023-01-31 PROCEDURE — 1159F MED LIST DOCD IN RCRD: CPT | Mod: CPTII,S$GLB,, | Performed by: SOCIAL WORKER

## 2023-01-31 PROCEDURE — 90834 PR PSYCHOTHERAPY W/PATIENT, 45 MIN: ICD-10-PCS | Mod: S$GLB,,, | Performed by: SOCIAL WORKER

## 2023-01-31 PROCEDURE — 3051F PR MOST RECENT HEMOGLOBIN A1C LEVEL 7.0 - < 8.0%: ICD-10-PCS | Mod: CPTII,S$GLB,, | Performed by: SOCIAL WORKER

## 2023-01-31 PROCEDURE — 1159F PR MEDICATION LIST DOCUMENTED IN MEDICAL RECORD: ICD-10-PCS | Mod: CPTII,S$GLB,, | Performed by: SOCIAL WORKER

## 2023-01-31 PROCEDURE — 3051F HG A1C>EQUAL 7.0%<8.0%: CPT | Mod: CPTII,S$GLB,, | Performed by: SOCIAL WORKER

## 2023-01-31 NOTE — PROGRESS NOTES
Individual Psychotherapy (PhD/LCSW)    1/31/2023    Site:  [facility]         Therapeutic Intervention: Met with patient.  Outpatient - Behavior modifying psychotherapy 45 min - CPT code 54555    Chief complaint/reason for encounter: anger, anxiety, and interpersonal     Interval history and content of current session: Patient in this date. He was last seen on 1/17/23. Patient reports he is doing well physically. He had gastric bypass surgery on 1/20/23 and reports everything went well and he is recovery well so far. Patient indicated he is uncertain how much weight he has lost to date, but is aware he has lost as his clothes are already getting too big for him. He will be able to return to eating solid foods next week. Patient reports his mood has improved. He finds he is less anxious and stressed. He does report moments of anger with his neighbor (sister-in-law's boyfriend) but he is less angry than when he started therapy. Patient states he is sticking to his plan of only dealing with him if he has to and planning to eventually purchase a fence for more privacy and to keep their dogs off of his property.     Patient has up-coming plans for a cruise in February-2/20. His wife will have surgery in March and then in April patient will start school, with his ultimate plans to return to work. Patient's affect and mood confirm his report that he is doing better physically and emotionally.    Treatment plan:  Target symptoms: anxiety , anger and interpersonal  Why chosen therapy is appropriate versus another modality: evidence based practice  Outcome monitoring methods: self-report, observation  Therapeutic intervention type: behavior modifying psychotherapy, supportive psychotherapy    Risk parameters:  Patient reports no suicidal ideation  Patient reports no homicidal ideation  Patient reports no self-injurious behavior  Patient reports no violent behavior    Verbal deficits: None    Patient's response to  intervention:  The patient's response to intervention is accepting, motivated.    Progress toward goals and other mental status changes:  The patient's progress toward goals is good.    Diagnosis:     ICD-10-CM ICD-9-CM   1. KENNEDY (generalized anxiety disorder)  F41.1 300.02       Plan:  individual psychotherapy and medication management by physician    Return to clinic: 2 weeks    Length of Service (minutes): 45

## 2023-02-02 ENCOUNTER — PATIENT MESSAGE (OUTPATIENT)
Dept: SURGERY | Facility: CLINIC | Age: 58
End: 2023-02-02
Payer: MEDICARE

## 2023-02-02 ENCOUNTER — TELEPHONE (OUTPATIENT)
Dept: SURGERY | Facility: CLINIC | Age: 58
End: 2023-02-02
Payer: MEDICARE

## 2023-02-02 DIAGNOSIS — E11.9 TYPE 2 DIABETES MELLITUS WITHOUT COMPLICATION, UNSPECIFIED WHETHER LONG TERM INSULIN USE: ICD-10-CM

## 2023-02-07 ENCOUNTER — PATIENT MESSAGE (OUTPATIENT)
Dept: ADMINISTRATIVE | Facility: HOSPITAL | Age: 58
End: 2023-02-07
Payer: MEDICARE

## 2023-02-10 ENCOUNTER — OFFICE VISIT (OUTPATIENT)
Dept: BARIATRICS | Facility: CLINIC | Age: 58
End: 2023-02-10
Payer: MEDICARE

## 2023-02-10 VITALS
HEIGHT: 75 IN | HEART RATE: 73 BPM | TEMPERATURE: 98 F | SYSTOLIC BLOOD PRESSURE: 114 MMHG | WEIGHT: 284.81 LBS | BODY MASS INDEX: 35.41 KG/M2 | DIASTOLIC BLOOD PRESSURE: 69 MMHG

## 2023-02-10 DIAGNOSIS — I10 ESSENTIAL HYPERTENSION: ICD-10-CM

## 2023-02-10 DIAGNOSIS — E66.01 SEVERE OBESITY (BMI 35.0-39.9) WITH COMORBIDITY: ICD-10-CM

## 2023-02-10 DIAGNOSIS — G47.33 OSA ON CPAP: ICD-10-CM

## 2023-02-10 DIAGNOSIS — E11.65 TYPE 2 DIABETES MELLITUS WITH HYPERGLYCEMIA, WITH LONG-TERM CURRENT USE OF INSULIN: ICD-10-CM

## 2023-02-10 DIAGNOSIS — E66.01 MORBID OBESITY: Primary | ICD-10-CM

## 2023-02-10 DIAGNOSIS — Z79.4 TYPE 2 DIABETES MELLITUS WITH HYPERGLYCEMIA, WITH LONG-TERM CURRENT USE OF INSULIN: ICD-10-CM

## 2023-02-10 PROCEDURE — 3008F BODY MASS INDEX DOCD: CPT | Mod: CPTII,S$GLB,, | Performed by: SURGERY

## 2023-02-10 PROCEDURE — 3074F PR MOST RECENT SYSTOLIC BLOOD PRESSURE < 130 MM HG: ICD-10-PCS | Mod: CPTII,S$GLB,, | Performed by: SURGERY

## 2023-02-10 PROCEDURE — 99024 PR POST-OP FOLLOW-UP VISIT: ICD-10-PCS | Mod: S$GLB,,, | Performed by: SURGERY

## 2023-02-10 PROCEDURE — 1159F PR MEDICATION LIST DOCUMENTED IN MEDICAL RECORD: ICD-10-PCS | Mod: CPTII,S$GLB,, | Performed by: SURGERY

## 2023-02-10 PROCEDURE — 3051F HG A1C>EQUAL 7.0%<8.0%: CPT | Mod: CPTII,S$GLB,, | Performed by: SURGERY

## 2023-02-10 PROCEDURE — 3051F PR MOST RECENT HEMOGLOBIN A1C LEVEL 7.0 - < 8.0%: ICD-10-PCS | Mod: CPTII,S$GLB,, | Performed by: SURGERY

## 2023-02-10 PROCEDURE — 99999 PR PBB SHADOW E&M-EST. PATIENT-LVL III: CPT | Mod: PBBFAC,,, | Performed by: SURGERY

## 2023-02-10 PROCEDURE — 3074F SYST BP LT 130 MM HG: CPT | Mod: CPTII,S$GLB,, | Performed by: SURGERY

## 2023-02-10 PROCEDURE — 99999 PR PBB SHADOW E&M-EST. PATIENT-LVL III: ICD-10-PCS | Mod: PBBFAC,,, | Performed by: SURGERY

## 2023-02-10 PROCEDURE — 1159F MED LIST DOCD IN RCRD: CPT | Mod: CPTII,S$GLB,, | Performed by: SURGERY

## 2023-02-10 PROCEDURE — 99024 POSTOP FOLLOW-UP VISIT: CPT | Mod: S$GLB,,, | Performed by: SURGERY

## 2023-02-10 PROCEDURE — 3008F PR BODY MASS INDEX (BMI) DOCUMENTED: ICD-10-PCS | Mod: CPTII,S$GLB,, | Performed by: SURGERY

## 2023-02-10 PROCEDURE — 3078F PR MOST RECENT DIASTOLIC BLOOD PRESSURE < 80 MM HG: ICD-10-PCS | Mod: CPTII,S$GLB,, | Performed by: SURGERY

## 2023-02-10 PROCEDURE — 3078F DIAST BP <80 MM HG: CPT | Mod: CPTII,S$GLB,, | Performed by: SURGERY

## 2023-02-10 NOTE — PROGRESS NOTES
Post Op Note    Surgery: gastric sleeve surgery  Date: 1/20/23  Initial weight: 310  Last weight: 311  Current weight: 284    Constipation: none  Reflux: none  Vomiting: none    Diet:    Soups mainly- cream of chicken and vegetable     Exercise:  none    MVI: andra mvi    Vitals:    02/10/23 1547   BP: 114/69   Pulse: 73   Temp: 97.9 °F (36.6 °C)       Body comp:  Fat Percent:  42.7 %  Fat Mass:  121.6 lb  FFM:  163.2 lb  TBW: 123.6 lb  TBW %:  43.4 %  BMR: 2284 kcal    PE:  NAD  RRR  Soft/nt/nd  Incisions: well healed    Labs: none    A/P: s/p sleeve     Counseling for patient:    Diet: continue protocol  Exercise: ok for cardio, no heavy lifting over 30 pounds for another month  Vitamins: 2 mvi daily, calcium, and b complex    Co morbidities:     1. Morbid obesity        2. Severe obesity (BMI 35.0-39.9) with comorbidity        3. Essential hypertension        4. Type 2 diabetes mellitus with hyperglycemia, with long-term current use of insulin        5. TARYN on CPAP            : I met with the patient for 15 minutes and counseled his for over 50% of that time

## 2023-02-13 ENCOUNTER — OFFICE VISIT (OUTPATIENT)
Dept: PSYCHIATRY | Facility: CLINIC | Age: 58
End: 2023-02-13
Payer: COMMERCIAL

## 2023-02-13 ENCOUNTER — TELEPHONE (OUTPATIENT)
Dept: ENDOCRINOLOGY | Facility: CLINIC | Age: 58
End: 2023-02-13
Payer: MEDICARE

## 2023-02-13 ENCOUNTER — OFFICE VISIT (OUTPATIENT)
Dept: ENDOCRINOLOGY | Facility: CLINIC | Age: 58
End: 2023-02-13
Payer: MEDICARE

## 2023-02-13 VITALS
DIASTOLIC BLOOD PRESSURE: 72 MMHG | WEIGHT: 290.88 LBS | BODY MASS INDEX: 37.33 KG/M2 | HEIGHT: 74 IN | HEART RATE: 62 BPM | OXYGEN SATURATION: 98 % | SYSTOLIC BLOOD PRESSURE: 114 MMHG

## 2023-02-13 DIAGNOSIS — E78.2 MIXED HYPERLIPIDEMIA: ICD-10-CM

## 2023-02-13 DIAGNOSIS — I25.10 ATHEROSCLEROSIS OF NATIVE CORONARY ARTERY OF NATIVE HEART WITHOUT ANGINA PECTORIS: ICD-10-CM

## 2023-02-13 DIAGNOSIS — Z79.4 TYPE 2 DIABETES MELLITUS WITH HYPERGLYCEMIA, WITH LONG-TERM CURRENT USE OF INSULIN: Primary | ICD-10-CM

## 2023-02-13 DIAGNOSIS — I10 ESSENTIAL HYPERTENSION: ICD-10-CM

## 2023-02-13 DIAGNOSIS — F41.1 GAD (GENERALIZED ANXIETY DISORDER): Primary | ICD-10-CM

## 2023-02-13 DIAGNOSIS — E11.42 TYPE 2 DIABETES MELLITUS WITH DIABETIC POLYNEUROPATHY, WITH LONG-TERM CURRENT USE OF INSULIN: Primary | ICD-10-CM

## 2023-02-13 DIAGNOSIS — L98.9 SKIN ABNORMALITIES: ICD-10-CM

## 2023-02-13 DIAGNOSIS — I42.8 OTHER CARDIOMYOPATHY: ICD-10-CM

## 2023-02-13 DIAGNOSIS — Z79.4 TYPE 2 DIABETES MELLITUS WITH DIABETIC POLYNEUROPATHY, WITH LONG-TERM CURRENT USE OF INSULIN: Primary | ICD-10-CM

## 2023-02-13 DIAGNOSIS — E11.65 TYPE 2 DIABETES MELLITUS WITH HYPERGLYCEMIA, WITH LONG-TERM CURRENT USE OF INSULIN: Primary | ICD-10-CM

## 2023-02-13 PROCEDURE — 3051F PR MOST RECENT HEMOGLOBIN A1C LEVEL 7.0 - < 8.0%: ICD-10-PCS | Mod: CPTII,S$GLB,, | Performed by: NURSE PRACTITIONER

## 2023-02-13 PROCEDURE — 3051F PR MOST RECENT HEMOGLOBIN A1C LEVEL 7.0 - < 8.0%: ICD-10-PCS | Mod: CPTII,S$GLB,, | Performed by: SOCIAL WORKER

## 2023-02-13 PROCEDURE — 1159F PR MEDICATION LIST DOCUMENTED IN MEDICAL RECORD: ICD-10-PCS | Mod: CPTII,S$GLB,, | Performed by: NURSE PRACTITIONER

## 2023-02-13 PROCEDURE — 99999 PR PBB SHADOW E&M-EST. PATIENT-LVL III: CPT | Mod: PBBFAC,,, | Performed by: NURSE PRACTITIONER

## 2023-02-13 PROCEDURE — 3078F DIAST BP <80 MM HG: CPT | Mod: CPTII,S$GLB,, | Performed by: NURSE PRACTITIONER

## 2023-02-13 PROCEDURE — 1111F DSCHRG MED/CURRENT MED MERGE: CPT | Mod: CPTII,S$GLB,, | Performed by: NURSE PRACTITIONER

## 2023-02-13 PROCEDURE — 3074F PR MOST RECENT SYSTOLIC BLOOD PRESSURE < 130 MM HG: ICD-10-PCS | Mod: CPTII,S$GLB,, | Performed by: NURSE PRACTITIONER

## 2023-02-13 PROCEDURE — 95251 PR GLUCOSE MONITOR, 72 HOUR, PHYS INTERP: ICD-10-PCS | Mod: S$GLB,,, | Performed by: NURSE PRACTITIONER

## 2023-02-13 PROCEDURE — 99214 OFFICE O/P EST MOD 30 MIN: CPT | Mod: S$GLB,,, | Performed by: NURSE PRACTITIONER

## 2023-02-13 PROCEDURE — 3008F BODY MASS INDEX DOCD: CPT | Mod: CPTII,S$GLB,, | Performed by: NURSE PRACTITIONER

## 2023-02-13 PROCEDURE — 1159F PR MEDICATION LIST DOCUMENTED IN MEDICAL RECORD: ICD-10-PCS | Mod: CPTII,S$GLB,, | Performed by: SOCIAL WORKER

## 2023-02-13 PROCEDURE — 3051F HG A1C>EQUAL 7.0%<8.0%: CPT | Mod: CPTII,S$GLB,, | Performed by: SOCIAL WORKER

## 2023-02-13 PROCEDURE — 99999 PR PBB SHADOW E&M-EST. PATIENT-LVL I: ICD-10-PCS | Mod: PBBFAC,,, | Performed by: SOCIAL WORKER

## 2023-02-13 PROCEDURE — 3074F SYST BP LT 130 MM HG: CPT | Mod: CPTII,S$GLB,, | Performed by: NURSE PRACTITIONER

## 2023-02-13 PROCEDURE — 3078F PR MOST RECENT DIASTOLIC BLOOD PRESSURE < 80 MM HG: ICD-10-PCS | Mod: CPTII,S$GLB,, | Performed by: NURSE PRACTITIONER

## 2023-02-13 PROCEDURE — 99999 PR PBB SHADOW E&M-EST. PATIENT-LVL I: CPT | Mod: PBBFAC,,, | Performed by: SOCIAL WORKER

## 2023-02-13 PROCEDURE — 3008F PR BODY MASS INDEX (BMI) DOCUMENTED: ICD-10-PCS | Mod: CPTII,S$GLB,, | Performed by: NURSE PRACTITIONER

## 2023-02-13 PROCEDURE — 99999 PR PBB SHADOW E&M-EST. PATIENT-LVL III: ICD-10-PCS | Mod: PBBFAC,,, | Performed by: NURSE PRACTITIONER

## 2023-02-13 PROCEDURE — 1111F PR DISCHARGE MEDS RECONCILED W/ CURRENT OUTPATIENT MED LIST: ICD-10-PCS | Mod: CPTII,S$GLB,, | Performed by: NURSE PRACTITIONER

## 2023-02-13 PROCEDURE — 1159F MED LIST DOCD IN RCRD: CPT | Mod: CPTII,S$GLB,, | Performed by: NURSE PRACTITIONER

## 2023-02-13 PROCEDURE — 99214 PR OFFICE/OUTPT VISIT, EST, LEVL IV, 30-39 MIN: ICD-10-PCS | Mod: S$GLB,,, | Performed by: NURSE PRACTITIONER

## 2023-02-13 PROCEDURE — 90834 PR PSYCHOTHERAPY W/PATIENT, 45 MIN: ICD-10-PCS | Mod: S$GLB,,, | Performed by: SOCIAL WORKER

## 2023-02-13 PROCEDURE — 3051F HG A1C>EQUAL 7.0%<8.0%: CPT | Mod: CPTII,S$GLB,, | Performed by: NURSE PRACTITIONER

## 2023-02-13 PROCEDURE — 95251 CONT GLUC MNTR ANALYSIS I&R: CPT | Mod: S$GLB,,, | Performed by: NURSE PRACTITIONER

## 2023-02-13 PROCEDURE — 90834 PSYTX W PT 45 MINUTES: CPT | Mod: S$GLB,,, | Performed by: SOCIAL WORKER

## 2023-02-13 PROCEDURE — 1159F MED LIST DOCD IN RCRD: CPT | Mod: CPTII,S$GLB,, | Performed by: SOCIAL WORKER

## 2023-02-13 RX ORDER — METFORMIN HYDROCHLORIDE 500 MG/1
TABLET, EXTENDED RELEASE ORAL
Qty: 360 TABLET | Refills: 3 | Status: SHIPPED | OUTPATIENT
Start: 2023-02-13 | End: 2023-04-06

## 2023-02-13 NOTE — PROGRESS NOTES
Individual Psychotherapy (PhD/LCSW)    2/13/2023    Site:  [facility]         Therapeutic Intervention: Met with patient.  Outpatient - Behavior modifying psychotherapy 45 min - CPT code 35181    Chief complaint/reason for encounter: anger, anxiety, and interpersonal     Interval history and content of current session: Patient in this date. He was last seen on 1/31/23. Patient reported he is doing well. He reports little to no anxiety, depression or anger. Patient shared he made amends with his neighbor and feels at peace as a result. He indicated he is doing well with his physical health since having his gastric bypass, he reports he has lost 27 lbs total so far and is feeling great. Patient shared he has not been triggered to drink alcohol or curry. He has plans to go on a cruise with his family next week and is looking forward this vacation, it is their first in 10 years. Patient shared since he started therapy, he has reached the goals he planned and feels proud of this. He is much happier. He is hopeful he will be able to stay off of insuline and eventually all diabetic medications. Patient will return for a check-in appointment in one month. Overall he is doing well. His affect and mood this date support his report.      Treatment plan:  Target symptoms: anxiety , anger, interpersonal  Why chosen therapy is appropriate versus another modality: evidence based practice  Outcome monitoring methods: self-report, observation  Therapeutic intervention type: behavior modifying psychotherapy, supportive psychotherapy    Risk parameters:  Patient reports no suicidal ideation  Patient reports no homicidal ideation  Patient reports no self-injurious behavior  Patient reports no violent behavior    Verbal deficits: None    Patient's response to intervention:  The patient's response to intervention is accepting, motivated.    Progress toward goals and other mental status changes:  The patient's progress toward  goals is good.    Diagnosis:     ICD-10-CM ICD-9-CM   1. KENNEDY (generalized anxiety disorder)  F41.1 300.02       Plan:  individual psychotherapy    Return to clinic: 1 month    Length of Service (minutes): 45

## 2023-02-13 NOTE — PROGRESS NOTES
CC: This 57 y.o. male presents for management of diabetes  and chronic conditions pending review including HTN, HLP, morbid obesity, fatty liver, vitamin d deficiency, CM, CAD     HPI: He was diagnosed with T2DM in 2005. Has never been hospitalized r/t DM.  Mother has passed away from Fatty liver and hepatic carcinoma in 2018  Family hx of DM: sister, mom and dad  Father passed away 5/2022    Gastric sleeve in January  Has lost an additional 12 lbs since his last visit    See Dexcom download until then in Media tab  Time in range 23%  Hypoglycemia none  Bg 181-250 68%  His actos has definitely worn off  Currently taking no dm meds  Bg are just globally elevated, 180-200 range  Does occasionally have large pp excursions- some of which he did admin 12 u lantus  Exercise: none- but has been cleared for cardio  CURRENT DM MEDS:  none  Glucometer type: True Metrix  Standards of Care:  Eye exam: 2/2022  no h/o retinopathy, [facility]- he will schedule f/u    Following w Dr Bergman's in cardiology      ROS:   Gen: Appetite good, weight loss 12 lbs  Eyes: Denies visual disturbances  Resp: no SOB or HENDERSON, no cough  Cardiac: No palpitations, chest pain, no edema   GI: No nausea or vomiting, diarrhea, constipation, or abdominal pain.  /GYN: No nocturia, burning or pain.   MS/Neuro: +numbness/ tingling in BLE; Gait steady, speech clear  Other systems: negative.    PE:  GENERAL: Well developed, well nourished.appears older than age.   PSYCH: AAOx3, appropriate mood and affect, pleasant expression, conversant, appears relaxed, well groomed.   EYES: Conjunctiva, corneas clear  NECK: Supple, trachea midline   NEURO: Gait steady  SKIN:   no acanthosis nigracans.  FOOT EXAMINATION:  3/22/2022  No foot deformity, corns or callus formation,  nails in good condiiton and well trimmed, no interspace maceration or ulceration noted.  Decreased hair growth present over toes/feet.  Protective sensation intact with 10 gram monofilament.   +2 dorsalis pedis and posterior pulses noted.    Lab Results   Component Value Date    MICALBCREAT 5.3 09/16/2022       Hemoglobin A1C   Date Value Ref Range Status   01/17/2023 7.1 (H) 4.0 - 5.6 % Final     Comment:     ADA Screening Guidelines:  5.7-6.4%  Consistent with prediabetes  >or=6.5%  Consistent with diabetes    High levels of fetal hemoglobin interfere with the HbA1C  assay. Heterozygous hemoglobin variants (HbS, HgC, etc)do  not significantly interfere with this assay.   However, presence of multiple variants may affect accuracy.     09/16/2022 7.0 (H) 4.0 - 5.6 % Final     Comment:     ADA Screening Guidelines:  5.7-6.4%  Consistent with prediabetes  >or=6.5%  Consistent with diabetes    High levels of fetal hemoglobin interfere with the HbA1C  assay. Heterozygous hemoglobin variants (HbS, HgC, etc)do  not significantly interfere with this assay.   However, presence of multiple variants may affect accuracy.     08/25/2022 7.1 (H) 4.0 - 5.6 % Final     Comment:     ADA Screening Guidelines:  5.7-6.4%  Consistent with prediabetes  >or=6.5%  Consistent with diabetes    High levels of fetal hemoglobin interfere with the HbA1C  assay. Heterozygous hemoglobin variants (HbS, HgC, etc)do  not significantly interfere with this assay.   However, presence of multiple variants may affect accuracy.          ASSESSMENT and PLAN:    1. Type 2 diabetes w hyperglycemia , DM PN  Start metformin xr 500 mg bid x 2 weeks, increase to 2 tabs bid thereafter  Start Jardiance 10 mg once a day  x1 month, then increase to 25 mg once a day indefinitely.   Discussed MOA, possible side effects and importance of maintaining hydration and avoiding low carb diets.   Nurse visit in 2 weeks for Dexcom download  Derm for skin check    2. HTN - controlled, continue meds as previously prescribed and monitor.     3. HLP -  Continue statin therapy,     4. LINDSEY-  continue weight loss      5. Obesity-  Body mass index is 37.35 kg/m².   Post  gastric sleeve 1/20, following w Dr Devine      6. CAD, CM  Follows w Dr Bergman's       Follow-up:  in 2 months w labs prior

## 2023-03-06 NOTE — TELEPHONE ENCOUNTER
----- Message from Gi Washington sent at 5/31/2018  4:28 PM CDT -----  Contact: [phone]  Patient is returning nurse's phone call, stated he was discharged from hospital today.  Please call patient back at [phone].  
Informed patient of Hospital follow up appointment with Dr Carroll, patient requested sooner appt with Endo. R/s to Marcia    
yes

## 2023-03-09 ENCOUNTER — OFFICE VISIT (OUTPATIENT)
Dept: FAMILY MEDICINE | Facility: CLINIC | Age: 58
End: 2023-03-09
Payer: MEDICARE

## 2023-03-09 VITALS
OXYGEN SATURATION: 99 % | HEART RATE: 55 BPM | DIASTOLIC BLOOD PRESSURE: 56 MMHG | SYSTOLIC BLOOD PRESSURE: 98 MMHG | WEIGHT: 271.19 LBS | BODY MASS INDEX: 34.8 KG/M2 | HEIGHT: 74 IN

## 2023-03-09 DIAGNOSIS — E78.2 MIXED HYPERLIPIDEMIA: ICD-10-CM

## 2023-03-09 DIAGNOSIS — E11.65 TYPE 2 DIABETES MELLITUS WITH HYPERGLYCEMIA, WITH LONG-TERM CURRENT USE OF INSULIN: ICD-10-CM

## 2023-03-09 DIAGNOSIS — I10 ESSENTIAL HYPERTENSION: Primary | ICD-10-CM

## 2023-03-09 DIAGNOSIS — F33.1 MODERATE EPISODE OF RECURRENT MAJOR DEPRESSIVE DISORDER: ICD-10-CM

## 2023-03-09 DIAGNOSIS — M46.06 SPINAL ENTHESOPATHY, LUMBAR REGION: ICD-10-CM

## 2023-03-09 DIAGNOSIS — Z79.4 TYPE 2 DIABETES MELLITUS WITH HYPERGLYCEMIA, WITH LONG-TERM CURRENT USE OF INSULIN: ICD-10-CM

## 2023-03-09 PROBLEM — J96.21 ACUTE AND CHRONIC RESPIRATORY FAILURE WITH HYPOXIA: Status: RESOLVED | Noted: 2022-05-30 | Resolved: 2023-03-09

## 2023-03-09 PROBLEM — R06.09 DOE (DYSPNEA ON EXERTION): Status: RESOLVED | Noted: 2020-09-23 | Resolved: 2023-03-09

## 2023-03-09 PROBLEM — R53.1 WEAKNESS: Status: RESOLVED | Noted: 2021-05-31 | Resolved: 2023-03-09

## 2023-03-09 PROBLEM — J18.9 PNA (PNEUMONIA): Status: RESOLVED | Noted: 2022-05-30 | Resolved: 2023-03-09

## 2023-03-09 PROCEDURE — 3051F PR MOST RECENT HEMOGLOBIN A1C LEVEL 7.0 - < 8.0%: ICD-10-PCS | Mod: CPTII,S$GLB,, | Performed by: FAMILY MEDICINE

## 2023-03-09 PROCEDURE — 3051F HG A1C>EQUAL 7.0%<8.0%: CPT | Mod: CPTII,S$GLB,, | Performed by: FAMILY MEDICINE

## 2023-03-09 PROCEDURE — 3008F BODY MASS INDEX DOCD: CPT | Mod: CPTII,S$GLB,, | Performed by: FAMILY MEDICINE

## 2023-03-09 PROCEDURE — 99214 PR OFFICE/OUTPT VISIT, EST, LEVL IV, 30-39 MIN: ICD-10-PCS | Mod: S$GLB,,, | Performed by: FAMILY MEDICINE

## 2023-03-09 PROCEDURE — 1159F PR MEDICATION LIST DOCUMENTED IN MEDICAL RECORD: ICD-10-PCS | Mod: CPTII,S$GLB,, | Performed by: FAMILY MEDICINE

## 2023-03-09 PROCEDURE — 3078F PR MOST RECENT DIASTOLIC BLOOD PRESSURE < 80 MM HG: ICD-10-PCS | Mod: CPTII,S$GLB,, | Performed by: FAMILY MEDICINE

## 2023-03-09 PROCEDURE — 3078F DIAST BP <80 MM HG: CPT | Mod: CPTII,S$GLB,, | Performed by: FAMILY MEDICINE

## 2023-03-09 PROCEDURE — 3008F PR BODY MASS INDEX (BMI) DOCUMENTED: ICD-10-PCS | Mod: CPTII,S$GLB,, | Performed by: FAMILY MEDICINE

## 2023-03-09 PROCEDURE — 99214 OFFICE O/P EST MOD 30 MIN: CPT | Mod: S$GLB,,, | Performed by: FAMILY MEDICINE

## 2023-03-09 PROCEDURE — 99999 PR PBB SHADOW E&M-EST. PATIENT-LVL III: ICD-10-PCS | Mod: PBBFAC,,, | Performed by: FAMILY MEDICINE

## 2023-03-09 PROCEDURE — 1159F MED LIST DOCD IN RCRD: CPT | Mod: CPTII,S$GLB,, | Performed by: FAMILY MEDICINE

## 2023-03-09 PROCEDURE — 3074F PR MOST RECENT SYSTOLIC BLOOD PRESSURE < 130 MM HG: ICD-10-PCS | Mod: CPTII,S$GLB,, | Performed by: FAMILY MEDICINE

## 2023-03-09 PROCEDURE — 3074F SYST BP LT 130 MM HG: CPT | Mod: CPTII,S$GLB,, | Performed by: FAMILY MEDICINE

## 2023-03-09 PROCEDURE — 99999 PR PBB SHADOW E&M-EST. PATIENT-LVL III: CPT | Mod: PBBFAC,,, | Performed by: FAMILY MEDICINE

## 2023-03-09 NOTE — PROGRESS NOTES
Subjective:       Patient ID: Gio Forrest is a 57 y.o. male.    Chief Complaint: Annual Exam (wellness)    Will monitor chronic medical issues and continue current plan of care.  Had gastric sleeve and lost weight.  Off insulin.    Review of Systems   Constitutional:  Negative for chills and fever.   Respiratory:  Negative for cough, chest tightness and shortness of breath.    Cardiovascular:  Negative for chest pain, palpitations and leg swelling.   Endocrine: Negative for cold intolerance and heat intolerance.   Psychiatric/Behavioral:  Negative for decreased concentration. The patient is not nervous/anxious.      Objective:      Physical Exam  Vitals and nursing note reviewed.   Constitutional:       Appearance: He is well-developed.   HENT:      Head: Normocephalic and atraumatic.   Cardiovascular:      Rate and Rhythm: Normal rate and regular rhythm.      Heart sounds: Normal heart sounds.   Pulmonary:      Effort: Pulmonary effort is normal.      Breath sounds: Normal breath sounds.       Assessment:       1. Essential hypertension    2. Mixed hyperlipidemia    3. Type 2 diabetes mellitus with hyperglycemia, with long-term current use of insulin    4. Spinal enthesopathy, lumbar region    5. Moderate episode of recurrent major depressive disorder        Plan:       Essential hypertension    Mixed hyperlipidemia    Type 2 diabetes mellitus with hyperglycemia, with long-term current use of insulin    Spinal enthesopathy, lumbar region    Moderate episode of recurrent major depressive disorder      Mood stable  Back pain stable    Htn stable  No addiction issues  Mood stable  Will monitor chronic medical issues and continue current plan of care.      Follow up in about 6 months (around 9/9/2023), or if symptoms worsen or fail to improve.

## 2023-03-15 ENCOUNTER — TELEPHONE (OUTPATIENT)
Dept: BARIATRICS | Facility: CLINIC | Age: 58
End: 2023-03-15
Payer: MEDICARE

## 2023-03-15 NOTE — TELEPHONE ENCOUNTER
----- Message from Munir Golden sent at 3/15/2023  4:06 PM CDT -----  Contact: self  Type: Needs Medical Advice  Who Called:  Patient    Best Call Back Number: [phone]    Additional Information: Pt states he need to lit appt on 3/16 but need to be seen before may. Please call back

## 2023-03-21 ENCOUNTER — OFFICE VISIT (OUTPATIENT)
Dept: PSYCHIATRY | Facility: CLINIC | Age: 58
End: 2023-03-21
Payer: COMMERCIAL

## 2023-03-21 DIAGNOSIS — F41.1 GAD (GENERALIZED ANXIETY DISORDER): Primary | ICD-10-CM

## 2023-03-21 DIAGNOSIS — F33.1 MODERATE EPISODE OF RECURRENT MAJOR DEPRESSIVE DISORDER: ICD-10-CM

## 2023-03-21 PROCEDURE — 3051F HG A1C>EQUAL 7.0%<8.0%: CPT | Mod: CPTII,S$GLB,, | Performed by: SOCIAL WORKER

## 2023-03-21 PROCEDURE — 1159F PR MEDICATION LIST DOCUMENTED IN MEDICAL RECORD: ICD-10-PCS | Mod: CPTII,S$GLB,, | Performed by: SOCIAL WORKER

## 2023-03-21 PROCEDURE — 1159F MED LIST DOCD IN RCRD: CPT | Mod: CPTII,S$GLB,, | Performed by: SOCIAL WORKER

## 2023-03-21 PROCEDURE — 90834 PSYTX W PT 45 MINUTES: CPT | Mod: S$GLB,,, | Performed by: SOCIAL WORKER

## 2023-03-21 PROCEDURE — 3051F PR MOST RECENT HEMOGLOBIN A1C LEVEL 7.0 - < 8.0%: ICD-10-PCS | Mod: CPTII,S$GLB,, | Performed by: SOCIAL WORKER

## 2023-03-21 PROCEDURE — 90834 PR PSYCHOTHERAPY W/PATIENT, 45 MIN: ICD-10-PCS | Mod: S$GLB,,, | Performed by: SOCIAL WORKER

## 2023-03-21 NOTE — PROGRESS NOTES
Individual Psychotherapy (PhD/LCSW)    3/21/2023    Site:  [facility]         Therapeutic Intervention: Met with patient.  Outpatient - Behavior modifying psychotherapy 45 min - CPT code 30544    Chief complaint/reason for encounter: depression, anger, anxiety, and interpersonal, grief     Interval history and content of current session: Patient in this date. He was last seen on 23. Since his last session, Patient reported his brother was placed on hospice and . He shared he is handling this loss much better than when his father  last May because his brother had been struggling with cancer for years. Patient shared he was able to be with his brother and help care for him the last week of his life. He stated he is at peace with his brother's death.    Prior to his brother's need for hospice, patient and his family were able to take their scheduled cruise. This trip was possible due to money patient inherited from his father. Patient shared his family enjoyed their time on the cruise it was a much needed vacation especially after the difficult year they have had and right before his brother's death. Patient's wife is schedule to have surgery at the end of this month and then he will leave [facility] for school and work. Patient shared he is considering moving out of state with his sister and nephew in the distant future. He shared he is interested in living in another state they are considering [place]. Patient indicated he is currently doing well, not experiencing depression, anger, or anxiety much. He is grieving his brother and reporting he is at peace with his death. Encouragement and support were offered throughout.   Treatment plan:  Target symptoms: depression, anxiety , grief, anger interpersonal  Why chosen therapy is appropriate versus another modality: evidence based practice  Outcome monitoring methods: self-report, observation  Therapeutic intervention type: behavior modifying  psychotherapy, supportive psychotherapy    Risk parameters:  Patient reports no suicidal ideation  Patient reports no homicidal ideation  Patient reports no self-injurious behavior  Patient reports no violent behavior    Verbal deficits: None    Patient's response to intervention:  The patient's response to intervention is accepting, motivated.    Progress toward goals and other mental status changes:  The patient's progress toward goals is good.    Diagnosis:     ICD-10-CM ICD-9-CM   1. KENNEDY (generalized anxiety disorder)  F41.1 300.02   2. Moderate episode of recurrent major depressive disorder  F33.1 296.32       Plan:  individual psychotherapy and medication management by physician    Return to clinic: 2 weeks    Length of Service (minutes): 45

## 2023-03-29 ENCOUNTER — OFFICE VISIT (OUTPATIENT)
Dept: BARIATRICS | Facility: CLINIC | Age: 58
End: 2023-03-29
Payer: MEDICARE

## 2023-03-29 VITALS
DIASTOLIC BLOOD PRESSURE: 75 MMHG | WEIGHT: 259.63 LBS | SYSTOLIC BLOOD PRESSURE: 112 MMHG | RESPIRATION RATE: 16 BRPM | HEART RATE: 60 BPM | HEIGHT: 75 IN | TEMPERATURE: 98 F | BODY MASS INDEX: 32.28 KG/M2

## 2023-03-29 DIAGNOSIS — E66.01 MORBID OBESITY: Primary | ICD-10-CM

## 2023-03-29 DIAGNOSIS — G47.33 OSA ON CPAP: ICD-10-CM

## 2023-03-29 DIAGNOSIS — Z79.4 TYPE 2 DIABETES MELLITUS WITH HYPERGLYCEMIA, WITH LONG-TERM CURRENT USE OF INSULIN: ICD-10-CM

## 2023-03-29 DIAGNOSIS — I10 ESSENTIAL HYPERTENSION: ICD-10-CM

## 2023-03-29 DIAGNOSIS — E11.65 TYPE 2 DIABETES MELLITUS WITH HYPERGLYCEMIA, WITH LONG-TERM CURRENT USE OF INSULIN: ICD-10-CM

## 2023-03-29 DIAGNOSIS — E53.8 B12 DEFICIENCY: ICD-10-CM

## 2023-03-29 DIAGNOSIS — E61.1 IRON DEFICIENCY: ICD-10-CM

## 2023-03-29 DIAGNOSIS — E78.2 MIXED HYPERLIPIDEMIA: ICD-10-CM

## 2023-03-29 PROCEDURE — 99024 PR POST-OP FOLLOW-UP VISIT: ICD-10-PCS | Mod: S$GLB,,, | Performed by: SURGERY

## 2023-03-29 PROCEDURE — 3008F PR BODY MASS INDEX (BMI) DOCUMENTED: ICD-10-PCS | Mod: CPTII,S$GLB,, | Performed by: SURGERY

## 2023-03-29 PROCEDURE — 3051F PR MOST RECENT HEMOGLOBIN A1C LEVEL 7.0 - < 8.0%: ICD-10-PCS | Mod: CPTII,S$GLB,, | Performed by: SURGERY

## 2023-03-29 PROCEDURE — 3008F BODY MASS INDEX DOCD: CPT | Mod: CPTII,S$GLB,, | Performed by: SURGERY

## 2023-03-29 PROCEDURE — 99999 PR PBB SHADOW E&M-EST. PATIENT-LVL IV: ICD-10-PCS | Mod: PBBFAC,,, | Performed by: SURGERY

## 2023-03-29 PROCEDURE — 3074F PR MOST RECENT SYSTOLIC BLOOD PRESSURE < 130 MM HG: ICD-10-PCS | Mod: CPTII,S$GLB,, | Performed by: SURGERY

## 2023-03-29 PROCEDURE — 1159F MED LIST DOCD IN RCRD: CPT | Mod: CPTII,S$GLB,, | Performed by: SURGERY

## 2023-03-29 PROCEDURE — 99024 POSTOP FOLLOW-UP VISIT: CPT | Mod: S$GLB,,, | Performed by: SURGERY

## 2023-03-29 PROCEDURE — 1159F PR MEDICATION LIST DOCUMENTED IN MEDICAL RECORD: ICD-10-PCS | Mod: CPTII,S$GLB,, | Performed by: SURGERY

## 2023-03-29 PROCEDURE — 3051F HG A1C>EQUAL 7.0%<8.0%: CPT | Mod: CPTII,S$GLB,, | Performed by: SURGERY

## 2023-03-29 PROCEDURE — 99999 PR PBB SHADOW E&M-EST. PATIENT-LVL IV: CPT | Mod: PBBFAC,,, | Performed by: SURGERY

## 2023-03-29 PROCEDURE — 3078F DIAST BP <80 MM HG: CPT | Mod: CPTII,S$GLB,, | Performed by: SURGERY

## 2023-03-29 PROCEDURE — 3078F PR MOST RECENT DIASTOLIC BLOOD PRESSURE < 80 MM HG: ICD-10-PCS | Mod: CPTII,S$GLB,, | Performed by: SURGERY

## 2023-03-29 PROCEDURE — 3074F SYST BP LT 130 MM HG: CPT | Mod: CPTII,S$GLB,, | Performed by: SURGERY

## 2023-03-29 NOTE — PROGRESS NOTES
Post Op Note    Surgery: gastric sleeve surgery  Date: 1/20/23  Initial weight: 310  Last weight: 284  Current weight: 259    Constipation: none  Reflux: occasional heartburn  Vomiting: once    Diet:  Breakfast:  egg sausage cheese bowl, oatmeal   Lunch: red beans, salad  Dinner: eggs tuna wheat bread    Exercise:  None- replaced brittany recently    MVI: andra mvi    Vitals:    03/29/23 1510   BP: 112/75   Pulse: 60   Resp: 16   Temp: 97.7 °F (36.5 °C)       Body comp:  Fat Percent:  40.4 %  Fat Mass:  104.8 lb  FFM:  154.8 lb  TBW: 114.2 lb  TBW %:  44.0 %  BMR: 2140 kcal    PE:  NAD  RRR  Soft/nt/nd    Labs: none    A/P: s/p sleeve     Counseling for patient:    Diet: continue dieting  Exercise: no restrictions, add some resistance exercises  Vitamins: 2 mvi, calcium, b12  Co morbidities:     1. Morbid obesity        2. Essential hypertension        3. Type 2 diabetes mellitus with hyperglycemia, with long-term current use of insulin        4. TARYN on CPAP        5. Mixed hyperlipidemia            -All above: Evaluated, monitored, and treated with diet and exercise program.

## 2023-03-31 ENCOUNTER — LAB VISIT (OUTPATIENT)
Dept: LAB | Facility: HOSPITAL | Age: 58
End: 2023-03-31
Attending: NURSE PRACTITIONER
Payer: MEDICARE

## 2023-03-31 ENCOUNTER — TELEPHONE (OUTPATIENT)
Dept: PODIATRY | Facility: CLINIC | Age: 58
End: 2023-03-31
Payer: MEDICARE

## 2023-03-31 DIAGNOSIS — Z79.4 TYPE 2 DIABETES MELLITUS WITH HYPERGLYCEMIA, WITH LONG-TERM CURRENT USE OF INSULIN: ICD-10-CM

## 2023-03-31 DIAGNOSIS — E11.65 TYPE 2 DIABETES MELLITUS WITH HYPERGLYCEMIA, WITH LONG-TERM CURRENT USE OF INSULIN: ICD-10-CM

## 2023-03-31 LAB
ALBUMIN/CREAT UR: 8.3 UG/MG (ref 0–30)
CREAT UR-MCNC: 121 MG/DL (ref 23–375)
MICROALBUMIN UR DL<=1MG/L-MCNC: 10 UG/ML

## 2023-03-31 PROCEDURE — 82570 ASSAY OF URINE CREATININE: CPT | Performed by: NURSE PRACTITIONER

## 2023-04-03 LAB
LEFT EYE DM RETINOPATHY: POSITIVE
RIGHT EYE DM RETINOPATHY: POSITIVE

## 2023-04-05 NOTE — PROGRESS NOTES
CC: This 57 y.o. male presents for management of diabetes  and chronic conditions pending review including HTN, HLP, morbid obesity, fatty liver, vitamin d deficiency, CM, CAD     HPI: He was diagnosed with T2DM in 2005. Has never been hospitalized r/t DM.  Mother has passed away from Fatty liver and hepatic carcinoma in 2018  Family hx of DM: sister, mom and dad  Father passed away 5/2022    Gastric sleeve in January 2023- has lost almost 70 lbs as of today  Having some GI issues- dirrhea,       See Dexcom download in Media tab  Time in range 56%  Hypoglycemia none  Bg mostly running in the upper level limit of normal w some prandial excursions  Leaving Sunday for [place] for  school  Exercise: none   CURRENT DM MEDS:  jardaince 25 mg qam, metformin xr 500 mg 2 tabs bid  Glucometer type: True Metrix  Standards of Care:  Eye exam: 4/2023  no h/o retinopathy, [facility]-      Following w Dr Bergman's in cardiology      ROS:   Gen: Appetite good, weight loss 30 lbs  Eyes: Denies visual disturbances  Resp: no SOB or HENDERSON, no cough  Cardiac: No palpitations, chest pain, no edema   GI: No nausea or vomiting, + diarrhea, no constipation   /GYN: No nocturia, burning or pain.   MS/Neuro: +numbness/ tingling in BLE; Gait steady, speech clear  Other systems: negative.    PE:  GENERAL: Well developed, well nourished.appears older than age.   PSYCH: AAOx3, appropriate mood and affect, pleasant expression, conversant, appears relaxed, well groomed.   EYES: Conjunctiva, corneas clear  NECK: Supple, trachea midline   NEURO: Gait steady  SKIN:   no acanthosis nigracans.  FOOT EXAMINATION:  4/6/2023  No foot deformity, corns or callus formation,  nails in good condiiton and well trimmed, no interspace maceration or ulceration noted.  Decreased hair growth present over toes/feet. Protective sensation intact with 10 gram monofilament.  +2 dorsalis pedis and posterior pulses noted.    Lab Results   Component  Value Date    MICALBCREAT 8.3 03/31/2023       Hemoglobin A1C   Date Value Ref Range Status   03/31/2023 7.3 (H) 4.0 - 5.6 % Final     Comment:     ADA Screening Guidelines:  5.7-6.4%  Consistent with prediabetes  >or=6.5%  Consistent with diabetes    High levels of fetal hemoglobin interfere with the HbA1C  assay. Heterozygous hemoglobin variants (HbS, HgC, etc)do  not significantly interfere with this assay.   However, presence of multiple variants may affect accuracy.     01/17/2023 7.1 (H) 4.0 - 5.6 % Final     Comment:     ADA Screening Guidelines:  5.7-6.4%  Consistent with prediabetes  >or=6.5%  Consistent with diabetes    High levels of fetal hemoglobin interfere with the HbA1C  assay. Heterozygous hemoglobin variants (HbS, HgC, etc)do  not significantly interfere with this assay.   However, presence of multiple variants may affect accuracy.     09/16/2022 7.0 (H) 4.0 - 5.6 % Final     Comment:     ADA Screening Guidelines:  5.7-6.4%  Consistent with prediabetes  >or=6.5%  Consistent with diabetes    High levels of fetal hemoglobin interfere with the HbA1C  assay. Heterozygous hemoglobin variants (HbS, HgC, etc)do  not significantly interfere with this assay.   However, presence of multiple variants may affect accuracy.          ASSESSMENT and PLAN:    1. Type 2 diabetes w hyperglycemia , DM PN  Decrease metformin xr 500 mg  to 1 tablet bid- if gi symptoms not resolved next week message me   Continue Jardiance  25 mg once a day   Start glimepiride 1 mg breakfast, likely needs a long acting insulin but he would like to avoid if possible  Also not the best to be on GLP-1 as he just has sx a few months ago   Upgrade to Dexcom G7     2. HTN - controlled, continue meds as previously prescribed and monitor.     3. HLP -  Continue statin therapy,     4. LINDSEY-  continue weight loss      5. Obesity-  Body mass index is 33.34 kg/m².   Post gastric sleeve 1/20, following w Dr Devine, Start exercising      6. CAD, CM   Follows w Dr Bergman      Follow-up:  in 3 months w labs prior

## 2023-04-06 ENCOUNTER — OFFICE VISIT (OUTPATIENT)
Dept: ENDOCRINOLOGY | Facility: CLINIC | Age: 58
End: 2023-04-06
Payer: MEDICARE

## 2023-04-06 VITALS
HEART RATE: 85 BPM | SYSTOLIC BLOOD PRESSURE: 93 MMHG | BODY MASS INDEX: 33.33 KG/M2 | HEIGHT: 74 IN | DIASTOLIC BLOOD PRESSURE: 63 MMHG | WEIGHT: 259.69 LBS

## 2023-04-06 DIAGNOSIS — I10 ESSENTIAL HYPERTENSION: Primary | ICD-10-CM

## 2023-04-06 DIAGNOSIS — E11.42 TYPE 2 DIABETES MELLITUS WITH DIABETIC POLYNEUROPATHY, WITH LONG-TERM CURRENT USE OF INSULIN: ICD-10-CM

## 2023-04-06 DIAGNOSIS — Z79.4 TYPE 2 DIABETES MELLITUS WITH DIABETIC POLYNEUROPATHY, WITH LONG-TERM CURRENT USE OF INSULIN: ICD-10-CM

## 2023-04-06 DIAGNOSIS — I25.10 ATHEROSCLEROSIS OF NATIVE CORONARY ARTERY OF NATIVE HEART WITHOUT ANGINA PECTORIS: ICD-10-CM

## 2023-04-06 DIAGNOSIS — E78.5 HYPERLIPIDEMIA DUE TO TYPE 2 DIABETES MELLITUS: ICD-10-CM

## 2023-04-06 DIAGNOSIS — E66.09 CLASS 1 OBESITY DUE TO EXCESS CALORIES WITH SERIOUS COMORBIDITY AND BODY MASS INDEX (BMI) OF 33.0 TO 33.9 IN ADULT: ICD-10-CM

## 2023-04-06 DIAGNOSIS — E11.69 HYPERLIPIDEMIA DUE TO TYPE 2 DIABETES MELLITUS: ICD-10-CM

## 2023-04-06 PROBLEM — E66.811 CLASS 1 OBESITY DUE TO EXCESS CALORIES WITH SERIOUS COMORBIDITY AND BODY MASS INDEX (BMI) OF 33.0 TO 33.9 IN ADULT: Status: ACTIVE | Noted: 2023-04-06

## 2023-04-06 PROBLEM — E66.01 SEVERE OBESITY (BMI 35.0-39.9) WITH COMORBIDITY: Status: RESOLVED | Noted: 2020-06-23 | Resolved: 2023-04-06

## 2023-04-06 PROCEDURE — 3074F PR MOST RECENT SYSTOLIC BLOOD PRESSURE < 130 MM HG: ICD-10-PCS | Mod: CPTII,S$GLB,, | Performed by: NURSE PRACTITIONER

## 2023-04-06 PROCEDURE — 3051F HG A1C>EQUAL 7.0%<8.0%: CPT | Mod: CPTII,S$GLB,, | Performed by: NURSE PRACTITIONER

## 2023-04-06 PROCEDURE — 3008F PR BODY MASS INDEX (BMI) DOCUMENTED: ICD-10-PCS | Mod: CPTII,S$GLB,, | Performed by: NURSE PRACTITIONER

## 2023-04-06 PROCEDURE — 3008F BODY MASS INDEX DOCD: CPT | Mod: CPTII,S$GLB,, | Performed by: NURSE PRACTITIONER

## 2023-04-06 PROCEDURE — 99999 PR PBB SHADOW E&M-EST. PATIENT-LVL III: ICD-10-PCS | Mod: PBBFAC,,, | Performed by: NURSE PRACTITIONER

## 2023-04-06 PROCEDURE — 3078F DIAST BP <80 MM HG: CPT | Mod: CPTII,S$GLB,, | Performed by: NURSE PRACTITIONER

## 2023-04-06 PROCEDURE — 3066F PR DOCUMENTATION OF TREATMENT FOR NEPHROPATHY: ICD-10-PCS | Mod: CPTII,S$GLB,, | Performed by: NURSE PRACTITIONER

## 2023-04-06 PROCEDURE — 95251 PR GLUCOSE MONITOR, 72 HOUR, PHYS INTERP: ICD-10-PCS | Mod: S$GLB,,, | Performed by: NURSE PRACTITIONER

## 2023-04-06 PROCEDURE — 99214 OFFICE O/P EST MOD 30 MIN: CPT | Mod: S$GLB,,, | Performed by: NURSE PRACTITIONER

## 2023-04-06 PROCEDURE — 1159F MED LIST DOCD IN RCRD: CPT | Mod: CPTII,S$GLB,, | Performed by: NURSE PRACTITIONER

## 2023-04-06 PROCEDURE — 99214 PR OFFICE/OUTPT VISIT, EST, LEVL IV, 30-39 MIN: ICD-10-PCS | Mod: S$GLB,,, | Performed by: NURSE PRACTITIONER

## 2023-04-06 PROCEDURE — 95251 CONT GLUC MNTR ANALYSIS I&R: CPT | Mod: S$GLB,,, | Performed by: NURSE PRACTITIONER

## 2023-04-06 PROCEDURE — 3061F NEG MICROALBUMINURIA REV: CPT | Mod: CPTII,S$GLB,, | Performed by: NURSE PRACTITIONER

## 2023-04-06 PROCEDURE — 3051F PR MOST RECENT HEMOGLOBIN A1C LEVEL 7.0 - < 8.0%: ICD-10-PCS | Mod: CPTII,S$GLB,, | Performed by: NURSE PRACTITIONER

## 2023-04-06 PROCEDURE — 3078F PR MOST RECENT DIASTOLIC BLOOD PRESSURE < 80 MM HG: ICD-10-PCS | Mod: CPTII,S$GLB,, | Performed by: NURSE PRACTITIONER

## 2023-04-06 PROCEDURE — 3074F SYST BP LT 130 MM HG: CPT | Mod: CPTII,S$GLB,, | Performed by: NURSE PRACTITIONER

## 2023-04-06 PROCEDURE — 3061F PR NEG MICROALBUMINURIA RESULT DOCUMENTED/REVIEW: ICD-10-PCS | Mod: CPTII,S$GLB,, | Performed by: NURSE PRACTITIONER

## 2023-04-06 PROCEDURE — 99999 PR PBB SHADOW E&M-EST. PATIENT-LVL III: CPT | Mod: PBBFAC,,, | Performed by: NURSE PRACTITIONER

## 2023-04-06 PROCEDURE — 3066F NEPHROPATHY DOC TX: CPT | Mod: CPTII,S$GLB,, | Performed by: NURSE PRACTITIONER

## 2023-04-06 PROCEDURE — 1159F PR MEDICATION LIST DOCUMENTED IN MEDICAL RECORD: ICD-10-PCS | Mod: CPTII,S$GLB,, | Performed by: NURSE PRACTITIONER

## 2023-04-06 RX ORDER — BLOOD-GLUCOSE SENSOR
EACH MISCELLANEOUS
Qty: 9 EACH | Refills: 4 | Status: SHIPPED | OUTPATIENT
Start: 2023-04-06

## 2023-04-06 RX ORDER — METFORMIN HYDROCHLORIDE 500 MG/1
TABLET, EXTENDED RELEASE ORAL
Qty: 180 TABLET | Refills: 3
Start: 2023-04-06 | End: 2023-04-06

## 2023-04-06 RX ORDER — GLIMEPIRIDE 1 MG/1
1 TABLET ORAL
Qty: 90 TABLET | Refills: 3 | Status: SHIPPED | OUTPATIENT
Start: 2023-04-06 | End: 2023-12-27

## 2023-04-06 RX ORDER — BLOOD-GLUCOSE,RECEIVER,CONT
EACH MISCELLANEOUS
Qty: 1 EACH | Refills: 0 | Status: SHIPPED | OUTPATIENT
Start: 2023-04-06

## 2023-04-06 RX ORDER — ATORVASTATIN CALCIUM 40 MG/1
TABLET, FILM COATED ORAL
Qty: 90 TABLET | Refills: 4 | Status: SHIPPED | OUTPATIENT
Start: 2023-04-06

## 2023-04-06 RX ORDER — METFORMIN HYDROCHLORIDE 500 MG/1
TABLET, EXTENDED RELEASE ORAL
Qty: 180 TABLET | Refills: 3 | Status: SHIPPED | OUTPATIENT
Start: 2023-04-06 | End: 2024-03-05 | Stop reason: SDUPTHER

## 2023-04-06 RX ORDER — FERROUS SULFATE, DRIED 160(50) MG
1 TABLET, EXTENDED RELEASE ORAL 2 TIMES DAILY WITH MEALS
COMMUNITY

## 2023-04-11 ENCOUNTER — PATIENT MESSAGE (OUTPATIENT)
Dept: ADMINISTRATIVE | Facility: HOSPITAL | Age: 58
End: 2023-04-11
Payer: MEDICARE

## 2023-04-13 ENCOUNTER — TELEPHONE (OUTPATIENT)
Dept: PODIATRY | Facility: CLINIC | Age: 58
End: 2023-04-13
Payer: MEDICARE

## 2023-04-13 NOTE — TELEPHONE ENCOUNTER
----- Message from Gema Ramsey sent at 4/13/2023  3:26 PM CDT -----  Regarding: needs sooner appt  Type:  Sooner Appointment Request    Caller is requesting a sooner appointment.  Caller declined first available appointment listed below.  Caller will not accept being placed on the waitlist and is requesting a message be sent to doctor.    Name of Caller:  rodolfo  When is the first available appointment?  5/29/23  Symptoms:  pt needs to be seen as soon as possible, appt was changed, please call to maddy.   Best Call Back Number:  [phone]    Additional Information:

## 2023-04-14 ENCOUNTER — PATIENT OUTREACH (OUTPATIENT)
Dept: ADMINISTRATIVE | Facility: HOSPITAL | Age: 58
End: 2023-04-14
Payer: MEDICARE

## 2023-04-19 ENCOUNTER — OFFICE VISIT (OUTPATIENT)
Dept: ORTHOPEDICS | Facility: CLINIC | Age: 58
End: 2023-04-19
Payer: MEDICARE

## 2023-04-19 DIAGNOSIS — M70.62 GREATER TROCHANTERIC BURSITIS OF BOTH HIPS: Primary | ICD-10-CM

## 2023-04-19 DIAGNOSIS — M70.61 GREATER TROCHANTERIC BURSITIS OF BOTH HIPS: Primary | ICD-10-CM

## 2023-04-19 PROCEDURE — 1160F RVW MEDS BY RX/DR IN RCRD: CPT | Mod: CPTII,S$GLB,, | Performed by: NURSE PRACTITIONER

## 2023-04-19 PROCEDURE — 3061F PR NEG MICROALBUMINURIA RESULT DOCUMENTED/REVIEW: ICD-10-PCS | Mod: CPTII,S$GLB,, | Performed by: NURSE PRACTITIONER

## 2023-04-19 PROCEDURE — 3061F NEG MICROALBUMINURIA REV: CPT | Mod: CPTII,S$GLB,, | Performed by: NURSE PRACTITIONER

## 2023-04-19 PROCEDURE — 3066F NEPHROPATHY DOC TX: CPT | Mod: CPTII,S$GLB,, | Performed by: NURSE PRACTITIONER

## 2023-04-19 PROCEDURE — 1160F PR REVIEW ALL MEDS BY PRESCRIBER/CLIN PHARMACIST DOCUMENTED: ICD-10-PCS | Mod: CPTII,S$GLB,, | Performed by: NURSE PRACTITIONER

## 2023-04-19 PROCEDURE — 99213 OFFICE O/P EST LOW 20 MIN: CPT | Mod: 25,S$GLB,, | Performed by: NURSE PRACTITIONER

## 2023-04-19 PROCEDURE — 3066F PR DOCUMENTATION OF TREATMENT FOR NEPHROPATHY: ICD-10-PCS | Mod: CPTII,S$GLB,, | Performed by: NURSE PRACTITIONER

## 2023-04-19 PROCEDURE — 99999 PR PBB SHADOW E&M-EST. PATIENT-LVL III: ICD-10-PCS | Mod: PBBFAC,,, | Performed by: NURSE PRACTITIONER

## 2023-04-19 PROCEDURE — 1159F PR MEDICATION LIST DOCUMENTED IN MEDICAL RECORD: ICD-10-PCS | Mod: CPTII,S$GLB,, | Performed by: NURSE PRACTITIONER

## 2023-04-19 PROCEDURE — 99999 PR PBB SHADOW E&M-EST. PATIENT-LVL III: CPT | Mod: PBBFAC,,, | Performed by: NURSE PRACTITIONER

## 2023-04-19 PROCEDURE — 20610 DRAIN/INJ JOINT/BURSA W/O US: CPT | Mod: 50,S$GLB,, | Performed by: NURSE PRACTITIONER

## 2023-04-19 PROCEDURE — 1159F MED LIST DOCD IN RCRD: CPT | Mod: CPTII,S$GLB,, | Performed by: NURSE PRACTITIONER

## 2023-04-19 PROCEDURE — 20610 LARGE JOINT ASPIRATION/INJECTION: BILATERAL GREATER TROCHANTERIC BURSA: ICD-10-PCS | Mod: 50,S$GLB,, | Performed by: NURSE PRACTITIONER

## 2023-04-19 PROCEDURE — 3051F HG A1C>EQUAL 7.0%<8.0%: CPT | Mod: CPTII,S$GLB,, | Performed by: NURSE PRACTITIONER

## 2023-04-19 PROCEDURE — 3051F PR MOST RECENT HEMOGLOBIN A1C LEVEL 7.0 - < 8.0%: ICD-10-PCS | Mod: CPTII,S$GLB,, | Performed by: NURSE PRACTITIONER

## 2023-04-19 PROCEDURE — 99213 PR OFFICE/OUTPT VISIT, EST, LEVL III, 20-29 MIN: ICD-10-PCS | Mod: 25,S$GLB,, | Performed by: NURSE PRACTITIONER

## 2023-04-19 RX ORDER — TRIAMCINOLONE ACETONIDE 40 MG/ML
40 INJECTION, SUSPENSION INTRA-ARTICULAR; INTRAMUSCULAR
Status: DISCONTINUED | OUTPATIENT
Start: 2023-04-19 | End: 2023-04-19 | Stop reason: HOSPADM

## 2023-04-19 RX ORDER — METHOCARBAMOL 750 MG/1
1500 TABLET, FILM COATED ORAL 3 TIMES DAILY PRN
Qty: 80 TABLET | Refills: 2 | Status: SHIPPED | OUTPATIENT
Start: 2023-04-19 | End: 2023-12-29

## 2023-04-19 RX ADMIN — TRIAMCINOLONE ACETONIDE 40 MG: 40 INJECTION, SUSPENSION INTRA-ARTICULAR; INTRAMUSCULAR at 10:04

## 2023-04-19 NOTE — PROGRESS NOTES
Chief Complaint   Patient presents with    Left Hip - Pain, Injections    Right Hip - Pain, Injections      HPI:   This is a 57 y.o. who presents to clinic today for bilateral hip pain for the past 7 months after no known trauma. Complains of pain while sleeping on side and when descending stairs. Pain is dull and deep. No numbness or tingling. He had injections to both hip bursas last encounter last June but his A1c was higher and we opted to give him half of the steroid dose. He is having more pain again, but A1c much more controlled.       Past Medical History:   Diagnosis Date    Arthritis     Back pain     radiates to both legs but more on rt leg    Depression     Diabetes     HENDERSON (dyspnea on exertion)     Dyslipidemia 08/12/2015    GERD (gastroesophageal reflux disease)     Gout 08/12/2015    Hypertension     Idiopathic gout     Lumbar pseudoarthrosis     Neck pain     nonradiating pain    Obesity 08/12/2015    Obstructive sleep apnea 08/12/2015    Restless leg syndrome     Sebaceous cyst 04/18/2017    SVT (supraventricular tachycardia)     had abaltion    Type 2 diabetes mellitus 08/12/2015     Past Surgical History:   Procedure Laterality Date    ABLATION N/A 10/08/2020    Procedure: Ablation;  Surgeon: Rip Che III, MD;  Location: [facility] CATH;  Service: Cardiology;  Laterality: N/A;    ARTHROPLASTY OF JOINT OF FINGER Right 04/12/2022    Procedure: Right middle finger MCP joint arthroplasty;  Surgeon: Luis Alberto Payne MD;  Location: [facility] OR;  Service: Orthopedics;  Laterality: Right;  Anesthesia:  General with a single-shot supraclavicular block    BACK SURGERY      x2    CARDIAC CATHETERIZATION      CARDIAC ELECTROPHYSIOLOGY STUDY  10/08/2020    Procedure: Study possible ablation;  Surgeon: Rip Che III, MD;  Location: [facility] CATH;  Service: Cardiology;;    CERVICAL SPINE SURGERY      CHOLECYSTECTOMY  05/30/2018    Dr. ROGELIO Tao, [facility]     COLONOSCOPY  2008    COLONOSCOPY N/A 09/14/2017     Procedure: COLONOSCOPY;  Surgeon: Obi Beltre MD;  Location: [facility] ENDO;  Service: Endoscopy;  Laterality: N/A;    CORONARY ANGIOGRAPHY Left 05/28/2018    Procedure: Coronary angiography;  Surgeon: Rafiq Malik MD;  Location: [facility] CATH;  Service: Cardiology;  Laterality: Left;    ELBOW SURGERY Bilateral     EPIDURAL STEROID INJECTION INTO CERVICAL SPINE      EPIDURAL STEROID INJECTION INTO LUMBAR SPINE      HERNIA REPAIR      LAPAROSCOPIC APPENDECTOMY N/A 06/25/2020    Procedure: APPENDECTOMY, LAPAROSCOPIC;  Surgeon: Gordon Can MD;  Location: [facility] OR;  Service: General;  Laterality: N/A;    LAPAROSCOPIC CHOLECYSTECTOMY N/A 05/30/2018    Procedure: CHOLECYSTECTOMY, LAPAROSCOPIC;  Surgeon: Fletcher Tao MD;  Location: [facility] OR;  Service: General;  Laterality: N/A;    LAPAROSCOPIC SLEEVE GASTRECTOMY N/A 1/20/2023    Procedure: GASTRECTOMY, SLEEVE, LAPAROSCOPIC;  Surgeon: Bharat Devine MD;  Location: [facility] OR;  Service: General;  Laterality: N/A;    LEFT HEART CATHETERIZATION Left 05/28/2018    Procedure: Left heart cath;  Surgeon: Rafiq Malik MD;  Location: [facility] CATH;  Service: Cardiology;  Laterality: Left;    NECK SURGERY      RADIOFREQUENCY ABLATION OF LUMBAR MEDIAL BRANCH NERVE AT SINGLE LEVEL Bilateral 06/17/2022    Procedure: Radiofrequency Ablation, Nerve, Spinal, Lumbar, Medial Branch, 1 Level L3,4,5;  Surgeon: Thiago Garcia MD;  Location: [facility] OR;  Service: Pain Management;  Laterality: Bilateral;    SHOULDER ARTHROSCOPY      SPINE SURGERY      lumbar x2    TONSILLECTOMY      TREATMENT OF CARDIAC ARRHYTHMIA  10/08/2020    Procedure: Cardioversion or Defibrillation;  Surgeon: Rip Che III, MD;  Location: [facility] CATH;  Service: Cardiology;;    TRIGGER FINGER RELEASE Right 09/09/2021    Procedure: RELEASE, TRIGGER FINGER;  Surgeon: Luis Alberto Payne MD;  Location: [facility] OR;  Service: Orthopedics;  Laterality: Right;  Procedure: Right ring finger trigger release    Position:  Supine    Anesthesia: Local    Equipment: Basic handset    TYMPANOSTOMY TUBE PLACEMENT Right      Current Outpatient Medications on File Prior to Visit   Medication Sig Dispense Refill    atorvastatin (LIPITOR) 40 MG tablet take one Tablet by mouth once daily in the evening 90 tablet 4    blood-glucose meter,continuous (DEXCOM G6 ) Misc Dispense 1  1 each 0    blood-glucose meter,continuous (DEXCOM G7 ) Misc Dispense 1 1 each 0    blood-glucose sensor (DEXCOM G6 SENSOR) Oxana Dispense 3 sensors/month 3 each 12    blood-glucose sensor (DEXCOM G7 SENSOR) Oxana Change every 10 days 9 each 4    blood-glucose transmitter (DEXCOM G6 TRANSMITTER) Oxana Dispense 1 transmitter 1 each 3    calcium-vitamin D3 (OS-SOHAM 500 + D3) 500 mg-5 mcg (200 unit) per tablet Take 1 tablet by mouth 2 (two) times daily with meals.      carvediloL (COREG) 12.5 MG tablet TAKE ONE TABLET (12.5MG) BY MOUTH TWICE DAILY with meals 180 tablet 3    empagliflozin (JARDIANCE) 25 mg tablet 1 TABLET DAILY 90 tablet 4    famotidine (PEPCID) 40 MG tablet Take 1 tablet (40 mg total) by mouth every evening. 30 tablet 11    gabapentin (NEURONTIN) 600 MG tablet take one Tablet by mouth three times a day 90 tablet 2    glimepiride (AMARYL) 1 MG tablet Take 1 tablet (1 mg total) by mouth before breakfast. 90 tablet 3    metFORMIN (GLUCOPHAGE-XR) 500 MG ER 24hr tablet Take 1 tablet twice a day 180 tablet 3    multivitamin capsule Take 1 capsule by mouth 2 (two) times daily.      ondansetron (ZOFRAN-ODT) 4 MG TbDL Take 1 tablet (4 mg total) by mouth every 6 (six) hours as needed (nv). 30 tablet 0    vitamin D (VITAMIN D3) 1000 units Tab Take 1,000 Units by mouth once daily.      blood-glucose meter kit To check BG 2 times daily, to use with insurance preferred meter 1 each 1     No current facility-administered medications on file prior to visit.     Review of patient's allergies indicates:  No Known Allergies  Family History   Problem  Relation Age of Onset    Diabetes Mother     Depression Mother     Liver cancer Mother     Obesity Mother     Heart disease Father     Anuerysm Father     Diabetes Father     Stroke Father     Glaucoma Paternal Grandmother     Obesity Sister      Social History     Socioeconomic History    Marital status:     Number of children: 1   Tobacco Use    Smoking status: Former     Packs/day: 0.50     Years: 6.00     Pack years: 3.00     Types: Cigarettes     Start date:      Quit date:      Years since quittin.3    Smokeless tobacco: Never   Substance and Sexual Activity    Alcohol use: No     Alcohol/week: 0.0 standard drinks    Drug use: No    Sexual activity: Yes     Partners: Female   Social History Narrative                Review of Systems:  Constitutional:  Denies fever or chills   Eyes:  Denies change in visual acuity   HENT:  Denies nasal congestion or sore throat   Respiratory:  Denies cough or shortness of breath   Cardiovascular:  Denies chest pain or edema   GI:  Denies abdominal pain, nausea, vomiting, bloody stools or diarrhea   :  Denies dysuria   Integument:  Denies rash   Neurologic:  Denies headache, focal weakness or sensory changes   Endocrine:  Denies polyuria or polydipsia   Lymphatic:  Denies swollen glands   Psychiatric:  Denies depression or anxiety     Physical Exam:   Constitutional:  Well developed, well nourished, no acute distress, non-toxic appearance   Integument:  Well hydrated  Neurologic:  Alert & oriented x 3  Psychiatric:  Speech and behavior appropriate     Bilateral Hip Exam    Bilateral Hip Exam   Bilateral hip exam performed same as contralateral hip and is normal.     Bilateral Hip Exam   Tenderness   The patient is experiencing tenderness in the greater trochanter.  Range of Motion   The patient has normal hip ROM.  Muscle Strength   Abduction: 4/5   Other   Erythema: absent  Sensation: normal  Pulse: present        Greater trochanteric bursitis  of both hips  -     Large Joint Aspiration/Injection: bilateral greater trochanteric bursa  -     methocarbamoL (ROBAXIN) 750 MG Tab; Take 2 tablets (1,500 mg total) by mouth 3 (three) times daily as needed.  Dispense: 80 tablet; Refill: 2           Using an aseptic technique, I injected 5 cc of lidocaine 1% without and 1 cc of kenalog 40mg into the bilateral Hip. The patient tolerated this well.    Rtc 6 wk as needed.

## 2023-04-19 NOTE — PROCEDURES
Large Joint Aspiration/Injection: bilateral greater trochanteric bursa    Date/Time: 4/19/2023 10:20 AM  Performed by: AQUILINO Santos  Authorized by: AQUILINO Santos     Consent Done?:  Yes (Verbal)  Indications:  Pain  Timeout: prior to procedure the correct patient, procedure, and site was verified    Prep: patient was prepped and draped in usual sterile fashion      Local anesthesia used?: Yes    Local anesthetic:  Lidocaine 1% without epinephrine  Anesthetic total (ml):  5      Details:  Needle Size:  21 G  Approach:  Lateral  Location:  Hip  Laterality:  Bilateral  Site:  Bilateral greater trochanteric bursa  Medications (Right):  40 mg triamcinolone acetonide 40 mg/mL  Medications (Left):  40 mg triamcinolone acetonide 40 mg/mL  Patient tolerance:  Patient tolerated the procedure well with no immediate complications

## 2023-04-21 ENCOUNTER — TELEPHONE (OUTPATIENT)
Dept: DERMATOLOGY | Facility: CLINIC | Age: 58
End: 2023-04-21
Payer: MEDICARE

## 2023-04-27 ENCOUNTER — TELEPHONE (OUTPATIENT)
Dept: PODIATRY | Facility: CLINIC | Age: 58
End: 2023-04-27
Payer: MEDICARE

## 2023-04-27 ENCOUNTER — TELEPHONE (OUTPATIENT)
Dept: FAMILY MEDICINE | Facility: CLINIC | Age: 58
End: 2023-04-27
Payer: MEDICARE

## 2023-04-27 NOTE — TELEPHONE ENCOUNTER
----- Message from Ramon Palm sent at 4/27/2023  2:22 PM CDT -----  Regarding: referral  CESARIOIVONE GARNER MRN: [id number]  is still waiting on a dermatology referral. Can someone give him a call please

## 2023-04-27 NOTE — TELEPHONE ENCOUNTER
Spoke with patient and rescheduled appointment to an earlier date. Placed on waitlist in case of cancellation.

## 2023-04-27 NOTE — TELEPHONE ENCOUNTER
----- Message from Debra Barthelemy sent at 4/27/2023  2:30 PM CDT -----  Regarding: Appointment  Patient would like to know if an early appointment is available to see Dr. Nuñez. He would like to be seen early as possible. He's been trying to reach out to Dr. Nuñez for awhile now but said no one will return his phone calls. He can be reached @ [phone].

## 2023-05-04 ENCOUNTER — TELEPHONE (OUTPATIENT)
Dept: PODIATRY | Facility: CLINIC | Age: 58
End: 2023-05-04
Payer: MEDICARE

## 2023-05-08 ENCOUNTER — TELEPHONE (OUTPATIENT)
Dept: PODIATRY | Facility: CLINIC | Age: 58
End: 2023-05-08
Payer: MEDICARE

## 2023-05-08 NOTE — TELEPHONE ENCOUNTER
----- Message from Sofia Ewign sent at 5/8/2023 12:52 PM CDT -----  Regarding: PT CALL BACK  Name of Who is Calling: IVONE LAI [[id number]]        What is the request in detail: Pt needs a call back, to schedule an appt, and does not really want to see another dr. Please advise.         Can the clinic reply by [facility]:           [facility]: [phone]

## 2023-05-23 ENCOUNTER — OFFICE VISIT (OUTPATIENT)
Dept: PODIATRY | Facility: CLINIC | Age: 58
End: 2023-05-23
Payer: MEDICARE

## 2023-05-23 VITALS — WEIGHT: 259.69 LBS | BODY MASS INDEX: 33.33 KG/M2 | HEIGHT: 74 IN

## 2023-05-23 DIAGNOSIS — B35.1 ONYCHOMYCOSIS DUE TO DERMATOPHYTE: ICD-10-CM

## 2023-05-23 DIAGNOSIS — R20.2 PARESTHESIA OF FOOT, BILATERAL: ICD-10-CM

## 2023-05-23 DIAGNOSIS — E11.42 DIABETIC POLYNEUROPATHY ASSOCIATED WITH TYPE 2 DIABETES MELLITUS: Primary | ICD-10-CM

## 2023-05-23 PROCEDURE — 1159F MED LIST DOCD IN RCRD: CPT | Mod: CPTII,S$GLB,, | Performed by: STUDENT IN AN ORGANIZED HEALTH CARE EDUCATION/TRAINING PROGRAM

## 2023-05-23 PROCEDURE — 99999 PR PBB SHADOW E&M-EST. PATIENT-LVL III: ICD-10-PCS | Mod: PBBFAC,,, | Performed by: STUDENT IN AN ORGANIZED HEALTH CARE EDUCATION/TRAINING PROGRAM

## 2023-05-23 PROCEDURE — 3051F HG A1C>EQUAL 7.0%<8.0%: CPT | Mod: CPTII,S$GLB,, | Performed by: STUDENT IN AN ORGANIZED HEALTH CARE EDUCATION/TRAINING PROGRAM

## 2023-05-23 PROCEDURE — 3066F PR DOCUMENTATION OF TREATMENT FOR NEPHROPATHY: ICD-10-PCS | Mod: CPTII,S$GLB,, | Performed by: STUDENT IN AN ORGANIZED HEALTH CARE EDUCATION/TRAINING PROGRAM

## 2023-05-23 PROCEDURE — 3061F NEG MICROALBUMINURIA REV: CPT | Mod: CPTII,S$GLB,, | Performed by: STUDENT IN AN ORGANIZED HEALTH CARE EDUCATION/TRAINING PROGRAM

## 2023-05-23 PROCEDURE — 11721 DEBRIDE NAIL 6 OR MORE: CPT | Mod: Q9,S$GLB,, | Performed by: STUDENT IN AN ORGANIZED HEALTH CARE EDUCATION/TRAINING PROGRAM

## 2023-05-23 PROCEDURE — 1159F PR MEDICATION LIST DOCUMENTED IN MEDICAL RECORD: ICD-10-PCS | Mod: CPTII,S$GLB,, | Performed by: STUDENT IN AN ORGANIZED HEALTH CARE EDUCATION/TRAINING PROGRAM

## 2023-05-23 PROCEDURE — 99213 PR OFFICE/OUTPT VISIT, EST, LEVL III, 20-29 MIN: ICD-10-PCS | Mod: 25,S$GLB,, | Performed by: STUDENT IN AN ORGANIZED HEALTH CARE EDUCATION/TRAINING PROGRAM

## 2023-05-23 PROCEDURE — 99213 OFFICE O/P EST LOW 20 MIN: CPT | Mod: 25,S$GLB,, | Performed by: STUDENT IN AN ORGANIZED HEALTH CARE EDUCATION/TRAINING PROGRAM

## 2023-05-23 PROCEDURE — 99999 PR PBB SHADOW E&M-EST. PATIENT-LVL III: CPT | Mod: PBBFAC,,, | Performed by: STUDENT IN AN ORGANIZED HEALTH CARE EDUCATION/TRAINING PROGRAM

## 2023-05-23 PROCEDURE — 1160F PR REVIEW ALL MEDS BY PRESCRIBER/CLIN PHARMACIST DOCUMENTED: ICD-10-PCS | Mod: CPTII,S$GLB,, | Performed by: STUDENT IN AN ORGANIZED HEALTH CARE EDUCATION/TRAINING PROGRAM

## 2023-05-23 PROCEDURE — 3051F PR MOST RECENT HEMOGLOBIN A1C LEVEL 7.0 - < 8.0%: ICD-10-PCS | Mod: CPTII,S$GLB,, | Performed by: STUDENT IN AN ORGANIZED HEALTH CARE EDUCATION/TRAINING PROGRAM

## 2023-05-23 PROCEDURE — 3066F NEPHROPATHY DOC TX: CPT | Mod: CPTII,S$GLB,, | Performed by: STUDENT IN AN ORGANIZED HEALTH CARE EDUCATION/TRAINING PROGRAM

## 2023-05-23 PROCEDURE — 3008F BODY MASS INDEX DOCD: CPT | Mod: CPTII,S$GLB,, | Performed by: STUDENT IN AN ORGANIZED HEALTH CARE EDUCATION/TRAINING PROGRAM

## 2023-05-23 PROCEDURE — 3008F PR BODY MASS INDEX (BMI) DOCUMENTED: ICD-10-PCS | Mod: CPTII,S$GLB,, | Performed by: STUDENT IN AN ORGANIZED HEALTH CARE EDUCATION/TRAINING PROGRAM

## 2023-05-23 PROCEDURE — 11721 ROUTINE FOOT CARE: ICD-10-PCS | Mod: Q9,S$GLB,, | Performed by: STUDENT IN AN ORGANIZED HEALTH CARE EDUCATION/TRAINING PROGRAM

## 2023-05-23 PROCEDURE — 3061F PR NEG MICROALBUMINURIA RESULT DOCUMENTED/REVIEW: ICD-10-PCS | Mod: CPTII,S$GLB,, | Performed by: STUDENT IN AN ORGANIZED HEALTH CARE EDUCATION/TRAINING PROGRAM

## 2023-05-23 PROCEDURE — 1160F RVW MEDS BY RX/DR IN RCRD: CPT | Mod: CPTII,S$GLB,, | Performed by: STUDENT IN AN ORGANIZED HEALTH CARE EDUCATION/TRAINING PROGRAM

## 2023-05-23 NOTE — PROGRESS NOTES
Subjective:      Patient ID: Gio Forrest is a 57 y.o. male.    Chief Complaint: Nail Care    Gio is a 57 y.o. male who presents to the clinic for evaluation and treatment of diabetic feet. Gio has a past medical history of Arthritis, Back pain, Depression, Diabetes, HENDERSON (dyspnea on exertion), Dyslipidemia (08/12/2015), GERD (gastroesophageal reflux disease), Gout (08/12/2015), Hypertension, Idiopathic gout, Lumbar pseudoarthrosis, Neck pain, Obesity (08/12/2015), Obstructive sleep apnea (08/12/2015), Restless leg syndrome, Sebaceous cyst (04/18/2017), SVT (supraventricular tachycardia), and Type 2 diabetes mellitus (08/12/2015). Patient relates having toenails that are in need of trimming.  Denies experiencing pain from the nails with wearing shoe gear.  Has not attempted to self treat.  Continues to maintain decent control over his blood glucose.  Denies any additional pedal complaints.    5/23/23: f/u for nail care.    Endocrinology: Gabby Rooney DNP, NP  Last visit: 4/6/23  Hemoglobin A1C   Date Value Ref Range Status   03/31/2023 7.3 (H) 4.0 - 5.6 % Final     Comment:     ADA Screening Guidelines:  5.7-6.4%  Consistent with prediabetes  >or=6.5%  Consistent with diabetes    High levels of fetal hemoglobin interfere with the HbA1C  assay. Heterozygous hemoglobin variants (HbS, HgC, etc)do  not significantly interfere with this assay.   However, presence of multiple variants may affect accuracy.     01/17/2023 7.1 (H) 4.0 - 5.6 % Final     Comment:     ADA Screening Guidelines:  5.7-6.4%  Consistent with prediabetes  >or=6.5%  Consistent with diabetes    High levels of fetal hemoglobin interfere with the HbA1C  assay. Heterozygous hemoglobin variants (HbS, HgC, etc)do  not significantly interfere with this assay.   However, presence of multiple variants may affect accuracy.     09/16/2022 7.0 (H) 4.0 - 5.6 % Final     Comment:     ADA Screening Guidelines:  5.7-6.4%  Consistent with  prediabetes  >or=6.5%  Consistent with diabetes    High levels of fetal hemoglobin interfere with the HbA1C  assay. Heterozygous hemoglobin variants (HbS, HgC, etc)do  not significantly interfere with this assay.   However, presence of multiple variants may affect accuracy.             Past Medical History:   Diagnosis Date    Arthritis     Back pain     radiates to both legs but more on rt leg    Depression     Diabetes     HENDERSON (dyspnea on exertion)     Dyslipidemia 08/12/2015    GERD (gastroesophageal reflux disease)     Gout 08/12/2015    Hypertension     Idiopathic gout     Lumbar pseudoarthrosis     Neck pain     nonradiating pain    Obesity 08/12/2015    Obstructive sleep apnea 08/12/2015    Restless leg syndrome     Sebaceous cyst 04/18/2017    SVT (supraventricular tachycardia)     had abaltion    Type 2 diabetes mellitus 08/12/2015       Past Surgical History:   Procedure Laterality Date    ABLATION N/A 10/08/2020    Procedure: Ablation;  Surgeon: Rip Che III, MD;  Location: [facility] CATH;  Service: Cardiology;  Laterality: N/A;    ARTHROPLASTY OF JOINT OF FINGER Right 04/12/2022    Procedure: Right middle finger MCP joint arthroplasty;  Surgeon: Luis Alberto Payne MD;  Location: [facility] OR;  Service: Orthopedics;  Laterality: Right;  Anesthesia:  General with a single-shot supraclavicular block    BACK SURGERY      x2    CARDIAC CATHETERIZATION      CARDIAC ELECTROPHYSIOLOGY STUDY  10/08/2020    Procedure: Study possible ablation;  Surgeon: Rip Che III, MD;  Location: [facility] CATH;  Service: Cardiology;;    CERVICAL SPINE SURGERY      CHOLECYSTECTOMY  05/30/2018    Dr. ROGELIO Tao, [facility]     COLONOSCOPY  2008    COLONOSCOPY N/A 09/14/2017    Procedure: COLONOSCOPY;  Surgeon: Obi Beltre MD;  Location: [facility] ENDO;  Service: Endoscopy;  Laterality: N/A;    CORONARY ANGIOGRAPHY Left 05/28/2018    Procedure: Coronary angiography;  Surgeon: Rafiq Malik MD;  Location: [facility] CATH;  Service:  Cardiology;  Laterality: Left;    ELBOW SURGERY Bilateral     EPIDURAL STEROID INJECTION INTO CERVICAL SPINE      EPIDURAL STEROID INJECTION INTO LUMBAR SPINE      HERNIA REPAIR      LAPAROSCOPIC APPENDECTOMY N/A 06/25/2020    Procedure: APPENDECTOMY, LAPAROSCOPIC;  Surgeon: Gordon Can MD;  Location: [facility] OR;  Service: General;  Laterality: N/A;    LAPAROSCOPIC CHOLECYSTECTOMY N/A 05/30/2018    Procedure: CHOLECYSTECTOMY, LAPAROSCOPIC;  Surgeon: Fletcher Tao MD;  Location: [facility] OR;  Service: General;  Laterality: N/A;    LAPAROSCOPIC SLEEVE GASTRECTOMY N/A 1/20/2023    Procedure: GASTRECTOMY, SLEEVE, LAPAROSCOPIC;  Surgeon: Bharat Devine MD;  Location: [facility] OR;  Service: General;  Laterality: N/A;    LEFT HEART CATHETERIZATION Left 05/28/2018    Procedure: Left heart cath;  Surgeon: Rafiq Malik MD;  Location: [facility] CATH;  Service: Cardiology;  Laterality: Left;    NECK SURGERY      RADIOFREQUENCY ABLATION OF LUMBAR MEDIAL BRANCH NERVE AT SINGLE LEVEL Bilateral 06/17/2022    Procedure: Radiofrequency Ablation, Nerve, Spinal, Lumbar, Medial Branch, 1 Level L3,4,5;  Surgeon: Thiago Garcia MD;  Location: [facility] OR;  Service: Pain Management;  Laterality: Bilateral;    SHOULDER ARTHROSCOPY      SPINE SURGERY      lumbar x2    TONSILLECTOMY      TREATMENT OF CARDIAC ARRHYTHMIA  10/08/2020    Procedure: Cardioversion or Defibrillation;  Surgeon: Rip Che III, MD;  Location: [facility] CATH;  Service: Cardiology;;    TRIGGER FINGER RELEASE Right 09/09/2021    Procedure: RELEASE, TRIGGER FINGER;  Surgeon: Luis Alberto Payne MD;  Location: [facility] OR;  Service: Orthopedics;  Laterality: Right;  Procedure: Right ring finger trigger release    Position: Supine    Anesthesia: Local    Equipment: Basic handset    TYMPANOSTOMY TUBE PLACEMENT Right        Family History   Problem Relation Age of Onset    Diabetes Mother     Depression Mother     Liver cancer Mother     Obesity Mother     Heart disease Father      Anuerysm Father     Diabetes Father     Stroke Father     Glaucoma Paternal Grandmother     Obesity Sister        Social History     Socioeconomic History    Marital status:     Number of children: 1   Tobacco Use    Smoking status: Former     Packs/day: 0.50     Years: 6.00     Pack years: 3.00     Types: Cigarettes     Start date:      Quit date:      Years since quittin.4    Smokeless tobacco: Never   Substance and Sexual Activity    Alcohol use: No     Alcohol/week: 0.0 standard drinks    Drug use: No    Sexual activity: Yes     Partners: Female   Social History Narrative                Current Outpatient Medications   Medication Sig Dispense Refill    atorvastatin (LIPITOR) 40 MG tablet take one Tablet by mouth once daily in the evening 90 tablet 4    blood-glucose meter,continuous (DEXCOM G6 ) Misc Dispense 1  1 each 0    blood-glucose meter,continuous (DEXCOM G7 ) Misc Dispense 1 1 each 0    blood-glucose sensor (DEXCOM G6 SENSOR) Oxana Dispense 3 sensors/month 3 each 12    blood-glucose sensor (DEXCOM G7 SENSOR) Oxana Change every 10 days 9 each 4    blood-glucose transmitter (DEXCOM G6 TRANSMITTER) Oxana Dispense 1 transmitter 1 each 3    calcium-vitamin D3 (OS-SOHAM 500 + D3) 500 mg-5 mcg (200 unit) per tablet Take 1 tablet by mouth 2 (two) times daily with meals.      carvediloL (COREG) 12.5 MG tablet TAKE ONE TABLET (12.5MG) BY MOUTH TWICE DAILY with meals 180 tablet 3    empagliflozin (JARDIANCE) 25 mg tablet 1 TABLET DAILY 90 tablet 4    famotidine (PEPCID) 40 MG tablet Take 1 tablet (40 mg total) by mouth every evening. 30 tablet 11    gabapentin (NEURONTIN) 600 MG tablet take one Tablet by mouth three times a day 90 tablet 2    glimepiride (AMARYL) 1 MG tablet Take 1 tablet (1 mg total) by mouth before breakfast. 90 tablet 3    metFORMIN (GLUCOPHAGE-XR) 500 MG ER 24hr tablet Take 1 tablet twice a day 180 tablet 3    methocarbamoL (ROBAXIN) 750 MG Tab  Take 2 tablets (1,500 mg total) by mouth 3 (three) times daily as needed. 80 tablet 2    multivitamin capsule Take 1 capsule by mouth 2 (two) times daily.      ondansetron (ZOFRAN-ODT) 4 MG TbDL Take 1 tablet (4 mg total) by mouth every 6 (six) hours as needed (nv). 30 tablet 0    vitamin D (VITAMIN D3) 1000 units Tab Take 1,000 Units by mouth once daily.      blood-glucose meter kit To check BG 2 times daily, to use with insurance preferred meter 1 each 1     No current facility-administered medications for this visit.       Review of patient's allergies indicates:  No Known Allergies      Review of Systems   Constitutional: Negative for chills and fever.   Cardiovascular:  Negative for claudication and leg swelling.   Skin:  Positive for color change and nail changes.   Musculoskeletal:  Positive for joint pain and joint swelling. Negative for muscle cramps, muscle weakness and myalgias.   Neurological:  Positive for numbness and paresthesias.   Psychiatric/Behavioral:  Negative for altered mental status.          Objective:      Physical Exam  Constitutional:       General: He is not in acute distress.     Appearance: He is well-developed. He is not diaphoretic.   Cardiovascular:      Pulses:           Dorsalis pedis pulses are 2+ on the right side and 2+ on the left side.        Posterior tibial pulses are 2+ on the right side and 2+ on the left side.      Comments: CFT is < 3 seconds bilateral.  Pedal hair growth is decreased bilateral.  No varicosities noted bilateral.  No lower extremity edema noted bilateral. Toes are cool to touch bilateral.    Musculoskeletal:         General: Tenderness present.      Left lower leg: No edema.      Comments: Muscle strength 5/5 in all muscle groups bilateral.  No tenderness nor crepitation with ROM of foot/ankle joints bilateral.  No pain with palpation of bilateral foot and ankle.  Bilateral pes planus foot type.  Bilateral hallux abducto valgus.  Bilateral semi-reducible  contracture of toe 2-5.     Skin:     General: Skin is warm and dry.      Capillary Refill: Capillary refill takes 2 to 3 seconds.      Coloration: Skin is not pale.      Findings: No abrasion, bruising, burn, ecchymosis, erythema, laceration, lesion or petechiae.      Comments: Pedal skin appears thin bilateral.  Toenails x 10 appear thickened by 2 mm, elongated by 4 mm, and discolored with subungual debris. Examination of the skin reveals no evidence of significant maceration, rashes, open lesions, suspicious appearing nevi or other concerning lesions.    Neurological:      Mental Status: He is alert and oriented to person, place, and time.      Sensory: Sensory deficit present.      Motor: No weakness or atrophy.      Comments: Protective sensation per Dexter-Love monofilament is decreased bilateral.  Vibratory sensation is intact bilateral.  Light touch is intact bilateral.             Assessment:       Encounter Diagnoses   Name Primary?    Diabetic polyneuropathy associated with type 2 diabetes mellitus Yes    Onychomycosis due to dermatophyte     Paresthesia of foot, bilateral            Plan:       Gio was seen today for nail care.    Diagnoses and all orders for this visit:    Diabetic polyneuropathy associated with type 2 diabetes mellitus  -     Routine Foot Care    Onychomycosis due to dermatophyte  -     Routine Foot Care    Paresthesia of foot, bilateral  -     Routine Foot Care        I counseled the patient on his conditions, their implications and medical management.    Patient was evaluated and risk assessed today. The patient is at moderate risk for developing pedal complications due to peripheral vascular disease and peripheral neuropathy. I explained to the patient that uncontrolled DMII, DMN, PVD can make healing injuries difficult leading to wounds or gangrene.    In general, I recommend monitoring the feet daily for any areas of pressure or injuries. If any areas appear to be healing  slowly or become infected, seek medical attention as soon as possible. Wear supportive shoes and avoid barefoot walking.     Follow up in 3 months    Routine Foot Care    Date/Time: 5/23/2023 8:40 AM  Performed by: Tiago Latham DPM  Authorized by: Tiago Latham DPM     Consent Done?:  Yes (Verbal)  Hyperkeratotic Skin Lesions?: No      Nail Care Type:  Debride  Location(s): All  (Left 1st Toe, Left 3rd Toe, Left 2nd Toe, Left 4th Toe, Left 5th Toe, Right 1st Toe, Right 2nd Toe, Right 3rd Toe, Right 4th Toe and Right 5th Toe)  Patient tolerance:  Patient tolerated the procedure well with no immediate complications      Tiago Latham DPM

## 2023-06-15 ENCOUNTER — PATIENT MESSAGE (OUTPATIENT)
Dept: BARIATRICS | Facility: CLINIC | Age: 58
End: 2023-06-15

## 2023-06-15 ENCOUNTER — OFFICE VISIT (OUTPATIENT)
Dept: BARIATRICS | Facility: CLINIC | Age: 58
End: 2023-06-15
Payer: MEDICARE

## 2023-06-15 ENCOUNTER — TELEPHONE (OUTPATIENT)
Dept: BARIATRICS | Facility: CLINIC | Age: 58
End: 2023-06-15
Payer: MEDICARE

## 2023-06-15 VITALS
HEIGHT: 75 IN | BODY MASS INDEX: 29.32 KG/M2 | RESPIRATION RATE: 16 BRPM | WEIGHT: 235.81 LBS | TEMPERATURE: 97 F | SYSTOLIC BLOOD PRESSURE: 117 MMHG | DIASTOLIC BLOOD PRESSURE: 76 MMHG | HEART RATE: 95 BPM

## 2023-06-15 DIAGNOSIS — E66.01 MORBID OBESITY: Primary | ICD-10-CM

## 2023-06-15 DIAGNOSIS — I10 ESSENTIAL HYPERTENSION: ICD-10-CM

## 2023-06-15 DIAGNOSIS — E11.65 TYPE 2 DIABETES MELLITUS WITH HYPERGLYCEMIA, WITH LONG-TERM CURRENT USE OF INSULIN: ICD-10-CM

## 2023-06-15 DIAGNOSIS — Z79.4 TYPE 2 DIABETES MELLITUS WITH HYPERGLYCEMIA, WITH LONG-TERM CURRENT USE OF INSULIN: ICD-10-CM

## 2023-06-15 PROCEDURE — 3066F PR DOCUMENTATION OF TREATMENT FOR NEPHROPATHY: ICD-10-PCS | Mod: CPTII,S$GLB,, | Performed by: SURGERY

## 2023-06-15 PROCEDURE — 99213 PR OFFICE/OUTPT VISIT, EST, LEVL III, 20-29 MIN: ICD-10-PCS | Mod: S$GLB,,, | Performed by: SURGERY

## 2023-06-15 PROCEDURE — 99999 PR PBB SHADOW E&M-EST. PATIENT-LVL III: CPT | Mod: PBBFAC,,, | Performed by: SURGERY

## 2023-06-15 PROCEDURE — 3074F PR MOST RECENT SYSTOLIC BLOOD PRESSURE < 130 MM HG: ICD-10-PCS | Mod: CPTII,S$GLB,, | Performed by: SURGERY

## 2023-06-15 PROCEDURE — 3008F PR BODY MASS INDEX (BMI) DOCUMENTED: ICD-10-PCS | Mod: CPTII,S$GLB,, | Performed by: SURGERY

## 2023-06-15 PROCEDURE — 3074F SYST BP LT 130 MM HG: CPT | Mod: CPTII,S$GLB,, | Performed by: SURGERY

## 2023-06-15 PROCEDURE — 3051F HG A1C>EQUAL 7.0%<8.0%: CPT | Mod: CPTII,S$GLB,, | Performed by: SURGERY

## 2023-06-15 PROCEDURE — 99999 PR PBB SHADOW E&M-EST. PATIENT-LVL III: ICD-10-PCS | Mod: PBBFAC,,, | Performed by: SURGERY

## 2023-06-15 PROCEDURE — 3061F NEG MICROALBUMINURIA REV: CPT | Mod: CPTII,S$GLB,, | Performed by: SURGERY

## 2023-06-15 PROCEDURE — 3078F DIAST BP <80 MM HG: CPT | Mod: CPTII,S$GLB,, | Performed by: SURGERY

## 2023-06-15 PROCEDURE — 99213 OFFICE O/P EST LOW 20 MIN: CPT | Mod: S$GLB,,, | Performed by: SURGERY

## 2023-06-15 PROCEDURE — 3078F PR MOST RECENT DIASTOLIC BLOOD PRESSURE < 80 MM HG: ICD-10-PCS | Mod: CPTII,S$GLB,, | Performed by: SURGERY

## 2023-06-15 PROCEDURE — 1159F MED LIST DOCD IN RCRD: CPT | Mod: CPTII,S$GLB,, | Performed by: SURGERY

## 2023-06-15 PROCEDURE — 1159F PR MEDICATION LIST DOCUMENTED IN MEDICAL RECORD: ICD-10-PCS | Mod: CPTII,S$GLB,, | Performed by: SURGERY

## 2023-06-15 PROCEDURE — 3008F BODY MASS INDEX DOCD: CPT | Mod: CPTII,S$GLB,, | Performed by: SURGERY

## 2023-06-15 PROCEDURE — 3061F PR NEG MICROALBUMINURIA RESULT DOCUMENTED/REVIEW: ICD-10-PCS | Mod: CPTII,S$GLB,, | Performed by: SURGERY

## 2023-06-15 PROCEDURE — 3051F PR MOST RECENT HEMOGLOBIN A1C LEVEL 7.0 - < 8.0%: ICD-10-PCS | Mod: CPTII,S$GLB,, | Performed by: SURGERY

## 2023-06-15 PROCEDURE — 3066F NEPHROPATHY DOC TX: CPT | Mod: CPTII,S$GLB,, | Performed by: SURGERY

## 2023-06-15 RX ORDER — PANTOPRAZOLE SODIUM 40 MG/1
40 TABLET, DELAYED RELEASE ORAL DAILY
Qty: 30 TABLET | Refills: 2 | Status: SHIPPED | OUTPATIENT
Start: 2023-06-15 | End: 2023-08-17 | Stop reason: SDUPTHER
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
## 2023-06-15 NOTE — PROGRESS NOTES
Post Op Note    Surgery: gastric sleeve surgery  Date: 1/20/23  Initial weight: 310  Last weight: 259  Current weight: 235    Constipation: none  Reflux: getting heartburn  Vomiting: none    Diet:    Breakfast:  eggs with 1-2 pieces of hawkins occasionally 1/2 piece of toast  Lunch: drink protein shake  Dinner: goes out to eat, shares meal, gets chicken vegetables    Exercise:  None cause of back surgery    MVI: mvi bid- regulars    Vitals:    06/15/23 1104   BP: 117/76   Pulse: 95   Resp: 16   Temp: 97.2 °F (36.2 °C)       Body comp:  Fat Percent:  33.3 %  Fat Mass:  78.6 lb  FFM:  157.2 lb  TBW: 109 lb  TBW %:  46.2 %  BMR: 2128 kcal    PE:  NAD  RRR  Soft/nt/nd    Labs: pending- has some labs from Dr. Camejo    A/P: s/p sleeve    Reflux - start protonix     Counseling for patient:    Diet: continue healthy eating  Exercise: when able  Vitamins: continue regimen   Co morbidities:     1. Morbid obesity        2. BMI 40.0-44.9, adult        3. Type 2 diabetes mellitus with hyperglycemia, with long-term current use of insulin        4. Essential hypertension            -All above: Evaluated, monitored, and treated with diet and exercise program.

## 2023-06-15 NOTE — TELEPHONE ENCOUNTER
tried to call pt back and let him know it was okay, but his phone wasn't taking calls at that time.

## 2023-06-18 PROBLEM — Z98.890 STATUS POST LUMBAR SURGERY: Status: ACTIVE | Noted: 2023-06-18

## 2023-06-18 PROBLEM — I49.3 PVC'S (PREMATURE VENTRICULAR CONTRACTIONS): Status: ACTIVE | Noted: 2023-06-18

## 2023-06-18 PROBLEM — E11.9 TYPE 2 DIABETES MELLITUS: Status: ACTIVE | Noted: 2018-06-11

## 2023-06-18 PROBLEM — I95.1 ORTHOSTATIC HYPOTENSION: Status: ACTIVE | Noted: 2020-06-18

## 2023-06-21 ENCOUNTER — TELEPHONE (OUTPATIENT)
Dept: FAMILY MEDICINE | Facility: CLINIC | Age: 58
End: 2023-06-21
Payer: MEDICARE

## 2023-06-21 NOTE — TELEPHONE ENCOUNTER
----- Message from Carolina Pedroza sent at 6/21/2023  2:54 PM CDT -----  Contact: pt  Type: Needs Medical Advice  Who Called:  pt  Best Call Back Number: [phone]    Additional Information: Pt is calling needs to be seen for hospital follow up.Please call back and advise.

## 2023-07-03 ENCOUNTER — TELEPHONE (OUTPATIENT)
Dept: ENDOCRINOLOGY | Facility: CLINIC | Age: 58
End: 2023-07-03
Payer: MEDICARE

## 2023-07-03 NOTE — TELEPHONE ENCOUNTER
----- Message from Iram Valentine sent at 7/3/2023  3:48 PM CDT -----  Type: Patient Call Back         Who called: Pt          What is the request in detail: Pt called in regarding wanting to reschedule appointment 7/6/23 for a later day if possible          Can the clinic reply by [facility]?no          Would the patient rather a call back or a response via [facility]? Call  back          Best call back number: [phone] (mobile)          Additional Information:           Thank You

## 2023-07-13 ENCOUNTER — TELEPHONE (OUTPATIENT)
Dept: PODIATRY | Facility: CLINIC | Age: 58
End: 2023-07-13
Payer: MEDICARE

## 2023-07-27 ENCOUNTER — EXTERNAL HOME HEALTH (OUTPATIENT)
Dept: HOME HEALTH SERVICES | Facility: HOSPITAL | Age: 58
End: 2023-07-27

## 2023-08-22 RX ORDER — PANTOPRAZOLE SODIUM 40 MG/1
40 TABLET, DELAYED RELEASE ORAL DAILY
Qty: 30 TABLET | Refills: 2 | Status: SHIPPED | OUTPATIENT
Start: 2023-08-22 | End: 2023-11-27 | Stop reason: SDUPTHER

## 2023-09-13 ENCOUNTER — TELEPHONE (OUTPATIENT)
Dept: FAMILY MEDICINE | Facility: CLINIC | Age: 58
End: 2023-09-13
Payer: MEDICARE

## 2023-09-14 ENCOUNTER — TELEPHONE (OUTPATIENT)
Dept: FAMILY MEDICINE | Facility: CLINIC | Age: 58
End: 2023-09-14
Payer: MEDICARE

## 2023-09-14 NOTE — TELEPHONE ENCOUNTER
----- Message from Luisana Moore sent at 9/14/2023  7:45 AM CDT -----  Contact: self  Type: Sooner Appointment Request        Caller is requesting a sooner appointment. Caller declined first available appointment listed below. Caller will not accept being placed on the waitlist and is requesting a message be sent to doctor.        Name of Caller: patient   Best Call Back Number: [phone] or [phone]  Additional Information: Patient states he needs to be r/s due to provider being out plz call to do so. Thanks

## 2023-09-19 ENCOUNTER — LAB VISIT (OUTPATIENT)
Dept: LAB | Facility: HOSPITAL | Age: 58
End: 2023-09-19
Attending: SURGERY
Payer: MEDICARE

## 2023-09-19 DIAGNOSIS — E66.01 MORBID OBESITY: ICD-10-CM

## 2023-09-19 DIAGNOSIS — E53.8 B12 DEFICIENCY: ICD-10-CM

## 2023-09-19 DIAGNOSIS — E61.1 IRON DEFICIENCY: ICD-10-CM

## 2023-09-19 DIAGNOSIS — Z79.4 TYPE 2 DIABETES MELLITUS WITH HYPERGLYCEMIA, WITH LONG-TERM CURRENT USE OF INSULIN: ICD-10-CM

## 2023-09-19 DIAGNOSIS — E11.65 TYPE 2 DIABETES MELLITUS WITH HYPERGLYCEMIA, WITH LONG-TERM CURRENT USE OF INSULIN: ICD-10-CM

## 2023-09-19 LAB
ALBUMIN SERPL BCP-MCNC: 4.1 G/DL (ref 3.5–5.2)
ALP SERPL-CCNC: 104 U/L (ref 55–135)
ALT SERPL W/O P-5'-P-CCNC: 27 U/L (ref 10–44)
ANION GAP SERPL CALC-SCNC: 8 MMOL/L (ref 8–16)
AST SERPL-CCNC: 26 U/L (ref 10–40)
BASOPHILS # BLD AUTO: 0.04 K/UL (ref 0–0.2)
BASOPHILS NFR BLD: 0.7 % (ref 0–1.9)
BILIRUB SERPL-MCNC: 0.6 MG/DL (ref 0.1–1)
BUN SERPL-MCNC: 10 MG/DL (ref 6–20)
CALCIUM SERPL-MCNC: 10.1 MG/DL (ref 8.7–10.5)
CHLORIDE SERPL-SCNC: 104 MMOL/L (ref 95–110)
CHOLEST SERPL-MCNC: 91 MG/DL (ref 120–199)
CHOLEST/HDLC SERPL: 3.4 {RATIO} (ref 2–5)
CO2 SERPL-SCNC: 28 MMOL/L (ref 23–29)
CREAT SERPL-MCNC: 0.8 MG/DL (ref 0.5–1.4)
DIFFERENTIAL METHOD: ABNORMAL
EOSINOPHIL # BLD AUTO: 0.1 K/UL (ref 0–0.5)
EOSINOPHIL NFR BLD: 2.3 % (ref 0–8)
ERYTHROCYTE [DISTWIDTH] IN BLOOD BY AUTOMATED COUNT: 12.7 % (ref 11.5–14.5)
EST. GFR  (NO RACE VARIABLE): >60 ML/MIN/1.73 M^2
ESTIMATED AVG GLUCOSE: 137 MG/DL (ref 68–131)
GLUCOSE SERPL-MCNC: 187 MG/DL (ref 70–110)
HBA1C MFR BLD: 6.4 % (ref 4–5.6)
HCT VFR BLD AUTO: 46.9 % (ref 40–54)
HDLC SERPL-MCNC: 27 MG/DL (ref 40–75)
HDLC SERPL: 29.7 % (ref 20–50)
HGB BLD-MCNC: 14.9 G/DL (ref 14–18)
IMM GRANULOCYTES # BLD AUTO: 0.01 K/UL (ref 0–0.04)
IMM GRANULOCYTES NFR BLD AUTO: 0.2 % (ref 0–0.5)
IRON SERPL-MCNC: 75 UG/DL (ref 45–160)
LDLC SERPL CALC-MCNC: 48 MG/DL (ref 63–159)
LYMPHOCYTES # BLD AUTO: 1.8 K/UL (ref 1–4.8)
LYMPHOCYTES NFR BLD: 32.2 % (ref 18–48)
MCH RBC QN AUTO: 28.5 PG (ref 27–31)
MCHC RBC AUTO-ENTMCNC: 31.8 G/DL (ref 32–36)
MCV RBC AUTO: 90 FL (ref 82–98)
MONOCYTES # BLD AUTO: 0.3 K/UL (ref 0.3–1)
MONOCYTES NFR BLD: 5.7 % (ref 4–15)
NEUTROPHILS # BLD AUTO: 3.3 K/UL (ref 1.8–7.7)
NEUTROPHILS NFR BLD: 58.9 % (ref 38–73)
NONHDLC SERPL-MCNC: 64 MG/DL
NRBC BLD-RTO: 0 /100 WBC
PLATELET # BLD AUTO: 200 K/UL (ref 150–450)
PMV BLD AUTO: 11.1 FL (ref 9.2–12.9)
POTASSIUM SERPL-SCNC: 4.5 MMOL/L (ref 3.5–5.1)
PROT SERPL-MCNC: 7 G/DL (ref 6–8.4)
RBC # BLD AUTO: 5.22 M/UL (ref 4.6–6.2)
SATURATED IRON: 20 % (ref 20–50)
SODIUM SERPL-SCNC: 140 MMOL/L (ref 136–145)
TOTAL IRON BINDING CAPACITY: 383 UG/DL (ref 250–450)
TRANSFERRIN SERPL-MCNC: 259 MG/DL (ref 200–375)
TRIGL SERPL-MCNC: 80 MG/DL (ref 30–150)
VIT B12 SERPL-MCNC: 1011 PG/ML (ref 210–950)
WBC # BLD AUTO: 5.59 K/UL (ref 3.9–12.7)

## 2023-09-19 PROCEDURE — 36415 COLL VENOUS BLD VENIPUNCTURE: CPT | Mod: PO | Performed by: SURGERY

## 2023-09-19 PROCEDURE — 80061 LIPID PANEL: CPT | Performed by: SURGERY

## 2023-09-19 PROCEDURE — 84466 ASSAY OF TRANSFERRIN: CPT | Performed by: SURGERY

## 2023-09-19 PROCEDURE — 83540 ASSAY OF IRON: CPT | Performed by: SURGERY

## 2023-09-19 PROCEDURE — 83036 HEMOGLOBIN GLYCOSYLATED A1C: CPT | Performed by: SURGERY

## 2023-09-19 PROCEDURE — 80053 COMPREHEN METABOLIC PANEL: CPT | Performed by: SURGERY

## 2023-09-19 PROCEDURE — 82607 VITAMIN B-12: CPT | Performed by: SURGERY

## 2023-09-19 PROCEDURE — 85025 COMPLETE CBC W/AUTO DIFF WBC: CPT | Performed by: SURGERY

## 2023-09-20 ENCOUNTER — TELEPHONE (OUTPATIENT)
Dept: ENDOCRINOLOGY | Facility: CLINIC | Age: 58
End: 2023-09-20
Payer: MEDICARE

## 2023-09-20 ENCOUNTER — TELEPHONE (OUTPATIENT)
Dept: PODIATRY | Facility: CLINIC | Age: 58
End: 2023-09-20
Payer: MEDICARE

## 2023-09-20 ENCOUNTER — PATIENT MESSAGE (OUTPATIENT)
Dept: PODIATRY | Facility: CLINIC | Age: 58
End: 2023-09-20
Payer: MEDICARE

## 2023-09-22 ENCOUNTER — OFFICE VISIT (OUTPATIENT)
Dept: BARIATRICS | Facility: CLINIC | Age: 58
End: 2023-09-22
Payer: MEDICARE

## 2023-09-22 VITALS
RESPIRATION RATE: 16 BRPM | HEIGHT: 75 IN | BODY MASS INDEX: 28.57 KG/M2 | SYSTOLIC BLOOD PRESSURE: 111 MMHG | DIASTOLIC BLOOD PRESSURE: 67 MMHG | HEART RATE: 71 BPM | TEMPERATURE: 98 F | WEIGHT: 229.81 LBS

## 2023-09-22 DIAGNOSIS — I10 ESSENTIAL HYPERTENSION: ICD-10-CM

## 2023-09-22 DIAGNOSIS — E53.8 B12 DEFICIENCY: ICD-10-CM

## 2023-09-22 DIAGNOSIS — E78.2 MIXED HYPERLIPIDEMIA: ICD-10-CM

## 2023-09-22 DIAGNOSIS — E66.01 SEVERE OBESITY (BMI 35.0-39.9) WITH COMORBIDITY: ICD-10-CM

## 2023-09-22 DIAGNOSIS — E55.9 VITAMIN D DEFICIENCY: ICD-10-CM

## 2023-09-22 DIAGNOSIS — E11.65 TYPE 2 DIABETES MELLITUS WITH HYPERGLYCEMIA, WITH LONG-TERM CURRENT USE OF INSULIN: ICD-10-CM

## 2023-09-22 DIAGNOSIS — E66.01 MORBID OBESITY: Primary | ICD-10-CM

## 2023-09-22 DIAGNOSIS — E61.1 IRON DEFICIENCY: ICD-10-CM

## 2023-09-22 DIAGNOSIS — G47.33 OSA ON CPAP: ICD-10-CM

## 2023-09-22 DIAGNOSIS — Z79.4 TYPE 2 DIABETES MELLITUS WITH HYPERGLYCEMIA, WITH LONG-TERM CURRENT USE OF INSULIN: ICD-10-CM

## 2023-09-22 PROCEDURE — 99999 PR PBB SHADOW E&M-EST. PATIENT-LVL IV: ICD-10-PCS | Mod: PBBFAC,,, | Performed by: SURGERY

## 2023-09-22 PROCEDURE — 3078F DIAST BP <80 MM HG: CPT | Mod: CPTII,S$GLB,, | Performed by: SURGERY

## 2023-09-22 PROCEDURE — 3066F PR DOCUMENTATION OF TREATMENT FOR NEPHROPATHY: ICD-10-PCS | Mod: CPTII,S$GLB,, | Performed by: SURGERY

## 2023-09-22 PROCEDURE — 99213 PR OFFICE/OUTPT VISIT, EST, LEVL III, 20-29 MIN: ICD-10-PCS | Mod: S$GLB,,, | Performed by: SURGERY

## 2023-09-22 PROCEDURE — 3008F BODY MASS INDEX DOCD: CPT | Mod: CPTII,S$GLB,, | Performed by: SURGERY

## 2023-09-22 PROCEDURE — 99999 PR PBB SHADOW E&M-EST. PATIENT-LVL IV: CPT | Mod: PBBFAC,,, | Performed by: SURGERY

## 2023-09-22 PROCEDURE — 3074F SYST BP LT 130 MM HG: CPT | Mod: CPTII,S$GLB,, | Performed by: SURGERY

## 2023-09-22 PROCEDURE — 3061F PR NEG MICROALBUMINURIA RESULT DOCUMENTED/REVIEW: ICD-10-PCS | Mod: CPTII,S$GLB,, | Performed by: SURGERY

## 2023-09-22 PROCEDURE — 3044F HG A1C LEVEL LT 7.0%: CPT | Mod: CPTII,S$GLB,, | Performed by: SURGERY

## 2023-09-22 PROCEDURE — 3008F PR BODY MASS INDEX (BMI) DOCUMENTED: ICD-10-PCS | Mod: CPTII,S$GLB,, | Performed by: SURGERY

## 2023-09-22 PROCEDURE — 1159F MED LIST DOCD IN RCRD: CPT | Mod: CPTII,S$GLB,, | Performed by: SURGERY

## 2023-09-22 PROCEDURE — 99213 OFFICE O/P EST LOW 20 MIN: CPT | Mod: S$GLB,,, | Performed by: SURGERY

## 2023-09-22 PROCEDURE — 3061F NEG MICROALBUMINURIA REV: CPT | Mod: CPTII,S$GLB,, | Performed by: SURGERY

## 2023-09-22 PROCEDURE — 3074F PR MOST RECENT SYSTOLIC BLOOD PRESSURE < 130 MM HG: ICD-10-PCS | Mod: CPTII,S$GLB,, | Performed by: SURGERY

## 2023-09-22 PROCEDURE — 3066F NEPHROPATHY DOC TX: CPT | Mod: CPTII,S$GLB,, | Performed by: SURGERY

## 2023-09-22 PROCEDURE — 3078F PR MOST RECENT DIASTOLIC BLOOD PRESSURE < 80 MM HG: ICD-10-PCS | Mod: CPTII,S$GLB,, | Performed by: SURGERY

## 2023-09-22 PROCEDURE — 1159F PR MEDICATION LIST DOCUMENTED IN MEDICAL RECORD: ICD-10-PCS | Mod: CPTII,S$GLB,, | Performed by: SURGERY

## 2023-09-22 PROCEDURE — 3044F PR MOST RECENT HEMOGLOBIN A1C LEVEL <7.0%: ICD-10-PCS | Mod: CPTII,S$GLB,, | Performed by: SURGERY

## 2023-09-22 NOTE — PROGRESS NOTES
Post Op Note    Surgery: gastric sleeve surgery  Date: 1/20/23  Initial weight: 310  Last weight: 235  Current weight: 229    Constipation: none- takes stool softener  Reflux: occasionally getting heartburn - will take med every now and then  Vomiting: none    Diet:  Breakfast:  waffle turkey cheese  Lunch: protein shake - every now and then  Dinner: fried fish    Exercise:  Little cause of back     MVI: mvi bid    Vitals:    09/22/23 0837   BP: 111/67   Pulse: 71   Resp: 16   Temp: 97.5 °F (36.4 °C)       Body comp:  Fat Percent:  31.9 %  Fat Mass:  73.4 lb  FFM:  156.4 lb  TBW: 107.4 lb  TBW %:  46.7 %  BMR: 2109 kcal    PE:  NAD  RRR  Soft/nt/nd    Labs: reviewed    A/P: s/p sleeve     Counseling for patient:    Diet: continue low carb dieting  Exercise: add in some resistance exercising  Vitamins: keep routine    Co morbidities:     1. Morbid obesity        2. BMI 40.0-44.9, adult        3. Type 2 diabetes mellitus with hyperglycemia, with long-term current use of insulin        4. Severe obesity (BMI 35.0-39.9) with comorbidity        5. Essential hypertension        6. TARYN on CPAP        7. Mixed hyperlipidemia            -All above: Evaluated, monitored, and treated with diet and exercise program.

## 2023-09-25 ENCOUNTER — TELEPHONE (OUTPATIENT)
Dept: PODIATRY | Facility: CLINIC | Age: 58
End: 2023-09-25
Payer: MEDICARE

## 2023-09-25 NOTE — TELEPHONE ENCOUNTER
----- Message from Nicole Lira sent at 9/25/2023  2:06 PM CDT -----  Type: Needs Medical Advice  Who Called:  pt     Best Call Back Number: [phone]    Additional Information: pt is calling in regards to him needing his appt and it keeps getting cancelled . Needs a appt asap and is very frustrating

## 2023-09-25 NOTE — TELEPHONE ENCOUNTER
----- Message from Jayde Oliva MA sent at 9/25/2023  4:22 PM CDT -----  Type:  Patient Returning Call    Who Called:  pt  Who Left Message for Patient:  nurse  Does the patient know what this is regarding?:  yes  Best Call Back Number:  [phone]    Additional Information:  please advise

## 2023-10-09 ENCOUNTER — OFFICE VISIT (OUTPATIENT)
Dept: PRIMARY CARE CLINIC | Facility: CLINIC | Age: 58
End: 2023-10-09
Payer: MEDICARE

## 2023-10-09 VITALS
HEART RATE: 85 BPM | OXYGEN SATURATION: 97 % | SYSTOLIC BLOOD PRESSURE: 108 MMHG | WEIGHT: 238.13 LBS | TEMPERATURE: 98 F | HEIGHT: 75 IN | BODY MASS INDEX: 29.61 KG/M2 | DIASTOLIC BLOOD PRESSURE: 68 MMHG

## 2023-10-09 DIAGNOSIS — N64.4 MASTODYNIA: ICD-10-CM

## 2023-10-09 DIAGNOSIS — N63.0 BREAST MASS IN MALE: Primary | ICD-10-CM

## 2023-10-09 PROCEDURE — 3066F NEPHROPATHY DOC TX: CPT | Mod: CPTII,S$GLB,, | Performed by: PHYSICIAN ASSISTANT

## 2023-10-09 PROCEDURE — 1159F PR MEDICATION LIST DOCUMENTED IN MEDICAL RECORD: ICD-10-PCS | Mod: CPTII,S$GLB,, | Performed by: PHYSICIAN ASSISTANT

## 2023-10-09 PROCEDURE — 3061F PR NEG MICROALBUMINURIA RESULT DOCUMENTED/REVIEW: ICD-10-PCS | Mod: CPTII,S$GLB,, | Performed by: PHYSICIAN ASSISTANT

## 2023-10-09 PROCEDURE — 3008F PR BODY MASS INDEX (BMI) DOCUMENTED: ICD-10-PCS | Mod: CPTII,S$GLB,, | Performed by: PHYSICIAN ASSISTANT

## 2023-10-09 PROCEDURE — 1160F PR REVIEW ALL MEDS BY PRESCRIBER/CLIN PHARMACIST DOCUMENTED: ICD-10-PCS | Mod: CPTII,S$GLB,, | Performed by: PHYSICIAN ASSISTANT

## 2023-10-09 PROCEDURE — 99213 PR OFFICE/OUTPT VISIT, EST, LEVL III, 20-29 MIN: ICD-10-PCS | Mod: S$GLB,,, | Performed by: PHYSICIAN ASSISTANT

## 2023-10-09 PROCEDURE — 3061F NEG MICROALBUMINURIA REV: CPT | Mod: CPTII,S$GLB,, | Performed by: PHYSICIAN ASSISTANT

## 2023-10-09 PROCEDURE — 1160F RVW MEDS BY RX/DR IN RCRD: CPT | Mod: CPTII,S$GLB,, | Performed by: PHYSICIAN ASSISTANT

## 2023-10-09 PROCEDURE — 3078F DIAST BP <80 MM HG: CPT | Mod: CPTII,S$GLB,, | Performed by: PHYSICIAN ASSISTANT

## 2023-10-09 PROCEDURE — 3044F HG A1C LEVEL LT 7.0%: CPT | Mod: CPTII,S$GLB,, | Performed by: PHYSICIAN ASSISTANT

## 2023-10-09 PROCEDURE — 3008F BODY MASS INDEX DOCD: CPT | Mod: CPTII,S$GLB,, | Performed by: PHYSICIAN ASSISTANT

## 2023-10-09 PROCEDURE — 3074F PR MOST RECENT SYSTOLIC BLOOD PRESSURE < 130 MM HG: ICD-10-PCS | Mod: CPTII,S$GLB,, | Performed by: PHYSICIAN ASSISTANT

## 2023-10-09 PROCEDURE — 3078F PR MOST RECENT DIASTOLIC BLOOD PRESSURE < 80 MM HG: ICD-10-PCS | Mod: CPTII,S$GLB,, | Performed by: PHYSICIAN ASSISTANT

## 2023-10-09 PROCEDURE — 3044F PR MOST RECENT HEMOGLOBIN A1C LEVEL <7.0%: ICD-10-PCS | Mod: CPTII,S$GLB,, | Performed by: PHYSICIAN ASSISTANT

## 2023-10-09 PROCEDURE — 3066F PR DOCUMENTATION OF TREATMENT FOR NEPHROPATHY: ICD-10-PCS | Mod: CPTII,S$GLB,, | Performed by: PHYSICIAN ASSISTANT

## 2023-10-09 PROCEDURE — 99213 OFFICE O/P EST LOW 20 MIN: CPT | Mod: S$GLB,,, | Performed by: PHYSICIAN ASSISTANT

## 2023-10-09 PROCEDURE — 1159F MED LIST DOCD IN RCRD: CPT | Mod: CPTII,S$GLB,, | Performed by: PHYSICIAN ASSISTANT

## 2023-10-09 PROCEDURE — 3074F SYST BP LT 130 MM HG: CPT | Mod: CPTII,S$GLB,, | Performed by: PHYSICIAN ASSISTANT

## 2023-10-09 RX ORDER — DOXYCYCLINE 100 MG/1
100 CAPSULE ORAL EVERY 12 HOURS
Qty: 20 CAPSULE | Refills: 0 | Status: SHIPPED | OUTPATIENT
Start: 2023-10-09 | End: 2024-01-09

## 2023-10-09 NOTE — PROGRESS NOTES
Subjective     Patient ID: Gio Forrest is a 57 y.o. male.    Chief Complaint: Cyst (On left side chest )    Cyst  This is a new problem. The current episode started 1 to 4 weeks ago. The problem occurs constantly. The problem has been gradually worsening. Pertinent negatives include no chest pain. Associated symptoms comments: Tender mass under the left nipple. Nothing aggravates the symptoms. He has tried nothing for the symptoms.   Review of Systems   Constitutional:  Negative for activity change and appetite change.   Respiratory:  Negative for chest tightness and shortness of breath.    Cardiovascular:  Negative for chest pain.        Objective     Physical Exam  Constitutional:       General: He is not in acute distress.     Appearance: Normal appearance. He is not ill-appearing, toxic-appearing or diaphoretic.   Chest:       Neurological:      Mental Status: He is alert.        Assessment and Plan     1. Breast mass in male  -     US Breast Left Complete; Future; Expected date: 10/09/2023  -     Mammo Digital Diagnostic Left with Bret; Future; Expected date: 10/09/2023    2. Mastodynia  -     Mammo Digital Diagnostic Left with Bret; Future; Expected date: 10/09/2023    Other orders  -     doxycycline (VIBRAMYCIN) 100 MG Cap; Take 1 capsule (100 mg total) by mouth every 12 (twelve) hours.  Dispense: 20 capsule; Refill: 0                 No follow-ups on file.

## 2023-10-09 NOTE — PROGRESS NOTES
CC: This 57 y.o. male presents for management of diabetes  and chronic conditions pending review including HTN, HLP, morbid obesity, fatty liver, vitamin d deficiency, CM, CAD     HPI: He was diagnosed with T2DM in 2005. Has never been hospitalized r/t DM.  Mother has passed away from Fatty liver and hepatic carcinoma in 2018  Family hx of DM: sister, mom and dad    Gastric sleeve in January 2023- has lost almost 90 lbs as of today    BP has been checked 4 times at beginning of visit  BP in upper 70s, low 80s-/60s  may feel a little dizzy   Off BP meds > 1 month  No n/v/d  Denies recent cardiac med changes    See Dexcom download in Media tab  <1% Very High   27% High   72% In Range   0% Low   0% Very Low   Small pp excursions noted, resolve quickly  Overall bg well controlled w no hypoglycemia     Exercise: none     CURRENT DM MEDS:  jardaince 25 mg qam, metformin xr 500 mg 2 tabs in an 1 in pm, glimepiride 1 mg qam  Glucometer type: True Metrix  Standards of Care:  Eye exam: 4/2023  no h/o retinopathy, [facility]-      Following w Dr Martinezs in cardiology      ROS:   Gen: Appetite good, weight loss 18 lbs  Eyes: Denies visual disturbances  Resp: no SOB or HENDERSON, no cough  Cardiac: No palpitations, chest pain, no edema   GI: No nausea or vomiting, diarrhea, no constipation   /GYN: No nocturia, burning or pain.   MS/Neuro: +numbness/ tingling in BLE; Gait steady, speech clear  Other systems: negative.    PE:  GENERAL: Well developed, well nourished.appears older than age.   PSYCH: AAOx3, appropriate mood and affect, pleasant expression, conversant, appears relaxed, well groomed.   EYES: Conjunctiva, corneas clear  NECK: Supple, trachea midline   NEURO: Gait steady  SKIN:   no acanthosis nigracans.  FOOT EXAMINATION:  4/6/2023  No foot deformity, corns or callus formation,  nails in good condiiton and well trimmed, no interspace maceration or ulceration noted.    Decreased hair growth present over toes/feet.  Protective sensation intact with 10 gram monofilament.  +2 dorsalis pedis and posterior pulses noted.    Lab Results   Component Value Date    MICALBCREAT 8.3 03/31/2023       Hemoglobin A1C   Date Value Ref Range Status   09/19/2023 6.4 (H) 4.0 - 5.6 % Final     Comment:     ADA Screening Guidelines:  5.7-6.4%  Consistent with prediabetes  >or=6.5%  Consistent with diabetes    High levels of fetal hemoglobin interfere with the HbA1C  assay. Heterozygous hemoglobin variants (HbS, HgC, etc)do  not significantly interfere with this assay.   However, presence of multiple variants may affect accuracy.     03/31/2023 7.3 (H) 4.0 - 5.6 % Final     Comment:     ADA Screening Guidelines:  5.7-6.4%  Consistent with prediabetes  >or=6.5%  Consistent with diabetes    High levels of fetal hemoglobin interfere with the HbA1C  assay. Heterozygous hemoglobin variants (HbS, HgC, etc)do  not significantly interfere with this assay.   However, presence of multiple variants may affect accuracy.     01/17/2023 7.1 (H) 4.0 - 5.6 % Final     Comment:     ADA Screening Guidelines:  5.7-6.4%  Consistent with prediabetes  >or=6.5%  Consistent with diabetes    High levels of fetal hemoglobin interfere with the HbA1C  assay. Heterozygous hemoglobin variants (HbS, HgC, etc)do  not significantly interfere with this assay.   However, presence of multiple variants may affect accuracy.          ASSESSMENT and PLAN:    1. Type 2 diabetes controlled w DM PN  Continue  jardaince 25 mg qam, metformin xr 500 mg 2 tabs in an 1 in pm, glimepiride 1 mg qam  Dexcom G7     2. Hypotension-Orthostatics done - BG stable in 90s/60s  12 lead- ECG SR w first degree AVB  Stressed need to stay hydrated   Was given oral fluids prior to Orthostatic pressures being done  Monitor at home  F/u w PCP      3. HLP -  Continue statin therapy,     4. LINDSEY-  continue weight loss      5. Obesity-  Body mass index is 30.44 kg/m².   Post gastric sleeve 1/20, following w   Nilson      6. CAD, CM  Follows w Dr Bergman      Follow-up:  in 3 months w labs prior

## 2023-10-12 ENCOUNTER — HOSPITAL ENCOUNTER (OUTPATIENT)
Dept: RADIOLOGY | Facility: HOSPITAL | Age: 58
Discharge: HOME | End: 2023-10-12
Attending: PHYSICIAN ASSISTANT
Payer: MEDICARE

## 2023-10-12 ENCOUNTER — OFFICE VISIT (OUTPATIENT)
Dept: ENDOCRINOLOGY | Facility: CLINIC | Age: 58
End: 2023-10-12
Payer: MEDICARE

## 2023-10-12 VITALS
OXYGEN SATURATION: 95 % | HEIGHT: 74 IN | BODY MASS INDEX: 30.43 KG/M2 | SYSTOLIC BLOOD PRESSURE: 96 MMHG | DIASTOLIC BLOOD PRESSURE: 66 MMHG | WEIGHT: 237.13 LBS | HEART RATE: 93 BPM

## 2023-10-12 DIAGNOSIS — E78.2 MIXED HYPERLIPIDEMIA: ICD-10-CM

## 2023-10-12 DIAGNOSIS — I25.10 ATHEROSCLEROSIS OF NATIVE CORONARY ARTERY OF NATIVE HEART WITHOUT ANGINA PECTORIS: ICD-10-CM

## 2023-10-12 DIAGNOSIS — E66.09 CLASS 1 OBESITY DUE TO EXCESS CALORIES WITH SERIOUS COMORBIDITY AND BODY MASS INDEX (BMI) OF 33.0 TO 33.9 IN ADULT: ICD-10-CM

## 2023-10-12 DIAGNOSIS — N64.4 MASTODYNIA: ICD-10-CM

## 2023-10-12 DIAGNOSIS — N63.0 BREAST MASS IN MALE: ICD-10-CM

## 2023-10-12 DIAGNOSIS — Z79.4 TYPE 2 DIABETES MELLITUS WITH HYPERGLYCEMIA, WITH LONG-TERM CURRENT USE OF INSULIN: Primary | ICD-10-CM

## 2023-10-12 DIAGNOSIS — E11.65 TYPE 2 DIABETES MELLITUS WITH HYPERGLYCEMIA, WITH LONG-TERM CURRENT USE OF INSULIN: Primary | ICD-10-CM

## 2023-10-12 DIAGNOSIS — I95.9 HYPOTENSION, UNSPECIFIED HYPOTENSION TYPE: ICD-10-CM

## 2023-10-12 PROBLEM — E11.9 TYPE 2 DIABETES MELLITUS: Status: RESOLVED | Noted: 2018-06-11 | Resolved: 2023-10-12

## 2023-10-12 PROCEDURE — 3078F PR MOST RECENT DIASTOLIC BLOOD PRESSURE < 80 MM HG: ICD-10-PCS | Mod: CPTII,S$GLB,, | Performed by: NURSE PRACTITIONER

## 2023-10-12 PROCEDURE — 3061F NEG MICROALBUMINURIA REV: CPT | Mod: CPTII,S$GLB,, | Performed by: NURSE PRACTITIONER

## 2023-10-12 PROCEDURE — 77062 BREAST TOMOSYNTHESIS BI: CPT | Mod: TC,PO

## 2023-10-12 PROCEDURE — 93005 ELECTROCARDIOGRAM TRACING: CPT | Mod: S$GLB,,, | Performed by: NURSE PRACTITIONER

## 2023-10-12 PROCEDURE — 3044F HG A1C LEVEL LT 7.0%: CPT | Mod: CPTII,S$GLB,, | Performed by: NURSE PRACTITIONER

## 2023-10-12 PROCEDURE — 1159F MED LIST DOCD IN RCRD: CPT | Mod: CPTII,S$GLB,, | Performed by: NURSE PRACTITIONER

## 2023-10-12 PROCEDURE — 3066F PR DOCUMENTATION OF TREATMENT FOR NEPHROPATHY: ICD-10-PCS | Mod: CPTII,S$GLB,, | Performed by: NURSE PRACTITIONER

## 2023-10-12 PROCEDURE — 95251 CONT GLUC MNTR ANALYSIS I&R: CPT | Mod: S$GLB,,, | Performed by: NURSE PRACTITIONER

## 2023-10-12 PROCEDURE — 99999 PR PBB SHADOW E&M-EST. PATIENT-LVL V: CPT | Mod: PBBFAC,,, | Performed by: NURSE PRACTITIONER

## 2023-10-12 PROCEDURE — 3066F NEPHROPATHY DOC TX: CPT | Mod: CPTII,S$GLB,, | Performed by: NURSE PRACTITIONER

## 2023-10-12 PROCEDURE — 1159F PR MEDICATION LIST DOCUMENTED IN MEDICAL RECORD: ICD-10-PCS | Mod: CPTII,S$GLB,, | Performed by: NURSE PRACTITIONER

## 2023-10-12 PROCEDURE — 76642 US BREAST LEFT LIMITED: ICD-10-PCS | Mod: 26,LT,, | Performed by: RADIOLOGY

## 2023-10-12 PROCEDURE — 3074F PR MOST RECENT SYSTOLIC BLOOD PRESSURE < 130 MM HG: ICD-10-PCS | Mod: CPTII,S$GLB,, | Performed by: NURSE PRACTITIONER

## 2023-10-12 PROCEDURE — 93010 ELECTROCARDIOGRAM REPORT: CPT | Mod: S$GLB,,, | Performed by: INTERNAL MEDICINE

## 2023-10-12 PROCEDURE — 3008F PR BODY MASS INDEX (BMI) DOCUMENTED: ICD-10-PCS | Mod: CPTII,S$GLB,, | Performed by: NURSE PRACTITIONER

## 2023-10-12 PROCEDURE — 76642 ULTRASOUND BREAST LIMITED: CPT | Mod: 26,LT,, | Performed by: RADIOLOGY

## 2023-10-12 PROCEDURE — 77066 MAMMO DIGITAL DIAGNOSTIC BILAT WITH TOMO: ICD-10-PCS | Mod: 26,,, | Performed by: RADIOLOGY

## 2023-10-12 PROCEDURE — 93005 EKG 12-LEAD: ICD-10-PCS | Mod: S$GLB,,, | Performed by: NURSE PRACTITIONER

## 2023-10-12 PROCEDURE — 76642 ULTRASOUND BREAST LIMITED: CPT | Mod: TC,PO,LT

## 2023-10-12 PROCEDURE — 3061F PR NEG MICROALBUMINURIA RESULT DOCUMENTED/REVIEW: ICD-10-PCS | Mod: CPTII,S$GLB,, | Performed by: NURSE PRACTITIONER

## 2023-10-12 PROCEDURE — 3044F PR MOST RECENT HEMOGLOBIN A1C LEVEL <7.0%: ICD-10-PCS | Mod: CPTII,S$GLB,, | Performed by: NURSE PRACTITIONER

## 2023-10-12 PROCEDURE — 93010 EKG 12-LEAD: ICD-10-PCS | Mod: S$GLB,,, | Performed by: INTERNAL MEDICINE

## 2023-10-12 PROCEDURE — 95251 PR GLUCOSE MONITOR, 72 HOUR, PHYS INTERP: ICD-10-PCS | Mod: S$GLB,,, | Performed by: NURSE PRACTITIONER

## 2023-10-12 PROCEDURE — 99999 PR PBB SHADOW E&M-EST. PATIENT-LVL V: ICD-10-PCS | Mod: PBBFAC,,, | Performed by: NURSE PRACTITIONER

## 2023-10-12 PROCEDURE — 77066 DX MAMMO INCL CAD BI: CPT | Mod: 26,,, | Performed by: RADIOLOGY

## 2023-10-12 PROCEDURE — 99214 OFFICE O/P EST MOD 30 MIN: CPT | Mod: S$GLB,,, | Performed by: NURSE PRACTITIONER

## 2023-10-12 PROCEDURE — 99214 PR OFFICE/OUTPT VISIT, EST, LEVL IV, 30-39 MIN: ICD-10-PCS | Mod: S$GLB,,, | Performed by: NURSE PRACTITIONER

## 2023-10-12 PROCEDURE — 3008F BODY MASS INDEX DOCD: CPT | Mod: CPTII,S$GLB,, | Performed by: NURSE PRACTITIONER

## 2023-10-12 PROCEDURE — 3078F DIAST BP <80 MM HG: CPT | Mod: CPTII,S$GLB,, | Performed by: NURSE PRACTITIONER

## 2023-10-12 PROCEDURE — 77062 BREAST TOMOSYNTHESIS BI: CPT | Mod: 26,,, | Performed by: RADIOLOGY

## 2023-10-12 PROCEDURE — 3074F SYST BP LT 130 MM HG: CPT | Mod: CPTII,S$GLB,, | Performed by: NURSE PRACTITIONER

## 2023-10-12 PROCEDURE — 77062 MAMMO DIGITAL DIAGNOSTIC BILAT WITH TOMO: ICD-10-PCS | Mod: 26,,, | Performed by: RADIOLOGY

## 2023-11-06 ENCOUNTER — TELEPHONE (OUTPATIENT)
Dept: ENDOCRINOLOGY | Facility: CLINIC | Age: 58
End: 2023-11-06
Payer: MEDICARE

## 2023-11-06 NOTE — TELEPHONE ENCOUNTER
----- Message from Katherine Anderson sent at 11/6/2023 12:33 PM CST -----  Regarding: Call back  Type:  Needs Medical Advice    Who Called: Pt      Would the patient rather a call back or a response via [facility]? Callback    Best Call Back Number: [phone]    Additional Information: Pt sts he should be having an appt for tomorrow and someone from the office called him today , pt would like a call back ( I ma not see a appt) Please advise --------------Thank you

## 2023-11-08 DIAGNOSIS — M25.511 ACUTE PAIN OF BOTH SHOULDERS: Primary | ICD-10-CM

## 2023-11-08 DIAGNOSIS — M25.512 ACUTE PAIN OF BOTH SHOULDERS: Primary | ICD-10-CM

## 2023-11-09 ENCOUNTER — OFFICE VISIT (OUTPATIENT)
Dept: ORTHOPEDICS | Facility: CLINIC | Age: 58
End: 2023-11-09
Payer: MEDICARE

## 2023-11-09 ENCOUNTER — HOSPITAL ENCOUNTER (OUTPATIENT)
Dept: RADIOLOGY | Facility: HOSPITAL | Age: 58
Discharge: HOME | End: 2023-11-09
Attending: ORTHOPAEDIC SURGERY
Payer: MEDICARE

## 2023-11-09 VITALS — HEIGHT: 74 IN | WEIGHT: 237 LBS | BODY MASS INDEX: 30.42 KG/M2

## 2023-11-09 DIAGNOSIS — G89.29 CHRONIC PAIN OF BOTH SHOULDERS: Primary | ICD-10-CM

## 2023-11-09 DIAGNOSIS — M25.511 ACUTE PAIN OF BOTH SHOULDERS: ICD-10-CM

## 2023-11-09 DIAGNOSIS — M25.512 ACUTE PAIN OF BOTH SHOULDERS: ICD-10-CM

## 2023-11-09 DIAGNOSIS — M25.511 CHRONIC PAIN OF BOTH SHOULDERS: Primary | ICD-10-CM

## 2023-11-09 DIAGNOSIS — M25.512 CHRONIC PAIN OF BOTH SHOULDERS: Primary | ICD-10-CM

## 2023-11-09 PROCEDURE — 3008F BODY MASS INDEX DOCD: CPT | Mod: CPTII,S$GLB,, | Performed by: ORTHOPAEDIC SURGERY

## 2023-11-09 PROCEDURE — 1159F MED LIST DOCD IN RCRD: CPT | Mod: CPTII,S$GLB,, | Performed by: ORTHOPAEDIC SURGERY

## 2023-11-09 PROCEDURE — 20610 DRAIN/INJ JOINT/BURSA W/O US: CPT | Mod: RT,S$GLB,, | Performed by: ORTHOPAEDIC SURGERY

## 2023-11-09 PROCEDURE — 20610 LARGE JOINT ASPIRATION/INJECTION: R SUBACROMIAL BURSA: ICD-10-PCS | Mod: RT,S$GLB,, | Performed by: ORTHOPAEDIC SURGERY

## 2023-11-09 PROCEDURE — 99204 OFFICE O/P NEW MOD 45 MIN: CPT | Mod: 25,S$GLB,, | Performed by: ORTHOPAEDIC SURGERY

## 2023-11-09 PROCEDURE — 73030 X-RAY EXAM OF SHOULDER: CPT | Mod: 26,50,, | Performed by: RADIOLOGY

## 2023-11-09 PROCEDURE — 3066F PR DOCUMENTATION OF TREATMENT FOR NEPHROPATHY: ICD-10-PCS | Mod: CPTII,S$GLB,, | Performed by: ORTHOPAEDIC SURGERY

## 2023-11-09 PROCEDURE — 3008F PR BODY MASS INDEX (BMI) DOCUMENTED: ICD-10-PCS | Mod: CPTII,S$GLB,, | Performed by: ORTHOPAEDIC SURGERY

## 2023-11-09 PROCEDURE — 3061F NEG MICROALBUMINURIA REV: CPT | Mod: CPTII,S$GLB,, | Performed by: ORTHOPAEDIC SURGERY

## 2023-11-09 PROCEDURE — 73030 XR SHOULDER TRAUMA 3 VIEW BILATERAL: ICD-10-PCS | Mod: 26,50,, | Performed by: RADIOLOGY

## 2023-11-09 PROCEDURE — 3044F HG A1C LEVEL LT 7.0%: CPT | Mod: CPTII,S$GLB,, | Performed by: ORTHOPAEDIC SURGERY

## 2023-11-09 PROCEDURE — 3061F PR NEG MICROALBUMINURIA RESULT DOCUMENTED/REVIEW: ICD-10-PCS | Mod: CPTII,S$GLB,, | Performed by: ORTHOPAEDIC SURGERY

## 2023-11-09 PROCEDURE — 99999 PR PBB SHADOW E&M-EST. PATIENT-LVL III: ICD-10-PCS | Mod: PBBFAC,,, | Performed by: ORTHOPAEDIC SURGERY

## 2023-11-09 PROCEDURE — 3066F NEPHROPATHY DOC TX: CPT | Mod: CPTII,S$GLB,, | Performed by: ORTHOPAEDIC SURGERY

## 2023-11-09 PROCEDURE — 73030 X-RAY EXAM OF SHOULDER: CPT | Mod: TC,50,PO

## 2023-11-09 PROCEDURE — 99204 PR OFFICE/OUTPT VISIT, NEW, LEVL IV, 45-59 MIN: ICD-10-PCS | Mod: 25,S$GLB,, | Performed by: ORTHOPAEDIC SURGERY

## 2023-11-09 PROCEDURE — 3044F PR MOST RECENT HEMOGLOBIN A1C LEVEL <7.0%: ICD-10-PCS | Mod: CPTII,S$GLB,, | Performed by: ORTHOPAEDIC SURGERY

## 2023-11-09 PROCEDURE — 1159F PR MEDICATION LIST DOCUMENTED IN MEDICAL RECORD: ICD-10-PCS | Mod: CPTII,S$GLB,, | Performed by: ORTHOPAEDIC SURGERY

## 2023-11-09 PROCEDURE — 99999 PR PBB SHADOW E&M-EST. PATIENT-LVL III: CPT | Mod: PBBFAC,,, | Performed by: ORTHOPAEDIC SURGERY

## 2023-11-09 RX ORDER — TRIAMCINOLONE ACETONIDE 40 MG/ML
80 INJECTION, SUSPENSION INTRA-ARTICULAR; INTRAMUSCULAR
Status: DISCONTINUED | OUTPATIENT
Start: 2023-11-09 | End: 2023-11-09 | Stop reason: HOSPADM

## 2023-11-09 RX ADMIN — TRIAMCINOLONE ACETONIDE 80 MG: 40 INJECTION, SUSPENSION INTRA-ARTICULAR; INTRAMUSCULAR at 03:11

## 2023-11-09 NOTE — PROGRESS NOTES
58 years old right greater than left shoulder pain for about 2 years time no trauma 5 on good days 7 on bad days worse with activity relieved with rest and oral pain medications     Exam shows positive Neer Moura impingement sign, cuff strength is intact no signs infection instability, cervical motion slightly uncomfortable     X-rays show previous distal clavicle excision on the left, no acute findings     Assessment:  Bilateral shoulder pain history of cervical spine disease and fusion, rotator cuff tendinitis right shoulder        Plan: Kenalog injection right shoulder subacromial space, home exercise program, follow-up as needed

## 2023-11-09 NOTE — PROCEDURES
Large Joint Aspiration/Injection: R subacromial bursa    Date/Time: 11/9/2023 3:00 PM    Performed by: Hugo Ellis MD  Authorized by: Hugo Ellis MD    Consent Done?:  Yes (Verbal)  Site marked: the procedure site was marked    Prep: patient was prepped and draped in usual sterile fashion      Details:  Needle Size:  21 G  Approach:  Posterior  Location:  Shoulder  Site:  R subacromial bursa  Medications:  80 mg triamcinolone acetonide 40 mg/mL  Patient tolerance:  Patient tolerated the procedure well with no immediate complications

## 2023-11-16 ENCOUNTER — OFFICE VISIT (OUTPATIENT)
Dept: PODIATRY | Facility: CLINIC | Age: 58
End: 2023-11-16
Payer: MEDICARE

## 2023-11-16 VITALS — HEIGHT: 74 IN | BODY MASS INDEX: 30.42 KG/M2 | WEIGHT: 237 LBS

## 2023-11-16 DIAGNOSIS — S90.229A SUBUNGUAL HEMATOMA OF TOE, UNSPECIFIED LATERALITY, INITIAL ENCOUNTER: ICD-10-CM

## 2023-11-16 DIAGNOSIS — E11.42 DIABETIC POLYNEUROPATHY ASSOCIATED WITH TYPE 2 DIABETES MELLITUS: Primary | ICD-10-CM

## 2023-11-16 DIAGNOSIS — B35.1 ONYCHOMYCOSIS DUE TO DERMATOPHYTE: ICD-10-CM

## 2023-11-16 PROCEDURE — 99499 NO LOS: ICD-10-PCS | Mod: S$GLB,,, | Performed by: PODIATRIST

## 2023-11-16 PROCEDURE — 11721 DEBRIDE NAIL 6 OR MORE: CPT | Mod: Q9,S$GLB,, | Performed by: PODIATRIST

## 2023-11-16 PROCEDURE — 99499 UNLISTED E&M SERVICE: CPT | Mod: S$GLB,,, | Performed by: PODIATRIST

## 2023-11-16 PROCEDURE — 11721 PR DEBRIDEMENT OF NAILS, 6 OR MORE: ICD-10-PCS | Mod: Q9,S$GLB,, | Performed by: PODIATRIST

## 2023-11-16 PROCEDURE — 99999 PR PBB SHADOW E&M-EST. PATIENT-LVL II: CPT | Mod: PBBFAC,,, | Performed by: PODIATRIST

## 2023-11-16 PROCEDURE — 99999 PR PBB SHADOW E&M-EST. PATIENT-LVL II: ICD-10-PCS | Mod: PBBFAC,,, | Performed by: PODIATRIST

## 2023-11-16 NOTE — PROGRESS NOTES
Subjective:      Patient ID: Gio Forrest is a 58 y.o. male.    Chief Complaint: Diabetes Mellitus, Diabetic Foot Exam, and Foot Problem (B/l great toes discolored)    Gio is a 58 y.o. male who presents to the clinic for evaluation and treatment of diabetic feet. Gio has a past medical history of Arthritis, Back pain, Depression, HENDERSON (dyspnea on exertion), Dyslipidemia (08/12/2015), GERD (gastroesophageal reflux disease), Gout (08/12/2015), Hypertension, Idiopathic gout, Lumbar pseudoarthrosis, Neck pain, Neuropathy, Obesity (08/12/2015), Obstructive sleep apnea (08/12/2015), Restless leg syndrome, Sebaceous cyst (04/18/2017), SVT (supraventricular tachycardia), and Type 2 diabetes mellitus (08/12/2015). Patient relates having toenails that are in need of trimming.  Denies experiencing pain from the nails with wearing shoe gear.  Has not attempted to self treat.  Notes excellent control over his blood glucose.  Denies any additional pedal complaints.    Endocrinology: Gabby Rooney, SVITLANA, NP  Last visit: 10/23  Hemoglobin A1C   Date Value Ref Range Status   09/19/2023 6.4 (H) 4.0 - 5.6 % Final     Comment:     ADA Screening Guidelines:  5.7-6.4%  Consistent with prediabetes  >or=6.5%  Consistent with diabetes    High levels of fetal hemoglobin interfere with the HbA1C  assay. Heterozygous hemoglobin variants (HbS, HgC, etc)do  not significantly interfere with this assay.   However, presence of multiple variants may affect accuracy.     03/31/2023 7.3 (H) 4.0 - 5.6 % Final     Comment:     ADA Screening Guidelines:  5.7-6.4%  Consistent with prediabetes  >or=6.5%  Consistent with diabetes    High levels of fetal hemoglobin interfere with the HbA1C  assay. Heterozygous hemoglobin variants (HbS, HgC, etc)do  not significantly interfere with this assay.   However, presence of multiple variants may affect accuracy.     01/17/2023 7.1 (H) 4.0 - 5.6 % Final     Comment:     ADA Screening Guidelines:  5.7-6.4%   Consistent with prediabetes  >or=6.5%  Consistent with diabetes    High levels of fetal hemoglobin interfere with the HbA1C  assay. Heterozygous hemoglobin variants (HbS, HgC, etc)do  not significantly interfere with this assay.   However, presence of multiple variants may affect accuracy.             Past Medical History:   Diagnosis Date    Arthritis     Back pain     radiates to both legs but more on rt leg    Depression     HENDERSON (dyspnea on exertion)     Dyslipidemia 08/12/2015    GERD (gastroesophageal reflux disease)     Gout 08/12/2015    Hypertension     Idiopathic gout     Lumbar pseudoarthrosis     Neck pain     nonradiating pain    Neuropathy     Obesity 08/12/2015    Obstructive sleep apnea 08/12/2015    cpap    Restless leg syndrome     Sebaceous cyst 04/18/2017    SVT (supraventricular tachycardia)     s/p ablation    Type 2 diabetes mellitus 08/12/2015       Past Surgical History:   Procedure Laterality Date    ABLATION N/A 10/08/2020    Procedure: Ablation;  Surgeon: Rip Che III, MD;  Location: [facility] CATH;  Service: Cardiology;  Laterality: N/A;    ARTHROPLASTY OF JOINT OF FINGER Right 04/12/2022    Procedure: Right middle finger MCP joint arthroplasty;  Surgeon: Luis Alberto Payne MD;  Location: [facility] OR;  Service: Orthopedics;  Laterality: Right;  Anesthesia:  General with a single-shot supraclavicular block    BACK SURGERY      multiple    CARDIAC CATHETERIZATION      CARDIAC ELECTROPHYSIOLOGY STUDY  10/08/2020    Procedure: Study possible ablation;  Surgeon: Rip Che III, MD;  Location: [facility] CATH;  Service: Cardiology;;    CERVICAL SPINE SURGERY      CHOLECYSTECTOMY  05/30/2018    Dr. ROGELIO Tao, [facility]     COLONOSCOPY  2008    COLONOSCOPY N/A 09/14/2017    Procedure: COLONOSCOPY;  Surgeon: Obi Beltre MD;  Location: [facility] ENDO;  Service: Endoscopy;  Laterality: N/A;    CORONARY ANGIOGRAPHY Left 05/28/2018    Procedure: Coronary angiography;  Surgeon: Rafiq Malik MD;   Location: [facility] CATH;  Service: Cardiology;  Laterality: Left;    ELBOW SURGERY Bilateral     EPIDURAL STEROID INJECTION INTO CERVICAL SPINE      EPIDURAL STEROID INJECTION INTO LUMBAR SPINE      HERNIA REPAIR      LAPAROSCOPIC APPENDECTOMY N/A 06/25/2020    Procedure: APPENDECTOMY, LAPAROSCOPIC;  Surgeon: Gordon Can MD;  [facility] OR;  Service: General;  Laterality: N/A;    LAPAROSCOPIC CHOLECYSTECTOMY N/A 05/30/2018    Procedure: CHOLECYSTECTOMY, LAPAROSCOPIC;  Surgeon: Fletcher Tao MD;  [facility] OR;  Service: General;  Laterality: N/A;    LAPAROSCOPIC SLEEVE GASTRECTOMY N/A 01/20/2023    Procedure: GASTRECTOMY, SLEEVE, LAPAROSCOPIC;  Surgeon: Bharat Devine MD;  Location: [facility] OR;  Service: General;  Laterality: N/A;    LEFT HEART CATHETERIZATION Left 05/28/2018    Procedure: Left heart cath;  Surgeon: Rafiq Malik MD;  Location: [facility] CATH;  Service: Cardiology;  Laterality: Left;    NECK SURGERY      RADIOFREQUENCY ABLATION OF LUMBAR MEDIAL BRANCH NERVE AT SINGLE LEVEL Bilateral 06/17/2022    Procedure: Radiofrequency Ablation, Nerve, Spinal, Lumbar, Medial Branch, 1 Level L3,4,5;  Surgeon: Thiago Garcia MD;  Location: [facility] OR;  Service: Pain Management;  Laterality: Bilateral;    SHOULDER ARTHROSCOPY      SPINE SURGERY      multiple    TONSILLECTOMY      TREATMENT OF CARDIAC ARRHYTHMIA  10/08/2020    Procedure: Cardioversion or Defibrillation;  Surgeon: Rip Che III, MD;  Location: [facility] CATH;  Service: Cardiology;;    TRIGGER FINGER RELEASE Right 09/09/2021    Procedure: RELEASE, TRIGGER FINGER;  Surgeon: Luis Alberto Payne MD;  Location: [facility] OR;  Service: Orthopedics;  Laterality: Right;  Procedure: Right ring finger trigger release    Position: Supine    Anesthesia: Local    Equipment: Basic handset    TYMPANOSTOMY TUBE PLACEMENT Right        Family History   Problem Relation Age of Onset    Diabetes Mother     Depression Mother     Liver cancer Mother     Obesity Mother      Heart disease Father     Anuerysm Father     Diabetes Father     Stroke Father     Glaucoma Paternal Grandmother     Obesity Sister        Social History     Socioeconomic History    Marital status:     Number of children: 1   Tobacco Use    Smoking status: Former     Current packs/day: 0.00     Average packs/day: 0.5 packs/day for 10.0 years (5.0 ttl pk-yrs)     Types: Cigarettes     Start date:      Quit date:      Years since quittin.8    Smokeless tobacco: Never   Substance and Sexual Activity    Alcohol use: Not Currently    Drug use: No    Sexual activity: Yes     Partners: Female   Social History Narrative              Social Determinants of Health     Financial Resource Strain: Medium Risk (2022)    Overall Financial Resource Strain (CARDIA)     Difficulty of Paying Living Expenses: Somewhat hard   Food Insecurity: Food Insecurity Present (2022)    Hunger Vital Sign     Worried About Running Out of Food in the Last Year: Often true     Ran Out of Food in the Last Year: Often true   Transportation Needs: Unmet Transportation Needs (2022)    PRAPARE - Transportation     Lack of Transportation (Medical): Yes     Lack of Transportation (Non-Medical): Yes   Physical Activity: Insufficiently Active (2022)    Exercise Vital Sign     Days of Exercise per Week: 4 days     Minutes of Exercise per Session: 10 min   Stress: Stress Concern Present (2022)    [employer] [place] [employer] - Occupational Stress Questionnaire     Feeling of Stress : To some extent   Social Connections: Socially Isolated (2022)    Social Connection and Isolation Panel [NHANES]     Frequency of Communication with Friends and Family: Once a week     Frequency of Social Gatherings with Friends and Family: Once a week     Attends Protestant Services: Never     Active Member of Clubs or Organizations: No     Attends Club or Organization Meetings: Never     Marital Status:         Current Outpatient Medications   Medication Sig Dispense Refill    atorvastatin (LIPITOR) 40 MG tablet take one Tablet by mouth once daily in the evening (Patient taking differently: Take 40 mg by mouth every evening. take one Tablet by mouth once daily in the evening) 90 tablet 4    blood-glucose meter kit To check BG 2 times daily, to use with insurance preferred meter 1 each 1    blood-glucose meter,continuous (DEXCOM G6 ) Misc Dispense 1  1 each 0    blood-glucose meter,continuous (DEXCOM G7 ) Misc Dispense 1 1 each 0    blood-glucose sensor (DEXCOM G6 SENSOR) [place] Dispense 3 sensors/month 3 each 12    blood-glucose sensor (DEXCOM G7 SENSOR) Oxana Change every 10 days 9 each 4    blood-glucose transmitter (DEXCOM G6 TRANSMITTER) [place] Dispense 1 transmitter 1 each 3    calcium-vitamin D3 (OS-SOHAM 500 + D3) 500 mg-5 mcg (200 unit) per tablet Take 1 tablet by mouth 2 (two) times daily with meals.      docusate sodium (COLACE) 100 MG capsule Take 100 mg by mouth 2 (two) times daily.      doxycycline (VIBRAMYCIN) 100 MG Cap Take 1 capsule (100 mg total) by mouth every 12 (twelve) hours. (Patient not taking: Reported on 10/12/2023) 20 capsule 0    empagliflozin (JARDIANCE) 25 mg tablet 1 TABLET DAILY (Patient taking differently: Take 25 mg by mouth once daily.) 90 tablet 4    famotidine (PEPCID) 40 MG tablet Take 1 tablet (40 mg total) by mouth every evening. (Patient not taking: Reported on 10/12/2023) 30 tablet 11    gabapentin (NEURONTIN) 600 MG tablet take one Tablet by mouth three times a day 90 tablet 2    glimepiride (AMARYL) 1 MG tablet Take 1 tablet (1 mg total) by mouth before breakfast. 90 tablet 3    metFORMIN (GLUCOPHAGE-XR) 500 MG ER 24hr tablet Take 1 tablet twice a day (Patient taking differently: Take 500 mg by mouth 2 (two) times daily with meals.) 180 tablet 3    multivitamin capsule Take 1 capsule by mouth once daily.      ondansetron (ZOFRAN-ODT) 4 MG TbDL Take  1 tablet (4 mg total) by mouth every 6 (six) hours as needed (nv). 30 tablet 0    oxyCODONE-acetaminophen (PERCOCET)  mg per tablet Take 1 tablet by mouth every 6 (six) hours as needed for Pain.      pantoprazole (PROTONIX) 40 MG tablet Take 1 tablet (40 mg total) by mouth once daily. 30 tablet 2    varicella-zoster gE-AS01B, PF, (SHINGRIX) 50 mcg/0.5 mL injection Inject into the muscle. (Patient not taking: Reported on 10/12/2023) 1 each 0    vitamin D (VITAMIN D3) 1000 units Tab Take 1,000 Units by mouth once daily.       No current facility-administered medications for this visit.       Review of patient's allergies indicates:  No Known Allergies      Review of Systems   Constitutional: Negative for chills and fever.   Cardiovascular:  Negative for claudication and leg swelling.   Skin:  Positive for color change and nail changes.   Musculoskeletal:  Positive for joint pain and joint swelling. Negative for muscle cramps, muscle weakness and myalgias.   Neurological:  Positive for paresthesias. Negative for numbness.   Psychiatric/Behavioral:  Negative for altered mental status.            Objective:      Physical Exam  Constitutional:       General: He is not in acute distress.     Appearance: He is well-developed. He is not diaphoretic.   Cardiovascular:      Pulses:           Dorsalis pedis pulses are 2+ on the right side and 2+ on the left side.        Posterior tibial pulses are 2+ on the right side and 2+ on the left side.      Comments: CFT is < 3 seconds bilateral.  Pedal hair growth is decreased bilateral.  No varicosities noted bilateral.  No lower extremity edema noted bilateral. Toes are cool to touch bilateral.    Musculoskeletal:         General: No tenderness.      Left lower leg: No edema.      Comments: Muscle strength 5/5 in all muscle groups bilateral.  No tenderness nor crepitation with ROM of foot/ankle joints bilateral.  No pain with palpation of bilateral foot and ankle.  Bilateral pes  planus foot type.  Bilateral hallux abducto valgus.  Bilateral semi-reducible contracture of toe 2-5.     Skin:     General: Skin is warm and dry.      Capillary Refill: Capillary refill takes 2 to 3 seconds.      Coloration: Skin is not pale.      Findings: Bruising and ecchymosis present. No abrasion, burn, erythema, laceration, lesion or petechiae.      Comments: Pedal skin appears thin bilateral.  Toenails x 10 appear thickened by 2 mm, elongated by 4 mm, and discolored with subungual debris. Ecchymosis noted to roughly 1/2 of bilateral hallux toenail.   Neurological:      Mental Status: He is alert and oriented to person, place, and time.      Sensory: No sensory deficit.      Motor: No weakness or atrophy.      Comments: Protective sensation per [employer] monofilament is intact bilateral.  Vibratory sensation is intact bilateral.  Light touch is intact bilateral.               Assessment:       Encounter Diagnoses   Name Primary?    Diabetic polyneuropathy associated with type 2 diabetes mellitus Yes    Onychomycosis due to dermatophyte     Subungual hematoma of toe, unspecified laterality, initial encounter          Plan:       Gio was seen today for diabetes mellitus, diabetic foot exam and foot problem.    Diagnoses and all orders for this visit:    Diabetic polyneuropathy associated with type 2 diabetes mellitus    Onychomycosis due to dermatophyte    Subungual hematoma of toe, unspecified laterality, initial encounter      I counseled the patient on his conditions, their implications and medical management.    Explained that bilateral subungual hematomas of the great toe was likely due to pressure from the shoes as they contacted his lengthy toenails.  He understands that it may take up to 12 months for traumatic portions of the nail to grow to length.    Shoe inspection. Diabetic Foot Education. Patient reminded of the importance of good nutrition and blood sugar control to help prevent  podiatric complications of diabetes. Patient instructed on proper foot hygeine. We discussed wearing proper shoe gear, daily foot inspections, never walking without protective shoe gear, never putting sharp instruments to feet    With patient's permission, nails were aggressively reduced and debrided x 10 to their soft tissue attachment mechanically and with electric , removing all offending nail and debris.  Patient relates relief following the procedure. He will continue to monitor the areas daily, inspect his feet, wear protective shoe gear when ambulatory, moisturizer to maintain skin integrity and follow in this office in approximately 3 months, sooner p.r.nataly.    Rj Nuñez DPM

## 2023-11-28 DIAGNOSIS — M47.896 OTHER SPONDYLOSIS, LUMBAR REGION: ICD-10-CM

## 2023-11-28 RX ORDER — PANTOPRAZOLE SODIUM 40 MG/1
40 TABLET, DELAYED RELEASE ORAL DAILY
Qty: 30 TABLET | Refills: 2 | Status: SHIPPED | OUTPATIENT
Start: 2023-11-28 | End: 2024-03-04 | Stop reason: SDUPTHER

## 2023-11-28 RX ORDER — GABAPENTIN 600 MG/1
600 TABLET ORAL 3 TIMES DAILY
Qty: 90 TABLET | Refills: 2 | Status: SHIPPED | OUTPATIENT
Start: 2023-11-28 | End: 2024-01-25

## 2023-12-26 DIAGNOSIS — Z79.4 TYPE 2 DIABETES MELLITUS WITH DIABETIC POLYNEUROPATHY, WITH LONG-TERM CURRENT USE OF INSULIN: ICD-10-CM

## 2023-12-26 DIAGNOSIS — E11.42 TYPE 2 DIABETES MELLITUS WITH DIABETIC POLYNEUROPATHY, WITH LONG-TERM CURRENT USE OF INSULIN: ICD-10-CM

## 2023-12-27 ENCOUNTER — TELEPHONE (OUTPATIENT)
Dept: PODIATRY | Facility: CLINIC | Age: 58
End: 2023-12-27
Payer: MEDICARE

## 2023-12-27 RX ORDER — GLIMEPIRIDE 1 MG/1
1 TABLET ORAL
Qty: 90 TABLET | Refills: 3 | Status: SHIPPED | OUTPATIENT
Start: 2023-12-27

## 2023-12-28 DIAGNOSIS — E11.42 DIABETIC POLYNEUROPATHY ASSOCIATED WITH TYPE 2 DIABETES MELLITUS: Primary | ICD-10-CM

## 2024-01-04 ENCOUNTER — TELEPHONE (OUTPATIENT)
Dept: PRIMARY CARE CLINIC | Facility: CLINIC | Age: 59
End: 2024-01-04
Payer: MEDICARE

## 2024-01-04 NOTE — TELEPHONE ENCOUNTER
----- Message from Maggi Mayer sent at 1/4/2024  2:50 PM CST -----  Type: Needs Medical Advice  Who Called:  pt  Best Call Back Number: [phone]  Additional Information: pt is calling the office to speak with the nurse in regards to his appt he had to cancel. Please call back to advise. Thanks!

## 2024-01-09 ENCOUNTER — OFFICE VISIT (OUTPATIENT)
Dept: PRIMARY CARE CLINIC | Facility: CLINIC | Age: 59
End: 2024-01-09
Payer: MEDICARE

## 2024-01-09 ENCOUNTER — LAB VISIT (OUTPATIENT)
Dept: PRIMARY CARE CLINIC | Facility: CLINIC | Age: 59
End: 2024-01-09
Attending: SURGERY
Payer: MEDICARE

## 2024-01-09 VITALS
BODY MASS INDEX: 28.96 KG/M2 | SYSTOLIC BLOOD PRESSURE: 110 MMHG | RESPIRATION RATE: 18 BRPM | WEIGHT: 225.5 LBS | DIASTOLIC BLOOD PRESSURE: 68 MMHG | OXYGEN SATURATION: 96 % | HEART RATE: 80 BPM

## 2024-01-09 DIAGNOSIS — E11.8 DM TYPE 2, CONTROLLED, WITH COMPLICATION: ICD-10-CM

## 2024-01-09 DIAGNOSIS — G47.30 SLEEP APNEA, UNSPECIFIED TYPE: ICD-10-CM

## 2024-01-09 DIAGNOSIS — E66.01 MORBID OBESITY: ICD-10-CM

## 2024-01-09 DIAGNOSIS — F33.0 MILD EPISODE OF RECURRENT MAJOR DEPRESSIVE DISORDER: ICD-10-CM

## 2024-01-09 DIAGNOSIS — E55.9 VITAMIN D DEFICIENCY: ICD-10-CM

## 2024-01-09 DIAGNOSIS — E61.1 IRON DEFICIENCY: ICD-10-CM

## 2024-01-09 DIAGNOSIS — E53.8 B12 DEFICIENCY: ICD-10-CM

## 2024-01-09 DIAGNOSIS — Z02.4 ENCOUNTER FOR CDL (COMMERCIAL DRIVING LICENSE) EXAM: Primary | ICD-10-CM

## 2024-01-09 PROBLEM — F19.20 ADDICTION: Status: RESOLVED | Noted: 2021-12-21 | Resolved: 2024-01-09

## 2024-01-09 PROBLEM — G47.10 HYPERSOMNIA WITH SLEEP APNEA: Status: RESOLVED | Noted: 2022-05-14 | Resolved: 2024-01-09

## 2024-01-09 PROBLEM — G47.19 EXCESSIVE DAYTIME SLEEPINESS: Status: RESOLVED | Noted: 2018-03-02 | Resolved: 2024-01-09

## 2024-01-09 LAB
25(OH)D3+25(OH)D2 SERPL-MCNC: 108 NG/ML (ref 30–96)
ALBUMIN SERPL BCP-MCNC: 4.3 G/DL (ref 3.5–5.2)
ALP SERPL-CCNC: 119 U/L (ref 55–135)
ALT SERPL W/O P-5'-P-CCNC: 25 U/L (ref 10–44)
ANION GAP SERPL CALC-SCNC: 10 MMOL/L (ref 8–16)
AST SERPL-CCNC: 23 U/L (ref 10–40)
BASOPHILS # BLD AUTO: 0.05 K/UL (ref 0–0.2)
BASOPHILS NFR BLD: 0.6 % (ref 0–1.9)
BILIRUB SERPL-MCNC: 0.8 MG/DL (ref 0.1–1)
BILIRUBIN, UA POC OHS: NEGATIVE
BLOOD, UA POC OHS: NEGATIVE
BUN SERPL-MCNC: 14 MG/DL (ref 6–20)
CALCIUM SERPL-MCNC: 10.1 MG/DL (ref 8.7–10.5)
CHLORIDE SERPL-SCNC: 104 MMOL/L (ref 95–110)
CHOLEST SERPL-MCNC: 118 MG/DL (ref 120–199)
CHOLEST/HDLC SERPL: 3.9 {RATIO} (ref 2–5)
CLARITY, UA POC OHS: CLEAR
CO2 SERPL-SCNC: 27 MMOL/L (ref 23–29)
COLOR, UA POC OHS: YELLOW
CREAT SERPL-MCNC: 0.9 MG/DL (ref 0.5–1.4)
DIFFERENTIAL METHOD BLD: NORMAL
EOSINOPHIL # BLD AUTO: 0.1 K/UL (ref 0–0.5)
EOSINOPHIL NFR BLD: 1.6 % (ref 0–8)
ERYTHROCYTE [DISTWIDTH] IN BLOOD BY AUTOMATED COUNT: 13.1 % (ref 11.5–14.5)
EST. GFR  (NO RACE VARIABLE): >60 ML/MIN/1.73 M^2
GLUCOSE SERPL-MCNC: 152 MG/DL (ref 70–110)
GLUCOSE, UA POC OHS: NEGATIVE
HCT VFR BLD AUTO: 47.3 % (ref 40–54)
HDLC SERPL-MCNC: 30 MG/DL (ref 40–75)
HDLC SERPL: 25.4 % (ref 20–50)
HGB BLD-MCNC: 15.2 G/DL (ref 14–18)
IMM GRANULOCYTES # BLD AUTO: 0.02 K/UL (ref 0–0.04)
IMM GRANULOCYTES NFR BLD AUTO: 0.2 % (ref 0–0.5)
IRON SERPL-MCNC: 108 UG/DL (ref 45–160)
KETONES, UA POC OHS: NEGATIVE
LDLC SERPL CALC-MCNC: 65.4 MG/DL (ref 63–159)
LEUKOCYTES, UA POC OHS: NEGATIVE
LYMPHOCYTES # BLD AUTO: 2.3 K/UL (ref 1–4.8)
LYMPHOCYTES NFR BLD: 27.7 % (ref 18–48)
MCH RBC QN AUTO: 29.1 PG (ref 27–31)
MCHC RBC AUTO-ENTMCNC: 32.1 G/DL (ref 32–36)
MCV RBC AUTO: 91 FL (ref 82–98)
MONOCYTES # BLD AUTO: 0.4 K/UL (ref 0.3–1)
MONOCYTES NFR BLD: 5.2 % (ref 4–15)
NEUTROPHILS # BLD AUTO: 5.3 K/UL (ref 1.8–7.7)
NEUTROPHILS NFR BLD: 64.7 % (ref 38–73)
NITRITE, UA POC OHS: NEGATIVE
NONHDLC SERPL-MCNC: 88 MG/DL
NRBC BLD-RTO: 0 /100 WBC
PH, UA POC OHS: 6
PLATELET # BLD AUTO: 201 K/UL (ref 150–450)
PMV BLD AUTO: 10.5 FL (ref 9.2–12.9)
POTASSIUM SERPL-SCNC: 4.4 MMOL/L (ref 3.5–5.1)
PROT SERPL-MCNC: 7.2 G/DL (ref 6–8.4)
PROTEIN, UA POC OHS: NEGATIVE
RBC # BLD AUTO: 5.22 M/UL (ref 4.6–6.2)
SATURATED IRON: 26 % (ref 20–50)
SODIUM SERPL-SCNC: 141 MMOL/L (ref 136–145)
SPECIFIC GRAVITY, UA POC OHS: 1.01
TOTAL IRON BINDING CAPACITY: 413 UG/DL (ref 250–450)
TRANSFERRIN SERPL-MCNC: 279 MG/DL (ref 200–375)
TRIGL SERPL-MCNC: 113 MG/DL (ref 30–150)
UROBILINOGEN, UA POC OHS: 1
VIT B12 SERPL-MCNC: 1044 PG/ML (ref 210–950)
WBC # BLD AUTO: 8.2 K/UL (ref 3.9–12.7)

## 2024-01-09 PROCEDURE — 85025 COMPLETE CBC W/AUTO DIFF WBC: CPT | Performed by: SURGERY

## 2024-01-09 PROCEDURE — 36415 COLL VENOUS BLD VENIPUNCTURE: CPT | Mod: S$GLB,,, | Performed by: SURGERY

## 2024-01-09 PROCEDURE — 84425 ASSAY OF VITAMIN B-1: CPT | Performed by: SURGERY

## 2024-01-09 PROCEDURE — 83540 ASSAY OF IRON: CPT | Performed by: SURGERY

## 2024-01-09 PROCEDURE — 81003 URINALYSIS AUTO W/O SCOPE: CPT | Mod: QW,S$GLB,, | Performed by: PHYSICIAN ASSISTANT

## 2024-01-09 PROCEDURE — 82607 VITAMIN B-12: CPT | Performed by: SURGERY

## 2024-01-09 PROCEDURE — 99215 OFFICE O/P EST HI 40 MIN: CPT | Mod: S$GLB,,, | Performed by: PHYSICIAN ASSISTANT

## 2024-01-09 PROCEDURE — 80061 LIPID PANEL: CPT | Performed by: SURGERY

## 2024-01-09 PROCEDURE — 80053 COMPREHEN METABOLIC PANEL: CPT | Performed by: SURGERY

## 2024-01-09 PROCEDURE — 82306 VITAMIN D 25 HYDROXY: CPT | Performed by: SURGERY

## 2024-01-09 NOTE — PROGRESS NOTES
Subjective     Patient ID: Gio Forrest is a 58 y.o. male.    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    Patient Active Problem List:     Sleep apnea: has a note from the Sleep apnea clinic showing adequate use     DM type 2, controlled, with complication: recent HAIC showing control     Essential hypertension: controlled     Chronic gout of right foot     Mixed hyperlipidemia     Sebaceous cyst     History of colon polyps     History of arthroscopy of left shoulder     Fatty liver     Atherosclerosis of native coronary artery of native heart without angina pectoris     Paroxysmal SVT (supraventricular tachycardia)     Chronic pain of right hand     Polypharmacy     Orthostatic hypotension     S/P catheter ablation of slow pathway     Spinal enthesopathy, lumbar region     Mild episode of recurrent major depressive disorder: controlled      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     Trigger finger, right ring finger     Chronic left shoulder pain     Limited range of motion (ROM) of shoulder: controlled     Hyperlipidemia due to type 2 diabetes mellitus     Benign hypertensive heart disease     Benign prostatic hyperplasia with lower urinary tract symptoms     Difficulty with CPAP use     Excessive daytime sleepiness       Nasal turbinate hypertrophy     Restless legs     Hypomagnesemia     Class 1 obesity due to excess calories with serious comorbidity and body mass index (BMI) of 33.0 to 33.9 in adult     Status post lumbar surgery     PVC's (premature ventricular contractions)    Past Medical History:  12/21/2021: Addiction  No date: Arthritis  No date: Back pain      Comment:  radiates to both legs but more on rt leg  No date: Depression  No date: HENDERSON (dyspnea on exertion)  08/12/2015: Dyslipidemia  03/02/2018: Excessive daytime sleepiness      Comment:  Formatting of this note might be different  from the                original.  Added automatically from request for surgery                [id number]  No date: GERD (gastroesophageal reflux disease)  08/12/2015: Gout  05/14/2022: Hypersomnia with sleep apnea  No date: Hypertension  No date: Idiopathic gout  No date: Lumbar pseudoarthrosis  No date: Neck pain      Comment:  nonradiating pain  No date: Neuropathy  08/12/2015: Obesity  08/12/2015: Obstructive sleep apnea      Comment:  cpap  No date: Restless leg syndrome  04/18/2017: Sebaceous cyst  No date: SVT (supraventricular tachycardia)      Comment:  s/p ablation  08/12/2015: Type 2 diabetes mellitus            Review of Systems   Constitutional:  Negative for activity change, chills, fatigue and unexpected weight change.   HENT: Negative.     Eyes:  Negative for photophobia and visual disturbance.   Respiratory:  Negative for apnea, chest tightness, shortness of breath and wheezing.    Cardiovascular:  Negative for chest pain, palpitations and leg swelling.   Gastrointestinal:  Negative for abdominal pain, blood in stool, diarrhea, nausea and reflux.   Endocrine: Negative for polydipsia, polyphagia and polyuria.   Genitourinary:  Negative for dysuria, genital sores and hematuria.   Musculoskeletal:  Negative for gait problem, leg pain and neck pain.   Neurological:  Negative for dizziness, seizures, weakness, numbness and headaches.   Hematological:  Negative for adenopathy.   Psychiatric/Behavioral:  Negative for dysphoric mood, self-injury, sleep disturbance and suicidal ideas. The patient is not nervous/anxious.           Objective     Physical Exam  Vitals reviewed.   Constitutional:       General: He is not in acute distress.     Appearance: Normal appearance. He is not ill-appearing, toxic-appearing or diaphoretic.   HENT:      Head: Normocephalic and atraumatic.      Right Ear: Tympanic membrane, ear canal and external ear normal.      Left Ear: Tympanic membrane, ear canal and external ear normal.       Ears:      Comments: Hearing to whisper 7 ft bilaterally      Nose: Nose normal. No congestion or rhinorrhea.      Mouth/Throat:      Mouth: Mucous membranes are dry.      Pharynx: No oropharyngeal exudate or posterior oropharyngeal erythema.   Eyes:      Conjunctiva/sclera: Conjunctivae normal.      Comments: Vision 20/20 bilaterally  Horizontal 70 degree bilateral   No color blindness    Neck:      Vascular: No carotid bruit.   Cardiovascular:      Rate and Rhythm: Normal rate and regular rhythm.      Pulses: Normal pulses.      Heart sounds: Normal heart sounds. No murmur heard.     No friction rub. No gallop.   Pulmonary:      Effort: Pulmonary effort is normal. No respiratory distress.      Breath sounds: Normal breath sounds. No stridor. No wheezing, rhonchi or rales.   Chest:      Chest wall: No tenderness.   Abdominal:      General: There is no distension.      Palpations: Abdomen is soft. There is no mass.      Tenderness: There is no abdominal tenderness. There is no right CVA tenderness, left CVA tenderness, guarding or rebound.      Hernia: No hernia is present.   Musculoskeletal:         General: No swelling or tenderness.      Cervical back: No rigidity or tenderness.   Feet:      Right foot:      Protective Sensation: 5 sites tested.  5 sites sensed.      Left foot:      Protective Sensation: 5 sites tested.  5 sites sensed.      Comments: Able to determined touch and light pressure. He has been evaluated by podiatry   Lymphadenopathy:      Cervical: No cervical adenopathy.   Skin:     General: Skin is dry.      Coloration: Skin is not jaundiced.   Neurological:      General: No focal deficit present.      Mental Status: He is alert.      Motor: No weakness.      Coordination: Coordination normal.      Gait: Gait normal.   Psychiatric:         Mood and Affect: Mood normal.            Assessment and Plan     1. Encounter for CDL (commercial driving license) exam  -     POCT  Urinalysis(Instrument)  Gave 1 year certification.     2. DM type 2, controlled, with complication  The current medical regimen is effective;  continue present plan and medications.     3. Sleep apnea, unspecified type  The current medical regimen is effective;  continue present plan and medications.     4. Mild episode of recurrent major depressive disorder  The current medical regimen is effective;  continue present plan and medications.     I spent 50 minutes on this encounter, time includes face-to-face, chart review, documentation, test review and orders.      Fu one year

## 2024-01-12 ENCOUNTER — TELEPHONE (OUTPATIENT)
Dept: BARIATRICS | Facility: CLINIC | Age: 59
End: 2024-01-12
Payer: MEDICARE

## 2024-01-12 NOTE — TELEPHONE ENCOUNTER
----- Message from Siobhan Ewing sent at 1/12/2024  7:49 AM CST -----  .Type:  Patient Call Back    Who Called: PT       Does the patient know what this is regarding?: PT CALLED TO RESCHEDULE     Would the patient rather a call back YES     Best Call Back Number:  [phone]    Additional Information: Thank You

## 2024-01-15 LAB — VIT B1 BLD-MCNC: 74 UG/L (ref 38–122)

## 2024-01-25 DIAGNOSIS — M47.896 OTHER SPONDYLOSIS, LUMBAR REGION: ICD-10-CM

## 2024-01-25 RX ORDER — GABAPENTIN 600 MG/1
600 TABLET ORAL 3 TIMES DAILY
Qty: 90 TABLET | Refills: 2 | Status: SHIPPED | OUTPATIENT
Start: 2024-01-25 | End: 2024-04-03

## 2024-02-23 ENCOUNTER — OFFICE VISIT (OUTPATIENT)
Dept: BARIATRICS | Facility: CLINIC | Age: 59
End: 2024-02-23
Payer: MEDICARE

## 2024-02-23 VITALS
RESPIRATION RATE: 16 BRPM | HEIGHT: 75 IN | WEIGHT: 223.81 LBS | BODY MASS INDEX: 27.83 KG/M2 | SYSTOLIC BLOOD PRESSURE: 102 MMHG | HEART RATE: 77 BPM | TEMPERATURE: 97 F | DIASTOLIC BLOOD PRESSURE: 56 MMHG

## 2024-02-23 DIAGNOSIS — E11.65 TYPE 2 DIABETES MELLITUS WITH HYPERGLYCEMIA, WITH LONG-TERM CURRENT USE OF INSULIN: ICD-10-CM

## 2024-02-23 DIAGNOSIS — I10 ESSENTIAL HYPERTENSION: ICD-10-CM

## 2024-02-23 DIAGNOSIS — G47.33 OSA ON CPAP: ICD-10-CM

## 2024-02-23 DIAGNOSIS — E66.01 MORBID OBESITY: Primary | ICD-10-CM

## 2024-02-23 DIAGNOSIS — Z79.4 TYPE 2 DIABETES MELLITUS WITH HYPERGLYCEMIA, WITH LONG-TERM CURRENT USE OF INSULIN: ICD-10-CM

## 2024-02-23 PROCEDURE — 99213 OFFICE O/P EST LOW 20 MIN: CPT | Mod: S$GLB,,, | Performed by: SURGERY

## 2024-02-23 PROCEDURE — 99999 PR PBB SHADOW E&M-EST. PATIENT-LVL III: CPT | Mod: PBBFAC,,, | Performed by: SURGERY

## 2024-02-23 NOTE — PROGRESS NOTES
Post Op Note    Surgery: gastric sleeve surgery  Date: 1/20/23  Initial weight: 310  Last weight: 229  Current weight: 223    Constipation: none  Reflux: none  Vomiting: none    Diet:  Breakfast:  fried egg, meat and maybe grits  Lunch: canned chicken, field pea  Dinner: meat and vegetable- pork chop/chicken/fried fish, green beans, broccoli medley     Exercise:  none    MVI: daily mvi    Vitals:    02/23/24 0945   BP: (!) 102/56   Pulse: 77   Resp: 16   Temp: 97.2 °F (36.2 °C)       Body comp:  Fat Percent:  31.9 %  Fat Mass:  71.4 lb  FFM:  152.4 lb  TBW: 104.2 lb  TBW %:  46.6 %  BMR: 2050 kcal    PE:  NAD  RRR  Soft/nt/nd    Labs: reviewed    A/P: s/p sleeve     Counseling for patient:    Diet: continue healthy eating  Exercise: none  Vitamins: daily mvi ok to stop d  Co morbidities:     1. Morbid obesity        2. BMI 40.0-44.9, adult        3. Type 2 diabetes mellitus with hyperglycemia, with long-term current use of insulin        4. TARYN on CPAP        5. Essential hypertension            -All above: Evaluated, monitored, and treated with diet and exercise program.

## 2024-02-26 ENCOUNTER — OFFICE VISIT (OUTPATIENT)
Dept: DERMATOLOGY | Facility: CLINIC | Age: 59
End: 2024-02-26
Payer: MEDICARE

## 2024-02-26 DIAGNOSIS — D48.5 NEOPLASM OF UNCERTAIN BEHAVIOR OF SKIN: ICD-10-CM

## 2024-02-26 DIAGNOSIS — L73.8 SEBACEOUS HYPERPLASIA: ICD-10-CM

## 2024-02-26 DIAGNOSIS — L21.9 SEBORRHEIC DERMATITIS: Primary | ICD-10-CM

## 2024-02-26 DIAGNOSIS — L57.0 AK (ACTINIC KERATOSIS): ICD-10-CM

## 2024-02-26 PROCEDURE — 17000 DESTRUCT PREMALG LESION: CPT | Mod: XS,S$GLB,, | Performed by: DERMATOLOGY

## 2024-02-26 PROCEDURE — 11103 TANGNTL BX SKIN EA SEP/ADDL: CPT | Mod: S$GLB,,, | Performed by: DERMATOLOGY

## 2024-02-26 PROCEDURE — 11102 TANGNTL BX SKIN SINGLE LES: CPT | Mod: S$GLB,,, | Performed by: DERMATOLOGY

## 2024-02-26 PROCEDURE — 88305 TISSUE EXAM BY PATHOLOGIST: CPT | Mod: 59,PO | Performed by: PATHOLOGY

## 2024-02-26 PROCEDURE — 88305 TISSUE EXAM BY PATHOLOGIST: CPT | Mod: 26,,, | Performed by: PATHOLOGY

## 2024-02-26 PROCEDURE — 17003 DESTRUCT PREMALG LES 2-14: CPT | Mod: S$GLB,,, | Performed by: DERMATOLOGY

## 2024-02-26 PROCEDURE — 99999 PR PBB SHADOW E&M-EST. PATIENT-LVL II: CPT | Mod: PBBFAC,,, | Performed by: DERMATOLOGY

## 2024-02-26 PROCEDURE — 99204 OFFICE O/P NEW MOD 45 MIN: CPT | Mod: 25,S$GLB,, | Performed by: DERMATOLOGY

## 2024-02-26 RX ORDER — KETOCONAZOLE 20 MG/G
CREAM TOPICAL 2 TIMES DAILY PRN
Qty: 60 G | Refills: 3 | Status: SHIPPED | OUTPATIENT
Start: 2024-02-26 | End: 2024-02-26

## 2024-02-26 RX ORDER — KETOCONAZOLE 20 MG/G
CREAM TOPICAL 2 TIMES DAILY PRN
Qty: 60 G | Refills: 3 | Status: SHIPPED | OUTPATIENT
Start: 2024-02-26

## 2024-02-26 RX ORDER — HYDROCORTISONE 25 MG/G
CREAM TOPICAL
Qty: 28 G | Refills: 3 | Status: SHIPPED | OUTPATIENT
Start: 2024-02-26 | End: 2024-02-26

## 2024-02-26 RX ORDER — HYDROCORTISONE 25 MG/G
CREAM TOPICAL
Qty: 20 G | Refills: 3 | Status: SHIPPED | OUTPATIENT
Start: 2024-02-26

## 2024-02-26 NOTE — PATIENT INSTRUCTIONS
CRYOSURGERY      Your doctor has used a method called cryosurgery to treat your skin condition. Cryosurgery refers to the use of very cold substances to treat a variety of skin conditions such as warts, pre-skin cancers, molluscum contagiosum, sun spots, and several benign growths. The substance we use in cryosurgery is liquid nitrogen and is so cold (-195 degrees Celsius) that is burns when administered.     Following treatment in the office, the skin may immediately burn and become red. You may find the area around the lesion is affected as well. It is sometimes necessary to treat not only the lesion, but a small area of the surrounding normal skin to achieve a good response.     A blister, and even a blood filled blister, may form after treatment.   This is a normal response. If the blister is painful, it is acceptable to sterilize a needle and with rubbing alcohol and gently pop the blister. It is important that you gently wash the area with soap and warm water as the blister fluid may contain wart virus if a wart was treated. Do no remove the roof of the blister.     The area treated can take anywhere from 1-3 weeks to heal. Healing time depends on the kind skin lesion treated, the location, and how aggressively the lesion was treated. It is recommended that the areas treated are covered with Vaseline or bacitracin ointment and a band-aid. If a band-aid is not practical, just ointment applied several times per day will do. Keeping these areas moist will speed the healing time.    Treatment with liquid nitrogen can leave a scar. In dark skin, it may be a light or dark scar, in light skin it may be a white or pink scar. These will generally fade with time, but may never go away completely.     If you have any concerns after your treatment, please feel free to call the office.       [address] [place] [address]/ [phone] FAX/ [email or web address]      Shave Biopsy Wound Care    Your  doctor has performed a shave biopsy today.  A band aid and vaseline ointment has been placed over the site.  This should remain in place for NO LONGER THAN 48 hours.  It is fine to remove the bandaid after 24 hours, if the area is no longer bleeding. It is recommended that you keep the area dry (do not wet)) for the first 24 hours.  After 24 hours, wash the area with warm soap and water and apply Vaseline jelly.  Many patients prefer to use Neosporin or Bacitracin ointment.  This is acceptable; however, know that you can develop an allergy to this medication even if you have used it safely for years.  It is important to keep the area moist.  Letting it dry out and get air slows healing time, and will worsen the scar.        If you notice increasing redness, tenderness, pain, or yellow drainage at the biopsy site, please notify your doctor.  These are signs of an infection.    If your biopsy site is bleeding, apply firm pressure for 15 minutes straight.  Repeat for another 15 minutes, if it is still bleeding.   If the surgical site continues to bleed, then please contact your doctor.      For [facility] users:   You will receive your biopsy results in [facility] as soon as they are available. Please be assured that your physician/provider will review your results and will then determine what further treatment, evaluation, or planning is required. You should be contacted by your physician's/provider's office within 5 business days of receiving your results; If not, please reach out to directly. This is one more way [facility] is putting you first.     [facility] [place] [address]/ [phone] FAX/ [email or web address]

## 2024-02-26 NOTE — PROGRESS NOTES
Subjective:      Patient ID:  Gio Forrest is a 58 y.o. male who presents for   Chief Complaint   Patient presents with    Skin Check     UBSE     HPI  New Patient  C/o multiple lesions at bilateral hands, dry patches at bilateral arms    Denies person history of skin cancer  ? Father    Review of Systems    Objective:   Physical Exam   Constitutional: He appears well-developed and well-nourished. No distress.   Neurological: He is alert and oriented to person, place, and time. He is not disoriented.   Psychiatric: He has a normal mood and affect.   Skin:   Areas Examined (abnormalities noted in diagram):   Head / Face Inspection Performed  RUE Inspected  LUE Inspection Performed                 Diagram Legend     Erythematous scaling macule/papule c/w actinic keratosis       Vascular papule c/w angioma      Pigmented verrucoid papule/plaque c/w seborrheic keratosis      Yellow umbilicated papule c/w sebaceous hyperplasia      Irregularly shaped tan macule c/w lentigo     1-2 mm smooth white papules consistent with Milia      Movable subcutaneous cyst with punctum c/w epidermal inclusion cyst      Subcutaneous movable cyst c/w pilar cyst      Firm pink to brown papule c/w dermatofibroma      Pedunculated fleshy papule(s) c/w skin tag(s)      Evenly pigmented macule c/w junctional nevus     Mildly variegated pigmented, slightly irregular-bordered macule c/w mildly atypical nevus      Flesh colored to evenly pigmented papule c/w intradermal nevus       Pink pearly papule/plaque c/w basal cell carcinoma      Erythematous hyperkeratotic cursted plaque c/w SCC      Surgical scar with no sign of skin cancer recurrence      Open and closed comedones      Inflammatory papules and pustules      Verrucoid papule consistent consistent with wart     Erythematous eczematous patches and plaques     Dystrophic onycholytic nail with subungual debris c/w onychomycosis     Umbilicated papule    Erythematous-base heme-crusted  tan verrucoid plaque consistent with inflamed seborrheic keratosis     Erythematous Silvery Scaling Plaque c/w Psoriasis     See annotation    Photos failed to upload 2/2 Epic being down    Assessment / Plan:      Pathology Orders:       Normal Orders This Visit    Specimen to Pathology, Dermatology     Comments:    Number of Specimens:->2  ------------------------->-------------------------  Spec 1 Procedure:->Biopsy  Spec 1 Clinical Impression:->hak r/o scc  Spec 1 Source:->right dorsal wrist, radial  ------------------------->-------------------------  Spec 2 Procedure:->Biopsy  Spec 2 Clinical Impression:->hak r/o scc  Spec 2 Source:->left dorsal hand, base of index finger  Release to patient->Immediate    Questions:    Procedure Type: Dermatology and skin neoplasms    Number of Specimens: 2    ------------------------: -------------------------    Spec 1 Procedure: Biopsy    Spec 1 Clinical Impression: hak r/o scc    Spec 1 Source: right dorsal wrist, radial    ------------------------: -------------------------    Spec 2 Procedure: Biopsy    Spec 2 Clinical Impression: hak r/o scc    Spec 2 Source: left dorsal hand, base of index finger    Release to patient: Immediate          Neoplasm of uncertain behavior of skin  -     Specimen to Pathology, Dermatology  - Discussed diagnosis with patient and explained uncertain nature of condition, including ddx.   - Discussed treatment options (biopsy, close monitoring) with patient, including the risks and benefits of each. Patient opted to pursue biopsy.  - Shave Biopsy Procedure Note: Discussed procedure with patient/patient's guardian including risks and benefits as well as treatment alternatives. Risks of procedure include pain, bleeding, infection, post-inflammatory pigmentary alteration, scar, recurrence. Patient informed that the purpose of a biopsy is sampling of condition in question rather than removal in entirety; further treatment may be necessary. Verbal  consent obtained. Area to be biopsied marked and cleansed with alcohol. Local anesthesia achieved by injecting approximately 1 cc of 1% lidocaine with epinephrine. Two shave biopsies performed using a double edge razor blade; specimens submitted to pathology. Hemostasis achieved with aluminum chloride. Petroleum jelly and bandage applied to wounds. Patient tolerated procedures well. After-visit wound care instructions reviewed and provided in writing.     AK (actinic keratosis)  - Discussed diagnosis, etiology, and precancerous nature of condition.   - Cryosurgery Procedure Note: Discussed procedure with patient/patient's guardian including risks and benefits as well as treatment alternatives. Risks of procedure include pain, itching, swelling, redness, blistering, crusting, wound formation, post-inflammatory pigmentary alteration, scar, recurrence. Verbal consent obtained. LN2 cryosurgery performed to 10 lesion(s). Patient tolerated procedure well. After-visit wound care instructions reviewed and provided in writing.      Sebaceous hyperplasia  - Benign; reassured treatment not necessary.      Seborrheic dermatitis  -     ketoconazole (NIZORAL) 2 % cream; Apply topically 2 (two) times daily as needed (rash around nose). Mix 50/50 with hydrocortisone cream. OK to use daily by itself for prevention.  Dispense: 60 g; Refill: 3  -     hydrocortisone 2.5 % cream; Apply topically 2 (two) times daily as needed (rash around nose). Mix 50/50 with ketoconazole cream.  Dispense: 20 g; Refill: 3  - Discussed diagnosis, etiology, and treatment options.   - Topicals as above.   - Counseled on potential SE of medication(s) and instructed on use.  - Avoid heavy moisturizers/conditioners.            Follow up for pending pathology.

## 2024-03-04 DIAGNOSIS — K21.9 GASTROESOPHAGEAL REFLUX DISEASE, UNSPECIFIED WHETHER ESOPHAGITIS PRESENT: Primary | ICD-10-CM

## 2024-03-04 LAB
FINAL PATHOLOGIC DIAGNOSIS: NORMAL
GROSS: NORMAL
Lab: NORMAL
MICROSCOPIC EXAM: NORMAL

## 2024-03-04 RX ORDER — PANTOPRAZOLE SODIUM 40 MG/1
40 TABLET, DELAYED RELEASE ORAL DAILY
Qty: 30 TABLET | Refills: 2 | Status: SHIPPED | OUTPATIENT
Start: 2024-03-04 | End: 2024-06-18 | Stop reason: SDUPTHER

## 2024-03-04 NOTE — PROGRESS NOTES
CC: This 58 y.o. male presents for management of diabetes  and chronic conditions pending review including HTN, HLP, morbid obesity, fatty liver, vitamin d deficiency, CM, CAD     HPI: He was diagnosed with T2DM in 2005. Has never been hospitalized r/t DM.  Mother has passed away from Fatty liver and hepatic carcinoma in 2018  Family hx of DM: sister, mom and dad    Gastric sleeve in January 2023- has lost almost 100 lbs    Wearing Dexcom G7   See Dexcom download in Media tab  2% Very High  23% High  74% In Range  <1% Low  <1% Very Low  Small pp excursions noted, mostly mid day early afternoon  No patterned hypos  B egg, grits, hawkins, sausage   L- typically skips    D- baked/grilled protein   BT snack PB &J    Exercise: none     CURRENT DM MEDS:  jardaince 25 mg qam, metformin xr 500 mg 2 tabs bid, glimepiride 1 mg qam  Glucometer type: True Metrix  Standards of Care:  Eye exam: 4/2023  no h/o retinopathy, [facility]-      Following w Dr Bergman's in cardiology      ROS:   Gen: Appetite good, weight loss 4 lbs  Eyes: Denies visual disturbances  Resp: no SOB or HENDERSON   Cardiac: No palpitations, chest pain   GI: No nausea or vomiting, diarrhea, no constipation   /GYN: No nocturia, burning or pain.   MS/Neuro: +numbness/ tingling in BLE; Gait steady, speech clear  Other systems: negative.    PE:  GENERAL: Well developed, well nourished.appears older than age.   PSYCH: AAOx3, appropriate mood and affect, pleasant expression, conversant, appears relaxed, well groomed.   EYES: Conjunctiva, corneas clear  NECK: Supple, trachea midline   NEURO: Gait steady  SKIN:   no acanthosis nigracans.  FOOT EXAMINATION: 3/5/2024  No foot deformity, corns or callus formation,  nails in good condiiton and well trimmed, no interspace maceration or ulceration noted.  Decreased hair growth present over toes/feet.    Protective sensation intact with 10 gram monofilament.  +2 dorsalis pedis and posterior pulses noted.    Left great tow w  bruise noted under nail    Lab Results   Component Value Date    MICALBCREAT Unable to calculate 02/26/2024       Hemoglobin A1C   Date Value Ref Range Status   02/26/2024 6.3 (H) 4.0 - 5.6 % Final     Comment:     ADA Screening Guidelines:  5.7-6.4%  Consistent with prediabetes  >or=6.5%  Consistent with diabetes    High levels of fetal hemoglobin interfere with the HbA1C  assay. Heterozygous hemoglobin variants (HbS, HgC, etc)do  not significantly interfere with this assay.   However, presence of multiple variants may affect accuracy.     09/19/2023 6.4 (H) 4.0 - 5.6 % Final     Comment:     ADA Screening Guidelines:  5.7-6.4%  Consistent with prediabetes  >or=6.5%  Consistent with diabetes    High levels of fetal hemoglobin interfere with the HbA1C  assay. Heterozygous hemoglobin variants (HbS, HgC, etc)do  not significantly interfere with this assay.   However, presence of multiple variants may affect accuracy.     03/31/2023 7.3 (H) 4.0 - 5.6 % Final     Comment:     ADA Screening Guidelines:  5.7-6.4%  Consistent with prediabetes  >or=6.5%  Consistent with diabetes    High levels of fetal hemoglobin interfere with the HbA1C  assay. Heterozygous hemoglobin variants (HbS, HgC, etc)do  not significantly interfere with this assay.   However, presence of multiple variants may affect accuracy.          ASSESSMENT and PLAN:    1. Type 2 diabetes controlled w DM PN  Continue  jardaince 25 mg qam, metformin xr 500 mg 2 tabs in an 2 in pm, glimepiride 1 mg qam  Dexcom G7  Get in the gym- goal-exercising 5 days a week for 30 mins     2. HLP -  Continue statin therapy,     4. LINDSEY-  continue weight loss        5. CAD, CM  Follows w Dr Bergman      Follow-up:  in 6 months w  A1C, CMP, lipid, [facility]

## 2024-03-05 ENCOUNTER — OFFICE VISIT (OUTPATIENT)
Dept: ENDOCRINOLOGY | Facility: CLINIC | Age: 59
End: 2024-03-05
Payer: MEDICARE

## 2024-03-05 VITALS
DIASTOLIC BLOOD PRESSURE: 68 MMHG | HEART RATE: 73 BPM | OXYGEN SATURATION: 97 % | HEIGHT: 75 IN | SYSTOLIC BLOOD PRESSURE: 114 MMHG | WEIGHT: 233.69 LBS | BODY MASS INDEX: 29.06 KG/M2

## 2024-03-05 DIAGNOSIS — L57.0 AK (ACTINIC KERATOSIS): Primary | ICD-10-CM

## 2024-03-05 DIAGNOSIS — E11.9 TYPE 2 DIABETES MELLITUS WITHOUT COMPLICATION, WITHOUT LONG-TERM CURRENT USE OF INSULIN: Primary | ICD-10-CM

## 2024-03-05 DIAGNOSIS — Z79.4 TYPE 2 DIABETES MELLITUS WITH DIABETIC POLYNEUROPATHY, WITH LONG-TERM CURRENT USE OF INSULIN: ICD-10-CM

## 2024-03-05 DIAGNOSIS — E11.42 TYPE 2 DIABETES MELLITUS WITH DIABETIC POLYNEUROPATHY, WITH LONG-TERM CURRENT USE OF INSULIN: ICD-10-CM

## 2024-03-05 PROCEDURE — 95251 CONT GLUC MNTR ANALYSIS I&R: CPT | Mod: S$GLB,,, | Performed by: NURSE PRACTITIONER

## 2024-03-05 PROCEDURE — 99999 PR PBB SHADOW E&M-EST. PATIENT-LVL III: CPT | Mod: PBBFAC,,, | Performed by: NURSE PRACTITIONER

## 2024-03-05 PROCEDURE — 99213 OFFICE O/P EST LOW 20 MIN: CPT | Mod: S$GLB,,, | Performed by: NURSE PRACTITIONER

## 2024-03-05 RX ORDER — METFORMIN HYDROCHLORIDE 500 MG/1
TABLET, EXTENDED RELEASE ORAL
Qty: 360 TABLET | Refills: 3 | Status: SHIPPED | OUTPATIENT
Start: 2024-03-05

## 2024-03-05 NOTE — PROGRESS NOTES
1. Skin, right dorsal wrist, radial, shave biopsy:   - ACTINIC KERATOSIS.   - THE LESION IS EXCORIATED.     2. Skin, left dorsal hand, base of index, shave biopsy:   - HYPERTROPHIC ACTINIC KERATOSIS.   - THE ATYPICAL SQUAMOUS EPITHELIUM EXTENDS TO THE BASE OF THE BIOPSY, AND AN UNDERLYING INVASIVE SQUAMOUS CELL CARCINOMA CANNOT BE ENTIRELY EXCLUDED.       Please call to discuss results / plan / schedule:   Both biopsies showed thick precancers. No definitive skin cancer was appreciated. Given the precancerous biopsy result and the numerous precancers treated with liquid nitrogen at the time of biopsy, I recommend field therapy of the entire back of hands with rx fluorouracil cream - BID x 4 weeks as tolerated. Please discuss protocol, SE, and precautions. Please pend rx to correct pharmacy for my approval. 3 month f/u. Thank you.

## 2024-03-06 RX ORDER — FLUOROURACIL 50 MG/G
CREAM TOPICAL
Qty: 40 G | Refills: 1 | Status: SHIPPED | OUTPATIENT
Start: 2024-03-06

## 2024-03-11 ENCOUNTER — HOSPITAL ENCOUNTER (OUTPATIENT)
Dept: RADIOLOGY | Facility: HOSPITAL | Age: 59
Discharge: HOME | End: 2024-03-11
Attending: ORTHOPAEDIC SURGERY
Payer: MEDICARE

## 2024-03-11 DIAGNOSIS — M75.51 BURSITIS OF RIGHT SHOULDER: ICD-10-CM

## 2024-03-11 PROCEDURE — 73221 MRI JOINT UPR EXTREM W/O DYE: CPT | Mod: TC,PO,RT

## 2024-03-11 PROCEDURE — 73221 MRI JOINT UPR EXTREM W/O DYE: CPT | Mod: 26,RT,, | Performed by: RADIOLOGY

## 2024-03-19 ENCOUNTER — HOSPITAL ENCOUNTER (OUTPATIENT)
Dept: RADIOLOGY | Facility: HOSPITAL | Age: 59
Discharge: HOME | End: 2024-03-19
Attending: PHYSICIAN ASSISTANT
Payer: MEDICARE

## 2024-03-19 ENCOUNTER — OFFICE VISIT (OUTPATIENT)
Dept: FAMILY MEDICINE | Facility: CLINIC | Age: 59
End: 2024-03-19
Payer: MEDICARE

## 2024-03-19 VITALS
WEIGHT: 237.19 LBS | DIASTOLIC BLOOD PRESSURE: 70 MMHG | HEART RATE: 64 BPM | HEIGHT: 75 IN | BODY MASS INDEX: 29.49 KG/M2 | SYSTOLIC BLOOD PRESSURE: 100 MMHG | OXYGEN SATURATION: 98 %

## 2024-03-19 DIAGNOSIS — Z01.818 PREOPERATIVE CLEARANCE: Primary | ICD-10-CM

## 2024-03-19 DIAGNOSIS — Z01.818 PREOPERATIVE CLEARANCE: ICD-10-CM

## 2024-03-19 LAB
BACTERIA #/AREA URNS HPF: NORMAL /HPF
BILIRUB UR QL STRIP: NEGATIVE
CLARITY UR: CLEAR
COLOR UR: YELLOW
GLUCOSE UR QL STRIP: ABNORMAL
HGB UR QL STRIP: NEGATIVE
KETONES UR QL STRIP: NEGATIVE
LEUKOCYTE ESTERASE UR QL STRIP: NEGATIVE
MICROSCOPIC COMMENT: NORMAL
NITRITE UR QL STRIP: NEGATIVE
PH UR STRIP: 7 [PH] (ref 5–8)
PROT UR QL STRIP: NEGATIVE
SP GR UR STRIP: 1.01 (ref 1–1.03)
URN SPEC COLLECT METH UR: ABNORMAL
YEAST URNS QL MICRO: NORMAL

## 2024-03-19 PROCEDURE — 99999 PR PBB SHADOW E&M-EST. PATIENT-LVL IV: CPT | Mod: PBBFAC,,, | Performed by: PHYSICIAN ASSISTANT

## 2024-03-19 PROCEDURE — 71046 X-RAY EXAM CHEST 2 VIEWS: CPT | Mod: TC,FY,PO

## 2024-03-19 PROCEDURE — 99214 OFFICE O/P EST MOD 30 MIN: CPT | Mod: S$GLB,,, | Performed by: PHYSICIAN ASSISTANT

## 2024-03-19 PROCEDURE — 71046 X-RAY EXAM CHEST 2 VIEWS: CPT | Mod: 26,,, | Performed by: RADIOLOGY

## 2024-03-19 PROCEDURE — 81000 URINALYSIS NONAUTO W/SCOPE: CPT | Mod: PO | Performed by: PHYSICIAN ASSISTANT

## 2024-03-19 NOTE — PROGRESS NOTES
Patient ID: Gio Forrest is a 58 y.o. male.    Chief Complaint: Pre-op Exam    Patient is new to me.    Gio Forrest is in the office for preoperative clearance for arthroscopy of right shoulder with Dr. Omkar Brizuela 4/17/2024.  Will have cardiac clearance with Dr. Dipak tovar.    HPI  Patient reports chronic right shoulder pain.    Not using CPAP due to mild TARYN since losing weight.    Patient Active Problem List   Diagnosis    Sleep apnea    DM type 2, controlled, with complication    Essential hypertension    Chronic gout of right foot    Mixed hyperlipidemia    Sebaceous cyst    History of colon polyps    History of arthroscopy of left shoulder    Fatty liver    Atherosclerosis of native coronary artery of native heart without angina pectoris    Paroxysmal SVT (supraventricular tachycardia)    Chronic pain of right hand    Polypharmacy    Orthostatic hypotension    S/P catheter ablation of slow pathway    Spinal enthesopathy, lumbar region    Mild episode of recurrent major depressive disorder    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    Trigger finger, right ring finger    Chronic left shoulder pain    Limited range of motion (ROM) of shoulder    Hyperlipidemia due to type 2 diabetes mellitus    Benign hypertensive heart disease    Benign prostatic hyperplasia with lower urinary tract symptoms    Difficulty with CPAP use    Nasal turbinate hypertrophy    Restless legs    Hypomagnesemia    Class 1 obesity due to excess calories with serious comorbidity and body mass index (BMI) of 33.0 to 33.9 in adult    Status post lumbar surgery    PVC's (premature ventricular contractions)    Type 2 diabetes mellitus without complication, without long-term current use of insulin       Past Medical History:   Diagnosis Date    Addiction 12/21/2021    Arthritis     Back pain     radiates to both legs but more on rt leg    Depression     HENDERSON (dyspnea on exertion)      Dyslipidemia 08/12/2015    Excessive daytime sleepiness 03/02/2018    Formatting of this note might be different from the original.  Added automatically from request for surgery [id number]    GERD (gastroesophageal reflux disease)     Gout 08/12/2015    Hypersomnia with sleep apnea 05/14/2022    Hypertension     Idiopathic gout     Lumbar pseudoarthrosis     Neck pain     nonradiating pain    Neuropathy     Obesity 08/12/2015    Obstructive sleep apnea 08/12/2015    cpap    Restless leg syndrome     Sebaceous cyst 04/18/2017    SVT (supraventricular tachycardia)     s/p ablation    Type 2 diabetes mellitus 08/12/2015     Current Outpatient Medications:     atorvastatin (LIPITOR) 40 MG tablet, take one Tablet by mouth once daily in the evening (Patient taking differently: Take 40 mg by mouth every evening. take one Tablet by mouth once daily in the evening), Disp: 90 tablet, Rfl: 4    calcium-vitamin D3 (OS-SOHAM 500 + D3) 500 mg-5 mcg (200 unit) per tablet, Take 1 tablet by mouth 2 (two) times daily with meals., Disp: , Rfl:     empagliflozin (JARDIANCE) 25 mg tablet, Take 1 tablet (25 mg total) by mouth once daily., Disp: 90 tablet, Rfl: 3    fluorouraciL (EFUDEX) 5 % cream, AAA dorsal hands BID x 4 weeks as tolerated., Disp: 40 g, Rfl: 1    gabapentin (NEURONTIN) 600 MG tablet, TAKE ONE TABLET BY MOUTH THREE TIMES DAILY, Disp: 90 tablet, Rfl: 2    glimepiride (AMARYL) 1 MG tablet, TAKE ONE TABLET BY MOUTH DAILY BEFORE BREAKFAST, Disp: 90 tablet, Rfl: 3    hydrocortisone 2.5 % cream, Apply topically 2 (two) times daily as needed (rash around nose). Mix 50/50 with ketoconazole cream., Disp: 20 g, Rfl: 3    metFORMIN (GLUCOPHAGE-XR) 500 MG ER 24hr tablet, Take 2 tablet twice a day, Disp: 360 tablet, Rfl: 3    multivitamin capsule, Take 1 capsule by mouth once daily., Disp: , Rfl:     pantoprazole (PROTONIX) 40 MG tablet, Take 1 tablet (40 mg total) by mouth once daily., Disp: 30 tablet, Rfl: 2    vitamin D (VITAMIN D3)  1000 units Tab, Take 1,000 Units by mouth once daily., Disp: , Rfl:     blood-glucose meter kit, To check BG 2 times daily, to use with insurance preferred meter, Disp: 1 each, Rfl: 1    blood-glucose meter,continuous (DEXCOM G7 ) Misc, Dispense 1, Disp: 1 each, Rfl: 0    blood-glucose sensor (DEXCOM G7 SENSOR) [place], Change every 10 days, Disp: 9 each, Rfl: 4    docusate sodium (COLACE) 100 MG capsule, Take 100 mg by mouth 2 (two) times daily., Disp: , Rfl:     ketoconazole (NIZORAL) 2 % cream, Apply topically 2 (two) times daily as needed (rash around nose). Mix 50/50 with hydrocortisone cream. OK to use daily by itself for prevention., Disp: 60 g, Rfl: 3    The ASCVD Risk score (Leticia SANDERS, et al., 2019) failed to calculate for the following reasons:    The valid total cholesterol range is 130 to 320 mg/dL     Wt Readings from Last 3 Encounters:   03/19/24 107.6 kg (237 lb 3.4 oz)   03/05/24 106 kg (233 lb 11 oz)   02/23/24 101.5 kg (223 lb 12.8 oz)     Temp Readings from Last 3 Encounters:   02/23/24 97.2 °F (36.2 °C) (Temporal)   10/09/23 97.7 °F (36.5 °C)   09/22/23 97.5 °F (36.4 °C) (Oral)     BP Readings from Last 3 Encounters:   03/19/24 100/70   03/05/24 114/68   02/23/24 (!) 102/56     Pulse Readings from Last 3 Encounters:   03/19/24 64   03/05/24 73   02/23/24 77     Resp Readings from Last 3 Encounters:   02/23/24 16   01/09/24 18   09/22/23 16     PF Readings from Last 3 Encounters:   05/17/16 96 L/min     SpO2 Readings from Last 3 Encounters:   03/19/24 98%   03/05/24 97%   01/09/24 96%        Lab Results   Component Value Date    HGBA1C 6.3 (H) 02/26/2024    HGBA1C 6.4 (H) 09/19/2023    HGBA1C 7.3 (H) 03/31/2023     Lab Results   Component Value Date    LDLCALC 65.4 01/09/2024    CREATININE 0.9 03/19/2024       Review of Systems   Constitutional:  Negative for appetite change, chills and fever.   HENT:  Negative for ear pain and sore throat.    Eyes:  Negative for pain.   Respiratory:   Negative for cough and shortness of breath.    Cardiovascular:  Negative for chest pain.   Gastrointestinal:  Negative for abdominal pain, blood in stool, constipation, diarrhea, nausea and vomiting.   Genitourinary:  Negative for dysuria, frequency and hematuria.   Musculoskeletal:  Positive for arthralgias.   Skin:  Positive for rash (baseline).   Neurological:  Negative for dizziness, light-headedness and headaches.   Psychiatric/Behavioral:  Negative for sleep disturbance.        Objective:      Physical Exam  Vitals reviewed.   Constitutional:       Appearance: Normal appearance. He is well-developed.   HENT:      Head: Normocephalic and atraumatic.      Right Ear: External ear normal.      Left Ear: External ear normal.   Eyes:      Conjunctiva/sclera: Conjunctivae normal.   Cardiovascular:      Rate and Rhythm: Normal rate and regular rhythm.      Heart sounds: Normal heart sounds. No murmur heard.     No friction rub. No gallop.   Pulmonary:      Effort: Pulmonary effort is normal. No respiratory distress.      Breath sounds: Normal breath sounds. No wheezing, rhonchi or rales.   Abdominal:      Palpations: Abdomen is soft.      Tenderness: There is no abdominal tenderness.   Musculoskeletal:         General: Normal range of motion.      Right lower leg: No edema.      Left lower leg: No edema.   Skin:     General: Skin is warm and dry.      Findings: No rash.   Neurological:      General: No focal deficit present.      Mental Status: He is alert and oriented to person, place, and time.   Psychiatric:         Mood and Affect: Mood normal.         Behavior: Behavior normal.         Judgment: Judgment normal.           Screening recommendations appropriate to age and health status were reviewed.    Preoperative clearance  -     X-Ray Chest PA And Lateral; Future; Expected date: 03/19/2024  -     CBC Auto Differential; Future; Expected date: 03/19/2024  -     Comprehensive Metabolic Panel; Future; Expected  date: 03/19/2024  -     Protime-INR; Future; Expected date: 03/19/2024  -     APTT; Future; Expected date: 03/19/2024  -     Urinalysis, Reflex to Urine Culture Urine, Clean Catch  -     Urinalysis Microscopic      RCRI risk factors include: no known RCRI risk factors. As such, per RCRI the risk of cardiac death, nonfatal myocardial infarction, or nonfatal cardiac arrest is 0.4% and the risk of myocardial infarction, pulmonary edema, ventricular fibrillation, primary cardiac arrest, or complete heart block is 0.5%.  Overall this patient can be considered intermediate risk for this intermediate risk procedure.     Patient denies any symptoms (as per HPI) concerning for undiagnosed lung disease including TARYN. Would not recommend obtaining chest X-ray, sleep study, or PFTs at this time. Patient quit smoking many years ago. We discussed the benefits of early mobilization and deep breathing after surgery.      Screened patient for alcohol misuse, use of illicit drugs, and personal or family history of anesthetic complications or bleeding diathesis and no substantial concerns were identified.  No complications with anesthesia with past surgeries.    All current medications were reviewed, and at this time no changes to medications are recommended prior to surgery.    I recommend use of standard pre-op and post-op precautions for this patient. In my opinion, he is medically optimized for this procedure and can proceed pending cardiac clearance.

## 2024-03-20 ENCOUNTER — TELEPHONE (OUTPATIENT)
Dept: FAMILY MEDICINE | Facility: CLINIC | Age: 59
End: 2024-03-20
Payer: MEDICARE

## 2024-03-20 ENCOUNTER — TELEPHONE (OUTPATIENT)
Dept: PODIATRY | Facility: CLINIC | Age: 59
End: 2024-03-20
Payer: MEDICARE

## 2024-03-20 NOTE — TELEPHONE ENCOUNTER
----- Message from Lynne Barrera sent at 3/20/2024 12:38 PM CDT -----  Contact: self  Type: Needs Medical Advice  Who Called:  pt  Best Call Back Number:[phone]  Additional Information: please call pt would like to speak with the office regarding his appt tomorrow

## 2024-04-02 DIAGNOSIS — M47.896 OTHER SPONDYLOSIS, LUMBAR REGION: ICD-10-CM

## 2024-04-03 RX ORDER — GABAPENTIN 600 MG/1
600 TABLET ORAL 3 TIMES DAILY
Qty: 90 TABLET | Refills: 2 | Status: SHIPPED | OUTPATIENT
Start: 2024-04-03

## 2024-04-08 DIAGNOSIS — E11.42 TYPE 2 DIABETES MELLITUS WITH DIABETIC POLYNEUROPATHY, WITH LONG-TERM CURRENT USE OF INSULIN: ICD-10-CM

## 2024-04-08 DIAGNOSIS — Z79.4 TYPE 2 DIABETES MELLITUS WITH DIABETIC POLYNEUROPATHY, WITH LONG-TERM CURRENT USE OF INSULIN: ICD-10-CM

## 2024-04-08 RX ORDER — ATORVASTATIN CALCIUM 40 MG/1
TABLET, FILM COATED ORAL
Qty: 90 TABLET | Refills: 4 | Status: SHIPPED | OUTPATIENT
Start: 2024-04-08

## 2024-04-08 RX ORDER — BLOOD-GLUCOSE SENSOR
EACH MISCELLANEOUS
Qty: 9 EACH | Refills: 4 | Status: SHIPPED | OUTPATIENT
Start: 2024-04-08

## 2024-04-18 DIAGNOSIS — Z98.890 S/P ARTHROSCOPY OF RIGHT SHOULDER: Primary | ICD-10-CM

## 2024-04-23 ENCOUNTER — CLINICAL SUPPORT (OUTPATIENT)
Dept: REHABILITATION | Facility: HOSPITAL | Age: 59
End: 2024-04-23
Payer: MEDICARE

## 2024-04-23 DIAGNOSIS — M25.611 DECREASED ROM OF RIGHT SHOULDER: ICD-10-CM

## 2024-04-23 DIAGNOSIS — Z98.890 S/P ARTHROSCOPY OF RIGHT SHOULDER: Primary | ICD-10-CM

## 2024-04-23 PROCEDURE — 97161 PT EVAL LOW COMPLEX 20 MIN: CPT | Mod: PO

## 2024-04-23 NOTE — PLAN OF CARE
[facility]   Physical Therapy Initial Evaluation      Name: Gio GARNER [facility] Number: [id number]    Therapy Diagnosis:   Encounter Diagnoses   Name Primary?    S/P arthroscopy of right shoulder Yes    Decreased ROM of right shoulder         Physician: Omkar Wu MD    Physician Orders: PT Eval and Treat   Medical Diagnosis from Referral: S/P arthroscopy of right shoulder [Z98.890]     Evaluation Date: 4/23/2024  Authorization Period Expiration: 12/31/24  Plan of Care Expiration: 7/23/24  Progress Note Due: 5/22/24  Date of Surgery: 4/17/24 s/p right shoulder arthroscopy acromioplasty and distal clavicle resection  Visit # / Visits authorized: 1/ 1   FOTO: 1/3    Precautions: 4/17/24 s/p right shoulder arthroscopy acromioplasty and distal clavicle resection, Standard and Diabetes     Time In: 1111  Time Out: 1206  Total Billable Time: 55 minutes    Subjective     Date of onset: Surgery on 4/17/24    History of current condition - Gio reports: Pt had a right shoulder surgery on 4/17/24 (s/p right shoulder arthroscopy acromioplasty and distal clavicle resection).  He was told not to put his hand on his head too far, but he says he was given no other restrictions.  He is getting his staples out next Wednesday.  He has been trying to move his right shoulder but has not been overdoing it.  Pt has some bruising in his right arm.  Pt had right shoulder pain with pain radiating to his elbow prior to surgery leading him to have surgery.  He has increased right shoulder pain with movement.  Pt's radiating pain is improved.  Pt says he is not physically active like he used to be.  Pt has a PMHx of 4 back surgeries, 2 neck surgeries, 3 left shoulder surgeries, right knuckle joint replacement, bilateral hip pain, gastric sleeve surgery, hand arthritis, type 2 diabetes, heart ablation, elbow surgeries, and carpal tunnel surgeries. Pt was a boilman at a plant up until 2012.  Pt has  been on disability since 2014.  He said due to his neck towards the end of his working career, he ws unable to move his right arm at the time.             Falls: None    Prior Therapy: Yes   Prior Level of Function: Independent but sometimes got help from wife at times to put socks on and other activities  Current Level of Function: Limited with right shoulder movement    Pain:  Current 6/10 (with movement), worst 6/10, best 3/10 (with movement)   Location: Right shoulder    Patients goals: To get his right shoulder moving better and to get the soreness out of it.       Medical History:   Past Medical History:   Diagnosis Date    Addiction 12/21/2021    Arthritis     Back pain     radiates to both legs but more on rt leg    Depression     HENDERSON (dyspnea on exertion)     Dyslipidemia 08/12/2015    Excessive daytime sleepiness 03/02/2018    Formatting of this note might be different from the original.  Added automatically from request for surgery [id number]    GERD (gastroesophageal reflux disease)     Gout 08/12/2015    Hypersomnia with sleep apnea 05/14/2022    Hypertension     Idiopathic gout     Lumbar pseudoarthrosis     Neck pain     nonradiating pain    Neuropathy     Obesity 08/12/2015    Obstructive sleep apnea 08/12/2015    cpap    Restless leg syndrome     Sebaceous cyst 04/18/2017    SVT (supraventricular tachycardia)     s/p ablation    Type 2 diabetes mellitus 08/12/2015       Surgical History:   Gio Forrest  has a past surgical history that includes Hernia repair; Shoulder arthroscopy; Cervical spine surgery; Spine surgery; Colonoscopy (2008); Tonsillectomy; Elbow surgery (Bilateral); Colonoscopy (N/A, 09/14/2017); Back surgery; Cardiac catheterization; Coronary angiography (Left, 05/28/2018); Left heart catheterization (Left, 05/28/2018); Laparoscopic cholecystectomy (N/A, 05/30/2018); Cholecystectomy (05/30/2018); Neck surgery; Laparoscopic appendectomy (N/A, 06/25/2020); Ablation (N/A, 10/08/2020);  Treatment of cardiac arrhythmia (10/08/2020); Cardiac electrophysiology study (10/08/2020); Trigger finger release (Right, 09/09/2021); Arthroplasty of joint of finger (Right, 04/12/2022); Radiofrequency ablation of lumbar medial branch nerve at single level (Bilateral, 06/17/2022); Epidural steroid injection into lumbar spine; Epidural steroid injection into cervical spine; Tympanostomy tube placement (Right); and Laparoscopic sleeve gastrectomy (N/A, 01/20/2023).    Medications:   Gio has a current medication list which includes the following prescription(s): atorvastatin, blood-glucose meter, dexcom g7 , calcium-vitamin d3, dexcom g7 sensor, docusate sodium, empagliflozin, fluorouracil, gabapentin, glimepiride, hydrocortisone, ketoconazole, metformin, multivitamin, pantoprazole, and vitamin d.    Allergies:   Review of patient's allergies indicates:  No Known Allergies     Objective    Posture: Forward head, flexed trunk    Sensation: intact in B UEs    AROM:  UE  Shoulder flexion (supine): R: 101 degrees, L: 166 degrees  Shoulder abduction (supine): R: 77 degrees, L: 147 degrees  Shoulder IR (supine ~90/90): R: 68 degrees (almost at 90 degrees of shoulder abduction when measuring), L: 63 degrees  Shoulder ER (supine ~90/90): R: 35 degrees (almost at 90 degrees of shoulder abduction when measuring), L: 74 degrees  Shoulder extension (seated): R: 47 degrees, L: 51 degrees  Elbow flexion: WFL B  Elbow extension: WFL B    PROM:   Shoulder Flexion: R: 150 degrees  Shoulder abduction: R: 114 degrees  Shoulder IR: R: 97 degrees  Shoulder ER: R: 46 degrees    MMTs/Strength:  Elbow flexion: R: 5/5, L: 5/5  Elbow extension: R: 5/5, L: 5/5  Shoulder flexion: R: NT, L: 5/5  Shoulder abduction: R: NT, L: 3+/5  Shoulder internal rotation: R: NT, L: 5/5  Shoulder external rotation: R: NT, L: 4/5    Intake Outcome Measure for FOTO (DASH) Survey    Therapist reviewed FOTO scores for Gio Forrest on 4/23/2024.    FOTO report - see Media section or FOTO account episode details.    Intake Score: DASH: 61.7, FOTO Primary: 42         Treatment     Total Treatment time (time-based codes) separate from Evaluation: 0 minutes     Patient Education and Home Exercises     Education provided:   - Pt was educated about working on pendulums until his next appointment.      Assessment     Gio is a 58 y.o. male referred to outpatient Physical Therapy with a medical diagnosis of S/P arthroscopy of right shoulder [Z98.890]. Patient presents s/p right shoulder arthroscopy acromioplasty and distal clavicle resection on 4/17/24.  He is limited with right shoulder AROM and PROM with flexion, abduction, and external rotation.  He has some weakness in his left shoulder, but his right shoulder was not tested.  Pt has an extensive surgical history as described in the subjective.  Prior to surgery, he has pain radiating down to his elbow, but he does not have this anymore.  He has pain mostly with moving his right shoulder.  Patient prognosis is Good.  Patient will benefit from skilled outpatient Physical Therapy to address the deficits stated above and in the chart below, provide patient /family education, and to maximize patient's level of independence.     Plan of care discussed with patient: Yes    Anticipated Barriers for therapy: PMHx, inactivity    Problem List:  s/p right shoulder arthroscopy acromioplasty and distal clavicle resection on 4/17/24  Decreased right shoulder mobility  UE weakness    Medical Necessity is demonstrated by the following  History  Co-morbidities and personal factors that may impact the plan of care [] LOW: no personal factors / co-morbidities  [] MODERATE: 1-2 personal factors / co-morbidities  [x] HIGH: 3+ personal factors / co-morbidities    Moderate / High Support Documentation:   Co-morbidities affecting plan of care: type 2 diabetes    Personal Factors:   Surgical history, inactivity     Examination  Body  Structures and Functions, activity limitations and participation restrictions that may impact the plan of care [x] LOW: addressing 1-2 elements  [] MODERATE: 3+ elements  [] HIGH: 4+ elements (please support below)    Moderate / High Support Documentation: Shoulders, elbows     Clinical Presentation [x] LOW: stable  [] MODERATE: Evolving  [] HIGH: Unstable     Decision Making/ Complexity Score: low       Goals:  Short Term Goals: 4 weeks   Pt will be independent with the initial phase of their HEP in order to continue to make functional gains outside of physical therapy.  Pt's right shoulder flexion AROM will improve to 130 degrees in order for him to tolerate lifting something overhead.  Pt's right shoulder abduction AROM will improve to 100 degrees to demonstrate improved shoulder mobility for functional activities.  Pt's right shoulder external rotation AROM will improve to 50 degrees to demonstrate improved shoulder mobility for functional activities   Pt's right shoulder pain with movement will decrease to 3/10 in order for him to tolerate ADLs better.       Long Term Goals: 8 weeks   Pt will be independent with the final phase of their HEP in order to continue to make functional gains outside of physical therapy.  Pt's right shoulder flexion AROM will improve to 160 degrees in order for him to tolerate lifting something overhead.  Pt's right shoulder abduction AROM will improve to 135 degrees to demonstrate improved shoulder mobility for functional activities.  Pt's right shoulder external rotation AROM will improve to 70 degrees to demonstrate improved shoulder mobility for functional activities   Pt's right shoulder pain with movement will decrease to 1/10 in order for him to tolerate ADLs better.         Plan     Plan of care Certification: 4/23/2024 to 7/23/24.    Outpatient Physical Therapy 2 times weekly for 8-12 weeks to include the following interventions: Aquatic Therapy, Cervical/Lumbar Traction,  Electrical Stimulation  , Gait Training, Manual Therapy, Moist Heat/ Ice, Neuromuscular Re-ed, Patient Education, Therapeutic Activities, and Therapeutic Exercise.     Pt may be seen by a PTA at times as part of the rehab team.    Blake Cesar, PT,  DPT        Physician's Signature: _________________________________________ Date: ________________

## 2024-04-26 ENCOUNTER — CLINICAL SUPPORT (OUTPATIENT)
Dept: REHABILITATION | Facility: HOSPITAL | Age: 59
End: 2024-04-26
Payer: MEDICARE

## 2024-04-26 DIAGNOSIS — M25.611 DECREASED ROM OF RIGHT SHOULDER: Primary | ICD-10-CM

## 2024-04-26 PROCEDURE — 97110 THERAPEUTIC EXERCISES: CPT | Mod: PO,CQ

## 2024-04-26 PROCEDURE — 97140 MANUAL THERAPY 1/> REGIONS: CPT | Mod: PO,CQ

## 2024-04-26 PROCEDURE — 97112 NEUROMUSCULAR REEDUCATION: CPT | Mod: PO,CQ

## 2024-04-26 NOTE — PROGRESS NOTES
[facility]   Physical Therapy Treatment Note     Name: Gio Forrest  [facility] Number: [id number]    Therapy Diagnosis:   Encounter Diagnosis   Name Primary?    Decreased ROM of right shoulder Yes     Physician: Omkar Wu MD    Visit Date: 4/26/2024  Physician Orders: PT Eval and Treat   Medical Diagnosis from Referral: S/P arthroscopy of right shoulder [Z98.890]     Evaluation Date: 4/23/2024  Authorization Period Expiration: 12/31/24  Plan of Care Expiration: 7/23/24  Progress Note Due: 5/22/24  Date of Surgery: 4/17/24 s/p right shoulder arthroscopy acromioplasty and distal clavicle resection  Visit # / Visits authorized: 1/ 20  FOTO: 1/3    PTA Visit #: 1/5     Precautions: Standard and 4/17/24 (R) shoulder arthroscopy, acromioplasty and distal clavicle, resection, Diabetes      Time In: 1205  Time Out: 1258  Total Billable Time: 53 minutes    SUBJECTIVE     Pt reports: he is constipated from the pain medication. Patient states his shoulder is sore today. He was compliant with home exercise program.  Response to previous treatment: no adverse effects   Functional change: too soon to determine     Pain: 5/10  Location: right shoulder      OBJECTIVE     Objective Measures updated at progress report unless specified.     Treatment     Gio received the treatments listed below:      therapeutic exercises to develop strength, endurance, ROM, flexibility, posture, and core stabilization for 30 minutes including:  PROM into flexion and ER x 10 minutes  Supine AAROM dowel flexion 2x10  Supine AAROM ER with dowel 2x10  Seated towel slides x 2 minutes  Seated ball roll outs x 2 minutes, 3 sec gold   Seated pulley: flexion and abduction x 2 minutes,ea     manual therapy techniques: Joint mobilizations and Soft tissue Mobilization were applied to the: (R) shoulder for 13 minutes, including:  STM to (R) upper trap  Gentle inferior and anterior joint mobilizations    neuromuscular  re-education activities to improve: Balance, Coordination, Proprioception, and Posture for 10 minutes. The following activities were included:  Seated scapular retractions 2x10, 5 sec hold   Standing rows (red TB) 3x10    Patient Education and Home Exercises     Home Exercises Provided and Patient Education Provided     Education provided:   - HEP compliance  - Importance of restoring ROM    Written Home Exercises Provided: Patient instructed to cont prior HEP. Exercises were reviewed and Gio was able to demonstrate them prior to the end of the session.  Gio demonstrated good  understanding of the education provided. See EMR under Patient Instructions for exercises provided during therapy sessions     ASSESSMENT     Gio initially guarded with PROM but tolerance and ROM improved with gentle repetition. Heavy cues for relaxation of upper traps throughout treatment session. Good tolerance to exercises targeted at restoring ROM.     Gio Is progressing well towards his goals.   Pt prognosis is Good.     Pt will continue to benefit from skilled outpatient physical therapy to address the deficits listed in the problem list box on initial evaluation, provide pt/family education and to maximize pt's level of independence in the home and community environment.     Pt's spiritual, cultural and educational needs considered and pt agreeable to plan of care and goals.     Anticipated barriers to physical therapy: PMHx, inactivity     Goals:   Short Term Goals: 4 weeks   Pt will be independent with the initial phase of their HEP in order to continue to make functional gains outside of physical therapy.  Pt's right shoulder flexion AROM will improve to 130 degrees in order for him to tolerate lifting something overhead.  Pt's right shoulder abduction AROM will improve to 100 degrees to demonstrate improved shoulder mobility for functional activities.  Pt's right shoulder external rotation AROM will improve to 50  degrees to demonstrate improved shoulder mobility for functional activities   Pt's right shoulder pain with movement will decrease to 3/10 in order for him to tolerate ADLs better.        Long Term Goals: 8 weeks   Pt will be independent with the final phase of their HEP in order to continue to make functional gains outside of physical therapy.  Pt's right shoulder flexion AROM will improve to 160 degrees in order for him to tolerate lifting something overhead.  Pt's right shoulder abduction AROM will improve to 135 degrees to demonstrate improved shoulder mobility for functional activities.  Pt's right shoulder external rotation AROM will improve to 70 degrees to demonstrate improved shoulder mobility for functional activities   Pt's right shoulder pain with movement will decrease to 1/10 in order for him to tolerate ADLs better.       PLAN     Continue current POC     Amber Keith, PTA

## 2024-04-29 ENCOUNTER — CLINICAL SUPPORT (OUTPATIENT)
Dept: REHABILITATION | Facility: HOSPITAL | Age: 59
End: 2024-04-29
Payer: MEDICARE

## 2024-04-29 DIAGNOSIS — M25.611 DECREASED ROM OF RIGHT SHOULDER: Primary | ICD-10-CM

## 2024-04-29 PROCEDURE — 97110 THERAPEUTIC EXERCISES: CPT | Mod: PO,CQ

## 2024-04-29 PROCEDURE — 97140 MANUAL THERAPY 1/> REGIONS: CPT | Mod: PO,CQ

## 2024-04-29 NOTE — PROGRESS NOTES
"[facility]   Physical Therapy Treatment Note     Name: Gio Forrest  [facility] Number: [id number]    Therapy Diagnosis:   Encounter Diagnosis   Name Primary?    Decreased ROM of right shoulder Yes     Physician: Omkar Wu MD    Visit Date: 4/29/2024  Physician Orders: PT Eval and Treat   Medical Diagnosis from Referral: S/P arthroscopy of right shoulder [Z98.890]     Evaluation Date: 4/23/2024  Authorization Period Expiration: 12/31/24  Plan of Care Expiration: 7/23/24  Progress Note Due: 5/22/24  Date of Surgery: 4/17/24 s/p right shoulder arthroscopy acromioplasty and distal clavicle resection  Visit # / Visits authorized: 1/ 20  FOTO: 1/3    PTA Visit #: 2/5     Precautions: Standard and 4/17/24 (R) shoulder arthroscopy, acromioplasty and distal clavicle, resection, Diabetes      Time In: 2:00 pm  Time Out: 3:00 pm  Total Billable Time: 27 minutes 1:1    SUBJECTIVE     Pt reports: Continued R shoulder pain and soreness, but does state " I can move it around a little better".     He was compliant with home exercise program.  Response to previous treatment: no adverse effects   Functional change: too soon to determine     Pain: 5/10  Location: right shoulder      OBJECTIVE     Objective Measures updated at progress report unless specified.     Treatment     Gio received the treatments listed below:      therapeutic exercises to develop strength, endurance, ROM, flexibility, posture, and core stabilization for 35 minutes including:  PROM into flexion and ER x 10 minutes  Supine AAROM dowel flexion 2x10  Supine AAROM ER with dowel 2x10  Seated towel slides (flex, scaption, abd) x 2 minutes ea  Seated ball roll outs x 2 minutes, 3 sec hold   Seated pulley: flexion and abduction x 2 minutes,ea     manual therapy techniques: Joint mobilizations and Soft tissue Mobilization were applied to the: (R) shoulder for 10 minutes, including:  STM to (R) upper trap  Gentle inferior and " anterior joint mobilizations    neuromuscular re-education activities to improve: Balance, Coordination, Proprioception, and Posture for 10 minutes. The following activities were included:  Seated scapular retractions 3x10, 5 sec hold   Standing rows (red TB) 3x10    Gio received a cold pack to R shoulder post treatment for 5 minutes    Patient Education and Home Exercises     Home Exercises Provided and Patient Education Provided     Education provided:   - HEP compliance  - Importance of restoring ROM    Written Home Exercises Provided: Patient instructed to cont prior HEP. Exercises were reviewed and Gio was able to demonstrate them prior to the end of the session.  Gio demonstrated good  understanding of the education provided. See EMR under Patient Instructions for exercises provided during therapy sessions     ASSESSMENT   Gio returned reporting  continued R shoulder pain levels but notes improved functional shoulder mobility. During PROM initial cueing was required to decrease muscle guarding but improved through repetition. Cueing still required to decrease UT activation as well. He tolerated treatment without any adverse effects and reports of decreased joint stiffness. Will continue to progress per pt's tolerance.    Gio Is progressing well towards his goals.   Pt prognosis is Good.     Pt will continue to benefit from skilled outpatient physical therapy to address the deficits listed in the problem list box on initial evaluation, provide pt/family education and to maximize pt's level of independence in the home and community environment.     Pt's spiritual, cultural and educational needs considered and pt agreeable to plan of care and goals.     Anticipated barriers to physical therapy: PMHx, inactivity     Goals:   Short Term Goals: 4 weeks   Pt will be independent with the initial phase of their HEP in order to continue to make functional gains outside of physical therapy.  Pt's  right shoulder flexion AROM will improve to 130 degrees in order for him to tolerate lifting something overhead.  Pt's right shoulder abduction AROM will improve to 100 degrees to demonstrate improved shoulder mobility for functional activities.  Pt's right shoulder external rotation AROM will improve to 50 degrees to demonstrate improved shoulder mobility for functional activities   Pt's right shoulder pain with movement will decrease to 3/10 in order for him to tolerate ADLs better.        Long Term Goals: 8 weeks   Pt will be independent with the final phase of their HEP in order to continue to make functional gains outside of physical therapy.  Pt's right shoulder flexion AROM will improve to 160 degrees in order for him to tolerate lifting something overhead.  Pt's right shoulder abduction AROM will improve to 135 degrees to demonstrate improved shoulder mobility for functional activities.  Pt's right shoulder external rotation AROM will improve to 70 degrees to demonstrate improved shoulder mobility for functional activities   Pt's right shoulder pain with movement will decrease to 1/10 in order for him to tolerate ADLs better.       PLAN     Continue current POC     Raj Rosa, PTA

## 2024-05-01 ENCOUNTER — CLINICAL SUPPORT (OUTPATIENT)
Dept: REHABILITATION | Facility: HOSPITAL | Age: 59
End: 2024-05-01
Payer: MEDICARE

## 2024-05-01 DIAGNOSIS — M25.611 DECREASED ROM OF RIGHT SHOULDER: ICD-10-CM

## 2024-05-01 DIAGNOSIS — Z98.890 S/P ARTHROSCOPY OF RIGHT SHOULDER: Primary | ICD-10-CM

## 2024-05-01 PROCEDURE — 97112 NEUROMUSCULAR REEDUCATION: CPT | Mod: PO

## 2024-05-01 PROCEDURE — 97110 THERAPEUTIC EXERCISES: CPT | Mod: PO

## 2024-05-01 NOTE — PROGRESS NOTES
[facility]   Physical Therapy Treatment Note     Name: Gio Forrest  [facility] Number: [id number]    Therapy Diagnosis:   Encounter Diagnoses   Name Primary?    Decreased ROM of right shoulder     S/P arthroscopy of right shoulder Yes     Physician: Omkar Wu MD    Visit Date: 5/1/2024  Physician Orders: PT Eval and Treat   Medical Diagnosis from Referral: S/P arthroscopy of right shoulder [Z98.890]     Evaluation Date: 4/23/2024  Authorization Period Expiration: 12/31/24  Plan of Care Expiration: 7/23/24  Progress Note Due: 5/22/24  Date of Surgery: 4/17/24 s/p right shoulder arthroscopy acromioplasty and distal clavicle resection  Visit # / Visits authorized: 3/ 20 + Eval  FOTO: 1/3    PTA Visit #: 0/5     Precautions: Standard and 4/17/24 (R) shoulder arthroscopy, acromioplasty and distal clavicle, resection, Diabetes      Time In 1013  Time Out: 1115  Total time: 62 minutes  Total Billable Time: 30 minutes due to concurrent treatment    SUBJECTIVE     Pt reports: He has right shoulder pain with movement but none when sitting still.       He was compliant with home exercise program.  Response to previous treatment: no adverse effects   Functional change: too soon to determine     Pain: pre-PT: 6-7/10 (with movement), 0/10 (when still), post-PT: 5/10 (with movement), 3/10 (when still)  Location: right shoulder      OBJECTIVE     Objective Measures updated at progress report unless specified.     Treatment     Gio received the treatments listed below:      therapeutic exercises to develop strength, endurance, ROM, flexibility, posture, and core stabilization for 42 minutes including:  PROM for right shoulder flexion, abduction, IR and ER   Supine shoulder flexion AAROM dowel 3 x 10  Supine shoulder abduction AAROM with dowel 3 x 10 R  Supine AAROM ER with dowel 3 x 10 R  Forward wall crawls x 10 R  HEP Review    neuromuscular re-education activities to improve: Balance,  Coordination, Proprioception, and Posture for 15 minutes. The following activities were included:  Standing rows (green TB) 3 s holds 3x10  Shoulder flexion isometrics 6 s holds x 10 R  Shoulder abduction isometrics 6 s holds x 10 R  Shoulder external rotation isometrics 6 s holds x 10 R    Cold pack for 5 minutes:  Cold pack to right shoulder    Patient Education and Home Exercises     Home Exercises Provided and Patient Education Provided     Education provided:   - Pt was given a HEP consisting of shoulder isometrics (flexion, abduction, external rotation) and supine shoulder (flexion, abduction, external rotation) AAROM with a mago.      Written Home Exercises Provided: Patient instructed to cont prior HEP. Exercises were reviewed and Gio was able to demonstrate them prior to the end of the session.  Gio demonstrated good  understanding of the education provided. See EMR under Patient Instructions for exercises provided during therapy sessions     ASSESSMENT   Gio tolerated his treatment well.  His right shoulder ROM continues to improve, but he still has pain with movement.  Shoulder isometric strengthening was introduced today.  Pt had some shoulder irritation during some of the exercises. He progressed to a green theraband for standing rows.  Gio is a 58 y.o. male referred to outpatient Physical Therapy with a medical diagnosis of S/P arthroscopy of right shoulder [Z98.890]. Patient presents s/p right shoulder arthroscopy acromioplasty and distal clavicle resection on 4/17/24.  Gio Is progressing well towards his goals.   Pt prognosis is Good. Pt will continue to benefit from skilled outpatient physical therapy to address the deficits listed in the problem list box on initial evaluation, provide pt/family education and to maximize pt's level of independence in the home and community environment.     Plan of care discussed with patient: Yes     Anticipated Barriers for therapy: PMHx,  inactivity     Problem List:  s/p right shoulder arthroscopy acromioplasty and distal clavicle resection on 4/17/24  Decreased right shoulder mobility  UE weakness    Goals:   Short Term Goals: 4 weeks   Pt will be independent with the initial phase of their HEP in order to continue to make functional gains outside of physical therapy.  Pt's right shoulder flexion AROM will improve to 130 degrees in order for him to tolerate lifting something overhead.  Pt's right shoulder abduction AROM will improve to 100 degrees to demonstrate improved shoulder mobility for functional activities.  Pt's right shoulder external rotation AROM will improve to 50 degrees to demonstrate improved shoulder mobility for functional activities   Pt's right shoulder pain with movement will decrease to 3/10 in order for him to tolerate ADLs better.        Long Term Goals: 8 weeks   Pt will be independent with the final phase of their HEP in order to continue to make functional gains outside of physical therapy.  Pt's right shoulder flexion AROM will improve to 160 degrees in order for him to tolerate lifting something overhead.  Pt's right shoulder abduction AROM will improve to 135 degrees to demonstrate improved shoulder mobility for functional activities.  Pt's right shoulder external rotation AROM will improve to 70 degrees to demonstrate improved shoulder mobility for functional activities   Pt's right shoulder pain with movement will decrease to 1/10 in order for him to tolerate ADLs better.       PLAN     Continue with PT plan of care and progress as tolerated.    Blake Cesar, PT, DPT

## 2024-05-06 ENCOUNTER — CLINICAL SUPPORT (OUTPATIENT)
Dept: REHABILITATION | Facility: HOSPITAL | Age: 59
End: 2024-05-06
Payer: MEDICARE

## 2024-05-06 DIAGNOSIS — M25.611 DECREASED ROM OF RIGHT SHOULDER: Primary | ICD-10-CM

## 2024-05-06 PROCEDURE — 97112 NEUROMUSCULAR REEDUCATION: CPT | Mod: PO,CQ

## 2024-05-06 PROCEDURE — 97110 THERAPEUTIC EXERCISES: CPT | Mod: PO,CQ

## 2024-05-06 NOTE — PROGRESS NOTES
[facility]   Physical Therapy Treatment Note     Name: Gio Forrest  [facility] Number: [id number]    Therapy Diagnosis:   Encounter Diagnosis   Name Primary?    Decreased ROM of right shoulder Yes       Physician: Omkar Wu MD    Visit Date: 5/6/2024  Physician Orders: PT Eval and Treat   Medical Diagnosis from Referral: S/P arthroscopy of right shoulder [Z98.890]     Evaluation Date: 4/23/2024  Authorization Period Expiration: 12/31/24  Plan of Care Expiration: 7/23/24  Progress Note Due: 5/22/24  Date of Surgery: 4/17/24 s/p right shoulder arthroscopy acromioplasty and distal clavicle resection  Visit # / Visits authorized: 4/ 20 + Eval  FOTO: 1/3    PTA Visit #: 1/5     Precautions: Standard and 4/17/24 (R) shoulder arthroscopy, acromioplasty and distal clavicle, resection, Diabetes      Time In: 1306  Time Out: 1400  Total Billable Time: 30 minutes due to concurrent treatment    SUBJECTIVE     Pt reports: his arm is doing well. Patient states he is having difficulty with reaching overhead due to stiffness. He was compliant with home exercise program.  Response to previous treatment: no adverse effects   Functional change: too soon to determine     Pain: 0/10  Location: right shoulder      OBJECTIVE     Objective Measures updated at progress report unless specified.     Treatment     Gio received the treatments listed below:      therapeutic exercises to develop strength, endurance, ROM, flexibility, posture, and core stabilization for 42 minutes including:  UBE x 6 minutes (forward and retro)  PROM for right shoulder flexion, abduction, IR and ER   Supine shoulder flexion AAROM dowel 3 x 10  Supine shoulder abduction AAROM with dowel 3 x 10 R  Supine AAROM ER with dowel 3 x 10 R  Forward wall crawls x 10 R    neuromuscular re-education activities to improve: Balance, Coordination, Proprioception, and Posture for 15 minutes. The following activities were  included:  Standing rows (green TB) 3 s holds 3x10  S/L ER 3x10, 1#   S/L abduction 2x10  Seated bruegger (red TB) 2x10  Standing IR/ER with red TB 2x10 (B)    Cold pack for 5 minutes:  Cold pack to right shoulder    Patient Education and Home Exercises     Home Exercises Provided and Patient Education Provided     Education provided:   - Pt was given a HEP consisting of shoulder isometrics (flexion, abduction, external rotation) and supine shoulder (flexion, abduction, external rotation) AAROM with a mago.      Written Home Exercises Provided: Patient instructed to cont prior HEP. Exercises were reviewed and Gio was able to demonstrate them prior to the end of the session.  Gio demonstrated good  understanding of the education provided. See EMR under Patient Instructions for exercises provided during therapy sessions     ASSESSMENT   Gio able to progress to isotonic rotator cuff strengthening today without complaints of pain. Tactile cues provided for proper scapular engagement and to offload anterior shoulder. Consistent improvements in ROM over the last few session. Continuing to encourage flexion ROM to end range with wall walks to improve functional mobility.     Gio is a 58 y.o. male referred to outpatient Physical Therapy with a medical diagnosis of S/P arthroscopy of right shoulder [Z98.890]. Patient presents s/p right shoulder arthroscopy acromioplasty and distal clavicle resection on 4/17/24.  Gio Is progressing well towards his goals.   Pt prognosis is Good. Pt will continue to benefit from skilled outpatient physical therapy to address the deficits listed in the problem list box on initial evaluation, provide pt/family education and to maximize pt's level of independence in the home and community environment.     Plan of care discussed with patient: Yes     Anticipated Barriers for therapy: PMHx, inactivity     Problem List:  s/p right shoulder arthroscopy acromioplasty and distal  clavicle resection on 4/17/24  Decreased right shoulder mobility  UE weakness    Goals:   Short Term Goals: 4 weeks   Pt will be independent with the initial phase of their HEP in order to continue to make functional gains outside of physical therapy.  Pt's right shoulder flexion AROM will improve to 130 degrees in order for him to tolerate lifting something overhead.  Pt's right shoulder abduction AROM will improve to 100 degrees to demonstrate improved shoulder mobility for functional activities.  Pt's right shoulder external rotation AROM will improve to 50 degrees to demonstrate improved shoulder mobility for functional activities   Pt's right shoulder pain with movement will decrease to 3/10 in order for him to tolerate ADLs better.        Long Term Goals: 8 weeks   Pt will be independent with the final phase of their HEP in order to continue to make functional gains outside of physical therapy.  Pt's right shoulder flexion AROM will improve to 160 degrees in order for him to tolerate lifting something overhead.  Pt's right shoulder abduction AROM will improve to 135 degrees to demonstrate improved shoulder mobility for functional activities.  Pt's right shoulder external rotation AROM will improve to 70 degrees to demonstrate improved shoulder mobility for functional activities   Pt's right shoulder pain with movement will decrease to 1/10 in order for him to tolerate ADLs better.       PLAN     Continue with PT plan of care and progress as tolerated.    Amber Keith, PTA

## 2024-05-13 ENCOUNTER — CLINICAL SUPPORT (OUTPATIENT)
Dept: REHABILITATION | Facility: HOSPITAL | Age: 59
End: 2024-05-13
Payer: MEDICARE

## 2024-05-13 DIAGNOSIS — M25.611 DECREASED ROM OF RIGHT SHOULDER: Primary | ICD-10-CM

## 2024-05-13 PROCEDURE — 97110 THERAPEUTIC EXERCISES: CPT | Mod: PO,CQ

## 2024-05-13 PROCEDURE — 97112 NEUROMUSCULAR REEDUCATION: CPT | Mod: PO,CQ

## 2024-05-13 NOTE — PROGRESS NOTES
[facility]   Physical Therapy Treatment Note     Name: Gio GARNER [facility] [address] Number: [id number]    Therapy Diagnosis:   Encounter Diagnosis   Name Primary?    Decreased ROM of right shoulder Yes       Physician: Omkar Wu MD    Visit Date: 5/13/2024  Physician Orders: PT Eval and Treat   Medical Diagnosis from Referral: S/P arthroscopy of right shoulder [Z98.890]     Evaluation Date: 4/23/2024  Authorization Period Expiration: 12/31/24  Plan of Care Expiration: 7/23/24  Progress Note Due: 5/22/24  Date of Surgery: 4/17/24 s/p right shoulder arthroscopy acromioplasty and distal clavicle resection  Visit # / Visits authorized: 5/ 20 + Eval  FOTO: 1/3    PTA Visit #: 2/5     Precautions: Standard and 4/17/24 (R) shoulder arthroscopy, acromioplasty and distal clavicle, resection, Diabetes      Time In: 2:15 pm late arrival  Time Out: 3:08 pm  Total Billable Time: 24 minutes due to concurrent treatment    SUBJECTIVE     Pt reports: experiencing mild pain at rest and continued difficulty with overhead motions.    He was compliant with home exercise program.  Response to previous treatment: no adverse effects   Functional change: too soon to determine     Pain: 3/10  Location: right shoulder      OBJECTIVE     Objective Measures updated at progress report unless specified.     Treatment     Gio received the treatments listed below:      therapeutic exercises to develop strength, endurance, ROM, flexibility, posture, and core stabilization for 33 minutes including:  UBE x 6 minutes (forward and retro)-NP  Pulleys x 3 min ea flex/scaption  PROM for right shoulder flexion, abduction, IR and ER   Supine shoulder flexion AAROM dowel 3 x 10  Supine shoulder abduction AAROM with dowel 3 x 10 R  Supine AAROM ER with dowel 3 x 10 R  Forward wall crawls x 10 R    neuromuscular re-education activities to improve: Balance, Coordination, Proprioception, and Posture for 15 minutes. The  following activities were included:  Standing rows (green TB) 3 s holds 3x10  S/L ER 3x10, 1#   S/L abduction 2x10  Seated bruegger (red TB) 2x10  Standing IR/ER with Green TB 2x10 (B)    Cold pack for 5 minutes:  Cold pack to right shoulder    Patient Education and Home Exercises     Home Exercises Provided and Patient Education Provided     Education provided:   - Pt was given a HEP consisting of shoulder isometrics (flexion, abduction, external rotation) and supine shoulder (flexion, abduction, external rotation) AAROM with a mago.      Written Home Exercises Provided: Patient instructed to cont prior HEP. Exercises were reviewed and Gio was able to demonstrate them prior to the end of the session.  Gio demonstrated good  understanding of the education provided. See EMR under Patient Instructions for exercises provided during therapy sessions     ASSESSMENT   Gio returned reporting slight increase in R shoulder pain at rest and continued difficulty with overhead motions. Pt arrived 15 minutes late to today's visit resulting in altered treatment. Shoulder mobility and ER/IR strengthening was the main emphasis of today's visit. He tolerated treatment fair with pain at end range during PROM. Will continue to progress per pt's tolerance.    Gio is a 58 y.o. male referred to outpatient Physical Therapy with a medical diagnosis of S/P arthroscopy of right shoulder [Z98.890]. Patient presents s/p right shoulder arthroscopy acromioplasty and distal clavicle resection on 4/17/24.  Gio Is progressing well towards his goals.   Pt prognosis is Good. Pt will continue to benefit from skilled outpatient physical therapy to address the deficits listed in the problem list box on initial evaluation, provide pt/family education and to maximize pt's level of independence in the home and community environment.     Plan of care discussed with patient: Yes     Anticipated Barriers for therapy: PMHx, inactivity      Problem List:  s/p right shoulder arthroscopy acromioplasty and distal clavicle resection on 4/17/24  Decreased right shoulder mobility  UE weakness    Goals:   Short Term Goals: 4 weeks   Pt will be independent with the initial phase of their HEP in order to continue to make functional gains outside of physical therapy.  Pt's right shoulder flexion AROM will improve to 130 degrees in order for him to tolerate lifting something overhead.  Pt's right shoulder abduction AROM will improve to 100 degrees to demonstrate improved shoulder mobility for functional activities.  Pt's right shoulder external rotation AROM will improve to 50 degrees to demonstrate improved shoulder mobility for functional activities   Pt's right shoulder pain with movement will decrease to 3/10 in order for him to tolerate ADLs better.        Long Term Goals: 8 weeks   Pt will be independent with the final phase of their HEP in order to continue to make functional gains outside of physical therapy.  Pt's right shoulder flexion AROM will improve to 160 degrees in order for him to tolerate lifting something overhead.  Pt's right shoulder abduction AROM will improve to 135 degrees to demonstrate improved shoulder mobility for functional activities.  Pt's right shoulder external rotation AROM will improve to 70 degrees to demonstrate improved shoulder mobility for functional activities   Pt's right shoulder pain with movement will decrease to 1/10 in order for him to tolerate ADLs better.       PLAN     Continue with PT plan of care and progress as tolerated.    Raj Rosa, PTA

## 2024-05-16 ENCOUNTER — CLINICAL SUPPORT (OUTPATIENT)
Dept: REHABILITATION | Facility: HOSPITAL | Age: 59
End: 2024-05-16
Payer: MEDICARE

## 2024-05-16 DIAGNOSIS — M25.611 DECREASED ROM OF RIGHT SHOULDER: Primary | ICD-10-CM

## 2024-05-16 PROCEDURE — 97110 THERAPEUTIC EXERCISES: CPT | Mod: PO,CQ

## 2024-05-16 PROCEDURE — 97112 NEUROMUSCULAR REEDUCATION: CPT | Mod: PO,CQ

## 2024-05-16 NOTE — PROGRESS NOTES
[facility]   Physical Therapy Treatment Note     Name: Gio Forrest  [facility] Number: [id number]    Therapy Diagnosis:   Encounter Diagnosis   Name Primary?    Decreased ROM of right shoulder Yes       Physician: Omkar Wu MD    Visit Date: 5/16/2024  Physician Orders: PT Eval and Treat   Medical Diagnosis from Referral: S/P arthroscopy of right shoulder [Z98.890]     Evaluation Date: 4/23/2024  Authorization Period Expiration: 12/31/24  Plan of Care Expiration: 7/23/24  Progress Note Due: 5/22/24  Date of Surgery: 4/17/24 s/p right shoulder arthroscopy acromioplasty and distal clavicle resection  Visit # / Visits authorized: 6/ 20 + Eval  FOTO: 1/3    PTA Visit #: 3/5     Precautions: Standard and 4/17/24 (R) shoulder arthroscopy, acromioplasty and distal clavicle, resection, Diabetes      Time In: 1:25 pm late arrival  Time Out: 2:16 pm  Total Billable Time:  23 minutes due to concurrent treatment    SUBJECTIVE     Pt reports: continued R shoulder pain levels at rest but notes improved tolerance to overhead motions with increased ROM.    He was compliant with home exercise program.  Response to previous treatment: no adverse effects   Functional change: too soon to determine     Pain: 3/10  Location: right shoulder      OBJECTIVE     Objective Measures updated at progress report unless specified.     Treatment     Gio received the treatments listed below:      therapeutic exercises to develop strength, endurance, ROM, flexibility, posture, and core stabilization for 30 minutes including:    UBE x 6 minutes (forward and retro)-NP  Pulleys x 3 min ea flex/scaption  PROM for right shoulder flexion, abduction, IR and ER   Supine shoulder flexion AAROM 3# dowel 2 x 10  Supine shoulder abduction AAROM with 3# dowel 2 x 10 R  Supine AAROM ER with 3# dowel 3 x 10 R  Forward wall crawls x 10 R    neuromuscular re-education activities to improve: Balance, Coordination,  Proprioception, and Posture for 16 minutes. The following activities were included:  Standing rows (green TB) 3 s holds 3x10  S/L ER 3x10, 3#   S/L abduction 1# 2x10  Seated bruegger (red TB) 2x10  Standing IR/ER with Green TB 2x10 (B)    Cold pack for 5 minutes:  Cold pack to right shoulder    Patient Education and Home Exercises     Home Exercises Provided and Patient Education Provided     Education provided:   - Pt was given a HEP consisting of shoulder isometrics (flexion, abduction, external rotation) and supine shoulder (flexion, abduction, external rotation) AAROM with a mago.      Written Home Exercises Provided: Patient instructed to cont prior HEP. Exercises were reviewed and Gio was able to demonstrate them prior to the end of the session.  Gio demonstrated good  understanding of the education provided. See EMR under Patient Instructions for exercises provided during therapy sessions     ASSESSMENT   Pt's late arrival time resulted in shortened treatment time. Treatment continued emphasis of R shoulder mobility and periscapular strengthening. Pt tolerated increased resistance on AAROM shoulder exercises as well as SL rotator cuff strengthening exercises without any increase in pain and appropriate increase in muscular fatigue.  Will continue to progress per pt's tolerance.    Gio is a 58 y.o. male referred to outpatient Physical Therapy with a medical diagnosis of S/P arthroscopy of right shoulder [Z98.890]. Patient presents s/p right shoulder arthroscopy acromioplasty and distal clavicle resection on 4/17/24.  Gio Is progressing well towards his goals.   Pt prognosis is Good. Pt will continue to benefit from skilled outpatient physical therapy to address the deficits listed in the problem list box on initial evaluation, provide pt/family education and to maximize pt's level of independence in the home and community environment.     Plan of care discussed with patient: Yes     Anticipated  Barriers for therapy: PMHx, inactivity     Problem List:  s/p right shoulder arthroscopy acromioplasty and distal clavicle resection on 4/17/24  Decreased right shoulder mobility  UE weakness    Goals:   Short Term Goals: 4 weeks   Pt will be independent with the initial phase of their HEP in order to continue to make functional gains outside of physical therapy.  Pt's right shoulder flexion AROM will improve to 130 degrees in order for him to tolerate lifting something overhead.  Pt's right shoulder abduction AROM will improve to 100 degrees to demonstrate improved shoulder mobility for functional activities.  Pt's right shoulder external rotation AROM will improve to 50 degrees to demonstrate improved shoulder mobility for functional activities   Pt's right shoulder pain with movement will decrease to 3/10 in order for him to tolerate ADLs better.        Long Term Goals: 8 weeks   Pt will be independent with the final phase of their HEP in order to continue to make functional gains outside of physical therapy.  Pt's right shoulder flexion AROM will improve to 160 degrees in order for him to tolerate lifting something overhead.  Pt's right shoulder abduction AROM will improve to 135 degrees to demonstrate improved shoulder mobility for functional activities.  Pt's right shoulder external rotation AROM will improve to 70 degrees to demonstrate improved shoulder mobility for functional activities   Pt's right shoulder pain with movement will decrease to 1/10 in order for him to tolerate ADLs better.       PLAN     Continue with PT plan of care and progress as tolerated.    Raj Rosa, PTA

## 2024-05-22 ENCOUNTER — CLINICAL SUPPORT (OUTPATIENT)
Dept: REHABILITATION | Facility: HOSPITAL | Age: 59
End: 2024-05-22
Payer: MEDICARE

## 2024-05-22 DIAGNOSIS — M25.611 DECREASED ROM OF RIGHT SHOULDER: Primary | ICD-10-CM

## 2024-05-22 PROCEDURE — 97112 NEUROMUSCULAR REEDUCATION: CPT | Mod: PO,CQ

## 2024-05-22 PROCEDURE — 97110 THERAPEUTIC EXERCISES: CPT | Mod: PO,CQ

## 2024-05-22 NOTE — PROGRESS NOTES
"[facility]   Physical Therapy Treatment Note     Name: Gio Forrest  [facility] Number: [id number]    Therapy Diagnosis:   Encounter Diagnosis   Name Primary?    Decreased ROM of right shoulder Yes       Physician: Omkar Wu MD    Visit Date: 5/22/2024  Physician Orders: PT Eval and Treat   Medical Diagnosis from Referral: S/P arthroscopy of right shoulder [Z98.890]     Evaluation Date: 4/23/2024  Authorization Period Expiration: 12/31/24  Plan of Care Expiration: 7/23/24  Progress Note Due: 5/22/24  Date of Surgery: 4/17/24 s/p right shoulder arthroscopy acromioplasty and distal clavicle resection  Visit # / Visits authorized: 7/ 20 + Eval  FOTO: 1/3    PTA Visit #: 4/5     Precautions: Standard and 4/17/24 (R) shoulder arthroscopy, acromioplasty and distal clavicle, resection, Diabetes      Time In: 11:05 pm late arrival  Time Out: 12:00 pm  Total Billable Time:  27 minutes due to concurrent treatment    SUBJECTIVE     Pt reports: "I been feeling a little bit more sore since my pain medication ran out a couple of days ago".    He was compliant with home exercise program.  Response to previous treatment: no adverse effects   Functional change: too soon to determine     Pain: 3/10  Location: right shoulder      OBJECTIVE     Objective Measures updated at progress report unless specified.     Treatment     Gio received the treatments listed below:      therapeutic exercises to develop strength, endurance, ROM, flexibility, posture, and core stabilization for 30 minutes including:    UBE x 6 minutes (forward and retro)  Pulleys x 3 min ea flex/scaption  PROM for right shoulder flexion, abduction, IR and ER   Supine shoulder flexion AAROM 5# dowel 2 x 10  Supine shoulder abduction AAROM with 5# dowel 2 x 10 R  Supine AAROM ER with 5# dowel 3 x 10 R  Supine serratus punches 5# 20x  Forward wall crawls x 10 R    neuromuscular re-education activities to improve: Balance, " Coordination, Proprioception, and Posture for 25 minutes. The following activities were included:  Supine PNF D2 Flexion x 20  Standing rows (green TB) 3 s holds 3x10  S/L ER 3x10, 3#   S/L abduction 1# 2x10  Seated bruegger (red TB) 2x10  Standing IR/ER with Green TB 2x10 (B)    Cold pack for 0 minutes:  Cold pack to right shoulder    Patient Education and Home Exercises     Home Exercises Provided and Patient Education Provided     Education provided:   - Pt was given a HEP consisting of shoulder isometrics (flexion, abduction, external rotation) and supine shoulder (flexion, abduction, external rotation) AAROM with a mago.      Written Home Exercises Provided: Patient instructed to cont prior HEP. Exercises were reviewed and Gio was able to demonstrate them prior to the end of the session.  Gio demonstrated good  understanding of the education provided. See EMR under Patient Instructions for exercises provided during therapy sessions     ASSESSMENT   Continuation and progression of R shoulder mobility and rotator cuff/postural strengthening exercises were tolerated well without adverse effects and desired training effect achieved. Supine serratus punches and D2 Flexion exercises were introduced to further shoulder stabilization and strengthening challenge. Will continue to progress per pt's tolerance.    Gio is a 58 y.o. male referred to outpatient Physical Therapy with a medical diagnosis of S/P arthroscopy of right shoulder [Z98.890]. Patient presents s/p right shoulder arthroscopy acromioplasty and distal clavicle resection on 4/17/24.  Gio Is progressing well towards his goals.   Pt prognosis is Good. Pt will continue to benefit from skilled outpatient physical therapy to address the deficits listed in the problem list box on initial evaluation, provide pt/family education and to maximize pt's level of independence in the home and community environment.     Plan of care discussed with patient:  Yes     Anticipated Barriers for therapy: PMHx, inactivity     Problem List:  s/p right shoulder arthroscopy acromioplasty and distal clavicle resection on 4/17/24  Decreased right shoulder mobility  UE weakness    Goals:   Short Term Goals: 4 weeks   Pt will be independent with the initial phase of their HEP in order to continue to make functional gains outside of physical therapy.  Pt's right shoulder flexion AROM will improve to 130 degrees in order for him to tolerate lifting something overhead.  Pt's right shoulder abduction AROM will improve to 100 degrees to demonstrate improved shoulder mobility for functional activities.  Pt's right shoulder external rotation AROM will improve to 50 degrees to demonstrate improved shoulder mobility for functional activities   Pt's right shoulder pain with movement will decrease to 3/10 in order for him to tolerate ADLs better.        Long Term Goals: 8 weeks   Pt will be independent with the final phase of their HEP in order to continue to make functional gains outside of physical therapy.  Pt's right shoulder flexion AROM will improve to 160 degrees in order for him to tolerate lifting something overhead.  Pt's right shoulder abduction AROM will improve to 135 degrees to demonstrate improved shoulder mobility for functional activities.  Pt's right shoulder external rotation AROM will improve to 70 degrees to demonstrate improved shoulder mobility for functional activities   Pt's right shoulder pain with movement will decrease to 1/10 in order for him to tolerate ADLs better.       PLAN     Continue with PT plan of care and progress as tolerated.    Raj Rosa, PTA

## 2024-05-24 ENCOUNTER — CLINICAL SUPPORT (OUTPATIENT)
Dept: REHABILITATION | Facility: HOSPITAL | Age: 59
End: 2024-05-24
Payer: MEDICARE

## 2024-05-24 ENCOUNTER — OFFICE VISIT (OUTPATIENT)
Dept: PODIATRY | Facility: CLINIC | Age: 59
End: 2024-05-24
Payer: MEDICARE

## 2024-05-24 VITALS — HEIGHT: 75 IN | BODY MASS INDEX: 29.33 KG/M2 | WEIGHT: 235.88 LBS

## 2024-05-24 DIAGNOSIS — B35.1 ONYCHOMYCOSIS DUE TO DERMATOPHYTE: ICD-10-CM

## 2024-05-24 DIAGNOSIS — M25.611 DECREASED ROM OF RIGHT SHOULDER: Primary | ICD-10-CM

## 2024-05-24 DIAGNOSIS — E11.42 DIABETIC POLYNEUROPATHY ASSOCIATED WITH TYPE 2 DIABETES MELLITUS: Primary | ICD-10-CM

## 2024-05-24 DIAGNOSIS — R20.2 PARESTHESIA OF FOOT, BILATERAL: ICD-10-CM

## 2024-05-24 PROCEDURE — 99213 OFFICE O/P EST LOW 20 MIN: CPT | Mod: 25,S$GLB,, | Performed by: PODIATRIST

## 2024-05-24 PROCEDURE — 99999 PR PBB SHADOW E&M-EST. PATIENT-LVL III: CPT | Mod: PBBFAC,,, | Performed by: PODIATRIST

## 2024-05-24 PROCEDURE — 3044F HG A1C LEVEL LT 7.0%: CPT | Mod: CPTII,S$GLB,, | Performed by: PODIATRIST

## 2024-05-24 PROCEDURE — 97110 THERAPEUTIC EXERCISES: CPT | Mod: KX,PO

## 2024-05-24 PROCEDURE — 3008F BODY MASS INDEX DOCD: CPT | Mod: CPTII,S$GLB,, | Performed by: PODIATRIST

## 2024-05-24 PROCEDURE — 3061F NEG MICROALBUMINURIA REV: CPT | Mod: CPTII,S$GLB,, | Performed by: PODIATRIST

## 2024-05-24 PROCEDURE — 3066F NEPHROPATHY DOC TX: CPT | Mod: CPTII,S$GLB,, | Performed by: PODIATRIST

## 2024-05-24 PROCEDURE — 1159F MED LIST DOCD IN RCRD: CPT | Mod: CPTII,S$GLB,, | Performed by: PODIATRIST

## 2024-05-24 PROCEDURE — 97112 NEUROMUSCULAR REEDUCATION: CPT | Mod: KX,PO

## 2024-05-24 PROCEDURE — 11721 DEBRIDE NAIL 6 OR MORE: CPT | Mod: Q9,S$GLB,, | Performed by: PODIATRIST

## 2024-05-24 NOTE — PROGRESS NOTES
Subjective:      Patient ID: Gio Forrest is a 58 y.o. male.    Chief Complaint: Diabetes Mellitus    Gio is a 58 y.o. male who presents to the clinic for evaluation and treatment of diabetic feet. Gio has a past medical history of Addiction (12/21/2021), Arthritis, Back pain, Depression, HENDERSON (dyspnea on exertion), Dyslipidemia (08/12/2015), Excessive daytime sleepiness (03/02/2018), GERD (gastroesophageal reflux disease), Gout (08/12/2015), Hypersomnia with sleep apnea (05/14/2022), Hypertension, Idiopathic gout, Lumbar pseudoarthrosis, Neck pain, Neuropathy, Obesity (08/12/2015), Obstructive sleep apnea (08/12/2015), Restless leg syndrome, Sebaceous cyst (04/18/2017), SVT (supraventricular tachycardia), and Type 2 diabetes mellitus (08/12/2015). Patient relates having toenails that are in need of trimming.  Denies experiencing pain from the nails with wearing shoe gear.  Has not attempted to self treat.  Continues with good control over his blood glucose.  Notes having substantial paresthesias in bilateral lower extremity at night.  Relates having tried gabapentin in the past with no relief.  Inquires as to additional treatment options.  Denies any additional pedal complaints.    PCP: Matthew Carroll MD  Last visit: 3/24  Hemoglobin A1C   Date Value Ref Range Status   02/26/2024 6.3 (H) 4.0 - 5.6 % Final     Comment:     ADA Screening Guidelines:  5.7-6.4%  Consistent with prediabetes  >or=6.5%  Consistent with diabetes    High levels of fetal hemoglobin interfere with the HbA1C  assay. Heterozygous hemoglobin variants (HbS, HgC, etc)do  not significantly interfere with this assay.   However, presence of multiple variants may affect accuracy.     09/19/2023 6.4 (H) 4.0 - 5.6 % Final     Comment:     ADA Screening Guidelines:  5.7-6.4%  Consistent with prediabetes  >or=6.5%  Consistent with diabetes    High levels of fetal hemoglobin interfere with the HbA1C  assay. Heterozygous hemoglobin variants  (HbS, HgC, etc)do  not significantly interfere with this assay.   However, presence of multiple variants may affect accuracy.     03/31/2023 7.3 (H) 4.0 - 5.6 % Final     Comment:     ADA Screening Guidelines:  5.7-6.4%  Consistent with prediabetes  >or=6.5%  Consistent with diabetes    High levels of fetal hemoglobin interfere with the HbA1C  assay. Heterozygous hemoglobin variants (HbS, HgC, etc)do  not significantly interfere with this assay.   However, presence of multiple variants may affect accuracy.             Past Medical History:   Diagnosis Date    Addiction 12/21/2021    Arthritis     Back pain     radiates to both legs but more on rt leg    Depression     HENDERSON (dyspnea on exertion)     Dyslipidemia 08/12/2015    Excessive daytime sleepiness 03/02/2018    Formatting of this note might be different from the original.  Added automatically from request for surgery [id number]    GERD (gastroesophageal reflux disease)     Gout 08/12/2015    Hypersomnia with sleep apnea 05/14/2022    Hypertension     Idiopathic gout     Lumbar pseudoarthrosis     Neck pain     nonradiating pain    Neuropathy     Obesity 08/12/2015    Obstructive sleep apnea 08/12/2015    cpap    Restless leg syndrome     Sebaceous cyst 04/18/2017    SVT (supraventricular tachycardia)     s/p ablation    Type 2 diabetes mellitus 08/12/2015       Past Surgical History:   Procedure Laterality Date    ABLATION N/A 10/08/2020    Procedure: Ablation;  Surgeon: Rip Che III, MD;  Location: [facility];  Service: Cardiology;  Laterality: N/A;    ARTHROPLASTY OF JOINT OF FINGER Right 04/12/2022    Procedure: Right middle finger MCP joint arthroplasty;  Surgeon: Lui sAlberto Payne MD;  Location: [facility] OR;  Service: Orthopedics;  Laterality: Right;  Anesthesia:  General with a single-shot supraclavicular block    BACK SURGERY      multiple    CARDIAC CATHETERIZATION      CARDIAC ELECTROPHYSIOLOGY STUDY  10/08/2020    Procedure: Study possible ablation;   Surgeon: Rip Che III, MD;  Location: [facility] [place] CATH;  Service: Cardiology;;    CERVICAL SPINE SURGERY      CHOLECYSTECTOMY  05/30/2018    Dr. ROGELIO Tao, [facility] [place]     COLONOSCOPY  2008    COLONOSCOPY N/A 09/14/2017    Procedure: COLONOSCOPY;  Surgeon: Obi Beltre MD;  Location: [facility] [place] ENDO;  Service: Endoscopy;  Laterality: N/A;    CORONARY ANGIOGRAPHY Left 05/28/2018    Procedure: Coronary angiography;  Surgeon: Rafiq Malik MD;  Location: [facility] [place] CATH;  Service: Cardiology;  Laterality: Left;    ELBOW SURGERY Bilateral     EPIDURAL STEROID INJECTION INTO CERVICAL SPINE      EPIDURAL STEROID INJECTION INTO LUMBAR SPINE      HERNIA REPAIR      LAPAROSCOPIC APPENDECTOMY N/A 06/25/2020    Procedure: APPENDECTOMY, LAPAROSCOPIC;  Surgeon: Gordon Can MD;  Location: [facility] [place] OR;  Service: General;  Laterality: N/A;    LAPAROSCOPIC CHOLECYSTECTOMY N/A 05/30/2018    Procedure: CHOLECYSTECTOMY, LAPAROSCOPIC;  Surgeon: Fletcher Tao MD;  Location: [facility] [place] OR;  Service: General;  Laterality: N/A;    LAPAROSCOPIC SLEEVE GASTRECTOMY N/A 01/20/2023    Procedure: GASTRECTOMY, SLEEVE, LAPAROSCOPIC;  Surgeon: Bharat Devine MD;  Location: [facility] OR;  Service: General;  Laterality: N/A;    LEFT HEART CATHETERIZATION Left 05/28/2018    Procedure: Left heart cath;  Surgeon: Rafiq Malik MD;  Location: [facility] [place] CATH;  Service: Cardiology;  Laterality: Left;    NECK SURGERY      RADIOFREQUENCY ABLATION OF LUMBAR MEDIAL BRANCH NERVE AT SINGLE LEVEL Bilateral 06/17/2022    Procedure: Radiofrequency Ablation, Nerve, Spinal, Lumbar, Medial Branch, 1 Level L3,4,5;  Surgeon: Thiago Garcia MD;  Location: [facility] OR;  Service: Pain Management;  Laterality: Bilateral;    SHOULDER ARTHROSCOPY      SPINE SURGERY      multiple    TONSILLECTOMY      TREATMENT OF CARDIAC ARRHYTHMIA  10/08/2020    Procedure: Cardioversion or Defibrillation;  Surgeon: Rip Che III, MD;  Location: [facility] [place] CATH;  Service: Cardiology;;    TRIGGER FINGER RELEASE  Right 2021    Procedure: RELEASE, TRIGGER FINGER;  Surgeon: Luis Alberto Payne MD;  Location: [facility] OR;  Service: Orthopedics;  Laterality: Right;  Procedure: Right ring finger trigger release    Position: Supine    Anesthesia: Local    Equipment: Basic handset    TYMPANOSTOMY TUBE PLACEMENT Right        Family History   Problem Relation Name Age of Onset    Diabetes Mother      Depression Mother      Liver cancer Mother      Obesity Mother      Heart disease Father      Anuerysm Father      Diabetes Father      Stroke Father      Glaucoma Paternal Grandmother      Obesity Sister         Social History     Socioeconomic History    Marital status:     Number of children: 1   Tobacco Use    Smoking status: Former     Current packs/day: 0.00     Average packs/day: 0.5 packs/day for 10.0 years (5.0 ttl pk-yrs)     Types: Cigarettes     Start date:      Quit date:      Years since quittin.4    Smokeless tobacco: Never   Substance and Sexual Activity    Alcohol use: Not Currently    Drug use: No    Sexual activity: Yes     Partners: Female   Social History Narrative              Social Determinants of Health     Financial Resource Strain: Medium Risk (2022)    Overall Financial Resource Strain (CARDIA)     Difficulty of Paying Living Expenses: Somewhat hard   Food Insecurity: Food Insecurity Present (2022)    Hunger Vital Sign     Worried About Running Out of Food in the Last Year: Often true     Ran Out of Food in the Last Year: Often true   Transportation Needs: Unmet Transportation Needs (2022)    PRAPARE - Transportation     Lack of Transportation (Medical): Yes     Lack of Transportation (Non-Medical): Yes   Physical Activity: Insufficiently Active (2022)    Exercise Vital Sign     Days of Exercise per Week: 4 days     Minutes of Exercise per Session: 10 min   Stress: Stress Concern Present (2022)    [employer] [place] [employer] - Occupational  Stress Questionnaire     Feeling of Stress : To some extent       Current Outpatient Medications   Medication Sig Dispense Refill    atorvastatin (LIPITOR) 40 MG tablet TAKE ONE TABLET BY MOUTH ONCE DAILY IN THE EVENING 90 tablet 4    blood-glucose meter,continuous (DEXCOM G7 ) Misc Dispense 1 1 each 0    calcium-vitamin D3 (OS-SOHAM 500 + D3) 500 mg-5 mcg (200 unit) per tablet Take 1 tablet by mouth 2 (two) times daily with meals.      DEXCOM G7 SENSOR Oxana CHANGE EVERY 10 DAYS 9 each 4    docusate sodium (COLACE) 100 MG capsule Take 100 mg by mouth 2 (two) times daily.      empagliflozin (JARDIANCE) 25 mg tablet Take 1 tablet (25 mg total) by mouth once daily. 90 tablet 3    fluorouraciL (EFUDEX) 5 % cream AAA dorsal hands BID x 4 weeks as tolerated. 40 g 1    gabapentin (NEURONTIN) 600 MG tablet TAKE ONE TABLET BY MOUTH THREE TIMES DAILY 90 tablet 2    glimepiride (AMARYL) 1 MG tablet TAKE ONE TABLET BY MOUTH DAILY BEFORE BREAKFAST 90 tablet 3    hydrocortisone 2.5 % cream Apply topically 2 (two) times daily as needed (rash around nose). Mix 50/50 with ketoconazole cream. 20 g 3    ketoconazole (NIZORAL) 2 % cream Apply topically 2 (two) times daily as needed (rash around nose). Mix 50/50 with hydrocortisone cream. OK to use daily by itself for prevention. 60 g 3    metFORMIN (GLUCOPHAGE-XR) 500 MG ER 24hr tablet Take 2 tablet twice a day 360 tablet 3    multivitamin capsule Take 1 capsule by mouth once daily.      pantoprazole (PROTONIX) 40 MG tablet Take 1 tablet (40 mg total) by mouth once daily. 30 tablet 2    vitamin D (VITAMIN D3) 1000 units Tab Take 1,000 Units by mouth once daily.      blood-glucose meter kit To check BG 2 times daily, to use with insurance preferred meter 1 each 1     No current facility-administered medications for this visit.       Review of patient's allergies indicates:  No Known Allergies      Review of Systems   Constitutional: Negative for chills and fever.   Cardiovascular:   Negative for claudication and leg swelling.   Skin:  Positive for color change and nail changes.   Musculoskeletal:  Positive for joint pain and joint swelling. Negative for muscle cramps, muscle weakness and myalgias.   Neurological:  Positive for paresthesias. Negative for numbness.   Psychiatric/Behavioral:  Negative for altered mental status.            Objective:      Physical Exam  Constitutional:       General: He is not in acute distress.     Appearance: He is well-developed. He is not diaphoretic.   Cardiovascular:      Pulses:           Dorsalis pedis pulses are 2+ on the right side and 2+ on the left side.        Posterior tibial pulses are 2+ on the right side and 2+ on the left side.      Comments: CFT is < 3 seconds bilateral.  Pedal hair growth is decreased bilateral.  No varicosities noted bilateral.  No lower extremity edema noted bilateral. Toes are cool to touch bilateral.    Musculoskeletal:         General: No tenderness.      Left lower leg: No edema.      Comments: Muscle strength 5/5 in all muscle groups bilateral.  No tenderness nor crepitation with ROM of foot/ankle joints bilateral.  No pain with palpation of bilateral foot and ankle.  Bilateral pes planus foot type.  Bilateral hallux abducto valgus.  Bilateral semi-reducible contracture of toe 2-5.     Skin:     General: Skin is warm and dry.      Capillary Refill: Capillary refill takes 2 to 3 seconds.      Coloration: Skin is not pale.      Findings: Bruising and ecchymosis present. No abrasion, burn, erythema, laceration, lesion or petechiae.      Comments: Pedal skin appears thin bilateral.  Toenails x 10 appear thickened by 2 mm, elongated by 4 mm, and discolored with subungual debris. Ecchymosis noted to the distal 1/2 of bilateral hallux toenail.   Neurological:      Mental Status: He is alert and oriented to person, place, and time.      Sensory: No sensory deficit.      Motor: No weakness or atrophy.      Comments: Protective  sensation per [place] monofilament is intact bilateral.  Vibratory sensation is intact bilateral.  Light touch is intact bilateral.               Assessment:       Encounter Diagnoses   Name Primary?    Diabetic polyneuropathy associated with type 2 diabetes mellitus Yes    Onychomycosis due to dermatophyte     Paresthesia of foot, bilateral          Plan:       Gio was seen today for diabetes mellitus.    Diagnoses and all orders for this visit:    Diabetic polyneuropathy associated with type 2 diabetes mellitus  -     Ambulatory referral/consult to Pain Clinic; Future    Onychomycosis due to dermatophyte    Paresthesia of foot, bilateral  -     Ambulatory referral/consult to Pain Clinic; Future      I counseled the patient on his conditions, their implications and medical management.    Referral written for Pain Management to discuss [place].    Shoe inspection. Diabetic Foot Education. Patient reminded of the importance of good nutrition and blood sugar control to help prevent podiatric complications of diabetes. Patient instructed on proper foot hygeine. We discussed wearing proper shoe gear, daily foot inspections, never walking without protective shoe gear, never putting sharp instruments to feet    With patient's permission, nails were aggressively reduced and debrided x 10 to their soft tissue attachment mechanically and with electric , removing all offending nail and debris.  Patient relates relief following the procedure. He will continue to monitor the areas daily, inspect his feet, wear protective shoe gear when ambulatory, moisturizer to maintain skin integrity and follow in this office in approximately 3 months, sooner p.r.n.    Rj Nuñez DPM

## 2024-05-24 NOTE — PROGRESS NOTES
[facility]   Physical Therapy Re-assessment     Name: Gio Forrest  [facility] Number: [id number]    Therapy Diagnosis:   Encounter Diagnosis   Name Primary?    Decreased ROM of right shoulder Yes       Physician: Omkar Wu MD    Visit Date: 5/24/2024  Physician Orders: PT Eval and Treat   Medical Diagnosis from Referral: S/P arthroscopy of right shoulder [Z98.890]     Evaluation Date: 4/23/2024  Authorization Period Expiration: 12/31/24  Plan of Care Expiration: 7/23/24  Progress Note Due: 6/24/24  Date of Surgery: 4/17/24 s/p right shoulder arthroscopy acromioplasty and distal clavicle resection  Visit # / Visits authorized: 8/ 20 + Eval  FOTO: 1/3    PTA Visit #: 4/5     Precautions: Standard and 4/17/24 (R) shoulder arthroscopy, acromioplasty and distal clavicle, resection, Diabetes      Time In: 0723  Time Out: 0816  Total Billable Time:  53 minutes     SUBJECTIVE     Pt reports: Feeling better over recent days and states his motion is coming back.    He was compliant with home exercise program.  Response to previous treatment: no adverse effects   Functional change: too soon to determine     Pain: 3/10  Location: right shoulder      OBJECTIVE     Objective Measures updated at progress report unless specified.     AROM:  UE  Shoulder flexion : R: 135 degrees, L: 166 degrees  Shoulder abduction: R: 130 degrees, L: 147 degrees  Shoulder IR : R: 65 degrees (almost at 90 degrees of shoulder abduction when measuring), L: 63 degrees  Shoulder ER : R: 56 degrees (almost at 90 degrees of shoulder abduction when measuring), L: 74 degrees  Shoulder extension: R: 47 degrees, L: 51 degrees  Elbow flexion: WFL B  Elbow extension: WFL B     MMTs/Strength:  Elbow flexion: R: 5/5, L: 5/5  Elbow extension: R: 5/5, L: 5/5  Shoulder flexion: R: 4/5, L: 5/5  Shoulder abduction: R: 4/5, L: 4/5  Shoulder internal rotation: R: 4/5, L: 5/5  Shoulder external rotation: R: 4/5, L: 4/5    Treatment      Gio received the treatments listed below:      therapeutic exercises to develop strength, endurance, ROM, flexibility, posture, and core stabilization for 30 minutes including:    UBE x 6 minutes (forward and retro)  Pulleys x 3 min ea flex/scaption  PROM for right shoulder flexion, abduction, IR and ER   Supine shoulder flexion AAROM 5# dowel 2 x 10  Supine shoulder abduction AAROM with 5# dowel 2 x 10 R  Supine AAROM ER with 5# dowel 3 x 10 R  Supine serratus punches 5# 20x    neuromuscular re-education activities to improve: Balance, Coordination, Proprioception, and Posture for 23 minutes. The following activities were included:  Supine PNF D2 Flexion x 20  Standing rows (green TB) 3 s holds 3x10  Seated bruegger (red TB) 2x10  Standing IR/ER with Green TB 2x10 (B)    Cold pack for 0 minutes:  Cold pack to right shoulder    Patient Education and Home Exercises     Home Exercises Provided and Patient Education Provided     Education provided:   - Pt was given a HEP consisting of shoulder isometrics (flexion, abduction, external rotation) and supine shoulder (flexion, abduction, external rotation) AAROM with a mago.      Written Home Exercises Provided: Patient instructed to cont prior HEP. Exercises were reviewed and Gio was able to demonstrate them prior to the end of the session.  Gio demonstrated good  understanding of the education provided. See EMR under Patient Instructions for exercises provided during therapy sessions     ASSESSMENT   Re-assessment performed today. Pt with notable improvement in pain, range of motion, and strength as a result of current POC. Despite this, he continues to present with mild strength and range of motion deficits that will continue to benefit from load progressions to tolerance to address remaining deficits. Pt appears to be progressing well at this time but continues to demo mod fatiguing with RTC strengthening, suggesting a need for further loading to this  area. Patient will continue to benefit from loading and progressions to tolerance going forward to improve overall function.    Gio is a 58 y.o. male referred to outpatient Physical Therapy with a medical diagnosis of S/P arthroscopy of right shoulder [Z98.890]. Patient presents s/p right shoulder arthroscopy acromioplasty and distal clavicle resection on 4/17/24.  Gio Is progressing well towards his goals.   Pt prognosis is Good. Pt will continue to benefit from skilled outpatient physical therapy to address the deficits listed in the problem list box on initial evaluation, provide pt/family education and to maximize pt's level of independence in the home and community environment.     Plan of care discussed with patient: Yes     Anticipated Barriers for therapy: PMHx, inactivity     Problem List:  s/p right shoulder arthroscopy acromioplasty and distal clavicle resection on 4/17/24  Decreased right shoulder mobility  UE weakness    Goals:   Short Term Goals: 4 weeks   Pt will be independent with the initial phase of their HEP in order to continue to make functional gains outside of physical therapy.  Pt's right shoulder flexion AROM will improve to 130 degrees in order for him to tolerate lifting something overhead.  Pt's right shoulder abduction AROM will improve to 100 degrees to demonstrate improved shoulder mobility for functional activities.  Pt's right shoulder external rotation AROM will improve to 50 degrees to demonstrate improved shoulder mobility for functional activities   Pt's right shoulder pain with movement will decrease to 3/10 in order for him to tolerate ADLs better.        Long Term Goals: 8 weeks   Pt will be independent with the final phase of their HEP in order to continue to make functional gains outside of physical therapy.  Pt's right shoulder flexion AROM will improve to 160 degrees in order for him to tolerate lifting something overhead.  Pt's right shoulder abduction AROM will  improve to 135 degrees to demonstrate improved shoulder mobility for functional activities.  Pt's right shoulder external rotation AROM will improve to 70 degrees to demonstrate improved shoulder mobility for functional activities   Pt's right shoulder pain with movement will decrease to 1/10 in order for him to tolerate ADLs better.       PLAN     Continue with PT plan of care and progress as tolerated.    Nii Ann, PT

## 2024-05-27 ENCOUNTER — TELEPHONE (OUTPATIENT)
Dept: PODIATRY | Facility: CLINIC | Age: 59
End: 2024-05-27
Payer: MEDICARE

## 2024-05-27 ENCOUNTER — TELEPHONE (OUTPATIENT)
Dept: PAIN MEDICINE | Facility: CLINIC | Age: 59
End: 2024-05-27
Payer: MEDICARE

## 2024-05-27 NOTE — TELEPHONE ENCOUNTER
----- Message from Argenis Stephens sent at 5/24/2024 12:12 PM CDT -----  Type:  Patient Returning Call    Who Called:pt      Who Left Message for Patient:unsure    Does the patient know what this is regarding?:yes    Would the patient rather a call back or a response via [facility]? Call back    Best Call Back Number:[phone]      Additional Information:      Please call Back to advise. Thanks!

## 2024-05-27 NOTE — TELEPHONE ENCOUNTER
----- Message from Radha Cuellar sent at 5/27/2024 11:09 AM CDT -----  Contact: self  Type:  Patient Returning Call    Who Called:  self  Who Left Message for Patient:  nurse  Does the patient know what this is regarding?:  scheduling an sooner apt for his pain mngment from the wait list  Best Call Back Number:  [phone]  Additional Information:  Please call him back to advise. Thanks!

## 2024-05-28 ENCOUNTER — TELEPHONE (OUTPATIENT)
Dept: PODIATRY | Facility: CLINIC | Age: 59
End: 2024-05-28
Payer: MEDICARE

## 2024-05-28 ENCOUNTER — CLINICAL SUPPORT (OUTPATIENT)
Dept: REHABILITATION | Facility: HOSPITAL | Age: 59
End: 2024-05-28
Payer: MEDICARE

## 2024-05-28 DIAGNOSIS — M25.611 DECREASED ROM OF RIGHT SHOULDER: Primary | ICD-10-CM

## 2024-05-28 PROCEDURE — 97110 THERAPEUTIC EXERCISES: CPT | Mod: PO,CQ

## 2024-05-28 PROCEDURE — 97112 NEUROMUSCULAR REEDUCATION: CPT | Mod: PO,CQ

## 2024-05-28 NOTE — TELEPHONE ENCOUNTER
----- Message from Lynne Barrera sent at 5/28/2024  3:25 PM CDT -----  Contact: self  Type:  Patient Returning Call    Who Called:  pt  Who Left Message for Patient:  chelsey  Does the patient know what this is regarding?:  yes  Best Call Back Number:  [phone]   Additional Information:  please call

## 2024-05-28 NOTE — PROGRESS NOTES
[facility]   Physical Therapy Note    Name: Gio Forrest  [facility] Number: [id number]    Therapy Diagnosis:   Encounter Diagnosis   Name Primary?    Decreased ROM of right shoulder Yes       Physician: Omkar Wu MD    Visit Date: 5/28/2024  Physician Orders: PT Eval and Treat   Medical Diagnosis from Referral: S/P arthroscopy of right shoulder [Z98.890]     Evaluation Date: 4/23/2024  Authorization Period Expiration: 12/31/24  Plan of Care Expiration: 7/23/24  Progress Note Due: 6/24/24  Date of Surgery: 4/17/24 s/p right shoulder arthroscopy acromioplasty and distal clavicle resection  Visit # / Visits authorized: 9/ 20 + Eval  FOTO: 1/3    PTA Visit #: 1/5     Precautions: Standard and 4/17/24 (R) shoulder arthroscopy, acromioplasty and distal clavicle, resection, Diabetes      Time In: 2:00 pm  Time Out: 3:05 pm  Total Billable Time:  30 minutes 1:1    SUBJECTIVE     Pt reports: continued min R shoulder pain levels but continues to note improved functional ROM with activities around the house.     He was compliant with home exercise program.  Response to previous treatment: no adverse effects   Functional change: too soon to determine     Pain: 3/10  Location: right shoulder      OBJECTIVE     Objective Measures updated at progress report unless specified.     AROM:  UE  Shoulder flexion : R: 135 degrees, L: 166 degrees  Shoulder abduction: R: 130 degrees, L: 147 degrees  Shoulder IR : R: 65 degrees (almost at 90 degrees of shoulder abduction when measuring), L: 63 degrees  Shoulder ER : R: 56 degrees (almost at 90 degrees of shoulder abduction when measuring), L: 74 degrees  Shoulder extension: R: 47 degrees, L: 51 degrees  Elbow flexion: WFL B  Elbow extension: WFL B     MMTs/Strength:  Elbow flexion: R: 5/5, L: 5/5  Elbow extension: R: 5/5, L: 5/5  Shoulder flexion: R: 4/5, L: 5/5  Shoulder abduction: R: 4/5, L: 4/5  Shoulder internal rotation: R: 4/5, L: 5/5  Shoulder  external rotation: R: 4/5, L: 4/5    Treatment     Gio received the treatments listed below:      therapeutic exercises to develop strength, endurance, ROM, flexibility, posture, and core stabilization for 30 minutes including:    UBE x 6 minutes (forward and retro)  Pulleys x 3 min ea flex/scaption  PROM for right shoulder flexion, abduction, IR and ER   Supine shoulder flexion AAROM 5# dowel 2 x 10  Supine shoulder abduction AAROM with 5# dowel 2 x 10 R  Supine AAROM ER with 5# dowel 3 x 10 R  Supine serratus punches 5# 20x    neuromuscular re-education activities to improve: Balance, Coordination, Proprioception, and Posture for 30 minutes. The following activities were included:  Supine PNF D2 Flexion RTB x 20  Standing rows (green TB) 3 s holds 3x10  S/L ER 3x10, 3#   S/L abduction 1# 2x10  Seated bruegger (red TB) 2x10  Standing IR/ER with Green TB 2x10 (B)  Landmine press flex/scaption/abd 5# dowel x 20 ea    Cold pack for 5 minutes:  Cold pack to right shoulder    Patient Education and Home Exercises     Home Exercises Provided and Patient Education Provided     Education provided:   - Pt was given a HEP consisting of shoulder isometrics (flexion, abduction, external rotation) and supine shoulder (flexion, abduction, external rotation) AAROM with a mago.      Written Home Exercises Provided: Patient instructed to cont prior HEP. Exercises were reviewed and Gio was able to demonstrate them prior to the end of the session.  Gio demonstrated good  understanding of the education provided. See EMR under Patient Instructions for exercises provided during therapy sessions     ASSESSMENT   Gio returned continuing to report min R shoulder pain. He continues to demonstrate improvements in shoulder AROM. Treatment continued to emphasis  shoulder AAROM and strengthening as well as periscapular strengthening. Landmine presses in flex/scaption/abd was introduced for increased functional challenge. He  tolerated treatment well without adverse effects and desired training effect achieved. Will continue to progress per pt's tolerance.    Gio is a 58 y.o. male referred to outpatient Physical Therapy with a medical diagnosis of S/P arthroscopy of right shoulder [Z98.890]. Patient presents s/p right shoulder arthroscopy acromioplasty and distal clavicle resection on 4/17/24.  Gio Is progressing well towards his goals.   Pt prognosis is Good. Pt will continue to benefit from skilled outpatient physical therapy to address the deficits listed in the problem list box on initial evaluation, provide pt/family education and to maximize pt's level of independence in the home and community environment.     Plan of care discussed with patient: Yes     Anticipated Barriers for therapy: PMHx, inactivity     Problem List:  s/p right shoulder arthroscopy acromioplasty and distal clavicle resection on 4/17/24  Decreased right shoulder mobility  UE weakness    Goals:   Short Term Goals: 4 weeks   Pt will be independent with the initial phase of their HEP in order to continue to make functional gains outside of physical therapy.  Pt's right shoulder flexion AROM will improve to 130 degrees in order for him to tolerate lifting something overhead.  Pt's right shoulder abduction AROM will improve to 100 degrees to demonstrate improved shoulder mobility for functional activities.  Pt's right shoulder external rotation AROM will improve to 50 degrees to demonstrate improved shoulder mobility for functional activities   Pt's right shoulder pain with movement will decrease to 3/10 in order for him to tolerate ADLs better.        Long Term Goals: 8 weeks   Pt will be independent with the final phase of their HEP in order to continue to make functional gains outside of physical therapy.  Pt's right shoulder flexion AROM will improve to 160 degrees in order for him to tolerate lifting something overhead.  Pt's right shoulder abduction  AROM will improve to 135 degrees to demonstrate improved shoulder mobility for functional activities.  Pt's right shoulder external rotation AROM will improve to 70 degrees to demonstrate improved shoulder mobility for functional activities   Pt's right shoulder pain with movement will decrease to 1/10 in order for him to tolerate ADLs better.       PLAN     Continue with PT plan of care and progress as tolerated.    Raj Rosa, PTA

## 2024-05-28 NOTE — TELEPHONE ENCOUNTER
----- Message from Lynne Barrera sent at 5/28/2024  3:28 PM CDT -----  Contact: self  Type: Needs Medical Advice  Who Called:  pt  Best Call Back Number: [phone]  Additional Information: I already sent a message but the phone number was his wife's so please call the above thank you

## 2024-05-31 ENCOUNTER — HOSPITAL ENCOUNTER (OUTPATIENT)
Dept: RADIOLOGY | Facility: HOSPITAL | Age: 59
Discharge: HOME | End: 2024-05-31
Attending: ORTHOPAEDIC SURGERY
Payer: MEDICARE

## 2024-05-31 ENCOUNTER — CLINICAL SUPPORT (OUTPATIENT)
Dept: REHABILITATION | Facility: HOSPITAL | Age: 59
End: 2024-05-31
Payer: MEDICARE

## 2024-05-31 DIAGNOSIS — M54.6 PAIN IN THORACIC SPINE: ICD-10-CM

## 2024-05-31 DIAGNOSIS — M25.611 DECREASED ROM OF RIGHT SHOULDER: Primary | ICD-10-CM

## 2024-05-31 PROCEDURE — 97110 THERAPEUTIC EXERCISES: CPT | Mod: PO,CQ

## 2024-05-31 PROCEDURE — 97112 NEUROMUSCULAR REEDUCATION: CPT | Mod: PO,CQ

## 2024-05-31 PROCEDURE — 72146 MRI CHEST SPINE W/O DYE: CPT | Mod: TC,PO

## 2024-05-31 PROCEDURE — 72146 MRI CHEST SPINE W/O DYE: CPT | Mod: 26,,, | Performed by: RADIOLOGY

## 2024-05-31 NOTE — PROGRESS NOTES
"[facility]   Physical Therapy Note    Name: Gio Forrest  [facility] Number: [id number]    Therapy Diagnosis:   Encounter Diagnosis   Name Primary?    Decreased ROM of right shoulder Yes       Physician: Omkar Wu MD    Visit Date: 5/31/2024  Physician Orders: PT Eval and Treat   Medical Diagnosis from Referral: S/P arthroscopy of right shoulder [Z98.890]     Evaluation Date: 4/23/2024  Authorization Period Expiration: 12/31/24  Plan of Care Expiration: 7/23/24  Progress Note Due: 6/24/24  Date of Surgery: 4/17/24 s/p right shoulder arthroscopy acromioplasty and distal clavicle resection  Visit # / Visits authorized: 10/ 20 + Eval  FOTO: 2/3    PTA Visit #: 2/5     Precautions: Standard and 4/17/24 (R) shoulder arthroscopy, acromioplasty and distal clavicle, resection, Diabetes      Time In: 0751 AM  Time Out: 0854 AM  Total Billable Time: 54 minutes    SUBJECTIVE     Pt reports: his shoulder is feeling sore "in the bone." Patient states he gets soreness and pain from time to time but overall his shoulder is feeling better. He was compliant with home exercise program.  Response to previous treatment: no adverse effects   Functional change: too soon to determine     Pain: 3/10  Location: right shoulder      OBJECTIVE     Objective Measures updated at progress report unless specified.     AROM:  UE  Shoulder flexion : R: 135 degrees, L: 166 degrees  Shoulder abduction: R: 130 degrees, L: 147 degrees  Shoulder IR : R: 65 degrees (almost at 90 degrees of shoulder abduction when measuring), L: 63 degrees  Shoulder ER : R: 56 degrees (almost at 90 degrees of shoulder abduction when measuring), L: 74 degrees  Shoulder extension: R: 47 degrees, L: 51 degrees  Elbow flexion: WFL B  Elbow extension: WFL B     MMTs/Strength:  Elbow flexion: R: 5/5, L: 5/5  Elbow extension: R: 5/5, L: 5/5  Shoulder flexion: R: 4/5, L: 5/5  Shoulder abduction: R: 4/5, L: 4/5  Shoulder internal rotation: R: " 4/5, L: 5/5  Shoulder external rotation: R: 4/5, L: 4/5    Treatment     Gio received the treatments listed below:      therapeutic exercises to develop strength, endurance, ROM, flexibility, posture, and core stabilization for 30 minutes including:    UBE x 6 minutes (forward and retro)  Pulleys x 3 min ea flex/scaption  PROM for right shoulder flexion, abduction, IR and ER   Supine shoulder flexion AAROM 5# dowel 2 x 10  Supine shoulder abduction AAROM with 5# dowel 2 x 10 R  Supine AAROM ER with 5# dowel 3 x 10 R  Supine serratus punches 5# 20x  Forward wall crawls x 10 R    neuromuscular re-education activities to improve: Balance, Coordination, Proprioception, and Posture for 30 minutes. The following activities were included:  Supine PNF D2 Flexion Red TB x 20  Standing rows (green TB) 3 s holds 3x10  S/L ER 3x10, 3#   S/L abduction 1# 2x10  Seated bruegger (red TB) 2x10  Standing IR/ER with Green TB 2x10 (B)  Landmine press flex/scaption/abd 5# dowel x 20 ea    Cold pack for 5 minutes:  Cold pack to right shoulder    Patient Education and Home Exercises     Home Exercises Provided and Patient Education Provided     Education provided:   - Pt was given a HEP consisting of shoulder isometrics (flexion, abduction, external rotation) and supine shoulder (flexion, abduction, external rotation) AAROM with a mago.      Written Home Exercises Provided: Patient instructed to cont prior HEP. Exercises were reviewed and Gio was able to demonstrate them prior to the end of the session.  Gio demonstrated good  understanding of the education provided. See EMR under Patient Instructions for exercises provided during therapy sessions     ASSESSMENT   Gio achieving training effect easily with posterior cuff focused exercises. ROM responds well to P/AA repetition as motion improves as session progresses. Continued cues for dual task scapular retraction to avoid forward shoulder positioning.     Gio is a 58  [employer] y.o. male referred to outpatient Physical Therapy with a medical diagnosis of S/P arthroscopy of right shoulder [Z98.890]. Patient presents s/p right shoulder arthroscopy acromioplasty and distal clavicle resection on 4/17/24.  Gio Is progressing well towards his goals.     Pt prognosis is Good. Pt will continue to benefit from skilled outpatient physical therapy to address the deficits listed in the problem list box on initial evaluation, provide pt/family education and to maximize pt's level of independence in the home and community environment.     Plan of care discussed with patient: Yes     Anticipated Barriers for therapy: PMHx, inactivity     Problem List:  s/p right shoulder arthroscopy acromioplasty and distal clavicle resection on 4/17/24  Decreased right shoulder mobility  UE weakness    Goals:   Short Term Goals: 4 weeks   Pt will be independent with the initial phase of their HEP in order to continue to make functional gains outside of physical therapy.  Pt's right shoulder flexion AROM will improve to 130 degrees in order for him to tolerate lifting something overhead.  Pt's right shoulder abduction AROM will improve to 100 degrees to demonstrate improved shoulder mobility for functional activities.  Pt's right shoulder external rotation AROM will improve to 50 degrees to demonstrate improved shoulder mobility for functional activities   Pt's right shoulder pain with movement will decrease to 3/10 in order for him to tolerate ADLs better.        Long Term Goals: 8 weeks   Pt will be independent with the final phase of their HEP in order to continue to make functional gains outside of physical therapy.  Pt's right shoulder flexion AROM will improve to 160 degrees in order for him to tolerate lifting something overhead.  Pt's right shoulder abduction AROM will improve to 135 degrees to demonstrate improved shoulder mobility for functional activities.  Pt's right shoulder external rotation AROM will  improve to 70 degrees to demonstrate improved shoulder mobility for functional activities   Pt's right shoulder pain with movement will decrease to 1/10 in order for him to tolerate ADLs better.       PLAN     Continue with PT plan of care and progress as tolerated.    Amber Keith, PTA

## 2024-06-03 ENCOUNTER — CLINICAL SUPPORT (OUTPATIENT)
Dept: REHABILITATION | Facility: HOSPITAL | Age: 59
End: 2024-06-03
Payer: MEDICARE

## 2024-06-03 DIAGNOSIS — M25.611 DECREASED ROM OF RIGHT SHOULDER: Primary | ICD-10-CM

## 2024-06-03 PROCEDURE — 97112 NEUROMUSCULAR REEDUCATION: CPT | Mod: PO,CQ

## 2024-06-03 PROCEDURE — 97110 THERAPEUTIC EXERCISES: CPT | Mod: PO,CQ

## 2024-06-03 NOTE — PROGRESS NOTES
"[facility]   Physical Therapy Note    Name: Gio Forrest  [facility] Number: [id number]    Therapy Diagnosis:   Encounter Diagnosis   Name Primary?    Decreased ROM of right shoulder Yes       Physician: Omkar Wu MD    Visit Date: 6/3/2024  Physician Orders: PT Eval and Treat   Medical Diagnosis from Referral: S/P arthroscopy of right shoulder [Z98.890]     Evaluation Date: 4/23/2024  Authorization Period Expiration: 12/31/24  Plan of Care Expiration: 7/23/24  Progress Note Due: 6/24/24  Date of Surgery: 4/17/24 s/p right shoulder arthroscopy acromioplasty and distal clavicle resection  Visit # / Visits authorized: 11/ 20 + Eval  FOTO: 2/3    PTA Visit #: 3/5     Precautions: Standard and 4/17/24 (R) shoulder arthroscopy, acromioplasty and distal clavicle, resection, Diabetes      Time In: 4:00 PM  Time Out: 5:00 PM  Total Billable Time: 55 minutes    SUBJECTIVE     Pt continues to note soreness "deep in the bone". He does note improvements in ROM with daily activities around the house.     He was compliant with home exercise program.  Response to previous treatment: no adverse effects   Functional change: too soon to determine     Pain: 3/10  Location: right shoulder      OBJECTIVE     Objective Measures updated at progress report unless specified.     AROM:  UE  Shoulder flexion : R: 135 degrees, L: 166 degrees  Shoulder abduction: R: 130 degrees, L: 147 degrees  Shoulder IR : R: 65 degrees (almost at 90 degrees of shoulder abduction when measuring), L: 63 degrees  Shoulder ER : R: 56 degrees (almost at 90 degrees of shoulder abduction when measuring), L: 74 degrees  Shoulder extension: R: 47 degrees, L: 51 degrees  Elbow flexion: WFL B  Elbow extension: WFL B     MMTs/Strength:  Elbow flexion: R: 5/5, L: 5/5  Elbow extension: R: 5/5, L: 5/5  Shoulder flexion: R: 4/5, L: 5/5  Shoulder abduction: R: 4/5, L: 4/5  Shoulder internal rotation: R: 4/5, L: 5/5  Shoulder external " rotation: R: 4/5, L: 4/5    Treatment     Gio received the treatments listed below:      therapeutic exercises to develop strength, endurance, ROM, flexibility, posture, and core stabilization for 25 minutes including:    UBE x 6 minutes (forward and retro)  Pulleys x 3 min ea flex/scaption  PROM for right shoulder flexion, abduction, IR and ER   Supine shoulder flexion AAROM 5# dowel 2 x 10  Supine shoulder abduction AAROM with 5# dowel 2 x 10 R  Supine AAROM ER with 5# dowel 3 x 10 R  Supine serratus punches 5# 20x  Forward wall crawls x 10 R    neuromuscular re-education activities to improve: Balance, Coordination, Proprioception, and Posture for 30 minutes. The following activities were included:  Supine PNF D2 Flexion Red TB x 20  Standing rows (green TB) 3 s holds 3x10  S/L ER 3x10, 3#   S/L abduction 1# 2x10  Seated bruegger (red TB) 2x10  Standing IR/ER with Green TB 2x10 (B)  Landmine press flex/scaption/abd 5# dowel x 20 ea  Wall clocks RTB 2 x 8    Cold pack for 5 minutes:  Cold pack to right shoulder    Patient Education and Home Exercises     Home Exercises Provided and Patient Education Provided     Education provided:   - Pt was given a HEP consisting of shoulder isometrics (flexion, abduction, external rotation) and supine shoulder (flexion, abduction, external rotation) AAROM with a mago.      Written Home Exercises Provided: Patient instructed to cont prior HEP. Exercises were reviewed and Gio was able to demonstrate them prior to the end of the session.  Gio demonstrated good  understanding of the education provided. See EMR under Patient Instructions for exercises provided during therapy sessions     ASSESSMENT   Gio returned reporting continued min R shoulder pain levels. Continuation of rotator cuff/periscapular strengthening exercises were tolerated well easily achieving  training effect. Will continue to progress per pt's tolerance.    Gio is a 58 y.o. male referred to  outpatient Physical Therapy with a medical diagnosis of S/P arthroscopy of right shoulder [Z98.890]. Patient presents s/p right shoulder arthroscopy acromioplasty and distal clavicle resection on 4/17/24.  Gio Is progressing well towards his goals.     Pt prognosis is Good. Pt will continue to benefit from skilled outpatient physical therapy to address the deficits listed in the problem list box on initial evaluation, provide pt/family education and to maximize pt's level of independence in the home and community environment.     Plan of care discussed with patient: Yes     Anticipated Barriers for therapy: PMHx, inactivity     Problem List:  s/p right shoulder arthroscopy acromioplasty and distal clavicle resection on 4/17/24  Decreased right shoulder mobility  UE weakness    Goals:   Short Term Goals: 4 weeks   Pt will be independent with the initial phase of their HEP in order to continue to make functional gains outside of physical therapy.  Pt's right shoulder flexion AROM will improve to 130 degrees in order for him to tolerate lifting something overhead.  Pt's right shoulder abduction AROM will improve to 100 degrees to demonstrate improved shoulder mobility for functional activities.  Pt's right shoulder external rotation AROM will improve to 50 degrees to demonstrate improved shoulder mobility for functional activities   Pt's right shoulder pain with movement will decrease to 3/10 in order for him to tolerate ADLs better.        Long Term Goals: 8 weeks   Pt will be independent with the final phase of their HEP in order to continue to make functional gains outside of physical therapy.  Pt's right shoulder flexion AROM will improve to 160 degrees in order for him to tolerate lifting something overhead.  Pt's right shoulder abduction AROM will improve to 135 degrees to demonstrate improved shoulder mobility for functional activities.  Pt's right shoulder external rotation AROM will improve to 70 degrees  to demonstrate improved shoulder mobility for functional activities   Pt's right shoulder pain with movement will decrease to 1/10 in order for him to tolerate ADLs better.       PLAN     Continue with PT plan of care and progress as tolerated.    Raj Rosa, PTA

## 2024-06-12 ENCOUNTER — CLINICAL SUPPORT (OUTPATIENT)
Dept: REHABILITATION | Facility: HOSPITAL | Age: 59
End: 2024-06-12
Payer: MEDICARE

## 2024-06-12 DIAGNOSIS — M25.611 DECREASED ROM OF RIGHT SHOULDER: Primary | ICD-10-CM

## 2024-06-12 PROCEDURE — 97140 MANUAL THERAPY 1/> REGIONS: CPT | Mod: KX,PO

## 2024-06-12 PROCEDURE — 97112 NEUROMUSCULAR REEDUCATION: CPT | Mod: KX,PO

## 2024-06-12 PROCEDURE — 97110 THERAPEUTIC EXERCISES: CPT | Mod: KX,PO

## 2024-06-12 NOTE — PROGRESS NOTES
[facility]   Physical Therapy Note    Name: Gio Forrest  [facility] Number: [id number]    Therapy Diagnosis:   Encounter Diagnosis   Name Primary?    Decreased ROM of right shoulder Yes       Physician: Omkar Wu MD    Visit Date: 6/12/2024  Physician Orders: PT Eval and Treat   Medical Diagnosis from Referral: S/P arthroscopy of right shoulder [Z98.890]     Evaluation Date: 4/23/2024  Authorization Period Expiration: 12/31/24  Plan of Care Expiration: 7/23/24  Progress Note Due: 6/24/24  Date of Surgery: 4/17/24 s/p right shoulder arthroscopy acromioplasty and distal clavicle resection  Visit # / Visits authorized: 12/ 20 + Eval  FOTO: 2/3    PTA Visit #: 4/5     Precautions: Standard and 4/17/24 (R) shoulder arthroscopy, acromioplasty and distal clavicle, resection, Diabetes      Time In: 0902 AM  Time Out: 1058 AM  Total Billable Time: 56 minutes    SUBJECTIVE     Pt continues to feel better as sessions continue. He states he had to miss recent visits due to being sick but would like to schedule more to continue to work on getting stronger.     He was compliant with home exercise program.  Response to previous treatment: no adverse effects   Functional change: too soon to determine     Pain: 3/10  Location: right shoulder      OBJECTIVE     Objective Measures updated at progress report unless specified.     AROM:  UE  Shoulder flexion : R: 135 degrees, L: 166 degrees  Shoulder abduction: R: 130 degrees, L: 147 degrees  Shoulder IR : R: 65 degrees (almost at 90 degrees of shoulder abduction when measuring), L: 63 degrees  Shoulder ER : R: 56 degrees (almost at 90 degrees of shoulder abduction when measuring), L: 74 degrees  Shoulder extension: R: 47 degrees, L: 51 degrees  Elbow flexion: WFL B  Elbow extension: WFL B     MMTs/Strength:  Elbow flexion: R: 5/5, L: 5/5  Elbow extension: R: 5/5, L: 5/5  Shoulder flexion: R: 4/5, L: 5/5  Shoulder abduction: R: 4/5, L: 4/5  Shoulder  internal rotation: R: 4/5, L: 5/5  Shoulder external rotation: R: 4/5, L: 4/5    Treatment     Gio received the treatments listed below:      therapeutic exercises to develop strength, endurance, ROM, flexibility, posture, and core stabilization for 21 minutes including:    UBE x 6 minutes (forward and retro)  Pulleys x 3 min ea flex/scaption  PROM for right shoulder flexion, abduction, IR and ER   Supine shoulder flexion AAROM 5# dowel 2 x 10  Supine shoulder abduction AAROM with 5# dowel 2 x 10 R  Supine AAROM ER with 5# dowel 3 x 10 R  Supine serratus punches 5# 20x  Forward wall crawls x 10 R      Manual Therapy for 10 mins to include:  Long axis distraction  Grade 3 post and inf mobs to R shoulder    neuromuscular re-education activities to improve: Balance, Coordination, Proprioception, and Posture for 25 minutes. The following activities were included:  Supine PNF D2 Flexion Red TB x 20  Standing rows (green TB) 3 s holds 3x10  S/L ER 3x10, 3#   S/L abduction 1# 2x10  Seated bruegger (green TB) 2x10  Standing IR/ER with Green TB 2x10 (B)  Landmine press flex/scaption/abd 10# dowel x 20 ea  Wall clocks GTB 2 x 8    Cold pack for 0 minutes:  Cold pack to right shoulder    Patient Education and Home Exercises     Home Exercises Provided and Patient Education Provided     Education provided:   - Pt was given a HEP consisting of shoulder isometrics (flexion, abduction, external rotation) and supine shoulder (flexion, abduction, external rotation) AAROM with a mago.      Written Home Exercises Provided: Patient instructed to cont prior HEP. Exercises were reviewed and Gio was able to demonstrate them prior to the end of the session.  Gio demonstrated good  understanding of the education provided. See EMR under Patient Instructions for exercises provided during therapy sessions     ASSESSMENT   Incorporated manual joint mobilizations followed by RTC loading within pain-free levels with great tolerance  noted. Pt also able to tolerate weight progression with overhead elevation and RTC strengthening activities, suggesting good improvement in carry over as sessions continue. Patient will continue to benefit from loading and progressions to tolerance going forward to improve overall function.    Gio is a 58 y.o. male referred to outpatient Physical Therapy with a medical diagnosis of S/P arthroscopy of right shoulder [Z98.890]. Patient presents s/p right shoulder arthroscopy acromioplasty and distal clavicle resection on 4/17/24.  Gio Is progressing well towards his goals.     Pt prognosis is Good. Pt will continue to benefit from skilled outpatient physical therapy to address the deficits listed in the problem list box on initial evaluation, provide pt/family education and to maximize pt's level of independence in the home and community environment.     Plan of care discussed with patient: Yes     Anticipated Barriers for therapy: PMHx, inactivity     Problem List:  s/p right shoulder arthroscopy acromioplasty and distal clavicle resection on 4/17/24  Decreased right shoulder mobility  UE weakness    Goals:   Short Term Goals: 4 weeks   Pt will be independent with the initial phase of their HEP in order to continue to make functional gains outside of physical therapy.  Pt's right shoulder flexion AROM will improve to 130 degrees in order for him to tolerate lifting something overhead.  Pt's right shoulder abduction AROM will improve to 100 degrees to demonstrate improved shoulder mobility for functional activities.  Pt's right shoulder external rotation AROM will improve to 50 degrees to demonstrate improved shoulder mobility for functional activities   Pt's right shoulder pain with movement will decrease to 3/10 in order for him to tolerate ADLs better.        Long Term Goals: 8 weeks   Pt will be independent with the final phase of their HEP in order to continue to make functional gains outside of physical  therapy.  Pt's right shoulder flexion AROM will improve to 160 degrees in order for him to tolerate lifting something overhead.  Pt's right shoulder abduction AROM will improve to 135 degrees to demonstrate improved shoulder mobility for functional activities.  Pt's right shoulder external rotation AROM will improve to 70 degrees to demonstrate improved shoulder mobility for functional activities   Pt's right shoulder pain with movement will decrease to 1/10 in order for him to tolerate ADLs better.       PLAN     Continue with PT plan of care and progress as tolerated.    Nii Ann, PT

## 2024-06-13 ENCOUNTER — TELEPHONE (OUTPATIENT)
Dept: PAIN MEDICINE | Facility: CLINIC | Age: 59
End: 2024-06-13

## 2024-06-13 ENCOUNTER — OFFICE VISIT (OUTPATIENT)
Dept: PAIN MEDICINE | Facility: CLINIC | Age: 59
End: 2024-06-13
Payer: MEDICARE

## 2024-06-13 ENCOUNTER — TELEPHONE (OUTPATIENT)
Dept: PAIN MEDICINE | Facility: CLINIC | Age: 59
End: 2024-06-13
Payer: MEDICARE

## 2024-06-13 ENCOUNTER — CLINICAL SUPPORT (OUTPATIENT)
Dept: REHABILITATION | Facility: HOSPITAL | Age: 59
End: 2024-06-13
Payer: MEDICARE

## 2024-06-13 VITALS
DIASTOLIC BLOOD PRESSURE: 60 MMHG | HEART RATE: 65 BPM | HEIGHT: 75 IN | SYSTOLIC BLOOD PRESSURE: 101 MMHG | WEIGHT: 234.38 LBS | BODY MASS INDEX: 29.14 KG/M2

## 2024-06-13 DIAGNOSIS — M25.611 DECREASED ROM OF RIGHT SHOULDER: Primary | ICD-10-CM

## 2024-06-13 DIAGNOSIS — E11.40 PAINFUL DIABETIC NEUROPATHY: Primary | ICD-10-CM

## 2024-06-13 DIAGNOSIS — E11.42 DIABETIC POLYNEUROPATHY ASSOCIATED WITH TYPE 2 DIABETES MELLITUS: ICD-10-CM

## 2024-06-13 DIAGNOSIS — R20.2 PARESTHESIA OF FOOT, BILATERAL: ICD-10-CM

## 2024-06-13 DIAGNOSIS — E11.42 DIABETIC PERIPHERAL NEUROPATHY: Primary | ICD-10-CM

## 2024-06-13 PROCEDURE — 3044F HG A1C LEVEL LT 7.0%: CPT | Mod: CPTII,S$GLB,,

## 2024-06-13 PROCEDURE — 97112 NEUROMUSCULAR REEDUCATION: CPT | Mod: KX,PO,CQ

## 2024-06-13 PROCEDURE — 99999 PR PBB SHADOW E&M-EST. PATIENT-LVL IV: CPT | Mod: PBBFAC,,,

## 2024-06-13 PROCEDURE — 3061F NEG MICROALBUMINURIA REV: CPT | Mod: CPTII,S$GLB,,

## 2024-06-13 PROCEDURE — 97110 THERAPEUTIC EXERCISES: CPT | Mod: KX,PO,CQ

## 2024-06-13 PROCEDURE — 3078F DIAST BP <80 MM HG: CPT | Mod: CPTII,S$GLB,,

## 2024-06-13 PROCEDURE — 97140 MANUAL THERAPY 1/> REGIONS: CPT | Mod: KX,PO,CQ

## 2024-06-13 PROCEDURE — 1159F MED LIST DOCD IN RCRD: CPT | Mod: CPTII,S$GLB,,

## 2024-06-13 PROCEDURE — 3008F BODY MASS INDEX DOCD: CPT | Mod: CPTII,S$GLB,,

## 2024-06-13 PROCEDURE — 3074F SYST BP LT 130 MM HG: CPT | Mod: CPTII,S$GLB,,

## 2024-06-13 PROCEDURE — 99203 OFFICE O/P NEW LOW 30 MIN: CPT | Mod: S$GLB,,,

## 2024-06-13 PROCEDURE — 3066F NEPHROPATHY DOC TX: CPT | Mod: CPTII,S$GLB,,

## 2024-06-13 NOTE — PROGRESS NOTES
[facility]   Physical Therapy Note    Name: Gio Forrest  [facility] Number: [id number]    Therapy Diagnosis:   Encounter Diagnosis   Name Primary?    Decreased ROM of right shoulder Yes       Physician: Omkar Wu MD    Visit Date: 6/13/2024  Physician Orders: PT Eval and Treat   Medical Diagnosis from Referral: S/P arthroscopy of right shoulder [Z98.890]     Evaluation Date: 4/23/2024  Authorization Period Expiration: 12/31/24  Plan of Care Expiration: 7/23/24  Progress Note Due: 6/24/24  Date of Surgery: 4/17/24 s/p right shoulder arthroscopy acromioplasty and distal clavicle resection  Visit # / Visits authorized: 13/ 20 + Eval  FOTO: 2/3    PTA Visit #: 4/5     Precautions: Standard and 4/17/24 (R) shoulder arthroscopy, acromioplasty and distal clavicle, resection, Diabetes      Time In: 4:00 PM  Time Out: 5:00 PM  Total Billable Time: 60 minutes    SUBJECTIVE     Pt states that his shld continues to feel better as sessions continue.      He was compliant with home exercise program.  Response to previous treatment: no adverse effects   Functional change: too soon to determine     Pain: 3/10  Location: right shoulder      OBJECTIVE     Objective Measures updated at progress report unless specified.     AROM:  UE  Shoulder flexion : R: 135 degrees, L: 166 degrees  Shoulder abduction: R: 130 degrees, L: 147 degrees  Shoulder IR : R: 65 degrees (almost at 90 degrees of shoulder abduction when measuring), L: 63 degrees  Shoulder ER : R: 56 degrees (almost at 90 degrees of shoulder abduction when measuring), L: 74 degrees  Shoulder extension: R: 47 degrees, L: 51 degrees  Elbow flexion: WFL B  Elbow extension: WFL B     MMTs/Strength:  Elbow flexion: R: 5/5, L: 5/5  Elbow extension: R: 5/5, L: 5/5  Shoulder flexion: R: 4/5, L: 5/5  Shoulder abduction: R: 4/5, L: 4/5  Shoulder internal rotation: R: 4/5, L: 5/5  Shoulder external rotation: R: 4/5, L: 4/5    Treatment     Gio  received the treatments listed below:      therapeutic exercises to develop strength, endurance, ROM, flexibility, posture, and core stabilization for 21 minutes including:    UBE x 6 minutes (forward and retro)  Pulleys x 3 min ea flex/scaption  PROM for right shoulder flexion, abduction, IR and ER   Supine shoulder flexion AAROM 5# dowel 2 x 10  Supine shoulder abduction AAROM with 5# dowel 2 x 10 R  Supine AAROM ER with 5# dowel 3 x 10 R  Supine serratus punches 5# 20x  Forward wall crawls x 10 R      Manual Therapy for 10 mins to include:  Long axis distraction  Grade 3 post and inf mobs to R shoulder    neuromuscular re-education activities to improve: Balance, Coordination, Proprioception, and Posture for 25 minutes. The following activities were included:  Supine PNF D2 Flexion Red TB x 20  Standing rows (green TB) 3 s holds 3x10  S/L ER 3x10, 3#   S/L abduction 1# 2x10  Seated bruegger (green TB) 2x10  Standing IR/ER with Green TB 2x10 (B)  Landmine press flex/scaption/abd 10# dowel x 20 ea  Wall clocks GTB 2 x 8    Cold pack for 0 minutes:  Cold pack to right shoulder    Patient Education and Home Exercises     Home Exercises Provided and Patient Education Provided     Education provided:   - Pt was given a HEP consisting of shoulder isometrics (flexion, abduction, external rotation) and supine shoulder (flexion, abduction, external rotation) AAROM with a mago.      Written Home Exercises Provided: Patient instructed to cont prior HEP. Exercises were reviewed and Gio was able to demonstrate them prior to the end of the session.  Gio demonstrated good  understanding of the education provided. See EMR under Patient Instructions for exercises provided during therapy sessions     ASSESSMENT   Gio abeba today's tx with ther ex neuromuscular re-ed and manual intervention well. He was not progressed on this date due to having attended therapy yesterday. He was able to perform all activities w/o difficulty  or complaint. He did exhibit some mm fatigue with exs requiring brief rest breaks . Patient will continue to benefit from loading and progressions to tolerance going forward to improve overall function.    Gio is a 58 y.o. male referred to outpatient Physical Therapy with a medical diagnosis of S/P arthroscopy of right shoulder [Z98.890]. Patient presents s/p right shoulder arthroscopy acromioplasty and distal clavicle resection on 4/17/24.  Gio Is progressing well towards his goals.     Pt prognosis is Good. Pt will continue to benefit from skilled outpatient physical therapy to address the deficits listed in the problem list box on initial evaluation, provide pt/family education and to maximize pt's level of independence in the home and community environment.     Plan of care discussed with patient: Yes     Anticipated Barriers for therapy: PMHx, inactivity     Problem List:  s/p right shoulder arthroscopy acromioplasty and distal clavicle resection on 4/17/24  Decreased right shoulder mobility  UE weakness    Goals:   Short Term Goals: 4 weeks   Pt will be independent with the initial phase of their HEP in order to continue to make functional gains outside of physical therapy.  Pt's right shoulder flexion AROM will improve to 130 degrees in order for him to tolerate lifting something overhead.  Pt's right shoulder abduction AROM will improve to 100 degrees to demonstrate improved shoulder mobility for functional activities.  Pt's right shoulder external rotation AROM will improve to 50 degrees to demonstrate improved shoulder mobility for functional activities   Pt's right shoulder pain with movement will decrease to 3/10 in order for him to tolerate ADLs better.        Long Term Goals: 8 weeks   Pt will be independent with the final phase of their HEP in order to continue to make functional gains outside of physical therapy.  Pt's right shoulder flexion AROM will improve to 160 degrees in order for him  to tolerate lifting something overhead.  Pt's right shoulder abduction AROM will improve to 135 degrees to demonstrate improved shoulder mobility for functional activities.  Pt's right shoulder external rotation AROM will improve to 70 degrees to demonstrate improved shoulder mobility for functional activities   Pt's right shoulder pain with movement will decrease to 1/10 in order for him to tolerate ADLs better.       PLAN     Continue with PT plan of care and progress as tolerated.    Amador Curran, PTA

## 2024-06-13 NOTE — PROGRESS NOTES
[facility] New Patient Evaluation      Referred by: Dr. Rj Nuñez    PCP:     CC:   Chief Complaint   Patient presents with    Low-back Pain    Foot Pain          6/13/2024     2:44 PM 8/3/2022     1:04 PM 5/25/2022    10:06 AM   Last 3 PDI Scores   Pain Disability Index (PDI) 29 [phone] 40         HPI:   Gio Forrest is a 58 y.o. male patient who has a past medical history of Addiction, Arthritis, Back pain, Depression, HENDERSON (dyspnea on exertion), Dyslipidemia, Excessive daytime sleepiness, GERD (gastroesophageal reflux disease), Gout, Hypersomnia with sleep apnea, Hypertension, Idiopathic gout, Lumbar pseudoarthrosis, Neck pain, Neuropathy, Obesity, Obstructive sleep apnea, Restless leg syndrome, Sebaceous cyst, SVT (supraventricular tachycardia), and Type 2 diabetes mellitus. He presents with midback pain, low back pain, leg pain in bilateral foot pain.  He has a history of type 2 diabetes and reports painful diabetic neuropathy in his feet.  This keeps him from sleeping at night.  Describes his pain as burning, sharp, stabbing, aching.  He takes gabapentin without significant relief of his pain.      Pain Intervention History:      Past Spine Surgical History:      Past and current medications:  Antineuropathics:  NSAIDs:  Physical therapy:  Antidepressants:  Muscle relaxers:  Opioids:  Antiplatelets/Anticoagulants:    History:    Current Outpatient Medications:     atorvastatin (LIPITOR) 40 MG tablet, TAKE ONE TABLET BY MOUTH ONCE DAILY IN THE EVENING, Disp: 90 tablet, Rfl: 4    blood-glucose meter,continuous (DEXCOM G7 ) Misc, Dispense 1, Disp: 1 each, Rfl: 0    calcium-vitamin D3 (OS-SOHAM 500 + D3) 500 mg-5 mcg (200 unit) per tablet, Take 1 tablet by mouth 2 (two) times daily with meals., Disp: , Rfl:     DEXCOM G7 SENSOR [place], CHANGE EVERY 10 DAYS, Disp: 9 each, Rfl: 4    docusate sodium (COLACE) 100 MG capsule, Take 100 mg by mouth 2 (two) times daily., Disp: , Rfl:     empagliflozin  (JARDIANCE) 25 mg tablet, Take 1 tablet (25 mg total) by mouth once daily., Disp: 90 tablet, Rfl: 3    fluorouraciL (EFUDEX) 5 % cream, AAA dorsal hands BID x 4 weeks as tolerated., Disp: 40 g, Rfl: 1    gabapentin (NEURONTIN) 600 MG tablet, TAKE ONE TABLET BY MOUTH THREE TIMES DAILY, Disp: 90 tablet, Rfl: 2    glimepiride (AMARYL) 1 MG tablet, TAKE ONE TABLET BY MOUTH DAILY BEFORE BREAKFAST, Disp: 90 tablet, Rfl: 3    hydrocortisone 2.5 % cream, Apply topically 2 (two) times daily as needed (rash around nose). Mix 50/50 with ketoconazole cream., Disp: 20 g, Rfl: 3    ketoconazole (NIZORAL) 2 % cream, Apply topically 2 (two) times daily as needed (rash around nose). Mix 50/50 with hydrocortisone cream. OK to use daily by itself for prevention., Disp: 60 g, Rfl: 3    metFORMIN (GLUCOPHAGE-XR) 500 MG ER 24hr tablet, Take 2 tablet twice a day, Disp: 360 tablet, Rfl: 3    multivitamin capsule, Take 1 capsule by mouth once daily., Disp: , Rfl:     vitamin D (VITAMIN D3) 1000 units Tab, Take 1,000 Units by mouth once daily., Disp: , Rfl:     blood-glucose meter kit, To check BG 2 times daily, to use with insurance preferred meter, Disp: 1 each, Rfl: 1    pantoprazole (PROTONIX) 40 MG tablet, Take 1 tablet (40 mg total) by mouth once daily., Disp: 30 tablet, Rfl: 2    Past Medical History:   Diagnosis Date    Addiction 12/21/2021    Arthritis     Back pain     radiates to both legs but more on rt leg    Depression     HENDERSON (dyspnea on exertion)     Dyslipidemia 08/12/2015    Excessive daytime sleepiness 03/02/2018    Formatting of this note might be different from the original.  Added automatically from request for surgery [id number]    GERD (gastroesophageal reflux disease)     Gout 08/12/2015    Hypersomnia with sleep apnea 05/14/2022    Hypertension     Idiopathic gout     Lumbar pseudoarthrosis     Neck pain     nonradiating pain    Neuropathy     Obesity 08/12/2015    Obstructive sleep apnea 08/12/2015    cpap    Restless  leg syndrome     Sebaceous cyst 04/18/2017    SVT (supraventricular tachycardia)     s/p ablation    Type 2 diabetes mellitus 08/12/2015       Past Surgical History:   Procedure Laterality Date    ABLATION N/A 10/08/2020    Procedure: Ablation;  Surgeon: Rip Che III, MD;  Location: [facility] CATH;  Service: Cardiology;  Laterality: N/A;    ARTHROPLASTY OF JOINT OF FINGER Right 04/12/2022    Procedure: Right middle finger MCP joint arthroplasty;  Surgeon: Luis Alberto Payne MD;  Location: [facility] OR;  Service: Orthopedics;  Laterality: Right;  Anesthesia:  General with a single-shot supraclavicular block    BACK SURGERY      multiple    CARDIAC CATHETERIZATION      CARDIAC ELECTROPHYSIOLOGY STUDY  10/08/2020    Procedure: Study possible ablation;  Surgeon: Rip Che III, MD;  Location: [facility] CATH;  Service: Cardiology;;    CERVICAL SPINE SURGERY      CHOLECYSTECTOMY  05/30/2018    Dr. ROGELIO Tao, [facility]     COLONOSCOPY  2008    COLONOSCOPY N/A 09/14/2017    Procedure: COLONOSCOPY;  Surgeon: Obi Beltre MD;  Location: [facility] ENDO;  Service: Endoscopy;  Laterality: N/A;    CORONARY ANGIOGRAPHY Left 05/28/2018    Procedure: Coronary angiography;  Surgeon: Rafiq Malik MD;  Location: [facility] CATH;  Service: Cardiology;  Laterality: Left;    ELBOW SURGERY Bilateral     EPIDURAL STEROID INJECTION INTO CERVICAL SPINE      EPIDURAL STEROID INJECTION INTO LUMBAR SPINE      HERNIA REPAIR      LAPAROSCOPIC APPENDECTOMY N/A 06/25/2020    Procedure: APPENDECTOMY, LAPAROSCOPIC;  Surgeon: Gordon Can MD;  Location: [facility] OR;  Service: General;  Laterality: N/A;    LAPAROSCOPIC CHOLECYSTECTOMY N/A 05/30/2018    Procedure: CHOLECYSTECTOMY, LAPAROSCOPIC;  Surgeon: Fletcher Tao MD;  Location: [facility] OR;  Service: General;  Laterality: N/A;    LAPAROSCOPIC SLEEVE GASTRECTOMY N/A 01/20/2023    Procedure: GASTRECTOMY, SLEEVE, LAPAROSCOPIC;  Surgeon: Bharat Devine MD;  Location: [facility] OR;  Service: General;  Laterality:  N/A;    LEFT HEART CATHETERIZATION Left 2018    Procedure: Left heart cath;  Surgeon: Rafiq Malik MD;  Location: [facility] CATH;  Service: Cardiology;  Laterality: Left;    NECK SURGERY      RADIOFREQUENCY ABLATION OF LUMBAR MEDIAL BRANCH NERVE AT SINGLE LEVEL Bilateral 2022    Procedure: Radiofrequency Ablation, Nerve, Spinal, Lumbar, Medial Branch, 1 Level L3,4,5;  Surgeon: Thiago Garcia MD;  Location: [facility] OR;  Service: Pain Management;  Laterality: Bilateral;    SHOULDER ARTHROSCOPY      SPINE SURGERY      multiple    TONSILLECTOMY      TREATMENT OF CARDIAC ARRHYTHMIA  10/08/2020    Procedure: Cardioversion or Defibrillation;  Surgeon: Rip Che III, MD;  Location: [facility] CATH;  Service: Cardiology;;    TRIGGER FINGER RELEASE Right 2021    Procedure: RELEASE, TRIGGER FINGER;  Surgeon: Luis Alberto Payne MD;  Location: [facility] OR;  Service: Orthopedics;  Laterality: Right;  Procedure: Right ring finger trigger release    Position: Supine    Anesthesia: Local    Equipment: Basic handset    TYMPANOSTOMY TUBE PLACEMENT Right        Family History   Problem Relation Name Age of Onset    Diabetes Mother      Depression Mother      Liver cancer Mother      Obesity Mother      Heart disease Father      Anuerysm Father      Diabetes Father      Stroke Father      Glaucoma Paternal Grandmother      Obesity Sister         Social History     Socioeconomic History    Marital status:     Number of children: 1   Tobacco Use    Smoking status: Former     Current packs/day: 0.00     Average packs/day: 0.5 packs/day for 10.0 years (5.0 ttl pk-yrs)     Types: Cigarettes     Start date:      Quit date:      Years since quittin.4    Smokeless tobacco: Never   Substance and Sexual Activity    Alcohol use: Not Currently    Drug use: No    Sexual activity: Yes     Partners: Female   Social History Narrative              Social Determinants of Health     Financial Resource Strain:  "Medium Risk (1/24/2022)    Overall Financial Resource Strain (CARDIA)     Difficulty of Paying Living Expenses: Somewhat hard   Food Insecurity: Food Insecurity Present (1/24/2022)    Hunger Vital Sign     Worried About Running Out of Food in the Last Year: Often true     Ran Out of Food in the Last Year: Often true   Transportation Needs: Unmet Transportation Needs (1/24/2022)    PRAPARE - Transportation     Lack of Transportation (Medical): Yes     Lack of Transportation (Non-Medical): Yes   Physical Activity: Insufficiently Active (1/24/2022)    Exercise Vital Sign     Days of Exercise per Week: 4 days     Minutes of Exercise per Session: 10 min   Stress: Stress Concern Present (1/24/2022)    [employer] [place] [employer] - Occupational Stress Questionnaire     Feeling of Stress : To some extent       Review of patient's allergies indicates:  No Known Allergies    Review of Systems:  Positive for midback pain, low back pain, numbness and tingling in his feet.  Balance of review of systems is negative.    Physical Exam:  Vitals:    06/13/24 1447   BP: 101/60   Pulse: 65   Weight: 106.3 kg (234 lb 5.6 oz)   Height: 6' 2.5" (1.892 m)   PainSc:   4   PainLoc: Foot     Body mass index is 29.69 kg/m².    Gen: NAD  Psych: mood appropriate for given condition  HEENT: eyes anicteric   CV: RRR  HEENT: anicteric   Respiratory: non-labored, no signs of respiratory distress  Abd: non-distended  Skin: warm, dry and intact.  Gait: No antalgic gait.       Foot exam:  No open wounds or sores on bilateral feet      Labs:  Lab Results   Component Value Date    HGBA1C 6.3 (H) 02/26/2024       Lab Results   Component Value Date    WBC 6.60 03/19/2024    HGB 15.0 03/19/2024    HCT 46.5 03/19/2024    MCV 93 03/19/2024     03/19/2024           Imaging:  N/a    Assessment:   Problem List Items Addressed This Visit    None  Visit Diagnoses       Diabetic polyneuropathy associated with type 2 diabetes mellitus        " Paresthesia of foot, bilateral                  Gio Forrest is a 58 y.o. male patient who has a past medical history of Addiction, Arthritis, Back pain, Depression, HENDERSON (dyspnea on exertion), Dyslipidemia, Excessive daytime sleepiness, GERD (gastroesophageal reflux disease), Gout, Hypersomnia with sleep apnea, Hypertension, Idiopathic gout, Lumbar pseudoarthrosis, Neck pain, Neuropathy, Obesity, Obstructive sleep apnea, Restless leg syndrome, Sebaceous cyst, SVT (supraventricular tachycardia), and Type 2 diabetes mellitus. He presents with midback pain, low back pain, leg pain in bilateral foot pain.  He has a history of type 2 diabetes and reports painful diabetic neuropathy in his feet.  This keeps him from sleeping at night.  Describes his pain as burning, sharp, stabbing, aching.  He takes gabapentin without significant relief of his pain.    - bilateral foot exam shows no open sores or wounds.  - he likely is experiencing painful diabetic neuropathy in his feet.  This pain is limiting his mobility interfering with his ADLs and quality of life.  He has not finding significant relief with gabapentin.  - he would likely benefit from [employer] application.  - we will submit for approval and get him scheduled for this.      : Not applicable      This note was completed with dictation software and grammatical errors may exist.

## 2024-06-14 ENCOUNTER — HOSPITAL ENCOUNTER (OUTPATIENT)
Dept: RADIOLOGY | Facility: HOSPITAL | Age: 59
Discharge: HOME | End: 2024-06-14
Attending: ORTHOPAEDIC SURGERY
Payer: MEDICARE

## 2024-06-14 DIAGNOSIS — M25.552 LEFT HIP PAIN: ICD-10-CM

## 2024-06-14 PROCEDURE — 73721 MRI JNT OF LWR EXTRE W/O DYE: CPT | Mod: TC,PO,LT

## 2024-06-14 PROCEDURE — 73721 MRI JNT OF LWR EXTRE W/O DYE: CPT | Mod: 26,LT,, | Performed by: RADIOLOGY

## 2024-06-18 DIAGNOSIS — M70.62 GREATER TROCHANTERIC BURSITIS OF BOTH HIPS: ICD-10-CM

## 2024-06-18 DIAGNOSIS — M70.61 GREATER TROCHANTERIC BURSITIS OF BOTH HIPS: ICD-10-CM

## 2024-06-18 DIAGNOSIS — K21.9 GASTROESOPHAGEAL REFLUX DISEASE, UNSPECIFIED WHETHER ESOPHAGITIS PRESENT: ICD-10-CM

## 2024-06-18 RX ORDER — METHOCARBAMOL 750 MG/1
1500 TABLET, FILM COATED ORAL 3 TIMES DAILY PRN
Qty: 80 TABLET | Refills: 2 | Status: SHIPPED | OUTPATIENT
Start: 2024-06-18 | End: 2024-07-28

## 2024-06-19 RX ORDER — PANTOPRAZOLE SODIUM 40 MG/1
40 TABLET, DELAYED RELEASE ORAL DAILY
Qty: 30 TABLET | Refills: 2 | Status: SHIPPED | OUTPATIENT
Start: 2024-06-19 | End: 2024-09-17

## 2024-06-21 ENCOUNTER — CLINICAL SUPPORT (OUTPATIENT)
Dept: REHABILITATION | Facility: HOSPITAL | Age: 59
End: 2024-06-21
Payer: MEDICARE

## 2024-06-21 DIAGNOSIS — M25.611 DECREASED ROM OF RIGHT SHOULDER: Primary | ICD-10-CM

## 2024-06-21 PROCEDURE — 97110 THERAPEUTIC EXERCISES: CPT | Mod: KX,PO,CQ

## 2024-06-21 PROCEDURE — 97140 MANUAL THERAPY 1/> REGIONS: CPT | Mod: KX,PO,CQ

## 2024-06-21 PROCEDURE — 97112 NEUROMUSCULAR REEDUCATION: CPT | Mod: PO,CQ

## 2024-06-21 NOTE — PROGRESS NOTES
[facility]   Physical Therapy Note    Name: Gio Forrest  [facility] Number: [id number]    Therapy Diagnosis:   Encounter Diagnosis   Name Primary?    Decreased ROM of right shoulder Yes       Physician: Omkar Wu MD    Visit Date: 6/21/2024  Physician Orders: PT Eval and Treat   Medical Diagnosis from Referral: S/P arthroscopy of right shoulder [Z98.890]     Evaluation Date: 4/23/2024  Authorization Period Expiration: 12/31/24  Plan of Care Expiration: 7/23/24  Progress Note Due: 6/24/24  Date of Surgery: 4/17/24 s/p right shoulder arthroscopy acromioplasty and distal clavicle resection  Visit # / Visits authorized: 14/ 20 + Eval  FOTO: 2/3    PTA Visit #: 4/5     Precautions: Standard and 4/17/24 (R) shoulder arthroscopy, acromioplasty and distal clavicle, resection, Diabetes      Time In: 3:09 PM  Time Out: 4:13 PM  Total Billable Time: 64 minutes    SUBJECTIVE     Pt states that his shld that his shld is feeling good, just some soreness today. .      He was compliant with home exercise program.  Response to previous treatment: no adverse effects   Functional change: too soon to determine     Pain: 3/10  Location: right shoulder      OBJECTIVE     Objective Measures updated at progress report unless specified.     AROM:  UE  Shoulder flexion : R: 135 degrees, L: 166 degrees  Shoulder abduction: R: 130 degrees, L: 147 degrees  Shoulder IR : R: 65 degrees (almost at 90 degrees of shoulder abduction when measuring), L: 63 degrees  Shoulder ER : R: 56 degrees (almost at 90 degrees of shoulder abduction when measuring), L: 74 degrees  Shoulder extension: R: 47 degrees, L: 51 degrees  Elbow flexion: WFL B  Elbow extension: WFL B     MMTs/Strength:  Elbow flexion: R: 5/5, L: 5/5  Elbow extension: R: 5/5, L: 5/5  Shoulder flexion: R: 4/5, L: 5/5  Shoulder abduction: R: 4/5, L: 4/5  Shoulder internal rotation: R: 4/5, L: 5/5  Shoulder external rotation: R: 4/5, L: 4/5    Treatment      Gio received the treatments listed below:      therapeutic exercises to develop strength, endurance, ROM, flexibility, posture, and core stabilization for 21 minutes including:    UBE x 6 minutes (forward and retro)  Pulleys x 3 min ea flex/scaption  PROM for right shoulder flexion, abduction, IR and ER   Supine shoulder flexion AAROM 5# dowel 2 x 10  Supine shoulder abduction AAROM with 5# dowel 2 x 10 R  Supine AAROM ER with 5# dowel 3 x 10 R  Supine serratus punches 5# 20x  Forward wall crawls x 10 R      Manual Therapy for 10 mins to include:  Long axis distraction  Grade 3 post and inf mobs to R shoulder    neuromuscular re-education activities to improve: Balance, Coordination, Proprioception, and Posture for 25 minutes. The following activities were included:  Supine PNF D2 Flexion green TB x 20  Standing rows (green TB) 3 s holds 3x10  S/L ER 3x10, 3#   S/L abduction 1# 2x10  Seated bruegger (green TB) 2x10  Standing IR/ER with Green TB 2x10 (B)  Landmine press flex/scaption/abd 10# dowel x 20 ea  Wall clocks GTB 2 x 8    Cold pack for 0 minutes:  Cold pack to right shoulder    Patient Education and Home Exercises     Home Exercises Provided and Patient Education Provided     Education provided:   - Pt was given a HEP consisting of shoulder isometrics (flexion, abduction, external rotation) and supine shoulder (flexion, abduction, external rotation) AAROM with a mago.      Written Home Exercises Provided: Patient instructed to cont prior HEP. Exercises were reviewed and Gio was able to demonstrate them prior to the end of the session.  Gio demonstrated good  understanding of the education provided. See EMR under Patient Instructions for exercises provided during therapy sessions     ASSESSMENT   Gio abeba today's tx with progression of ther ex neuromuscular re-ed and manual intervention well. He was able to progress with resistance with strengthening and neuromuscular ex today w/o difficulty  or complaint. He did exhibit some mm fatigue with scap stab exs requiring brief rest breaks.He is demonstrating improved abeba with increasing ROM with passive stretching . Patient will continue to benefit from loading and progressions to tolerance going forward to improve overall function.    Gio is a 58 y.o. male referred to outpatient Physical Therapy with a medical diagnosis of S/P arthroscopy of right shoulder [Z98.890]. Patient presents s/p right shoulder arthroscopy acromioplasty and distal clavicle resection on 4/17/24.  Gio Is progressing well towards his goals.     Pt prognosis is Good. Pt will continue to benefit from skilled outpatient physical therapy to address the deficits listed in the problem list box on initial evaluation, provide pt/family education and to maximize pt's level of independence in the home and community environment.     Plan of care discussed with patient: Yes     Anticipated Barriers for therapy: PMHx, inactivity     Problem List:  s/p right shoulder arthroscopy acromioplasty and distal clavicle resection on 4/17/24  Decreased right shoulder mobility  UE weakness    Goals:   Short Term Goals: 4 weeks   Pt will be independent with the initial phase of their HEP in order to continue to make functional gains outside of physical therapy.  Pt's right shoulder flexion AROM will improve to 130 degrees in order for him to tolerate lifting something overhead.  Pt's right shoulder abduction AROM will improve to 100 degrees to demonstrate improved shoulder mobility for functional activities.  Pt's right shoulder external rotation AROM will improve to 50 degrees to demonstrate improved shoulder mobility for functional activities   Pt's right shoulder pain with movement will decrease to 3/10 in order for him to tolerate ADLs better.        Long Term Goals: 8 weeks   Pt will be independent with the final phase of their HEP in order to continue to make functional gains outside of physical  therapy.  Pt's right shoulder flexion AROM will improve to 160 degrees in order for him to tolerate lifting something overhead.  Pt's right shoulder abduction AROM will improve to 135 degrees to demonstrate improved shoulder mobility for functional activities.  Pt's right shoulder external rotation AROM will improve to 70 degrees to demonstrate improved shoulder mobility for functional activities   Pt's right shoulder pain with movement will decrease to 1/10 in order for him to tolerate ADLs better.       PLAN     Continue with PT plan of care and progress as tolerated.    Amador Curran, PTA

## 2024-06-25 ENCOUNTER — CLINICAL SUPPORT (OUTPATIENT)
Dept: REHABILITATION | Facility: HOSPITAL | Age: 59
End: 2024-06-25
Payer: MEDICARE

## 2024-06-25 DIAGNOSIS — M25.611 DECREASED ROM OF RIGHT SHOULDER: Primary | ICD-10-CM

## 2024-06-25 PROCEDURE — 97112 NEUROMUSCULAR REEDUCATION: CPT | Mod: PO

## 2024-06-25 PROCEDURE — 97110 THERAPEUTIC EXERCISES: CPT | Mod: PO

## 2024-06-25 NOTE — PROGRESS NOTES
[facility]   Physical Therapy Re-assessment    Name: Gio Forrest  [facility] Number: [id number]    Therapy Diagnosis:   Encounter Diagnosis   Name Primary?    Decreased ROM of right shoulder Yes       Physician: Omkar Wu MD    Visit Date: 6/25/2024  Physician Orders: PT Eval and Treat   Medical Diagnosis from Referral: S/P arthroscopy of right shoulder [Z98.890]     Evaluation Date: 4/23/2024  Authorization Period Expiration: 12/31/24  Plan of Care Expiration: 7/23/24  Progress Note Due: 7/23/24  Date of Surgery: 4/17/24 s/p right shoulder arthroscopy acromioplasty and distal clavicle resection  Visit # / Visits authorized: 15/ 20 + Eval  FOTO: 2/3    PTA Visit #: 4/5     Precautions: Standard and 4/17/24 (R) shoulder arthroscopy, acromioplasty and distal clavicle, resection, Diabetes      Time In: 1400 PM  Time Out: 1500 PM  Total Billable Time: 60 minutes    SUBJECTIVE     Pt states that his shld that his shoulder is more sore today than last session.      He was compliant with home exercise program.  Response to previous treatment: no adverse effects   Functional change: too soon to determine     Pain: 3/10  Location: right shoulder      OBJECTIVE     Objective Measures updated at progress report unless specified.     AROM:  UE  Shoulder flexion : R: 150 degrees, L: 166 degrees  Shoulder abduction: R: 150 degrees, L: 147 degrees  Shoulder IR : R: 65 degrees (almost at 90 degrees of shoulder abduction when measuring), L: 63 degrees  Shoulder ER : R: 64 degrees (almost at 90 degrees of shoulder abduction when measuring), L: 74 degrees  Elbow flexion: WFL B  Elbow extension: WFL B     MMTs/Strength:  Elbow flexion: R: 5/5, L: 5/5  Elbow extension: R: 5/5, L: 5/5  Shoulder flexion: R: 4+/5, L: 5/5  Shoulder abduction: R: 4/5, L: 4/5  Shoulder internal rotation: R: 4/5, L: 5/5  Shoulder external rotation: R: 4/5, L: 4/5    Treatment     Gio received the treatments listed  below:      therapeutic exercises to develop strength, endurance, ROM, flexibility, posture, and core stabilization for 33 minutes including:    UBE x 6 minutes (forward and retro)  Pulleys x 3 min ea flex/scaption  PROM for right shoulder flexion, abduction, IR and ER   Supine shoulder flexion AAROM 5# dowel 2 x 10  Supine shoulder abduction AAROM with 5# dowel 2 x 10 R  Supine AAROM ER with 5# dowel 3 x 10 R  Supine serratus punches 5# 20x  Forward wall crawls x 10 R-NP  PRECOR rows 50# 3x10    Manual Therapy for 0 mins to include:  Long axis distraction  Grade 3 post and inf mobs to R shoulder    neuromuscular re-education activities to improve: Balance, Coordination, Proprioception, and Posture for 27 minutes. The following activities were included:  Supine PNF D2 Flexion green TB x 20  Standing rows (green TB) 3 s holds 3x10  S/L ER 3x10, 3#   S/L abduction 1# 2x10  Seated bruegger (green TB) 2x10  Standing IR/ER with Green TB 2x10 (B)  Landmine press flex/scaption/abd 10# dowel x 20 ea  Wall clocks GTB 2 x 8    Cold pack for 0 minutes:  Cold pack to right shoulder    Patient Education and Home Exercises     Home Exercises Provided and Patient Education Provided     Education provided:   - Pt was given a HEP consisting of shoulder isometrics (flexion, abduction, external rotation) and supine shoulder (flexion, abduction, external rotation) AAROM with a mago.      Written Home Exercises Provided: Patient instructed to cont prior HEP. Exercises were reviewed and Gio was able to demonstrate them prior to the end of the session.  Gio demonstrated good  understanding of the education provided. See EMR under Patient Instructions for exercises provided during therapy sessions     ASSESSMENT   Re-assessment performed today. Pt with mild improvement in range of motion and strength as a result of current POC. Despite these improvements, pt continues to demo deficits in each of these areas that limits overall  functional mobility and performance of everyday activities. Pt was progressed to machine based periscapular loading with good performance and no adverse reactions. Patient will continue to benefit from loading and progressions to tolerance going forward to improve overall function.    Gio is a 58 y.o. male referred to outpatient Physical Therapy with a medical diagnosis of S/P arthroscopy of right shoulder [Z98.890]. Patient presents s/p right shoulder arthroscopy acromioplasty and distal clavicle resection on 4/17/24.  Gio Is progressing well towards his goals.     Pt prognosis is Good. Pt will continue to benefit from skilled outpatient physical therapy to address the deficits listed in the problem list box on initial evaluation, provide pt/family education and to maximize pt's level of independence in the home and community environment.     Plan of care discussed with patient: Yes     Anticipated Barriers for therapy: PMHx, inactivity     Problem List:  s/p right shoulder arthroscopy acromioplasty and distal clavicle resection on 4/17/24  Decreased right shoulder mobility  UE weakness    Goals:   Short Term Goals: 4 weeks   Pt will be independent with the initial phase of their HEP in order to continue to make functional gains outside of physical therapy.  Pt's right shoulder flexion AROM will improve to 130 degrees in order for him to tolerate lifting something overhead.MET  Pt's right shoulder abduction AROM will improve to 100 degrees to demonstrate improved shoulder mobility for functional activities.MET  Pt's right shoulder external rotation AROM will improve to 50 degrees to demonstrate improved shoulder mobility for functional activities   Pt's right shoulder pain with movement will decrease to 3/10 in order for him to tolerate ADLs better.        Long Term Goals: 8 weeks   Pt will be independent with the final phase of their HEP in order to continue to make functional gains outside of physical  therapy.  Pt's right shoulder flexion AROM will improve to 160 degrees in order for him to tolerate lifting something overhead.  Pt's right shoulder abduction AROM will improve to 135 degrees to demonstrate improved shoulder mobility for functional activities.  Pt's right shoulder external rotation AROM will improve to 70 degrees to demonstrate improved shoulder mobility for functional activities   Pt's right shoulder pain with movement will decrease to 1/10 in order for him to tolerate ADLs better.       PLAN     Continue with PT plan of care and progress as tolerated.    Nii Ann, PT

## 2024-07-03 ENCOUNTER — CLINICAL SUPPORT (OUTPATIENT)
Dept: REHABILITATION | Facility: HOSPITAL | Age: 59
End: 2024-07-03
Payer: MEDICARE

## 2024-07-03 ENCOUNTER — PROCEDURE VISIT (OUTPATIENT)
Dept: PAIN MEDICINE | Facility: CLINIC | Age: 59
End: 2024-07-03
Payer: MEDICARE

## 2024-07-03 VITALS
SYSTOLIC BLOOD PRESSURE: 98 MMHG | WEIGHT: 232.38 LBS | DIASTOLIC BLOOD PRESSURE: 61 MMHG | BODY MASS INDEX: 29.43 KG/M2 | HEART RATE: 65 BPM

## 2024-07-03 DIAGNOSIS — E11.40 PAINFUL DIABETIC NEUROPATHY: Primary | ICD-10-CM

## 2024-07-03 DIAGNOSIS — M25.611 DECREASED ROM OF RIGHT SHOULDER: Primary | ICD-10-CM

## 2024-07-03 PROCEDURE — 97110 THERAPEUTIC EXERCISES: CPT | Mod: KX,PO,CQ

## 2024-07-03 PROCEDURE — 97112 NEUROMUSCULAR REEDUCATION: CPT | Mod: KX,PO,CQ

## 2024-07-03 RX ORDER — MELOXICAM 15 MG/1
15 TABLET ORAL
COMMUNITY
Start: 2024-06-27

## 2024-07-03 RX ORDER — FLUTICASONE PROPIONATE 50 MCG
SPRAY, SUSPENSION (ML) NASAL
COMMUNITY
Start: 2024-05-12

## 2024-07-03 NOTE — PROCEDURES
This is a pain clinic procedure note.    Procedure: Qutenza Patch Application   Procedure Date: 07/03/2024  Location applied:  Dorsum and plantar aspect of bilateral feet  Subjective:  Preoperative Diagnosis:  Diabetic peripheral neuropathy  Post Procedure Diagnosis:  Diabetic peripheral neuropathy  Complications: None  Anesthetic:  None    Procedure details: Hands washed, dried and gloves donned prior to patient care. Examined the pt's feet for any signs of open wounds or broken skin. Skin intact with no open areas noted. Patches applied to plantar and dorsal areas of both clean, dry feet, wrapped with cast padding to hold in place. Examined skin 10 min after application, no sign of redness or irritation noted, pt denies any pain.  Patches applied at 3:35 p.m.     Hands washed, dried and gloves donned prior to removal of patches. Patches removed after 30 min, disposed of properly in biohazard receptacle. Feet cleansed with Qutenza cleanser provided, patted dry. Pt tolerated well.  Patches removed at 4:05 p.m.    Dispo: no complications, pt tolerated the procedure well.    Plan: RTC for virtual follow-up in 6 weeks

## 2024-07-03 NOTE — PROGRESS NOTES
[facility]   Physical Therapy Re-assessment    Name: Gio Forrest  [facility] Number: [id number]    Therapy Diagnosis:   Encounter Diagnosis   Name Primary?    Decreased ROM of right shoulder Yes       Physician: Omkar Wu MD    Visit Date: 7/3/2024  Physician Orders: PT Eval and Treat   Medical Diagnosis from Referral: S/P arthroscopy of right shoulder [Z98.890]     Evaluation Date: 4/23/2024  Authorization Period Expiration: 12/31/24  Plan of Care Expiration: 7/23/24  Progress Note Due: 7/23/24  Date of Surgery: 4/17/24 s/p right shoulder arthroscopy acromioplasty and distal clavicle resection  Visit # / Visits authorized: 15/ 20 + Eval  FOTO: 2/3    PTA Visit #: 4/5     Precautions: Standard and 4/17/24 (R) shoulder arthroscopy, acromioplasty and distal clavicle, resection, Diabetes      Time In: 4:23 PM  Time Out: 5:05 PM  Total Billable Time: 42 minutes    SUBJECTIVE     Pt states that it seems that his shld that his shoulder pn has kind of plateaued . It remains at a~ 3/10.      He was compliant with home exercise program.  Response to previous treatment: no adverse effects   Functional change: too soon to determine     Pain: 3/10  Location: right shoulder      OBJECTIVE     Objective Measures updated at progress report unless specified.     AROM:  UE  Shoulder flexion : R: 150 degrees, L: 166 degrees  Shoulder abduction: R: 150 degrees, L: 147 degrees  Shoulder IR : R: 65 degrees (almost at 90 degrees of shoulder abduction when measuring), L: 63 degrees  Shoulder ER : R: 64 degrees (almost at 90 degrees of shoulder abduction when measuring), L: 74 degrees  Elbow flexion: WFL B  Elbow extension: WFL B     MMTs/Strength:  Elbow flexion: R: 5/5, L: 5/5  Elbow extension: R: 5/5, L: 5/5  Shoulder flexion: R: 4+/5, L: 5/5  Shoulder abduction: R: 4/5, L: 4/5  Shoulder internal rotation: R: 4/5, L: 5/5  Shoulder external rotation: R: 4/5, L: 4/5    Treatment     Gio received  the treatments listed below:      therapeutic exercises to develop strength, endurance, ROM, flexibility, posture, and core stabilization for 17 minutes including:    UBE x 6 minutes (forward and retro)  Pulleys x 3 min ea flex/scaption  PROM for right shoulder flexion, abduction, IR and ER  NP  Supine shoulder flexion AAROM 5# dowel 2 x 10  Supine shoulder abduction AAROM with 5# dowel 2 x 10 R  Supine AAROM ER with 5# dowel 3 x 10 R  Supine serratus punches 5# 20x  Forward wall crawls x 10 R-NP  PRECOR rows 50# 3x10    Manual Therapy for 0 mins to include:  Long axis distraction  Grade 3 post and inf mobs to R shoulder    neuromuscular re-education activities to improve: Balance, Coordination, Proprioception, and Posture for 25 minutes. The following activities were included:  Supine PNF D2 Flexion green TB x 20  Standing rows (green TB) 3 s holds 3x10 NP  S/L ER 3x10, 3#   S/L abduction 1# 2x10  Seated bruegger (green TB) 2x10  Standing IR/ER with Green TB 2x10 (B)  Landmine press flex/scaption/abd 10# dowel x 20 ea  Wall clocks GTB 2 x 8 NP    Cold pack for 0 minutes:  Cold pack to right shoulder    Patient Education and Home Exercises     Home Exercises Provided and Patient Education Provided     Education provided:   - Pt was given a HEP consisting of shoulder isometrics (flexion, abduction, external rotation) and supine shoulder (flexion, abduction, external rotation) AAROM with a mago.      Written Home Exercises Provided: Patient instructed to cont prior HEP. Exercises were reviewed and Gio was able to demonstrate them prior to the end of the session.  Gio demonstrated good  understanding of the education provided. See EMR under Patient Instructions for exercises provided during therapy sessions     ASSESSMENT   Gio abeba today's tx with ther ex and neuromuscular re-ed well. He did not complete all activities today due to late arrival as appoint with pn med ran late. He was able to perform today's  activities with little discomfort. He has made some progress with ROM and strength but despite these improvements, pt continues to demo deficits in each of these areas that limits overall functional mobility and performance of everyday activities.. Patient will continue to benefit from loading and progressions to tolerance going forward to improve overall function.    Gio is a 58 y.o. male referred to outpatient Physical Therapy with a medical diagnosis of S/P arthroscopy of right shoulder [Z98.890]. Patient presents s/p right shoulder arthroscopy acromioplasty and distal clavicle resection on 4/17/24.  Gio Is progressing well towards his goals.     Pt prognosis is Good. Pt will continue to benefit from skilled outpatient physical therapy to address the deficits listed in the problem list box on initial evaluation, provide pt/family education and to maximize pt's level of independence in the home and community environment.     Plan of care discussed with patient: Yes     Anticipated Barriers for therapy: PMHx, inactivity     Problem List:  s/p right shoulder arthroscopy acromioplasty and distal clavicle resection on 4/17/24  Decreased right shoulder mobility  UE weakness    Goals:   Short Term Goals: 4 weeks   Pt will be independent with the initial phase of their HEP in order to continue to make functional gains outside of physical therapy.  Pt's right shoulder flexion AROM will improve to 130 degrees in order for him to tolerate lifting something overhead.MET  Pt's right shoulder abduction AROM will improve to 100 degrees to demonstrate improved shoulder mobility for functional activities.MET  Pt's right shoulder external rotation AROM will improve to 50 degrees to demonstrate improved shoulder mobility for functional activities   Pt's right shoulder pain with movement will decrease to 3/10 in order for him to tolerate ADLs better.        Long Term Goals: 8 weeks   Pt will be independent with the final  phase of their HEP in order to continue to make functional gains outside of physical therapy.  Pt's right shoulder flexion AROM will improve to 160 degrees in order for him to tolerate lifting something overhead.  Pt's right shoulder abduction AROM will improve to 135 degrees to demonstrate improved shoulder mobility for functional activities.  Pt's right shoulder external rotation AROM will improve to 70 degrees to demonstrate improved shoulder mobility for functional activities   Pt's right shoulder pain with movement will decrease to 1/10 in order for him to tolerate ADLs better.       PLAN     Continue with PT plan of care and progress as tolerated.    Amador Curran, PTA

## 2024-07-09 ENCOUNTER — CLINICAL SUPPORT (OUTPATIENT)
Dept: REHABILITATION | Facility: HOSPITAL | Age: 59
End: 2024-07-09
Payer: MEDICARE

## 2024-07-09 DIAGNOSIS — M25.611 DECREASED ROM OF RIGHT SHOULDER: Primary | ICD-10-CM

## 2024-07-09 PROCEDURE — 97112 NEUROMUSCULAR REEDUCATION: CPT | Mod: PO

## 2024-07-09 NOTE — PROGRESS NOTES
[facility]   Physical Therapy Treatment Note    Name: Gio Forrest  [facility] Number: [id number]    Therapy Diagnosis:   Encounter Diagnosis   Name Primary?    Decreased ROM of right shoulder Yes       Physician: Omkar Wu MD    Visit Date: 7/9/2024  Physician Orders: PT Eval and Treat   Medical Diagnosis from Referral: S/P arthroscopy of right shoulder [Z98.890]     Evaluation Date: 4/23/2024  Authorization Period Expiration: 12/31/24  Plan of Care Expiration: 7/23/24  Progress Note Due: 7/23/24  Date of Surgery: 4/17/24 s/p right shoulder arthroscopy acromioplasty and distal clavicle resection  Visit # / Visits authorized: 17/20 + Eval  FOTO: 2/3    PTA Visit #: 4/5     Precautions: Standard and 4/17/24 (R) shoulder arthroscopy, acromioplasty and distal clavicle, resection, Diabetes      Time In: 1300 PM  Time Out: 1355 PM  Total Billable Time: 30 minutes 1:1    SUBJECTIVE     Patient with no new complaints today     He was compliant with home exercise program.  Response to previous treatment: no adverse effects   Functional change: too soon to determine     Pain: 3/10  Location: right shoulder      OBJECTIVE     Objective Measures updated at progress report unless specified.     AROM:  UE  Shoulder flexion : R: 150 degrees, L: 166 degrees  Shoulder abduction: R: 150 degrees, L: 147 degrees  Shoulder IR : R: 65 degrees (almost at 90 degrees of shoulder abduction when measuring), L: 63 degrees  Shoulder ER : R: 64 degrees (almost at 90 degrees of shoulder abduction when measuring), L: 74 degrees  Elbow flexion: WFL B  Elbow extension: WFL B     MMTs/Strength:  Elbow flexion: R: 5/5, L: 5/5  Elbow extension: R: 5/5, L: 5/5  Shoulder flexion: R: 4+/5, L: 5/5  Shoulder abduction: R: 4/5, L: 4/5  Shoulder internal rotation: R: 4/5, L: 5/5  Shoulder external rotation: R: 4/5, L: 4/5    Treatment     Gio received the treatments listed below:      therapeutic exercises to develop  strength, endurance, ROM, flexibility, posture, and core stabilization for 15 minutes including:    UBE x 6 minutes (forward and retro)  Pulleys x 3 min ea flex/scaption  PROM for right shoulder flexion, abduction, IR and ER  NP  Supine shoulder flexion AAROM 5# dowel 2 x 10  Supine shoulder abduction AAROM with 5# dowel 2 x 10 R  Supine AAROM ER with 5# dowel 3 x 10 R  Supine serratus punches 5# 20x  Forward wall crawls x 10 R-NP  PRECOR rows 50# 3x10    Manual Therapy for 10 mins to include:  Long axis distraction  Grade 3 post and inf mobs to R shoulder    neuromuscular re-education activities to improve: Balance, Coordination, Proprioception, and Posture for 30 minutes. The following activities were included:  Supine PNF D2 Flexion green TB x 20  Standing rows (green TB) 3 s holds 3x10 NP  S/L ER 3x10, 3#   S/L abduction 1# 2x10  Seated bruegger (green TB) 2x10  Standing IR/ER with Green TB 2x10 (B)  Landmine press flex/scaption/abd 10# dowel x 20 ea  Wall clocks GTB 2 x 8 NP  Shoulder taps Wb'ing through hands at EOB 3x10    Cold pack for 0 minutes:  Cold pack to right shoulder    Patient Education and Home Exercises     Home Exercises Provided and Patient Education Provided     Education provided:   - Pt was given a HEP consisting of shoulder isometrics (flexion, abduction, external rotation) and supine shoulder (flexion, abduction, external rotation) AAROM with a mago.      Written Home Exercises Provided: Patient instructed to cont prior HEP. Exercises were reviewed and Gio was able to demonstrate them prior to the end of the session.  Gio demonstrated good  understanding of the education provided. See EMR under Patient Instructions for exercises provided during therapy sessions     ASSESSMENT   Pt continues to tolerate treatment well at this time with only pain at end range with overhead elevation. Pt set to follow-up with MD later this week, but will continue to benefit from further loading and  progressing to tolerance as sessions continue to maximize return of overall function.    Gio is a 58 y.o. male referred to outpatient Physical Therapy with a medical diagnosis of S/P arthroscopy of right shoulder [Z98.890]. Patient presents s/p right shoulder arthroscopy acromioplasty and distal clavicle resection on 4/17/24.  Gio Is progressing well towards his goals.     Pt prognosis is Good. Pt will continue to benefit from skilled outpatient physical therapy to address the deficits listed in the problem list box on initial evaluation, provide pt/family education and to maximize pt's level of independence in the home and community environment.     Plan of care discussed with patient: Yes     Anticipated Barriers for therapy: PMHx, inactivity     Problem List:  s/p right shoulder arthroscopy acromioplasty and distal clavicle resection on 4/17/24  Decreased right shoulder mobility  UE weakness    Goals:   Short Term Goals: 4 weeks   Pt will be independent with the initial phase of their HEP in order to continue to make functional gains outside of physical therapy.  Pt's right shoulder flexion AROM will improve to 130 degrees in order for him to tolerate lifting something overhead.MET  Pt's right shoulder abduction AROM will improve to 100 degrees to demonstrate improved shoulder mobility for functional activities.MET  Pt's right shoulder external rotation AROM will improve to 50 degrees to demonstrate improved shoulder mobility for functional activities   Pt's right shoulder pain with movement will decrease to 3/10 in order for him to tolerate ADLs better.        Long Term Goals: 8 weeks   Pt will be independent with the final phase of their HEP in order to continue to make functional gains outside of physical therapy.  Pt's right shoulder flexion AROM will improve to 160 degrees in order for him to tolerate lifting something overhead.  Pt's right shoulder abduction AROM will improve to 135 degrees to  demonstrate improved shoulder mobility for functional activities.  Pt's right shoulder external rotation AROM will improve to 70 degrees to demonstrate improved shoulder mobility for functional activities   Pt's right shoulder pain with movement will decrease to 1/10 in order for him to tolerate ADLs better.       PLAN     Continue with PT plan of care and progress as tolerated.    Nii Ann, PT

## 2024-07-11 ENCOUNTER — CLINICAL SUPPORT (OUTPATIENT)
Dept: REHABILITATION | Facility: HOSPITAL | Age: 59
End: 2024-07-11
Payer: MEDICARE

## 2024-07-11 DIAGNOSIS — M25.611 DECREASED ROM OF RIGHT SHOULDER: Primary | ICD-10-CM

## 2024-07-11 PROCEDURE — 97110 THERAPEUTIC EXERCISES: CPT | Mod: KX,PO

## 2024-07-11 PROCEDURE — 97112 NEUROMUSCULAR REEDUCATION: CPT | Mod: KX,PO

## 2024-07-11 NOTE — PROGRESS NOTES
[facility]   Physical Therapy Treatment Note    Name: Gio Forrest  [facility] Hospital Number: [id number]    Therapy Diagnosis:   Encounter Diagnosis   Name Primary?    Decreased ROM of right shoulder Yes       Physician: Omkar Wu MD    Visit Date: 7/11/2024  Physician Orders: PT Eval and Treat   Medical Diagnosis from Referral: S/P arthroscopy of right shoulder [Z98.890]     Evaluation Date: 4/23/2024  Authorization Period Expiration: 12/31/24  Plan of Care Expiration: 7/23/24  Progress Note Due: 7/23/24  Date of Surgery: 4/17/24 s/p right shoulder arthroscopy acromioplasty and distal clavicle resection  Visit # / Visits authorized: 18/20 + Eval  FOTO: 2/3    PTA Visit #: 4/5     Precautions: Standard and 4/17/24 (R) shoulder arthroscopy, acromioplasty and distal clavicle, resection, Diabetes      Time In: 1300 PM  Time Out: 1355 PM  Total Billable Time: 55 minutes 1:1    SUBJECTIVE     Patient with no new complaints today. He reports continues R shoulder pain that is unchanged and is scheduled to see his MD after today's session     He was compliant with home exercise program.  Response to previous treatment: no adverse effects   Functional change: too soon to determine     Pain: 3/10  Location: right shoulder      OBJECTIVE     Objective Measures updated at progress report unless specified.     7/11/2024  AROM:  UE  Shoulder flexion : R: 150 degrees, L: 166 degrees  Shoulder abduction: R: 150 degrees, L: 147 degrees  Shoulder IR : R: 65 degrees (almost at 90 degrees of shoulder abduction when measuring), L: 63 degrees  Shoulder ER : R: 64 degrees (almost at 90 degrees of shoulder abduction when measuring), L: 74 degrees  Elbow flexion: WFL B  Elbow extension: WFL B     MMTs/Strength:  Elbow flexion: R: 5/5, L: 5/5  Elbow extension: R: 5/5, L: 5/5  Shoulder flexion: R: 4+/5, L: 5/5  Shoulder abduction: R: 4/5, L: 4/5  Shoulder internal rotation: R: 4/5, L: 5/5  Shoulder external  rotation: R: 4/5, L: 4/5    Treatment     Gio received the treatments listed below:      therapeutic exercises to develop strength, endurance, ROM, flexibility, posture, and core stabilization for 25 minutes including:    UBE x 6 minutes (forward and retro)  Pulleys x 3 min ea flex/scaption  PROM for right shoulder flexion, abduction, IR and ER  NP  Supine shoulder flexion AAROM 5# dowel 2 x 10  Supine shoulder abduction AAROM with 5# dowel 2 x 10 R  Supine AAROM ER with 5# dowel 3 x 10 R  Supine serratus punches 5# 20x  Forward wall crawls x 10 R-NP  PRECOR rows 50# 3x10    Manual Therapy for 0 mins to include:  Long axis distraction  Grade 3 post and inf mobs to R shoulder    neuromuscular re-education activities to improve: Balance, Coordination, Proprioception, and Posture for 30 minutes. The following activities were included:  Supine PNF D2 Flexion green TB x 20  Standing rows (green TB) 3 s holds 3x10 NP  S/L ER 3x10, 3#   S/L abduction 1# 2x10  Seated bruegger (green TB) 2x10  Standing IR/ER with Green TB 2x10 (B)  Landmine press flex/scaption/abd 10# dowel x 20 ea  Wall clocks GTB 2 x 8 NP  Shoulder taps Wb'ing through hands at EOB 3x10    Cold pack for 0 minutes:  Cold pack to right shoulder    Patient Education and Home Exercises     Home Exercises Provided and Patient Education Provided     Education provided:   - Pt was given a HEP consisting of shoulder isometrics (flexion, abduction, external rotation) and supine shoulder (flexion, abduction, external rotation) AAROM with a mago.      Written Home Exercises Provided: Patient instructed to cont prior HEP. Exercises were reviewed and Gio was able to demonstrate them prior to the end of the session.  Gio demonstrated good  understanding of the education provided. See EMR under Patient Instructions for exercises provided during therapy sessions     ASSESSMENT   Pt continues to present with R shoulder pain that remains relatively unchanged at  this time. Overall range of motion is within functional ranged, but pt continues to present with RTC tenderness to palpation at infraspinatus and supraspinatus that responds fairly to RTC isometric and scapular stability exercises. Patient will continue to benefit from loading and progressions to tolerance going forward to improve overall function.    Gio is a 58 y.o. male referred to outpatient Physical Therapy with a medical diagnosis of S/P arthroscopy of right shoulder [Z98.890]. Patient presents s/p right shoulder arthroscopy acromioplasty and distal clavicle resection on 4/17/24.  Gio Is progressing well towards his goals.     Pt prognosis is Good. Pt will continue to benefit from skilled outpatient physical therapy to address the deficits listed in the problem list box on initial evaluation, provide pt/family education and to maximize pt's level of independence in the home and community environment.     Plan of care discussed with patient: Yes     Anticipated Barriers for therapy: PMHx, inactivity     Problem List:  s/p right shoulder arthroscopy acromioplasty and distal clavicle resection on 4/17/24  Decreased right shoulder mobility  UE weakness    Goals:   Short Term Goals: 4 weeks   Pt will be independent with the initial phase of their HEP in order to continue to make functional gains outside of physical therapy.  Pt's right shoulder flexion AROM will improve to 130 degrees in order for him to tolerate lifting something overhead.MET  Pt's right shoulder abduction AROM will improve to 100 degrees to demonstrate improved shoulder mobility for functional activities.MET  Pt's right shoulder external rotation AROM will improve to 50 degrees to demonstrate improved shoulder mobility for functional activities   Pt's right shoulder pain with movement will decrease to 3/10 in order for him to tolerate ADLs better.        Long Term Goals: 8 weeks   Pt will be independent with the final phase of their HEP  in order to continue to make functional gains outside of physical therapy.  Pt's right shoulder flexion AROM will improve to 160 degrees in order for him to tolerate lifting something overhead.  Pt's right shoulder abduction AROM will improve to 135 degrees to demonstrate improved shoulder mobility for functional activities.  Pt's right shoulder external rotation AROM will improve to 70 degrees to demonstrate improved shoulder mobility for functional activities   Pt's right shoulder pain with movement will decrease to 1/10 in order for him to tolerate ADLs better.       PLAN     Continue with PT plan of care and progress as tolerated.    Nii Ann, PT

## 2024-07-26 ENCOUNTER — OFFICE VISIT (OUTPATIENT)
Dept: PAIN MEDICINE | Facility: CLINIC | Age: 59
End: 2024-07-26
Payer: MEDICARE

## 2024-07-26 VITALS
HEART RATE: 77 BPM | SYSTOLIC BLOOD PRESSURE: 117 MMHG | HEIGHT: 74 IN | WEIGHT: 231.06 LBS | BODY MASS INDEX: 29.65 KG/M2 | DIASTOLIC BLOOD PRESSURE: 58 MMHG

## 2024-07-26 DIAGNOSIS — M54.50 LUMBAR BACK PAIN: Primary | ICD-10-CM

## 2024-07-26 DIAGNOSIS — Z98.1 HISTORY OF LUMBAR FUSION: ICD-10-CM

## 2024-07-26 DIAGNOSIS — M47.896 OTHER SPONDYLOSIS, LUMBAR REGION: ICD-10-CM

## 2024-07-26 PROCEDURE — 99999 PR PBB SHADOW E&M-EST. PATIENT-LVL IV: CPT | Mod: PBBFAC,,, | Performed by: PHYSICIAN ASSISTANT

## 2024-07-26 NOTE — PROGRESS NOTES
[facility]      Referred by: none    PCP: Matthew Carroll MD    CC:   Chief Complaint   Patient presents with    Pain          HPI:   Gio Forrest is a 58 y.o. year old male patient who has a past medical history of Addiction, Arthritis, Back pain, Depression, HENDERSON (dyspnea on exertion), Dyslipidemia, Excessive daytime sleepiness, GERD (gastroesophageal reflux disease), Gout, Hypersomnia with sleep apnea, Hypertension, Idiopathic gout, Lumbar pseudoarthrosis, Neck pain, Neuropathy, Obesity, Obstructive sleep apnea, Restless leg syndrome, Sebaceous cyst, SVT (supraventricular tachycardia), and Type 2 diabetes mellitus. He presents for lower back pain.      He is new to me.  He was seen by Dr. Garcia in 2022 for back pain and had an L3, L4, L5 RFA.  He has seen Forrest Paige in Pain management in 2024 for diabetic neuropathy pain and trying Qutenza.  He had recent right shoulder surgery with continued right shoulder pain.     Today he would like to discuss back pain.  He has had 2 cervical spine and 4 lumbar spine surgeries.  Last lumbar surgery by Dr. Guru Camejo about 1 year ago with fusion approach from the lateral and posterior sides.  He has pain across the lower back and along the axis of the spine the entire lumbar region to the sacrum and coccyx.  He also describes left hip pain.  He denies any radicular leg pain, numbness or tingling.  He has tried gabapentin, robaxin, mobic and percocet.  He had lumbar PT years ago, but none since last surgery.      Denies bowel/ bladder incontinence.    Past and current medications:  Antineuropathics: gabapentin, qutenza.  NSAIDs: mobic  Antidepressants:  Muscle relaxers:  robaxin  Opioids: percocet  Antiplatelets/Anticoagulants:    Physical Therapy/ Chiropractic care:  PT -years ago for lower back pain, but none since last back surgery    Pain Intervention History:  6-17-24 L3, L4,, L5 RFA by Nghia Garcia.    Past Spine Surgical History:  2 cervical spine surgeries  4  lumbar spine surgeries by Nghia Camejo with last one about 1 year ago being lateral, posterior fusion approach.        History:    Current Outpatient Medications:     atorvastatin (LIPITOR) 40 MG tablet, TAKE ONE TABLET BY MOUTH ONCE DAILY IN THE EVENING, Disp: 90 tablet, Rfl: 4    blood-glucose meter,continuous (DEXCOM G7 ) Misc, Dispense 1, Disp: 1 each, Rfl: 0    calcium-vitamin D3 (OS-SOHAM 500 + D3) 500 mg-5 mcg (200 unit) per tablet, Take 1 tablet by mouth 2 (two) times daily with meals., Disp: , Rfl:     DEXCOM G7 SENSOR [place], CHANGE EVERY 10 DAYS, Disp: 9 each, Rfl: 4    docusate sodium (COLACE) 100 MG capsule, Take 100 mg by mouth 2 (two) times daily., Disp: , Rfl:     empagliflozin (JARDIANCE) 25 mg tablet, Take 1 tablet (25 mg total) by mouth once daily., Disp: 90 tablet, Rfl: 3    fluorouraciL (EFUDEX) 5 % cream, AAA dorsal hands BID x 4 weeks as tolerated., Disp: 40 g, Rfl: 1    fluticasone propionate (FLONASE) 50 mcg/actuation nasal spray, by Each Nostril route., Disp: , Rfl:     gabapentin (NEURONTIN) 600 MG tablet, TAKE ONE TABLET BY MOUTH THREE TIMES DAILY, Disp: 90 tablet, Rfl: 2    glimepiride (AMARYL) 1 MG tablet, TAKE ONE TABLET BY MOUTH DAILY BEFORE BREAKFAST, Disp: 90 tablet, Rfl: 3    hydrocortisone 2.5 % cream, Apply topically 2 (two) times daily as needed (rash around nose). Mix 50/50 with ketoconazole cream., Disp: 20 g, Rfl: 3    ketoconazole (NIZORAL) 2 % cream, Apply topically 2 (two) times daily as needed (rash around nose). Mix 50/50 with hydrocortisone cream. OK to use daily by itself for prevention., Disp: 60 g, Rfl: 3    meloxicam (MOBIC) 15 MG tablet, Take 15 mg by mouth., Disp: , Rfl:     metFORMIN (GLUCOPHAGE-XR) 500 MG ER 24hr tablet, Take 2 tablet twice a day, Disp: 360 tablet, Rfl: 3    methocarbamoL (ROBAXIN) 750 MG Tab, Take 2 tablets (1,500 mg total) by mouth 3 (three) times daily as needed., Disp: 80 tablet, Rfl: 2    multivitamin capsule, Take 1 capsule by  mouth once daily., Disp: , Rfl:     pantoprazole (PROTONIX) 40 MG tablet, Take 1 tablet (40 mg total) by mouth once daily., Disp: 30 tablet, Rfl: 2    vitamin D (VITAMIN D3) 1000 units Tab, Take 1,000 Units by mouth once daily., Disp: , Rfl:     blood-glucose meter kit, To check BG 2 times daily, to use with insurance preferred meter, Disp: 1 each, Rfl: 1    Past Medical History:   Diagnosis Date    Addiction 12/21/2021    Arthritis     Back pain     radiates to both legs but more on rt leg    Depression     HENDERSON (dyspnea on exertion)     Dyslipidemia 08/12/2015    Excessive daytime sleepiness 03/02/2018    Formatting of this note might be different from the original.  Added automatically from request for surgery [id number]    GERD (gastroesophageal reflux disease)     Gout 08/12/2015    Hypersomnia with sleep apnea 05/14/2022    Hypertension     Idiopathic gout     Lumbar pseudoarthrosis     Neck pain     nonradiating pain    Neuropathy     Obesity 08/12/2015    Obstructive sleep apnea 08/12/2015    cpap    Restless leg syndrome     Sebaceous cyst 04/18/2017    SVT (supraventricular tachycardia)     s/p ablation    Type 2 diabetes mellitus 08/12/2015       Past Surgical History:   Procedure Laterality Date    ABLATION N/A 10/08/2020    Procedure: Ablation;  Surgeon: Rip Che III, MD;  Location: [facility] CATH;  Service: Cardiology;  Laterality: N/A;    ARTHROPLASTY OF JOINT OF FINGER Right 04/12/2022    Procedure: Right middle finger MCP joint arthroplasty;  Surgeon: Luis Alberto Payne MD;  Location: [facility] OR;  Service: Orthopedics;  Laterality: Right;  Anesthesia:  General with a single-shot supraclavicular block    BACK SURGERY      multiple    CARDIAC CATHETERIZATION      CARDIAC ELECTROPHYSIOLOGY STUDY  10/08/2020    Procedure: Study possible ablation;  Surgeon: Rip Che III, MD;  Location: [facility] CATH;  Service: Cardiology;;    CERVICAL SPINE SURGERY      CHOLECYSTECTOMY  05/30/2018    Dr. ROGELIO Tao, [facility]      COLONOSCOPY  2008    COLONOSCOPY N/A 09/14/2017    Procedure: COLONOSCOPY;  Surgeon: Obi Beltre MD;  Location: [facility] ENDO;  Service: Endoscopy;  Laterality: N/A;    CORONARY ANGIOGRAPHY Left 05/28/2018    Procedure: Coronary angiography;  Surgeon: Rafiq Malik MD;  Location: [facility] CATH;  Service: Cardiology;  Laterality: Left;    ELBOW SURGERY Bilateral     EPIDURAL STEROID INJECTION INTO CERVICAL SPINE      EPIDURAL STEROID INJECTION INTO LUMBAR SPINE      HERNIA REPAIR      LAPAROSCOPIC APPENDECTOMY N/A 06/25/2020    Procedure: APPENDECTOMY, LAPAROSCOPIC;  Surgeon: Gordon Can MD;  Location: [facility] OR;  Service: General;  Laterality: N/A;    LAPAROSCOPIC CHOLECYSTECTOMY N/A 05/30/2018    Procedure: CHOLECYSTECTOMY, LAPAROSCOPIC;  Surgeon: Fletcher Tao MD;  Location: [facility] OR;  Service: General;  Laterality: N/A;    LAPAROSCOPIC SLEEVE GASTRECTOMY N/A 01/20/2023    Procedure: GASTRECTOMY, SLEEVE, LAPAROSCOPIC;  Surgeon: Bharat Devine MD;  Location: [facility] OR;  Service: General;  Laterality: N/A;    LEFT HEART CATHETERIZATION Left 05/28/2018    Procedure: Left heart cath;  Surgeon: Rafiq Malik MD;  Location: [facility] CATH;  Service: Cardiology;  Laterality: Left;    NECK SURGERY      RADIOFREQUENCY ABLATION OF LUMBAR MEDIAL BRANCH NERVE AT SINGLE LEVEL Bilateral 06/17/2022    Procedure: Radiofrequency Ablation, Nerve, Spinal, Lumbar, Medial Branch, 1 Level L3,4,5;  Surgeon: Thiago Garcia MD;  Location: [facility] OR;  Service: Pain Management;  Laterality: Bilateral;    SHOULDER ARTHROSCOPY      SPINE SURGERY      multiple    TONSILLECTOMY      TREATMENT OF CARDIAC ARRHYTHMIA  10/08/2020    Procedure: Cardioversion or Defibrillation;  Surgeon: Rip Che III, MD;  Location: [facility] CATH;  Service: Cardiology;;    TRIGGER FINGER RELEASE Right 09/09/2021    Procedure: RELEASE, TRIGGER FINGER;  Surgeon: Luis Alberto Payne MD;  Location: [facility] OR;  Service: Orthopedics;  Laterality: Right;   Procedure: Right ring finger trigger release    Position: Supine    Anesthesia: Local    Equipment: Basic handset    TYMPANOSTOMY TUBE PLACEMENT Right        Family History   Problem Relation Name Age of Onset    Diabetes Mother      Depression Mother      Liver cancer Mother      Obesity Mother      Heart disease Father      Anuerysm Father      Diabetes Father      Stroke Father      Glaucoma Paternal Grandmother      Obesity Sister         Social History     Socioeconomic History    Marital status:     Number of children: 1   Tobacco Use    Smoking status: Former     Current packs/day: 0.00     Average packs/day: 0.5 packs/day for 10.0 years (5.0 ttl pk-yrs)     Types: Cigarettes     Start date:      Quit date:      Years since quittin.5    Smokeless tobacco: Never   Substance and Sexual Activity    Alcohol use: Not Currently    Drug use: No    Sexual activity: Yes     Partners: Female   Social History Narrative              Social Determinants of Health     Financial Resource Strain: Medium Risk (2022)    Overall Financial Resource Strain (CARDIA)     Difficulty of Paying Living Expenses: Somewhat hard   Food Insecurity: Food Insecurity Present (2022)    Hunger Vital Sign     Worried About Running Out of Food in the Last Year: Often true     Ran Out of Food in the Last Year: Often true   Transportation Needs: Unmet Transportation Needs (2022)    PRAPARE - Transportation     Lack of Transportation (Medical): Yes     Lack of Transportation (Non-Medical): Yes   Physical Activity: Insufficiently Active (2022)    Exercise Vital Sign     Days of Exercise per Week: 4 days     Minutes of Exercise per Session: 10 min   Stress: Stress Concern Present (2022)    [employer] - Occupational Stress Questionnaire     Feeling of Stress : To some extent       Review of patient's allergies indicates:  No Known Allergies    Labs:  Lab Results  "  Component Value Date    HGBA1C 6.3 (H) 02/26/2024       Lab Results   Component Value Date    WBC 6.60 03/19/2024    HGB 15.0 03/19/2024    HCT 46.5 03/19/2024    MCV 93 03/19/2024     03/19/2024           Review of Systems:  Shoulder pain.  Hip pain.  Lower back pain..  Balance of review of systems is negative.    Physical Exam:  Vitals:    07/26/24 1009   BP: (!) 117/58   Pulse: 77   Weight: 104.8 kg (231 lb 0.7 oz)   Height: 6' 2" (1.88 m)   PainSc:   6   PainLoc: Hip     Body mass index is 29.66 kg/m².    Pain disability index:      7/26/2024    10:10 AM 6/13/2024     2:44 PM 8/3/2022     1:04 PM   Last 3 PDI Scores   Pain Disability Index (PDI) 63 29 42       Gen: NAD  Psych: mood appropriate for given condition  HEENT: eyes anicteric   CV: RRR, 2+ radial pulse  Respiratory: non-labored, no signs of respiratory distress  Abd: non-distended  Skin: warm, dry and intact.  Gait: Able to heel walk, toe walk. No antalgic gait.     Coordination:   Tandem walking coordination: normal    Cervical spine: ROM is full in flexion, extension and lateral rotation without increased pain.  Spurling's maneuver causes no neck pain to either side.  Myofascial exam: No Tenderness to palpation across cervical paraspinous region bilaterally.    Lumbar spine:  Lumbar spine: ROM is full with flexion extension and oblique extension with no increased pain.    Teodoro's test causes no increased pain on either side.    Supine straight leg raise is negative bilaterally.    Internal and external rotation of the hip causes no increased pain on either side.  Myofascial exam: No tenderness to palpation across lumbar paraspinous muscles. No tenderness to palpation over the bilateral greater trochanters and bilateral SI joint    Sensory:  Intact and symmetrical to light touch in C4-T1 dermatomes bilaterally. Intact and symmetrical to light touch in L1-S1 dermatomes bilaterally.    Motor:    Right Left   C4 Shoulder Abduction  5  5   C5 " Elbow Flexion    5  5   C6 Wrist Extension  5  5   C7 Elbow Extension   5  5   C8/T1 Hand Intrinsics   5  5        Right Left   L2/3 Iliacus Hip flexion  5  5   L3/4 Qudratus Femoris Knee Extension  5  5   L4/5 Tib Anterior Ankle Dorsiflexion   5  5   L5/S1 Extensor Hallicus Longus Great toe extension  5  5   S1/S2 Gastroc/Soleus Plantar Flexion  5  5      Right Left   Triceps DTR 2+ 2+   Biceps DTR 2+ 2+   Brachioradialis DTR 2+ 2+   Patellar DTR 2+ 2+   Achilles DTR 2+ 2+   Harrison Absent  Absent   Clonus Absent Absent   Babinski Absent Absent       Imaging:  No lumbar spine imaging to review after his surgery 1 year ago.      Assessment:   Gio Forrest is a 58 y.o. year old male patient who has a past medical history of Addiction, Arthritis, Back pain, Depression, HENDERSON (dyspnea on exertion), Dyslipidemia, Excessive daytime sleepiness, GERD (gastroesophageal reflux disease), Gout, Hypersomnia with sleep apnea, Hypertension, Idiopathic gout, Lumbar pseudoarthrosis, Neck pain, Neuropathy, Obesity, Obstructive sleep apnea, Restless leg syndrome, Sebaceous cyst, SVT (supraventricular tachycardia), and Type 2 diabetes mellitus. He presents for lower back pain.    Lumbar back pain can be myofascial, possible facet mediated, due to scar tissue from prior surgery or can occur if there is a surgical hardware complication. He has no current focal neurolagical deficits and no radiculopathy.    Plan:  - we discussed the role of medications, PT, home exercise, and obtaining further imaging to assess hardware and determine if he is a candiated for interventional procedures.  - he elects tot try PT.  - if no improvement, we will obtain MRI and xrays of the lumbar spine.  - he said he might also follow back up with Dr. Camejo about back pain after surgery.  Dr. Camejo ordered a Thoracic spine MRI in May 2024 without any major abnormalities.    Problem List Items Addressed This Visit    None  Visit Diagnoses       Lumbar back  pain    -  Primary    Relevant Orders    Ambulatory referral/consult to Physical/Occupational Therapy    History of lumbar fusion        Relevant Orders    Ambulatory referral/consult to Physical/Occupational Therapy    Other spondylosis, lumbar region        Relevant Orders    Ambulatory referral/consult to Physical/Occupational Therapy            Follow Up: RTC in 6-8 weeks    : Reviewed and consistent with medication use as prescribed.          Emiliana Celeste PA-C  [facility]

## 2024-07-29 DIAGNOSIS — M47.896 OTHER SPONDYLOSIS, LUMBAR REGION: ICD-10-CM

## 2024-07-29 RX ORDER — GABAPENTIN 600 MG/1
600 TABLET ORAL 3 TIMES DAILY
Qty: 90 TABLET | Refills: 2 | Status: SHIPPED | OUTPATIENT
Start: 2024-07-29

## 2024-08-09 DIAGNOSIS — Z79.4 TYPE 2 DIABETES MELLITUS WITH DIABETIC POLYNEUROPATHY, WITH LONG-TERM CURRENT USE OF INSULIN: ICD-10-CM

## 2024-08-09 DIAGNOSIS — E11.42 TYPE 2 DIABETES MELLITUS WITH DIABETIC POLYNEUROPATHY, WITH LONG-TERM CURRENT USE OF INSULIN: ICD-10-CM

## 2024-08-12 DIAGNOSIS — K21.9 LARYNGOPHARYNGEAL REFLUX (LPR): Primary | ICD-10-CM

## 2024-08-12 RX ORDER — BLOOD-GLUCOSE,RECEIVER,CONT
EACH MISCELLANEOUS
Qty: 1 EACH | Refills: 0 | Status: SHIPPED | OUTPATIENT
Start: 2024-08-12

## 2024-08-12 RX ORDER — FAMOTIDINE 40 MG/1
40 TABLET, FILM COATED ORAL DAILY
COMMUNITY
End: 2024-08-14 | Stop reason: SDUPTHER

## 2024-08-12 RX ORDER — BLOOD-GLUCOSE TRANSMITTER
EACH MISCELLANEOUS
Qty: 1 EACH | Refills: 0 | Status: SHIPPED | OUTPATIENT
Start: 2024-08-12

## 2024-08-12 RX ORDER — FAMOTIDINE 40 MG/1
40 TABLET, FILM COATED ORAL DAILY
Qty: 30 TABLET | Refills: 6 | OUTPATIENT
Start: 2024-08-12

## 2024-08-14 RX ORDER — FAMOTIDINE 40 MG/1
40 TABLET, FILM COATED ORAL DAILY
Qty: 30 TABLET | Refills: 3 | Status: SHIPPED | OUTPATIENT
Start: 2024-08-14

## 2024-08-16 ENCOUNTER — CLINICAL SUPPORT (OUTPATIENT)
Dept: REHABILITATION | Facility: HOSPITAL | Age: 59
End: 2024-08-16
Payer: MEDICARE

## 2024-08-16 DIAGNOSIS — M25.552 LEFT HIP PAIN: Primary | ICD-10-CM

## 2024-08-16 DIAGNOSIS — M54.50 LUMBAR BACK PAIN: ICD-10-CM

## 2024-08-16 DIAGNOSIS — M25.652 HIP STIFFNESS, LEFT: ICD-10-CM

## 2024-08-16 DIAGNOSIS — Z98.1 HISTORY OF LUMBAR FUSION: ICD-10-CM

## 2024-08-16 DIAGNOSIS — R29.898 IMPAIRED FLEXIBILITY OF LOWER EXTREMITY: ICD-10-CM

## 2024-08-16 DIAGNOSIS — R53.1 WEAKNESS: ICD-10-CM

## 2024-08-16 DIAGNOSIS — M47.896 OTHER SPONDYLOSIS, LUMBAR REGION: ICD-10-CM

## 2024-08-16 PROCEDURE — 97530 THERAPEUTIC ACTIVITIES: CPT | Mod: KX,PO

## 2024-08-16 PROCEDURE — 97161 PT EVAL LOW COMPLEX 20 MIN: CPT | Mod: KX,PO

## 2024-08-16 NOTE — PATIENT INSTRUCTIONS
"ITB stretch 3 x 30  Prone quad stretch 3 x 30  Prone glute sets 20 x 5"  Hooklying posterior pelvic tilts 20 x 5   "

## 2024-08-16 NOTE — PLAN OF CARE
"[facility]   Physical Therapy Initial Evaluation      Name: Gio Forrest  [facility] Number: [id number]    Therapy Diagnosis:   Encounter Diagnoses   Name Primary?    Lumbar back pain     History of lumbar fusion     Other spondylosis, lumbar region         Physician: Emiliana Celeste PA-C    Physician Orders: PT Eval and Treat   Medical Diagnosis from Referral:   Diagnosis   M54.50 (ICD-10-CM) - Lumbar back pain   Z98.1 (ICD-10-CM) - History of lumbar fusion   M47.896 (ICD-10-CM) - Other spondylosis, lumbar region     Evaluation Date: 8/16/2024  Authorization Period Expiration: 7/26/2025  Plan of Care Expiration: 9/27/24  Progress Note Due: 9/16/24  Date of Surgery: LS fusion 2023  Visit # / Visits authorized: 1/ 1   FOTO: 1/ 3    Precautions: Standard and Diabetes     Time In: 12:10  Time Out: 1:00  Total Billable Time: 50 minutes    Subjective     Date of onset: chronic    History of current condition - Gio reports: He was referred by Dr Celeste to attempt PT for his hip pain that also seems to go into his tailbone area. Pt states she thought it was coming from his back but he thinks it is coming from his hip. He was seen by Dr. Garcia in 2022 for back pain and had an L3, L4, L5 RFA.  He has seen Forrest Paige in Pain management in 2024 for diabetic neuropathy pain.  He had recent right shoulder surgery with continued right shoulder pain, which he was in therapy for with [facility]. Pt reports he has had 2 cervical spine (fusion at C6-C7 and a "shave done on one of the discs) and 4 lumbar spine surgeries.  Last lumbar surgery by Dr. Guru Camejo about 1 year ago with fusion approach from the lateral and posterior sides.      He complains of pain across the posterior aspect of his hips and tailbone but mostly in the lateral aspect of his left hip joint.  He denies any radicular leg pain, numbness or tingling outside of the nerve damage from the time of his lumbar fusion which he states is along " the lateral aspect of his left leg. He did have weight loss surgery done a year and a half ago.  He has tried gabapentin, robaxin, mobic and percocet.  He had lumbar PT years ago, but none since last surgery.  Regarding his hip pain, he has had an injections in his left hip with mild relief in the past; he also had a recent MRI which he was told by Dr. Wu his MRI looked good. He reports movements such as drawing his legs up and letting his leg fall over in the bed causing pain. He denies and clicking or locking or groin pain.       Falls: none    Imaging: no recent imaging studies    Prior Therapy: years ago for LS; recent bout of therapy this year for shoulder  Social History:  lives with their spouse; about 5 steps to get into front door  Occupation: unemployed  Prior Level of Function: independent  Current Level of Function: independent    Pain:  Current 3/10, worst 5/10, best 2/10   Location: left Hip   Description: Dull, Deep, and Sharp  Aggravating Factors: Bending and Walking and twisting his leg  Easing Factors: injection    Patients goals: Pt would like to figure out why his hurting so much with specific movements of his leg.      Medical History:   Past Medical History:   Diagnosis Date    Addiction 12/21/2021    Arthritis     Back pain     radiates to both legs but more on rt leg    Depression     HENDERSON (dyspnea on exertion)     Dyslipidemia 08/12/2015    Excessive daytime sleepiness 03/02/2018    Formatting of this note might be different from the original.  Added automatically from request for surgery [id number]    GERD (gastroesophageal reflux disease)     Gout 08/12/2015    Hypersomnia with sleep apnea 05/14/2022    Hypertension     Idiopathic gout     Lumbar pseudoarthrosis     Neck pain     nonradiating pain    Neuropathy     Obesity 08/12/2015    Obstructive sleep apnea 08/12/2015    cpap    Restless leg syndrome     Sebaceous cyst 04/18/2017    SVT (supraventricular tachycardia)     s/p ablation     Type 2 diabetes mellitus 08/12/2015       Surgical History:   Gio Forrest  has a past surgical history that includes Hernia repair; Shoulder arthroscopy; Cervical spine surgery; Spine surgery; Colonoscopy (2008); Tonsillectomy; Elbow surgery (Bilateral); Colonoscopy (N/A, 09/14/2017); Back surgery; Cardiac catheterization; Coronary angiography (Left, 05/28/2018); Left heart catheterization (Left, 05/28/2018); Laparoscopic cholecystectomy (N/A, 05/30/2018); Cholecystectomy (05/30/2018); Neck surgery; Laparoscopic appendectomy (N/A, 06/25/2020); Ablation (N/A, 10/08/2020); Treatment of cardiac arrhythmia (10/08/2020); Cardiac electrophysiology study (10/08/2020); Trigger finger release (Right, 09/09/2021); Arthroplasty of joint of finger (Right, 04/12/2022); Radiofrequency ablation of lumbar medial branch nerve at single level (Bilateral, 06/17/2022); Epidural steroid injection into lumbar spine; Epidural steroid injection into cervical spine; Tympanostomy tube placement (Right); and Laparoscopic sleeve gastrectomy (N/A, 01/20/2023).    Medications:   Gio has a current medication list which includes the following prescription(s): atorvastatin, blood-glucose meter, calcium-vitamin d3, dexcom g6 , dexcom g6 transmitter, dexcom g7 sensor, docusate sodium, empagliflozin, famotidine, fluorouracil, fluticasone propionate, gabapentin, glimepiride, hydrocortisone, ketoconazole, meloxicam, metformin, multivitamin, pantoprazole, and vitamin d.    Allergies:   Review of patient's allergies indicates:  No Known Allergies     Objective      Observation: antalgic gait    Posture: unremarkable    Hip Range of Motion:   Right active Right Passive Left active  Left Passive   Flexion    120*   Abduction    25*   Extension    5   Ext. Rotation    30*   Int. Rotation    25*       Lower Extremity Strength  Right LE  Left LE    Quadriceps: 5/5 Quadriceps: 5/5   Hamstrings: 5/5 Hamstrings: 5/5   Iliopsoas: 5/5 Iliopsoas:  "5/5   Hip extension:  3+/5 Hip extension: 3+/5   Hip ABD: 4+/5 Hip ABD: 4+/5   Hip ADD:     5/5 Hip ADD: 5/5   Hip ER: 4+/5 Hip ER: 4+/5   Hip IR: 5/5 Hip IR: 5/5   Glut Max 3-/5    Glut Max 3-/5          Special Tests:   FABERs:  + for pain and reduced ROM doris   CAN:+ for pain  Hip Scour: + for pain  Slump: NT    Flexibility: mod restricted      Hamstrings: R = + ; L = +    Tan's test: R = + ; L = +  Travis Test Right  Left    Iliopsoas + -   Rectus Femoris  + +   L ITB>R; R Psaos>L    Joint Mobility: will formally assess left hip joint at next visit due to time constraints of pt's late arrival today      Intake Outcome Measure for FOTO 1 Survey    Therapist reviewed FOTO scores for Gio Forrest on 8/16/2024.   FOTO report - see Media section or FOTO account episode details.    Intake Score: 65%         Treatment     Total Treatment time (time-based codes) separate from Evaluation: 15 minutes     Gio received the treatments listed below:      therapeutic exercises to develop strength, endurance, ROM, flexibility, and posture for 0 minutes including:  NV work on pelvic control, flexibility of LE within pain tolerance    manual therapy techniques: Joint mobilizations, Manual traction, and Soft tissue Mobilization were applied to the: L hip for 0 minutes, including:  NV attempt long axis hip distraction for pain reduction and possible OA     neuromuscular re-education activities to improve: Balance, Coordination, Proprioception, and Posture for 0 minutes. The following activities were included:  NV progress upon glute control    therapeutic activities to improve functional performance for 15  minutes, including:  Prone quad stretch 3 x 30"  ITB stretch 3 x 30"  Prone glute sets 20 x 5 "  Hooklying posterior pelvic tilts 20 x 5"      Patient Education and Home Exercises     Education provided:   - yes    Written Home Exercises Provided: Yes. Exercises were reviewed and Gio was able to demonstrate them " prior to the end of the session.  Gio demonstrated fair  understanding of the education provided. See EMR under Patient Instructions for exercises provided during therapy sessions.    Assessment     Gio is a 58 y.o. male referred to outpatient Physical Therapy with a medical diagnosis of   M54.50 (ICD-10-CM) - Lumbar back pain   Z98.1 (ICD-10-CM) - History of lumbar fusion   M47.896 (ICD-10-CM) - Other spondylosis, lumbar region    Patient presents with left lateral hip pain with limited passive ROM and pain and end range. He presents as if he has intraarticular hip dysfunctions/OA and will benefit from skilled PT to improve soft tissue flexibility around the hip, ROM (especially external rotation tolerance) and glute strength in order to improve tolerance to changing positions and tolerating walking.    Patient prognosis is Good.   Patient will benefit from skilled outpatient Physical Therapy to address the deficits stated above and in the chart below, provide patient /family education, and to maximize patientt's level of independence.     Plan of care discussed with patient: Yes  Patient's spiritual, cultural and educational needs considered and patient is agreeable to the plan of care and goals as stated below:     Anticipated Barriers for therapy: none    Medical Necessity is demonstrated by the following  History  Co-morbidities and personal factors that may impact the plan of care [] LOW: no personal factors / co-morbidities  [x] MODERATE: 1-2 personal factors / co-morbidities  [] HIGH: 3+ personal factors / co-morbidities    Moderate / High Support Documentation:   Co-morbidities affecting plan of care: arthritis    Personal Factors:   none     Examination  Body Structures and Functions, activity limitations and participation restrictions that may impact the plan of care [] LOW: addressing 1-2 elements  [x] MODERATE: 3+ elements  [] HIGH: 4+ elements (please support below)    Moderate / High Support  Documentation: L hip pain, Decreased L hip ROM, Impaired flexibility, weakness     Clinical Presentation [x] LOW: stable  [] MODERATE: Evolving  [] HIGH: Unstable     Decision Making/ Complexity Score: low       Goals:  Short Term Goals: 2 weeks   Pt will report compliance with HEP in order to optimize improvements with current POC.    Long Term Goals: 6 weeks   Pt will improve passive ER of L hip from 30 degrees to at least 45 degrees without pain.  Pt will become independent with HEP to target limitations in LQ flexibility to continue on his own to optimize soft tissue lengths.  Pt will improve glute strength to at least 4/5 to improve functional demands across hip joint during sit to stand and walking tasks.   Pt will report a reduction in lateral left hip pain from 5/10 at worst to less than 2/10.   Plan     Plan of care Certification: 8/16/2024 to 9/27/2024.    Outpatient Physical Therapy 2 times weekly for 6 weeks to include the following interventions: Manual Therapy, Neuromuscular Re-ed, Patient Education, Therapeutic Activities, and Therapeutic Exercise.     Pam Kitchen, PT        Physician's Signature: _________________________________________ Date: ________________

## 2024-08-19 ENCOUNTER — PATIENT MESSAGE (OUTPATIENT)
Dept: ADMINISTRATIVE | Facility: HOSPITAL | Age: 59
End: 2024-08-19
Payer: MEDICARE

## 2024-08-21 ENCOUNTER — CLINICAL SUPPORT (OUTPATIENT)
Dept: REHABILITATION | Facility: HOSPITAL | Age: 59
End: 2024-08-21
Payer: MEDICARE

## 2024-08-21 DIAGNOSIS — M25.652 HIP STIFFNESS, LEFT: ICD-10-CM

## 2024-08-21 DIAGNOSIS — R53.1 WEAKNESS: Primary | ICD-10-CM

## 2024-08-21 PROCEDURE — 97110 THERAPEUTIC EXERCISES: CPT | Mod: KX,PO

## 2024-08-21 PROCEDURE — 97140 MANUAL THERAPY 1/> REGIONS: CPT | Mod: KX,PO

## 2024-08-21 PROCEDURE — 97530 THERAPEUTIC ACTIVITIES: CPT | Mod: KX,PO

## 2024-08-21 NOTE — PROGRESS NOTES
"[facility]   Physical Therapy Treatment Note      Name: Gio Forrest  [facility] Number: [id number]    Therapy Diagnosis:   Encounter Diagnoses   Name Primary?    Weakness Yes    Hip stiffness, left      Physician: Emiliana Celeste PA-C    Visit Date: 8/21/2024    Physician Orders: PT Eval and Treat   Medical Diagnosis from Referral:   Diagnosis   M54.50 (ICD-10-CM) - Lumbar back pain   Z98.1 (ICD-10-CM) - History of lumbar fusion   M47.896 (ICD-10-CM) - Other spondylosis, lumbar region      Evaluation Date: 8/16/2024  Authorization Period Expiration: 7/26/2025  Plan of Care Expiration: 9/27/24  Progress Note Due: 9/16/24  Date of Surgery: LS fusion 2023  Visit # / Visits authorized: 1/ 20  FOTO: 1/ 3    Precautions: Standard and Diabetes      Time In: 10:10  Time Out: 11:00  Total Billable Time: 50 minutes    PTA Visit #: -/5       Subjective     Patient reports: he has not been doing his exercises per instructions but has done "some".  He was not compliant with home exercise program.  Response to previous treatment: TBD  Functional change: TBD    Pain: 4/10  Location: left hip     Objective      Objective Measures updated at progress report unless specified.     Treatment     Gio received the treatments listed below:       therapeutic exercises to develop strength, endurance, ROM, flexibility, and posture for 20 minutes including:  Upright bike x 5'  Prone quad stretch 3 x 30"  ITB stretch 3 x 30"  Prone glute sets 20 x 5 "  Hooklying pelvic tilts 20 x 5"    NV work on pelvic control, flexibility of LE within pain tolerance     manual therapy techniques: Joint mobilizations, Manual traction, and Soft tissue Mobilization were applied to the: L hip for 15 minutes, including:  long axis hip distraction 4 x 60"  Lateral L hip joint mobilizations 4 x 30" grade III     neuromuscular re-education activities to improve: Balance, Coordination, Proprioception, and Posture for 15 minutes. The " "following activities were included:  Bridges GTB 2 x 10  Quad rocking 15x's   Hip hinge seated with dowel x 5  Sit to stand with GTB around knees 1 x 10 cues for glute control with GTB for increased abduction     therapeutic activities to improve functional performance for 0  minutes, including:      Patient Education and Home Exercises       Education provided:   - yes    Written Home Exercises Provided: Pt instructed to continue prior HEP. Exercises were reviewed and Gio was able to demonstrate them prior to the end of the session.  Gio demonstrated fair  understanding of the education provided. See Electronic Medical Record under Patient Instructions for exercises provided during therapy sessions    Assessment     Pt was reviewed through HEP due to poor compliance today. He requiring verbal and tactile cuing for performance of pelvic tilts as well as to improve spinal stability and glute control through sit to stand functional NMRe ed. Pt is very right both capsularly and throughout his soft tissue and reports pulling/stretching through LS suspected to be from lack of flexibility and use. Continue with slow progressions. Post treatment hip pain reduced from 4 to 2/10, but pt with report of low back "killing him" but assessment throughout session is indicated muscles working or expected stretching according to level of flexibility deficits.     Gio Is progressing well towards his goals.   Patient prognosis is Fair.     Patient will continue to benefit from skilled outpatient physical therapy to address the deficits listed in the problem list box on initial evaluation, provide pt/family education and to maximize pt's level of independence in the home and community environment.     Patient's spiritual, cultural and educational needs considered and pt agreeable to plan of care and goals.     Anticipated barriers to physical therapy: compliance with HEP    Goals: Short Term Goals: 2 weeks   Pt will report " compliance with HEP in order to optimize improvements with current POC.     Long Term Goals: 6 weeks   Pt will improve passive ER of L hip from 30 degrees to at least 45 degrees without pain.  Pt will become independent with HEP to target limitations in LQ flexibility to continue on his own to optimize soft tissue lengths.  Pt will improve glute strength to at least 4/5 to improve functional demands across     Plan     Plan of care Certification: 8/16/2024 to 9/27/2024.     Outpatient Physical Therapy 2 times weekly for 6 weeks to include the following interventions: Manual Therapy, Neuromuscular Re-ed, Patient Education, Therapeutic Activities, and Therapeutic Exercise.        Pam Kitchen, PT

## 2024-08-23 PROBLEM — K76.0 FATTY LIVER: Status: RESOLVED | Noted: 2018-12-20 | Resolved: 2024-08-23

## 2024-08-23 PROBLEM — Z86.010 HISTORY OF COLON POLYPS: Status: RESOLVED | Noted: 2017-09-14 | Resolved: 2024-08-23

## 2024-08-23 PROBLEM — E11.69 HYPERLIPIDEMIA DUE TO TYPE 2 DIABETES MELLITUS: Status: RESOLVED | Noted: 2022-01-30 | Resolved: 2024-08-23

## 2024-08-23 PROBLEM — M25.552 LEFT HIP PAIN: Status: RESOLVED | Noted: 2024-08-16 | Resolved: 2024-08-23

## 2024-08-23 PROBLEM — J34.3 NASAL TURBINATE HYPERTROPHY: Status: RESOLVED | Noted: 2018-01-31 | Resolved: 2024-08-23

## 2024-08-23 PROBLEM — M25.611 DECREASED ROM OF RIGHT SHOULDER: Status: RESOLVED | Noted: 2024-04-23 | Resolved: 2024-08-23

## 2024-08-23 PROBLEM — M54.9 CHRONIC BACK PAIN: Status: ACTIVE | Noted: 2024-08-23

## 2024-08-23 PROBLEM — R07.89 ATYPICAL CHEST PAIN: Status: ACTIVE | Noted: 2024-08-23

## 2024-08-23 PROBLEM — M79.641 CHRONIC PAIN OF RIGHT HAND: Status: RESOLVED | Noted: 2019-12-09 | Resolved: 2024-08-23

## 2024-08-23 PROBLEM — G89.29 CHRONIC LEFT SHOULDER PAIN: Status: RESOLVED | Noted: 2021-05-31 | Resolved: 2024-08-23

## 2024-08-23 PROBLEM — M25.652 HIP STIFFNESS, LEFT: Status: RESOLVED | Noted: 2024-08-16 | Resolved: 2024-08-23

## 2024-08-23 PROBLEM — E66.811 CLASS 1 OBESITY DUE TO EXCESS CALORIES WITH SERIOUS COMORBIDITY AND BODY MASS INDEX (BMI) OF 33.0 TO 33.9 IN ADULT: Status: RESOLVED | Noted: 2023-04-06 | Resolved: 2024-08-23

## 2024-08-23 PROBLEM — E78.5 HYPERLIPIDEMIA DUE TO TYPE 2 DIABETES MELLITUS: Status: RESOLVED | Noted: 2022-01-30 | Resolved: 2024-08-23

## 2024-08-23 PROBLEM — L72.3 SEBACEOUS CYST: Status: RESOLVED | Noted: 2017-04-18 | Resolved: 2024-08-23

## 2024-08-23 PROBLEM — Z86.0100 HISTORY OF COLON POLYPS: Status: RESOLVED | Noted: 2017-09-14 | Resolved: 2024-08-23

## 2024-08-23 PROBLEM — G89.29 CHRONIC BACK PAIN: Status: ACTIVE | Noted: 2024-08-23

## 2024-08-23 PROBLEM — N40.1 BENIGN PROSTATIC HYPERPLASIA WITH LOWER URINARY TRACT SYMPTOMS: Status: RESOLVED | Noted: 2020-03-08 | Resolved: 2024-08-23

## 2024-08-23 PROBLEM — G25.81 RESTLESS LEGS: Status: RESOLVED | Noted: 2020-03-08 | Resolved: 2024-08-23

## 2024-08-23 PROBLEM — I49.3 PVC'S (PREMATURE VENTRICULAR CONTRACTIONS): Status: RESOLVED | Noted: 2023-06-18 | Resolved: 2024-08-23

## 2024-08-23 PROBLEM — M25.619 LIMITED RANGE OF MOTION (ROM) OF SHOULDER: Status: RESOLVED | Noted: 2021-05-31 | Resolved: 2024-08-23

## 2024-08-23 PROBLEM — E83.42 HYPOMAGNESEMIA: Status: RESOLVED | Noted: 2022-05-30 | Resolved: 2024-08-23

## 2024-08-23 PROBLEM — I25.10 ATHEROSCLEROSIS OF NATIVE CORONARY ARTERY OF NATIVE HEART WITHOUT ANGINA PECTORIS: Status: RESOLVED | Noted: 2019-04-23 | Resolved: 2024-08-23

## 2024-08-23 PROBLEM — E66.09 CLASS 1 OBESITY DUE TO EXCESS CALORIES WITH SERIOUS COMORBIDITY AND BODY MASS INDEX (BMI) OF 33.0 TO 33.9 IN ADULT: Status: RESOLVED | Noted: 2023-04-06 | Resolved: 2024-08-23

## 2024-08-23 PROBLEM — M19.041 DEGENERATIVE ARTHRITIS OF METACARPOPHALANGEAL JOINT OF MIDDLE FINGER OF RIGHT HAND: Status: RESOLVED | Noted: 2021-05-12 | Resolved: 2024-08-23

## 2024-08-23 PROBLEM — I11.9 BENIGN HYPERTENSIVE HEART DISEASE: Status: RESOLVED | Noted: 2022-05-14 | Resolved: 2024-08-23

## 2024-08-23 PROBLEM — M25.512 CHRONIC LEFT SHOULDER PAIN: Status: RESOLVED | Noted: 2021-05-31 | Resolved: 2024-08-23

## 2024-08-23 PROBLEM — R53.1 WEAKNESS: Status: RESOLVED | Noted: 2024-08-16 | Resolved: 2024-08-23

## 2024-08-23 PROBLEM — M65.341 TRIGGER FINGER, RIGHT RING FINGER: Status: RESOLVED | Noted: 2021-05-12 | Resolved: 2024-08-23

## 2024-08-23 PROBLEM — R29.898 IMPAIRED FLEXIBILITY OF LOWER EXTREMITY: Status: RESOLVED | Noted: 2024-08-16 | Resolved: 2024-08-23

## 2024-08-23 PROBLEM — Z78.9 DIFFICULTY WITH CPAP USE: Status: RESOLVED | Noted: 2018-03-02 | Resolved: 2024-08-23

## 2024-08-23 PROBLEM — I95.1 ORTHOSTATIC HYPOTENSION: Status: RESOLVED | Noted: 2020-06-18 | Resolved: 2024-08-23

## 2024-08-23 PROBLEM — G89.29 CHRONIC PAIN OF RIGHT HAND: Status: RESOLVED | Noted: 2019-12-09 | Resolved: 2024-08-23

## 2024-08-23 PROBLEM — E11.9 TYPE 2 DIABETES MELLITUS WITHOUT COMPLICATION, WITHOUT LONG-TERM CURRENT USE OF INSULIN: Status: RESOLVED | Noted: 2024-03-05 | Resolved: 2024-08-23

## 2024-08-26 DIAGNOSIS — M70.61 GREATER TROCHANTERIC BURSITIS OF BOTH HIPS: Primary | ICD-10-CM

## 2024-08-26 DIAGNOSIS — M70.62 GREATER TROCHANTERIC BURSITIS OF BOTH HIPS: Primary | ICD-10-CM

## 2024-08-28 ENCOUNTER — OFFICE VISIT (OUTPATIENT)
Dept: ORTHOPEDICS | Facility: CLINIC | Age: 59
End: 2024-08-28
Payer: MEDICARE

## 2024-08-28 ENCOUNTER — HOSPITAL ENCOUNTER (OUTPATIENT)
Dept: RADIOLOGY | Facility: HOSPITAL | Age: 59
Discharge: HOME | End: 2024-08-28
Attending: NURSE PRACTITIONER
Payer: MEDICARE

## 2024-08-28 VITALS — HEIGHT: 74 IN | BODY MASS INDEX: 29.65 KG/M2 | WEIGHT: 231.06 LBS

## 2024-08-28 DIAGNOSIS — M70.61 GREATER TROCHANTERIC BURSITIS OF BOTH HIPS: ICD-10-CM

## 2024-08-28 DIAGNOSIS — M70.61 GREATER TROCHANTERIC BURSITIS OF BOTH HIPS: Primary | ICD-10-CM

## 2024-08-28 DIAGNOSIS — M70.62 GREATER TROCHANTERIC BURSITIS OF BOTH HIPS: Primary | ICD-10-CM

## 2024-08-28 DIAGNOSIS — M25.851 FEMOROACETABULAR IMPINGEMENT OF RIGHT HIP: ICD-10-CM

## 2024-08-28 DIAGNOSIS — S73.192A TEAR OF LEFT ACETABULAR LABRUM, INITIAL ENCOUNTER: ICD-10-CM

## 2024-08-28 DIAGNOSIS — M70.62 GREATER TROCHANTERIC BURSITIS OF BOTH HIPS: ICD-10-CM

## 2024-08-28 PROCEDURE — 99214 OFFICE O/P EST MOD 30 MIN: CPT | Mod: 25,S$GLB,, | Performed by: NURSE PRACTITIONER

## 2024-08-28 PROCEDURE — 3066F NEPHROPATHY DOC TX: CPT | Mod: CPTII,S$GLB,, | Performed by: NURSE PRACTITIONER

## 2024-08-28 PROCEDURE — 99999 PR PBB SHADOW E&M-EST. PATIENT-LVL III: CPT | Mod: PBBFAC,,, | Performed by: NURSE PRACTITIONER

## 2024-08-28 PROCEDURE — 20610 DRAIN/INJ JOINT/BURSA W/O US: CPT | Mod: 50,S$GLB,, | Performed by: NURSE PRACTITIONER

## 2024-08-28 PROCEDURE — 73521 X-RAY EXAM HIPS BI 2 VIEWS: CPT | Mod: 26,,, | Performed by: RADIOLOGY

## 2024-08-28 PROCEDURE — 1159F MED LIST DOCD IN RCRD: CPT | Mod: CPTII,S$GLB,, | Performed by: NURSE PRACTITIONER

## 2024-08-28 PROCEDURE — 1160F RVW MEDS BY RX/DR IN RCRD: CPT | Mod: CPTII,S$GLB,, | Performed by: NURSE PRACTITIONER

## 2024-08-28 PROCEDURE — 3008F BODY MASS INDEX DOCD: CPT | Mod: CPTII,S$GLB,, | Performed by: NURSE PRACTITIONER

## 2024-08-28 PROCEDURE — 73521 X-RAY EXAM HIPS BI 2 VIEWS: CPT | Mod: TC,PO

## 2024-08-28 PROCEDURE — 3061F NEG MICROALBUMINURIA REV: CPT | Mod: CPTII,S$GLB,, | Performed by: NURSE PRACTITIONER

## 2024-08-28 PROCEDURE — 1111F DSCHRG MED/CURRENT MED MERGE: CPT | Mod: CPTII,S$GLB,, | Performed by: NURSE PRACTITIONER

## 2024-08-28 PROCEDURE — 3044F HG A1C LEVEL LT 7.0%: CPT | Mod: CPTII,S$GLB,, | Performed by: NURSE PRACTITIONER

## 2024-08-28 RX ORDER — TRIAMCINOLONE ACETONIDE 40 MG/ML
40 INJECTION, SUSPENSION INTRA-ARTICULAR; INTRAMUSCULAR
Status: DISCONTINUED | OUTPATIENT
Start: 2024-08-28 | End: 2024-08-28 | Stop reason: HOSPADM

## 2024-08-28 RX ADMIN — TRIAMCINOLONE ACETONIDE 40 MG: 40 INJECTION, SUSPENSION INTRA-ARTICULAR; INTRAMUSCULAR at 11:08

## 2024-08-28 NOTE — PROGRESS NOTES
Chief Complaint   Patient presents with    Left Hip - Pain    Right Hip - Pain      HPI:   This is a 58 y.o. who presents to clinic today for bilateral hip pain for the past several months after no known trauma. Complains of pain while sleeping on side and when descending stairs. Pain is dull and deep. No numbness or tingling. No associated signs or symptoms. He reports he had MRI of hips done. Reviewed with the patient, and he has left hip labral tear and right hip impingement.       Past Medical History:   Diagnosis Date    Addiction 12/21/2021    Arthritis     Back pain     radiates to both legs but more on rt leg    Depression     HENDERSON (dyspnea on exertion)     Dyslipidemia 08/12/2015    Excessive daytime sleepiness 03/02/2018    Formatting of this note might be different from the original.  Added automatically from request for surgery [id number]    GERD (gastroesophageal reflux disease)     Gout 08/12/2015    Hypersomnia with sleep apnea 05/14/2022    Hypertension     Idiopathic gout     Lumbar pseudoarthrosis     Neck pain     nonradiating pain    Neuropathy     Obesity 08/12/2015    Obstructive sleep apnea 08/12/2015    cpap    Restless leg syndrome     Sebaceous cyst 04/18/2017    SVT (supraventricular tachycardia)     s/p ablation    Type 2 diabetes mellitus 08/12/2015     Past Surgical History:   Procedure Laterality Date    ABLATION N/A 10/08/2020    Procedure: Ablation;  Surgeon: Rip Che III, MD;  Location: [facility];  Service: Cardiology;  Laterality: N/A;    ARTHROPLASTY OF JOINT OF FINGER Right 04/12/2022    Procedure: Right middle finger MCP joint arthroplasty;  Surgeon: Luis Alberto Payne MD;  Location: [facility] OR;  Service: Orthopedics;  Laterality: Right;  Anesthesia:  General with a single-shot supraclavicular block    BACK SURGERY      multiple    CARDIAC CATHETERIZATION      CARDIAC ELECTROPHYSIOLOGY STUDY  10/08/2020    Procedure: Study possible ablation;  Surgeon: iRp Che III, MD;   Location: [facility] CATH;  Service: Cardiology;;    CERVICAL SPINE SURGERY      CHOLECYSTECTOMY  05/30/2018    Dr. ROGELIO Tao, [facility]     COLONOSCOPY  2008    COLONOSCOPY N/A 09/14/2017    Procedure: COLONOSCOPY;  Surgeon: Obi Beltre MD;  Location: [facility] ENDO;  Service: Endoscopy;  Laterality: N/A;    CORONARY ANGIOGRAPHY Left 05/28/2018    Procedure: Coronary angiography;  Surgeon: Rafiq Malik MD;  Location: [facility] CATH;  Service: Cardiology;  Laterality: Left;    ELBOW SURGERY Bilateral     EPIDURAL STEROID INJECTION INTO CERVICAL SPINE      EPIDURAL STEROID INJECTION INTO LUMBAR SPINE      HERNIA REPAIR      LAPAROSCOPIC APPENDECTOMY N/A 06/25/2020    Procedure: APPENDECTOMY, LAPAROSCOPIC;  Surgeon: Gordon Can MD;  Location: [facility] OR;  Service: General;  Laterality: N/A;    LAPAROSCOPIC CHOLECYSTECTOMY N/A 05/30/2018    Procedure: CHOLECYSTECTOMY, LAPAROSCOPIC;  Surgeon: Fletcher Tao MD;  Location: [facility] OR;  Service: General;  Laterality: N/A;    LAPAROSCOPIC SLEEVE GASTRECTOMY N/A 01/20/2023    Procedure: GASTRECTOMY, SLEEVE, LAPAROSCOPIC;  Surgeon: Bharat Devine MD;  Location: [facility] OR;  Service: General;  Laterality: N/A;    LEFT HEART CATHETERIZATION Left 05/28/2018    Procedure: Left heart cath;  Surgeon: Rafiq Malik MD;  Location: [facility] CATH;  Service: Cardiology;  Laterality: Left;    LEFT HEART CATHETERIZATION  8/26/2024    Procedure: Left heart cath Rm 3623;  Surgeon: Tom Quesada MD;  Location: [facility] CATH;  Service: Cardiology;;    NECK SURGERY      RADIOFREQUENCY ABLATION OF LUMBAR MEDIAL BRANCH NERVE AT SINGLE LEVEL Bilateral 06/17/2022    Procedure: Radiofrequency Ablation, Nerve, Spinal, Lumbar, Medial Branch, 1 Level L3,4,5;  Surgeon: Thiago Garcia MD;  Location: [facility] OR;  Service: Pain Management;  Laterality: Bilateral;    SHOULDER ARTHROSCOPY      SPINE SURGERY      multiple    TONSILLECTOMY      TREATMENT OF CARDIAC ARRHYTHMIA  10/08/2020    Procedure: Cardioversion  or Defibrillation;  Surgeon: Rip Che III, MD;  Location: [facility] CATH;  Service: Cardiology;;    TRIGGER FINGER RELEASE Right 09/09/2021    Procedure: RELEASE, TRIGGER FINGER;  Surgeon: Luis Alberto Payne MD;  Location: [facility] OR;  Service: Orthopedics;  Laterality: Right;  Procedure: Right ring finger trigger release    Position: Supine    Anesthesia: Local    Equipment: Basic handset    TYMPANOSTOMY TUBE PLACEMENT Right      Current Outpatient Medications on File Prior to Visit   Medication Sig Dispense Refill    aspirin (ECOTRIN) 81 MG EC tablet Take 81 mg by mouth once daily.      atorvastatin (LIPITOR) 40 MG tablet TAKE ONE TABLET BY MOUTH ONCE DAILY IN THE EVENING 90 tablet 4    calcium-vitamin D3 (OS-SOHAM 500 + D3) 500 mg-5 mcg (200 unit) per tablet Take 1 tablet by mouth once daily.      DEXCOM G6  Misc USE AS DIRECTED WITH SENSORS 1 each 0    DEXCOM G6 TRANSMITTER Oxana USE AS DIRECTED with sensors 1 each 0    DEXCOM G7 SENSOR Oxana CHANGE EVERY 10 DAYS 9 each 4    docusate sodium (COLACE) 100 MG capsule Take 200 mg by mouth 2 (two) times daily.      empagliflozin (JARDIANCE) 25 mg tablet Take 1 tablet (25 mg total) by mouth once daily. 90 tablet 3    fluticasone propionate (FLONASE) 50 mcg/actuation nasal spray 1 spray by Each Nostril route daily as needed for Allergies.      gabapentin (NEURONTIN) 600 MG tablet TAKE ONE TABLET BY MOUTH THREE TIMES DAILY 90 tablet 2    glimepiride (AMARYL) 1 MG tablet TAKE ONE TABLET BY MOUTH DAILY BEFORE BREAKFAST 90 tablet 3    metFORMIN (GLUCOPHAGE-XR) 500 MG ER 24hr tablet Take 2 tablet twice a day (Patient taking differently: Take 1,000 mg by mouth 2 (two) times daily with meals. Take 2 tablet twice a day) 360 tablet 3    methocarbamoL (ROBAXIN) 750 MG Tab Take 2 tablets (1,500 mg total) by mouth 3 (three) times daily as needed. 80 tablet 2    multivitamin capsule Take 1 capsule by mouth once daily.      pantoprazole (PROTONIX) 40 MG tablet Take 1 tablet (40  mg total) by mouth once daily. 30 tablet 2    vitamin D (VITAMIN D3) 1000 units Tab Take 1,000 Units by mouth once daily.      blood-glucose meter kit To check BG 2 times daily, to use with insurance preferred meter 1 each 1     No current facility-administered medications on file prior to visit.     Review of patient's allergies indicates:  No Known Allergies  Family History   Problem Relation Name Age of Onset    Diabetes Mother      Depression Mother      Liver cancer Mother      Obesity Mother      Heart disease Father      Anuerysm Father      Diabetes Father      Stroke Father      Glaucoma Paternal Grandmother      Obesity Sister       Social History     Socioeconomic History    Marital status:     Number of children: 1   Tobacco Use    Smoking status: Former     Current packs/day: 0.00     Average packs/day: 0.5 packs/day for 10.0 years (5.0 ttl pk-yrs)     Types: Cigarettes     Start date:      Quit date:      Years since quittin.6    Smokeless tobacco: Never   Substance and Sexual Activity    Alcohol use: Not Currently    Drug use: No    Sexual activity: Yes     Partners: Female   Social History Narrative              Social Determinants of Health     Financial Resource Strain: Medium Risk (2022)    Overall Financial Resource Strain (CARDIA)     Difficulty of Paying Living Expenses: Somewhat hard   Food Insecurity: No Food Insecurity (2024)    Hunger Vital Sign     Worried About Running Out of Food in the Last Year: Never true     Ran Out of Food in the Last Year: Never true   Transportation Needs: No Transportation Needs (2024)    TRANSPORTATION NEEDS     Transportation : No   Physical Activity: Insufficiently Active (2022)    Exercise Vital Sign     Days of Exercise per Week: 4 days     Minutes of Exercise per Session: 10 min   Stress: Stress Concern Present (2022)    [employer] - Occupational Stress Questionnaire      Feeling of Stress : To some extent   Housing Stability: Low Risk  (8/26/2024)    Housing Stability Vital Sign     Unable to Pay for Housing in the Last Year: No     Homeless in the Last Year: No       Review of Systems:  Constitutional:  Denies fever or chills   Eyes:  Denies change in visual acuity   HENT:  Denies nasal congestion or sore throat   Respiratory:  Denies cough or shortness of breath   Cardiovascular:  Denies chest pain or edema   GI:  Denies abdominal pain, nausea, vomiting, bloody stools or diarrhea   :  Denies dysuria   Integument:  Denies rash   Neurologic:  Denies headache, focal weakness or sensory changes   Endocrine:  Denies polyuria or polydipsia   Lymphatic:  Denies swollen glands   Psychiatric:  Denies depression or anxiety     Physical Exam:   Constitutional:  Well developed, well nourished, no acute distress, non-toxic appearance   Integument:  Well hydrated, no rash   Lymphatic:  No lymphadenopathy noted   Neurologic:  Alert & oriented x 3  Psychiatric:  Speech and behavior appropriate     Bilateral Hip Exam    Bilateral Hip Exam   Tenderness   The patient is experiencing tenderness in the greater trochanter.  Range of Motion   The patient has normal hip ROM.  Muscle Strength   Abduction: 4/5   Other   Erythema: absent  Sensation: normal  Pulse: present    X-rays were personally reviewed by me and findings discussed with the patient.  2 views of the bilateral hip show no fractures or dislocations; mild hip arthritis , more to left hip compared to right.         Assessment & plan    Greater trochanteric bursitis of both hips  -     Ambulatory referral/consult to Physical Medicine Rehab; Future; Expected date: 09/04/2024  -     Large Joint Aspiration/Injection: bilateral greater trochanteric bursa  -     triamcinolone acetonide injection 40 mg  -     triamcinolone acetonide injection 40 mg    Tear of left acetabular labrum, initial encounter  -     Ambulatory referral/consult to Physical  Medicine Rehab; Future; Expected date: 09/04/2024    Femoroacetabular impingement of right hip  -     Ambulatory referral/consult to Physical Medicine Rehab; Future; Expected date: 09/04/2024       Using an aseptic technique, I injected 5 cc of lidocaine 1% without and 1 cc of kenalog 40mg into the bilateral Hip. The patient tolerated this well.    Reviewed hip MRI and he does have left hip labral tear and right hip impingement. We discussed he may need bilateral hip joint injections. However, his pain is outer hip, more consistent with hip bursitis. We discussed if not a lot of relief with bursa injections, will need hip joint injections.   Referral to Eli Carty MD to evaluate for bilateral hip joint injections.     Rtc as needed

## 2024-08-28 NOTE — PROCEDURES
Large Joint Aspiration/Injection: bilateral greater trochanteric bursa    Date/Time: 8/28/2024 11:00 AM    Performed by: Robina Hassan FNP  Authorized by: Robina Hassan FNP    Consent Done?:  Yes (Verbal)  Indications:  Pain  Timeout: prior to procedure the correct patient, procedure, and site was verified    Prep: patient was prepped and draped in usual sterile fashion      Local anesthesia used?: Yes    Local anesthetic:  Lidocaine 1% without epinephrine  Anesthetic total (ml):  5      Details:  Needle Size:  21 G  Approach:  Lateral  Location:  Hip  Laterality:  Bilateral  Site:  Bilateral greater trochanteric bursa  Medications (Right):  40 mg triamcinolone acetonide 40 mg/mL  Medications (Left):  40 mg triamcinolone acetonide 40 mg/mL  Patient tolerance:  Patient tolerated the procedure well with no immediate complications

## 2024-09-03 ENCOUNTER — OFFICE VISIT (OUTPATIENT)
Dept: FAMILY MEDICINE | Facility: CLINIC | Age: 59
End: 2024-09-03
Payer: MEDICARE

## 2024-09-03 ENCOUNTER — CLINICAL SUPPORT (OUTPATIENT)
Dept: PHYSICAL MEDICINE AND REHAB | Facility: CLINIC | Age: 59
End: 2024-09-03
Payer: MEDICARE

## 2024-09-03 VITALS
HEART RATE: 71 BPM | BODY MASS INDEX: 29.34 KG/M2 | HEIGHT: 74 IN | SYSTOLIC BLOOD PRESSURE: 102 MMHG | DIASTOLIC BLOOD PRESSURE: 72 MMHG | OXYGEN SATURATION: 97 % | WEIGHT: 228.63 LBS

## 2024-09-03 VITALS — SYSTOLIC BLOOD PRESSURE: 111 MMHG | DIASTOLIC BLOOD PRESSURE: 76 MMHG | HEART RATE: 78 BPM

## 2024-09-03 DIAGNOSIS — M25.851 FEMOROACETABULAR IMPINGEMENT OF RIGHT HIP: ICD-10-CM

## 2024-09-03 DIAGNOSIS — G89.29 CHRONIC HIP PAIN, BILATERAL: Primary | ICD-10-CM

## 2024-09-03 DIAGNOSIS — M25.551 CHRONIC HIP PAIN, BILATERAL: Primary | ICD-10-CM

## 2024-09-03 DIAGNOSIS — R07.89 ATYPICAL CHEST PAIN: ICD-10-CM

## 2024-09-03 DIAGNOSIS — E11.9 TYPE 2 DIABETES MELLITUS WITHOUT COMPLICATION, UNSPECIFIED WHETHER LONG TERM INSULIN USE: ICD-10-CM

## 2024-09-03 DIAGNOSIS — Z77.090 HISTORY OF EXPOSURE TO ASBESTOS: ICD-10-CM

## 2024-09-03 DIAGNOSIS — M70.62 GREATER TROCHANTERIC BURSITIS OF BOTH HIPS: ICD-10-CM

## 2024-09-03 DIAGNOSIS — M25.552 CHRONIC HIP PAIN, BILATERAL: Primary | ICD-10-CM

## 2024-09-03 DIAGNOSIS — Z09 HOSPITAL DISCHARGE FOLLOW-UP: Primary | ICD-10-CM

## 2024-09-03 DIAGNOSIS — R06.09 DYSPNEA ON EXERTION: ICD-10-CM

## 2024-09-03 DIAGNOSIS — M70.61 GREATER TROCHANTERIC BURSITIS OF BOTH HIPS: ICD-10-CM

## 2024-09-03 DIAGNOSIS — S73.192A TEAR OF LEFT ACETABULAR LABRUM, INITIAL ENCOUNTER: ICD-10-CM

## 2024-09-03 PROCEDURE — 1159F MED LIST DOCD IN RCRD: CPT | Mod: CPTII,S$GLB,, | Performed by: PHYSICIAN ASSISTANT

## 2024-09-03 PROCEDURE — 1160F RVW MEDS BY RX/DR IN RCRD: CPT | Mod: CPTII,S$GLB,, | Performed by: PHYSICIAN ASSISTANT

## 2024-09-03 PROCEDURE — 3008F BODY MASS INDEX DOCD: CPT | Mod: CPTII,S$GLB,, | Performed by: PHYSICIAN ASSISTANT

## 2024-09-03 PROCEDURE — 3066F NEPHROPATHY DOC TX: CPT | Mod: CPTII,S$GLB,, | Performed by: PHYSICIAN ASSISTANT

## 2024-09-03 PROCEDURE — 3061F NEG MICROALBUMINURIA REV: CPT | Mod: CPTII,S$GLB,, | Performed by: PHYSICIAN ASSISTANT

## 2024-09-03 PROCEDURE — 20611 DRAIN/INJ JOINT/BURSA W/US: CPT | Mod: 50,S$GLB,, | Performed by: PHYSICAL MEDICINE & REHABILITATION

## 2024-09-03 PROCEDURE — 3074F SYST BP LT 130 MM HG: CPT | Mod: CPTII,S$GLB,, | Performed by: PHYSICIAN ASSISTANT

## 2024-09-03 PROCEDURE — 3078F DIAST BP <80 MM HG: CPT | Mod: CPTII,S$GLB,, | Performed by: PHYSICIAN ASSISTANT

## 2024-09-03 PROCEDURE — 99204 OFFICE O/P NEW MOD 45 MIN: CPT | Mod: 25,S$GLB,, | Performed by: PHYSICAL MEDICINE & REHABILITATION

## 2024-09-03 PROCEDURE — 99999 PR PBB SHADOW E&M-EST. PATIENT-LVL V: CPT | Mod: PBBFAC,,, | Performed by: PHYSICIAN ASSISTANT

## 2024-09-03 PROCEDURE — 1111F DSCHRG MED/CURRENT MED MERGE: CPT | Mod: CPTII,S$GLB,, | Performed by: PHYSICIAN ASSISTANT

## 2024-09-03 PROCEDURE — 99214 OFFICE O/P EST MOD 30 MIN: CPT | Mod: S$GLB,,, | Performed by: PHYSICIAN ASSISTANT

## 2024-09-03 PROCEDURE — 99999 PR PBB SHADOW E&M-EST. PATIENT-LVL III: CPT | Mod: PBBFAC,,, | Performed by: PHYSICAL MEDICINE & REHABILITATION

## 2024-09-03 PROCEDURE — 3044F HG A1C LEVEL LT 7.0%: CPT | Mod: CPTII,S$GLB,, | Performed by: PHYSICIAN ASSISTANT

## 2024-09-03 RX ORDER — TRIAMCINOLONE ACETONIDE 40 MG/ML
40 INJECTION, SUSPENSION INTRA-ARTICULAR; INTRAMUSCULAR
Status: DISCONTINUED | OUTPATIENT
Start: 2024-09-03 | End: 2024-09-03 | Stop reason: HOSPADM

## 2024-09-03 RX ORDER — LIDOCAINE HYDROCHLORIDE 10 MG/ML
4 INJECTION, SOLUTION INFILTRATION; PERINEURAL
Status: DISCONTINUED | OUTPATIENT
Start: 2024-09-03 | End: 2024-09-03 | Stop reason: HOSPADM

## 2024-09-03 RX ADMIN — TRIAMCINOLONE ACETONIDE 40 MG: 40 INJECTION, SUSPENSION INTRA-ARTICULAR; INTRAMUSCULAR at 08:09

## 2024-09-03 RX ADMIN — LIDOCAINE HYDROCHLORIDE 4 ML: 10 INJECTION, SOLUTION INFILTRATION; PERINEURAL at 08:09

## 2024-09-03 NOTE — PROCEDURES
Large Joint Aspiration/Injection: bilateral hip joint    Date/Time: 9/3/2024 8:30 AM    Performed by: Eli Carty, DO  Authorized by: Eli Carty, DO    Consent Done?:  Yes (Verbal)  Indications:  Arthritis, diagnostic evaluation and pain  Site marked: the procedure site was marked    Timeout: prior to procedure the correct patient, procedure, and site was verified    Prep: patient was prepped and draped in usual sterile fashion      Local anesthesia used?: Yes    Anesthesia:  Local infiltration  Local anesthetic:  Lidocaine 1% without epinephrine  Anesthetic total (ml):  2      Details:  Needle Size:  22 G  Ultrasonic Guidance for needle placement?: Yes    Images are saved and documented.  Approach:  Anterior  Location:  Hip  Laterality:  Bilateral  Site:  Bilateral hip joint  Medications (Right):  4 mL LIDOcaine HCL 10 mg/ml (1%) 10 mg/mL (1 %); 40 mg triamcinolone acetonide 40 mg/mL  Medications (Left):  4 mL LIDOcaine HCL 10 mg/ml (1%) 10 mg/mL (1 %); 40 mg triamcinolone acetonide 40 mg/mL  Patient tolerance:  Patient tolerated the procedure well with no immediate complications     Ultrasound guidance was used for correct needle placement, the images were saved and will be uploaded to EMR.

## 2024-09-03 NOTE — PROGRESS NOTES
"Subjective:      Patient ID: Gio Forrest is a 58 y.o. male.    Chief Complaint: Hospital Follow Up    HPI  Patient has PMH of SVT with ablation, Type 2 DM, gastric sleeve, and TARYN using CPAP nightly.    Patient went to [facility] 8/23/2024 to 8/26/2024 with intermittent chest pain and dyspnea on exertion.Ruled out MI in ER (nuclear stress was negative), but was admitted for observation.  Left heart catherization showed minimal nonobstructive disease in LAD.    Today chest pain has decreased, but still feeling winded when doing activities.  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  Smoked in the late 80s to early 90s and dipped tobacco as well.  Not doing much exercise now.    Lab Results   Component Value Date    HGBA1C 6.4 (H) 08/28/2024      Review of Systems   Constitutional:  Negative for appetite change, chills, fatigue and fever.   Respiratory:  Positive for shortness of breath. Negative for cough.    Cardiovascular:  Positive for chest pain (decreasing).   Gastrointestinal:  Negative for nausea and vomiting.   Musculoskeletal:  Positive for arthralgias (hip pain).   Neurological:  Negative for dizziness, light-headedness and headaches.   Psychiatric/Behavioral:  Negative for sleep disturbance.        Objective:   /72   Pulse 71   Ht 6' 2" (1.88 m)   Wt 103.7 kg (228 lb 9.9 oz)   SpO2 97%   BMI 29.35 kg/m²     Physical Exam  Vitals reviewed.   Constitutional:       Appearance: Normal appearance. He is well-developed.   HENT:      Head: Normocephalic and atraumatic.      Right Ear: External ear normal.      Left Ear: External ear normal.   Eyes:      Conjunctiva/sclera: Conjunctivae normal.   Cardiovascular:      Rate and Rhythm: Normal rate and regular rhythm.      Heart sounds: Normal heart sounds. No murmur heard.     No friction rub. No gallop.   Pulmonary:      Effort: Pulmonary effort is normal. No respiratory " distress.      Breath sounds: Normal breath sounds. No wheezing, rhonchi or rales.   Musculoskeletal:         General: Normal range of motion.   Skin:     General: Skin is warm and dry.      Findings: No rash.   Neurological:      General: No focal deficit present.      Mental Status: He is alert and oriented to person, place, and time.   Psychiatric:         Mood and Affect: Mood normal.         Behavior: Behavior normal.         Judgment: Judgment normal.       Assessment:      1. Hospital discharge follow-up    2. Atypical chest pain    3. Dyspnea on exertion    4. History of exposure to asbestos    5. Type 2 diabetes mellitus without complication, unspecified whether long term insulin use       Plan:   1. Hospital discharge follow-up  Improving somewhat.    2. Atypical chest pain  Decreasing.    3. Dyspnea on exertion  Continued.  - Ambulatory referral/consult to Pulmonology; Future    4. History of exposure to asbestos  - Ambulatory referral/consult to Pulmonology; Future    5. Type 2 diabetes mellitus without complication, unspecified whether long term insulin use  Controlled.    Follow up in 3 months with Dr. Carroll.  Patient agreed with plan and expressed understanding.    Thank you for allowing me to serve you,

## 2024-09-03 NOTE — PROGRESS NOTES
[facility]    Consulting Provider: Robina Hassan  PCP: KAYLIE Carroll MD    CHIEF COMPLAINT:   Chief Complaint   Patient presents with    Hip Pain     Both      HISTORY OF PRESENT ILLNESS: Gio Forrest is a 58 y.o. male who presents to me for evaluation and management of bilateral hip pain.    Today reports, chronic bilateral hip pain without noted traumatic event. Majority of pain is the outer/lateral hip/buttock area. Recently given steroid injections to GTB without any significant relief in his pain. Referred by Ortho to discuss options for intra-articular hip management. Currently in PT without signifciant relief. Requesting steroid injections to hips.     Medications:  Injections:  - bilateral GTB steroids 08/28/2024  Therapies: in PT  Bracing: none  DME: none    Review of Systems   Constitutional: Negative for fever.   HENT: Negative for drooling.    Eyes: Negative for discharge.   Respiratory: Negative for choking.    Cardiovascular: Negative for chest pain.   Genitourinary: Negative for flank pain.   Skin: Negative for wound.   Allergic/Immunologic: Negative for immunocompromised state.   Neurological: Negative for tremors and syncope.   Psychiatric/Behavioral: Negative for behavioral problems.     Past Medical History:   Past Medical History:   Diagnosis Date    Addiction 12/21/2021    Arthritis     Back pain     radiates to both legs but more on rt leg    Depression     HENDERSON (dyspnea on exertion)     Dyslipidemia 08/12/2015    Excessive daytime sleepiness 03/02/2018    Formatting of this note might be different from the original.  Added automatically from request for surgery [id number]    GERD (gastroesophageal reflux disease)     Gout 08/12/2015    Hypersomnia with sleep apnea 05/14/2022    Hypertension     Idiopathic gout     Lumbar pseudoarthrosis     Neck pain     nonradiating pain    Neuropathy     Obesity 08/12/2015    Obstructive sleep apnea  08/12/2015    cpap    Restless leg syndrome     Sebaceous cyst 04/18/2017    SVT (supraventricular tachycardia)     s/p ablation    Type 2 diabetes mellitus 08/12/2015       Past Surgical History:   Past Surgical History:   Procedure Laterality Date    ABLATION N/A 10/08/2020    Procedure: Ablation;  Surgeon: Rip Che III, MD;  Location: [facility] CATH;  Service: Cardiology;  Laterality: N/A;    ARTHROPLASTY OF JOINT OF FINGER Right 04/12/2022    Procedure: Right middle finger MCP joint arthroplasty;  Surgeon: Luis Alberto Payne MD;  Location: [facility] OR;  Service: Orthopedics;  Laterality: Right;  Anesthesia:  General with a single-shot supraclavicular block    BACK SURGERY      multiple    CARDIAC CATHETERIZATION      CARDIAC ELECTROPHYSIOLOGY STUDY  10/08/2020    Procedure: Study possible ablation;  Surgeon: Rip Che III, MD;  Location: [facility] CATH;  Service: Cardiology;;    CERVICAL SPINE SURGERY      CHOLECYSTECTOMY  05/30/2018    Dr. ROGELIO Tao, [facility]     COLONOSCOPY  2008    COLONOSCOPY N/A 09/14/2017    Procedure: COLONOSCOPY;  Surgeon: Obi Beltre MD;  Location: [facility] ENDO;  Service: Endoscopy;  Laterality: N/A;    CORONARY ANGIOGRAPHY Left 05/28/2018    Procedure: Coronary angiography;  Surgeon: Rafiq Malik MD;  Location: [facility] CATH;  Service: Cardiology;  Laterality: Left;    ELBOW SURGERY Bilateral     EPIDURAL STEROID INJECTION INTO CERVICAL SPINE      EPIDURAL STEROID INJECTION INTO LUMBAR SPINE      HERNIA REPAIR      LAPAROSCOPIC APPENDECTOMY N/A 06/25/2020    Procedure: APPENDECTOMY, LAPAROSCOPIC;  Surgeon: Gordon Can MD;  Location: [facility] OR;  Service: General;  Laterality: N/A;    LAPAROSCOPIC CHOLECYSTECTOMY N/A 05/30/2018    Procedure: CHOLECYSTECTOMY, LAPAROSCOPIC;  Surgeon: Fletcher Tao MD;  Location: [facility] OR;  Service: General;  Laterality: N/A;    LAPAROSCOPIC SLEEVE GASTRECTOMY N/A 01/20/2023    Procedure: GASTRECTOMY, SLEEVE, LAPAROSCOPIC;  Surgeon: Bharat Devine  MD;  Location: [facility] OR;  Service: General;  Laterality: N/A;    LEFT HEART CATHETERIZATION Left 05/28/2018    Procedure: Left heart cath;  Surgeon: Rafiq Malik MD;  Location: [facility] CATH;  Service: Cardiology;  Laterality: Left;    LEFT HEART CATHETERIZATION  8/26/2024    Procedure: Left heart cath Rm 3623;  Surgeon: Tom Quesada MD;  Location: [facility] CATH;  Service: Cardiology;;    NECK SURGERY      RADIOFREQUENCY ABLATION OF LUMBAR MEDIAL BRANCH NERVE AT SINGLE LEVEL Bilateral 06/17/2022    Procedure: Radiofrequency Ablation, Nerve, Spinal, Lumbar, Medial Branch, 1 Level L3,4,5;  Surgeon: Thiago Garcia MD;  Location: [facility] OR;  Service: Pain Management;  Laterality: Bilateral;    SHOULDER ARTHROSCOPY      SPINE SURGERY      multiple    TONSILLECTOMY      TREATMENT OF CARDIAC ARRHYTHMIA  10/08/2020    Procedure: Cardioversion or Defibrillation;  Surgeon: Rip Che III, MD;  Location: [facility] CATH;  Service: Cardiology;;    TRIGGER FINGER RELEASE Right 09/09/2021    Procedure: RELEASE, TRIGGER FINGER;  Surgeon: Luis Alberto Payne MD;  Location: [facility] OR;  Service: Orthopedics;  Laterality: Right;  Procedure: Right ring finger trigger release    Position: Supine    Anesthesia: Local    Equipment: Basic handset    TYMPANOSTOMY TUBE PLACEMENT Right        Family History:   Family History   Problem Relation Name Age of Onset    Diabetes Mother      Depression Mother      Liver cancer Mother      Obesity Mother      Heart disease Father      Anuerysm Father      Diabetes Father      Stroke Father      Glaucoma Paternal Grandmother      Obesity Sister         Medications:   Current Outpatient Medications on File Prior to Visit   Medication Sig Dispense Refill    aspirin (ECOTRIN) 81 MG EC tablet Take 81 mg by mouth once daily.      atorvastatin (LIPITOR) 40 MG tablet TAKE ONE TABLET BY MOUTH ONCE DAILY IN THE EVENING 90 tablet 4    blood-glucose meter kit To check BG 2 times daily, to use with insurance  preferred meter 1 each 1    calcium-vitamin D3 (OS-SOHAM 500 + D3) 500 mg-5 mcg (200 unit) per tablet Take 1 tablet by mouth once daily.      DEXCOM G6  Misc USE AS DIRECTED WITH SENSORS 1 each 0    DEXCOM G6 TRANSMITTER Oxana USE AS DIRECTED with sensors 1 each 0    DEXCOM G7 SENSOR Oxana CHANGE EVERY 10 DAYS 9 each 4    docusate sodium (COLACE) 100 MG capsule Take 200 mg by mouth 2 (two) times daily.      empagliflozin (JARDIANCE) 25 mg tablet Take 1 tablet (25 mg total) by mouth once daily. 90 tablet 3    fluticasone propionate (FLONASE) 50 mcg/actuation nasal spray 1 spray by Each Nostril route daily as needed for Allergies.      gabapentin (NEURONTIN) 600 MG tablet TAKE ONE TABLET BY MOUTH THREE TIMES DAILY 90 tablet 2    glimepiride (AMARYL) 1 MG tablet TAKE ONE TABLET BY MOUTH DAILY BEFORE BREAKFAST 90 tablet 3    metFORMIN (GLUCOPHAGE-XR) 500 MG ER 24hr tablet Take 2 tablet twice a day (Patient taking differently: Take 1,000 mg by mouth 2 (two) times daily with meals. Take 2 tablet twice a day) 360 tablet 3    methocarbamoL (ROBAXIN) 750 MG Tab Take 2 tablets (1,500 mg total) by mouth 3 (three) times daily as needed. 80 tablet 2    multivitamin capsule Take 1 capsule by mouth once daily.      pantoprazole (PROTONIX) 40 MG tablet Take 1 tablet (40 mg total) by mouth once daily. 30 tablet 2    vitamin D (VITAMIN D3) 1000 units Tab Take 1,000 Units by mouth once daily.       No current facility-administered medications on file prior to visit.       Allergies: Review of patient's allergies indicates:  No Known Allergies    Social History:   Social History     Socioeconomic History    Marital status:     Number of children: 1   Tobacco Use    Smoking status: Former     Current packs/day: 0.00     Average packs/day: 0.5 packs/day for 10.0 years (5.0 ttl pk-yrs)     Types: Cigarettes     Start date:      Quit date:      Years since quittin.6    Smokeless tobacco: Never   Substance and Sexual  Activity    Alcohol use: Not Currently    Drug use: No    Sexual activity: Yes     Partners: Female   Social History Narrative              Social Determinants of Health     Financial Resource Strain: Medium Risk (1/24/2022)    Overall Financial Resource Strain (CARDIA)     Difficulty of Paying Living Expenses: Somewhat hard   Food Insecurity: No Food Insecurity (8/26/2024)    Hunger Vital Sign     Worried About Running Out of Food in the Last Year: Never true     Ran Out of Food in the Last Year: Never true   Transportation Needs: No Transportation Needs (8/26/2024)    TRANSPORTATION NEEDS     Transportation : No   Physical Activity: Insufficiently Active (1/24/2022)    Exercise Vital Sign     Days of Exercise per Week: 4 days     Minutes of Exercise per Session: 10 min   Stress: Stress Concern Present (1/24/2022)    [employer] - Occupational Stress Questionnaire     Feeling of Stress : To some extent   Housing Stability: Low Risk  (8/26/2024)    Housing Stability Vital Sign     Unable to Pay for Housing in the Last Year: No     Homeless in the Last Year: No       PHYSICAL EXAMINATION:   Vitals:    09/03/24 0841   BP: 111/76   Pulse: 78     Constitutional: No apparent distress. Pleasant.  HENT: Trachea midline.  Head: Normocephalic and atraumatic.   Eyes: Right eye exhibits no discharge. Left eye exhibits no discharge. No scleral icterus.   CV: Well perfused.   Pulmonary/Chest: Effort normal. No respiratory distress.   Abdominal: There is no guarding.   Neurological: Awake, alert and cooperative.  SKIN: Intact no apparent lesions, cut, ulcers or abrasions  EXT:  No cyanosis, clubbing, or edema.  SENSORY: Intact to light touch in the bilateral lower extemities.  MUSCULOSKELETAL:   Muscle Strength:(0-5)                             Right     Left  Hip Flexors                5  5  Hip Abductor              5  5  Hip Adductor              5  5  Knee Extensors          5  5  Knee  Flexors              5  5  Ankle Dorsiflex           5  5  Ankle Planterflex        5  5  EHL                            5  5    Reflexes: (0-4+/4)   Right     Left  Patellar(L4)  2+  2+  Ankle(S1)  2+  2+    INSPECTION:         Right     Left   Localized/Generalized swelling:   -  -  Muscle contours normal and symmetrical: +  +  Erythema:      -  -  Gross deformity:    -  -    GAIT: smooth and symmetrical with equal arm swing    RANGE OF MOTION:           Right      Left  Hip Internal Rotation:  Full  Full  Hip External Rotation:  Full  Full  Log Rolling:   Full  Full  Other:     TENDERNESS:                 Right      Left  Ischial tuberosity:       -  -  Iliolumbar ligament:      -  -  Intraspinous ligaments:    -  -  Trochanteric bursa:     +  +  Gluteal muscles:          -  -  SI Joints:           -  -  Axial lumbar loading:   -  -  Simulated trunk rotation: -  -     SPECIAL TESTS:         Right     Left  Seated SLR:       -  -  SLR @ 70 degrees:              -  -  Braggart's (DF ankle):  -  -  Gaenslen's (SIJ):    -  -   Teodoro's (SEGUNDO):  -  -  Piriformis stretch:  -  -  FAIR:    +  +  Ankle clonus:   -  -  Babinski:   -  -  Other: tenderness to bilateral IT bands    Imaging  XR bilateral hip 08/28/2024 with noted: degenerative changes to FA joints with joint loss. Extensive back surgical changes with hardware present    MRI left hip 06/14/2024 with noted:   1. Probable tear of the posterosuperior left hip labrum.  No specific imaging evidence of cam type or pincer type femoroacetabular impingement.  No paralabral cyst formation.  2. Synovial herniation pit formation at the anterior aspect of the right femoral head-neck junction, incompletely assessed.  While nonspecific, this imaging finding can be seen in the setting of cam type femoroacetabular impingement.  No discrete labral pathology appreciated in the right hip on this limited evaluation.  3. Insertional tendinopathy/tendinosis of the left hamstring  "tendon with superimposed low-grade partial-thickness insertional tearing of the left hamstring tendon.  4. Insertional tendinopathy/tendinosis and low-grade partial-thickness tearing of the posteromedial fibers of the left gluteus medius tendon at its attachment with 9 mm adjacent ganglion cyst noted.    Data Reviewed: X-ray    Supportive Actions: Independent visualization of images or test specimens.    ASSESSMENT:   1. Chronic hip pain, bilateral    2. Greater trochanteric bursitis of both hips    3. Tear of left acetabular labrum, initial encounter    4. Femoroacetabular impingement of right hip        PLAN:   1. Time was spent reviewing the above diagnosis in depth with Gio today, including acute management and rehabilitation.     2. We discussed options for management of his pain would be to consider injection of short acting steroid. The use, benefits, risks, and expectations of all of these types of injections was discussed at length with him today. At this time, he elects to proceed with ultrasound-guided bilateral intra-articular hip steroid injection(s). This procedure was completed today in clinic. Please see procedure note for further details. We can repeat this every 3 months if appropriate.     3. If less than ideal response would proceed with continued management with Interventional Pain/Back and Spine.      RTC as needed.     This is a consult from Robina Hassan. Please see the "Communications" section of Epic to see how the consulting physician received the report of today's findings and recommendations. If it's an [facility] provider, it will be forwarded to his/her "in basket".      46 minutes of total time spent on the encounter, which includes face to face time and non-face to face time preparing to see the patient (eg, review of tests), obtaining and/or reviewing separately obtained history, documenting clinical information in the electronic or other health record, independently interpreting " results (not separately reported), communicating results to the patient/family/caregiver, and/or care coordination (not separately reported).

## 2024-09-04 ENCOUNTER — TELEPHONE (OUTPATIENT)
Dept: PULMONOLOGY | Facility: CLINIC | Age: 59
End: 2024-09-04
Payer: MEDICARE

## 2024-09-04 NOTE — TELEPHONE ENCOUNTER
Spoke to patient.  New patient appt is scheduled with Mary Lowery NP.  Pt verbalized understanding.

## 2024-09-04 NOTE — TELEPHONE ENCOUNTER
----- Message from Celia Sibley sent at 9/4/2024 12:37 PM CDT -----  Regarding: sooner apt referral  Contact: pt  Type:  Sooner Appointment Request    Caller is requesting a sooner appointment.  Caller declined first available appointment listed below.  Caller will not accept being placed on the waitlist and is requesting a message be sent to doctor.    Name of Caller:  patient   When is the first available appointment?    Symptoms:  referral see orders  Would the patient rather a call back or a response via [facility]?   Best Call Back Number:  [phone]    Additional Information:  call to be seen thanks

## 2024-09-05 ENCOUNTER — OFFICE VISIT (OUTPATIENT)
Dept: ENDOCRINOLOGY | Facility: CLINIC | Age: 59
End: 2024-09-05
Payer: MEDICARE

## 2024-09-05 VITALS
SYSTOLIC BLOOD PRESSURE: 128 MMHG | HEART RATE: 82 BPM | DIASTOLIC BLOOD PRESSURE: 64 MMHG | HEIGHT: 74 IN | OXYGEN SATURATION: 98 % | WEIGHT: 226.94 LBS | BODY MASS INDEX: 29.13 KG/M2

## 2024-09-05 DIAGNOSIS — E11.8 DM TYPE 2, CONTROLLED, WITH COMPLICATION: Primary | ICD-10-CM

## 2024-09-05 DIAGNOSIS — I10 ESSENTIAL HYPERTENSION: ICD-10-CM

## 2024-09-05 DIAGNOSIS — E78.2 MIXED HYPERLIPIDEMIA: ICD-10-CM

## 2024-09-05 PROCEDURE — 99999 PR PBB SHADOW E&M-EST. PATIENT-LVL III: CPT | Mod: PBBFAC,,, | Performed by: NURSE PRACTITIONER

## 2024-09-05 NOTE — PROGRESS NOTES
Chart review completed 2022.  Care Everywhere updates requested and reviewed.  Immunizations reconciled. Media reports reviewed.  Duplicate HM overrides and  orders removed.  Overdue HM topic chart audit and/or requested.  Overdue lab testing linked to upcoming lab appointments if applies.         To Dr MITTAL,    Please advise

## 2024-09-05 NOTE — PROGRESS NOTES
CC: This 58 y.o. male presents for management of diabetes  and chronic conditions pending review including HTN, HLP, morbid obesity, fatty liver, vitamin d deficiency, CM, CAD     HPI: He was diagnosed with T2DM in 2005. Has never been hospitalized r/t DM.  Mother has passed away from Fatty liver and hepatic carcinoma in 2018  Family hx of DM: sister, mom and dad  Gastric sleeve in January 2023- has lost ~ 100 lbs      Since his last visit-   Hospitalized with chest pain last week  He had 4 rounds of steroids in his hips in past week 8/28 and 9/3    BG are elevated post steroid injections- gave himself 10 units Nov last night to treat elevation     Wearing Dexcom G7   See Dexcom download in Media tab  18% Very High  31% High  51% In Range  0% Low  0% Very Low  Until last Thursday bg well controlled, since then has been having very large pp excursions    Diet Eats 3 meals a day  B egg, grits, sausage   L-  dinner leftovers or a sandwich   D- protein, veg, carb   BT vanilla wafer     Exercise: has a gym membership but has not been going recently     CURRENT DM MEDS:  jardaince 25 mg qam, metformin xr 500 mg 2 tabs bid, glimepiride 1 mg qam  Glucometer type: True Metrix  Standards of Care:  Eye exam: 5/2024  no h/o retinopathy, [facility]-      Following w Dr Quesada in cardiology      ROS:   Gen: Appetite good, weight loss 7 lbs  Eyes: Denies visual disturbances  Resp: no SOB or HENDERSON   Cardiac: No palpitations, chest pain   GI: No nausea or vomiting, diarrhea, no constipation   /GYN: No nocturia, burning or pain.   MS/Neuro: +numbness/ tingling in BLE; Gait steady, speech clear  Other systems: negative.    PE:  GENERAL: Well developed, well nourished.appears older than age.   PSYCH: AAOx3, appropriate mood and affect, pleasant expression, conversant, appears relaxed, well groomed.   EYES: Conjunctiva, corneas clear  NECK: Supple, trachea midline   NEURO: Gait steady  SKIN:   no acanthosis nigracans.  FOOT  EXAMINATION: 3/5/2024  No foot deformity, corns or callus formation,  nails in good condiiton and well trimmed, no interspace maceration or ulceration noted.  Decreased hair growth present over toes/feet.    Protective sensation intact with 10 gram monofilament.  +2 dorsalis pedis and posterior pulses noted.    Left great tow w bruise noted under nail    Lab Results   Component Value Date    MICALBCREAT Unable to calculate 08/28/2024       Hemoglobin A1C   Date Value Ref Range Status   08/28/2024 6.4 (H) 4.0 - 5.6 % Final     Comment:     ADA Screening Guidelines:  5.7-6.4%  Consistent with prediabetes  >or=6.5%  Consistent with diabetes    High levels of fetal hemoglobin interfere with the HbA1C  assay. Heterozygous hemoglobin variants (HbS, HgC, etc)do  not significantly interfere with this assay.   However, presence of multiple variants may affect accuracy.     08/24/2024 6.3 (H) 0.0 - 5.6 % Final     Comment:     Reference Interval:  5.0 - 5.6 Normal   5.7 - 6.4 High Risk   > 6.5 Diabetic      Hgb A1c results are standardized based on the (NGSP) National   Glycohemoglobin Standardization Program.      Hemoglobin A1C levels are related to mean serum/plasma glucose   during the preceding 2-3 months.        02/26/2024 6.3 (H) 4.0 - 5.6 % Final     Comment:     ADA Screening Guidelines:  5.7-6.4%  Consistent with prediabetes  >or=6.5%  Consistent with diabetes    High levels of fetal hemoglobin interfere with the HbA1C  assay. Heterozygous hemoglobin variants (HbS, HgC, etc)do  not significantly interfere with this assay.   However, presence of multiple variants may affect accuracy.          ASSESSMENT and PLAN:    1. Type 2 diabetes controlled w DM PN  Continue  jardaince 25 mg qam, metformin xr 500 mg 2 tabs in an 2 in pm, glimepiride 1 mg qam  For the next 3 days take 2 mg of glimepiride and Novolog 5 units AC  Notify me if be remains elevated on Monday  Typically takes 3-5 days for hyperglycemia to resolve    Continue Dexcom G7     2. HLP -  Continue statin therapy,     4. LINDSEY-  continue weight loss   Body mass index is 29.14 kg/m².     5. CAD, CM  Follows w Dr Quesada      Follow-up:  in 6 months w  A1C,  cmp, Lipid, UMCR

## 2024-09-10 ENCOUNTER — PATIENT MESSAGE (OUTPATIENT)
Dept: ADMINISTRATIVE | Facility: HOSPITAL | Age: 59
End: 2024-09-10
Payer: MEDICARE

## 2024-09-10 DIAGNOSIS — K21.9 GASTROESOPHAGEAL REFLUX DISEASE, UNSPECIFIED WHETHER ESOPHAGITIS PRESENT: ICD-10-CM

## 2024-09-10 RX ORDER — PANTOPRAZOLE SODIUM 40 MG/1
40 TABLET, DELAYED RELEASE ORAL DAILY
Qty: 30 TABLET | Refills: 2 | Status: SHIPPED | OUTPATIENT
Start: 2024-09-10 | End: 2024-12-09

## 2024-09-13 ENCOUNTER — CLINICAL SUPPORT (OUTPATIENT)
Dept: REHABILITATION | Facility: HOSPITAL | Age: 59
End: 2024-09-13
Payer: MEDICARE

## 2024-09-13 DIAGNOSIS — M25.552 BILATERAL HIP PAIN: Primary | ICD-10-CM

## 2024-09-13 DIAGNOSIS — R29.898 IMPAIRED FLEXIBILITY OF LOWER EXTREMITY: ICD-10-CM

## 2024-09-13 DIAGNOSIS — Z74.09 IMPAIRED FUNCTIONAL MOBILITY, BALANCE, GAIT, AND ENDURANCE: ICD-10-CM

## 2024-09-13 DIAGNOSIS — M25.551 BILATERAL HIP PAIN: Primary | ICD-10-CM

## 2024-09-13 DIAGNOSIS — R53.1 WEAKNESS: ICD-10-CM

## 2024-09-13 PROCEDURE — 97110 THERAPEUTIC EXERCISES: CPT | Mod: KX,PO

## 2024-09-13 PROCEDURE — 97530 THERAPEUTIC ACTIVITIES: CPT | Mod: KX,PO

## 2024-09-13 NOTE — PLAN OF CARE
[facility]  Physical Therapy Plan of Care Note     Name: Gio Forrest  [facility] Number: [id number]    Therapy Diagnosis:   Encounter Diagnoses   Name Primary?    Bilateral hip pain Yes    Weakness     Impaired flexibility of lower extremity     Impaired functional mobility, balance, gait, and endurance      Physician: Emiliana Celeste PA-C    Visit Date: 9/13/2024    Physician Orders: Eval and Treat   Medical Diagnosis from Referral:   Diagnosis   M54.50 (ICD-10-CM) - Lumbar back pain   Z98.1 (ICD-10-CM) - History of lumbar fusion   M47.896 (ICD-10-CM) - Other spondylosis, lumbar region     Evaluation Date: 8/16/24  Authorization Period Expiration: 12/31/2024   Plan of Care Expiration: 12/31/2024  Progress Note Due: 10/13/24   Visit # / Visits authorized: 2/ 20  FOTO: 2/3 (57% today)    Precautions: Standard  Functional Level Prior to Evaluation:  independent    Subjective     Update: pt reports going to the hospital on 8/23/24 for left sided chest pain and SOB that was going on for about a week prior. Hospital stay was inconclusive for why this was occurring (- for flu and strep) but angiogram was performed and looked fine and pt saw his cardiologist today just before coming for therapy and mention pallor and sent him for blood work, but did mention to his nurse he was resuming PT for his hips. Since last seeing PT, he did undergo bilateral FA joint injections with Dr. Carty reporting no change/relief in symptoms. Pt was instructed to follow if pain persisted which he has yet to do with Dr. Carty or Dr. Hassan; Dr. Hassan recommended seeing Dr. Gruber if pain in the hips is still a problem. He has not been compliant with HEP since all this has been going on. He reports 3/10 bilateral lateral hip joint pain, worse with pivoting/twisting, squatting, and walking long distances.     Objective      Update: Observation: antalgic gait     Posture: unremarkable     Hip Range of Motion:    Right  active Right Passive Left active  Left Passive   Flexion    120   120   Abduction    25   25   Extension    5   5   Ext. Rotation    20*   30*   Int. Rotation    25   25         Lower Extremity Strength  Right LE   Left LE     Quadriceps: 5/5 Quadriceps: 5/5   Hamstrings: 5/5 Hamstrings: 5/5   Iliopsoas: 5/5 Iliopsoas: 5/5   Hip extension:  3+/5 Hip extension: 3+/5   Hip ABD: 4/5 Hip ABD: 4/5   Hip ADD:       5/5 Hip ADD: 5/5   Hip ER: 4+/5 Hip ER: 4+/5   Hip IR: 5/5 Hip IR: 5/5   Glut Max 3-/5    Glut Max 3-/5            Special Tests:   FABERs:  + for pain and reduced ROM doris   CAN:+ for pain  Hip Scour: + for pain  Slump: NT     Flexibility: mod restricted                            Hamstrings: R = + ; L = +               Tan's test: R = + ; L = +  Travis Test Right  Left    Iliopsoas + -   Rectus Femoris  + +   L ITB>R; R Psaos>L     Joint Mobility: will formally assess left hip joint at next visit due to time constraints of pt's late arrival today    Assessment     Update: Pt continues with presentation similar to initial eval today but only reports pain elicited at lateral hip with Tan's testing and passive hip ER.. He is still very restricted in soft tissue lengths and may be having lateral joint pain due to ITB tightness and affects of poor hip posture. We discussed his previous HEP and made recommendations for patient to focus on consistency with HEP for stretching to attempt to feel desired reduction in hip pain prior to follow up with orthopedic surgeon for consultation regarding hip replacement surgery. Nature of femoral acetabular impingement-labral pathology-OA discussed at length with patient and patient's wife today.     Previous Short Term Goals Status:   Pt will report compliance with HEP in order to optimize improvements with current POC.  New Short Term Goals Status:   Pt will report compliance with HEP in order to optimize improvements with current POC.  Long Term Goal Status: continue per  initial plan of care.  Reasons for Recertification of Therapy:   hospital stay that forced absence from therapy and need for re-evaluation and restart of POC    GOALS  Short Term Goals: 2 weeks   Pt will report compliance with HEP in order to optimize improvements with current POC.     Long Term Goals: 6 weeks   Pt will improve passive ER of L hip from 30 degrees to at least 45 degrees without pain.  Pt will become independent with HEP to target limitations in LQ flexibility to continue on his own to optimize soft tissue lengths.  Pt will improve glute strength to at least 4/5 to improve functional demands across hip joint during sit to stand and walking tasks.   Pt will report a reduction in lateral left hip pain from 5/10 at worst to less than 2/10.     Plan     Updated Certification Period: 9/13/24 to 10/25/24   Recommended Treatment Plan: 1-2 times per week for 4-6 weeks:  Manual Therapy, Neuromuscular Re-ed, Therapeutic Activities, and Therapeutic Exercise      Pam Kitchen, PT

## 2024-09-13 NOTE — PROGRESS NOTES
[facility]   Physical Therapy Treatment Note      Name: Gio GARNER [facility]  [facility] Number: [id number]    Therapy Diagnosis:   No diagnosis found.    Physician: Emiliana Celeste PA-C    Visit Date: 9/13/2024    Physician Orders: PT Eval and Treat   Medical Diagnosis from Referral:   Diagnosis   M54.50 (ICD-10-CM) - Lumbar back pain   Z98.1 (ICD-10-CM) - History of lumbar fusion   M47.896 (ICD-10-CM) - Other spondylosis, lumbar region      Evaluation Date: 8/16/2024  Authorization Period Expiration: 7/26/2025  Plan of Care Expiration: 9/27/24  Progress Note Due: 9/16/24  Date of Surgery: LS fusion 2023  Visit # / Visits authorized: 1/ 20  FOTO: 1/ 3    Precautions: Standard and Diabetes      Time In: 1:10  Time Out: 1:50  Total Billable Time: 45 minutes    PTA Visit #: -/5       Subjective     Patient reports: he has not been doing his exercises- see re-eval  He was not compliant with home exercise program.  Response to previous treatment: TBD  Functional change: TBD    Pain: 4/10  Location: left hip     Objective      Objective Measures updated at progress report unless specified.     Treatment     Gio received the treatments listed below:       therapeutic exercises to develop strength, endurance, ROM, flexibility, and posture for 30 minutes including:  Reassessment of PROM, MMT, Flexibility x 30 minutes    NV work on pelvic control, flexibility of LE within pain tolerance     manual therapy techniques: Joint mobilizations, Manual traction, and Soft tissue Mobilization were applied to the: L hip for 0 minutes, including:     neuromuscular re-education activities to improve: Balance, Coordination, Proprioception, and Posture for 0 minutes. The following activities were included:  Bridges GTB 2 x 10  Quad rocking 15x's   Hip hinge seated with dowel x 5  Sit to stand with GTB around knees 1 x 10 cues for glute control with GTB for increased abduction     therapeutic activities to improve  functional performance for 10  minutes, including:  Review of previous HEP- see scanned patient info from eval    Patient Education and Home Exercises       Education provided:   - yes    Written Home Exercises Provided: Pt instructed to continue prior HEP. Exercises were reviewed and Gio was able to demonstrate them prior to the end of the session.  Gio demonstrated fair  understanding of the education provided. See Electronic Medical Record under Patient Instructions for exercises provided during therapy sessions    Assessment     See re-eval.    Gio Is progressing well towards his goals.   Patient prognosis is Fair.     Patient will continue to benefit from skilled outpatient physical therapy to address the deficits listed in the problem list box on initial evaluation, provide pt/family education and to maximize pt's level of independence in the home and community environment.     Patient's spiritual, cultural and educational needs considered and pt agreeable to plan of care and goals.     Anticipated barriers to physical therapy: compliance with HEP    Goals: Short Term Goals: 2 weeks   Pt will report compliance with HEP in order to optimize improvements with current POC.     Long Term Goals: 6 weeks   Pt will improve passive ER of L hip from 30 degrees to at least 45 degrees without pain.  Pt will become independent with HEP to target limitations in LQ flexibility to continue on his own to optimize soft tissue lengths.  Pt will improve glute strength to at least 4/5 to improve functional demands across     Plan     Plan of care Certification: 9/13/2024 to 10/25/2024.     Outpatient Physical Therapy 2 times weekly for 6 weeks to include the following interventions: Manual Therapy, Neuromuscular Re-ed, Patient Education, Therapeutic Activities, and Therapeutic Exercise.        Pam Kitchen, PT

## 2024-09-18 ENCOUNTER — CLINICAL SUPPORT (OUTPATIENT)
Dept: REHABILITATION | Facility: HOSPITAL | Age: 59
End: 2024-09-18
Payer: MEDICARE

## 2024-09-18 DIAGNOSIS — R53.1 WEAKNESS: ICD-10-CM

## 2024-09-18 DIAGNOSIS — Z74.09 IMPAIRED FUNCTIONAL MOBILITY, BALANCE, GAIT, AND ENDURANCE: ICD-10-CM

## 2024-09-18 DIAGNOSIS — M25.551 BILATERAL HIP PAIN: Primary | ICD-10-CM

## 2024-09-18 DIAGNOSIS — M25.552 BILATERAL HIP PAIN: Primary | ICD-10-CM

## 2024-09-18 DIAGNOSIS — R29.898 IMPAIRED FLEXIBILITY OF LOWER EXTREMITY: ICD-10-CM

## 2024-09-18 PROCEDURE — 97140 MANUAL THERAPY 1/> REGIONS: CPT | Mod: PO

## 2024-09-18 PROCEDURE — 97112 NEUROMUSCULAR REEDUCATION: CPT | Mod: PO

## 2024-09-18 PROCEDURE — 97110 THERAPEUTIC EXERCISES: CPT | Mod: PO

## 2024-09-18 NOTE — PROGRESS NOTES
"[facility]   Physical Therapy Treatment Note      Name: Gio Forrest  [facility] Number: [id number]    Therapy Diagnosis:   Encounter Diagnoses   Name Primary?    Bilateral hip pain Yes    Weakness     Impaired flexibility of lower extremity     Impaired functional mobility, balance, gait, and endurance        Physician: Emiliana Celeste PA-C    Visit Date: 9/18/2024    Physician Orders: PT Eval and Treat   Medical Diagnosis from Referral:   Diagnosis   M54.50 (ICD-10-CM) - Lumbar back pain   Z98.1 (ICD-10-CM) - History of lumbar fusion   M47.896 (ICD-10-CM) - Other spondylosis, lumbar region      Evaluation Date: 8/16/2024  Authorization Period Expiration: 7/26/2025  Plan of Care Expiration: 9/27/24  Progress Note Due: 10/13/24  Date of Surgery: LS fusion 2023  Visit # / Visits authorized: 3/ 20  FOTO: 2/ 3    Precautions: Standard and Diabetes      Time In: 1:15 (pt late)  Time Out: 2:00  Total Billable Time: 45 minutes    PTA Visit #: -/5       Subjective     Patient reports: he has been doing his stretches since re-eval last week; he "isn't really hurting right now unless I move it a certain way"  He was not compliant with home exercise program.  Response to previous treatment: TBD  Functional change: TBD    Pain: 3/10  Location: left hip     Objective      Objective Measures updated at progress report unless specified.     Treatment     Gio received the treatments listed below:       therapeutic exercises to develop strength, endurance, ROM, flexibility, and posture for 15 minutes including:  Prone quad stretch 3 x 30"  ITB stretch 3 x 30"  Tiger Tail to TFL and ITB x 5"     manual therapy techniques: Joint mobilizations, Manual traction, and Soft tissue Mobilization were applied to the: L hip for 15 minutes, including:  Long axis distraction 3 x 30"   Manual release of L Psoas  Lateral hip joint mobilizations 3 x 30"      neuromuscular re-education activities to improve: Balance, " "Coordination, Proprioception, and Posture for 15 minutes. The following activities were included:  Standing hip extension 2x15 YTB  Standing hip abd 2 x10 YTB  Bridges GTB 2 x 10     therapeutic activities to improve functional performance for 0 minutes, including:    Patient Education and Home Exercises       Education provided:   - yes    Written Home Exercises Provided: Pt instructed to continue prior HEP. Exercises were reviewed and Gio was able to demonstrate them prior to the end of the session.  Gio demonstrated fair  understanding of the education provided. See Electronic Medical Record under Patient Instructions for exercises provided during therapy sessions    Assessment   Pt noted improvement in feeling of "lightness" as well as stating that is as good as his hip has felt while in manual distraction of L hip joint. We attempted manual release of psoas due to tightness and irritability around anterior hip as well as progressed to supine femoral nerve slides. Continue with lateral hip joint mobilizations and strengthening as able to progress.     Gio Is progressing well towards his goals.   Patient prognosis is Fair.     Patient will continue to benefit from skilled outpatient physical therapy to address the deficits listed in the problem list box on initial evaluation, provide pt/family education and to maximize pt's level of independence in the home and community environment.     Patient's spiritual, cultural and educational needs considered and pt agreeable to plan of care and goals.     Anticipated barriers to physical therapy: compliance with HEP    Goals: Short Term Goals: 2 weeks   Pt will report compliance with HEP in order to optimize improvements with current POC.     Long Term Goals: 6 weeks   Pt will improve passive ER of L hip from 30 degrees to at least 45 degrees without pain.  Pt will become independent with HEP to target limitations in LQ flexibility to continue on his own to " optimize soft tissue lengths.  Pt will improve glute strength to at least 4/5 to improve functional demands across     Plan     Plan of care Certification: 9/13/2024 to 10/25/2024.     Outpatient Physical Therapy 2 times weekly for 6 weeks to include the following interventions: Manual Therapy, Neuromuscular Re-ed, Patient Education, Therapeutic Activities, and Therapeutic Exercise.        Pam Kitchen, PT

## 2024-09-19 ENCOUNTER — OFFICE VISIT (OUTPATIENT)
Dept: PODIATRY | Facility: CLINIC | Age: 59
End: 2024-09-19
Payer: MEDICARE

## 2024-09-19 VITALS — BODY MASS INDEX: 29.31 KG/M2 | HEIGHT: 74 IN | WEIGHT: 228.38 LBS

## 2024-09-19 DIAGNOSIS — E11.42 DIABETIC POLYNEUROPATHY ASSOCIATED WITH TYPE 2 DIABETES MELLITUS: Primary | ICD-10-CM

## 2024-09-19 DIAGNOSIS — B35.1 ONYCHOMYCOSIS DUE TO DERMATOPHYTE: ICD-10-CM

## 2024-09-19 PROCEDURE — 99999 PR PBB SHADOW E&M-EST. PATIENT-LVL II: CPT | Mod: PBBFAC,,, | Performed by: PODIATRIST

## 2024-09-19 NOTE — PROGRESS NOTES
Subjective:      Patient ID: Gio Forrest is a 58 y.o. male.    Chief Complaint: Diabetes Mellitus and Diabetic Foot Exam    Gio is a 58 y.o. male who presents to the clinic for evaluation and treatment of diabetic feet. Gio has a past medical history of Addiction (12/21/2021), Arthritis, Back pain, Depression, HENDERSON (dyspnea on exertion), Dyslipidemia (08/12/2015), Excessive daytime sleepiness (03/02/2018), GERD (gastroesophageal reflux disease), Gout (08/12/2015), Hypersomnia with sleep apnea (05/14/2022), Hypertension, Idiopathic gout, Lumbar pseudoarthrosis, Neck pain, Neuropathy, Obesity (08/12/2015), Obstructive sleep apnea (08/12/2015), Restless leg syndrome, Sebaceous cyst (04/18/2017), SVT (supraventricular tachycardia), and Type 2 diabetes mellitus (08/12/2015). Patient relates having toenails that are in need of trimming.  Denies experiencing pain from the nails with wearing shoe gear.  Has not attempted to self treat.  Continues with good control over his blood glucose.  States the 1st Qutenza treatment did little to curb pain symptoms secondary to neuropathy.  Inquires as to whether he should attempt a second round.  Denies any additional pedal complaints.    PCP: Matthew Carroll MD  Last visit: 9/24  Hemoglobin A1C   Date Value Ref Range Status   08/28/2024 6.4 (H) 4.0 - 5.6 % Final     Comment:     ADA Screening Guidelines:  5.7-6.4%  Consistent with prediabetes  >or=6.5%  Consistent with diabetes    High levels of fetal hemoglobin interfere with the HbA1C  assay. Heterozygous hemoglobin variants (HbS, HgC, etc)do  not significantly interfere with this assay.   However, presence of multiple variants may affect accuracy.     08/24/2024 6.3 (H) 0.0 - 5.6 % Final     Comment:     Reference Interval:  5.0 - 5.6 Normal   5.7 - 6.4 High Risk   > 6.5 Diabetic      Hgb A1c results are standardized based on the (NGSP) National   Glycohemoglobin Standardization Program.      Hemoglobin A1C levels are  related to mean serum/plasma glucose   during the preceding 2-3 months.        02/26/2024 6.3 (H) 4.0 - 5.6 % Final     Comment:     ADA Screening Guidelines:  5.7-6.4%  Consistent with prediabetes  >or=6.5%  Consistent with diabetes    High levels of fetal hemoglobin interfere with the HbA1C  assay. Heterozygous hemoglobin variants (HbS, HgC, etc)do  not significantly interfere with this assay.   However, presence of multiple variants may affect accuracy.             Past Medical History:   Diagnosis Date    Addiction 12/21/2021    Arthritis     Back pain     radiates to both legs but more on rt leg    Depression     HENDERSON (dyspnea on exertion)     Dyslipidemia 08/12/2015    Excessive daytime sleepiness 03/02/2018    Formatting of this note might be different from the original.  Added automatically from request for surgery [id number]    GERD (gastroesophageal reflux disease)     Gout 08/12/2015    Hypersomnia with sleep apnea 05/14/2022    Hypertension     Idiopathic gout     Lumbar pseudoarthrosis     Neck pain     nonradiating pain    Neuropathy     Obesity 08/12/2015    Obstructive sleep apnea 08/12/2015    cpap    Restless leg syndrome     Sebaceous cyst 04/18/2017    SVT (supraventricular tachycardia)     s/p ablation    Type 2 diabetes mellitus 08/12/2015       Past Surgical History:   Procedure Laterality Date    ABLATION N/A 10/08/2020    Procedure: Ablation;  Surgeon: Rip Che III, MD;  Location: [facility] CATH;  Service: Cardiology;  Laterality: N/A;    ARTHROPLASTY OF JOINT OF FINGER Right 04/12/2022    Procedure: Right middle finger MCP joint arthroplasty;  Surgeon: Luis Alberto Payne MD;  Location: [facility] OR;  Service: Orthopedics;  Laterality: Right;  Anesthesia:  General with a single-shot supraclavicular block    BACK SURGERY      multiple    CARDIAC CATHETERIZATION      CARDIAC ELECTROPHYSIOLOGY STUDY  10/08/2020    Procedure: Study possible ablation;  Surgeon: Rip Che III, MD;  Location: [facility]  CATH;  Service: Cardiology;;    CERVICAL SPINE SURGERY      CHOLECYSTECTOMY  05/30/2018    Dr. ROGELIO Tao, [facility]     COLONOSCOPY  2008    COLONOSCOPY N/A 09/14/2017    Procedure: COLONOSCOPY;  Surgeon: Obi Beltre MD;  Location: [facility] ENDO;  Service: Endoscopy;  Laterality: N/A;    CORONARY ANGIOGRAPHY Left 05/28/2018    Procedure: Coronary angiography;  Surgeon: Rafiq Malik MD;  Location: [facility] CATH;  Service: Cardiology;  Laterality: Left;    ELBOW SURGERY Bilateral     EPIDURAL STEROID INJECTION INTO CERVICAL SPINE      EPIDURAL STEROID INJECTION INTO LUMBAR SPINE      HERNIA REPAIR      LAPAROSCOPIC APPENDECTOMY N/A 06/25/2020    Procedure: APPENDECTOMY, LAPAROSCOPIC;  Surgeon: Gordon Can MD;  Location: [facility] OR;  Service: General;  Laterality: N/A;    LAPAROSCOPIC CHOLECYSTECTOMY N/A 05/30/2018    Procedure: CHOLECYSTECTOMY, LAPAROSCOPIC;  Surgeon: Fletcher Tao MD;  Location: [facility] OR;  Service: General;  Laterality: N/A;    LAPAROSCOPIC SLEEVE GASTRECTOMY N/A 01/20/2023    Procedure: GASTRECTOMY, SLEEVE, LAPAROSCOPIC;  Surgeon: Bharat Devine MD;  Location: [facility] OR;  Service: General;  Laterality: N/A;    LEFT HEART CATHETERIZATION Left 05/28/2018    Procedure: Left heart cath;  Surgeon: Rafiq Malik MD;  Location: [facility] CATH;  Service: Cardiology;  Laterality: Left;    LEFT HEART CATHETERIZATION  8/26/2024    Procedure: Left heart cath Rm 3623;  Surgeon: Tom Quesada MD;  Location: [facility] CATH;  Service: Cardiology;;    NECK SURGERY      RADIOFREQUENCY ABLATION OF LUMBAR MEDIAL BRANCH NERVE AT SINGLE LEVEL Bilateral 06/17/2022    Procedure: Radiofrequency Ablation, Nerve, Spinal, Lumbar, Medial Branch, 1 Level L3,4,5;  Surgeon: Thiago Garcia MD;  Location: [facility] OR;  Service: Pain Management;  Laterality: Bilateral;    SHOULDER ARTHROSCOPY      SPINE SURGERY      multiple    TONSILLECTOMY      TREATMENT OF CARDIAC ARRHYTHMIA  10/08/2020    Procedure: Cardioversion or  Defibrillation;  Surgeon: Rip Che III, MD;  Location: [facility] CATH;  Service: Cardiology;;    TRIGGER FINGER RELEASE Right 2021    Procedure: RELEASE, TRIGGER FINGER;  Surgeon: Luis Alberto Payne MD;  Location: [facility] OR;  Service: Orthopedics;  Laterality: Right;  Procedure: Right ring finger trigger release    Position: Supine    Anesthesia: Local    Equipment: Basic handset    TYMPANOSTOMY TUBE PLACEMENT Right        Family History   Problem Relation Name Age of Onset    Diabetes Mother      Depression Mother      Liver cancer Mother      Obesity Mother      Heart disease Father      Anuerysm Father      Diabetes Father      Stroke Father      Glaucoma Paternal Grandmother      Obesity Sister         Social History     Socioeconomic History    Marital status:     Number of children: 1   Tobacco Use    Smoking status: Former     Current packs/day: 0.00     Average packs/day: 0.5 packs/day for 10.0 years (5.0 ttl pk-yrs)     Types: Cigarettes     Start date:      Quit date:      Years since quittin.7    Smokeless tobacco: Never   Substance and Sexual Activity    Alcohol use: Not Currently    Drug use: No    Sexual activity: Yes     Partners: Female   Social History Narrative              Social Determinants of Health     Financial Resource Strain: Medium Risk (2022)    Overall Financial Resource Strain (CARDIA)     Difficulty of Paying Living Expenses: Somewhat hard   Food Insecurity: No Food Insecurity (2024)    Hunger Vital Sign     Worried About Running Out of Food in the Last Year: Never true     Ran Out of Food in the Last Year: Never true   Transportation Needs: No Transportation Needs (2024)    TRANSPORTATION NEEDS     Transportation : No   Physical Activity: Insufficiently Active (2022)    Exercise Vital Sign     Days of Exercise per Week: 4 days     Minutes of Exercise per Session: 10 min   Stress: Stress Concern Present (2022)    [employer] - Occupational Stress Questionnaire     Feeling of Stress : To some extent   Housing Stability: Low Risk  (8/26/2024)    Housing Stability Vital Sign     Unable to Pay for Housing in the Last Year: No     Homeless in the Last Year: No       Current Outpatient Medications   Medication Sig Dispense Refill    aspirin (ECOTRIN) 81 MG EC tablet Take 81 mg by mouth once daily.      atorvastatin (LIPITOR) 40 MG tablet TAKE ONE TABLET BY MOUTH ONCE DAILY IN THE EVENING 90 tablet 4    blood-glucose meter kit To check BG 2 times daily, to use with insurance preferred meter 1 each 1    calcium-vitamin D3 (OS-SOHAM 500 + D3) 500 mg-5 mcg (200 unit) per tablet Take 1 tablet by mouth once daily.      DEXCOM G7 SENSOR Oxana CHANGE EVERY 10 DAYS 9 each 4    docusate sodium (COLACE) 100 MG capsule Take 200 mg by mouth 2 (two) times daily.      empagliflozin (JARDIANCE) 25 mg tablet Take 1 tablet (25 mg total) by mouth once daily. 90 tablet 3    fluticasone propionate (FLONASE) 50 mcg/actuation nasal spray 1 spray by Each Nostril route daily as needed for Allergies.      gabapentin (NEURONTIN) 600 MG tablet TAKE ONE TABLET BY MOUTH THREE TIMES DAILY 90 tablet 2    glimepiride (AMARYL) 1 MG tablet TAKE ONE TABLET BY MOUTH DAILY BEFORE BREAKFAST 90 tablet 3    metFORMIN (GLUCOPHAGE-XR) 500 MG ER 24hr tablet Take 2 tablet twice a day (Patient taking differently: Take 1,000 mg by mouth 2 (two) times daily with meals. Take 2 tablet twice a day) 360 tablet 3    multivitamin capsule Take 1 capsule by mouth once daily.      pantoprazole (PROTONIX) 40 MG tablet Take 1 tablet (40 mg total) by mouth once daily. 30 tablet 2    vitamin D (VITAMIN D3) 1000 units Tab Take 1,000 Units by mouth once daily.       No current facility-administered medications for this visit.       Review of patient's allergies indicates:  No Known Allergies      Review of Systems   Constitutional: Negative for chills and fever.    Cardiovascular:  Negative for claudication and leg swelling.   Skin:  Positive for color change and nail changes.   Musculoskeletal:  Positive for joint pain and joint swelling. Negative for muscle cramps, muscle weakness and myalgias.   Neurological:  Positive for paresthesias. Negative for numbness.   Psychiatric/Behavioral:  Negative for altered mental status.            Objective:      Physical Exam  Constitutional:       General: He is not in acute distress.     Appearance: He is well-developed. He is not diaphoretic.   Cardiovascular:      Pulses:           Dorsalis pedis pulses are 2+ on the right side and 2+ on the left side.        Posterior tibial pulses are 2+ on the right side and 2+ on the left side.      Comments: CFT is < 3 seconds bilateral.  Pedal hair growth is decreased bilateral.  No varicosities noted bilateral.  No lower extremity edema noted bilateral. Toes are cool to touch bilateral.    Musculoskeletal:         General: No tenderness.      Left lower leg: No edema.      Comments: Muscle strength 5/5 in all muscle groups bilateral.  No tenderness nor crepitation with ROM of foot/ankle joints bilateral.  No pain with palpation of bilateral foot and ankle.  Bilateral pes planus foot type.  Bilateral hallux abducto valgus.  Bilateral semi-reducible contracture of toe 2-5.     Skin:     General: Skin is warm and dry.      Capillary Refill: Capillary refill takes 2 to 3 seconds.      Coloration: Skin is not pale.      Findings: Bruising and ecchymosis present. No abrasion, burn, erythema, laceration, lesion or petechiae.      Comments: Pedal skin appears thin bilateral.  Toenails x 10 appear thickened by 2 mm, elongated by 4 mm, and discolored with subungual debris. Ecchymosis noted to the distal 1/4 of bilateral hallux toenail.   Neurological:      Mental Status: He is alert and oriented to person, place, and time.      Sensory: No sensory deficit.      Motor: No weakness or atrophy.       Comments: Protective sensation per [place] monofilament is intact bilateral.  Vibratory sensation is intact bilateral.  Light touch is intact bilateral.               Assessment:       Encounter Diagnoses   Name Primary?    Diabetic polyneuropathy associated with type 2 diabetes mellitus Yes    Onychomycosis due to dermatophyte          Plan:       Gio was seen today for diabetes mellitus and diabetic foot exam.    Diagnoses and all orders for this visit:    Diabetic polyneuropathy associated with type 2 diabetes mellitus    Onychomycosis due to dermatophyte      I counseled the patient on his conditions, their implications and medical management.    Although the first round of Qutenza failed to provide relief, I recommend he try at least a final attempt to see if this will be therapeutic going forward.    Shoe inspection. Diabetic Foot Education. Patient reminded of the importance of good nutrition and blood sugar control to help prevent podiatric complications of diabetes. Patient instructed on proper foot hygeine. We discussed wearing proper shoe gear, daily foot inspections, never walking without protective shoe gear, never putting sharp instruments to feet    With patient's permission, nails were aggressively reduced and debrided x 10 to their soft tissue attachment mechanically and with electric , removing all offending nail and debris.  Patient relates relief following the procedure. He will continue to monitor the areas daily, inspect his feet, wear protective shoe gear when ambulatory, moisturizer to maintain skin integrity and follow in this office in approximately 3 months, sooner p.r.n.    Rj Nuñez DPM

## 2024-09-25 ENCOUNTER — CLINICAL SUPPORT (OUTPATIENT)
Dept: REHABILITATION | Facility: HOSPITAL | Age: 59
End: 2024-09-25
Payer: MEDICARE

## 2024-09-25 DIAGNOSIS — M25.552 BILATERAL HIP PAIN: Primary | ICD-10-CM

## 2024-09-25 DIAGNOSIS — R53.1 WEAKNESS: ICD-10-CM

## 2024-09-25 DIAGNOSIS — R29.898 IMPAIRED FLEXIBILITY OF LOWER EXTREMITY: ICD-10-CM

## 2024-09-25 DIAGNOSIS — Z74.09 IMPAIRED FUNCTIONAL MOBILITY, BALANCE, GAIT, AND ENDURANCE: ICD-10-CM

## 2024-09-25 DIAGNOSIS — M25.551 BILATERAL HIP PAIN: Primary | ICD-10-CM

## 2024-09-25 PROCEDURE — 97112 NEUROMUSCULAR REEDUCATION: CPT | Mod: KX,PO

## 2024-09-25 PROCEDURE — 97140 MANUAL THERAPY 1/> REGIONS: CPT | Mod: KX,PO

## 2024-09-25 NOTE — PROGRESS NOTES
"[facility]   Physical Therapy Treatment Note      Name: Gio Forrest  [facility] Number: [id number]    Therapy Diagnosis:   Encounter Diagnoses   Name Primary?    Bilateral hip pain Yes    Weakness     Impaired flexibility of lower extremity     Impaired functional mobility, balance, gait, and endurance        Physician: Emiliana Celeste PA-C    Visit Date: 9/25/2024    Physician Orders: PT Eval and Treat   Medical Diagnosis from Referral:   Diagnosis   M54.50 (ICD-10-CM) - Lumbar back pain   Z98.1 (ICD-10-CM) - History of lumbar fusion   M47.896 (ICD-10-CM) - Other spondylosis, lumbar region      Evaluation Date: 8/16/2024  Authorization Period Expiration: 7/26/2025  Plan of Care Expiration: 9/27/24  Progress Note Due: 10/13/24  Date of Surgery: LS fusion 2023  Visit # / Visits authorized: 3/ 20  FOTO: 2/ 3    Precautions: Standard and Diabetes      Time In: 10:05  Time Out: 11:00  Total Billable Time: 60 minutes (30 min with tech)    PTA Visit #: -/5       Subjective     Patient reports: he had a few days of soreness after the last session that took a few days to go away but it is "good now" not hurting at rest but still hurting with twisting of pivoting movements   He was not compliant with home exercise program.  Response to previous treatment: TBD  Functional change: TBD    Pain: 3/10  Location: left hip     Objective      Objective Measures updated at progress report unless specified.     Treatment     Gio received the treatments listed below:       therapeutic exercises to develop strength, endurance, ROM, flexibility, and posture for 15 minutes including:  Prone quad stretch 3 x 30"  ITB stretch 3 x 30"  Tiger Tail to TFL and ITB x 5"     manual therapy techniques: Joint mobilizations, Manual traction, and Soft tissue Mobilization were applied to the: L hip for 15 minutes, including:  Long axis distraction 3 x 30"   Manual release of L Psoas  Lateral hip joint mobilizations 3 x " "30"      neuromuscular re-education activities to improve: Balance, Coordination, Proprioception, and Posture for 30 minutes. The following activities were included:  Tra ADIM 20 x 5"  Tra with BKFO 20  Tra with Heelslide 20  Standing hip extension 2x15 YTB  Standing hip abd 2 x10 YTB  Bridges GTB 2 x 10     therapeutic activities to improve functional performance for 0 minutes, including:    Patient Education and Home Exercises       Education provided:   - yes    Written Home Exercises Provided: Pt instructed to continue prior HEP. Exercises were reviewed and Gio was able to demonstrate them prior to the end of the session.  Gio demonstrated fair  understanding of the education provided. See Electronic Medical Record under Patient Instructions for exercises provided during therapy sessions    Assessment   Pt continues with report of hip pain that has seemed to be unchanged yet since starting PT. We repeated previous treatments, and adding some transverse abdominis work to try to increased hip stability due to nature of symtpoms present with labral pathology. Will follow up on response to this treatment when he returns and adjust POC accordingly.    Gio Is progressing well towards his goals.   Patient prognosis is Fair.     Patient will continue to benefit from skilled outpatient physical therapy to address the deficits listed in the problem list box on initial evaluation, provide pt/family education and to maximize pt's level of independence in the home and community environment.     Patient's spiritual, cultural and educational needs considered and pt agreeable to plan of care and goals.     Anticipated barriers to physical therapy: compliance with HEP    Goals: Short Term Goals: 2 weeks   Pt will report compliance with HEP in order to optimize improvements with current POC.     Long Term Goals: 6 weeks   Pt will improve passive ER of L hip from 30 degrees to at least 45 degrees without pain.  Pt will " become independent with HEP to target limitations in LQ flexibility to continue on his own to optimize soft tissue lengths.  Pt will improve glute strength to at least 4/5 to improve functional demands across     Plan     Plan of care Certification: 9/13/2024 to 10/25/2024.     Outpatient Physical Therapy 2 times weekly for 6 weeks to include the following interventions: Manual Therapy, Neuromuscular Re-ed, Patient Education, Therapeutic Activities, and Therapeutic Exercise.        Pam Kitchen, PT

## 2024-09-30 ENCOUNTER — CLINICAL SUPPORT (OUTPATIENT)
Dept: REHABILITATION | Facility: HOSPITAL | Age: 59
End: 2024-09-30
Payer: MEDICARE

## 2024-09-30 DIAGNOSIS — R29.898 IMPAIRED FLEXIBILITY OF LOWER EXTREMITY: ICD-10-CM

## 2024-09-30 DIAGNOSIS — Z74.09 IMPAIRED FUNCTIONAL MOBILITY, BALANCE, GAIT, AND ENDURANCE: ICD-10-CM

## 2024-09-30 DIAGNOSIS — R53.1 WEAKNESS: ICD-10-CM

## 2024-09-30 DIAGNOSIS — M25.551 BILATERAL HIP PAIN: Primary | ICD-10-CM

## 2024-09-30 DIAGNOSIS — M25.552 BILATERAL HIP PAIN: Primary | ICD-10-CM

## 2024-09-30 PROCEDURE — 97110 THERAPEUTIC EXERCISES: CPT | Mod: KX,PO

## 2024-09-30 PROCEDURE — 97112 NEUROMUSCULAR REEDUCATION: CPT | Mod: KX,PO

## 2024-09-30 PROCEDURE — 97140 MANUAL THERAPY 1/> REGIONS: CPT | Mod: KX,PO

## 2024-09-30 NOTE — PROGRESS NOTES
"[facility]   Physical Therapy Treatment Note      Name: Gio Forrest  [facility] Number: [id number]    Therapy Diagnosis:   Encounter Diagnoses   Name Primary?    Bilateral hip pain Yes    Weakness     Impaired flexibility of lower extremity     Impaired functional mobility, balance, gait, and endurance        Physician: Emiliana Celeste PA-C    Visit Date: 9/30/2024    Physician Orders: PT Eval and Treat   Medical Diagnosis from Referral:   Diagnosis   M54.50 (ICD-10-CM) - Lumbar back pain   Z98.1 (ICD-10-CM) - History of lumbar fusion   M47.896 (ICD-10-CM) - Other spondylosis, lumbar region      Evaluation Date: 8/16/2024  Authorization Period Expiration: 7/26/2025  Plan of Care Expiration: 9/27/24  Progress Note Due: 10/13/24  Date of Surgery: LS fusion 2023  Visit # / Visits authorized: 3/ 20  FOTO: 2/ 3    Precautions: Standard and Diabetes      Time In: 9:50  Time Out: 10:50  Total Billable Time: 60 minutes     PTA Visit #: -/5       Subjective     Patient reports: he has some decrease in pain following integration of stability work at last visit; reporting about 2/10 hip pain this morning. Did a lot of yard work this weekend and was pleasantly surprised that his hip was doing better than expected.  He was not compliant with home exercise program.  Response to previous treatment: TBD  Functional change: TBD    Pain: 2/10 Post treatment: 2/10  Location: left hip     Objective      Objective Measures updated at progress report unless specified.     Treatment     Gio received the treatments listed below:       therapeutic exercises to develop strength, endurance, ROM, flexibility, and posture for 20 minutes including:  Upright bike x 5'  Prone quad stretch 3 x 30"  ITB stretch 3 x 30"  Tiger Tail to TFL and ITB x 5"  Quad rocks x 15     manual therapy techniques: Joint mobilizations, Manual traction, and Soft tissue Mobilization were applied to the: L hip for 15 minutes, " "including:  Lateral hip joint mobilizations Grade III 4 x 30"   Inferior hip joint mobilizations Grade III 3 x 30"     neuromuscular re-education activities to improve: Balance, Coordination, Proprioception, and Posture for 30 minutes. The following activities were included:  Tra ADIM 3'  Tra with BKFO 3'  Tra with Heelslide 20x's each   Standing hip extension 2 x 10 YTB  Standing hip abd 2 x10 YTB  Bridges GTB 2 x 10     therapeutic activities to improve functional performance for 0 minutes, including:    Patient Education and Home Exercises       Education provided:   - yes    Written Home Exercises Provided: Pt instructed to continue prior HEP. Exercises were reviewed and Gio was able to demonstrate them prior to the end of the session.  Gio demonstrated fair  understanding of the education provided. See Electronic Medical Record under Patient Instructions for exercises provided during therapy sessions    Assessment   Pt seems to have experienced some functional improvements and lower pain levels since adding proximal stability work into program. Added in inferior joint mobilizations today due to continues joint stiffness. Pt still showing decreased sitting external rotation ROM. Post treatment improvement in passive ROM ER and no worsening of pain showing improved tolerance to activities. Continue with POC.    Gio Is progressing well towards his goals.   Patient prognosis is Fair.     Patient will continue to benefit from skilled outpatient physical therapy to address the deficits listed in the problem list box on initial evaluation, provide pt/family education and to maximize pt's level of independence in the home and community environment.     Patient's spiritual, cultural and educational needs considered and pt agreeable to plan of care and goals.     Anticipated barriers to physical therapy: compliance with HEP    Goals: Short Term Goals: 2 weeks   Pt will report compliance with HEP in order to " optimize improvements with current POC.     Long Term Goals: 6 weeks   Pt will improve passive ER of L hip from 30 degrees to at least 45 degrees without pain.  Pt will become independent with HEP to target limitations in LQ flexibility to continue on his own to optimize soft tissue lengths.  Pt will improve glute strength to at least 4/5 to improve functional demands across     Plan     Plan of care Certification: 9/13/2024 to 10/25/2024.     Outpatient Physical Therapy 2 times weekly for 6 weeks to include the following interventions: Manual Therapy, Neuromuscular Re-ed, Patient Education, Therapeutic Activities, and Therapeutic Exercise.        Pam Kitchen, PT

## 2024-10-02 ENCOUNTER — PROCEDURE VISIT (OUTPATIENT)
Dept: PAIN MEDICINE | Facility: CLINIC | Age: 59
End: 2024-10-02
Payer: MEDICARE

## 2024-10-02 ENCOUNTER — CLINICAL SUPPORT (OUTPATIENT)
Dept: REHABILITATION | Facility: HOSPITAL | Age: 59
End: 2024-10-02
Payer: MEDICARE

## 2024-10-02 VITALS
HEIGHT: 74 IN | DIASTOLIC BLOOD PRESSURE: 51 MMHG | WEIGHT: 227.94 LBS | HEART RATE: 74 BPM | BODY MASS INDEX: 29.25 KG/M2 | SYSTOLIC BLOOD PRESSURE: 110 MMHG

## 2024-10-02 DIAGNOSIS — R29.898 IMPAIRED FLEXIBILITY OF LOWER EXTREMITY: ICD-10-CM

## 2024-10-02 DIAGNOSIS — R53.1 WEAKNESS: ICD-10-CM

## 2024-10-02 DIAGNOSIS — Z74.09 IMPAIRED FUNCTIONAL MOBILITY, BALANCE, GAIT, AND ENDURANCE: ICD-10-CM

## 2024-10-02 DIAGNOSIS — E11.40 PAINFUL DIABETIC NEUROPATHY: Primary | ICD-10-CM

## 2024-10-02 DIAGNOSIS — M25.551 BILATERAL HIP PAIN: Primary | ICD-10-CM

## 2024-10-02 DIAGNOSIS — M25.552 BILATERAL HIP PAIN: Primary | ICD-10-CM

## 2024-10-02 PROCEDURE — 97140 MANUAL THERAPY 1/> REGIONS: CPT | Mod: KX,PO

## 2024-10-02 PROCEDURE — 97530 THERAPEUTIC ACTIVITIES: CPT | Mod: KX,PO

## 2024-10-02 NOTE — PROGRESS NOTES
"[facility]   Physical Therapy Treatment Note      Name: Gio Forrest  [facility] Number: [id number]    Therapy Diagnosis:   Encounter Diagnoses   Name Primary?    Bilateral hip pain Yes    Weakness     Impaired flexibility of lower extremity     Impaired functional mobility, balance, gait, and endurance        Physician: Emiliana Celeste PA-C    Visit Date: 10/2/2024    Physician Orders: PT Eval and Treat   Medical Diagnosis from Referral:   Diagnosis   M54.50 (ICD-10-CM) - Lumbar back pain   Z98.1 (ICD-10-CM) - History of lumbar fusion   M47.896 (ICD-10-CM) - Other spondylosis, lumbar region      Evaluation Date: 8/16/2024  Authorization Period Expiration: 7/26/2025  Plan of Care Expiration: 9/27/24  Progress Note Due: 10/13/24  Date of Surgery: LS fusion 2023  Visit # / Visits authorized: 3/ 20  FOTO: 2/ 3    Precautions: Standard and Diabetes      Time In: 2:00  Time Out: 2:55  Total Billable Time: 55 minutes  (30 min)    PTA Visit #: -/5       Subjective     Patient reports: he has some minor improvement in pain but still having pain and improvements have been mild.  He was not compliant with home exercise program.  Response to previous treatment: TBD  Functional change: TBD    Pain: 2/10 Post treatment: 0/10  Location: left hip     Objective      Objective Measures updated at progress report unless specified.     Treatment     Gio received the treatments listed below:       therapeutic exercises to develop strength, endurance, ROM, flexibility, and posture for 20 minutes including:  [place] Tail to TFL and ITB x 5"  Quad rocks x 15  Tra ADIM 3'  Tra with BKFO 3'  Tra with Heelslide 20x's each   Standing hip extension 2 x 10 YTB  Standing hip abd 2 x10 YTB  Bridges GTB 2 x 10     manual therapy techniques: Joint mobilizations, Manual traction, and Soft tissue Mobilization were applied to the: L hip for 15 minutes, including:  Lateral hip joint mobilizations Grade III 4 x 30" " "  Inferior hip joint mobilizations Grade III 3 x 30"     neuromuscular re-education activities to improve: Balance, Coordination, Proprioception, and Posture for 30 minutes. The following activities were included:       therapeutic activities to improve functional performance for 15 minutes, including:  Upright bike x 10'  Prone quad stretch 3 x 30"  ITB stretch 3 x 30"  Patient Education and Home Exercises       Education provided:   - yes    Written Home Exercises Provided: Pt instructed to continue prior HEP. Exercises were reviewed and Gio was able to demonstrate them prior to the end of the session.  Gio demonstrated fair  understanding of the education provided. See Electronic Medical Record under Patient Instructions for exercises provided during therapy sessions    Assessment   Pt has no pain throughout exercise today; continued to discuss arthritis versus labral pathology. Will continue with current POC progressing NMC to weight bearing pelvic on femur control at next visit to attempt improvement in rotational stability in weight bearing.     Gio Is progressing well towards his goals.   Patient prognosis is Fair.     Patient will continue to benefit from skilled outpatient physical therapy to address the deficits listed in the problem list box on initial evaluation, provide pt/family education and to maximize pt's level of independence in the home and community environment.     Patient's spiritual, cultural and educational needs considered and pt agreeable to plan of care and goals.     Anticipated barriers to physical therapy: compliance with HEP    Goals: Short Term Goals: 2 weeks   Pt will report compliance with HEP in order to optimize improvements with current POC.     Long Term Goals: 6 weeks   Pt will improve passive ER of L hip from 30 degrees to at least 45 degrees without pain.  Pt will become independent with HEP to target limitations in LQ flexibility to continue on his own to " optimize soft tissue lengths.  Pt will improve glute strength to at least 4/5 to improve functional demands across     Plan     Plan of care Certification: 9/13/2024 to 10/25/2024.     Outpatient Physical Therapy 2 times weekly for 6 weeks to include the following interventions: Manual Therapy, Neuromuscular Re-ed, Patient Education, Therapeutic Activities, and Therapeutic Exercise.        Pam Kitchen, PT

## 2024-10-02 NOTE — PROCEDURES
This is a pain clinic procedure note.    Procedure: Qutenza Patch Application   Procedure Date: 10/02/2024  Location applied:  Dorsum and plantar aspect of bilateral feet  Subjective:  Preoperative Diagnosis:  Diabetic peripheral neuropathy  Post Procedure Diagnosis:  Diabetic peripheral neuropathy  Complications: None  Anesthetic:  None    Procedure details: Hands washed, dried and gloves donned prior to patient care. Examined the pt's feet for any signs of open wounds or broken skin. Skin intact with no open areas noted. Patches applied to plantar and dorsal areas of both clean, dry feet, wrapped with cast padding to hold in place. Examined skin 10 min after application, no sign of redness or irritation noted, pt denies any pain.  Patches applied at 3:15 p.m.     Hands washed, dried and gloves donned prior to removal of patches. Patches removed after 30 min, disposed of properly in biohazard receptacle. Feet cleansed with Qutenza cleanser provided, patted dry. Pt tolerated well.  Patches removed at 3:45 p.m.    Dispo: no complications, pt tolerated the procedure well.    Plan: RTC in 91 days

## 2024-10-14 ENCOUNTER — CLINICAL SUPPORT (OUTPATIENT)
Dept: REHABILITATION | Facility: HOSPITAL | Age: 59
End: 2024-10-14
Payer: MEDICARE

## 2024-10-14 ENCOUNTER — TELEPHONE (OUTPATIENT)
Dept: PULMONOLOGY | Facility: CLINIC | Age: 59
End: 2024-10-14
Payer: MEDICARE

## 2024-10-14 ENCOUNTER — OFFICE VISIT (OUTPATIENT)
Dept: PULMONOLOGY | Facility: CLINIC | Age: 59
End: 2024-10-14
Payer: MEDICARE

## 2024-10-14 ENCOUNTER — PATIENT MESSAGE (OUTPATIENT)
Dept: PULMONOLOGY | Facility: CLINIC | Age: 59
End: 2024-10-14

## 2024-10-14 VITALS
WEIGHT: 231.5 LBS | HEIGHT: 74 IN | DIASTOLIC BLOOD PRESSURE: 65 MMHG | HEART RATE: 71 BPM | OXYGEN SATURATION: 98 % | SYSTOLIC BLOOD PRESSURE: 102 MMHG | BODY MASS INDEX: 29.71 KG/M2

## 2024-10-14 DIAGNOSIS — R06.09 DYSPNEA ON EXERTION: Primary | ICD-10-CM

## 2024-10-14 DIAGNOSIS — Z74.09 IMPAIRED FUNCTIONAL MOBILITY, BALANCE, GAIT, AND ENDURANCE: ICD-10-CM

## 2024-10-14 DIAGNOSIS — R29.898 IMPAIRED FLEXIBILITY OF LOWER EXTREMITY: ICD-10-CM

## 2024-10-14 DIAGNOSIS — Z86.79 S/P CATHETER ABLATION OF SLOW PATHWAY: ICD-10-CM

## 2024-10-14 DIAGNOSIS — Z87.891 PERSONAL HISTORY OF NICOTINE DEPENDENCE: ICD-10-CM

## 2024-10-14 DIAGNOSIS — G47.33 OSA (OBSTRUCTIVE SLEEP APNEA): ICD-10-CM

## 2024-10-14 DIAGNOSIS — Z87.01 HISTORY OF PNEUMONIA: ICD-10-CM

## 2024-10-14 DIAGNOSIS — Z77.090 HISTORY OF EXPOSURE TO ASBESTOS: ICD-10-CM

## 2024-10-14 DIAGNOSIS — Z98.890 S/P CATHETER ABLATION OF SLOW PATHWAY: ICD-10-CM

## 2024-10-14 DIAGNOSIS — M25.551 BILATERAL HIP PAIN: Primary | ICD-10-CM

## 2024-10-14 DIAGNOSIS — M25.552 BILATERAL HIP PAIN: Primary | ICD-10-CM

## 2024-10-14 DIAGNOSIS — R53.1 WEAKNESS: ICD-10-CM

## 2024-10-14 PROCEDURE — 3066F NEPHROPATHY DOC TX: CPT | Mod: CPTII,S$GLB,, | Performed by: NURSE PRACTITIONER

## 2024-10-14 PROCEDURE — 97110 THERAPEUTIC EXERCISES: CPT | Mod: KX,PO

## 2024-10-14 PROCEDURE — 99204 OFFICE O/P NEW MOD 45 MIN: CPT | Mod: S$GLB,,, | Performed by: NURSE PRACTITIONER

## 2024-10-14 PROCEDURE — 1159F MED LIST DOCD IN RCRD: CPT | Mod: CPTII,S$GLB,, | Performed by: NURSE PRACTITIONER

## 2024-10-14 PROCEDURE — 3008F BODY MASS INDEX DOCD: CPT | Mod: CPTII,S$GLB,, | Performed by: NURSE PRACTITIONER

## 2024-10-14 PROCEDURE — 3078F DIAST BP <80 MM HG: CPT | Mod: CPTII,S$GLB,, | Performed by: NURSE PRACTITIONER

## 2024-10-14 PROCEDURE — 3044F HG A1C LEVEL LT 7.0%: CPT | Mod: CPTII,S$GLB,, | Performed by: NURSE PRACTITIONER

## 2024-10-14 PROCEDURE — 3074F SYST BP LT 130 MM HG: CPT | Mod: CPTII,S$GLB,, | Performed by: NURSE PRACTITIONER

## 2024-10-14 PROCEDURE — 3061F NEG MICROALBUMINURIA REV: CPT | Mod: CPTII,S$GLB,, | Performed by: NURSE PRACTITIONER

## 2024-10-14 PROCEDURE — 99999 PR PBB SHADOW E&M-EST. PATIENT-LVL IV: CPT | Mod: PBBFAC,,, | Performed by: NURSE PRACTITIONER

## 2024-10-14 NOTE — PROGRESS NOTES
"[facility]   Physical Therapy Progress Note      Name: Gio Forrest  [facility] Number: [id number]    Therapy Diagnosis:   Encounter Diagnoses   Name Primary?    Bilateral hip pain Yes    Weakness     Impaired flexibility of lower extremity     Impaired functional mobility, balance, gait, and endurance        Physician: Emiliana Celeste PA-C    Visit Date: 10/14/2024    Physician Orders: PT Eval and Treat   Medical Diagnosis from Referral:   Diagnosis   M54.50 (ICD-10-CM) - Lumbar back pain   Z98.1 (ICD-10-CM) - History of lumbar fusion   M47.896 (ICD-10-CM) - Other spondylosis, lumbar region      Evaluation Date: 8/16/2024  Authorization Period Expiration: 7/26/2025  Plan of Care Expiration: 9/27/24  Progress Note Due: 11/14/24  Date of Surgery: LS fusion 2023  Visit # / Visits authorized: 7/ 20  FOTO: 2/ 3    Precautions: Standard and Diabetes      Time In: 8:00  Time Out: 8:55  Total Billable Time: 50 minutes     PTA Visit #: -/5       Subjective     Patient reports:[place] and reports an incident of "burning pain" around the outside of the left hip Saturday, described as "stinging".   He was not compliant with home exercise program.  Response to previous treatment: TBD  Functional change: TBD    Pain: 2/10 Post treatment: 1/10  Location: left hip     Objective      Slump test: neg  Supine neural tension test: neg  Repeated Hip flexion (L): neg for recreation of burning pain at lateral joint    Hip flexion: 120    Treatment     Gio received the treatments listed below:       therapeutic exercises to develop strength, endurance, ROM, flexibility, and posture for 20 minutes including:  Quad rocks x 15  Posterior pelvic tilt 20 x 5"  Tra ADIM 3'  Tra with BKFO 3'  Up/up Down/down x 15  [place] isometrics 3'   Prone quad stretch 3 x 30"  Prone glute sets 20 x 10"   Prone Hip extension 3 x 10  Standing hip extension 2 x 12 YTB  Standing " hip abd 2 x 12 YTB  Bridges GTB 3 x 10  Posterior pelvic tilts in standing     manual therapy techniques: Joint mobilizations, Manual traction, and Soft tissue Mobilization were applied to the: L hip for 0 minutes, including:     neuromuscular re-education activities to improve: Balance, Coordination, Proprioception, and Posture for 30 minutes. The following activities were included:       therapeutic activities to improve functional performance for 0 minutes, including:    Patient Education and Home Exercises       Education provided:   - yes    Written Home Exercises Provided: Pt instructed to continue prior HEP. Exercises were reviewed and Gio was able to demonstrate them prior to the end of the session.  Gio demonstrated fair  understanding of the education provided. See Electronic Medical Record under Patient Instructions for exercises provided during therapy sessions    Assessment   Pt's pain appears to be unchanging at this time with physical therapy, although he has made some improvements in strength and in regards to hip flexor flexibility. He will return to pain management Friday for direction with POC and expecting referral to ortho sx for possible hip replacement. We reviewed over education regarding glute med function and trendelenburg gait, which when cued patient is able to reduce but not fully resolve. Continue through 10/25 with POC then discharge to [facility].     Gio Is progressing well towards his goals.   Patient prognosis is Fair.     Patient will continue to benefit from skilled outpatient physical therapy to address the deficits listed in the problem list box on initial evaluation, provide pt/family education and to maximize pt's level of independence in the home and community environment.     Patient's spiritual, cultural and educational needs considered and pt agreeable to plan of care and goals.     Anticipated barriers to physical therapy: compliance with HEP    Goals: Short Term  Goals: 2 weeks   Pt will report compliance with HEP in order to optimize improvements with current POC. Met     Long Term Goals: 6 weeks   Pt will improve passive ER of L hip from 30 degrees to at least 45 degrees without pain. Not Met  Pt will become independent with HEP to target limitations in LQ flexibility to continue on his own to optimize soft tissue lengths. Met  Pt will improve glute strength to at least 4/5 to improve functional demands across. Partially Met    Plan     Plan of care Certification: 9/13/2024 to 10/25/2024.     Outpatient Physical Therapy 2 times weekly for 6 weeks to include the following interventions: Manual Therapy, Neuromuscular Re-ed, Patient Education, Therapeutic Activities, and Therapeutic Exercise.        Pam Kitchen, PT

## 2024-10-14 NOTE — PATIENT INSTRUCTIONS
Overall etiology of your symptoms is unknown at this time.    Will check CT Chest without contrast to further evaluate lung tissue.    I ordered a Lung Function Test to evaluate lung strength. Please complete prior to next appointment.     Check six minute walk test to see if you qualify for oxygen.    Continue current prescription medication regiment. Keep follow up appointment as scheduled. Please call the office if you have any questions or concerns.

## 2024-10-14 NOTE — PROGRESS NOTES
"10/14/2024    Gio Forrest  [facility] Patient Consult    Chief Complaint   Patient presents with    Shortness of Breath       HPI:  10/14/2024: Hx: TARYN on CPAP, Personal History of Nicotine Dependence, Asbestosis Exposure, Pneumonia, SVT s/p ablation.  In office today alone.  Denies being seen by prior Pulmonologist. Denies history of lung disease. Denies current use of inhaler therapy, supplemental oxygen. Denies personal history of cancer, PE, current anticoagulation use.  Diagnosed with TARYN approx 10 years ago - using CPAP nightly. States underwent bariatric surgery approx one year ago per Dr. Devine - states since surgery has lost 100# - states underwent at home sleep study since weight loss and was told he technically did not need CPAP anymore.   Patient with a history of SVT s/p cardiac ablation approx 5 years ago. Currently following with Dr. Quesada, Cardiology.  Was overall doing well from a resp perspective up until approx one month ago when he developed SOB and L sided, sharp Chest pain. States the SOB was present with very minimal exertion such as walking throughout the house. Presented to [facility] on 8/23 and was admitted to atypical chest pain - underwent stress test at that time which led to L heart cath being performed on 8/26 revealing non obstructive disease, no stent or intervention was done. Patient had improvement of symptoms and was subsequently discharged home. Since discharge home patient has followed up with Cardiology, note from 9/13 reviewed in which patient was described as "pale" during office visit, CBC was ordered revealing stable H/H.   Patient states overall from a resp perspective he is doing well. Denies shortness of breath, wheezing, chest tightness, cough, mucous production. States he hasn't really exerted himself as of lately. States he was able to walk through [place] this weekend with no real issue.   Was diagnosed with severe pneumonia approx two years ago requiring " hospitalization.   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         Social Hx: Lives with wife - three dogs and two cats in the house. Currently disabled - former . Possible Asbestosis exposure, Smoking Hx: Former Smoker, quit 1990 - started as a teen - typical use 1 ppd over the years.  Family Hx: No Lung Cancer, Father with COPD, No Asthma  Medical Hx: Previous pneumonia (frequent episodes in the past) ; Previous shoulder surgery, bilateral - R shoulder was done in April 2024. Denies prior chest surgery.          The Chief Complaint is: New to me      PFSH:  Past Medical History:   Diagnosis Date    Addiction 12/21/2021    Arthritis     Back pain     radiates to both legs but more on rt leg    Depression     HENDERSON (dyspnea on exertion)     Dyslipidemia 08/12/2015    Excessive daytime sleepiness 03/02/2018    Formatting of this note might be different from the original.  Added automatically from request for surgery [id number]    GERD (gastroesophageal reflux disease)     Gout 08/12/2015    Hypersomnia with sleep apnea 05/14/2022    Hypertension     Idiopathic gout     Lumbar pseudoarthrosis     Neck pain     nonradiating pain    Neuropathy     Obesity 08/12/2015    Obstructive sleep apnea 08/12/2015    cpap    Restless leg syndrome     Sebaceous cyst 04/18/2017    SVT (supraventricular tachycardia)     s/p ablation    Type 2 diabetes mellitus 08/12/2015         Past Surgical History:   Procedure Laterality Date    ABLATION N/A 10/08/2020    Procedure: Ablation;  Surgeon: Rip Che III, MD;  Location: [facility] CATH;  Service: Cardiology;  Laterality: N/A;    ARTHROPLASTY OF JOINT OF FINGER Right 04/12/2022    Procedure: Right middle finger MCP joint arthroplasty;  Surgeon: Luis Alberto Payne MD;  Location: [facility] OR;  Service: Orthopedics;  Laterality: Right;  Anesthesia:  General with a single-shot supraclavicular block    BACK SURGERY      multiple    CARDIAC CATHETERIZATION       CARDIAC ELECTROPHYSIOLOGY STUDY  10/08/2020    Procedure: Study possible ablation;  Surgeon: Rip Che III, MD;  Location: [facility] CATH;  Service: Cardiology;;    CERVICAL SPINE SURGERY      CHOLECYSTECTOMY  05/30/2018    Dr. ROGELIO Tao, [facility]     COLONOSCOPY  2008    COLONOSCOPY N/A 09/14/2017    Procedure: COLONOSCOPY;  Surgeon: Obi Beltre MD;  Location: [facility] ENDO;  Service: Endoscopy;  Laterality: N/A;    CORONARY ANGIOGRAPHY Left 05/28/2018    Procedure: Coronary angiography;  Surgeon: Rafiq Malik MD;  Location: [facility] CATH;  Service: Cardiology;  Laterality: Left;    ELBOW SURGERY Bilateral     EPIDURAL STEROID INJECTION INTO CERVICAL SPINE      EPIDURAL STEROID INJECTION INTO LUMBAR SPINE      ESOPHAGOGASTRODUODENOSCOPY N/A 9/19/2024    Procedure: EGD (ESOPHAGOGASTRODUODENOSCOPY);  Surgeon: Obi Beltre MD;  Location: [facility] ENDO;  Service: Endoscopy;  Laterality: N/A;    HERNIA REPAIR      LAPAROSCOPIC APPENDECTOMY N/A 06/25/2020    Procedure: APPENDECTOMY, LAPAROSCOPIC;  Surgeon: Gordon Can MD;  Location: [facility] OR;  Service: General;  Laterality: N/A;    LAPAROSCOPIC CHOLECYSTECTOMY N/A 05/30/2018    Procedure: CHOLECYSTECTOMY, LAPAROSCOPIC;  Surgeon: Fletcher Tao MD;  Location: [facility] OR;  Service: General;  Laterality: N/A;    LAPAROSCOPIC SLEEVE GASTRECTOMY N/A 01/20/2023    Procedure: GASTRECTOMY, SLEEVE, LAPAROSCOPIC;  Surgeon: Bharat Devine MD;  Location: [facility] OR;  Service: General;  Laterality: N/A;    LEFT HEART CATHETERIZATION Left 05/28/2018    Procedure: Left heart cath;  Surgeon: Rafiq Malik MD;  Location: [facility] CATH;  Service: Cardiology;  Laterality: Left;    LEFT HEART CATHETERIZATION  8/26/2024    Procedure: Left heart cath Rm 3623;  Surgeon: Tom Quesada MD;  Location: [facility] CATH;  Service: Cardiology;;    NECK SURGERY      RADIOFREQUENCY ABLATION OF LUMBAR MEDIAL BRANCH NERVE AT SINGLE LEVEL Bilateral 06/17/2022    Procedure: Radiofrequency  Ablation, Nerve, Spinal, Lumbar, Medial Branch, 1 Level L3,4,5;  Surgeon: Thiago Garcia MD;  Location: [facility] OR;  Service: Pain Management;  Laterality: Bilateral;    SHOULDER ARTHROSCOPY      SPINE SURGERY      multiple    TONSILLECTOMY      TREATMENT OF CARDIAC ARRHYTHMIA  10/08/2020    Procedure: Cardioversion or Defibrillation;  Surgeon: Rip Che III, MD;  Location: [facility];  Service: Cardiology;;    TRIGGER FINGER RELEASE Right 2021    Procedure: RELEASE, TRIGGER FINGER;  Surgeon: Luis Alberto Payne MD;  Location: [facility] OR;  Service: Orthopedics;  Laterality: Right;  Procedure: Right ring finger trigger release    Position: Supine    Anesthesia: Local    Equipment: Basic handset    TYMPANOSTOMY TUBE PLACEMENT Right      Social History     Tobacco Use    Smoking status: Former     Current packs/day: 0.00     Average packs/day: 0.5 packs/day for 10.0 years (5.0 ttl pk-yrs)     Types: Cigarettes     Start date:      Quit date:      Years since quittin.8    Smokeless tobacco: Never   Substance Use Topics    Alcohol use: Not Currently    Drug use: No     Family History   Problem Relation Name Age of Onset    Diabetes Mother      Depression Mother      Liver cancer Mother      Obesity Mother      Heart disease Father      Anuerysm Father      Diabetes Father      Stroke Father      Glaucoma Paternal Grandmother      Obesity Sister       Review of patient's allergies indicates:  No Known Allergies      I have reviewed past medical, family, and social history.     Performance Status:The patient's activity level is no limits with regular activity.        Review of Systems   Constitutional:  Positive for activity change and fatigue. Negative for appetite change, chills, diaphoresis, fever and unexpected weight change.   HENT:  Negative for congestion, postnasal drip, rhinorrhea, sinus pressure, sinus pain, sore throat and trouble swallowing.    Eyes:  Negative for photophobia and visual  "disturbance.   Respiratory:  Negative for cough, choking, chest tightness, shortness of breath and wheezing.    Cardiovascular:  Negative for chest pain, palpitations and leg swelling.   Gastrointestinal:  Negative for abdominal distention, abdominal pain, blood in stool, constipation, diarrhea, nausea and vomiting.   Genitourinary:  Negative for difficulty urinating, dysuria, flank pain and hematuria.   Musculoskeletal:  Positive for back pain and neck pain. Negative for gait problem and joint swelling.   Skin:  Negative for rash and wound.   Allergic/Immunologic: Negative for environmental allergies and food allergies.   Neurological:  Negative for dizziness, seizures, syncope, weakness, light-headedness, numbness and headaches.   Hematological:  Does not bruise/bleed easily.   Psychiatric/Behavioral:  Negative for confusion and sleep disturbance. The patient is not nervous/anxious.          Exam:Comprehensive exam done. /65   Pulse 71   Ht 6' 2" (1.88 m)   Wt 105 kg (231 lb 7.7 oz)   SpO2 98% Comment: on room air at rest  BMI 29.72 kg/m²   Exam included Vitals as listed  Constitutional: He is oriented to person, place, and time. He appears well-developed. No distress.   Nose: Nose normal.   Mouth/Throat: Uvula is midline, oropharynx is clear and moist and mucous membranes are normal. No dental caries. No oropharyngeal exudate, posterior oropharyngeal edema, posterior oropharyngeal erythema or tonsillar abscesses.    Eyes: Pupils are equal, round, and reactive to light.   Neck: No JVD present. No thyromegaly present.   Cardiovascular: Normal rate, regular rhythm and normal heart sounds. Exam reveals no gallop and no friction rub.   No murmur heard.  Pulmonary/Chest: Effort normal and breath sounds normal. No accessory muscle usage or stridor. No apnea and no tachypnea. No respiratory distress, decreased breath sounds, wheezes, rhonchi, rales, or tenderness. Clear breath sounds throughout, on room air, " in no acute distress.   Abdominal: Soft. He exhibits no mass. There is no tenderness. No hepatosplenomegaly, hernias and normoactive bowel sounds  Musculoskeletal: Normal range of motion. exhibits no edema.   Neurological:  alert and oriented to person, place, and time. not disoriented.   Skin: Skin is warm and dry. Capillary refill takes less 2 sec. No cyanosis or erythema. No pallor. Nails show no clubbing.   Psychiatric: normal mood and affect. behavior is normal. Judgment and thought content normal.       Radiographs (ct chest and cxr) reviewed: was done by direct view   Patient imaging studies were reviewed and interpreted independently. My personal interpretation of most recent images include:    CTA Chest - 6/17/2023 - No acute process noted, no evidence of PE.      Labs Patient's labs were reviewed including CBC and CMP    Lab Results   Component Value Date    WBC 10.22 09/13/2024    RBC 4.99 09/13/2024    HGB 14.9 09/13/2024    HCT 44.0 09/13/2024    MCV 88 09/13/2024    MCH 29.9 09/13/2024    MCHC 33.9 09/13/2024    RDW 12.7 09/13/2024     09/13/2024    MPV 9.9 09/13/2024    GRAN 6.6 09/13/2024    GRAN 64.3 09/13/2024    LYMPH 2.7 09/13/2024    LYMPH 26.5 09/13/2024    MONO 0.7 09/13/2024    MONO 6.8 09/13/2024    EOS 0.1 09/13/2024    BASO 0.07 09/13/2024    EOSINOPHIL 1.2 09/13/2024    BASOPHIL 0.7 09/13/2024   CMP  Sodium   Date Value Ref Range Status   08/28/2024 139 136 - 145 mmol/L Final     Potassium   Date Value Ref Range Status   08/28/2024 4.5 3.5 - 5.1 mmol/L Final     Chloride   Date Value Ref Range Status   08/28/2024 102 95 - 110 mmol/L Final     CO2   Date Value Ref Range Status   08/28/2024 28 23 - 29 mmol/L Final     Glucose   Date Value Ref Range Status   08/28/2024 120 (H) 70 - 110 mg/dL Final     BUN   Date Value Ref Range Status   08/28/2024 17 6 - 20 mg/dL Final     Creatinine   Date Value Ref Range Status   08/28/2024 1.0 0.5 - 1.4 mg/dL Final     Calcium   Date Value Ref  Range Status   08/28/2024 9.8 8.7 - 10.5 mg/dL Final     Total Protein   Date Value Ref Range Status   08/28/2024 7.2 6.0 - 8.4 g/dL Final     Albumin   Date Value Ref Range Status   08/28/2024 4.2 3.5 - 5.2 g/dL Final   07/16/2020 4.1 3.6 - 5.1 g/dL Final     Comment:     For additional information, please refer to   http://education.[employer]/faq/WBA608 (This link is   being provided for informational/ educational purposes only.)  This test was developed and its analytical performance   characteristics have been determined by [facility]. It has not been cleared or approved by the   [place] Food and Drug Administration. This assay has been validated   pursuant to the CLIA regulations and is used for clinical   purposes.  @ Test Performed By:  [facility]  Juan Carlos Humphreys M.D., Ph.D.,   [address] [place] [id number]  [facility]  [id number]       Total Bilirubin   Date Value Ref Range Status   08/28/2024 0.7 0.1 - 1.0 mg/dL Final     Comment:     For infants and newborns, interpretation of results should be based  on gestational age, weight and in agreement with clinical  observations.    Premature Infant recommended reference ranges:  Up to 24 hours.............<8.0 mg/dL  Up to 48 hours............<12.0 mg/dL  3-5 days..................<15.0 mg/dL  6-29 days.................<15.0 mg/dL       Alkaline Phosphatase   Date Value Ref Range Status   08/28/2024 95 55 - 135 U/L Final     AST ([place])   Date Value Ref Range Status   02/15/2016 66 (H) 17 - 59 U/L Final     AST   Date Value Ref Range Status   08/28/2024 22 10 - 40 U/L Final     ALT   Date Value Ref Range Status   08/28/2024 28 10 - 44 U/L Final     Anion Gap   Date Value Ref Range Status   08/28/2024 9 8 - 16 mmol/L Final     eGFR   Date Value Ref Range Status   08/28/2024 >60.0 >60 mL/min/1.73 m^2 Final         PFT will be done and results to be  reviewed  Pulmonary Functions Testing Results:        Plan:  Clinical impression is resonably certain and repeated evaluation prn +/- follow up will be needed as below.    Gio was seen today for shortness of breath.    Diagnoses and all orders for this visit:    Dyspnea on exertion  -     Ambulatory referral/consult to Pulmonology  -     CT Chest Without Contrast; Future  -     Complete PFT with bronchodilator; Future  -     Six Minute Walk Test to qualify for Home Oxygen; Future    History of exposure to asbestos  -     Ambulatory referral/consult to Pulmonology    History of pneumonia    TARYN (obstructive sleep apnea)    Personal history of nicotine dependence    S/P catheter ablation of slow pathway        Follow up in about 3 months (around 1/14/2025), or if symptoms worsen or fail to improve.    Discussed with patient above for education the following:      Patient Instructions   Overall etiology of your symptoms is unknown at this time.    Will check CT Chest without contrast to further evaluate lung tissue.    I ordered a Lung Function Test to evaluate lung strength. Please complete prior to next appointment.     Check six minute walk test to see if you qualify for oxygen.    Continue current prescription medication regiment. Keep follow up appointment as scheduled. Please call the office if you have any questions or concerns.

## 2024-10-14 NOTE — TELEPHONE ENCOUNTER
Tried to call patient regarding his appt at 10:30am with Mary Lowery, needs to be moved to 3:30pm slot. Attempted to call 3x and lvm

## 2024-10-18 ENCOUNTER — OFFICE VISIT (OUTPATIENT)
Dept: PAIN MEDICINE | Facility: CLINIC | Age: 59
End: 2024-10-18
Payer: MEDICARE

## 2024-10-18 ENCOUNTER — CLINICAL SUPPORT (OUTPATIENT)
Dept: REHABILITATION | Facility: HOSPITAL | Age: 59
End: 2024-10-18
Payer: MEDICARE

## 2024-10-18 VITALS
HEIGHT: 74 IN | HEART RATE: 87 BPM | BODY MASS INDEX: 29.95 KG/M2 | SYSTOLIC BLOOD PRESSURE: 112 MMHG | WEIGHT: 233.38 LBS | DIASTOLIC BLOOD PRESSURE: 56 MMHG

## 2024-10-18 DIAGNOSIS — Z74.09 IMPAIRED FUNCTIONAL MOBILITY, BALANCE, GAIT, AND ENDURANCE: ICD-10-CM

## 2024-10-18 DIAGNOSIS — R53.1 WEAKNESS: ICD-10-CM

## 2024-10-18 DIAGNOSIS — M25.552 BILATERAL HIP PAIN: Primary | ICD-10-CM

## 2024-10-18 DIAGNOSIS — M25.551 BILATERAL HIP PAIN: Primary | ICD-10-CM

## 2024-10-18 DIAGNOSIS — R29.898 IMPAIRED FLEXIBILITY OF LOWER EXTREMITY: ICD-10-CM

## 2024-10-18 DIAGNOSIS — Z98.1 HISTORY OF LUMBAR FUSION: ICD-10-CM

## 2024-10-18 PROCEDURE — 99999 PR PBB SHADOW E&M-EST. PATIENT-LVL III: CPT | Mod: PBBFAC,,, | Performed by: PHYSICIAN ASSISTANT

## 2024-10-18 PROCEDURE — 97110 THERAPEUTIC EXERCISES: CPT | Mod: KX,PO

## 2024-10-18 NOTE — PROGRESS NOTES
"[facility]   Physical Therapy Progress Note      Name: Gio Forrest  [facility] Number: [id number]    Therapy Diagnosis:   Encounter Diagnoses   Name Primary?    Bilateral hip pain Yes    Weakness     Impaired flexibility of lower extremity     Impaired functional mobility, balance, gait, and endurance        Physician: Emiliana Celeste PA-C    Visit Date: 10/18/2024    Physician Orders: PT Eval and Treat   Medical Diagnosis from Referral:   Diagnosis   M54.50 (ICD-10-CM) - Lumbar back pain   Z98.1 (ICD-10-CM) - History of lumbar fusion   M47.896 (ICD-10-CM) - Other spondylosis, lumbar region      Evaluation Date: 8/16/2024  Authorization Period Expiration: 7/26/2025  Plan of Care Expiration: 9/27/24  Progress Note Due: 11/14/24  Date of Surgery: LS fusion 2023  Visit # / Visits authorized: 7/ 20  FOTO: 2/ 3    Precautions: Standard and Diabetes      Time In: 8:00  Time Out: 8:55  Total Billable Time: 30 minutes (tech assistance)    PTA Visit #: -/5       Subjective     Patient reports: pt reports a pinch in his lower back over the last few days. He saw Emiliana Celeste this am and she recommended him going to see Dr. Gruber   He was not compliant with home exercise program.  Response to previous treatment: TBD  Functional change: TBD    Pain: 1/10 Post treatment: 1/10  Location: left hip     Objective      Slump test: neg  Supine neural tension test: neg  Repeated Hip flexion (L): neg for recreation of burning pain at lateral joint    Hip flexion: 120    Treatment     Goi received the treatments listed below:       therapeutic exercises to develop strength, endurance, ROM, flexibility, and posture for 30 minutes including:  Quad rocks x 15  Posterior pelvic tilt 20 x 5"  Tra ADIM 3'  Tra with BKFO 3'  Up/up Down/down x 15  Dead Bug isometrics 3'   Prone quad stretch 3 x 30" doris   Prone glute sets 20 x 10"   Prone Hip extension 3 x 10  Standing hip extension 2 x 12 YTB  Standing hip abd " 2 x 12 YTB  Bridges GTB 3 x 10  Posterior pelvic tilts in standing     manual therapy techniques: Joint mobilizations, Manual traction, and Soft tissue Mobilization were applied to the: L hip for 0 minutes, including:     neuromuscular re-education activities to improve: Balance, Coordination, Proprioception, and Posture for 30 minutes. The following activities were included:       therapeutic activities to improve functional performance for 0 minutes, including:    Patient Education and Home Exercises       Education provided:   - yes    Written Home Exercises Provided: Pt instructed to continue prior HEP. Exercises were reviewed and Gio was able to demonstrate them prior to the end of the session.  Gio demonstrated fair  understanding of the education provided. See Electronic Medical Record under Patient Instructions for exercises provided during therapy sessions    Assessment   Pt's hip pain exacerbated with isometric hip flexor/core strengthening today. Pt showing improved frontal plane control in weight bearing today but still with complaints of hip pains;  Continue through 10/25 with POC then discharge to [facility].     Gio Is progressing well towards his goals.   Patient prognosis is Fair.     Patient will continue to benefit from skilled outpatient physical therapy to address the deficits listed in the problem list box on initial evaluation, provide pt/family education and to maximize pt's level of independence in the home and community environment.     Patient's spiritual, cultural and educational needs considered and pt agreeable to plan of care and goals.     Anticipated barriers to physical therapy: compliance with HEP    Goals: Short Term Goals: 2 weeks   Pt will report compliance with HEP in order to optimize improvements with current POC. Met     Long Term Goals: 6 weeks   Pt will improve passive ER of L hip from 30 degrees to at least 45 degrees without pain. Not Met  Pt will become independent  with HEP to target limitations in LQ flexibility to continue on his own to optimize soft tissue lengths. Met  Pt will improve glute strength to at least 4/5 to improve functional demands across. Partially Met    Plan     Plan of care Certification: 9/13/2024 to 10/25/2024.     Outpatient Physical Therapy 2 times weekly for 6 weeks to include the following interventions: Manual Therapy, Neuromuscular Re-ed, Patient Education, Therapeutic Activities, and Therapeutic Exercise.        Pam Kitchen, PT

## 2024-10-18 NOTE — PROGRESS NOTES
"[facility] Established Patient      Referred by: none    PCP: Matthew Carroll MD    CC:   Chief Complaint   Patient presents with    Low-back Pain     Left lower back    Hip Pain          HPI:   Gio Forrest is a 58 y.o. year old male patient who has a past medical history of Addiction, Arthritis, Back pain, Depression, HENDERSON (dyspnea on exertion), Dyslipidemia, Excessive daytime sleepiness, GERD (gastroesophageal reflux disease), Gout, Hypersomnia with sleep apnea, Hypertension, Idiopathic gout, Lumbar pseudoarthrosis, Neck pain, Neuropathy, Obesity, Obstructive sleep apnea, Restless leg syndrome, Sebaceous cyst, SVT (supraventricular tachycardia), and Type 2 diabetes mellitus. He presents for lower back pain.      Mr. Powers returns for follow up of back and hip region pain.  Recall he has undergone 4 lumbar surgeries.  At last visit, he described  pain across the lower back and along the axis of the spine the entire lumbar region to the sacrum and coccyx with left hip pain and no radicular leg pain, numbness or tingling. He has been going to PT with no major improvement.  He has had GTB injections and IA hip injections to help with bursistis and labral tears.  His pain today is on the outer bilateral left greater than right hips.  He has some lower back pain that is from "tweaking" it recently, but overall back pain has not been bothersome.  He has no further leg pain, numbness or tingling past the hip.        HPI 7-26-24:  He is new to me.  He was seen by Dr. Garcia in 2022 for back pain and had an L3, L4, L5 RFA.  He has seen Forrest Paige in Pain management in 2024 for diabetic neuropathy pain and trying Qutenza.  He had recent right shoulder surgery with continued right shoulder pain.   Today he would like to discuss back pain.  He has had 2 cervical spine and 4 lumbar spine surgeries.  Last lumbar surgery by Dr. Guru Camejo about 1 year ago with fusion approach from the lateral and posterior " sides.  He has pain across the lower back and along the axis of the spine the entire lumbar region to the sacrum and coccyx.  He also describes left hip pain.  He denies any radicular leg pain, numbness or tingling.  He has tried gabapentin, robaxin, mobic and percocet.  He had lumbar PT years ago, but none since last surgery.      Denies bowel/ bladder incontinence.    Past and current medications:  Antineuropathics: gabapentin, qutenza.  NSAIDs: mobic  Antidepressants:  Muscle relaxers:  robaxin  Opioids: percocet  Antiplatelets/Anticoagulants:    Physical Therapy/ Chiropractic care:  PT -years ago for lower back pain, but none since last back surgery  PT  - undergoing for hip and back pain.     Pain Intervention History:  6-17-24 L3, L4,, L5 RFA by Nghia Garcia.    Past Spine Surgical History:  2 cervical spine surgeries  4 lumbar spine surgeries by Nghia Camejo with last one about 1 year ago being lateral, posterior fusion approach.        History:    Current Outpatient Medications:     aspirin (ECOTRIN) 81 MG EC tablet, Take 81 mg by mouth once daily., Disp: , Rfl:     atorvastatin (LIPITOR) 40 MG tablet, TAKE ONE TABLET BY MOUTH ONCE DAILY IN THE EVENING, Disp: 90 tablet, Rfl: 4    calcium-vitamin D3 (OS-SOHAM 500 + D3) 500 mg-5 mcg (200 unit) per tablet, Take 1 tablet by mouth once daily., Disp: , Rfl:     DEXCOM G7 SENSOR Oxana, CHANGE EVERY 10 DAYS, Disp: 9 each, Rfl: 4    docusate sodium (COLACE) 100 MG capsule, Take 200 mg by mouth 2 (two) times daily., Disp: , Rfl:     empagliflozin (JARDIANCE) 25 mg tablet, Take 1 tablet (25 mg total) by mouth once daily., Disp: 90 tablet, Rfl: 3    fluticasone propionate (FLONASE) 50 mcg/actuation nasal spray, 1 spray by Each Nostril route daily as needed for Allergies., Disp: , Rfl:     gabapentin (NEURONTIN) 600 MG tablet, TAKE ONE TABLET BY MOUTH THREE TIMES DAILY, Disp: 90 tablet, Rfl: 2    glimepiride (AMARYL) 1 MG tablet, TAKE ONE TABLET BY MOUTH DAILY BEFORE  BREAKFAST, Disp: 90 tablet, Rfl: 3    metFORMIN (GLUCOPHAGE-XR) 500 MG ER 24hr tablet, Take 2 tablet twice a day (Patient taking differently: Take 1,000 mg by mouth 2 (two) times daily with meals. Take 2 tablet twice a day), Disp: 360 tablet, Rfl: 3    multivitamin capsule, Take 1 capsule by mouth once daily., Disp: , Rfl:     pantoprazole (PROTONIX) 40 MG tablet, Take 1 tablet (40 mg total) by mouth once daily., Disp: 30 tablet, Rfl: 2    vitamin D (VITAMIN D3) 1000 units Tab, Take 1,000 Units by mouth once daily., Disp: , Rfl:     blood-glucose meter kit, To check BG 2 times daily, to use with insurance preferred meter, Disp: 1 each, Rfl: 1    Past Medical History:   Diagnosis Date    Addiction 12/21/2021    Arthritis     Back pain     radiates to both legs but more on rt leg    Depression     HENDERSON (dyspnea on exertion)     Dyslipidemia 08/12/2015    Excessive daytime sleepiness 03/02/2018    Formatting of this note might be different from the original.  Added automatically from request for surgery [id number]    GERD (gastroesophageal reflux disease)     Gout 08/12/2015    Hypersomnia with sleep apnea 05/14/2022    Hypertension     Idiopathic gout     Lumbar pseudoarthrosis     Neck pain     nonradiating pain    Neuropathy     Obesity 08/12/2015    Obstructive sleep apnea 08/12/2015    cpap    Restless leg syndrome     Sebaceous cyst 04/18/2017    SVT (supraventricular tachycardia)     s/p ablation    Type 2 diabetes mellitus 08/12/2015       Past Surgical History:   Procedure Laterality Date    ABLATION N/A 10/08/2020    Procedure: Ablation;  Surgeon: Rip Che III, MD;  Location: [facility] CATH;  Service: Cardiology;  Laterality: N/A;    ARTHROPLASTY OF JOINT OF FINGER Right 04/12/2022    Procedure: Right middle finger MCP joint arthroplasty;  Surgeon: Luis Alberto Payne MD;  Location: [facility] OR;  Service: Orthopedics;  Laterality: Right;  Anesthesia:  General with a single-shot supraclavicular block    BACK SURGERY       multiple    CARDIAC CATHETERIZATION      CARDIAC ELECTROPHYSIOLOGY STUDY  10/08/2020    Procedure: Study possible ablation;  Surgeon: Rip Che III, MD;  Location: [facility] CATH;  Service: Cardiology;;    CERVICAL SPINE SURGERY      CHOLECYSTECTOMY  05/30/2018    Dr. ROGELIO Tao, [facility]     COLONOSCOPY  2008    COLONOSCOPY N/A 09/14/2017    Procedure: COLONOSCOPY;  Surgeon: Obi Beltre MD;  Location: [facility] ENDO;  Service: Endoscopy;  Laterality: N/A;    CORONARY ANGIOGRAPHY Left 05/28/2018    Procedure: Coronary angiography;  Surgeon: Rafiq Malik MD;  Location: [facility] CATH;  Service: Cardiology;  Laterality: Left;    ELBOW SURGERY Bilateral     EPIDURAL STEROID INJECTION INTO CERVICAL SPINE      EPIDURAL STEROID INJECTION INTO LUMBAR SPINE      ESOPHAGOGASTRODUODENOSCOPY N/A 9/19/2024    Procedure: EGD (ESOPHAGOGASTRODUODENOSCOPY);  Surgeon: Obi Beltre MD;  Location: [facility] ENDO;  Service: Endoscopy;  Laterality: N/A;    HERNIA REPAIR      LAPAROSCOPIC APPENDECTOMY N/A 06/25/2020    Procedure: APPENDECTOMY, LAPAROSCOPIC;  Surgeon: Gordon Can MD;  Location: [facility] OR;  Service: General;  Laterality: N/A;    LAPAROSCOPIC CHOLECYSTECTOMY N/A 05/30/2018    Procedure: CHOLECYSTECTOMY, LAPAROSCOPIC;  Surgeon: Fletcher Tao MD;  Location: [facility] OR;  Service: General;  Laterality: N/A;    LAPAROSCOPIC SLEEVE GASTRECTOMY N/A 01/20/2023    Procedure: GASTRECTOMY, SLEEVE, LAPAROSCOPIC;  Surgeon: Bharat Devine MD;  Location: [facility] OR;  Service: General;  Laterality: N/A;    LEFT HEART CATHETERIZATION Left 05/28/2018    Procedure: Left heart cath;  Surgeon: Rafiq Malik MD;  Location: [facility] CATH;  Service: Cardiology;  Laterality: Left;    LEFT HEART CATHETERIZATION  8/26/2024    Procedure: Left heart cath Rm 3623;  Surgeon: oTm Quesada MD;  Location: [facility] CATH;  Service: Cardiology;;    NECK SURGERY      RADIOFREQUENCY ABLATION OF LUMBAR MEDIAL BRANCH NERVE AT SINGLE LEVEL Bilateral  2022    Procedure: Radiofrequency Ablation, Nerve, Spinal, Lumbar, Medial Branch, 1 Level L3,4,5;  Surgeon: Thiago Garcia MD;  Location: [facility] OR;  Service: Pain Management;  Laterality: Bilateral;    SHOULDER ARTHROSCOPY      SPINE SURGERY      multiple    TONSILLECTOMY      TREATMENT OF CARDIAC ARRHYTHMIA  10/08/2020    Procedure: Cardioversion or Defibrillation;  Surgeon: Rip Che III, MD;  Location: [facility] CATH;  Service: Cardiology;;    TRIGGER FINGER RELEASE Right 2021    Procedure: RELEASE, TRIGGER FINGER;  Surgeon: Luis Alberto Payne MD;  Location: [facility] OR;  Service: Orthopedics;  Laterality: Right;  Procedure: Right ring finger trigger release    Position: Supine    Anesthesia: Local    Equipment: Basic handset    TYMPANOSTOMY TUBE PLACEMENT Right        Family History   Problem Relation Name Age of Onset    Diabetes Mother      Depression Mother      Liver cancer Mother      Obesity Mother      Heart disease Father      Anuerysm Father      Diabetes Father      Stroke Father      Glaucoma Paternal Grandmother      Obesity Sister         Social History     Socioeconomic History    Marital status:     Number of children: 1   Tobacco Use    Smoking status: Former     Current packs/day: 0.00     Average packs/day: 0.5 packs/day for 10.0 years (5.0 ttl pk-yrs)     Types: Cigarettes     Start date:      Quit date:      Years since quittin.8    Smokeless tobacco: Never   Substance and Sexual Activity    Alcohol use: Not Currently    Drug use: No    Sexual activity: Yes     Partners: Female   Social History Narrative              Social Drivers of Health     Financial Resource Strain: Medium Risk (2022)    Overall Financial Resource Strain (CARDIA)     Difficulty of Paying Living Expenses: Somewhat hard   Food Insecurity: No Food Insecurity (2024)    Hunger Vital Sign     Worried About Running Out of Food in the Last Year: Never true     Ran Out of Food in  "the Last Year: Never true   Transportation Needs: No Transportation Needs (8/26/2024)    TRANSPORTATION NEEDS     Transportation : No   Physical Activity: Insufficiently Active (1/24/2022)    Exercise Vital Sign     Days of Exercise per Week: 4 days     Minutes of Exercise per Session: 10 min   Stress: Stress Concern Present (1/24/2022)    [employer] - Occupational Stress Questionnaire     Feeling of Stress : To some extent   Housing Stability: Low Risk  (8/26/2024)    Housing Stability Vital Sign     Unable to Pay for Housing in the Last Year: No     Homeless in the Last Year: No       Review of patient's allergies indicates:  No Known Allergies    Labs:  Lab Results   Component Value Date    HGBA1C 6.4 (H) 08/28/2024       Lab Results   Component Value Date    WBC 10.22 09/13/2024    HGB 14.9 09/13/2024    HCT 44.0 09/13/2024    MCV 88 09/13/2024     09/13/2024           Review of Systems:  Shoulder pain.  Hip pain.  Lower back pain..  Balance of review of systems is negative.    Physical Exam:  Vitals:    10/18/24 0917   BP: (!) 112/56   Pulse: 87   Weight: 105.8 kg (233 lb 5.7 oz)   Height: 6' 2" (1.88 m)   PainSc:   2   PainLoc: Hip     Body mass index is 29.96 kg/m².    Pain disability index:      10/18/2024     9:15 AM 7/26/2024    10:10 AM 6/13/2024     2:44 PM   Last 3 PDI Scores   Pain Disability Index (PDI) 11 63 29       Gen: NAD  Psych: mood appropriate for given condition  HEENT: eyes anicteric   CV: RRR, 2+ radial pulse  Respiratory: non-labored, no signs of respiratory distress  Abd: non-distended  Skin: warm, dry and intact.  Gait: Able to heel walk, toe walk. No antalgic gait.     Coordination:   Tandem walking coordination: normal    Cervical spine: ROM is full in flexion, extension and lateral rotation without increased pain.  Spurling's maneuver causes no neck pain to either side.  Myofascial exam: No Tenderness to palpation across cervical paraspinous region " bilaterally.    Lumbar spine:  Lumbar spine: ROM is full with flexion extension and oblique extension with no increased pain.    Teodoro's test causes no increased pain on either side.    Supine straight leg raise is negative bilaterally.    Internal and external rotation of the hip causes no increased pain on either side.  Myofascial exam: No tenderness to palpation across lumbar paraspinous muscles. No tenderness to palpation over the bilateral greater trochanters and bilateral SI joint    Sensory:  Intact and symmetrical to light touch in C4-T1 dermatomes bilaterally. Intact and symmetrical to light touch in L1-S1 dermatomes bilaterally.    Motor:    Right Left   C4 Shoulder Abduction  5  5   C5 Elbow Flexion    5  5   C6 Wrist Extension  5  5   C7 Elbow Extension   5  5   C8/T1 Hand Intrinsics   5  5        Right Left   L2/3 Iliacus Hip flexion  5  5   L3/4 Qudratus Femoris Knee Extension  5  5   L4/5 Tib Anterior Ankle Dorsiflexion   5  5   L5/S1 Extensor Hallicus Longus Great toe extension  5  5   S1/S2 Gastroc/Soleus Plantar Flexion  5  5      Right Left   Triceps DTR 2+ 2+   Biceps DTR 2+ 2+   Brachioradialis DTR 2+ 2+   Patellar DTR 2+ 2+   Achilles DTR 2+ 2+   Harrison Absent  Absent   Clonus Absent Absent   Babinski Absent Absent       Imaging:  No lumbar spine imaging to review after his surgery 1 year ago.    MRI hip 6-14-24:  Impression:  1. Probable tear of the posterosuperior left hip labrum.  No specific imaging evidence of cam type or pincer type femoroacetabular impingement.  No paralabral cyst formation.  2. Synovial herniation pit formation at the anterior aspect of the right femoral head-neck junction, incompletely assessed.  While nonspecific, this imaging finding can be seen in the setting of cam type femoroacetabular impingement.  No discrete labral pathology appreciated in the right hip on this limited evaluation.  3. Insertional tendinopathy/tendinosis of the left hamstring tendon with  superimposed low-grade partial-thickness insertional tearing of the left hamstring tendon.  4. Insertional tendinopathy/tendinosis and low-grade partial-thickness tearing of the posteromedial fibers of the left gluteus medius tendon at its attachment with 9 mm adjacent ganglion cyst noted.  5. Other findings detailed in full report.    Assessment:   Gio Forrest is a 58 y.o. year old male patient who has a past medical history of Addiction, Arthritis, Back pain, Depression, HENDERSON (dyspnea on exertion), Dyslipidemia, Excessive daytime sleepiness, GERD (gastroesophageal reflux disease), Gout, Hypersomnia with sleep apnea, Hypertension, Idiopathic gout, Lumbar pseudoarthrosis, Neck pain, Neuropathy, Obesity, Obstructive sleep apnea, Restless leg syndrome, Sebaceous cyst, SVT (supraventricular tachycardia), and Type 2 diabetes mellitus. He presents for hip pain.    Mild current acute exacerbation of lumbar back pain, is not his main concern.  His main complaint is bilateral outer hip pain.  No radiculopathy and no neurological deficits.     Plan:  - he has been going to PT without improvement of hip pain.  Recommend he follow back with orthopedics, Dr. Gruber.  - if no further recommendations from orthopedics, then we can further look into the spine as the source of hip pain - with MRI and follow up with one of the pain management physicians.     Problem List Items Addressed This Visit       Bilateral hip pain - Primary     Other Visit Diagnoses       History of lumbar fusion                  Follow Up: RTC as needed    : Reviewed and consistent with medication use as prescribed.          Emiliana Celeste PA-C  [facility]

## 2024-10-23 ENCOUNTER — CLINICAL SUPPORT (OUTPATIENT)
Dept: REHABILITATION | Facility: HOSPITAL | Age: 59
End: 2024-10-23
Payer: MEDICARE

## 2024-10-23 DIAGNOSIS — R53.1 WEAKNESS: ICD-10-CM

## 2024-10-23 DIAGNOSIS — M25.551 BILATERAL HIP PAIN: Primary | ICD-10-CM

## 2024-10-23 DIAGNOSIS — R29.898 IMPAIRED FLEXIBILITY OF LOWER EXTREMITY: ICD-10-CM

## 2024-10-23 DIAGNOSIS — M25.552 BILATERAL HIP PAIN: Primary | ICD-10-CM

## 2024-10-23 DIAGNOSIS — Z74.09 IMPAIRED FUNCTIONAL MOBILITY, BALANCE, GAIT, AND ENDURANCE: ICD-10-CM

## 2024-10-23 PROCEDURE — 97112 NEUROMUSCULAR REEDUCATION: CPT | Mod: KX,PO

## 2024-10-23 PROCEDURE — 97110 THERAPEUTIC EXERCISES: CPT | Mod: KX,PO

## 2024-10-23 NOTE — PROGRESS NOTES
"[facility]   Physical Therapy Progress Note      Name: Gio Forrest  [facility] Number: [id number]    Therapy Diagnosis:   Encounter Diagnoses   Name Primary?    Bilateral hip pain Yes    Weakness     Impaired flexibility of lower extremity     Impaired functional mobility, balance, gait, and endurance        Physician: Emiliana Celeste PA-C    Visit Date: 10/23/2024    Physician Orders: PT Eval and Treat   Medical Diagnosis from Referral:   Diagnosis   M54.50 (ICD-10-CM) - Lumbar back pain   Z98.1 (ICD-10-CM) - History of lumbar fusion   M47.896 (ICD-10-CM) - Other spondylosis, lumbar region      Evaluation Date: 8/16/2024  Authorization Period Expiration: 7/26/2025  Plan of Care Expiration: 9/27/24  Progress Note Due: 11/14/24  Date of Surgery: LS fusion 2023  Visit # / Visits authorized: 8/ 20 + eval  FOTO: 2/ 3    Precautions: Standard and Diabetes      Time In: 10:15  Time Out: 11:00  Total Billable Time: 45 minutes (tech assistance)    PTA Visit #: -/5       Subjective     Patient reports: pt reports continued deep hip joint pain felt mostly laterally  He was not compliant with home exercise program.  Response to previous treatment: TBD  Functional change: TBD    Pain: 2/10 Post treatment: 1/10  Location: left hip     Objective      Slump test: neg  Supine neural tension test: neg  Repeated Hip flexion (L): neg for recreation of burning pain at lateral joint    Hip flexion: 120    Treatment     Gio received the treatments listed below:       therapeutic exercises to develop strength, endurance, ROM, flexibility, and posture for 30 minutes including:  Quad rocks x 15  Posterior pelvic tilt 20 x 5"  Prone quad stretch 3 x 30" doris   Prone glute sets 20 x 10"   Prone Hip extension 3 x 10  Standing hip extension 2 x 15 YTB  Standing hip abd 2 x 15 YTB  Bridges GTB 3 x 10     manual therapy techniques: Joint mobilizations, Manual traction, and Soft tissue Mobilization were applied to " the: L hip for 0 minutes, including:     neuromuscular re-education activities to improve: Balance, Coordination, Proprioception, and Posture for 15 minutes. The following activities were included:  Tra ADIM 3'  Tra with BKFO 3'  Up/up Down/down x 15  Dead Bug isometrics 3'   Clams GTB 3 x 10 on R 2 x 10 on L  Posterior pelvic tilts in standing     therapeutic activities to improve functional performance for 0 minutes, including:    Patient Education and Home Exercises       Education provided:   - yes    Written Home Exercises Provided: Pt instructed to continue prior HEP. Exercises were reviewed and Gio was able to demonstrate them prior to the end of the session.  Gio demonstrated fair  understanding of the education provided. See Electronic Medical Record under Patient Instructions for exercises provided during therapy sessions    Assessment   Pt shows improved neuromuscular control of core stabilization through standing resisted hip abductions today. He progressed resistance with hip ER strengthening via sidelying clams needing cues to avoid LS rotation. Pt will be discharged after next visit and will resume PT depending on Orthopedic follow up instructions.     Gio Is progressing well towards his goals.   Patient prognosis is Fair.     Patient will continue to benefit from skilled outpatient physical therapy to address the deficits listed in the problem list box on initial evaluation, provide pt/family education and to maximize pt's level of independence in the home and community environment.     Patient's spiritual, cultural and educational needs considered and pt agreeable to plan of care and goals.     Anticipated barriers to physical therapy: compliance with HEP    Goals: Short Term Goals: 2 weeks   Pt will report compliance with HEP in order to optimize improvements with current POC. Met     Long Term Goals: 6 weeks   Pt will improve passive ER of L hip from 30 degrees to at least 45 degrees  without pain. Not Met  Pt will become independent with HEP to target limitations in LQ flexibility to continue on his own to optimize soft tissue lengths. Met  Pt will improve glute strength to at least 4/5 to improve functional demands across. Partially Met    Plan     Plan of care Certification: 9/13/2024 to 10/25/2024.     Outpatient Physical Therapy 2 times weekly for 6 weeks to include the following interventions: Manual Therapy, Neuromuscular Re-ed, Patient Education, Therapeutic Activities, and Therapeutic Exercise.        Pam Kitchen, PT

## 2024-11-05 DIAGNOSIS — M47.896 OTHER SPONDYLOSIS, LUMBAR REGION: ICD-10-CM

## 2024-11-05 RX ORDER — GABAPENTIN 600 MG/1
600 TABLET ORAL 3 TIMES DAILY
Qty: 90 TABLET | Refills: 2 | Status: SHIPPED | OUTPATIENT
Start: 2024-11-05

## 2024-11-11 ENCOUNTER — PATIENT OUTREACH (OUTPATIENT)
Dept: ADMINISTRATIVE | Facility: HOSPITAL | Age: 59
End: 2024-11-11
Payer: MEDICARE

## 2024-11-11 ENCOUNTER — PATIENT MESSAGE (OUTPATIENT)
Dept: ADMINISTRATIVE | Facility: HOSPITAL | Age: 59
End: 2024-11-11
Payer: MEDICARE

## 2024-11-11 LAB
LEFT EYE DM RETINOPATHY: NEGATIVE
RIGHT EYE DM RETINOPATHY: NEGATIVE

## 2024-11-11 NOTE — PROGRESS NOTES
Claims eye report    Did patient have a 2024 eye exam report.     Routine Dilated Eye Exam  (Diabetic Retinopathy screening)     Non-compliant report chart audits for Eye Exam for Patients with Diabetes     Outreach to patient in reference to a routine Eye Exam    Population Health Chart Review & Patient Outreach Details      Additional [facility] Health Notes:               Updates Requested / Reviewed:      Care Team Updated         Health Maintenance Topics Overdue:      [facility] Score: 1     Eye Exam                       Health Maintenance Topic(s) Outreach Outcomes & Actions Taken:    Eye Exam - Outreach Outcomes & Actions Taken  : External Records Requested & Care Team Updated if Applicable

## 2024-11-11 NOTE — LETTER
AUTHORIZATION FOR RELEASE OF   CONFIDENTIAL INFORMATION    [facility]    We are seeing Gio Forrest, date of birth 1965, in the clinic at [facility]. KAYLIE Carroll MD is the patient's PCP. Gio Forrest has an outstanding lab/procedure at the time we reviewed his chart. In order to help keep his health information updated, he has authorized us to request the following medical record(s):         EYE EXAM             Please fax to [phone]    Thank you So much!               If you have any questions, please contact Rubia Soto, Care Coordinator   at [phone].            Patient Name: Gio Forrest  : 1965  Patient Phone #: [phone]

## 2024-11-12 ENCOUNTER — PATIENT OUTREACH (OUTPATIENT)
Dept: ADMINISTRATIVE | Facility: HOSPITAL | Age: 59
End: 2024-11-12
Payer: MEDICARE

## 2024-11-12 NOTE — PROGRESS NOTES
Population Health Chart Review & Patient Outreach Details      Additional Pop Health Notes:               Updates Requested / Reviewed:      Updated Care Coordination Note         Health Maintenance Topics Overdue:      [facility] Score: 1     Eye Exam                       Health Maintenance Topic(s) Outreach Outcomes & Actions Taken:    Eye Exam - Outreach Outcomes & Actions Taken  : Diabetic Eye External Records Uploaded, Care Team & History Updated if Applicable

## 2024-11-13 ENCOUNTER — OFFICE VISIT (OUTPATIENT)
Facility: CLINIC | Age: 59
End: 2024-11-13
Payer: MEDICARE

## 2024-11-13 DIAGNOSIS — L57.0 AK (ACTINIC KERATOSIS): ICD-10-CM

## 2024-11-13 DIAGNOSIS — L30.8 OTHER ECZEMA: Primary | ICD-10-CM

## 2024-11-13 PROCEDURE — 3061F NEG MICROALBUMINURIA REV: CPT | Mod: CPTII,S$GLB,, | Performed by: DERMATOLOGY

## 2024-11-13 PROCEDURE — 99214 OFFICE O/P EST MOD 30 MIN: CPT | Mod: 25,S$GLB,, | Performed by: DERMATOLOGY

## 2024-11-13 PROCEDURE — 3044F HG A1C LEVEL LT 7.0%: CPT | Mod: CPTII,S$GLB,, | Performed by: DERMATOLOGY

## 2024-11-13 PROCEDURE — 3066F NEPHROPATHY DOC TX: CPT | Mod: CPTII,S$GLB,, | Performed by: DERMATOLOGY

## 2024-11-13 PROCEDURE — 17000 DESTRUCT PREMALG LESION: CPT | Mod: S$GLB,,, | Performed by: DERMATOLOGY

## 2024-11-13 PROCEDURE — 1159F MED LIST DOCD IN RCRD: CPT | Mod: CPTII,S$GLB,, | Performed by: DERMATOLOGY

## 2024-11-13 PROCEDURE — 99999 PR PBB SHADOW E&M-EST. PATIENT-LVL III: CPT | Mod: PBBFAC,,, | Performed by: DERMATOLOGY

## 2024-11-13 PROCEDURE — 17003 DESTRUCT PREMALG LES 2-14: CPT | Mod: S$GLB,,, | Performed by: DERMATOLOGY

## 2024-11-13 RX ORDER — OMEPRAZOLE 40 MG/1
40 CAPSULE, DELAYED RELEASE ORAL DAILY
COMMUNITY

## 2024-11-13 RX ORDER — MELOXICAM 15 MG/1
TABLET ORAL
COMMUNITY
Start: 2024-11-10

## 2024-11-13 RX ORDER — FLUOCINONIDE 0.5 MG/G
CREAM TOPICAL 2 TIMES DAILY PRN
Qty: 60 G | Refills: 1 | Status: SHIPPED | OUTPATIENT
Start: 2024-11-13

## 2024-11-13 NOTE — PROGRESS NOTES
Subjective:      Patient ID:  Gio Forrest is a 59 y.o. male who presents for   No chief complaint on file.    HPI    Established patient.  Here for f/u AKs at hands, past cryo and Efudex field therapy.   Also complaining of itchy rash at abdomen, arm -- suspects poison ivy.     1. Skin, right dorsal wrist, radial, shave biopsy:   - ACTINIC KERATOSIS.   - THE LESION IS EXCORIATED.   2. Skin, left dorsal hand, base of index, shave biopsy:   - HYPERTROPHIC ACTINIC KERATOSIS.   - THE ATYPICAL SQUAMOUS EPITHELIUM EXTENDS TO THE BASE OF THE BIOPSY, AND AN UNDERLYING INVASIVE SQUAMOUS CELL CARCINOMA CANNOT BE ENTIRELY EXCLUDED.     Review of Systems    Objective:   Physical Exam   Constitutional: He appears well-developed and well-nourished. No distress.   Neurological: He is alert and oriented to person, place, and time. He is not disoriented.   Psychiatric: He has a normal mood and affect.   Skin:               Diagram Legend     Erythematous scaling macule/papule c/w actinic keratosis       Vascular papule c/w angioma      Pigmented verrucoid papule/plaque c/w seborrheic keratosis      Yellow umbilicated papule c/w sebaceous hyperplasia      Irregularly shaped tan macule c/w lentigo     1-2 mm smooth white papules consistent with Milia      Movable subcutaneous cyst with punctum c/w epidermal inclusion cyst      Subcutaneous movable cyst c/w pilar cyst      Firm pink to brown papule c/w dermatofibroma      Pedunculated fleshy papule(s) c/w skin tag(s)      Evenly pigmented macule c/w junctional nevus     Mildly variegated pigmented, slightly irregular-bordered macule c/w mildly atypical nevus      Flesh colored to evenly pigmented papule c/w intradermal nevus       Pink pearly papule/plaque c/w basal cell carcinoma      Erythematous hyperkeratotic cursted plaque c/w SCC      Surgical scar with no sign of skin cancer recurrence      Open and closed comedones      Inflammatory papules and pustules      Verrucoid  papule consistent consistent with wart     Erythematous eczematous patches and plaques     Dystrophic onycholytic nail with subungual debris c/w onychomycosis     Umbilicated papule    Erythematous-base heme-crusted tan verrucoid plaque consistent with inflamed seborrheic keratosis     Erythematous Silvery Scaling Plaque c/w Psoriasis     See annotation       Assessment / Plan:          AK (actinic keratosis)  - Discussed diagnosis, etiology, and precancerous nature of condition.   - Cryosurgery Procedure Note: Discussed procedure with patient/patient's guardian including risks and benefits as well as treatment alternatives. Risks of procedure include pain, itching, swelling, redness, blistering, crusting, wound formation, post-inflammatory pigmentary alteration, scar, recurrence. Verbal consent obtained. LN2 cryosurgery performed to 2 lesion(s). Patient tolerated procedure well. After-visit wound care instructions reviewed and provided in writing.      Other eczema  -     fluocinonide 0.05% (LIDEX) 0.05 % cream; Apply topically 2 (two) times daily as needed (rash).  Dispense: 60 g; Refill: 1  - Discussed diagnosis, etiology, and treatment options.   - Lidex cream BID PRN rash.   - Counseled on gentle, dry skin care and avoidance of common allergens/irritants.    - Avoid triggers. Discussed use of Zanfel post poison ivy exposure.    - Counseled on potential SE of medication(s) and instructed on use.            Follow up for skin check 3-6 months.

## 2024-11-13 NOTE — PATIENT INSTRUCTIONS
Zanfel Poison [place], [place] & [place] Wash    No more than 1 shower or bath a day.   Out of shower or bath in less than 10 minutes.   Use only warm water, NOT hot.   Soap only where needed - areas that make body odor or are visibly dirty.  Use gentle soaps only (eg, Cetaphil Body Wash; non soap cleansers are more gentle than bar soaps).   After shower or bath, pat dry - do not scrub or rub aggressively.   Apply thick moisturizing cream twice daily, once being after the shower or bath (eg, Cerave, Cetaphil, Vanicream).   Avoid common allergens/irritants - fragrances, botanicals, etc.       CRYOSURGERY      Your doctor has used a method called cryosurgery to treat your skin condition. Cryosurgery refers to the use of very cold substances to treat a variety of skin conditions such as warts, pre-skin cancers, molluscum contagiosum, sun spots, and several benign growths. The substance we use in cryosurgery is liquid nitrogen and is so cold (-195 degrees Celsius) that is burns when administered.     Following treatment in the office, the skin may immediately burn and become red. You may find the area around the lesion is affected as well. It is sometimes necessary to treat not only the lesion, but a small area of the surrounding normal skin to achieve a good response.     A blister, and even a blood filled blister, may form after treatment.   This is a normal response. If the blister is painful, it is acceptable to sterilize a needle and with rubbing alcohol and gently pop the blister. It is important that you gently wash the area with soap and warm water as the blister fluid may contain wart virus if a wart was treated. Do no remove the roof of the blister.     The area treated can take anywhere from 1-3 weeks to heal. Healing time depends on the kind skin lesion treated, the location, and how aggressively the lesion was treated. It is recommended that the areas treated are covered with Vaseline or bacitracin ointment and a  band-aid. If a band-aid is not practical, just ointment applied several times per day will do. Keeping these areas moist will speed the healing time.    Treatment with liquid nitrogen can leave a scar. In dark skin, it may be a light or dark scar, in light skin it may be a white or pink scar. These will generally fade with time, but may never go away completely.     If you have any concerns after your treatment, please feel free to call the office.       [facility] [place] [address]/ [phone] FAX/ [email or web address]

## 2024-11-14 ENCOUNTER — PATIENT MESSAGE (OUTPATIENT)
Facility: CLINIC | Age: 59
End: 2024-11-14
Payer: MEDICARE

## 2024-11-21 ENCOUNTER — OFFICE VISIT (OUTPATIENT)
Dept: ORTHOPEDICS | Facility: CLINIC | Age: 59
End: 2024-11-21
Payer: MEDICARE

## 2024-11-21 VITALS — WEIGHT: 233.25 LBS | HEIGHT: 74 IN | BODY MASS INDEX: 29.93 KG/M2

## 2024-11-21 DIAGNOSIS — M70.62 GREATER TROCHANTERIC BURSITIS OF LEFT HIP: Primary | ICD-10-CM

## 2024-11-21 PROCEDURE — 1159F MED LIST DOCD IN RCRD: CPT | Mod: CPTII,S$GLB,, | Performed by: ORTHOPAEDIC SURGERY

## 2024-11-21 PROCEDURE — 3066F NEPHROPATHY DOC TX: CPT | Mod: CPTII,S$GLB,, | Performed by: ORTHOPAEDIC SURGERY

## 2024-11-21 PROCEDURE — 3008F BODY MASS INDEX DOCD: CPT | Mod: CPTII,S$GLB,, | Performed by: ORTHOPAEDIC SURGERY

## 2024-11-21 PROCEDURE — 3061F NEG MICROALBUMINURIA REV: CPT | Mod: CPTII,S$GLB,, | Performed by: ORTHOPAEDIC SURGERY

## 2024-11-21 PROCEDURE — 99214 OFFICE O/P EST MOD 30 MIN: CPT | Mod: S$GLB,,, | Performed by: ORTHOPAEDIC SURGERY

## 2024-11-21 PROCEDURE — 1160F RVW MEDS BY RX/DR IN RCRD: CPT | Mod: CPTII,S$GLB,, | Performed by: ORTHOPAEDIC SURGERY

## 2024-11-21 PROCEDURE — 99999 PR PBB SHADOW E&M-EST. PATIENT-LVL IV: CPT | Mod: PBBFAC,,, | Performed by: ORTHOPAEDIC SURGERY

## 2024-11-21 PROCEDURE — 3044F HG A1C LEVEL LT 7.0%: CPT | Mod: CPTII,S$GLB,, | Performed by: ORTHOPAEDIC SURGERY

## 2024-11-21 RX ORDER — CEFAZOLIN SODIUM 2 G/50ML
2 SOLUTION INTRAVENOUS
OUTPATIENT
Start: 2024-11-21

## 2024-11-21 RX ORDER — SODIUM CHLORIDE 0.9 % (FLUSH) 0.9 %
10 SYRINGE (ML) INJECTION EVERY 6 HOURS PRN
Status: SHIPPED | OUTPATIENT
Start: 2024-12-13

## 2024-11-22 ENCOUNTER — PATIENT MESSAGE (OUTPATIENT)
Dept: PODIATRY | Facility: CLINIC | Age: 59
End: 2024-11-22
Payer: MEDICARE

## 2024-12-03 DIAGNOSIS — E11.42 TYPE 2 DIABETES MELLITUS WITH DIABETIC POLYNEUROPATHY, WITH LONG-TERM CURRENT USE OF INSULIN: ICD-10-CM

## 2024-12-03 DIAGNOSIS — Z79.4 TYPE 2 DIABETES MELLITUS WITH DIABETIC POLYNEUROPATHY, WITH LONG-TERM CURRENT USE OF INSULIN: ICD-10-CM

## 2024-12-03 RX ORDER — GLIMEPIRIDE 1 MG/1
1 TABLET ORAL
Qty: 90 TABLET | Refills: 3 | Status: SHIPPED | OUTPATIENT
Start: 2024-12-03

## 2024-12-03 NOTE — H&P
Chief Complaint   Patient presents with    Left Hip - Pain    Right Hip - Pain      HPI:   This is a 59 y.o. who presents to clinic today for left hip pain for the past 3 years after no known trauma. Complains of pain while sleeping on side and when descending stairs. Pain is dull. No numbness or tingling. No associated signs or symptoms.      Past Medical History:   Diagnosis Date    Addiction 12/21/2021    Arthritis     Back pain     radiates to both legs but more on rt leg    Depression     HENDERSON (dyspnea on exertion)     Dyslipidemia 08/12/2015    Excessive daytime sleepiness 03/02/2018    Formatting of this note might be different from the original.  Added automatically from request for surgery [id number]    GERD (gastroesophageal reflux disease)     Gout 08/12/2015    Hypersomnia with sleep apnea 05/14/2022    Hypertension     Idiopathic gout     Lumbar pseudoarthrosis     Neck pain     nonradiating pain    Neuropathy     Obesity 08/12/2015    Obstructive sleep apnea 08/12/2015    cpap    Restless leg syndrome     Sebaceous cyst 04/18/2017    SVT (supraventricular tachycardia)     s/p ablation    Type 2 diabetes mellitus 08/12/2015     Past Surgical History:   Procedure Laterality Date    ABLATION N/A 10/08/2020    Procedure: Ablation;  Surgeon: Rip Che III, MD;  Location: [facility] CATH;  Service: Cardiology;  Laterality: N/A;    ARTHROPLASTY OF JOINT OF FINGER Right 04/12/2022    Procedure: Right middle finger MCP joint arthroplasty;  Surgeon: Luis Alberto Payne MD;  Location: [facility] OR;  Service: Orthopedics;  Laterality: Right;  Anesthesia:  General with a single-shot supraclavicular block    BACK SURGERY      multiple    CARDIAC CATHETERIZATION      CARDIAC ELECTROPHYSIOLOGY STUDY  10/08/2020    Procedure: Study possible ablation;  Surgeon: Rip Che III, MD;  Location: [facility] CATH;  Service: Cardiology;;    CERVICAL SPINE SURGERY      CHOLECYSTECTOMY  05/30/2018    Dr. ROGELIO Tao, [facility]     COLONOSCOPY   2008    COLONOSCOPY N/A 09/14/2017    Procedure: COLONOSCOPY;  Surgeon: Obi Beltre MD;  Location: [facility] ENDO;  Service: Endoscopy;  Laterality: N/A;    CORONARY ANGIOGRAPHY Left 05/28/2018    Procedure: Coronary angiography;  Surgeon: Rafiq Malik MD;  Location: [facility] CATH;  Service: Cardiology;  Laterality: Left;    ELBOW SURGERY Bilateral     EPIDURAL STEROID INJECTION INTO CERVICAL SPINE      EPIDURAL STEROID INJECTION INTO LUMBAR SPINE      ESOPHAGOGASTRODUODENOSCOPY N/A 9/19/2024    Procedure: EGD (ESOPHAGOGASTRODUODENOSCOPY);  Surgeon: Obi Beltre MD;  Location: [facility] ENDO;  Service: Endoscopy;  Laterality: N/A;    HERNIA REPAIR      LAPAROSCOPIC APPENDECTOMY N/A 06/25/2020    Procedure: APPENDECTOMY, LAPAROSCOPIC;  Surgeon: Gordon Can MD;  Location: [facility] OR;  Service: General;  Laterality: N/A;    LAPAROSCOPIC CHOLECYSTECTOMY N/A 05/30/2018    Procedure: CHOLECYSTECTOMY, LAPAROSCOPIC;  Surgeon: Fletcher Tao MD;  Location: [facility] OR;  Service: General;  Laterality: N/A;    LAPAROSCOPIC SLEEVE GASTRECTOMY N/A 01/20/2023    Procedure: GASTRECTOMY, SLEEVE, LAPAROSCOPIC;  Surgeon: Bharat Devine MD;  Location: [facility] OR;  Service: General;  Laterality: N/A;    LEFT HEART CATHETERIZATION Left 05/28/2018    Procedure: Left heart cath;  Surgeon: Rafiq Malik MD;  Location: [facility] CATH;  Service: Cardiology;  Laterality: Left;    LEFT HEART CATHETERIZATION  8/26/2024    Procedure: Left heart cath Rm 3623;  Surgeon: Tom Quesada MD;  Location: [facility] CATH;  Service: Cardiology;;    NECK SURGERY      RADIOFREQUENCY ABLATION OF LUMBAR MEDIAL BRANCH NERVE AT SINGLE LEVEL Bilateral 06/17/2022    Procedure: Radiofrequency Ablation, Nerve, Spinal, Lumbar, Medial Branch, 1 Level L3,4,5;  Surgeon: Thiago Garcia MD;  Location: [facility] OR;  Service: Pain Management;  Laterality: Bilateral;    SHOULDER ARTHROSCOPY      SPINE SURGERY      multiple    TONSILLECTOMY      TREATMENT OF CARDIAC  ARRHYTHMIA  10/08/2020    Procedure: Cardioversion or Defibrillation;  Surgeon: Rip Che III, MD;  Location: [facility] CATH;  Service: Cardiology;;    TRIGGER FINGER RELEASE Right 09/09/2021    Procedure: RELEASE, TRIGGER FINGER;  Surgeon: Luis Alberto Payne MD;  Location: [facility] OR;  Service: Orthopedics;  Laterality: Right;  Procedure: Right ring finger trigger release    Position: Supine    Anesthesia: Local    Equipment: Basic handset    TYMPANOSTOMY TUBE PLACEMENT Right      Current Outpatient Medications on File Prior to Visit   Medication Sig Dispense Refill    aspirin (ECOTRIN) 81 MG EC tablet Take 81 mg by mouth once daily.      atorvastatin (LIPITOR) 40 MG tablet TAKE ONE TABLET BY MOUTH ONCE DAILY IN THE EVENING 90 tablet 4    calcium-vitamin D3 (OS-SOHAM 500 + D3) 500 mg-5 mcg (200 unit) per tablet Take 1 tablet by mouth once daily.      DEXCOM G7 SENSOR Oxana CHANGE EVERY 10 DAYS 9 each 4    empagliflozin (JARDIANCE) 25 mg tablet Take 1 tablet (25 mg total) by mouth once daily. 90 tablet 3    fluocinonide 0.05% (LIDEX) 0.05 % cream Apply topically 2 (two) times daily as needed (rash). 60 g 1    fluticasone propionate (FLONASE) 50 mcg/actuation nasal spray 1 spray by Each Nostril route daily as needed for Allergies.      gabapentin (NEURONTIN) 600 MG tablet TAKE ONE TABLET BY MOUTH THREE TIMES DAILY 90 tablet 2    hydrocortisone 2.5 % cream Apply topically 2 (two) times daily as needed (rash around nose). Mix 50/50 with ketoconazole cream. 20 g 3    ketoconazole (NIZORAL) 2 % cream Apply topically 2 (two) times daily as needed (rash around nose). Mix 50/50 with hydrocortisone cream. OK to use daily by itself for prevention. 60 g 3    meloxicam (MOBIC) 15 MG tablet       metFORMIN (GLUCOPHAGE-XR) 500 MG ER 24hr tablet Take 2 tablet twice a day 360 tablet 3    multivitamin capsule Take 1 capsule by mouth once daily.      omeprazole (PRILOSEC) 40 MG capsule Take 40 mg by mouth once daily.      pantoprazole  (PROTONIX) 40 MG tablet Take 1 tablet (40 mg total) by mouth once daily. 30 tablet 2    vitamin D (VITAMIN D3) 1000 units Tab Take 1,000 Units by mouth once daily.      [DISCONTINUED] glimepiride (AMARYL) 1 MG tablet TAKE ONE TABLET BY MOUTH DAILY BEFORE BREAKFAST 90 tablet 3    blood-glucose meter kit To check BG 2 times daily, to use with insurance preferred meter 1 each 1    docusate sodium (COLACE) 100 MG capsule Take 200 mg by mouth 2 (two) times daily. (Patient not taking: Reported on 2024)       No current facility-administered medications on file prior to visit.     Review of patient's allergies indicates:  No Known Allergies  Family History   Problem Relation Name Age of Onset    Diabetes Mother      Depression Mother      Liver cancer Mother      Obesity Mother      Heart disease Father      Anuerysm Father      Diabetes Father      Stroke Father      Glaucoma Paternal Grandmother      Obesity Sister       Social History     Socioeconomic History    Marital status:     Number of children: 1   Tobacco Use    Smoking status: Former     Current packs/day: 0.00     Average packs/day: 0.5 packs/day for 10.0 years (5.0 ttl pk-yrs)     Types: Cigarettes     Start date:      Quit date:      Years since quittin.9    Smokeless tobacco: Never   Substance and Sexual Activity    Alcohol use: Not Currently    Drug use: No    Sexual activity: Yes     Partners: Female   Social History Narrative              Social Drivers of Health     Financial Resource Strain: Medium Risk (2022)    Overall Financial Resource Strain (CARDIA)     Difficulty of Paying Living Expenses: Somewhat hard   Food Insecurity: No Food Insecurity (2024)    Hunger Vital Sign     Worried About Running Out of Food in the Last Year: Never true     Ran Out of Food in the Last Year: Never true   Transportation Needs: No Transportation Needs (2024)    TRANSPORTATION NEEDS     Transportation : No    Physical Activity: Insufficiently Active (1/24/2022)    Exercise Vital Sign     Days of Exercise per Week: 4 days     Minutes of Exercise per Session: 10 min   Stress: Stress Concern Present (1/24/2022)    [employer] - Occupational Stress Questionnaire     Feeling of Stress : To some extent   Housing Stability: Low Risk  (8/26/2024)    Housing Stability Vital Sign     Unable to Pay for Housing in the Last Year: No     Homeless in the Last Year: No       Review of Systems:  Constitutional:  Denies fever or chills   Eyes:  Denies change in visual acuity   HENT:  Denies nasal congestion or sore throat   Respiratory:  Denies cough or shortness of breath   Cardiovascular:  Denies chest pain or edema   GI:  Denies abdominal pain, nausea, vomiting, bloody stools or diarrhea   :  Denies dysuria   Integument:  Denies rash   Neurologic:  Denies headache, focal weakness or sensory changes   Endocrine:  Denies polyuria or polydipsia   Lymphatic:  Denies swollen glands   Psychiatric:  Denies depression or anxiety     Physical Exam:   Constitutional:  Well developed, well nourished, no acute distress, non-toxic appearance   Integument:  Well hydrated, no rash   Lymphatic:  No lymphadenopathy noted   Neurologic:  Alert & oriented x 3  Psychiatric:  Speech and behavior appropriate     Bilateral Hip Exam    right Hip Exam   right hip exam performed same as contralateral hip and is normal.     left Hip Exam   Tenderness   The patient is experiencing tenderness in the greater trochanter.  Range of Motion   The patient has normal hip ROM.  Muscle Strength   Abduction: 4/5   Other   Erythema: absent  Sensation: normal  Pulse: present    X-rays were personally reviewed by me and findings discussed with the patient.  2 views of the left hip show no fractures or dislocations    Greater trochanteric bursitis of left hip  -     Vital signs; Standing  -     Diet NPO; Standing  -     Place MONIQUE hose;  Standing  -     Place sequential compression device; Standing  -     Case Request Operating Room: BURSECTOMY, HIP  -     Place in Outpatient; Standing    Other orders  -     sodium chloride 0.9% flush 10 mL  -     IP VTE LOW RISK PATIENT; Standing  -     tranexamic acid (CYKLOKAPRON) 1,000 mg in 0.9% NaCl 100 mL IVPB (MB+)  -     cefazolin (ANCEF) 2 gram in dextrose 5% 50 mL IVPB (premix)           I had a long discussion with the patient regarding the benefits and risks of left troch bursectomy with piriformis release. They voiced understanding and wish to proceed with surgery. Consents signed in clinic.

## 2024-12-06 RX ORDER — METFORMIN HYDROCHLORIDE 500 MG/1
TABLET, EXTENDED RELEASE ORAL
Qty: 360 TABLET | Refills: 3 | Status: SHIPPED | OUTPATIENT
Start: 2024-12-06

## 2024-12-11 ENCOUNTER — OFFICE VISIT (OUTPATIENT)
Dept: FAMILY MEDICINE | Facility: CLINIC | Age: 59
End: 2024-12-11
Payer: MEDICARE

## 2024-12-11 VITALS
HEART RATE: 75 BPM | BODY MASS INDEX: 30.62 KG/M2 | HEIGHT: 74 IN | WEIGHT: 238.56 LBS | OXYGEN SATURATION: 97 % | SYSTOLIC BLOOD PRESSURE: 108 MMHG | DIASTOLIC BLOOD PRESSURE: 61 MMHG

## 2024-12-11 DIAGNOSIS — E78.2 MIXED HYPERLIPIDEMIA: ICD-10-CM

## 2024-12-11 DIAGNOSIS — Z00.00 WELLNESS EXAMINATION: Primary | ICD-10-CM

## 2024-12-11 DIAGNOSIS — I10 ESSENTIAL HYPERTENSION: ICD-10-CM

## 2024-12-11 PROBLEM — M46.06 SPINAL ENTHESOPATHY, LUMBAR REGION: Status: RESOLVED | Noted: 2021-03-24 | Resolved: 2024-12-11

## 2024-12-11 PROCEDURE — 99999 PR PBB SHADOW E&M-EST. PATIENT-LVL III: CPT | Mod: PBBFAC,,, | Performed by: FAMILY MEDICINE

## 2024-12-11 RX ORDER — SODIUM CHLORIDE 0.9 % (FLUSH) 0.9 %
10 SYRINGE (ML) INJECTION EVERY 6 HOURS PRN
Status: SHIPPED | OUTPATIENT
Start: 2024-12-13

## 2024-12-11 NOTE — PROGRESS NOTES
Subjective:       Patient ID: Gio Forrest is a 59 y.o. male.    Chief Complaint: Follow-up (3 month )    Here for wellness and follow up multiple chronic medical issues. Doing well overall and in normal state of health.      Follow-up  Associated symptoms include arthralgias. Pertinent negatives include no chest pain, chills, coughing or fever.     Review of Systems   Constitutional:  Negative for chills and fever.   Respiratory:  Negative for cough, chest tightness and shortness of breath.    Cardiovascular:  Negative for chest pain, palpitations and leg swelling.   Endocrine: Negative for cold intolerance and heat intolerance.   Musculoskeletal:  Positive for arthralgias.   Psychiatric/Behavioral:  Negative for decreased concentration. The patient is not nervous/anxious.        Objective:      Physical Exam  Vitals and nursing note reviewed.   Constitutional:       Appearance: He is well-developed.   HENT:      Head: Normocephalic and atraumatic.   Cardiovascular:      Rate and Rhythm: Normal rate and regular rhythm.      Heart sounds: Normal heart sounds.   Pulmonary:      Effort: Pulmonary effort is normal.      Breath sounds: Normal breath sounds.   Psychiatric:         Mood and Affect: Mood normal.         Behavior: Behavior normal.         Assessment:       1. Wellness examination    2. Essential hypertension    3. Mixed hyperlipidemia        Plan:       Wellness examination    Essential hypertension    Mixed hyperlipidemia    Other orders  -     sodium chloride 0.9% flush 10 mL        Will monitor chronic medical issues and continue current plan of care.  Visit today included increased complexity associated with the care of the episodic problem htn addressed and managing the longitudinal care of the patient due to the serious and/or complex managed problem(s) as above.  Upcoming ortho procedure  Stay active as tolerated and work on diet  F/u with endo    Follow up in about 6 months (around 6/11/2025),  or if symptoms worsen or fail to improve.

## 2024-12-13 PROBLEM — M70.62 GREATER TROCHANTERIC BURSITIS OF LEFT HIP: Status: ACTIVE | Noted: 2024-12-13

## 2024-12-19 ENCOUNTER — TELEPHONE (OUTPATIENT)
Dept: PODIATRY | Facility: CLINIC | Age: 59
End: 2024-12-19
Payer: MEDICARE

## 2024-12-19 NOTE — TELEPHONE ENCOUNTER
----- Message from Kishan sent at 12/19/2024  9:07 AM CST -----  Contact: Self  Type:  Sooner Appointment Request    Caller is requesting a sooner appointment.  Caller declined first available appointment listed below.  Caller will not accept being placed on the waitlist and is requesting a message be sent to doctor.    Name of Caller:  Patient  When is the first available appointment?  Jan 31  Would the patient rather a call back or a response via [facility]? Call Back  Best Call Back Number:  [phone]  Additional Information:  Patient is requesting a call back, he had to cancel his appt today due to a stomach bug, and is asking if there is any way that him and his wife Crystal MRN [id number] could be squeezed in on the 26th.

## 2024-12-26 ENCOUNTER — OFFICE VISIT (OUTPATIENT)
Dept: ORTHOPEDICS | Facility: CLINIC | Age: 59
End: 2024-12-26
Payer: MEDICARE

## 2024-12-26 VITALS — HEIGHT: 74 IN | BODY MASS INDEX: 30.42 KG/M2 | WEIGHT: 237 LBS

## 2024-12-26 DIAGNOSIS — M70.61 GREATER TROCHANTERIC BURSITIS OF RIGHT HIP: Primary | ICD-10-CM

## 2024-12-26 PROCEDURE — 1159F MED LIST DOCD IN RCRD: CPT | Mod: CPTII,S$GLB,, | Performed by: ORTHOPAEDIC SURGERY

## 2024-12-26 PROCEDURE — 1160F RVW MEDS BY RX/DR IN RCRD: CPT | Mod: CPTII,S$GLB,, | Performed by: ORTHOPAEDIC SURGERY

## 2024-12-26 PROCEDURE — 99214 OFFICE O/P EST MOD 30 MIN: CPT | Mod: 24,S$GLB,, | Performed by: ORTHOPAEDIC SURGERY

## 2024-12-26 PROCEDURE — 3008F BODY MASS INDEX DOCD: CPT | Mod: CPTII,S$GLB,, | Performed by: ORTHOPAEDIC SURGERY

## 2024-12-26 PROCEDURE — 99999 PR PBB SHADOW E&M-EST. PATIENT-LVL IV: CPT | Mod: PBBFAC,,, | Performed by: ORTHOPAEDIC SURGERY

## 2024-12-26 RX ORDER — SODIUM CHLORIDE 0.9 % (FLUSH) 0.9 %
10 SYRINGE (ML) INJECTION EVERY 6 HOURS PRN
Status: DISCONTINUED | OUTPATIENT
Start: 2025-01-10 | End: 2025-01-08 | Stop reason: CLARIF

## 2024-12-26 RX ORDER — CEFAZOLIN SODIUM 2 G/50ML
2 SOLUTION INTRAVENOUS
Status: CANCELLED | OUTPATIENT
Start: 2024-12-26

## 2024-12-30 DIAGNOSIS — M70.62 GREATER TROCHANTERIC BURSITIS OF BOTH HIPS: ICD-10-CM

## 2024-12-30 DIAGNOSIS — M70.61 GREATER TROCHANTERIC BURSITIS OF BOTH HIPS: ICD-10-CM

## 2024-12-30 RX ORDER — METHOCARBAMOL 750 MG/1
500 TABLET, FILM COATED ORAL 4 TIMES DAILY
COMMUNITY
End: 2024-12-30 | Stop reason: SDUPTHER

## 2025-01-02 RX ORDER — METHOCARBAMOL 500 MG/1
500 TABLET, FILM COATED ORAL 4 TIMES DAILY PRN
Qty: 44 TABLET | Refills: 3 | Status: SHIPPED | OUTPATIENT
Start: 2025-01-02

## 2025-01-02 RX ORDER — METHOCARBAMOL 750 MG/1
1500 TABLET, FILM COATED ORAL 3 TIMES DAILY PRN
Qty: 80 TABLET | Refills: 2 | OUTPATIENT
Start: 2025-01-02 | End: 2025-02-11

## 2025-01-07 NOTE — H&P
Chief Complaint   Patient presents with    Left Hip - Post-op Evaluation, Pain      HPI:   This is a 59 y.o. who presents to clinic today for right hip pain for the past 3 months after no known trauma. Complains of pain while sleeping on side and when descending stairs. Pain is dull. No numbness or tingling. No associated signs or symptoms.      Past Medical History:   Diagnosis Date    Addiction 12/21/2021    Arthritis     Back pain     radiates to both legs but more on rt leg    Depression     HENDERSON (dyspnea on exertion)     Dyslipidemia 08/12/2015    Excessive daytime sleepiness 03/02/2018    Formatting of this note might be different from the original.  Added automatically from request for surgery [id number]    GERD (gastroesophageal reflux disease)     Gout 08/12/2015    Hypersomnia with sleep apnea 05/14/2022    Hypertension     Idiopathic gout     Lumbar pseudoarthrosis     Neck pain     nonradiating pain    Neuropathy     Obesity 08/12/2015    Obstructive sleep apnea 08/12/2015    cpap    Restless leg syndrome     Sebaceous cyst 04/18/2017    SVT (supraventricular tachycardia)     s/p ablation    Type 2 diabetes mellitus 08/12/2015     Past Surgical History:   Procedure Laterality Date    ABLATION N/A 10/08/2020    Procedure: Ablation;  Surgeon: Rip Che III, MD;  Location: [facility] CATH;  Service: Cardiology;  Laterality: N/A;    ARTHROPLASTY OF JOINT OF FINGER Right 04/12/2022    Procedure: Right middle finger MCP joint arthroplasty;  Surgeon: Luis Alberto Payne MD;  Location: [facility] OR;  Service: Orthopedics;  Laterality: Right;  Anesthesia:  General with a single-shot supraclavicular block    BACK SURGERY      multiple    CARDIAC CATHETERIZATION      CARDIAC ELECTROPHYSIOLOGY STUDY  10/08/2020    Procedure: Study possible ablation;  Surgeon: Rip Che III, MD;  Location: [facility] CATH;  Service: Cardiology;;    CERVICAL SPINE SURGERY      CHOLECYSTECTOMY  05/30/2018    Dr. ROGELIO Tao, [facility]     COLONOSCOPY   2008    COLONOSCOPY N/A 09/14/2017    Procedure: COLONOSCOPY;  Surgeon: Obi Beltre MD;  Location: [facility] ENDO;  Service: Endoscopy;  Laterality: N/A;    CORONARY ANGIOGRAPHY Left 05/28/2018    Procedure: Coronary angiography;  Surgeon: Rafiq Malik MD;  Location: [facility] CATH;  Service: Cardiology;  Laterality: Left;    ELBOW SURGERY Bilateral     EPIDURAL STEROID INJECTION INTO CERVICAL SPINE      EPIDURAL STEROID INJECTION INTO LUMBAR SPINE      ESOPHAGOGASTRODUODENOSCOPY N/A 9/19/2024    Procedure: EGD (ESOPHAGOGASTRODUODENOSCOPY);  Surgeon: Obi Beltre MD;  Location: [facility] ENDO;  Service: Endoscopy;  Laterality: N/A;    EXCISION OF BURSA OF HIP Left 12/13/2024    Procedure: BURSECTOMY, HIP;  Surgeon: Axel Gruber MD;  Location: [facility] OR;  Service: Orthopedics;  Laterality: Left;    HERNIA REPAIR      LAPAROSCOPIC APPENDECTOMY N/A 06/25/2020    Procedure: APPENDECTOMY, LAPAROSCOPIC;  Surgeon: Gordon Can MD;  Location: [facility] OR;  Service: General;  Laterality: N/A;    LAPAROSCOPIC CHOLECYSTECTOMY N/A 05/30/2018    Procedure: CHOLECYSTECTOMY, LAPAROSCOPIC;  Surgeon: Fletcher Tao MD;  Location: [facility] OR;  Service: General;  Laterality: N/A;    LAPAROSCOPIC SLEEVE GASTRECTOMY N/A 01/20/2023    Procedure: GASTRECTOMY, SLEEVE, LAPAROSCOPIC;  Surgeon: Bharat Devine MD;  Location: [facility] OR;  Service: General;  Laterality: N/A;    LEFT HEART CATHETERIZATION Left 05/28/2018    Procedure: Left heart cath;  Surgeon: Rafiq Malik MD;  Location: [facility] CATH;  Service: Cardiology;  Laterality: Left;    LEFT HEART CATHETERIZATION  8/26/2024    Procedure: Left heart cath Rm 3623;  Surgeon: Tom Quesada MD;  Location: [facility] CATH;  Service: Cardiology;;    NECK SURGERY      RADIOFREQUENCY ABLATION OF LUMBAR MEDIAL BRANCH NERVE AT SINGLE LEVEL Bilateral 06/17/2022    Procedure: Radiofrequency Ablation, Nerve, Spinal, Lumbar, Medial Branch, 1 Level L3,4,5;  Surgeon: Thiago Garcia MD;  Location:  [facility] OR;  Service: Pain Management;  Laterality: Bilateral;    SHOULDER ARTHROSCOPY      SPINE SURGERY      multiple    TONSILLECTOMY      TREATMENT OF CARDIAC ARRHYTHMIA  10/08/2020    Procedure: Cardioversion or Defibrillation;  Surgeon: Rip Che III, MD;  Location: [facility];  Service: Cardiology;;    TRIGGER FINGER RELEASE Right 09/09/2021    Procedure: RELEASE, TRIGGER FINGER;  Surgeon: Luis Alberto Payne MD;  Location: [facility] OR;  Service: Orthopedics;  Laterality: Right;  Procedure: Right ring finger trigger release    Position: Supine    Anesthesia: Local    Equipment: Basic handset    TYMPANOSTOMY TUBE PLACEMENT Right      Current Outpatient Medications on File Prior to Visit   Medication Sig Dispense Refill    aspirin (ECOTRIN) 81 MG EC tablet Take 81 mg by mouth once daily.      atorvastatin (LIPITOR) 40 MG tablet TAKE ONE TABLET BY MOUTH ONCE DAILY IN THE EVENING 90 tablet 4    blood-glucose meter kit To check BG 2 times daily, to use with insurance preferred meter 1 each 1    calcium-vitamin D3 (OS-SOHAM 500 + D3) 500 mg-5 mcg (200 unit) per tablet Take 1 tablet by mouth once daily.      DEXCOM G7 SENSOR Oxana CHANGE EVERY 10 DAYS 9 each 4    empagliflozin (JARDIANCE) 25 mg tablet Take 1 tablet (25 mg total) by mouth once daily. 90 tablet 3    fluocinonide 0.05% (LIDEX) 0.05 % cream Apply topically 2 (two) times daily as needed (rash). 60 g 1    fluticasone propionate (FLONASE) 50 mcg/actuation nasal spray 1 spray by Each Nostril route daily as needed for Allergies.      gabapentin (NEURONTIN) 600 MG tablet TAKE ONE TABLET BY MOUTH THREE TIMES DAILY 90 tablet 2    glimepiride (AMARYL) 1 MG tablet TAKE ONE TABLET BY MOUTH DAILY BEFORE BREAKFAST 90 tablet 3    hydrocortisone 2.5 % cream Apply topically 2 (two) times daily as needed (rash around nose). Mix 50/50 with ketoconazole cream. 20 g 3    ketoconazole (NIZORAL) 2 % cream Apply topically 2 (two) times daily as needed (rash around nose). Mix 50/50  with hydrocortisone cream. OK to use daily by itself for prevention. 60 g 3    meloxicam (MOBIC) 15 MG tablet Take 15 mg by mouth once daily.      metFORMIN (GLUCOPHAGE-XR) 500 MG ER 24hr tablet TAKE TWO TABLETS BY MOUTH TWICE DAILY 360 tablet 3    multivitamin capsule Take 1 capsule by mouth once daily.      omeprazole (PRILOSEC) 40 MG capsule Take 40 mg by mouth once daily.      oxyCODONE-acetaminophen (PERCOCET)  mg per tablet Take 1 tablet by mouth every 6 (six) hours as needed. 22 tablet 0    traMADoL (ULTRAM) 50 mg tablet Take 1 tablet (50 mg total) by mouth every 4 (four) hours as needed. 44 tablet 0    vitamin D (VITAMIN D3) 1000 units Tab Take 1,000 Units by mouth once daily.       Current Facility-Administered Medications on File Prior to Visit   Medication Dose Route Frequency Provider Last Rate Last Admin    sodium chloride 0.9% flush 10 mL  10 mL Intravenous Q6H PRN          Review of patient's allergies indicates:  No Known Allergies  Family History   Problem Relation Name Age of Onset    Diabetes Mother      Depression Mother      Liver cancer Mother      Obesity Mother      Heart disease Father      Anuerysm Father      Diabetes Father      Stroke Father      Obesity Sister      Glaucoma Paternal Grandmother       Social History     Socioeconomic History    Marital status:     Number of children: 1   Tobacco Use    Smoking status: Former     Current packs/day: 0.00     Average packs/day: 0.5 packs/day for 10.0 years (5.0 ttl pk-yrs)     Types: Cigarettes     Start date:      Quit date:      Years since quittin.0    Smokeless tobacco: Never   Substance and Sexual Activity    Alcohol use: Not Currently    Drug use: No    Sexual activity: Yes     Partners: Female   Social History Narrative              Social Drivers of Health     Financial Resource Strain: Medium Risk (2022)    Overall Financial Resource Strain (CARDIA)     Difficulty of Paying Living  Expenses: Somewhat hard   Food Insecurity: No Food Insecurity (8/26/2024)    Hunger Vital Sign     Worried About Running Out of Food in the Last Year: Never true     Ran Out of Food in the Last Year: Never true   Transportation Needs: No Transportation Needs (8/26/2024)    TRANSPORTATION NEEDS     Transportation : No   Physical Activity: Insufficiently Active (1/24/2022)    Exercise Vital Sign     Days of Exercise per Week: 4 days     Minutes of Exercise per Session: 10 min   Stress: Stress Concern Present (1/24/2022)    [employer] - Occupational Stress Questionnaire     Feeling of Stress : To some extent   Housing Stability: Low Risk  (8/26/2024)    Housing Stability Vital Sign     Unable to Pay for Housing in the Last Year: No     Homeless in the Last Year: No       Review of Systems:  Constitutional:  Denies fever or chills   Eyes:  Denies change in visual acuity   HENT:  Denies nasal congestion or sore throat   Respiratory:  Denies cough or shortness of breath   Cardiovascular:  Denies chest pain or edema   GI:  Denies abdominal pain, nausea, vomiting, bloody stools or diarrhea   :  Denies dysuria   Integument:  Denies rash   Neurologic:  Denies headache, focal weakness or sensory changes   Endocrine:  Denies polyuria or polydipsia   Lymphatic:  Denies swollen glands   Psychiatric:  Denies depression or anxiety     Physical Exam:   Constitutional:  Well developed, well nourished, no acute distress, non-toxic appearance   Integument:  Well hydrated, no rash   Lymphatic:  No lymphadenopathy noted   Neurologic:  Alert & oriented x 3  Psychiatric:  Speech and behavior appropriate     Bilateral Hip Exam    left Hip Exam   left hip exam performed same as contralateral hip and is normal.     right Hip Exam   Tenderness   The patient is experiencing tenderness in the greater trochanter.  Range of Motion   The patient has normal hip ROM.  Muscle Strength   Abduction: 4/5   Other    Erythema: absent  Sensation: normal  Pulse: present    X-rays were personally reviewed by me and findings discussed with the patient.  2 views of the right hip show no fractures or dislocations    Greater trochanteric bursitis of right hip  -     Vital signs; Standing  -     Diet NPO; Standing  -     Case Request Operating Room: BURSECTOMY, HIP  -     Place in Outpatient; Standing  -     Place sequential compression device; Standing  -     Place MONIQUE hose; Standing    Other orders  -     sodium chloride 0.9% flush 10 mL  -     IP VTE HIGH RISK PATIENT; Standing  -     tranexamic acid (CYKLOKAPRON) 1,000 mg in 0.9% NaCl 100 mL IVPB (MB+)  -     cefazolin (ANCEF) 2 gram in dextrose 5% 50 mL IVPB (premix)           I had a long discussion with the patient regarding the benefits and risks of right hip bursectomy and piriformis tenotomy. They voiced understanding and wish to proceed with surgery. Consents signed in clinic.

## 2025-01-09 ENCOUNTER — TELEPHONE (OUTPATIENT)
Dept: PAIN MEDICINE | Facility: CLINIC | Age: 60
End: 2025-01-09
Payer: MEDICARE

## 2025-01-09 NOTE — TELEPHONE ENCOUNTER
Called to reschedule qutenza procedure   Pt states he canceled last procedure visit due to him having surgery with another provider on 1/10/25     Appt rescheduled with us on 1/29/25 @4:30pm

## 2025-01-10 PROBLEM — M70.61 GREATER TROCHANTERIC BURSITIS OF RIGHT HIP: Status: ACTIVE | Noted: 2024-12-13

## 2025-01-14 ENCOUNTER — TELEPHONE (OUTPATIENT)
Dept: PAIN MEDICINE | Facility: CLINIC | Age: 60
End: 2025-01-14
Payer: MEDICARE

## 2025-01-14 NOTE — TELEPHONE ENCOUNTER
Called to ask pt to come in earlier for qutenza procedure on 1/29/25 @4:30pm   He agreed to come in @1:15pm

## 2025-01-16 DIAGNOSIS — L21.9 SEBORRHEIC DERMATITIS: ICD-10-CM

## 2025-01-16 RX ORDER — HYDROCORTISONE 25 MG/G
CREAM TOPICAL
Qty: 20 G | Refills: 3 | Status: SHIPPED | OUTPATIENT
Start: 2025-01-16

## 2025-01-23 ENCOUNTER — OFFICE VISIT (OUTPATIENT)
Dept: ORTHOPEDICS | Facility: CLINIC | Age: 60
End: 2025-01-23
Payer: MEDICARE

## 2025-01-23 VITALS — WEIGHT: 235 LBS | BODY MASS INDEX: 30.16 KG/M2 | HEIGHT: 74 IN

## 2025-01-23 DIAGNOSIS — M70.61 GREATER TROCHANTERIC BURSITIS OF RIGHT HIP: Primary | ICD-10-CM

## 2025-01-23 PROCEDURE — 1160F RVW MEDS BY RX/DR IN RCRD: CPT | Mod: CPTII,S$GLB,, | Performed by: ORTHOPAEDIC SURGERY

## 2025-01-23 PROCEDURE — 99999 PR PBB SHADOW E&M-EST. PATIENT-LVL III: CPT | Mod: PBBFAC,,, | Performed by: ORTHOPAEDIC SURGERY

## 2025-01-23 PROCEDURE — 1159F MED LIST DOCD IN RCRD: CPT | Mod: CPTII,S$GLB,, | Performed by: ORTHOPAEDIC SURGERY

## 2025-01-23 PROCEDURE — 99024 POSTOP FOLLOW-UP VISIT: CPT | Mod: S$GLB,,, | Performed by: ORTHOPAEDIC SURGERY

## 2025-01-28 ENCOUNTER — TELEPHONE (OUTPATIENT)
Dept: PAIN MEDICINE | Facility: CLINIC | Age: 60
End: 2025-01-28
Payer: MEDICARE

## 2025-01-28 NOTE — PROGRESS NOTES
Chief Complaint   Patient presents with    Right Hip - Post-op Evaluation       HPI:  59 y.o. returns to clinic today status post  right GTB 2 weeks ago. Pain is moderate. Patient is compliant most of the time with restrictions.     R hip    Incision healed. No erythema or fluctuance. Overall normal alignment. Mild point TTP about the surgical site. Decreased ROM due to stiffness. Compartments soft. Skin intact. NVI distally.      Greater trochanteric bursitis of right hip        Staples out. Ice and compression. RTC 6 weeks.

## 2025-01-28 NOTE — TELEPHONE ENCOUNTER
Called to remind pt about upcoming procedure with Joel . PT agreed to come in around 1:15pm for his 4:30 appt

## 2025-02-04 DIAGNOSIS — M47.896 OTHER SPONDYLOSIS, LUMBAR REGION: ICD-10-CM

## 2025-02-04 RX ORDER — GABAPENTIN 600 MG/1
600 TABLET ORAL 3 TIMES DAILY
Qty: 90 TABLET | Refills: 2 | Status: SHIPPED | OUTPATIENT
Start: 2025-02-04

## 2025-02-07 ENCOUNTER — TELEPHONE (OUTPATIENT)
Dept: BARIATRICS | Facility: CLINIC | Age: 60
End: 2025-02-07
Payer: MEDICARE

## 2025-02-07 DIAGNOSIS — E55.9 VITAMIN D DEFICIENCY: ICD-10-CM

## 2025-02-07 DIAGNOSIS — Z79.4 TYPE 2 DIABETES MELLITUS WITH HYPERGLYCEMIA, WITH LONG-TERM CURRENT USE OF INSULIN: Primary | ICD-10-CM

## 2025-02-07 DIAGNOSIS — E11.65 TYPE 2 DIABETES MELLITUS WITH HYPERGLYCEMIA, WITH LONG-TERM CURRENT USE OF INSULIN: Primary | ICD-10-CM

## 2025-02-07 DIAGNOSIS — E66.01 MORBID OBESITY: ICD-10-CM

## 2025-02-07 DIAGNOSIS — Z13.21 ENCOUNTER FOR VITAMIN DEFICIENCY SCREENING: ICD-10-CM

## 2025-02-07 DIAGNOSIS — E61.1 IRON DEFICIENCY: ICD-10-CM

## 2025-02-07 DIAGNOSIS — Z98.84 HX OF BARIATRIC SURGERY: ICD-10-CM

## 2025-02-07 DIAGNOSIS — D52.0 DIETARY FOLATE DEFICIENCY ANEMIA: ICD-10-CM

## 2025-02-07 DIAGNOSIS — E78.2 MIXED HYPERLIPIDEMIA: ICD-10-CM

## 2025-02-07 NOTE — TELEPHONE ENCOUNTER
----- Message from Pam sent at 2/7/2025 12:40 PM CST -----  Type:  Needs Medical Advice    Who Called: Patient    Would the patient rather a call back or a response via [facility]? Please call    Best Call Back Number: [phone]    Additional Information: Patient is requesting an appt with ALMA Osorio, he received a letter stating his Doctor was leaving and to call to schedule   Please call back to advise. Thanks!

## 2025-02-14 ENCOUNTER — OFFICE VISIT (OUTPATIENT)
Dept: BARIATRICS | Facility: CLINIC | Age: 60
End: 2025-02-14
Payer: MEDICARE

## 2025-02-14 VITALS
DIASTOLIC BLOOD PRESSURE: 60 MMHG | HEIGHT: 74 IN | TEMPERATURE: 97 F | HEART RATE: 87 BPM | SYSTOLIC BLOOD PRESSURE: 123 MMHG | BODY MASS INDEX: 30.42 KG/M2 | RESPIRATION RATE: 16 BRPM | WEIGHT: 237 LBS

## 2025-02-14 DIAGNOSIS — E66.3 OVERWEIGHT (BMI 25.0-29.9): Primary | ICD-10-CM

## 2025-02-14 DIAGNOSIS — E11.65 TYPE 2 DIABETES MELLITUS WITH HYPERGLYCEMIA, WITH LONG-TERM CURRENT USE OF INSULIN: ICD-10-CM

## 2025-02-14 DIAGNOSIS — Z98.84 HX OF BARIATRIC SURGERY: ICD-10-CM

## 2025-02-14 DIAGNOSIS — G47.33 OSA ON CPAP: ICD-10-CM

## 2025-02-14 DIAGNOSIS — I10 ESSENTIAL HYPERTENSION: ICD-10-CM

## 2025-02-14 DIAGNOSIS — Z79.4 TYPE 2 DIABETES MELLITUS WITH HYPERGLYCEMIA, WITH LONG-TERM CURRENT USE OF INSULIN: ICD-10-CM

## 2025-02-14 PROCEDURE — 99999 PR PBB SHADOW E&M-EST. PATIENT-LVL IV: CPT | Mod: PBBFAC,,, | Performed by: NURSE PRACTITIONER

## 2025-02-14 RX ORDER — PYRIDOXINE HCL (VITAMIN B6) 25 MG
25 TABLET ORAL DAILY
COMMUNITY

## 2025-02-14 RX ORDER — MAGNESIUM GLUCONATE 27.5 (500)
1 TABLET ORAL ONCE
COMMUNITY

## 2025-02-14 NOTE — PROGRESS NOTES
Post Op Note    Surgery: gastric sleeve surgery  Date: 1/20/23  Initial weight: 310  Last weight: 223  Current weight: 237   Lowest: 224   Goal: 230's    Constipation: none  Reflux: none  Vomiting: none    Diet:  Breakfast:  fried egg, meat and maybe spoonful grits  Lunch: canned chicken, field pea- sandwich  Dinner: meat and vegetable- pork chop/chicken baked/fried fish, green beans, broccoli medley - speghetti with green beans bred    Exercise:  None  Hip sx in December & January    MVI:   daily mvi  B6  D3  Magnesium    Vitals:    02/14/25 1149   BP: 123/60   Pulse: 87   Resp: 16   Temp: 97.1 °F (36.2 °C)         Body comp:  Fat Percent:  35.2 %  Fat Mass:  83.4 lb  FFM:  153 lb  TBW: 108 lb  TBW %:  45.6 %  BMR: 2086 kcal    PE:  NAD  RRR  Soft/nt/nd    Labs: reviewed    A/P: s/p sleeve     Counseling for patient:  Falls Reviewed  Continued with Prescribed home medication  Tanita Scale Review  Increased lb fat  Decreased lb muscle  Increase in 4 lb water  Diet:   continue healthy eating  High protein, low carb  Exercise: start increasing activity  Vitamins:   continue with MVI  Get labs done    Co morbidities:     1. Overweight (BMI 25.0-29.9)        2. Type 2 diabetes mellitus with hyperglycemia, with long-term current use of insulin        3. TARYN on CPAP        4. Essential hypertension        5. Hx of bariatric surgery            -All above: Evaluated, monitored, and treated with diet and exercise program.

## 2025-02-24 ENCOUNTER — OFFICE VISIT (OUTPATIENT)
Dept: PULMONOLOGY | Facility: CLINIC | Age: 60
End: 2025-02-24
Payer: MEDICARE

## 2025-02-24 VITALS
OXYGEN SATURATION: 98 % | HEIGHT: 74 IN | DIASTOLIC BLOOD PRESSURE: 71 MMHG | BODY MASS INDEX: 31.56 KG/M2 | WEIGHT: 245.94 LBS | SYSTOLIC BLOOD PRESSURE: 110 MMHG | HEART RATE: 67 BPM

## 2025-02-24 DIAGNOSIS — G47.33 OSA (OBSTRUCTIVE SLEEP APNEA): ICD-10-CM

## 2025-02-24 DIAGNOSIS — Z87.01 HISTORY OF PNEUMONIA: ICD-10-CM

## 2025-02-24 DIAGNOSIS — Z77.090 HISTORY OF EXPOSURE TO ASBESTOS: ICD-10-CM

## 2025-02-24 DIAGNOSIS — Z87.891 PERSONAL HISTORY OF NICOTINE DEPENDENCE: ICD-10-CM

## 2025-02-24 DIAGNOSIS — E11.42 TYPE 2 DIABETES MELLITUS WITH DIABETIC POLYNEUROPATHY, WITH LONG-TERM CURRENT USE OF INSULIN: ICD-10-CM

## 2025-02-24 DIAGNOSIS — Z86.79 S/P CATHETER ABLATION OF SLOW PATHWAY: ICD-10-CM

## 2025-02-24 DIAGNOSIS — Z98.890 S/P CATHETER ABLATION OF SLOW PATHWAY: ICD-10-CM

## 2025-02-24 DIAGNOSIS — R06.09 DYSPNEA ON EXERTION: Primary | ICD-10-CM

## 2025-02-24 DIAGNOSIS — Z79.4 TYPE 2 DIABETES MELLITUS WITH DIABETIC POLYNEUROPATHY, WITH LONG-TERM CURRENT USE OF INSULIN: ICD-10-CM

## 2025-02-24 PROCEDURE — 3008F BODY MASS INDEX DOCD: CPT | Mod: CPTII,S$GLB,, | Performed by: NURSE PRACTITIONER

## 2025-02-24 PROCEDURE — 99999 PR PBB SHADOW E&M-EST. PATIENT-LVL IV: CPT | Mod: PBBFAC,,, | Performed by: NURSE PRACTITIONER

## 2025-02-24 PROCEDURE — 99213 OFFICE O/P EST LOW 20 MIN: CPT | Mod: S$GLB,,, | Performed by: NURSE PRACTITIONER

## 2025-02-24 PROCEDURE — 3078F DIAST BP <80 MM HG: CPT | Mod: CPTII,S$GLB,, | Performed by: NURSE PRACTITIONER

## 2025-02-24 PROCEDURE — 1159F MED LIST DOCD IN RCRD: CPT | Mod: CPTII,S$GLB,, | Performed by: NURSE PRACTITIONER

## 2025-02-24 PROCEDURE — 3074F SYST BP LT 130 MM HG: CPT | Mod: CPTII,S$GLB,, | Performed by: NURSE PRACTITIONER

## 2025-02-24 RX ORDER — CIPROFLOXACIN AND DEXAMETHASONE 3; 1 MG/ML; MG/ML
SUSPENSION/ DROPS AURICULAR (OTIC)
COMMUNITY
Start: 2024-11-27

## 2025-02-24 RX ORDER — DOXYCYCLINE 100 MG/1
CAPSULE ORAL
COMMUNITY

## 2025-02-24 NOTE — PATIENT INSTRUCTIONS
Overall your testing was normal!    No lung disease noted that would cause you to be short of breath.    If your shortness of breath returns please let me know!    Continue current prescription medication regiment. Keep follow up appointment as scheduled. Please call the office if you have any questions or concerns.

## 2025-02-24 NOTE — PROGRESS NOTES
2/24/2025    Gio Forrest  In office visit     Chief Complaint   Patient presents with    Shortness of Breath       HPI:  02/24/2025: Hx: TARYN on CPAP, Personal History of Nicotine Dependence, Asbestosis Exposure, Pneumonia, SVT s/p ablation.  Underwent 6MWT revealing no need for supplemental oxygen. PFT was very impressive, no restriction or obstruction noted.   CT chest did not reveal any acute abnormality.  States since prior office visit his SOB has completely resolved.   [facility] has gained 14# back - states he is going to start working towards losing that again as well.       10/14/2024: Hx: TARYN on CPAP, Personal History of Nicotine Dependence, Asbestosis Exposure, Pneumonia, SVT s/p ablation.  In office today alone.  Denies being seen by prior Pulmonologist. Denies history of lung disease. Denies current use of inhaler therapy, supplemental oxygen. Denies personal history of cancer, PE, current anticoagulation use.  Diagnosed with TARYN approx 10 years ago - using CPAP nightly. [facility] underwent bariatric surgery approx one year ago per Dr. Devine - [facility] since surgery has lost 100# - [facility] underwent at home sleep study since weight loss and was told he technically did not need CPAP anymore.   Patient with a history of SVT s/p cardiac ablation approx 5 years ago. Currently following with Dr. Quesada, Cardiology.  Was overall doing well from a resp perspective up until approx one month ago when he developed SOB and L sided, sharp Chest pain. States the SOB was present with very minimal exertion such as walking throughout the house. Presented to [facility] on 8/23 and was admitted to atypical chest pain - underwent stress test at that time which led to L heart cath being performed on 8/26 revealing non obstructive disease, no stent or intervention was done. Patient had improvement of symptoms and was subsequently discharged home. Since discharge home patient has followed up with Cardiology, note from 9/13  "reviewed in which patient was described as "pale" during office visit, CBC was ordered revealing stable H/H.   Patient states overall from a resp perspective he is doing well. Denies shortness of breath, wheezing, chest tightness, cough, mucous production. States he hasn't really exerted himself as of lately. States he was able to walk through [place] this weekend with no real issue.   Was diagnosed with severe pneumonia approx two years ago requiring hospitalization.   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   Patient Instructions   Overall etiology of your symptoms is unknown at this time.  Will check CT Chest without contrast to further evaluate lung tissue.  I ordered a Lung Function Test to evaluate lung strength. Please complete prior to next appointment.   Check six minute walk test to see if you qualify for oxygen.  Continue current prescription medication regiment. Keep follow up appointment as scheduled. Please call the office if you have any questions or concerns.         Social Hx: Lives with wife - three dogs and two cats in the house. Currently disabled - former . Possible Asbestosis exposure, Smoking Hx: Former Smoker, quit 1990 - started as a teen - typical use 1 ppd over the years.  Family Hx: No Lung Cancer, Father with COPD, No Asthma  Medical Hx: Previous pneumonia (frequent episodes in the past) ; Previous shoulder surgery, bilateral - R shoulder was done in April 2024. Denies prior chest surgery.          The Chief Complaint varies with instability at times.       PFSH:  Past Medical History:   Diagnosis Date    Addiction 12/21/2021    Arthritis     Back pain     radiates to both legs but more on rt leg    Depression     HENDERSON (dyspnea on exertion)     Dyslipidemia 08/12/2015    Excessive daytime sleepiness 03/02/2018    Formatting of this note might be different from the original.  Added automatically from request for surgery [id number]    GERD " (gastroesophageal reflux disease)     Gout 08/12/2015    Hypersomnia with sleep apnea 05/14/2022    Hypertension     Idiopathic gout     Lumbar pseudoarthrosis     Neck pain     nonradiating pain    Neuropathy     Obesity 08/12/2015    Obstructive sleep apnea 08/12/2015    cpap    Restless leg syndrome     Sebaceous cyst 04/18/2017    SVT (supraventricular tachycardia)     s/p ablation    Type 2 diabetes mellitus 08/12/2015         Past Surgical History:   Procedure Laterality Date    ABLATION N/A 10/08/2020    Procedure: Ablation;  Surgeon: Rip Che III, MD;  Location: [facility] CATH;  Service: Cardiology;  Laterality: N/A;    ARTHROPLASTY OF JOINT OF FINGER Right 04/12/2022    Procedure: Right middle finger MCP joint arthroplasty;  Surgeon: Luis Alberto Payne MD;  Location: [facility] OR;  Service: Orthopedics;  Laterality: Right;  Anesthesia:  General with a single-shot supraclavicular block    BACK SURGERY      multiple    CARDIAC CATHETERIZATION      CARDIAC ELECTROPHYSIOLOGY STUDY  10/08/2020    Procedure: Study possible ablation;  Surgeon: Rip Che III, MD;  Location: [facility] CATH;  Service: Cardiology;;    CERVICAL SPINE SURGERY      CHOLECYSTECTOMY  05/30/2018    Dr. ROGELIO Tao, [facility]     COLONOSCOPY  2008    COLONOSCOPY N/A 09/14/2017    Procedure: COLONOSCOPY;  Surgeon: Obi Beltre MD;  Location: [facility] ENDO;  Service: Endoscopy;  Laterality: N/A;    CORONARY ANGIOGRAPHY Left 05/28/2018    Procedure: Coronary angiography;  Surgeon: Rafiq Malik MD;  Location: [facility] CATH;  Service: Cardiology;  Laterality: Left;    ELBOW SURGERY Bilateral     EPIDURAL STEROID INJECTION INTO CERVICAL SPINE      EPIDURAL STEROID INJECTION INTO LUMBAR SPINE      ESOPHAGOGASTRODUODENOSCOPY N/A 9/19/2024    Procedure: EGD (ESOPHAGOGASTRODUODENOSCOPY);  Surgeon: Obi Beltre MD;  Location: [facility] ENDO;  Service: Endoscopy;  Laterality: N/A;    EXCISION OF BURSA OF HIP Left 12/13/2024    Procedure: BURSECTOMY, HIP;   Surgeon: Axel Gruber MD;  Location: [facility] OR;  Service: Orthopedics;  Laterality: Left;    EXCISION OF BURSA OF HIP Right 1/10/2025    Procedure: BURSECTOMY, HIP;  Surgeon: Axel Gruber MD;  Location: [facility] OR;  Service: Orthopedics;  Laterality: Right;    HERNIA REPAIR      LAPAROSCOPIC APPENDECTOMY N/A 06/25/2020    Procedure: APPENDECTOMY, LAPAROSCOPIC;  Surgeon: Gordon Can MD;  Location: [facility] OR;  Service: General;  Laterality: N/A;    LAPAROSCOPIC CHOLECYSTECTOMY N/A 05/30/2018    Procedure: CHOLECYSTECTOMY, LAPAROSCOPIC;  Surgeon: Fletcher Tao MD;  Location: [facility] OR;  Service: General;  Laterality: N/A;    LAPAROSCOPIC SLEEVE GASTRECTOMY N/A 01/20/2023    Procedure: GASTRECTOMY, SLEEVE, LAPAROSCOPIC;  Surgeon: Bharat Devine MD;  Location: [facility] OR;  Service: General;  Laterality: N/A;    LEFT HEART CATHETERIZATION Left 05/28/2018    Procedure: Left heart cath;  Surgeon: Rafiq Malik MD;  Location: [facility] CATH;  Service: Cardiology;  Laterality: Left;    LEFT HEART CATHETERIZATION  8/26/2024    Procedure: Left heart cath Rm 3623;  Surgeon: Tom Quesada MD;  Location: [facility] CATH;  Service: Cardiology;;    NECK SURGERY      RADIOFREQUENCY ABLATION OF LUMBAR MEDIAL BRANCH NERVE AT SINGLE LEVEL Bilateral 06/17/2022    Procedure: Radiofrequency Ablation, Nerve, Spinal, Lumbar, Medial Branch, 1 Level L3,4,5;  Surgeon: Thiago Garcia MD;  [facility];  Service: Pain Management;  Laterality: Bilateral;    SHOULDER ARTHROSCOPY      SPINE SURGERY      multiple    TONSILLECTOMY      TREATMENT OF CARDIAC ARRHYTHMIA  10/08/2020    Procedure: Cardioversion or Defibrillation;  Surgeon: Rip Che III, MD;  Location: [facility] CATH;  Service: Cardiology;;    TRIGGER FINGER RELEASE Right 09/09/2021    Procedure: RELEASE, TRIGGER FINGER;  Surgeon: Luis Alberto Payne MD;  Location: [facility] OR;  Service: Orthopedics;  Laterality: Right;  Procedure: Right ring finger trigger release    Position:  "Supine    Anesthesia: Local    Equipment: Basic handset    TYMPANOSTOMY TUBE PLACEMENT Right      Social History     Tobacco Use    Smoking status: Former     Current packs/day: 0.00     Average packs/day: 0.5 packs/day for 10.0 years (5.0 ttl pk-yrs)     Types: Cigarettes     Start date:      Quit date:      Years since quittin.1    Smokeless tobacco: Never   Substance Use Topics    Alcohol use: Not Currently    Drug use: No     Family History   Problem Relation Name Age of Onset    Diabetes Mother      Depression Mother      Liver cancer Mother      Obesity Mother      Heart disease Father      Anuerysm Father      Diabetes Father      Stroke Father      Obesity Sister      Glaucoma Paternal Grandmother       Review of patient's allergies indicates:  No Known Allergies      I have reviewed past medical, family, and social history.     Performance Status:The patient's activity level is no limits with regular activity.        Review of Systems:  a review of eleven systems covering constitutional, Eye, HEENT, Psych, Respiratory, Cardiac, GI, , Musculoskeletal, Endocrine, Dermatologic was negative except for pertinent findings as listed ABOVE and below: pertinent positive as above, rest is good       Exam:Comprehensive exam done. /71 (BP Location: Right arm, Patient Position: Sitting)   Pulse 67   Ht 6' 2" (1.88 m)   Wt 111.5 kg (245 lb 14.8 oz)   SpO2 98% Comment: on room air with rest  BMI 31.57 kg/m²   Exam included Vitals as listed  Constitutional: He is oriented to person, place, and time. He appears well-developed. No distress.   Nose: Nose normal.   Mouth/Throat: Uvula is midline, oropharynx is clear and moist and mucous membranes are normal. No dental caries. No oropharyngeal exudate, posterior oropharyngeal edema, posterior oropharyngeal erythema or tonsillar abscesses.    Eyes: Pupils are equal, round, and reactive to light.   Neck: No JVD present. No thyromegaly present. "   Cardiovascular: Normal rate, regular rhythm and normal heart sounds. Exam reveals no gallop and no friction rub.   No murmur heard.  Pulmonary/Chest: Effort normal and breath sounds normal. No accessory muscle usage or stridor. No apnea and no tachypnea. No respiratory distress, decreased breath sounds, wheezes, rhonchi, rales, or tenderness. Clear breath sounds throughout, on room air, in no acute distress.   Abdominal: Soft. He exhibits no mass. There is no tenderness. No hepatosplenomegaly, hernias and normoactive bowel sounds  Musculoskeletal: Normal range of motion. exhibits no edema.   Neurological:  alert and oriented to person, place, and time. not disoriented.   Skin: Skin is warm and dry. Capillary refill takes less 2 sec. No cyanosis or erythema. No pallor. Nails show no clubbing.   Psychiatric: normal mood and affect. behavior is normal. Judgment and thought content normal.       Radiographs (ct chest and cxr) reviewed: was done by direct view   Patient imaging studies were reviewed and interpreted independently. My personal interpretation of most recent images include:  CT Chest without Contrast 10/23/2024 - no acute process noted.   CTA Chest - 6/17/2023 - No acute process noted, no evidence of PE.      Labs Patient's labs were reviewed including CBC and CMP    Lab Results   Component Value Date    WBC 10.22 09/13/2024    RBC 4.99 09/13/2024    HGB 14.5 01/09/2025    HCT 44.0 09/13/2024    MCV 88 09/13/2024    MCH 29.9 09/13/2024    MCHC 33.9 09/13/2024    RDW 12.7 09/13/2024     09/13/2024    MPV 9.9 09/13/2024    GRAN 6.6 09/13/2024    GRAN 64.3 09/13/2024    LYMPH 2.7 09/13/2024    LYMPH 26.5 09/13/2024    MONO 0.7 09/13/2024    MONO 6.8 09/13/2024    EOS 0.1 09/13/2024    BASO 0.07 09/13/2024    EOSINOPHIL 1.2 09/13/2024    BASOPHIL 0.7 09/13/2024   CMP  Sodium   Date Value Ref Range Status   08/28/2024 139 136 - 145 mmol/L Final     Potassium   Date Value Ref Range Status   08/28/2024 4.5  3.5 - 5.1 mmol/L Final     Chloride   Date Value Ref Range Status   08/28/2024 102 95 - 110 mmol/L Final     CO2   Date Value Ref Range Status   08/28/2024 28 23 - 29 mmol/L Final     Glucose   Date Value Ref Range Status   08/28/2024 120 (H) 70 - 110 mg/dL Final     BUN   Date Value Ref Range Status   08/28/2024 17 6 - 20 mg/dL Final     Creatinine   Date Value Ref Range Status   08/28/2024 1.0 0.5 - 1.4 mg/dL Final     Calcium   Date Value Ref Range Status   08/28/2024 9.8 8.7 - 10.5 mg/dL Final     Total Protein   Date Value Ref Range Status   08/28/2024 7.2 6.0 - 8.4 g/dL Final     Albumin   Date Value Ref Range Status   08/28/2024 4.2 3.5 - 5.2 g/dL Final   07/16/2020 4.1 3.6 - 5.1 g/dL Final     Comment:     For additional information, please refer to   http://education.[employer]/faq/BUG457 (This link is   being provided for informational/ educational purposes only.)  This test was developed and its analytical performance   characteristics have been determined by [facility]. It has not been cleared or approved by the   [place] Food and Drug Administration. This assay has been validated   pursuant to the CLIA regulations and is used for clinical   purposes.  @ Test Performed By:  [facility]  Juan Carlos Humphreys M.D., Ph.D.,   [address] [place] [id number]  [place]  [id number]       Total Bilirubin   Date Value Ref Range Status   08/28/2024 0.7 0.1 - 1.0 mg/dL Final     Comment:     For infants and newborns, interpretation of results should be based  on gestational age, weight and in agreement with clinical  observations.    Premature Infant recommended reference ranges:  Up to 24 hours.............<8.0 mg/dL  Up to 48 hours............<12.0 mg/dL  3-5 days..................<15.0 mg/dL  6-29 days.................<15.0 mg/dL       Alkaline Phosphatase   Date Value Ref Range Status   08/28/2024 95 55 - 135 U/L Final      AST ([place])   Date Value Ref Range Status   02/15/2016 66 (H) 17 - 59 U/L Final     AST   Date Value Ref Range Status   08/28/2024 22 10 - 40 U/L Final     ALT   Date Value Ref Range Status   08/28/2024 28 10 - 44 U/L Final     Anion Gap   Date Value Ref Range Status   08/28/2024 9 8 - 16 mmol/L Final     eGFR   Date Value Ref Range Status   08/28/2024 >60.0 >60 mL/min/1.73 m^2 Final         PFT reviewed  Pulmonary Functions Testing Results:        Plan:  Clinical impression is resonably certain and repeated evaluation prn +/- follow up will be needed as below.    Gio was seen today for shortness of breath.    Diagnoses and all orders for this visit:    Dyspnea on exertion    History of pneumonia    History of exposure to asbestos    Personal history of nicotine dependence    S/P catheter ablation of slow pathway    TARYN (obstructive sleep apnea)          Follow up in about 1 year (around 2/24/2026), or if symptoms worsen or fail to improve.    Discussed with patient above for education the following:      Patient Instructions   Overall your testing was normal!    No lung disease noted that would cause you to be short of breath.    If your shortness of breath returns please let me know!    Continue current prescription medication regiment. Keep follow up appointment as scheduled. Please call the office if you have any questions or concerns.

## 2025-02-25 RX ORDER — EMPAGLIFLOZIN 25 MG/1
25 TABLET, FILM COATED ORAL
Qty: 90 TABLET | Refills: 3 | Status: SHIPPED | OUTPATIENT
Start: 2025-02-25

## 2025-02-26 DIAGNOSIS — M25.551 RIGHT HIP PAIN: Primary | ICD-10-CM

## 2025-03-05 ENCOUNTER — LAB VISIT (OUTPATIENT)
Dept: LAB | Facility: HOSPITAL | Age: 60
End: 2025-03-05
Attending: FAMILY MEDICINE
Payer: MEDICARE

## 2025-03-05 ENCOUNTER — TELEPHONE (OUTPATIENT)
Dept: PODIATRY | Facility: CLINIC | Age: 60
End: 2025-03-05
Payer: MEDICARE

## 2025-03-05 ENCOUNTER — OFFICE VISIT (OUTPATIENT)
Dept: PODIATRY | Facility: CLINIC | Age: 60
End: 2025-03-05
Payer: MEDICARE

## 2025-03-05 VITALS — HEIGHT: 74 IN | BODY MASS INDEX: 31.55 KG/M2 | WEIGHT: 245.81 LBS

## 2025-03-05 DIAGNOSIS — E11.65 TYPE 2 DIABETES MELLITUS WITH HYPERGLYCEMIA, WITH LONG-TERM CURRENT USE OF INSULIN: ICD-10-CM

## 2025-03-05 DIAGNOSIS — E78.2 MIXED HYPERLIPIDEMIA: ICD-10-CM

## 2025-03-05 DIAGNOSIS — E55.9 VITAMIN D DEFICIENCY: ICD-10-CM

## 2025-03-05 DIAGNOSIS — E61.1 IRON DEFICIENCY: ICD-10-CM

## 2025-03-05 DIAGNOSIS — E11.8 DM TYPE 2, CONTROLLED, WITH COMPLICATION: ICD-10-CM

## 2025-03-05 DIAGNOSIS — B35.1 ONYCHOMYCOSIS DUE TO DERMATOPHYTE: ICD-10-CM

## 2025-03-05 DIAGNOSIS — Z13.21 ENCOUNTER FOR VITAMIN DEFICIENCY SCREENING: ICD-10-CM

## 2025-03-05 DIAGNOSIS — Z79.4 TYPE 2 DIABETES MELLITUS WITH HYPERGLYCEMIA, WITH LONG-TERM CURRENT USE OF INSULIN: ICD-10-CM

## 2025-03-05 DIAGNOSIS — E11.42 DIABETIC POLYNEUROPATHY ASSOCIATED WITH TYPE 2 DIABETES MELLITUS: Primary | ICD-10-CM

## 2025-03-05 DIAGNOSIS — D52.0 DIETARY FOLATE DEFICIENCY ANEMIA: ICD-10-CM

## 2025-03-05 DIAGNOSIS — Z98.84 HX OF BARIATRIC SURGERY: ICD-10-CM

## 2025-03-05 DIAGNOSIS — R20.2 PARESTHESIA OF FOOT, BILATERAL: ICD-10-CM

## 2025-03-05 LAB
25(OH)D3+25(OH)D2 SERPL-MCNC: 67 NG/ML (ref 30–96)
ALBUMIN SERPL BCP-MCNC: 4.3 G/DL (ref 3.5–5.2)
ALP SERPL-CCNC: 108 U/L (ref 40–150)
ALT SERPL W/O P-5'-P-CCNC: 29 U/L (ref 10–44)
ANION GAP SERPL CALC-SCNC: 9 MMOL/L (ref 8–16)
AST SERPL-CCNC: 35 U/L (ref 10–40)
BASOPHILS # BLD AUTO: 0.06 K/UL (ref 0–0.2)
BASOPHILS NFR BLD: 0.9 % (ref 0–1.9)
BILIRUB SERPL-MCNC: 0.7 MG/DL (ref 0.1–1)
BUN SERPL-MCNC: 10 MG/DL (ref 6–20)
CALCIUM SERPL-MCNC: 9.8 MG/DL (ref 8.7–10.5)
CHLORIDE SERPL-SCNC: 104 MMOL/L (ref 95–110)
CHOLEST SERPL-MCNC: 110 MG/DL (ref 120–199)
CHOLEST/HDLC SERPL: 3.1 {RATIO} (ref 2–5)
CO2 SERPL-SCNC: 29 MMOL/L (ref 23–29)
CREAT SERPL-MCNC: 0.9 MG/DL (ref 0.5–1.4)
DIFFERENTIAL METHOD BLD: NORMAL
EOSINOPHIL # BLD AUTO: 0.2 K/UL (ref 0–0.5)
EOSINOPHIL NFR BLD: 3 % (ref 0–8)
ERYTHROCYTE [DISTWIDTH] IN BLOOD BY AUTOMATED COUNT: 13 % (ref 11.5–14.5)
EST. GFR  (NO RACE VARIABLE): >60 ML/MIN/1.73 M^2
ESTIMATED AVG GLUCOSE: 146 MG/DL (ref 68–131)
GLUCOSE SERPL-MCNC: 143 MG/DL (ref 70–110)
HBA1C MFR BLD: 6.7 % (ref 4–5.6)
HCT VFR BLD AUTO: 46.9 % (ref 40–54)
HDLC SERPL-MCNC: 35 MG/DL (ref 40–75)
HDLC SERPL: 31.8 % (ref 20–50)
HGB BLD-MCNC: 15.2 G/DL (ref 14–18)
IMM GRANULOCYTES # BLD AUTO: 0.01 K/UL (ref 0–0.04)
IMM GRANULOCYTES NFR BLD AUTO: 0.1 % (ref 0–0.5)
IRON SERPL-MCNC: 85 UG/DL (ref 45–160)
LDLC SERPL CALC-MCNC: 53 MG/DL (ref 63–159)
LYMPHOCYTES # BLD AUTO: 2.2 K/UL (ref 1–4.8)
LYMPHOCYTES NFR BLD: 32.3 % (ref 18–48)
MCH RBC QN AUTO: 29.3 PG (ref 27–31)
MCHC RBC AUTO-ENTMCNC: 32.4 G/DL (ref 32–36)
MCV RBC AUTO: 91 FL (ref 82–98)
MONOCYTES # BLD AUTO: 0.5 K/UL (ref 0.3–1)
MONOCYTES NFR BLD: 7 % (ref 4–15)
NEUTROPHILS # BLD AUTO: 3.8 K/UL (ref 1.8–7.7)
NEUTROPHILS NFR BLD: 56.7 % (ref 38–73)
NONHDLC SERPL-MCNC: 75 MG/DL
NRBC BLD-RTO: 0 /100 WBC
PLATELET # BLD AUTO: 195 K/UL (ref 150–450)
PMV BLD AUTO: 11.2 FL (ref 9.2–12.9)
POTASSIUM SERPL-SCNC: 5.4 MMOL/L (ref 3.5–5.1)
PROT SERPL-MCNC: 7.3 G/DL (ref 6–8.4)
RBC # BLD AUTO: 5.18 M/UL (ref 4.6–6.2)
SATURATED IRON: 18 % (ref 20–50)
SODIUM SERPL-SCNC: 142 MMOL/L (ref 136–145)
TOTAL IRON BINDING CAPACITY: 462 UG/DL (ref 250–450)
TRANSFERRIN SERPL-MCNC: 312 MG/DL (ref 200–375)
TRIGL SERPL-MCNC: 110 MG/DL (ref 30–150)
VIT B12 SERPL-MCNC: 691 PG/ML (ref 210–950)
WBC # BLD AUTO: 6.72 K/UL (ref 3.9–12.7)

## 2025-03-05 PROCEDURE — 85025 COMPLETE CBC W/AUTO DIFF WBC: CPT | Performed by: NURSE PRACTITIONER

## 2025-03-05 PROCEDURE — 36415 COLL VENOUS BLD VENIPUNCTURE: CPT | Mod: PO | Performed by: NURSE PRACTITIONER

## 2025-03-05 PROCEDURE — 80053 COMPREHEN METABOLIC PANEL: CPT | Performed by: NURSE PRACTITIONER

## 2025-03-05 PROCEDURE — 83540 ASSAY OF IRON: CPT | Performed by: NURSE PRACTITIONER

## 2025-03-05 PROCEDURE — 80061 LIPID PANEL: CPT | Performed by: NURSE PRACTITIONER

## 2025-03-05 PROCEDURE — 82306 VITAMIN D 25 HYDROXY: CPT | Performed by: NURSE PRACTITIONER

## 2025-03-05 PROCEDURE — 82607 VITAMIN B-12: CPT | Performed by: NURSE PRACTITIONER

## 2025-03-05 PROCEDURE — 84425 ASSAY OF VITAMIN B-1: CPT | Performed by: NURSE PRACTITIONER

## 2025-03-05 PROCEDURE — 83036 HEMOGLOBIN GLYCOSYLATED A1C: CPT | Performed by: NURSE PRACTITIONER

## 2025-03-05 PROCEDURE — 99999 PR PBB SHADOW E&M-EST. PATIENT-LVL II: CPT | Mod: PBBFAC,,, | Performed by: PODIATRIST

## 2025-03-05 NOTE — TELEPHONE ENCOUNTER
----- Message from Katherine sent at 3/5/2025  8:44 AM CST -----  Regarding: Late  Type:  Needs Medical AdviceWho Called: PtWould the patient rather a call back or a response via [facility]? CallBest Call Back Number: [phone] Information: Pt maybe running a little late for appt. Thanks

## 2025-03-05 NOTE — PROGRESS NOTES
Subjective:      Patient ID: Gio Forrest is a 59 y.o. male.    Chief Complaint: No chief complaint on file.    Gio is a 59 y.o. male who presents to the clinic for evaluation and treatment of diabetic feet. Gio has a past medical history of Addiction (12/21/2021), Arthritis, Back pain, Depression, HENDERSON (dyspnea on exertion), Dyslipidemia (08/12/2015), Excessive daytime sleepiness (03/02/2018), GERD (gastroesophageal reflux disease), Gout (08/12/2015), Hypersomnia with sleep apnea (05/14/2022), Hypertension, Idiopathic gout, Lumbar pseudoarthrosis, Neck pain, Neuropathy, Obesity (08/12/2015), Obstructive sleep apnea (08/12/2015), Restless leg syndrome, Sebaceous cyst (04/18/2017), SVT (supraventricular tachycardia), and Type 2 diabetes mellitus (08/12/2015). Patient relates having toenails that are in need of trimming.  Denies experiencing pain from the nails with wearing shoe gear.  Has not attempted to self treat.  Relates excellent control over his blood glucose.  Continues with neuropathy pain in the feet, however, states this is manageable.  Notes two rounds of Qutenza did little to mitigate symptoms.  Denies any additional pedal complaints.    PCP: Matthew Carroll MD  Last visit: 12/24  Hemoglobin A1C   Date Value Ref Range Status   12/12/2024 6.5 (H) 4.0 - 5.6 % Final     Comment:     Reference Interval:  5.0 - 5.6 Normal   5.7 - 6.4 High Risk   > 6.5 Diabetic      Hgb A1c results are standardized based on the (NGSP) National   Glycohemoglobin Standardization Program.      Hemoglobin A1C levels are related to mean serum/plasma glucose   during the preceding 2-3 months.        08/28/2024 6.4 (H) 4.0 - 5.6 % Final     Comment:     ADA Screening Guidelines:  5.7-6.4%  Consistent with prediabetes  >or=6.5%  Consistent with diabetes    High levels of fetal hemoglobin interfere with the HbA1C  assay. Heterozygous hemoglobin variants (HbS, HgC, etc)do  not significantly interfere with this assay.    However, presence of multiple variants may affect accuracy.     08/24/2024 6.3 (H) 0.0 - 5.6 % Final     Comment:     Reference Interval:  5.0 - 5.6 Normal   5.7 - 6.4 High Risk   > 6.5 Diabetic      Hgb A1c results are standardized based on the (NGSP) National   Glycohemoglobin Standardization Program.      Hemoglobin A1C levels are related to mean serum/plasma glucose   during the preceding 2-3 months.                Past Medical History:   Diagnosis Date    Addiction 12/21/2021    Arthritis     Back pain     radiates to both legs but more on rt leg    Depression     HENDERSON (dyspnea on exertion)     Dyslipidemia 08/12/2015    Excessive daytime sleepiness 03/02/2018    Formatting of this note might be different from the original.  Added automatically from request for surgery [id number]    GERD (gastroesophageal reflux disease)     Gout 08/12/2015    Hypersomnia with sleep apnea 05/14/2022    Hypertension     Idiopathic gout     Lumbar pseudoarthrosis     Neck pain     nonradiating pain    Neuropathy     Obesity 08/12/2015    Obstructive sleep apnea 08/12/2015    cpap    Restless leg syndrome     Sebaceous cyst 04/18/2017    SVT (supraventricular tachycardia)     s/p ablation    Type 2 diabetes mellitus 08/12/2015       Past Surgical History:   Procedure Laterality Date    ABLATION N/A 10/08/2020    Procedure: Ablation;  Surgeon: Rip Che III, MD;  Location: [facility] CATH;  Service: Cardiology;  Laterality: N/A;    ARTHROPLASTY OF JOINT OF FINGER Right 04/12/2022    Procedure: Right middle finger MCP joint arthroplasty;  Surgeon: Luis Albreto Payne MD;  Location: [facility] OR;  Service: Orthopedics;  Laterality: Right;  Anesthesia:  General with a single-shot supraclavicular block    BACK SURGERY      multiple    CARDIAC CATHETERIZATION      CARDIAC ELECTROPHYSIOLOGY STUDY  10/08/2020    Procedure: Study possible ablation;  Surgeon: Rip Che III, MD;  Location: [facility] CATH;  Service: Cardiology;;    CERVICAL SPINE  SURGERY      CHOLECYSTECTOMY  05/30/2018    Dr. ROGELIO Tao, [facility]     COLONOSCOPY  2008    COLONOSCOPY N/A 09/14/2017    Procedure: COLONOSCOPY;  Surgeon: Obi Beltre MD;  Location: [facility] ENDO;  Service: Endoscopy;  Laterality: N/A;    CORONARY ANGIOGRAPHY Left 05/28/2018    Procedure: Coronary angiography;  Surgeon: Rafiq Malik MD;  Location: [facility] CATH;  Service: Cardiology;  Laterality: Left;    ELBOW SURGERY Bilateral     EPIDURAL STEROID INJECTION INTO CERVICAL SPINE      EPIDURAL STEROID INJECTION INTO LUMBAR SPINE      ESOPHAGOGASTRODUODENOSCOPY N/A 9/19/2024    Procedure: EGD (ESOPHAGOGASTRODUODENOSCOPY);  Surgeon: Obi Beltre MD;  Location: [facility] ENDO;  Service: Endoscopy;  Laterality: N/A;    EXCISION OF BURSA OF HIP Left 12/13/2024    Procedure: BURSECTOMY, HIP;  Surgeon: Axel Gruber MD;  Location: [facility] OR;  Service: Orthopedics;  Laterality: Left;    EXCISION OF BURSA OF HIP Right 1/10/2025    Procedure: BURSECTOMY, HIP;  Surgeon: Axel Gruber MD;  Location: [facility] OR;  Service: Orthopedics;  Laterality: Right;    HERNIA REPAIR      LAPAROSCOPIC APPENDECTOMY N/A 06/25/2020    Procedure: APPENDECTOMY, LAPAROSCOPIC;  Surgeon: Gordon Can MD;  Location: [facility] OR;  Service: General;  Laterality: N/A;    LAPAROSCOPIC CHOLECYSTECTOMY N/A 05/30/2018    Procedure: CHOLECYSTECTOMY, LAPAROSCOPIC;  Surgeon: Fletcher Tao MD;  Location: [facility] OR;  Service: General;  Laterality: N/A;    LAPAROSCOPIC SLEEVE GASTRECTOMY N/A 01/20/2023    Procedure: GASTRECTOMY, SLEEVE, LAPAROSCOPIC;  Surgeon: Bharat Devine MD;  Location: [facility] OR;  Service: General;  Laterality: N/A;    LEFT HEART CATHETERIZATION Left 05/28/2018    Procedure: Left heart cath;  Surgeon: Rafiq Malik MD;  Location: [facility] CATH;  Service: Cardiology;  Laterality: Left;    LEFT HEART CATHETERIZATION  8/26/2024    Procedure: Left heart cath Rm 3623;  Surgeon: Tom Quesada MD;  Location: [facility] CATH;  Service:  Cardiology;;    NECK SURGERY      RADIOFREQUENCY ABLATION OF LUMBAR MEDIAL BRANCH NERVE AT SINGLE LEVEL Bilateral 2022    Procedure: Radiofrequency Ablation, Nerve, Spinal, Lumbar, Medial Branch, 1 Level L3,4,5;  Surgeon: Thiago Garcia MD;  Location: [facility] OR;  Service: Pain Management;  Laterality: Bilateral;    SHOULDER ARTHROSCOPY      SPINE SURGERY      multiple    TONSILLECTOMY      TREATMENT OF CARDIAC ARRHYTHMIA  10/08/2020    Procedure: Cardioversion or Defibrillation;  Surgeon: Rip Che III, MD;  Location: [facility] CATH;  Service: Cardiology;;    TRIGGER FINGER RELEASE Right 2021    Procedure: RELEASE, TRIGGER FINGER;  Surgeon: Luis Alberto Payne MD;  Location: [facility] OR;  Service: Orthopedics;  Laterality: Right;  Procedure: Right ring finger trigger release    Position: Supine    Anesthesia: Local    Equipment: Basic handset    TYMPANOSTOMY TUBE PLACEMENT Right        Family History   Problem Relation Name Age of Onset    Diabetes Mother      Depression Mother      Liver cancer Mother      Obesity Mother      Heart disease Father      Anuerysm Father      Diabetes Father      Stroke Father      Obesity Sister      Glaucoma Paternal Grandmother         Social History     Socioeconomic History    Marital status:     Number of children: 1   Tobacco Use    Smoking status: Former     Current packs/day: 0.00     Average packs/day: 0.5 packs/day for 10.0 years (5.0 ttl pk-yrs)     Types: Cigarettes     Start date:      Quit date:      Years since quittin.1    Smokeless tobacco: Never   Substance and Sexual Activity    Alcohol use: Not Currently    Drug use: No    Sexual activity: Yes     Partners: Female   Social History Narrative              Social Drivers of Health     Financial Resource Strain: Medium Risk (2022)    Overall Financial Resource Strain (CARDIA)     Difficulty of Paying Living Expenses: Somewhat hard   Food Insecurity: No Food Insecurity (2024)     Hunger Vital Sign     Worried About Running Out of Food in the Last Year: Never true     Ran Out of Food in the Last Year: Never true   Transportation Needs: No Transportation Needs (8/26/2024)    TRANSPORTATION NEEDS     Transportation : No   Physical Activity: Insufficiently Active (1/24/2022)    Exercise Vital Sign     Days of Exercise per Week: 4 days     Minutes of Exercise per Session: 10 min   Stress: Stress Concern Present (1/24/2022)    [employer] - Occupational Stress Questionnaire     Feeling of Stress : To some extent   Housing Stability: Unknown (8/26/2024)    Housing Stability Vital Sign     Unable to Pay for Housing in the Last Year: No     Homeless in the Last Year: No       Current Outpatient Medications   Medication Sig Dispense Refill    aspirin (ECOTRIN) 81 MG EC tablet Take 81 mg by mouth once daily.      atorvastatin (LIPITOR) 40 MG tablet TAKE ONE TABLET BY MOUTH ONCE DAILY IN THE EVENING 90 tablet 4    blood-glucose meter kit To check BG 2 times daily, to use with insurance preferred meter 1 each 1    calcium-vitamin D3 (OS-SOHAM 500 + D3) 500 mg-5 mcg (200 unit) per tablet Take 1 tablet by mouth once daily.      ciprofloxacin-dexAMETHasone 0.3-0.1% (CIPRODEX) 0.3-0.1 % DrpS       DEXCOM G7 SENSOR Oxana CHANGE EVERY 10 DAYS 9 each 4    doxycycline (VIBRAMYCIN) 100 MG Cap       fluticasone propionate (FLONASE) 50 mcg/actuation nasal spray 1 spray by Each Nostril route daily as needed for Allergies.      gabapentin (NEURONTIN) 600 MG tablet TAKE ONE TABLET BY MOUTH THREE TIMES DAILY 90 tablet 2    glimepiride (AMARYL) 1 MG tablet TAKE ONE TABLET BY MOUTH DAILY BEFORE BREAKFAST 90 tablet 3    hydrocortisone 2.5 % cream Apply topically 2 (two) times daily as needed (rash around nose). Mix 50/50 with ketoconazole cream. 20 g 3    JARDIANCE 25 mg tablet TAKE ONE TABLET BY MOUTH ONCE DAILY 90 tablet 3    ketoconazole (NIZORAL) 2 % cream Apply topically 2 (two) times  daily as needed (rash around nose). Mix 50/50 with hydrocortisone cream. OK to use daily by itself for prevention. 60 g 3    magnesium gluconate 27.5 mg magne- sium (500 mg) Tab Take 1 tablet by mouth once.      meloxicam (MOBIC) 15 MG tablet Take 15 mg by mouth once daily.      metFORMIN (GLUCOPHAGE-XR) 500 MG ER 24hr tablet TAKE TWO TABLETS BY MOUTH TWICE DAILY 360 tablet 3    methocarbamoL (ROBAXIN) 500 MG Tab Take 1 tablet (500 mg total) by mouth 4 (four) times daily as needed (muscle spasms). 44 tablet 3    multivitamin capsule Take 1 capsule by mouth once daily.      omeprazole (PRILOSEC) 40 MG capsule Take 40 mg by mouth once daily.      oxyCODONE (ROXICODONE) 5 MG immediate release tablet Take 1 tablet (5 mg total) by mouth every 4 (four) hours as needed for Pain. (Patient not taking: Reported on 2/24/2025) 22 tablet 0    pyridoxine, vitamin B6, (B-6) 25 MG Tab Take 25 mg by mouth once daily.      semaglutide (OZEMPIC SUBQ) inject ONE MG UNDER THE SKIN WEEKLY      traMADoL (ULTRAM) 50 mg tablet Take 1 tablet (50 mg total) by mouth every 4 (four) hours as needed. (Patient not taking: Reported on 2/24/2025) 44 tablet 0    traMADoL (ULTRAM) 50 mg tablet Take 1 tablet (50 mg total) by mouth every 4 (four) hours as needed for Pain. (Patient not taking: Reported on 2/24/2025) 44 tablet 0    vitamin D (VITAMIN D3) 1000 units Tab Take 1,000 Units by mouth once daily.       No current facility-administered medications for this visit.       Review of patient's allergies indicates:  No Known Allergies      Review of Systems   Constitutional: Negative for chills and fever.   Cardiovascular:  Negative for claudication and leg swelling.   Skin:  Positive for color change and nail changes.   Musculoskeletal:  Positive for joint pain and joint swelling. Negative for muscle cramps, muscle weakness and myalgias.   Neurological:  Positive for paresthesias. Negative for numbness.   Psychiatric/Behavioral:  Negative for altered  mental status.            Objective:      Physical Exam  Constitutional:       General: He is not in acute distress.     Appearance: He is well-developed. He is not diaphoretic.   Cardiovascular:      Pulses:           Dorsalis pedis pulses are 2+ on the right side and 2+ on the left side.        Posterior tibial pulses are 2+ on the right side and 2+ on the left side.      Comments: CFT is < 3 seconds bilateral.  Pedal hair growth is decreased bilateral.  No varicosities noted bilateral.  No lower extremity edema noted bilateral. Toes are cool to touch bilateral.    Musculoskeletal:         General: No tenderness.      Left lower leg: No edema.      Comments: Muscle strength 5/5 in all muscle groups bilateral.  No tenderness nor crepitation with ROM of foot/ankle joints bilateral.  No pain with palpation of bilateral foot and ankle.  Bilateral pes planus foot type.  Bilateral hallux abducto valgus.  Bilateral semi-reducible contracture of toe 2-5.     Skin:     General: Skin is warm and dry.      Capillary Refill: Capillary refill takes 2 to 3 seconds.      Coloration: Skin is not pale.      Findings: No abrasion, bruising, burn, ecchymosis, erythema, laceration, lesion or petechiae.      Comments: Pedal skin appears thin bilateral.  Toenails x 10 appear thickened by 2 mm, elongated by 4 mm, and discolored with subungual debris.   Neurological:      Mental Status: He is alert and oriented to person, place, and time.      Sensory: No sensory deficit.      Motor: No weakness or atrophy.      Comments: Protective sensation per [place] monofilament is intact bilateral.  Vibratory sensation is intact bilateral.  Light touch is intact bilateral.               Assessment:       Encounter Diagnoses   Name Primary?    Diabetic polyneuropathy associated with type 2 diabetes mellitus Yes    Onychomycosis due to dermatophyte     Paresthesia of foot, bilateral            Plan:       Diagnoses and all orders for this  visit:    Diabetic polyneuropathy associated with type 2 diabetes mellitus    Onychomycosis due to dermatophyte    Paresthesia of foot, bilateral        I counseled the patient on his conditions, their implications and medical management.    Although he continues to note paresthesias of the lower extremities secondary to neuropathy, notes this is manageable for the time being.    Shoe inspection. Diabetic Foot Education. Patient reminded of the importance of good nutrition and blood sugar control to help prevent podiatric complications of diabetes. Patient instructed on proper foot hygeine. We discussed wearing proper shoe gear, daily foot inspections, never walking without protective shoe gear, never putting sharp instruments to feet    With patient's permission, nails were aggressively reduced and debrided x 10 to their soft tissue attachment mechanically and with electric , removing all offending nail and debris.  Patient relates relief following the procedure. He will continue to monitor the areas daily, inspect his feet, wear protective shoe gear when ambulatory, moisturizer to maintain skin integrity and follow in this office in approximately 3 months, sooner p.r.n.    Rj Nuñez DPM

## 2025-03-06 ENCOUNTER — OFFICE VISIT (OUTPATIENT)
Dept: ENDOCRINOLOGY | Facility: CLINIC | Age: 60
End: 2025-03-06
Payer: MEDICARE

## 2025-03-06 VITALS
HEIGHT: 74 IN | BODY MASS INDEX: 31.16 KG/M2 | SYSTOLIC BLOOD PRESSURE: 104 MMHG | OXYGEN SATURATION: 97 % | HEART RATE: 84 BPM | WEIGHT: 242.81 LBS | DIASTOLIC BLOOD PRESSURE: 64 MMHG

## 2025-03-06 DIAGNOSIS — E11.65 TYPE 2 DIABETES MELLITUS WITH HYPERGLYCEMIA, WITHOUT LONG-TERM CURRENT USE OF INSULIN: Primary | ICD-10-CM

## 2025-03-06 DIAGNOSIS — I10 ESSENTIAL HYPERTENSION: ICD-10-CM

## 2025-03-06 DIAGNOSIS — E66.09 CLASS 1 OBESITY DUE TO EXCESS CALORIES WITH BODY MASS INDEX (BMI) OF 31.0 TO 31.9 IN ADULT, UNSPECIFIED WHETHER SERIOUS COMORBIDITY PRESENT: ICD-10-CM

## 2025-03-06 DIAGNOSIS — E66.811 CLASS 1 OBESITY DUE TO EXCESS CALORIES WITH BODY MASS INDEX (BMI) OF 31.0 TO 31.9 IN ADULT, UNSPECIFIED WHETHER SERIOUS COMORBIDITY PRESENT: ICD-10-CM

## 2025-03-06 DIAGNOSIS — E78.2 MIXED HYPERLIPIDEMIA: ICD-10-CM

## 2025-03-06 PROCEDURE — 99999 PR PBB SHADOW E&M-EST. PATIENT-LVL IV: CPT | Mod: PBBFAC,,, | Performed by: NURSE PRACTITIONER

## 2025-03-06 RX ORDER — TIRZEPATIDE 2.5 MG/.5ML
2.5 INJECTION, SOLUTION SUBCUTANEOUS
Qty: 4 PEN | Refills: 0 | Status: SHIPPED | OUTPATIENT
Start: 2025-03-06

## 2025-03-06 NOTE — PROGRESS NOTES
CC: This 59 y.o. male presents for management of diabetes  and chronic conditions pending review including HTN, HLP, morbid obesity, fatty liver, vitamin d deficiency, CM, CAD     HPI: He was diagnosed with T2DM in 2005. Has never been hospitalized r/t DM.  Mother has passed away from Fatty liver and hepatic carcinoma in 2018  Family hx of DM: sister, mom and dad  Gastric sleeve in January 2023- has lost ~ 100 lbs      Wearing Dexcom G7   See Dexcom download in Media tab  13% Very High  38% High  49% In Range  0% Low  0% Very Low  GMI 7.8%  Having pp excursions    Diet: Eats 3 meals a day, snacks PB crackers, junk     Exercise: has a gym membership but has not been going recently     CURRENT DM MEDS:  jardaince 25 mg qam, metformin xr 500 mg 2 tabs bid, glimepiride 1 mg qam  Glucometer type: True Metrix  Standards of Care:  Eye exam: 5/2024  no h/o retinopathy, [facility]-      Following w Dr Quesada in cardiology      ROS:   Gen: Appetite good, weight gain 14 lbs  Eyes: Denies visual disturbances  Resp: no SOB or HENDERSON   Cardiac: No palpitations, chest pain   GI: No nausea or vomiting, diarrhea, no constipation   /GYN: No nocturia, burning or pain.   MS/Neuro: +numbness/ tingling in BLE; Gait steady, speech clear  Other systems: negative.    PE:  GENERAL: Well developed, well nourished.appears older than age.   PSYCH: AAOx3, appropriate mood and affect, pleasant expression, conversant, appears relaxed, well groomed.   EYES: Conjunctiva, corneas clear  NECK: Supple, trachea midline   NEURO: Gait steady  SKIN:   no acanthosis nigracans.  FOOT EXAMINATION: 3/5/2025  No foot deformity, corns or callus formation,  nails in good condiiton and well trimmed, no interspace maceration or ulceration noted.  Decreased hair growth present over toes/feet.    Protective sensation intact with 10 gram monofilament.  +2 dorsalis pedis and posterior pulses noted.    Left great tow w bruise noted under nail    Lab Results    Component Value Date    MICALBCREAT Unable to calculate 08/28/2024       Hemoglobin A1C   Date Value Ref Range Status   03/05/2025 6.7 (H) 4.0 - 5.6 % Final     Comment:     ADA Screening Guidelines:  5.7-6.4%  Consistent with prediabetes  >or=6.5%  Consistent with diabetes    High levels of fetal hemoglobin interfere with the HbA1C  assay. Heterozygous hemoglobin variants (HbS, HgC, etc)do  not significantly interfere with this assay.   However, presence of multiple variants may affect accuracy.     12/12/2024 6.5 (H) 4.0 - 5.6 % Final     Comment:     Reference Interval:  5.0 - 5.6 Normal   5.7 - 6.4 High Risk   > 6.5 Diabetic      Hgb A1c results are standardized based on the (NGSP) National   Glycohemoglobin Standardization Program.      Hemoglobin A1C levels are related to mean serum/plasma glucose   during the preceding 2-3 months.        08/28/2024 6.4 (H) 4.0 - 5.6 % Final     Comment:     ADA Screening Guidelines:  5.7-6.4%  Consistent with prediabetes  >or=6.5%  Consistent with diabetes    High levels of fetal hemoglobin interfere with the HbA1C  assay. Heterozygous hemoglobin variants (HbS, HgC, etc)do  not significantly interfere with this assay.   However, presence of multiple variants may affect accuracy.          ASSESSMENT and PLAN:    1. Type 2 diabetes w hyperglycemia, DM PN  Continue  jardaince 25 mg qam, metformin xr 500 mg 2 tabs in an 2 in pm, glimepiride 1 mg qam  Start Mounjaro 2.5 mg once weekly x 4 weeks. (Also messaged bariatric sx)  At week 5 increase to 5 mg weekly    No fmh MEN or medullary thyroid cancer  No personal hx of pancreatitis    Continue Dexcom G7     2. HLP -  Continue statin therapy,     4. LINDSEY-  Resume weight loss   Body mass index is 31.18 kg/m².     5. CAD, CM  Follows w Dr Quesada    6. Obesity-long conversation on him getting back on track w diet and exercise Body mass index is 31.18 kg/m².    Follow-up:  in 3 months w  A1C,  [facility]                                     Transition of care performed with sharing of clinical summary Transition of care performed with sharing of clinical summary Transition of care performed with sharing of clinical summary Transition of care performed with sharing of clinical summary Transition of care performed with sharing of clinical summary Transition of care performed with sharing of clinical summary Transition of care performed with sharing of clinical summary Transition of care performed with sharing of clinical summary Transition of care performed with sharing of clinical summary Transition of care performed with sharing of clinical summary

## 2025-03-07 ENCOUNTER — HOSPITAL ENCOUNTER (OUTPATIENT)
Dept: RADIOLOGY | Facility: HOSPITAL | Age: 60
Discharge: HOME | End: 2025-03-07
Attending: NURSE PRACTITIONER
Payer: MEDICARE

## 2025-03-07 ENCOUNTER — OFFICE VISIT (OUTPATIENT)
Dept: ORTHOPEDICS | Facility: CLINIC | Age: 60
End: 2025-03-07
Payer: MEDICARE

## 2025-03-07 VITALS
HEIGHT: 74 IN | HEART RATE: 84 BPM | WEIGHT: 242.94 LBS | SYSTOLIC BLOOD PRESSURE: 104 MMHG | BODY MASS INDEX: 31.18 KG/M2 | DIASTOLIC BLOOD PRESSURE: 64 MMHG

## 2025-03-07 DIAGNOSIS — Z98.890 HX OF BURSECTOMY: Primary | ICD-10-CM

## 2025-03-07 DIAGNOSIS — M25.551 RIGHT HIP PAIN: ICD-10-CM

## 2025-03-07 PROCEDURE — 73502 X-RAY EXAM HIP UNI 2-3 VIEWS: CPT | Mod: 26,RT,, | Performed by: STUDENT IN AN ORGANIZED HEALTH CARE EDUCATION/TRAINING PROGRAM

## 2025-03-07 PROCEDURE — 73502 X-RAY EXAM HIP UNI 2-3 VIEWS: CPT | Mod: TC,PO,RT

## 2025-03-07 PROCEDURE — 99999 PR PBB SHADOW E&M-EST. PATIENT-LVL IV: CPT | Mod: PBBFAC,,, | Performed by: NURSE PRACTITIONER

## 2025-03-07 RX ORDER — METHOCARBAMOL 750 MG/1
750 TABLET, FILM COATED ORAL 4 TIMES DAILY PRN
Qty: 60 TABLET | Refills: 2 | Status: SHIPPED | OUTPATIENT
Start: 2025-03-07

## 2025-03-07 RX ORDER — METHOCARBAMOL 750 MG/1
750 TABLET, FILM COATED ORAL 4 TIMES DAILY PRN
Qty: 60 TABLET | Refills: 2 | Status: SHIPPED | OUTPATIENT
Start: 2025-03-07 | End: 2025-03-07

## 2025-03-07 NOTE — PROGRESS NOTES
Chief Complaint   Patient presents with    Right Hip - Pain       HPI:  59 y.o. returns to clinic today status post  right hip bursectomy 8 weeks ago. Pain is intermittent. Patient is compliant most of the time with restrictions.     R hip     Incision healed. No erythema or fluctuance. Overall normal alignment. Mild point TTP about the surgical site. Decreased ROM due to stiffness. Compartments soft. Skin intact. NVI distally.       Hx of bursectomy  -     Discontinue: methocarbamoL (ROBAXIN) 750 MG Tab; Take 1 tablet (750 mg total) by mouth 4 (four) times daily as needed (muscle spasms).  Dispense: 60 tablet; Refill: 2  -     methocarbamoL (ROBAXIN) 750 MG Tab; Take 1 tablet (750 mg total) by mouth 4 (four) times daily as needed (muscle spasms).  Dispense: 60 tablet; Refill: 2        Continue ice and compression as needed  Return to clinic as needed.

## 2025-03-10 ENCOUNTER — RESULTS FOLLOW-UP (OUTPATIENT)
Dept: BARIATRICS | Facility: CLINIC | Age: 60
End: 2025-03-10

## 2025-03-11 LAB — VIT B1 BLD-MCNC: 77 UG/L (ref 38–122)

## 2025-03-30 DIAGNOSIS — E11.65 TYPE 2 DIABETES MELLITUS WITH HYPERGLYCEMIA, WITHOUT LONG-TERM CURRENT USE OF INSULIN: ICD-10-CM

## 2025-03-31 RX ORDER — TIRZEPATIDE 2.5 MG/.5ML
2.5 INJECTION, SOLUTION SUBCUTANEOUS
Qty: 4 PEN | Refills: 6 | Status: SHIPPED | OUTPATIENT
Start: 2025-03-31

## 2025-04-01 ENCOUNTER — TELEPHONE (OUTPATIENT)
Dept: BARIATRICS | Facility: CLINIC | Age: 60
End: 2025-04-01
Payer: MEDICARE

## 2025-04-01 NOTE — TELEPHONE ENCOUNTER
----- Message from Mariana sent at 4/1/2025  4:45 PM CDT -----  Regarding: new rx  Contact: patient  Type:  RX Refill RequestWho Called:  patientRefill or New Rx:  newRX Name and Strength:  zofranHow is the patient currently taking it? (ex. 1XDay):  as directedIs this a 30 day or 90 day RX:  90Pref[facility] or Mail Order:  localOrdering Provider:  Nicol Donovan Call Back Number:  [phone] Information:  Please call patient to advise.  Thanks!

## 2025-04-02 ENCOUNTER — TELEPHONE (OUTPATIENT)
Dept: BARIATRICS | Facility: CLINIC | Age: 60
End: 2025-04-02
Payer: MEDICARE

## 2025-04-02 DIAGNOSIS — R11.0 NAUSEA: Primary | ICD-10-CM

## 2025-04-02 RX ORDER — ONDANSETRON 4 MG/1
4 TABLET, FILM COATED ORAL EVERY 8 HOURS PRN
Qty: 90 TABLET | Refills: 1 | Status: SHIPPED | OUTPATIENT
Start: 2025-04-02 | End: 2025-05-03

## 2025-04-02 NOTE — TELEPHONE ENCOUNTER
----- Message from Med Assistant Faye sent at 4/2/2025  1:19 PM CDT -----  Contact: Pt [phone]  Nicol, I'm sending you this because the Zofran is not on his list for us to refill. Tila Montgomery  ----- Message -----  From: Kei Mi  Sent: 4/2/2025  11:29 AM CDT  To: Devon WRIGHT Staff    Type:  RX Refill RequestWho Called: PtRefill or New Rx: NewRX Name and Strength:ZofranHow is the patient currently taking it? (ex. 1XDay): Is this a 30 day or 90 day RX: 30Preferred Pharmacy with phone number: [facility] [address]Fzbos: [phone] Fax: [phone] Local or Mail Order:LocalOrdering Provider:Nilson Would the patient rather a call back or a response via [facility]? [facility] Back Number:[phone] Additional Information: Pt req rx for Zofran. Pls call back and adv. Thank you.

## 2025-04-09 DIAGNOSIS — E11.42 TYPE 2 DIABETES MELLITUS WITH DIABETIC POLYNEUROPATHY, WITH LONG-TERM CURRENT USE OF INSULIN: ICD-10-CM

## 2025-04-09 DIAGNOSIS — Z79.4 TYPE 2 DIABETES MELLITUS WITH DIABETIC POLYNEUROPATHY, WITH LONG-TERM CURRENT USE OF INSULIN: ICD-10-CM

## 2025-04-09 RX ORDER — ATORVASTATIN CALCIUM 40 MG/1
TABLET, FILM COATED ORAL
Qty: 90 TABLET | Refills: 4 | Status: SHIPPED | OUTPATIENT
Start: 2025-04-09

## 2025-04-09 RX ORDER — BLOOD-GLUCOSE SENSOR
EACH MISCELLANEOUS
Qty: 9 EACH | Refills: 4 | Status: SHIPPED | OUTPATIENT
Start: 2025-04-09

## 2025-04-14 ENCOUNTER — TELEPHONE (OUTPATIENT)
Dept: ENDOCRINOLOGY | Facility: CLINIC | Age: 60
End: 2025-04-14
Payer: MEDICARE

## 2025-04-14 NOTE — TELEPHONE ENCOUNTER
Pt aware you are out today & wanted to wait for your response. Pt arrived at clinic today for Dexcom upload. States he has been having low BG. He has been taking all of his diabetes medications as prescribed but states the last 2 nights he has skipped his Metformin because he is afraid his BG will get too low. Dexcom uploaded for review. Please advise.

## 2025-05-02 DIAGNOSIS — M47.896 OTHER SPONDYLOSIS, LUMBAR REGION: ICD-10-CM

## 2025-05-02 RX ORDER — GABAPENTIN 600 MG/1
600 TABLET ORAL 3 TIMES DAILY
Qty: 90 TABLET | Refills: 2 | Status: SHIPPED | OUTPATIENT
Start: 2025-05-02

## 2025-05-07 ENCOUNTER — TELEPHONE (OUTPATIENT)
Dept: FAMILY MEDICINE | Facility: CLINIC | Age: 60
End: 2025-05-07
Payer: MEDICARE

## 2025-05-07 DIAGNOSIS — M79.643 PAIN OF HAND, UNSPECIFIED LATERALITY: Primary | ICD-10-CM

## 2025-05-07 NOTE — TELEPHONE ENCOUNTER
----- Message from Rocio sent at 5/7/2025  4:46 PM CDT -----  Contact: Patient  Type:  Needs Medical AdviceWho Called: PatientWould the patient rather a call back or a response via [facility]? [facility] Call Back Number: [phone] Information: Patient is in need of a new Ortho referral regarding his hand and wrist

## 2025-05-12 ENCOUNTER — OFFICE VISIT (OUTPATIENT)
Facility: CLINIC | Age: 60
End: 2025-05-12
Payer: MEDICARE

## 2025-05-12 DIAGNOSIS — D22.9 MULTIPLE BENIGN NEVI: Primary | ICD-10-CM

## 2025-05-12 DIAGNOSIS — L57.0 AK (ACTINIC KERATOSIS): ICD-10-CM

## 2025-05-12 DIAGNOSIS — L81.4 LENTIGINES: ICD-10-CM

## 2025-05-12 DIAGNOSIS — L73.8 SEBACEOUS HYPERPLASIA: ICD-10-CM

## 2025-05-12 DIAGNOSIS — D18.01 CHERRY ANGIOMA: ICD-10-CM

## 2025-05-12 DIAGNOSIS — Z12.83 SCREENING FOR SKIN CANCER: ICD-10-CM

## 2025-05-12 PROCEDURE — 3044F HG A1C LEVEL LT 7.0%: CPT | Mod: CPTII,S$GLB,, | Performed by: DERMATOLOGY

## 2025-05-12 PROCEDURE — 99999 PR PBB SHADOW E&M-EST. PATIENT-LVL III: CPT | Mod: PBBFAC,,, | Performed by: DERMATOLOGY

## 2025-05-12 PROCEDURE — 99213 OFFICE O/P EST LOW 20 MIN: CPT | Mod: 25,S$GLB,, | Performed by: DERMATOLOGY

## 2025-05-12 PROCEDURE — 17000 DESTRUCT PREMALG LESION: CPT | Mod: S$GLB,,, | Performed by: DERMATOLOGY

## 2025-05-12 PROCEDURE — 1159F MED LIST DOCD IN RCRD: CPT | Mod: CPTII,S$GLB,, | Performed by: DERMATOLOGY

## 2025-05-12 PROCEDURE — 17003 DESTRUCT PREMALG LES 2-14: CPT | Mod: S$GLB,,, | Performed by: DERMATOLOGY

## 2025-05-12 NOTE — PROGRESS NOTES
Subjective:      Patient ID:  Gio Forrest is a 59 y.o. male who presents for   Chief Complaint   Patient presents with    Skin Check     HPI    Established patient.  Here today for upper body skin exam.       +AK  Cryotherapy, Efudex     Review of Systems    Objective:   Physical Exam   Constitutional: He appears well-developed and well-nourished. He is cooperative. No distress.   HENT:   Head: Normocephalic and atraumatic.   Eyes: Lids are normal. Lids are normal.  Right conjunctiva is not injected. Left conjunctiva is not injected. No conjunctival no injection.   Pulmonary/Chest: Effort normal. No respiratory distress.   Neurological: He is alert and oriented to person, place, and time. He is not disoriented.   Psychiatric: He has a normal mood and affect. His speech is normal and behavior is normal. Mood, memory, affect and thought content normal.   Skin:   Areas Examined (abnormalities noted in diagram):   Scalp / Hair Palpated and Inspected  Head / Face Inspection Performed  Neck Inspection Performed  Chest / Axilla Inspection Performed  Abdomen Inspection Performed  Back Inspection Performed  RUE Inspected  LUE Inspection Performed  Nails and Digits Inspection Performed                 Diagram Legend     Erythematous scaling macule/papule c/w actinic keratosis       Vascular papule c/w angioma      Pigmented verrucoid papule/plaque c/w seborrheic keratosis      Yellow umbilicated papule c/w sebaceous hyperplasia      Irregularly shaped tan macule c/w lentigo     1-2 mm smooth white papules consistent with Milia      Movable subcutaneous cyst with punctum c/w epidermal inclusion cyst      Subcutaneous movable cyst c/w pilar cyst      Firm pink to brown papule c/w dermatofibroma      Pedunculated fleshy papule(s) c/w skin tag(s)      Evenly pigmented macule c/w junctional nevus     Mildly variegated pigmented, slightly irregular-bordered macule c/w mildly atypical nevus      Flesh colored to evenly  pigmented papule c/w intradermal nevus       Pink pearly papule/plaque c/w basal cell carcinoma      Erythematous hyperkeratotic cursted plaque c/w SCC      Surgical scar with no sign of skin cancer recurrence      Open and closed comedones      Inflammatory papules and pustules      Verrucoid papule consistent consistent with wart     Erythematous eczematous patches and plaques     Dystrophic onycholytic nail with subungual debris c/w onychomycosis     Umbilicated papule    Erythematous-base heme-crusted tan verrucoid plaque consistent with inflamed seborrheic keratosis     Erythematous Silvery Scaling Plaque c/w Psoriasis     See annotation       Assessment / Plan:      AK (actinic keratosis)  - Discussed diagnosis, etiology, and precancerous nature of condition.   - Cryosurgery Procedure Note: Discussed procedure with patient/patient's guardian including risks and benefits as well as treatment alternatives. Risks of procedure include pain, itching, swelling, redness, blistering, crusting, wound formation, post-inflammatory pigmentary alteration, scar, recurrence. Verbal consent obtained. LN2 cryosurgery performed to 8 lesion(s). Patient tolerated procedure well. After-visit wound care instructions reviewed and provided in writing.      Multiple benign nevi  - Discussed diagnosis, etiology, and benign-nature of condition.  - Reassured; no lesions suspicious for malignancy noted on exam today.   - Recommended routine self examination of skin. Discussed the ABCDEs of melanoma and ugly duckling sign.   - Recommended daily sun protection, including the use of OTC broad-spectrum sunscreen (SPF 30 or greater) and sun-protective clothing.      Lentigines  - Benign; reassured treatment not necessary.   - Recommended daily sun protection, including the use of OTC broad-spectrum sunscreen (SPF 30 or greater) and sun-protective clothing.       Osullivan angioma  Sebaceous hyperplasia  - Benign; reassured treatment not  necessary.      Screening for skin cancer  - Upper body skin examination performed today.  - Findings listed above.   - Recommended routine self examination of skin.    - Recommended daily sun protection, including the use of OTC broad-spectrum sunscreen (SPF 30 or greater) and sun-protective clothing.           Follow up in about 6 months (around 11/12/2025) for skin check, sooner PRN.

## 2025-05-13 ENCOUNTER — OFFICE VISIT (OUTPATIENT)
Dept: ORTHOPEDICS | Facility: CLINIC | Age: 60
End: 2025-05-13
Payer: MEDICARE

## 2025-05-13 ENCOUNTER — HOSPITAL ENCOUNTER (OUTPATIENT)
Dept: RADIOLOGY | Facility: HOSPITAL | Age: 60
Discharge: HOME | End: 2025-05-13
Attending: PHYSICIAN ASSISTANT
Payer: MEDICARE

## 2025-05-13 DIAGNOSIS — M25.532 LEFT WRIST PAIN: ICD-10-CM

## 2025-05-13 DIAGNOSIS — M25.532 LEFT WRIST PAIN: Primary | ICD-10-CM

## 2025-05-13 DIAGNOSIS — M25.832 ULNAR ABUTMENT SYNDROME OF LEFT WRIST: Primary | ICD-10-CM

## 2025-05-13 DIAGNOSIS — M24.132 DEGENERATIVE TEAR OF TRIANGULAR FIBROCARTILAGE COMPLEX (TFCC) OF LEFT WRIST: ICD-10-CM

## 2025-05-13 PROCEDURE — 73110 X-RAY EXAM OF WRIST: CPT | Mod: TC,PO,LT

## 2025-05-13 PROCEDURE — 1159F MED LIST DOCD IN RCRD: CPT | Mod: CPTII,S$GLB,, | Performed by: PHYSICIAN ASSISTANT

## 2025-05-13 PROCEDURE — 3044F HG A1C LEVEL LT 7.0%: CPT | Mod: CPTII,S$GLB,, | Performed by: PHYSICIAN ASSISTANT

## 2025-05-13 PROCEDURE — 73110 X-RAY EXAM OF WRIST: CPT | Mod: 26,LT,, | Performed by: RADIOLOGY

## 2025-05-13 PROCEDURE — 99999 PR PBB SHADOW E&M-EST. PATIENT-LVL III: CPT | Mod: PBBFAC,,, | Performed by: PHYSICIAN ASSISTANT

## 2025-05-13 PROCEDURE — 99214 OFFICE O/P EST MOD 30 MIN: CPT | Mod: S$GLB,,, | Performed by: PHYSICIAN ASSISTANT

## 2025-05-13 NOTE — PROGRESS NOTES
5/13/2025    HPI:  Gio Forrest is a 59 y.o. male, who presents to clinic today for evaluation of his left wrist pain.  States pain is located on the outside of the wrist.  States pain has been present for the past year so.  States it is off and on in severity.  States sometimes it will catch and pop.  States he has to hold and shake his wrist for relief.  Denies any paresthesias.  Denies any acute injuries.  Denies any other complaints at this time.    PMHX:  Past Medical History:   Diagnosis Date    Addiction 12/21/2021    Arthritis     Back pain     radiates to both legs but more on rt leg    Depression     HENDERSON (dyspnea on exertion)     Dyslipidemia 08/12/2015    Excessive daytime sleepiness 03/02/2018    Formatting of this note might be different from the original.  Added automatically from request for surgery [id number]    GERD (gastroesophageal reflux disease)     Gout 08/12/2015    Hypersomnia with sleep apnea 05/14/2022    Hypertension     Idiopathic gout     Lumbar pseudoarthrosis     Neck pain     nonradiating pain    Neuropathy     Obesity 08/12/2015    Obstructive sleep apnea 08/12/2015    cpap    Restless leg syndrome     Sebaceous cyst 04/18/2017    SVT (supraventricular tachycardia)     s/p ablation    Type 2 diabetes mellitus 08/12/2015       PSHX:  Past Surgical History:   Procedure Laterality Date    ABLATION N/A 10/08/2020    Procedure: Ablation;  Surgeon: Rip Che III, MD;  Location: [facility] CATH;  Service: Cardiology;  Laterality: N/A;    ARTHROPLASTY OF JOINT OF FINGER Right 04/12/2022    Procedure: Right middle finger MCP joint arthroplasty;  Surgeon: Luis Alberto Payne MD;  Location: [facility] OR;  Service: Orthopedics;  Laterality: Right;  Anesthesia:  General with a single-shot supraclavicular block    BACK SURGERY      multiple    CARDIAC CATHETERIZATION      CARDIAC ELECTROPHYSIOLOGY STUDY  10/08/2020    Procedure: Study possible ablation;  Surgeon: Rip Che III, MD;  Location: [facility]  CATH;  Service: Cardiology;;    CERVICAL SPINE SURGERY      CHOLECYSTECTOMY  05/30/2018    Dr. ROGELIO Tao, [facility]     COLONOSCOPY  2008    COLONOSCOPY N/A 09/14/2017    Procedure: COLONOSCOPY;  Surgeon: Obi Beltre MD;  Location: [facility] ENDO;  Service: Endoscopy;  Laterality: N/A;    CORONARY ANGIOGRAPHY Left 05/28/2018    Procedure: Coronary angiography;  Surgeon: Rafiq Malik MD;  Location: [facility] CATH;  Service: Cardiology;  Laterality: Left;    ELBOW SURGERY Bilateral     EPIDURAL STEROID INJECTION INTO CERVICAL SPINE      EPIDURAL STEROID INJECTION INTO LUMBAR SPINE      ESOPHAGOGASTRODUODENOSCOPY N/A 9/19/2024    Procedure: EGD (ESOPHAGOGASTRODUODENOSCOPY);  Surgeon: Obi Beltre MD;  Location: [facility] ENDO;  Service: Endoscopy;  Laterality: N/A;    EXCISION OF BURSA OF HIP Left 12/13/2024    Procedure: BURSECTOMY, HIP;  Surgeon: Axel Gruber MD;  Location: [facility] OR;  Service: Orthopedics;  Laterality: Left;    EXCISION OF BURSA OF HIP Right 1/10/2025    Procedure: BURSECTOMY, HIP;  Surgeon: Axel Gruber MD;  Location: [facility] OR;  Service: Orthopedics;  Laterality: Right;    HERNIA REPAIR      LAPAROSCOPIC APPENDECTOMY N/A 06/25/2020    Procedure: APPENDECTOMY, LAPAROSCOPIC;  Surgeon: Gordon Can MD;  Location: [facility] OR;  Service: General;  Laterality: N/A;    LAPAROSCOPIC CHOLECYSTECTOMY N/A 05/30/2018    Procedure: CHOLECYSTECTOMY, LAPAROSCOPIC;  Surgeon: Fletcher Tao MD;  Location: [facility] OR;  Service: General;  Laterality: N/A;    LAPAROSCOPIC SLEEVE GASTRECTOMY N/A 01/20/2023    Procedure: GASTRECTOMY, SLEEVE, LAPAROSCOPIC;  Surgeon: Bharat Devine MD;  Location: [facility] OR;  Service: General;  Laterality: N/A;    LEFT HEART CATHETERIZATION Left 05/28/2018    Procedure: Left heart cath;  Surgeon: Rafiq Malik MD;  Location: [facility] CATH;  Service: Cardiology;  Laterality: Left;    LEFT HEART CATHETERIZATION  8/26/2024    Procedure: Left heart cath Rm 3623;  Surgeon: Tom Quesada  MD JHON;  [facility] CATH;  Service: Cardiology;;    NECK SURGERY      RADIOFREQUENCY ABLATION OF LUMBAR MEDIAL BRANCH NERVE AT SINGLE LEVEL Bilateral 2022    Procedure: Radiofrequency Ablation, Nerve, Spinal, Lumbar, Medial Branch, 1 Level L3,4,5;  Surgeon: Thiago Garcia MD;  Location: [facility] OR;  Service: Pain Management;  Laterality: Bilateral;    SHOULDER ARTHROSCOPY      SPINE SURGERY      multiple    TONSILLECTOMY      TREATMENT OF CARDIAC ARRHYTHMIA  10/08/2020    Procedure: Cardioversion or Defibrillation;  Surgeon: Rip Che III, MD;  Location: [facility] CATH;  Service: Cardiology;;    TRIGGER FINGER RELEASE Right 2021    Procedure: RELEASE, TRIGGER FINGER;  Surgeon: Luis Alberto Payne MD;  Location: [facility] OR;  Service: Orthopedics;  Laterality: Right;  Procedure: Right ring finger trigger release    Position: Supine    Anesthesia: Local    Equipment: Basic handset    TYMPANOSTOMY TUBE PLACEMENT Right        FMHX:  Family History   Problem Relation Name Age of Onset    Diabetes Mother      Depression Mother      Liver cancer Mother      Obesity Mother      Heart disease Father      Anuerysm Father      Diabetes Father      Stroke Father      Obesity Sister      Glaucoma Paternal Grandmother         SOCHX:  Social History     Tobacco Use    Smoking status: Former     Current packs/day: 0.00     Average packs/day: 0.5 packs/day for 10.0 years (5.0 ttl pk-yrs)     Types: Cigarettes     Start date:      Quit date:      Years since quittin.3    Smokeless tobacco: Never   Substance Use Topics    Alcohol use: Not Currently       ALLERGIES:  Patient has no known allergies.    CURRENT MEDICATIONS:  Medications Ordered Prior to Encounter[1]    REVIEW OF SYSTEMS:  Review of Systems Complete; Negative, unless noted above.    GENERAL PHYSICAL EXAM:   There were no vitals taken for this visit.   GEN: well developed, well nourished, no acute distress   PULM: No wheezing, no respiratory  distress   CV: RRR    ORTHO EXAM:   Examination of the left wrist reveals no edema, erythema, ecchymosis, or skin breakdown.  Presence of mild generalized tenderness palpation of the ulnar aspect of the left wrist.  No significant tenderness with range of motion of the wrist.  Able make composite fist and fully extend all fingers.  Sensation is grossly intact in the radial, ulnar, median nerve distributions.  Capillary refill less than 2 seconds.    RADIOLOGY:   X-rays of the left wrist were taken today in clinic.  X-rays reviewed by myself.  Imaging showed no acute fracture or dislocation.  No subluxation.  No radiopaque foreign body or mass noted no osseous destructive/erosive processes noted.  Presence of significant ulnar positive variance by approximately 5 mm, which greatly predispose him to ulnar impaction syndrome.  No other significant bony abnormalities noted.    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 I discussed with Gio Forrest and his wife the ulnar impaction syndrome pathology and treatment options in detail during today's visit.  We discussed the available treatment options at this time are splinting, anti-inflammatories, steroid injections, and lastly surgical intervention.  After discussion of all of the possible treatment options, he stated he would like to proceed with a definitive treatment and discuss surgical intervention.  We did discuss we should at least try some immobilization in the meantime prior to discussing surgery. We also discussed we would perform and MRI of the wrist to evaluated the integrity of the TFCC and ulnar side of the wrist. He verbalized understanding and verbally agreed with the treatment plan.      2.  He was provided a removable Velcro short wrist splint of the left wrist to be worn for activity only.      3. He was scheduled for an MRI of the left wrist without contrast in clinic.    4. I would like him follow up in clinic with   Luis Alberto Payne at his next available appointment.  He was instructed to contact the clinic for any problems or concerns in the interim.           [1]   Current Outpatient Medications on File Prior to Visit   Medication Sig Dispense Refill    aspirin (ECOTRIN) 81 MG EC tablet Take 81 mg by mouth once daily.      atorvastatin (LIPITOR) 40 MG tablet TAKE ONE TABLET BY MOUTH ONCE DAILY IN THE EVENING 90 tablet 4    blood-glucose meter kit To check BG 2 times daily, to use with insurance preferred meter 1 each 1    calcium-vitamin D3 (OS-SOHAM 500 + D3) 500 mg-5 mcg (200 unit) per tablet Take 1 tablet by mouth once daily.      ciprofloxacin-dexAMETHasone 0.3-0.1% (CIPRODEX) 0.3-0.1 % DrpS       DEXCOM G7 SENSOR Oxana CHANGE EVERY 10 DAYS 9 each 4    fluticasone propionate (FLONASE) 50 mcg/actuation nasal spray 1 spray by Each Nostril route daily as needed for Allergies.      gabapentin (NEURONTIN) 600 MG tablet TAKE ONE TABLET BY MOUTH THREE TIMES DAILY 90 tablet 2    glimepiride (AMARYL) 1 MG tablet TAKE ONE TABLET BY MOUTH DAILY BEFORE BREAKFAST 90 tablet 3    hydrocortisone 2.5 % cream Apply topically 2 (two) times daily as needed (rash around nose). Mix 50/50 with ketoconazole cream. 20 g 3    JARDIANCE 25 mg tablet TAKE ONE TABLET BY MOUTH ONCE DAILY 90 tablet 3    ketoconazole (NIZORAL) 2 % cream Apply topically 2 (two) times daily as needed (rash around nose). Mix 50/50 with hydrocortisone cream. OK to use daily by itself for prevention. 60 g 3    magnesium gluconate 27.5 mg magne- sium (500 mg) Tab Take 1 tablet by mouth once.      meloxicam (MOBIC) 15 MG tablet Take 15 mg by mouth once daily.      metFORMIN (GLUCOPHAGE-XR) 500 MG ER 24hr tablet TAKE TWO TABLETS BY MOUTH TWICE DAILY 360 tablet 3    methocarbamoL (ROBAXIN) 750 MG Tab Take 1 tablet (750 mg total) by mouth 4 (four) times daily as needed (muscle spasms). 60 tablet 2    multivitamin capsule Take 1 capsule by mouth once daily.      omeprazole (PRILOSEC)  40 MG capsule Take 40 mg by mouth once daily.      pyridoxine, vitamin B6, (B-6) 25 MG Tab Take 25 mg by mouth once daily.      tirzepatide (MOUNJARO) 2.5 mg/0.5 mL PnIj Inject 2.5 mg (one pen) into the skin every 7 days. 4 Pen 6    vitamin D (VITAMIN D3) 1000 units Tab Take 1,000 Units by mouth once daily.       No current facility-administered medications on file prior to visit.

## 2025-05-14 ENCOUNTER — TELEPHONE (OUTPATIENT)
Dept: ORTHOPEDICS | Facility: CLINIC | Age: 60
End: 2025-05-14
Payer: MEDICARE

## 2025-05-27 ENCOUNTER — TELEPHONE (OUTPATIENT)
Dept: BARIATRICS | Facility: CLINIC | Age: 60
End: 2025-05-27
Payer: MEDICARE

## 2025-05-27 ENCOUNTER — HOSPITAL ENCOUNTER (OUTPATIENT)
Dept: RADIOLOGY | Facility: HOSPITAL | Age: 60
Discharge: HOME | End: 2025-05-27
Attending: PHYSICIAN ASSISTANT
Payer: MEDICARE

## 2025-05-27 DIAGNOSIS — Z98.890 HX OF BURSECTOMY: ICD-10-CM

## 2025-05-27 DIAGNOSIS — M24.132 DEGENERATIVE TEAR OF TRIANGULAR FIBROCARTILAGE COMPLEX (TFCC) OF LEFT WRIST: ICD-10-CM

## 2025-05-27 DIAGNOSIS — M25.832 ULNAR ABUTMENT SYNDROME OF LEFT WRIST: ICD-10-CM

## 2025-05-27 DIAGNOSIS — M25.532 LEFT WRIST PAIN: ICD-10-CM

## 2025-05-27 PROCEDURE — 73221 MRI JOINT UPR EXTREM W/O DYE: CPT | Mod: 26,LT,, | Performed by: RADIOLOGY

## 2025-05-27 PROCEDURE — 73221 MRI JOINT UPR EXTREM W/O DYE: CPT | Mod: TC,PO,LT

## 2025-05-27 NOTE — TELEPHONE ENCOUNTER
----- Message from Massimo sent at 5/27/2025  4:14 PM CDT -----  Pt would like someone to call him about Zofran medication. He can be reach at [phone].

## 2025-05-27 NOTE — TELEPHONE ENCOUNTER
called pt back and pt wanted me to see if Nicol could send in the dissolving Zofran for him to the [facility] in [place]. I sent msg to ALMA Camarena to address.

## 2025-05-28 ENCOUNTER — TELEPHONE (OUTPATIENT)
Dept: BARIATRICS | Facility: CLINIC | Age: 60
End: 2025-05-28
Payer: MEDICARE

## 2025-05-28 DIAGNOSIS — L21.9 SEBORRHEIC DERMATITIS: ICD-10-CM

## 2025-05-28 DIAGNOSIS — R11.0 NAUSEA: Primary | ICD-10-CM

## 2025-05-28 RX ORDER — METHOCARBAMOL 750 MG/1
750 TABLET, FILM COATED ORAL 4 TIMES DAILY PRN
Qty: 60 TABLET | Refills: 2 | Status: SHIPPED | OUTPATIENT
Start: 2025-05-28

## 2025-05-28 RX ORDER — KETOCONAZOLE 20 MG/G
CREAM TOPICAL 2 TIMES DAILY PRN
Qty: 60 G | Refills: 3 | Status: CANCELLED | OUTPATIENT
Start: 2025-05-28

## 2025-05-28 RX ORDER — ONDANSETRON 4 MG/1
4 TABLET, ORALLY DISINTEGRATING ORAL EVERY 6 HOURS PRN
Qty: 120 TABLET | Refills: 0 | Status: SHIPPED | OUTPATIENT
Start: 2025-05-28 | End: 2025-06-28

## 2025-05-28 NOTE — TELEPHONE ENCOUNTER
----- Message from Med Assistant Faye sent at 5/27/2025  4:45 PM CDT -----  Regarding: Pt needs Rx  Gio Zamora is asking for a refill of Zofran and was wanting to know if you can send in the kind that dissolves under your tongue.  It's not on his medication list so I can't send in prescription form. Sorry  He would like to have it sent to the [facility] in [place]. Thanks Ulises  ----- Message -----  From: Massimo Carrasco  Sent: 5/27/2025   4:37 PM CDT  To: Devon WRIGHT Staff    Pt would like someone to call him about Zofran medication. He can be reach at [phone].

## 2025-05-29 DIAGNOSIS — L21.9 SEBORRHEIC DERMATITIS: ICD-10-CM

## 2025-06-02 RX ORDER — KETOCONAZOLE 20 MG/G
CREAM TOPICAL 2 TIMES DAILY PRN
Qty: 60 G | Refills: 3 | Status: SHIPPED | OUTPATIENT
Start: 2025-06-02

## 2025-06-06 ENCOUNTER — OFFICE VISIT (OUTPATIENT)
Dept: ORTHOPEDICS | Facility: CLINIC | Age: 60
End: 2025-06-06
Payer: MEDICARE

## 2025-06-06 VITALS — WEIGHT: 239 LBS | HEIGHT: 74 IN | BODY MASS INDEX: 30.67 KG/M2

## 2025-06-06 DIAGNOSIS — M25.832 ULNAR ABUTMENT SYNDROME OF LEFT WRIST: Primary | ICD-10-CM

## 2025-06-06 DIAGNOSIS — M24.132 DEGENERATIVE TEAR OF TRIANGULAR FIBROCARTILAGE COMPLEX (TFCC) OF LEFT WRIST: ICD-10-CM

## 2025-06-06 PROCEDURE — 99999 PR PBB SHADOW E&M-EST. PATIENT-LVL III: CPT | Mod: PBBFAC,,, | Performed by: ORTHOPAEDIC SURGERY

## 2025-06-06 NOTE — H&P (VIEW-ONLY)
6/6/2025    Chief Complaint:  Chief Complaint   Patient presents with    Left Wrist - Pain       HPI:  Gio Forrest is a 59 y.o. male, who presents to clinic today has a history of left wrist pain.  This has been going on for over a year.  He has been noted to have ulnar impaction syndrome.  We have treated this conservatively.  He continues to have pain.  He is here today to discuss further treatment    PMHX:  Past Medical History:   Diagnosis Date    Addiction 12/21/2021    Arthritis     Back pain     radiates to both legs but more on rt leg    Depression     HENDERSON (dyspnea on exertion)     Dyslipidemia 08/12/2015    Excessive daytime sleepiness 03/02/2018    Formatting of this note might be different from the original.  Added automatically from request for surgery [id number]    GERD (gastroesophageal reflux disease)     Gout 08/12/2015    Hypersomnia with sleep apnea 05/14/2022    Hypertension     Idiopathic gout     Lumbar pseudoarthrosis     Neck pain     nonradiating pain    Neuropathy     Obesity 08/12/2015    Obstructive sleep apnea 08/12/2015    cpap    Restless leg syndrome     Sebaceous cyst 04/18/2017    SVT (supraventricular tachycardia)     s/p ablation    Type 2 diabetes mellitus 08/12/2015       PSHX:  Past Surgical History:   Procedure Laterality Date    ABLATION N/A 10/08/2020    Procedure: Ablation;  Surgeon: Rip Che III, MD;  Location: [facility] CATH;  Service: Cardiology;  Laterality: N/A;    ARTHROPLASTY OF JOINT OF FINGER Right 04/12/2022    Procedure: Right middle finger MCP joint arthroplasty;  Surgeon: Luis Alberto Payne MD;  Location: [facility] OR;  Service: Orthopedics;  Laterality: Right;  Anesthesia:  General with a single-shot supraclavicular block    BACK SURGERY      multiple    CARDIAC CATHETERIZATION      CARDIAC ELECTROPHYSIOLOGY STUDY  10/08/2020    Procedure: Study possible ablation;  Surgeon: Rip Che III, MD;  Location: [facility] CATH;  Service: Cardiology;;    CERVICAL SPINE  SURGERY      CHOLECYSTECTOMY  05/30/2018    Dr. ROGELIO Tao, [facility]     COLONOSCOPY  2008    COLONOSCOPY N/A 09/14/2017    Procedure: COLONOSCOPY;  Surgeon: Obi Beltre MD;  Location: [facility] ENDO;  Service: Endoscopy;  Laterality: N/A;    CORONARY ANGIOGRAPHY Left 05/28/2018    Procedure: Coronary angiography;  Surgeon: Rafiq Malik MD;  Location: [facility] CATH;  Service: Cardiology;  Laterality: Left;    ELBOW SURGERY Bilateral     EPIDURAL STEROID INJECTION INTO CERVICAL SPINE      EPIDURAL STEROID INJECTION INTO LUMBAR SPINE      ESOPHAGOGASTRODUODENOSCOPY N/A 9/19/2024    Procedure: EGD (ESOPHAGOGASTRODUODENOSCOPY);  Surgeon: Obi Beltre MD;  Location: [facility] ENDO;  Service: Endoscopy;  Laterality: N/A;    EXCISION OF BURSA OF HIP Left 12/13/2024    Procedure: BURSECTOMY, HIP;  Surgeon: Axel Gruber MD;  Location: [facility] OR;  Service: Orthopedics;  Laterality: Left;    EXCISION OF BURSA OF HIP Right 1/10/2025    Procedure: BURSECTOMY, HIP;  Surgeon: Axel Gruber MD;  Location: [facility] OR;  Service: Orthopedics;  Laterality: Right;    HERNIA REPAIR      LAPAROSCOPIC APPENDECTOMY N/A 06/25/2020    Procedure: APPENDECTOMY, LAPAROSCOPIC;  Surgeon: Gordon Can MD;  Location: [facility] OR;  Service: General;  Laterality: N/A;    LAPAROSCOPIC CHOLECYSTECTOMY N/A 05/30/2018    Procedure: CHOLECYSTECTOMY, LAPAROSCOPIC;  Surgeon: Fletcher Tao MD;  Location: [facility] OR;  Service: General;  Laterality: N/A;    LAPAROSCOPIC SLEEVE GASTRECTOMY N/A 01/20/2023    Procedure: GASTRECTOMY, SLEEVE, LAPAROSCOPIC;  Surgeon: Bharat Devine MD;  Location: [facility] OR;  Service: General;  Laterality: N/A;    LEFT HEART CATHETERIZATION Left 05/28/2018    Procedure: Left heart cath;  Surgeon: Rafiq Malik MD;  Location: [facility] CATH;  Service: Cardiology;  Laterality: Left;    LEFT HEART CATHETERIZATION  8/26/2024    Procedure: Left heart cath Rm 3623;  Surgeon: Tom Quesada MD;  Location: [facility] CATH;  Service:  "Cardiology;;    NECK SURGERY      RADIOFREQUENCY ABLATION OF LUMBAR MEDIAL BRANCH NERVE AT SINGLE LEVEL Bilateral 2022    Procedure: Radiofrequency Ablation, Nerve, Spinal, Lumbar, Medial Branch, 1 Level L3,4,5;  Surgeon: Thiago Garcia MD;  Location: [facility] OR;  Service: Pain Management;  Laterality: Bilateral;    SHOULDER ARTHROSCOPY      SPINE SURGERY      multiple    TONSILLECTOMY      TREATMENT OF CARDIAC ARRHYTHMIA  10/08/2020    Procedure: Cardioversion or Defibrillation;  Surgeon: Rip Che III, MD;  Location: [facility];  Service: Cardiology;;    TRIGGER FINGER RELEASE Right 2021    Procedure: RELEASE, TRIGGER FINGER;  Surgeon: Luis Alberto Payne MD;  Location: [facility] OR;  Service: Orthopedics;  Laterality: Right;  Procedure: Right ring finger trigger release    Position: Supine    Anesthesia: Local    Equipment: Basic handset    TYMPANOSTOMY TUBE PLACEMENT Right        FMHX:  Family History   Problem Relation Name Age of Onset    Diabetes Mother      Depression Mother      Liver cancer Mother      Obesity Mother      Heart disease Father      Anuerysm Father      Diabetes Father      Stroke Father      Obesity Sister      Glaucoma Paternal Grandmother         SOCHX:  Social History     Tobacco Use    Smoking status: Former     Current packs/day: 0.00     Average packs/day: 0.5 packs/day for 10.0 years (5.0 ttl pk-yrs)     Types: Cigarettes     Start date:      Quit date:      Years since quittin.4    Smokeless tobacco: Never   Substance Use Topics    Alcohol use: Not Currently       ALLERGIES:  Patient has no known allergies.    CURRENT MEDICATIONS:  Medications Ordered Prior to Encounter[1]    REVIEW OF SYSTEMS:  ROS    GENERAL PHYSICAL EXAM:   Ht 6' 2.02" (1.88 m)   Wt 108.4 kg (238 lb 15.7 oz)   BMI 30.67 kg/m²    GEN: well developed, well nourished, no acute distress   HENT: Normocephalic, atraumatic   EYES: No discharge, conjunctiva normal   NECK: Supple, non-tender   PULM: No " wheezing, no respiratory distress   CV: RRR   ABD: Soft, non-tender    ORTHO EXAM:   Examination of the left wrist and hand reveals that there is no edema.  There are no major skin changes.  Palpation does produce tenderness over the region of the TFCC and lunate.  He has no radial sided tenderness noted.  Wrist range of motion is extension of 70° and flexion of 60°.  He is able to pronate and supinate.  He does have a 2+ radial pulse in his sensation is grossly intact in the median radial ulnar distribution    RADIOLOGY:   X-rays and MRI of the left wrist has been reviewed.  He is noted have evidence of ulnar impaction syndrome.  There are cystic changes and hyperemia in the lunate.  There was also a full-thickness central TFCC tear noted.  The remainder of the MRI and x-rays appear to be without a significant amount of pathology    ASSESSMENT:   Left wrist ulnar impaction syndrome    PLAN:  1. I have discussed the possibility of wrist arthroscopy for debridement as well as ulnar shortening osteotomy.  After discussion of the risks and benefits of the procedure consent has been obtained     2.  Will proceed with left wrist ulnar shortening osteotomy and wrist arthroscopy for debridement under general anesthesia with a single-shot supraclavicular block     3. He will follow up with me 2 weeks after the surgery         [1]   Current Outpatient Medications on File Prior to Visit   Medication Sig Dispense Refill    aspirin (ECOTRIN) 81 MG EC tablet Take 81 mg by mouth once daily.      atorvastatin (LIPITOR) 40 MG tablet TAKE ONE TABLET BY MOUTH ONCE DAILY IN THE EVENING 90 tablet 4    calcium-vitamin D3 (OS-SOHAM 500 + D3) 500 mg-5 mcg (200 unit) per tablet Take 1 tablet by mouth once daily.      ciprofloxacin-dexAMETHasone 0.3-0.1% (CIPRODEX) 0.3-0.1 % DrpS       DEXCOM G7 SENSOR Oxana CHANGE EVERY 10 DAYS 9 each 4    fluticasone propionate (FLONASE) 50 mcg/actuation nasal spray 1 spray by Each Nostril route daily as  needed for Allergies.      gabapentin (NEURONTIN) 600 MG tablet TAKE ONE TABLET BY MOUTH THREE TIMES DAILY 90 tablet 2    glimepiride (AMARYL) 1 MG tablet TAKE ONE TABLET BY MOUTH DAILY BEFORE BREAKFAST 90 tablet 3    hydrocortisone 2.5 % cream Apply topically 2 (two) times daily as needed (rash around nose). Mix 50/50 with ketoconazole cream. 20 g 3    JARDIANCE 25 mg tablet TAKE ONE TABLET BY MOUTH ONCE DAILY 90 tablet 3    ketoconazole (NIZORAL) 2 % cream Apply topically 2 (two) times daily as needed (rash around nose). Mix 50/50 with hydrocortisone cream. OK to use daily by itself for prevention. 60 g 3    magnesium gluconate 27.5 mg magne- sium (500 mg) Tab Take 1 tablet by mouth once.      meloxicam (MOBIC) 15 MG tablet Take 15 mg by mouth once daily.      metFORMIN (GLUCOPHAGE-XR) 500 MG ER 24hr tablet TAKE TWO TABLETS BY MOUTH TWICE DAILY 360 tablet 3    methocarbamoL (ROBAXIN) 750 MG Tab Take 1 tablet (750 mg total) by mouth 4 (four) times daily as needed (muscle spasms). 60 tablet 2    multivitamin capsule Take 1 capsule by mouth once daily.      omeprazole (PRILOSEC) 40 MG capsule Take 40 mg by mouth once daily.      ondansetron (ZOFRAN-ODT) 4 MG TbDL Take 1 tablet (4 mg total) by mouth every 6 (six) hours as needed (nausea). 120 tablet 0    pyridoxine, vitamin B6, (B-6) 25 MG Tab Take 25 mg by mouth once daily.      tirzepatide (MOUNJARO) 2.5 mg/0.5 mL PnIj Inject 2.5 mg (one pen) into the skin every 7 days. 4 Pen 6    vitamin D (VITAMIN D3) 1000 units Tab Take 1,000 Units by mouth once daily.      blood-glucose meter kit To check BG 2 times daily, to use with insurance preferred meter 1 each 1     No current facility-administered medications on file prior to visit.

## 2025-06-09 DIAGNOSIS — M25.832 ULNAR ABUTMENT SYNDROME OF LEFT WRIST: Primary | ICD-10-CM

## 2025-06-09 DIAGNOSIS — M24.132 DEGENERATIVE TEAR OF TRIANGULAR FIBROCARTILAGE COMPLEX (TFCC) OF LEFT WRIST: ICD-10-CM

## 2025-06-09 RX ORDER — CEFAZOLIN SODIUM 2 G/50ML
2 SOLUTION INTRAVENOUS
OUTPATIENT
Start: 2025-06-09

## 2025-06-09 RX ORDER — MUPIROCIN 20 MG/G
OINTMENT TOPICAL
OUTPATIENT
Start: 2025-06-09

## 2025-06-12 ENCOUNTER — OFFICE VISIT (OUTPATIENT)
Dept: PODIATRY | Facility: CLINIC | Age: 60
End: 2025-06-12
Payer: MEDICARE

## 2025-06-12 ENCOUNTER — OFFICE VISIT (OUTPATIENT)
Dept: FAMILY MEDICINE | Facility: CLINIC | Age: 60
End: 2025-06-12
Payer: MEDICARE

## 2025-06-12 ENCOUNTER — LAB VISIT (OUTPATIENT)
Dept: LAB | Facility: HOSPITAL | Age: 60
End: 2025-06-12
Attending: NURSE PRACTITIONER
Payer: MEDICARE

## 2025-06-12 VITALS
HEIGHT: 74 IN | RESPIRATION RATE: 16 BRPM | BODY MASS INDEX: 29.23 KG/M2 | WEIGHT: 227.75 LBS | HEART RATE: 75 BPM | OXYGEN SATURATION: 98 % | SYSTOLIC BLOOD PRESSURE: 105 MMHG | DIASTOLIC BLOOD PRESSURE: 68 MMHG

## 2025-06-12 VITALS — HEIGHT: 74 IN | WEIGHT: 239 LBS | BODY MASS INDEX: 30.67 KG/M2

## 2025-06-12 DIAGNOSIS — B35.1 ONYCHOMYCOSIS DUE TO DERMATOPHYTE: ICD-10-CM

## 2025-06-12 DIAGNOSIS — I10 ESSENTIAL HYPERTENSION: Primary | ICD-10-CM

## 2025-06-12 DIAGNOSIS — E11.65 TYPE 2 DIABETES MELLITUS WITH HYPERGLYCEMIA, WITHOUT LONG-TERM CURRENT USE OF INSULIN: ICD-10-CM

## 2025-06-12 DIAGNOSIS — E78.2 MIXED HYPERLIPIDEMIA: ICD-10-CM

## 2025-06-12 DIAGNOSIS — E11.42 DIABETIC POLYNEUROPATHY ASSOCIATED WITH TYPE 2 DIABETES MELLITUS: Primary | ICD-10-CM

## 2025-06-12 LAB
ALBUMIN/CREAT UR: NORMAL
CREAT UR-MCNC: 87 MG/DL (ref 23–375)
MICROALBUMIN UR-MCNC: <5 UG/ML (ref ?–5000)

## 2025-06-12 PROCEDURE — 99214 OFFICE O/P EST MOD 30 MIN: CPT | Mod: S$GLB,,, | Performed by: FAMILY MEDICINE

## 2025-06-12 PROCEDURE — 1159F MED LIST DOCD IN RCRD: CPT | Mod: CPTII,S$GLB,, | Performed by: FAMILY MEDICINE

## 2025-06-12 PROCEDURE — 3074F SYST BP LT 130 MM HG: CPT | Mod: CPTII,S$GLB,, | Performed by: FAMILY MEDICINE

## 2025-06-12 PROCEDURE — 82043 UR ALBUMIN QUANTITATIVE: CPT

## 2025-06-12 PROCEDURE — G2211 COMPLEX E/M VISIT ADD ON: HCPCS | Mod: S$GLB,,, | Performed by: FAMILY MEDICINE

## 2025-06-12 PROCEDURE — 3044F HG A1C LEVEL LT 7.0%: CPT | Mod: CPTII,S$GLB,, | Performed by: FAMILY MEDICINE

## 2025-06-12 PROCEDURE — 3078F DIAST BP <80 MM HG: CPT | Mod: CPTII,S$GLB,, | Performed by: FAMILY MEDICINE

## 2025-06-12 PROCEDURE — 99999 PR PBB SHADOW E&M-EST. PATIENT-LVL III: CPT | Mod: PBBFAC,,, | Performed by: PODIATRIST

## 2025-06-12 PROCEDURE — 3008F BODY MASS INDEX DOCD: CPT | Mod: CPTII,S$GLB,, | Performed by: FAMILY MEDICINE

## 2025-06-12 PROCEDURE — 99999 PR PBB SHADOW E&M-EST. PATIENT-LVL IV: CPT | Mod: PBBFAC,,, | Performed by: FAMILY MEDICINE

## 2025-06-12 NOTE — PROGRESS NOTES
Subjective:       Patient ID: Gio Forrest is a 59 y.o. male.    Chief Complaint: Follow-up (6 month follow up appointment )    Here for follow up multiple chronic medical issues. Doing well overall and in normal state of health.      Follow-up  Pertinent negatives include no chest pain, chills, coughing or fever.     Review of Systems   Constitutional:  Negative for chills and fever.   Respiratory:  Negative for cough, chest tightness and shortness of breath.    Cardiovascular:  Negative for chest pain, palpitations and leg swelling.   Endocrine: Negative for cold intolerance and heat intolerance.   Psychiatric/Behavioral:  Negative for decreased concentration. The patient is not nervous/anxious.        Objective:      Physical Exam  Vitals and nursing note reviewed.   Constitutional:       Appearance: He is well-developed.   HENT:      Head: Normocephalic and atraumatic.   Cardiovascular:      Rate and Rhythm: Normal rate and regular rhythm.      Heart sounds: Normal heart sounds.   Pulmonary:      Effort: Pulmonary effort is normal.      Breath sounds: Normal breath sounds.   Psychiatric:         Mood and Affect: Mood normal.         Behavior: Behavior normal.         Assessment:       1. Essential hypertension    2. Mixed hyperlipidemia    3. Type 2 diabetes mellitus with hyperglycemia, without long-term current use of insulin        Plan:       Essential hypertension    Mixed hyperlipidemia    Type 2 diabetes mellitus with hyperglycemia, without long-term current use of insulin        Will monitor chronic medical issues and continue current plan of care.  Visit today included increased complexity associated with the care of the episodic problem htn addressed and managing the longitudinal care of the patient due to the serious and/or complex managed problem(s) as above.  Htn, lipid, dm stable with med and diet    Follow up in about 6 months (around 12/12/2025), or if symptoms worsen or fail to improve.

## 2025-06-12 NOTE — PROGRESS NOTES
Subjective:      Patient ID: Gio Forrest is a 59 y.o. male.    Chief Complaint: Diabetes Mellitus and Nail Care (DM nail care)    Gio is a 59 y.o. male who presents to the clinic for evaluation and treatment of diabetic feet. Gio has a past medical history of Addiction (12/21/2021), Arthritis, Back pain, Depression, HENDERSON (dyspnea on exertion), Dyslipidemia (08/12/2015), Excessive daytime sleepiness (03/02/2018), GERD (gastroesophageal reflux disease), Gout (08/12/2015), Hypersomnia with sleep apnea (05/14/2022), Hypertension, Idiopathic gout, Lumbar pseudoarthrosis, Neck pain, Neuropathy, Obesity (08/12/2015), Obstructive sleep apnea (08/12/2015), Restless leg syndrome, Sebaceous cyst (04/18/2017), SVT (supraventricular tachycardia), and Type 2 diabetes mellitus (08/12/2015). Patient relates having toenails that are in need of trimming.  Denies experiencing pain from the nails with wearing shoe gear.  Has not attempted to self treat.  Continues with good control over his blood glucose.  States symptoms of neuropathy are manageable at this time.  Denies any additional pedal complaints.    PCP: Matthew Carroll MD  Last visit: 3/25  Hemoglobin A1C   Date Value Ref Range Status   03/05/2025 6.7 (H) 4.0 - 5.6 % Final     Comment:     ADA Screening Guidelines:  5.7-6.4%  Consistent with prediabetes  >or=6.5%  Consistent with diabetes    High levels of fetal hemoglobin interfere with the HbA1C  assay. Heterozygous hemoglobin variants (HbS, HgC, etc)do  not significantly interfere with this assay.   However, presence of multiple variants may affect accuracy.     12/12/2024 6.5 (H) 4.0 - 5.6 % Final     Comment:     Reference Interval:  5.0 - 5.6 Normal   5.7 - 6.4 High Risk   > 6.5 Diabetic      Hgb A1c results are standardized based on the (NGSP) National   Glycohemoglobin Standardization Program.      Hemoglobin A1C levels are related to mean serum/plasma glucose   during the preceding 2-3 months.         08/28/2024 6.4 (H) 4.0 - 5.6 % Final     Comment:     ADA Screening Guidelines:  5.7-6.4%  Consistent with prediabetes  >or=6.5%  Consistent with diabetes    High levels of fetal hemoglobin interfere with the HbA1C  assay. Heterozygous hemoglobin variants (HbS, HgC, etc)do  not significantly interfere with this assay.   However, presence of multiple variants may affect accuracy.             Past Medical History:   Diagnosis Date    Addiction 12/21/2021    Arthritis     Back pain     radiates to both legs but more on rt leg    Depression     HENDERSON (dyspnea on exertion)     Dyslipidemia 08/12/2015    Excessive daytime sleepiness 03/02/2018    Formatting of this note might be different from the original.  Added automatically from request for surgery [id number]    GERD (gastroesophageal reflux disease)     Gout 08/12/2015    Hypersomnia with sleep apnea 05/14/2022    Hypertension     Idiopathic gout     Lumbar pseudoarthrosis     Neck pain     nonradiating pain    Neuropathy     Obesity 08/12/2015    Obstructive sleep apnea 08/12/2015    cpap    Restless leg syndrome     Sebaceous cyst 04/18/2017    SVT (supraventricular tachycardia)     s/p ablation    Type 2 diabetes mellitus 08/12/2015       Past Surgical History:   Procedure Laterality Date    ABLATION N/A 10/08/2020    Procedure: Ablation;  Surgeon: Rip Che III, MD;  [facility];  Service: Cardiology;  Laterality: N/A;    ARTHROPLASTY OF JOINT OF FINGER Right 04/12/2022    Procedure: Right middle finger MCP joint arthroplasty;  Surgeon: Luis Alberto Payne MD;  Location: [facility] OR;  Service: Orthopedics;  Laterality: Right;  Anesthesia:  General with a single-shot supraclavicular block    BACK SURGERY      multiple    CARDIAC CATHETERIZATION      CARDIAC ELECTROPHYSIOLOGY STUDY  10/08/2020    Procedure: Study possible ablation;  Surgeon: Rip Che III, MD;  Location: [facility];  Service: Cardiology;;    CERVICAL SPINE SURGERY      CHOLECYSTECTOMY   05/30/2018    Dr. ROGELIO Tao, [facility]     COLONOSCOPY  2008    COLONOSCOPY N/A 09/14/2017    Procedure: COLONOSCOPY;  Surgeon: Obi Beltre MD;  Location: [facility] ENDO;  Service: Endoscopy;  Laterality: N/A;    CORONARY ANGIOGRAPHY Left 05/28/2018    Procedure: Coronary angiography;  Surgeon: Rafiq Malik MD;  Location: [facility] CATH;  Service: Cardiology;  Laterality: Left;    ELBOW SURGERY Bilateral     EPIDURAL STEROID INJECTION INTO CERVICAL SPINE      EPIDURAL STEROID INJECTION INTO LUMBAR SPINE      ESOPHAGOGASTRODUODENOSCOPY N/A 9/19/2024    Procedure: EGD (ESOPHAGOGASTRODUODENOSCOPY);  Surgeon: Obi Beltre MD;  Location: [facility] ENDO;  Service: Endoscopy;  Laterality: N/A;    EXCISION OF BURSA OF HIP Left 12/13/2024    Procedure: BURSECTOMY, HIP;  Surgeon: Axel Gruber MD;  Location: [facility] OR;  Service: Orthopedics;  Laterality: Left;    EXCISION OF BURSA OF HIP Right 1/10/2025    Procedure: BURSECTOMY, HIP;  Surgeon: Axel Gruber MD;  Location: [facility] OR;  Service: Orthopedics;  Laterality: Right;    HERNIA REPAIR      LAPAROSCOPIC APPENDECTOMY N/A 06/25/2020    Procedure: APPENDECTOMY, LAPAROSCOPIC;  Surgeon: Gordon Can MD;  Location: [facility] OR;  Service: General;  Laterality: N/A;    LAPAROSCOPIC CHOLECYSTECTOMY N/A 05/30/2018    Procedure: CHOLECYSTECTOMY, LAPAROSCOPIC;  Surgeon: Fletcher Tao MD;  Location: [facility] OR;  Service: General;  Laterality: N/A;    LAPAROSCOPIC SLEEVE GASTRECTOMY N/A 01/20/2023    Procedure: GASTRECTOMY, SLEEVE, LAPAROSCOPIC;  Surgeon: Bharat Devine MD;  Location: [facility] OR;  Service: General;  Laterality: N/A;    LEFT HEART CATHETERIZATION Left 05/28/2018    Procedure: Left heart cath;  Surgeon: Rafiq Malik MD;  Location: [facility] CATH;  Service: Cardiology;  Laterality: Left;    LEFT HEART CATHETERIZATION  8/26/2024    Procedure: Left heart cath Rm 3623;  Surgeon: Tom Quesada MD;  Location: [facility] CATH;  Service: Cardiology;;    NECK SURGERY       RADIOFREQUENCY ABLATION OF LUMBAR MEDIAL BRANCH NERVE AT SINGLE LEVEL Bilateral 2022    Procedure: Radiofrequency Ablation, Nerve, Spinal, Lumbar, Medial Branch, 1 Level L3,4,5;  Surgeon: Thiago Garcia MD;  Location: [facility] OR;  Service: Pain Management;  Laterality: Bilateral;    SHOULDER ARTHROSCOPY      SPINE SURGERY      multiple    TONSILLECTOMY      TREATMENT OF CARDIAC ARRHYTHMIA  10/08/2020    Procedure: Cardioversion or Defibrillation;  Surgeon: Rip Che III, MD;  Location: [facility];  Service: Cardiology;;    TRIGGER FINGER RELEASE Right 2021    Procedure: RELEASE, TRIGGER FINGER;  Surgeon: Luis Alberto Payne MD;  Location: [facility] OR;  Service: Orthopedics;  Laterality: Right;  Procedure: Right ring finger trigger release    Position: Supine    Anesthesia: Local    Equipment: Basic handset    TYMPANOSTOMY TUBE PLACEMENT Right        Family History   Problem Relation Name Age of Onset    Diabetes Mother      Depression Mother      Liver cancer Mother      Obesity Mother      Heart disease Father      Anuerysm Father      Diabetes Father      Stroke Father      Obesity Sister      Glaucoma Paternal Grandmother         Social History     Socioeconomic History    Marital status:     Number of children: 1   Tobacco Use    Smoking status: Former     Current packs/day: 0.00     Average packs/day: 0.5 packs/day for 10.0 years (5.0 ttl pk-yrs)     Types: Cigarettes     Start date:      Quit date:      Years since quittin.4    Smokeless tobacco: Never   Substance and Sexual Activity    Alcohol use: Not Currently    Drug use: No    Sexual activity: Yes     Partners: Female   Social History Narrative              Social Drivers of Health     Financial Resource Strain: Medium Risk (2022)    Overall Financial Resource Strain (CARDIA)     Difficulty of Paying Living Expenses: Somewhat hard   Food Insecurity: No Food Insecurity (2024)    Hunger Vital Sign     Worried  About Running Out of Food in the Last Year: Never true     Ran Out of Food in the Last Year: Never true   Transportation Needs: No Transportation Needs (8/26/2024)    TRANSPORTATION NEEDS     Transportation : No   Physical Activity: Insufficiently Active (1/24/2022)    Exercise Vital Sign     Days of Exercise per Week: 4 days     Minutes of Exercise per Session: 10 min   Stress: Stress Concern Present (1/24/2022)    [place] [employer] - Occupational Stress Questionnaire     Feeling of Stress : To some extent   Housing Stability: Unknown (8/26/2024)    Housing Stability Vital Sign     Unable to Pay for Housing in the Last Year: No     Homeless in the Last Year: No       Current Outpatient Medications   Medication Sig Dispense Refill    aspirin (ECOTRIN) 81 MG EC tablet Take 81 mg by mouth once daily.      atorvastatin (LIPITOR) 40 MG tablet TAKE ONE TABLET BY MOUTH ONCE DAILY IN THE EVENING 90 tablet 4    calcium-vitamin D3 (OS-SOHAM 500 + D3) 500 mg-5 mcg (200 unit) per tablet Take 1 tablet by mouth once daily.      ciprofloxacin-dexAMETHasone 0.3-0.1% (CIPRODEX) 0.3-0.1 % DrpS       DEXCOM G7 SENSOR [place] CHANGE EVERY 10 DAYS 9 each 4    fluticasone propionate (FLONASE) 50 mcg/actuation nasal spray 1 spray by Each Nostril route daily as needed for Allergies.      gabapentin (NEURONTIN) 600 MG tablet TAKE ONE TABLET BY MOUTH THREE TIMES DAILY 90 tablet 2    glimepiride (AMARYL) 1 MG tablet TAKE ONE TABLET BY MOUTH DAILY BEFORE BREAKFAST 90 tablet 3    hydrocortisone 2.5 % cream Apply topically 2 (two) times daily as needed (rash around nose). Mix 50/50 with ketoconazole cream. 20 g 3    JARDIANCE 25 mg tablet TAKE ONE TABLET BY MOUTH ONCE DAILY 90 tablet 3    ketoconazole (NIZORAL) 2 % cream Apply topically 2 (two) times daily as needed (rash around nose). Mix 50/50 with hydrocortisone cream. OK to use daily by itself for prevention. 60 g 3    magnesium gluconate 27.5 mg magne- sium (500 mg) Tab  Take 1 tablet by mouth once.      meloxicam (MOBIC) 15 MG tablet Take 15 mg by mouth once daily.      metFORMIN (GLUCOPHAGE-XR) 500 MG ER 24hr tablet TAKE TWO TABLETS BY MOUTH TWICE DAILY 360 tablet 3    methocarbamoL (ROBAXIN) 750 MG Tab Take 1 tablet (750 mg total) by mouth 4 (four) times daily as needed (muscle spasms). 60 tablet 2    multivitamin capsule Take 1 capsule by mouth once daily.      omeprazole (PRILOSEC) 40 MG capsule Take 40 mg by mouth once daily.      ondansetron (ZOFRAN-ODT) 4 MG TbDL Take 1 tablet (4 mg total) by mouth every 6 (six) hours as needed (nausea). 120 tablet 0    pyridoxine, vitamin B6, (B-6) 25 MG Tab Take 25 mg by mouth once daily.      tirzepatide (MOUNJARO) 2.5 mg/0.5 mL PnIj Inject 2.5 mg (one pen) into the skin every 7 days. 4 Pen 6    vitamin D (VITAMIN D3) 1000 units Tab Take 1,000 Units by mouth once daily.      blood-glucose meter kit To check BG 2 times daily, to use with insurance preferred meter 1 each 1     No current facility-administered medications for this visit.       Review of patient's allergies indicates:  No Known Allergies      Review of Systems   Constitutional: Negative for chills and fever.   Cardiovascular:  Negative for claudication and leg swelling.   Skin:  Positive for color change and nail changes.   Musculoskeletal:  Positive for joint pain and joint swelling. Negative for muscle cramps, muscle weakness and myalgias.   Neurological:  Positive for paresthesias. Negative for numbness.   Psychiatric/Behavioral:  Negative for altered mental status.            Objective:      Physical Exam  Constitutional:       General: He is not in acute distress.     Appearance: He is well-developed. He is not diaphoretic.   Cardiovascular:      Pulses:           Dorsalis pedis pulses are 2+ on the right side and 2+ on the left side.        Posterior tibial pulses are 2+ on the right side and 2+ on the left side.      Comments: CFT is < 3 seconds bilateral.  Pedal hair  growth is decreased bilateral.  No varicosities noted bilateral.  No lower extremity edema noted bilateral. Toes are cool to touch bilateral.    Musculoskeletal:         General: No tenderness.      Left lower leg: No edema.      Comments: Muscle strength 5/5 in all muscle groups bilateral.  No tenderness nor crepitation with ROM of foot/ankle joints bilateral.  No pain with palpation of bilateral foot and ankle.  Bilateral pes planus foot type.  Bilateral hallux abducto valgus.  Bilateral semi-reducible contracture of toe 2-5.     Skin:     General: Skin is warm and dry.      Capillary Refill: Capillary refill takes 2 to 3 seconds.      Coloration: Skin is not pale.      Findings: No abrasion, bruising, burn, ecchymosis, erythema, laceration, lesion or petechiae.      Comments: Pedal skin appears thin bilateral.  Toenails x 10 appear thickened by 2 mm, elongated by 4 mm, and discolored with subungual debris.   Neurological:      Mental Status: He is alert and oriented to person, place, and time.      Sensory: No sensory deficit.      Motor: No weakness or atrophy.      Comments: Protective sensation per [place] monofilament is intact bilateral.  Vibratory sensation is intact bilateral.  Light touch is intact bilateral.               Assessment:       Encounter Diagnoses   Name Primary?    Diabetic polyneuropathy associated with type 2 diabetes mellitus Yes    Onychomycosis due to dermatophyte            Plan:       Gio was seen today for diabetes mellitus and nail care.    Diagnoses and all orders for this visit:    Diabetic polyneuropathy associated with type 2 diabetes mellitus    Onychomycosis due to dermatophyte        I counseled the patient on his conditions, their implications and medical management.    Shoe inspection. Diabetic Foot Education. Patient reminded of the importance of good nutrition and blood sugar control to help prevent podiatric complications of diabetes. Patient instructed on  proper foot hygeine. We discussed wearing proper shoe gear, daily foot inspections, never walking without protective shoe gear, never putting sharp instruments to feet    With patient's permission, nails were aggressively reduced and debrided x 10 to their soft tissue attachment mechanically and with electric , removing all offending nail and debris.  Patient relates relief following the procedure. He will continue to monitor the areas daily, inspect his feet, wear protective shoe gear when ambulatory, moisturizer to maintain skin integrity and follow in this office in approximately 3 months, sooner p.r.n.    Rj Nuñez DPM

## 2025-06-13 ENCOUNTER — RESULTS FOLLOW-UP (OUTPATIENT)
Dept: ENDOCRINOLOGY | Facility: CLINIC | Age: 60
End: 2025-06-13

## 2025-06-16 ENCOUNTER — TELEPHONE (OUTPATIENT)
Dept: ENDOCRINOLOGY | Facility: CLINIC | Age: 60
End: 2025-06-16
Payer: MEDICARE

## 2025-06-17 ENCOUNTER — OFFICE VISIT (OUTPATIENT)
Dept: ENDOCRINOLOGY | Facility: CLINIC | Age: 60
End: 2025-06-17
Payer: MEDICARE

## 2025-06-17 VITALS
HEIGHT: 74 IN | HEART RATE: 94 BPM | SYSTOLIC BLOOD PRESSURE: 132 MMHG | RESPIRATION RATE: 18 BRPM | WEIGHT: 227.88 LBS | DIASTOLIC BLOOD PRESSURE: 84 MMHG | BODY MASS INDEX: 29.24 KG/M2 | OXYGEN SATURATION: 97 %

## 2025-06-17 DIAGNOSIS — E11.42 TYPE 2 DIABETES MELLITUS WITH DIABETIC POLYNEUROPATHY, WITH LONG-TERM CURRENT USE OF INSULIN: Primary | ICD-10-CM

## 2025-06-17 DIAGNOSIS — Z79.4 TYPE 2 DIABETES MELLITUS WITH DIABETIC POLYNEUROPATHY, WITH LONG-TERM CURRENT USE OF INSULIN: Primary | ICD-10-CM

## 2025-06-17 PROCEDURE — 3066F NEPHROPATHY DOC TX: CPT | Mod: CPTII,S$GLB,, | Performed by: NURSE PRACTITIONER

## 2025-06-17 PROCEDURE — 3061F NEG MICROALBUMINURIA REV: CPT | Mod: CPTII,S$GLB,, | Performed by: NURSE PRACTITIONER

## 2025-06-17 PROCEDURE — 3079F DIAST BP 80-89 MM HG: CPT | Mod: CPTII,S$GLB,, | Performed by: NURSE PRACTITIONER

## 2025-06-17 PROCEDURE — G2211 COMPLEX E/M VISIT ADD ON: HCPCS | Mod: S$GLB,,, | Performed by: NURSE PRACTITIONER

## 2025-06-17 PROCEDURE — 3075F SYST BP GE 130 - 139MM HG: CPT | Mod: CPTII,S$GLB,, | Performed by: NURSE PRACTITIONER

## 2025-06-17 PROCEDURE — 3044F HG A1C LEVEL LT 7.0%: CPT | Mod: CPTII,S$GLB,, | Performed by: NURSE PRACTITIONER

## 2025-06-17 PROCEDURE — 99213 OFFICE O/P EST LOW 20 MIN: CPT | Mod: S$GLB,,, | Performed by: NURSE PRACTITIONER

## 2025-06-17 PROCEDURE — 99999 PR PBB SHADOW E&M-EST. PATIENT-LVL IV: CPT | Mod: PBBFAC,,, | Performed by: NURSE PRACTITIONER

## 2025-06-17 PROCEDURE — 1159F MED LIST DOCD IN RCRD: CPT | Mod: CPTII,S$GLB,, | Performed by: NURSE PRACTITIONER

## 2025-06-17 PROCEDURE — 3008F BODY MASS INDEX DOCD: CPT | Mod: CPTII,S$GLB,, | Performed by: NURSE PRACTITIONER

## 2025-06-17 PROCEDURE — 95251 CONT GLUC MNTR ANALYSIS I&R: CPT | Mod: S$GLB,,, | Performed by: NURSE PRACTITIONER

## 2025-06-17 RX ORDER — TIRZEPATIDE 5 MG/.5ML
5 INJECTION, SOLUTION SUBCUTANEOUS
Qty: 4 PEN | Refills: 3 | Status: SHIPPED | OUTPATIENT
Start: 2025-06-17

## 2025-06-17 NOTE — PROGRESS NOTES
CC: This 59 y.o. male presents for management of diabetes  and chronic conditions pending review including HTN, HLP, morbid obesity, fatty liver, vitamin d deficiency, CM, CAD     HPI: He was diagnosed with T2DM in 2005. Has never been hospitalized r/t DM.  Mother has passed away from Fatty liver and hepatic carcinoma in 2018  Family hx of DM: sister, mom and dad  Gastric sleeve in January 2023- has lost ~ 100 lbs      No acute events since his last visit   Wearing Dexcom G7   See Dexcom download in Media tab  2% Very High  24% High  74% In Range  0% Low  0% Very Low  GMI 7.2%  Having small pp excursions post breakfast and supper    Diet: Eats 2-3 meals a day, snacks PB crackers, junk     Exercise: has a gym membership but doesn't go     CURRENT DM MEDS:  jardaince 25 mg qam, metformin xr 500 mg 2 tabs bid, Mounjaro 2.5 mg weekly (Sunday)   Glucometer type: True Metrix  Standards of Care:  Eye exam: 6/2024  no h/o retinopathy, [facility]-      Following w Dr Quesada in cardiology      ROS:   Gen: Appetite good, weight loss 15 lbs  Eyes: Denies visual disturbances  Resp: no SOB or HENDERSON   Cardiac: No palpitations, chest pain   GI: + nausea-with over eating; + diarrhea- with over eating  /GYN: No nocturia, burning or pain.   MS/Neuro: +numbness/ tingling in BLE; Gait steady, speech clear  Other systems: negative.    PE:  GENERAL: Well developed, well nourished.appears older than age.   PSYCH: AAOx3, appropriate mood and affect, pleasant expression, conversant, appears relaxed, well groomed.   EYES: Conjunctiva, corneas clear  NECK: Supple, trachea midline   NEURO: Gait steady  SKIN:   no acanthosis nigracans.  FOOT EXAMINATION: 3/5/2025  No foot deformity, corns or callus formation,  nails in good condiiton and well trimmed, no interspace maceration or ulceration noted.  Decreased hair growth present over toes/feet.    Protective sensation intact with 10 gram monofilament.  +2 dorsalis pedis and posterior pulses  noted.    Left great tow w bruise noted under nail    Lab Results   Component Value Date    MICALBCREAT  06/12/2025      Comment:      UNABLE TO CALCULATE       Hemoglobin A1C   Date Value Ref Range Status   03/05/2025 6.7 (H) 4.0 - 5.6 % Final     Comment:     ADA Screening Guidelines:  5.7-6.4%  Consistent with prediabetes  >or=6.5%  Consistent with diabetes    High levels of fetal hemoglobin interfere with the HbA1C  assay. Heterozygous hemoglobin variants (HbS, HgC, etc)do  not significantly interfere with this assay.   However, presence of multiple variants may affect accuracy.     12/12/2024 6.5 (H) 4.0 - 5.6 % Final     Comment:     Reference Interval:  5.0 - 5.6 Normal   5.7 - 6.4 High Risk   > 6.5 Diabetic      Hgb A1c results are standardized based on the (NGSP) National   Glycohemoglobin Standardization Program.      Hemoglobin A1C levels are related to mean serum/plasma glucose   during the preceding 2-3 months.        08/28/2024 6.4 (H) 4.0 - 5.6 % Final     Comment:     ADA Screening Guidelines:  5.7-6.4%  Consistent with prediabetes  >or=6.5%  Consistent with diabetes    High levels of fetal hemoglobin interfere with the HbA1C  assay. Heterozygous hemoglobin variants (HbS, HgC, etc)do  not significantly interfere with this assay.   However, presence of multiple variants may affect accuracy.       Hemoglobin A1c   Date Value Ref Range Status   06/12/2025 6.2 (H) 4.0 - 5.6 % Final     Comment:     ADA Screening Guidelines:  5.7-6.4%  Consistent with prediabetes  >=6.5%  Consistent with diabetes    High levels of fetal hemoglobin interfere with the HbA1C  assay. Heterozygous hemoglobin variants (HbS, HgC, etc)do  not significantly interfere with this assay.   However, presence of multiple variants may affect accuracy.        ASSESSMENT and PLAN:    1. Type 2 diabetes w hyperglycemia, DM PN  Continue  jardaince 25 mg qam, metformin xr 500 mg 2 tabs in an 2 in pm,   Increase Mounjaro to 5 mg  weekly  Continue Dexcom G7- Notify me for any issues  Encouraged exercise     2. HLP -  Continue statin therapy,     4. LINDSEY-  Continue weight loss        5. CAD, CM  Follows w Dr Quesada      Follow-up:  in 3 months w  A1C,

## 2025-06-25 ENCOUNTER — ANESTHESIA EVENT (OUTPATIENT)
Dept: SURGERY | Facility: HOSPITAL | Age: 60
End: 2025-06-25
Payer: MEDICARE

## 2025-06-26 ENCOUNTER — HOSPITAL ENCOUNTER (OUTPATIENT)
Facility: HOSPITAL | Age: 60
Discharge: HOME | End: 2025-06-26
Attending: ORTHOPAEDIC SURGERY | Admitting: ORTHOPAEDIC SURGERY
Payer: MEDICARE

## 2025-06-26 ENCOUNTER — ANESTHESIA (OUTPATIENT)
Dept: SURGERY | Facility: HOSPITAL | Age: 60
End: 2025-06-26
Payer: MEDICARE

## 2025-06-26 ENCOUNTER — HOSPITAL ENCOUNTER (OUTPATIENT)
Dept: RADIOLOGY | Facility: HOSPITAL | Age: 60
Discharge: HOME | End: 2025-06-26
Attending: ORTHOPAEDIC SURGERY
Payer: MEDICARE

## 2025-06-26 VITALS
HEIGHT: 74 IN | SYSTOLIC BLOOD PRESSURE: 128 MMHG | OXYGEN SATURATION: 99 % | HEART RATE: 77 BPM | WEIGHT: 227 LBS | RESPIRATION RATE: 16 BRPM | BODY MASS INDEX: 29.13 KG/M2 | DIASTOLIC BLOOD PRESSURE: 59 MMHG | TEMPERATURE: 97 F

## 2025-06-26 DIAGNOSIS — M25.832 ULNAR ABUTMENT SYNDROME OF LEFT WRIST: ICD-10-CM

## 2025-06-26 DIAGNOSIS — M24.132 DEGENERATIVE TEAR OF TRIANGULAR FIBROCARTILAGE COMPLEX (TFCC) OF LEFT WRIST: ICD-10-CM

## 2025-06-26 DIAGNOSIS — M25.532 LEFT WRIST PAIN: ICD-10-CM

## 2025-06-26 LAB — GLUCOSE SERPL-MCNC: 144 MG/DL (ref 70–110)

## 2025-06-26 PROCEDURE — 27200750 HC INSULATED NEEDLE/ STIMUPLEX: Mod: PO | Performed by: ANESTHESIOLOGY

## 2025-06-26 PROCEDURE — 29846 WRIST ARTHROSCOPY/SURGERY: CPT | Mod: 51,AS,LT, | Performed by: PHYSICIAN ASSISTANT

## 2025-06-26 PROCEDURE — 63600175 PHARM REV CODE 636 W HCPCS: Mod: PO | Performed by: NURSE ANESTHETIST, CERTIFIED REGISTERED

## 2025-06-26 PROCEDURE — 36000711: Mod: PO | Performed by: ORTHOPAEDIC SURGERY

## 2025-06-26 PROCEDURE — 25000003 PHARM REV CODE 250: Mod: PO | Performed by: ANESTHESIOLOGY

## 2025-06-26 PROCEDURE — 63600175 PHARM REV CODE 636 W HCPCS: Mod: PO | Performed by: ANESTHESIOLOGY

## 2025-06-26 PROCEDURE — 71000033 HC RECOVERY, INTIAL HOUR: Mod: PO | Performed by: ORTHOPAEDIC SURGERY

## 2025-06-26 PROCEDURE — 76000 FLUOROSCOPY <1 HR PHYS/QHP: CPT | Mod: TC,PO

## 2025-06-26 PROCEDURE — 64415 NJX AA&/STRD BRCH PLXS IMG: CPT | Mod: PO | Performed by: ANESTHESIOLOGY

## 2025-06-26 PROCEDURE — 25390 SHORTEN RADIUS OR ULNA: CPT | Mod: LT,,, | Performed by: ORTHOPAEDIC SURGERY

## 2025-06-26 PROCEDURE — 25390 SHORTEN RADIUS OR ULNA: CPT | Mod: AS,LT,, | Performed by: PHYSICIAN ASSISTANT

## 2025-06-26 PROCEDURE — 37000009 HC ANESTHESIA EA ADD 15 MINS: Mod: PO | Performed by: ORTHOPAEDIC SURGERY

## 2025-06-26 PROCEDURE — 71000015 HC POSTOP RECOV 1ST HR: Mod: PO | Performed by: ORTHOPAEDIC SURGERY

## 2025-06-26 PROCEDURE — 27201423 OPTIME MED/SURG SUP & DEVICES STERILE SUPPLY: Mod: PO | Performed by: ORTHOPAEDIC SURGERY

## 2025-06-26 PROCEDURE — 37000008 HC ANESTHESIA 1ST 15 MINUTES: Mod: PO | Performed by: ORTHOPAEDIC SURGERY

## 2025-06-26 PROCEDURE — C1713 ANCHOR/SCREW BN/BN,TIS/BN: HCPCS | Mod: PO | Performed by: ORTHOPAEDIC SURGERY

## 2025-06-26 PROCEDURE — 25000003 PHARM REV CODE 250: Mod: PO | Performed by: PHYSICIAN ASSISTANT

## 2025-06-26 PROCEDURE — 82962 GLUCOSE BLOOD TEST: CPT | Mod: PO | Performed by: ORTHOPAEDIC SURGERY

## 2025-06-26 PROCEDURE — 29846 WRIST ARTHROSCOPY/SURGERY: CPT | Mod: 51,LT,, | Performed by: ORTHOPAEDIC SURGERY

## 2025-06-26 PROCEDURE — 25000003 PHARM REV CODE 250: Mod: PO | Performed by: NURSE ANESTHETIST, CERTIFIED REGISTERED

## 2025-06-26 PROCEDURE — C1769 GUIDE WIRE: HCPCS | Mod: PO | Performed by: ORTHOPAEDIC SURGERY

## 2025-06-26 PROCEDURE — 36000710: Mod: PO | Performed by: ORTHOPAEDIC SURGERY

## 2025-06-26 DEVICE — SCREW BONE NLHEXALOBE 3.5 X 18: Type: IMPLANTABLE DEVICE | Site: WRIST | Status: FUNCTIONAL

## 2025-06-26 DEVICE — SCREW BONE NL HEXALOBE 3.5 X 1: Type: IMPLANTABLE DEVICE | Site: WRIST | Status: FUNCTIONAL

## 2025-06-26 DEVICE — IMPLANTABLE DEVICE: Type: IMPLANTABLE DEVICE | Site: WRIST | Status: FUNCTIONAL

## 2025-06-26 RX ORDER — FENTANYL CITRATE 50 UG/ML
INJECTION, SOLUTION INTRAMUSCULAR; INTRAVENOUS
Status: DISCONTINUED | OUTPATIENT
Start: 2025-06-26 | End: 2025-06-26

## 2025-06-26 RX ORDER — ONDANSETRON 8 MG/1
8 TABLET, ORALLY DISINTEGRATING ORAL EVERY 8 HOURS PRN
Qty: 4 TABLET | Refills: 0 | Status: SHIPPED | OUTPATIENT
Start: 2025-06-26

## 2025-06-26 RX ORDER — CEFAZOLIN SODIUM 1 G/3ML
2 INJECTION, POWDER, FOR SOLUTION INTRAMUSCULAR; INTRAVENOUS
Status: DISCONTINUED | OUTPATIENT
Start: 2025-06-26 | End: 2025-06-26 | Stop reason: HOSPADM

## 2025-06-26 RX ORDER — LIDOCAINE HYDROCHLORIDE 20 MG/ML
INJECTION INTRAVENOUS
Status: DISCONTINUED | OUTPATIENT
Start: 2025-06-26 | End: 2025-06-26

## 2025-06-26 RX ORDER — MIDAZOLAM HYDROCHLORIDE 1 MG/ML
.5-4 INJECTION, SOLUTION INTRAMUSCULAR; INTRAVENOUS
Status: DISCONTINUED | OUTPATIENT
Start: 2025-06-26 | End: 2025-06-26 | Stop reason: HOSPADM

## 2025-06-26 RX ORDER — FENTANYL CITRATE 50 UG/ML
25-200 INJECTION, SOLUTION INTRAMUSCULAR; INTRAVENOUS
Status: DISCONTINUED | OUTPATIENT
Start: 2025-06-26 | End: 2025-06-26 | Stop reason: HOSPADM

## 2025-06-26 RX ORDER — BUPIVACAINE 13.3 MG/ML
INJECTION, SUSPENSION, LIPOSOMAL INFILTRATION
Status: DISCONTINUED | OUTPATIENT
Start: 2025-06-26 | End: 2025-06-26

## 2025-06-26 RX ORDER — LIDOCAINE HYDROCHLORIDE 10 MG/ML
1 INJECTION, SOLUTION EPIDURAL; INFILTRATION; INTRACAUDAL; PERINEURAL ONCE
Status: DISCONTINUED | OUTPATIENT
Start: 2025-06-26 | End: 2025-06-26 | Stop reason: HOSPADM

## 2025-06-26 RX ORDER — ONDANSETRON HYDROCHLORIDE 2 MG/ML
INJECTION, SOLUTION INTRAVENOUS
Status: DISCONTINUED | OUTPATIENT
Start: 2025-06-26 | End: 2025-06-26

## 2025-06-26 RX ORDER — MUPIROCIN 20 MG/G
OINTMENT TOPICAL
Status: DISCONTINUED | OUTPATIENT
Start: 2025-06-26 | End: 2025-06-26 | Stop reason: HOSPADM

## 2025-06-26 RX ORDER — FENTANYL CITRATE 50 UG/ML
25 INJECTION, SOLUTION INTRAMUSCULAR; INTRAVENOUS EVERY 5 MIN PRN
Status: DISCONTINUED | OUTPATIENT
Start: 2025-06-26 | End: 2025-06-26 | Stop reason: HOSPADM

## 2025-06-26 RX ORDER — VASOPRESSIN 20 [USP'U]/ML
INJECTION, SOLUTION INTRAMUSCULAR; SUBCUTANEOUS
Status: DISCONTINUED | OUTPATIENT
Start: 2025-06-26 | End: 2025-06-26

## 2025-06-26 RX ORDER — EPHEDRINE SULFATE 50 MG/ML
INJECTION, SOLUTION INTRAVENOUS
Status: DISCONTINUED | OUTPATIENT
Start: 2025-06-26 | End: 2025-06-26

## 2025-06-26 RX ORDER — PHENYLEPHRINE HYDROCHLORIDE 10 MG/ML
INJECTION INTRAVENOUS
Status: DISCONTINUED | OUTPATIENT
Start: 2025-06-26 | End: 2025-06-26

## 2025-06-26 RX ORDER — KETAMINE HCL IN 0.9 % NACL 50 MG/5 ML
SYRINGE (ML) INTRAVENOUS
Status: DISCONTINUED | OUTPATIENT
Start: 2025-06-26 | End: 2025-06-26

## 2025-06-26 RX ORDER — BUPIVACAINE HYDROCHLORIDE 5 MG/ML
INJECTION, SOLUTION EPIDURAL; INTRACAUDAL; PERINEURAL
Status: COMPLETED | OUTPATIENT
Start: 2025-06-26 | End: 2025-06-26

## 2025-06-26 RX ORDER — SODIUM CHLORIDE, SODIUM LACTATE, POTASSIUM CHLORIDE, CALCIUM CHLORIDE 600; 310; 30; 20 MG/100ML; MG/100ML; MG/100ML; MG/100ML
INJECTION, SOLUTION INTRAVENOUS CONTINUOUS
Status: DISCONTINUED | OUTPATIENT
Start: 2025-06-26 | End: 2025-06-26 | Stop reason: HOSPADM

## 2025-06-26 RX ORDER — PROPOFOL 10 MG/ML
VIAL (ML) INTRAVENOUS
Status: DISCONTINUED | OUTPATIENT
Start: 2025-06-26 | End: 2025-06-26

## 2025-06-26 RX ORDER — MIDAZOLAM HYDROCHLORIDE 1 MG/ML
INJECTION INTRAMUSCULAR; INTRAVENOUS
Status: DISCONTINUED | OUTPATIENT
Start: 2025-06-26 | End: 2025-06-26

## 2025-06-26 RX ORDER — OXYCODONE HYDROCHLORIDE 10 MG/1
10 TABLET ORAL EVERY 4 HOURS PRN
Qty: 12 TABLET | Refills: 0 | Status: SHIPPED | OUTPATIENT
Start: 2025-06-26 | End: 2025-06-27 | Stop reason: SDUPTHER

## 2025-06-26 RX ORDER — PROCHLORPERAZINE EDISYLATE 5 MG/ML
5 INJECTION INTRAMUSCULAR; INTRAVENOUS EVERY 30 MIN PRN
Status: DISCONTINUED | OUTPATIENT
Start: 2025-06-26 | End: 2025-06-26 | Stop reason: HOSPADM

## 2025-06-26 RX ORDER — OXYCODONE HYDROCHLORIDE 5 MG/1
5 TABLET ORAL
Status: DISCONTINUED | OUTPATIENT
Start: 2025-06-26 | End: 2025-06-26 | Stop reason: HOSPADM

## 2025-06-26 RX ADMIN — LIDOCAINE HYDROCHLORIDE 75 MG: 20 INJECTION INTRAVENOUS at 09:06

## 2025-06-26 RX ADMIN — Medication 20 MG: at 09:06

## 2025-06-26 RX ADMIN — BUPIVACAINE 12.5 ML: 13.3 INJECTION, SUSPENSION, LIPOSOMAL INFILTRATION at 09:06

## 2025-06-26 RX ADMIN — PROPOFOL 150 MG: 10 INJECTION, EMULSION INTRAVENOUS at 09:06

## 2025-06-26 RX ADMIN — ONDANSETRON 8 MG: 2 INJECTION, SOLUTION INTRAMUSCULAR; INTRAVENOUS at 09:06

## 2025-06-26 RX ADMIN — VASOPRESSIN 4 UNITS: 20 INJECTION, SOLUTION INTRAMUSCULAR; SUBCUTANEOUS at 10:06

## 2025-06-26 RX ADMIN — FENTANYL CITRATE 100 MCG: 50 INJECTION INTRAMUSCULAR; INTRAVENOUS at 09:06

## 2025-06-26 RX ADMIN — FENTANYL CITRATE 50 MCG: 50 INJECTION, SOLUTION INTRAMUSCULAR; INTRAVENOUS at 09:06

## 2025-06-26 RX ADMIN — PHENYLEPHRINE HYDROCHLORIDE 100 MCG: 10 INJECTION INTRAVENOUS at 10:06

## 2025-06-26 RX ADMIN — MUPIROCIN: 20 OINTMENT TOPICAL at 08:06

## 2025-06-26 RX ADMIN — PHENYLEPHRINE HYDROCHLORIDE 200 MCG: 10 INJECTION INTRAVENOUS at 11:06

## 2025-06-26 RX ADMIN — SODIUM CHLORIDE, POTASSIUM CHLORIDE, SODIUM LACTATE AND CALCIUM CHLORIDE: 600; 310; 30; 20 INJECTION, SOLUTION INTRAVENOUS at 10:06

## 2025-06-26 RX ADMIN — SODIUM CHLORIDE, POTASSIUM CHLORIDE, SODIUM LACTATE AND CALCIUM CHLORIDE: 600; 310; 30; 20 INJECTION, SOLUTION INTRAVENOUS at 09:06

## 2025-06-26 RX ADMIN — MIDAZOLAM HYDROCHLORIDE 2 MG: 2 INJECTION, SOLUTION INTRAMUSCULAR; INTRAVENOUS at 09:06

## 2025-06-26 RX ADMIN — OXYCODONE 5 MG: 5 TABLET ORAL at 12:06

## 2025-06-26 RX ADMIN — EPHEDRINE SULFATE 15 MG: 50 INJECTION INTRAVENOUS at 10:06

## 2025-06-26 RX ADMIN — BUPIVACAINE HYDROCHLORIDE 12.5 ML: 5 INJECTION, SOLUTION EPIDURAL; INTRACAUDAL; PERINEURAL at 09:06

## 2025-06-26 RX ADMIN — MIDAZOLAM 2 MG: 1 INJECTION INTRAMUSCULAR; INTRAVENOUS at 09:06

## 2025-06-26 RX ADMIN — FENTANYL CITRATE 50 MCG: 50 INJECTION, SOLUTION INTRAMUSCULAR; INTRAVENOUS at 11:06

## 2025-06-26 RX ADMIN — EPHEDRINE SULFATE 15 MG: 50 INJECTION INTRAVENOUS at 11:06

## 2025-06-26 RX ADMIN — PHENYLEPHRINE HYDROCHLORIDE 100 MCG: 10 INJECTION INTRAVENOUS at 09:06

## 2025-06-26 NOTE — ANESTHESIA POSTPROCEDURE EVALUATION
Anesthesia Post Evaluation    Patient: Gio Forrest    Procedure(s) Performed: Procedure(s) (LRB):  Left wrist ulna shortening osteotomy (Left)  Left wrist arthroscopy for debridement (Left)    Final Anesthesia Type: general      Patient location during evaluation: PACU  Patient participation: Yes- Able to Participate  Level of consciousness: awake and alert  Post-procedure vital signs: reviewed and stable  Pain management: adequate  Airway patency: patent    PONV status at discharge: No PONV  Anesthetic complications: no      Cardiovascular status: hemodynamically stable  Respiratory status: unassisted and room air  Hydration status: euvolemic  Follow-up not needed.              Vitals Value Taken Time   /56 06/26/25 12:17   Temp 36.2 °C (97.1 °F) 06/26/25 12:02   Pulse 75 06/26/25 12:17   Resp 16 06/26/25 12:43   SpO2 100 % 06/26/25 12:17         Event Time   Out of Recovery 12:11:00         Pain/Yvonne Score: Pain Rating Prior to Med Admin: 5 (6/26/2025 12:43 PM)  Pain Rating Post Med Admin: 0 (6/26/2025  9:40 AM)  Yvonne Score: 10 (6/26/2025 12:17 PM)

## 2025-06-26 NOTE — DISCHARGE INSTRUCTIONS
Procedure:  Left wrist arthroscopy and ulnar shortening osteotomy    1.  Please keep the splint clean, dry, and in place. Do not take it off and do not get it wet.    2.  Please keep the sling to the left arm in place until you have regained full control over the arm and hand    3.  The pain block to the left arm will last between 1 and 3 days.  As soon as the pain block begins to wear off you can begin taking the prescribed pain medication    4.  Nausea medication has been prescribed in the case that you have any postoperative stomach upset    5.  Flexion and extension of the exposed fingers is encouraged but do NOT attempt to lift or push off with the left arm or hand.    6.  If there are any questions or concerns please call Dr. Payne office at [phone]    7.  Follow up with Dr. Payne in 2 weeks

## 2025-06-26 NOTE — ANESTHESIA PROCEDURE NOTES
Peripheral Block    Patient location during procedure: pre-op   Block not for primary anesthetic.  Reason for block: at surgeon's request and post-op pain management   Post-op Pain Location: left arm   Start time: 6/26/2025 9:00 AM  Timeout: 6/26/2025 9:00 AM   End time: 6/26/2025 9:05 AM    Staffing  Authorizing Provider: Ernesto Warren MD  Performing Provider: Ernesto Warren MD    Staffing  Performed by: Ernesto Warren MD  Authorized by: Ernesto Warren MD    Preanesthetic Checklist  Completed: patient identified, IV checked, site marked, risks and benefits discussed, surgical consent, monitors and equipment checked, pre-op evaluation and timeout performed  Peripheral Block  Patient position: supine  Prep: ChloraPrep  Patient monitoring: heart rate, cardiac monitor, continuous pulse ox, continuous capnometry and frequent blood pressure checks  Block type: supraclavicular  Laterality: left  Injection technique: single shot  Needle  Needle type: Stimuplex   Needle gauge: 22 G  Needle length: 2 in  Needle localization: anatomical landmarks and ultrasound guidance   -ultrasound image captured on disc.  Assessment  Injection assessment: negative aspiration, negative parasthesia and local visualized surrounding nerve  Paresthesia pain: none  Heart rate change: no  Slow fractionated injection: yes  Pain Tolerance: comfortable throughout block and no complaints  Medications:    Medications: bupivacaine (pf) (MARCAINE) injection 0.5% - Perineural   12.5 mL - 6/26/2025 9:05:00 AM    Additional Notes  VSS.  [facility] RN monitoring vitals throughout procedure.  Patient tolerated procedure well.

## 2025-06-26 NOTE — ANESTHESIA PREPROCEDURE EVALUATION
06/26/2025  Gio Forrest is a 59 y.o., male.      Pre-op Assessment    I have reviewed the Patient Summary Reports.     I have reviewed the Nursing Notes. I have reviewed the NPO Status.   I have reviewed the Medications.     Review of Systems  Anesthesia Hx:  No problems with previous Anesthesia                Social:  Former Smoker       Cardiovascular:     Hypertension            HENDERSON           Shortness of Breath Dyspnea On Extertion                   Hypertension         Pulmonary:      Shortness of breath  Sleep Apnea     Obstructive Sleep Apnea (TARYN).           Hepatic/GI:     GERD         Gerd          Endocrine:  Diabetes, type 2    Diabetes                      Psych:  Psychiatric History  depression                Physical Exam  General: Well nourished, Cooperative, Alert and Oriented    Airway:  Mallampati: II   Mouth Opening: Normal  TM Distance: Normal  Tongue: Normal  Neck ROM: Normal ROM    Dental:  Edentulous    Chest/Lungs:  Normal Respiratory Rate    Heart:  Rate: Normal        Anesthesia Plan  Type of Anesthesia, risks & benefits discussed:    Anesthesia Type: Gen Supraglottic Airway  Intra-op Monitoring Plan: Standard ASA Monitors  Post Op Pain Control Plan: multimodal analgesia, IV/PO Opioids PRN and peripheral nerve block  Induction:  IV  Airway Plan: Direct and Video, Post-Induction  Informed Consent: Informed consent signed with the Patient and all parties understand the risks and agree with anesthesia plan.  All questions answered.   ASA Score: 3  Day of Surgery Review of History & Physical: H&P Update referred to the surgeon/provider.    Ready For Surgery From Anesthesia Perspective.     .

## 2025-06-26 NOTE — DISCHARGE SUMMARY
[facility] - Surgery  Discharge Note  Short Stay    Procedure(s) (LRB):  Left wrist ulna shortening osteotomy (Left)  Left wrist arthroscopy for debridement (Left)      OUTCOME: Patient tolerated treatment/procedure well without complication and is now ready for discharge.    DISPOSITION: Home or Self Care    FINAL DIAGNOSIS:  Ulnar abutment syndrome of left wrist    FOLLOWUP: In clinic    DISCHARGE INSTRUCTIONS:    Discharge Procedure Orders   [facility] FOR HOME USE     Diet general     Activity as tolerated     Keep surgical extremity elevated     Lifting restrictions   Order Comments: Please do not lift or push off with the left arm or hand     Leave dressing on - Keep it clean, dry, and intact until clinic visit     Call MD for:  temperature >100.4     Call MD for:  persistent nausea and vomiting     Call MD for:  severe uncontrolled pain     Call MD for:  difficulty breathing, headache or visual disturbances     Call MD for:  redness, tenderness, or signs of infection (pain, swelling, redness, odor or green/yellow discharge around incision site)     Call MD for:  hives     Call MD for:  persistent dizziness or light-headedness     Call MD for:  extreme fatigue        TIME SPENT ON DISCHARGE: 15 minutes

## 2025-06-26 NOTE — PLAN OF CARE
Supraclavicular single shot block complete per Dr. Warren with Anesthesia. Exparel band placed to pt's wrist. Pt tolerated well. See Anesthesia record for procedural notes. See flowsheet and MAR for vitals and medications. Monitors in place, alarms audible. VSS. etCO2 monitoring in place. Resp even, unlabored. Skin warm, dry. Pt in NAD. Pt awaiting transport to OR. Family at bedside. Bed in lowest, locked position. Side rails up x2. Call light within reach.

## 2025-06-26 NOTE — TRANSFER OF CARE
"Anesthesia Transfer of Care Note    Patient: Gio Forrest    Procedure(s) Performed: Procedure(s) (LRB):  Left wrist ulna shortening osteotomy (Left)  Left wrist arthroscopy for debridement (Left)    Patient location: PACU    Anesthesia Type: general    Transport from OR: Transported from OR on room air with adequate spontaneous ventilation    Post pain: adequate analgesia    Post assessment: no apparent anesthetic complications and tolerated procedure well    Post vital signs: stable    Level of consciousness: awake    Nausea/Vomiting: no nausea/vomiting    Complications: none    Transfer of care protocol was followed      Last vitals: Visit Vitals  BP (!) 114/56   Pulse 75   Temp 36.2 °C (97.1 °F)   Resp 16   Ht 6' 2" (1.88 m)   Wt 103 kg (227 lb)   SpO2 100%   BMI 29.15 kg/m²     "

## 2025-06-26 NOTE — ANESTHESIA PROCEDURE NOTES
LMA    Date/Time: 6/26/2025 9:54 AM    Performed by: Remedios Bailey CRNA  Authorized by: Remedios Bailey CRNA    Intubation:     Induction:  Intravenous    Intubated:  Postinduction    Mask Ventilation:  Easy mask    Attempts:  1    Attempted By:  CRNA    Method of Intubation:  Other (see comments)    Difficult Airway Encountered?: No      Complications:  None    Airway Device:  Supraglottic airway/LMA    Airway Device Size:  4.0    Style/Cuff Inflation:  Cuffed    Inflation Amount (mL):  15    Secured at:  The lips    Placement Verified By:  Capnometry    Complicating Factors:  Brannon

## 2025-06-26 NOTE — OP NOTE
Gio Forrest  1965    DATE OF SURGERY: 6/26/2025     PRE-OPERATIVE DIAGNOSIS:  Left wrist ulnar impaction syndrome, TFCC tear    POST-OPERATIVE DIAGNOSIS:  Left wrist ulnar impaction syndrome, TFCC tear     ANESTHESIA TYPE:  General with a single-shot supraclavicular block    BLOOD LOSS:  15-20 cc    TOURNIQUET TIME:  Approximately 1 hour and 10 minutes    SURGEON: Dr Payne    ASSISTANT:  Bennett Fishman was present throughout the case.  His assistance was required for maintenance of alignment of the osteotomy during open reduction and internal fixation.  He also assisted with arthroscopy of the wrist during passage of the microfracture awls.  He also assisted with wound closure and splinting.    PROCEDURE:   1. Left wrist arthroscopy with TFCC debridement   2. Left wrist lunate microfracture   3. Left wrist ulnar shortening osteotomy      IMPLANTS:  Acumed ulnar shortening osteotomy plate x1     SPECIMENS:  None    INDICATION:     Mr. Forerst has a history of severe ulnar impaction syndrome of his left wrist.  He was noted have chondral injury on the lunate as well as tear of the TFCC.  He was ulnar positive in variance.  Due to this combination of pathology and persistent pain I discussed the possibility of wrist arthroscopy with debridement and possible ulnar shortening osteotomy.  Informed consent was then obtained    PROCEDURE IN DETAIL:     Mr. Forrest was transported to the operating room and was placed supine on the operating room table. All appropriate points were padded. The left arm and hand was prepped and draped in the normal sterile fashion. Time out was called. The correct patient, correct operative site, correct procedure, antibiotic administration which consisted of 2 g of Ancef, and allergies to medications which are to Patient has no known allergies.  were reviewed. Time in was then called.     Attention was turned to the left wrist.  The hand was placed in the wrist arthroscopy tower.   10 lb of traction were performed.  The joint was palpated.  An 18 gauge needle was advanced into the radial carpal joint.  The joint was insufflated with 10 cc of normal saline.  1 cm incision was made at the site of the 3/4 portal.  Blunt dissection with hemostat was performed into the joint.  The arthroscope was then advanced into the radiocarpal joint.  Diagnostic arthroscopy was performed.  Scapholunate ligament as well as short and long radiolunate ligaments were intact.  The scaphoid fossa was without pathology.  Scaphoid itself was noted to be healthy.  Visualization onto the ulnar side of the wrist revealed that there was a large central tear of the TFCC.  There was also a large cartilage lesion on the lunate.  The cartilage lesion on the lunate measured approximately 1 by 1 cm in size.  There was noted to be a significant amount of synovitis on the ulnar side of the wrist as well.  At this time a 6 R portal was established.  Arthroscopic shaver was introduced.  Debridement of the synovitis was performed.  The TFCC was visualized it was noted to be a central perforation which was not amenable to repair and therefore debridement of the TFCC was performed.  The cartilage lesion had several loose flap edges.  These were debrided with a combination of arthroscopic shaver and arthroscopic biter.  These were taken back to healthy stable margins.  At this time I did elect to proceed with microfracture of the cartilage lesion to the lunate.  A 45 degree microfracture awl was brought into the joint.  Six microfracture holes were placed into the cartilage lesion.  These were noted to be full depth with good bleeding surface.  At this time the shaver was reintroduced.  The joint was cleaned of any floating debris.  The arthroscope was then removed.  The arthroscopic portals were then closed superficially with 4-0 nylon suture.    Attention was then turned to the ulna.  An 8-10 cm incision was made over the ulnar border  of the forearm.  The incision was carried through the skin.  Subcutaneous tissues were dissected with tenotomy scissors.  The fascia overlying the ulnar border was identified.  Incision was made through the fascia.  The ECU and FCU muscle bellies and tendons were elevated.  A key elevator was used to elevate soft tissue off of the ulna.  Hohmann retractors were placed.  The ulnar shortening osteotomy plate from [facility] was then positioned over the ulna.  A distal nonlocking screw was placed.  A adjustment PEG was then placed into the proximal oblong hole.  With a good fixation proximally and distally noted the cut guide was positioned.  The cut guide was placed on the 1 mm cut.  Under direct visualization and with irrigation a sagittal saw was used to make the distal cut.  The cut guide was then adjusted proximally 5 mm.  A 2nd cut through the ulna was performed with a sagittal saw.  5 mm of bone were removed.  There was good alignment of the osteotomy site.  An additional distal nonlocking screw was placed and the locking tower was placed.  A reduction clamp was then used to reduce the osteotomy.  The osteotomy was noted to be anatomically aligned.  A screw was then placed in the proximal plate.  A lag screw was placed across the osteotomy site.  Two additional proximal screws were placed and 1 additional distal screw were placed.  This allowed for a total of 6 cortices proximal to and distal to the osteotomy site with a lag screw placed.  This provided very firm compression across the osteotomy site with firm fixation.  Final fluoroscopic images were obtained.  The plate position and screw lengths were all appropriate.    At this time the wounds were copiously irrigated.  The tourniquet was let down and hemostasis was obtained.  The ulnar border fascia was repaired with 2-0 Vicryl suture in a running fashion.  The skin was then closed with 3-0 Vicryl subcutaneous sutures and 3-0 nylon superficial sutures.  The  wounds were dressed with Xeroform, gauze padding, cast padding and a sugar-tong splint was placed    The patient was awakened from anesthesia and was transported to the recovery room in stable condition. All lap, needle, sponge, and equipment counts were correct at the end of the case.    POST-OPERATIVE PLAN:     Patient will keep the splint in place full-time for 2 weeks which time he will follow up with me.  Will begin gentle range of motion at the 2 week cheryl and go to a soft dressing only

## 2025-06-27 ENCOUNTER — TELEPHONE (OUTPATIENT)
Dept: ORTHOPEDICS | Facility: CLINIC | Age: 60
End: 2025-06-27
Payer: MEDICARE

## 2025-06-27 DIAGNOSIS — M25.832 ULNAR ABUTMENT SYNDROME OF LEFT WRIST: ICD-10-CM

## 2025-06-27 DIAGNOSIS — M24.132 DEGENERATIVE TEAR OF TRIANGULAR FIBROCARTILAGE COMPLEX (TFCC) OF LEFT WRIST: ICD-10-CM

## 2025-06-27 RX ORDER — OXYCODONE HYDROCHLORIDE 10 MG/1
10 TABLET ORAL EVERY 4 HOURS PRN
Qty: 16 TABLET | Refills: 0 | Status: SHIPPED | OUTPATIENT
Start: 2025-06-27

## 2025-07-02 DIAGNOSIS — M24.132 DEGENERATIVE TEAR OF TRIANGULAR FIBROCARTILAGE COMPLEX (TFCC) OF LEFT WRIST: ICD-10-CM

## 2025-07-02 DIAGNOSIS — M25.832 ULNAR ABUTMENT SYNDROME OF LEFT WRIST: ICD-10-CM

## 2025-07-02 RX ORDER — OXYCODONE HYDROCHLORIDE 10 MG/1
10 TABLET ORAL EVERY 4 HOURS PRN
Qty: 16 TABLET | Refills: 0 | Status: SHIPPED | OUTPATIENT
Start: 2025-07-02

## 2025-07-07 DIAGNOSIS — M25.832 ULNAR ABUTMENT SYNDROME OF LEFT WRIST: ICD-10-CM

## 2025-07-07 DIAGNOSIS — M24.132 DEGENERATIVE TEAR OF TRIANGULAR FIBROCARTILAGE COMPLEX (TFCC) OF LEFT WRIST: ICD-10-CM

## 2025-07-07 RX ORDER — OXYCODONE HYDROCHLORIDE 10 MG/1
10 TABLET ORAL EVERY 4 HOURS PRN
Qty: 16 TABLET | Refills: 0 | Status: SHIPPED | OUTPATIENT
Start: 2025-07-07

## 2025-07-09 ENCOUNTER — OFFICE VISIT (OUTPATIENT)
Dept: ORTHOPEDICS | Facility: CLINIC | Age: 60
End: 2025-07-09
Payer: MEDICARE

## 2025-07-09 ENCOUNTER — HOSPITAL ENCOUNTER (OUTPATIENT)
Dept: RADIOLOGY | Facility: HOSPITAL | Age: 60
Discharge: HOME | End: 2025-07-09
Attending: ORTHOPAEDIC SURGERY
Payer: MEDICARE

## 2025-07-09 VITALS — HEIGHT: 74 IN | WEIGHT: 227.06 LBS | BODY MASS INDEX: 29.14 KG/M2

## 2025-07-09 DIAGNOSIS — M24.132 DEGENERATIVE TEAR OF TRIANGULAR FIBROCARTILAGE COMPLEX (TFCC) OF LEFT WRIST: ICD-10-CM

## 2025-07-09 DIAGNOSIS — M24.132 DEGENERATIVE TEAR OF TRIANGULAR FIBROCARTILAGE COMPLEX (TFCC) OF LEFT WRIST: Primary | ICD-10-CM

## 2025-07-09 DIAGNOSIS — M25.832 ULNAR ABUTMENT SYNDROME OF LEFT WRIST: ICD-10-CM

## 2025-07-09 PROCEDURE — 73110 X-RAY EXAM OF WRIST: CPT | Mod: 26,LT,, | Performed by: RADIOLOGY

## 2025-07-09 PROCEDURE — 3044F HG A1C LEVEL LT 7.0%: CPT | Mod: CPTII,S$GLB,, | Performed by: ORTHOPAEDIC SURGERY

## 2025-07-09 PROCEDURE — 3061F NEG MICROALBUMINURIA REV: CPT | Mod: CPTII,S$GLB,, | Performed by: ORTHOPAEDIC SURGERY

## 2025-07-09 PROCEDURE — 3066F NEPHROPATHY DOC TX: CPT | Mod: CPTII,S$GLB,, | Performed by: ORTHOPAEDIC SURGERY

## 2025-07-09 PROCEDURE — 73110 X-RAY EXAM OF WRIST: CPT | Mod: TC,PO,LT

## 2025-07-09 PROCEDURE — 99999 PR PBB SHADOW E&M-EST. PATIENT-LVL III: CPT | Mod: PBBFAC,,, | Performed by: ORTHOPAEDIC SURGERY

## 2025-07-09 PROCEDURE — 99024 POSTOP FOLLOW-UP VISIT: CPT | Mod: S$GLB,,, | Performed by: ORTHOPAEDIC SURGERY

## 2025-07-09 PROCEDURE — 1159F MED LIST DOCD IN RCRD: CPT | Mod: CPTII,S$GLB,, | Performed by: ORTHOPAEDIC SURGERY

## 2025-07-09 NOTE — PROGRESS NOTES
Mr Forrest chief returns to clinic today.  Has a history of left wrist ulnar impaction syndrome.  Underwent wrist arthroscopy with ulnar shortening.  He is 2 weeks postop     Physical exam: Examination of the left upper extremity reveals that the wounds are all healing very well.  There are sutures in place.  There is only mild edema.  There was no erythema or drainage.  He is neurovascularly intact distally     Radiology: X-rays of the left wrist were taken in clinic today.  He is noted have evidence of the ulnar shortening osteotomy.  The osteotomy it is very well aligned in the plate and screws are well fixed     Assessment:  Status post left wrist arthroscopy with ulnar shortening osteotomy     Plan:     1. Sutures were removed today and Steri-Strips are placed     2.  I placed him into a long Velcro splint     3.  Will continue limited to nonweightbearing to the left upper extremity     4.  He can begin washing directly over the wounds     5. He will follow up in 3 weeks with repeat x-rays of the left wrist

## 2025-07-14 DIAGNOSIS — M24.132 DEGENERATIVE TEAR OF TRIANGULAR FIBROCARTILAGE COMPLEX (TFCC) OF LEFT WRIST: ICD-10-CM

## 2025-07-14 DIAGNOSIS — M25.832 ULNAR ABUTMENT SYNDROME OF LEFT WRIST: ICD-10-CM

## 2025-07-14 RX ORDER — OXYCODONE HYDROCHLORIDE 10 MG/1
10 TABLET ORAL EVERY 4 HOURS PRN
Qty: 16 TABLET | Refills: 0 | Status: SHIPPED | OUTPATIENT
Start: 2025-07-14

## 2025-07-21 DIAGNOSIS — M25.832 ULNAR ABUTMENT SYNDROME OF LEFT WRIST: ICD-10-CM

## 2025-07-21 DIAGNOSIS — M24.132 DEGENERATIVE TEAR OF TRIANGULAR FIBROCARTILAGE COMPLEX (TFCC) OF LEFT WRIST: ICD-10-CM

## 2025-07-21 RX ORDER — OXYCODONE HYDROCHLORIDE 10 MG/1
10 TABLET ORAL EVERY 4 HOURS PRN
Qty: 16 TABLET | Refills: 0 | Status: SHIPPED | OUTPATIENT
Start: 2025-07-21

## 2025-07-25 DIAGNOSIS — R11.0 NAUSEA: ICD-10-CM

## 2025-07-25 RX ORDER — ONDANSETRON 4 MG/1
4 TABLET, ORALLY DISINTEGRATING ORAL EVERY 6 HOURS PRN
Qty: 120 TABLET | Refills: 0 | Status: SHIPPED | OUTPATIENT
Start: 2025-07-25 | End: 2025-08-24

## 2025-07-29 DIAGNOSIS — M25.532 LEFT WRIST PAIN: Primary | ICD-10-CM

## 2025-08-04 ENCOUNTER — HOSPITAL ENCOUNTER (OUTPATIENT)
Dept: RADIOLOGY | Facility: HOSPITAL | Age: 60
Discharge: HOME | End: 2025-08-04
Attending: ORTHOPAEDIC SURGERY
Payer: MEDICARE

## 2025-08-04 ENCOUNTER — OFFICE VISIT (OUTPATIENT)
Dept: ORTHOPEDICS | Facility: CLINIC | Age: 60
End: 2025-08-04
Payer: MEDICARE

## 2025-08-04 VITALS — BODY MASS INDEX: 29.14 KG/M2 | WEIGHT: 227.06 LBS | HEIGHT: 74 IN

## 2025-08-04 DIAGNOSIS — M24.132 DEGENERATIVE TEAR OF TRIANGULAR FIBROCARTILAGE COMPLEX (TFCC) OF LEFT WRIST: ICD-10-CM

## 2025-08-04 DIAGNOSIS — E11.42 TYPE 2 DIABETES MELLITUS WITH DIABETIC POLYNEUROPATHY, WITH LONG-TERM CURRENT USE OF INSULIN: ICD-10-CM

## 2025-08-04 DIAGNOSIS — M25.532 LEFT WRIST PAIN: ICD-10-CM

## 2025-08-04 DIAGNOSIS — M25.832 ULNAR ABUTMENT SYNDROME OF LEFT WRIST: Primary | ICD-10-CM

## 2025-08-04 DIAGNOSIS — Z79.4 TYPE 2 DIABETES MELLITUS WITH DIABETIC POLYNEUROPATHY, WITH LONG-TERM CURRENT USE OF INSULIN: ICD-10-CM

## 2025-08-04 PROCEDURE — 3044F HG A1C LEVEL LT 7.0%: CPT | Mod: CPTII,S$GLB,, | Performed by: ORTHOPAEDIC SURGERY

## 2025-08-04 PROCEDURE — 99999 PR PBB SHADOW E&M-EST. PATIENT-LVL III: CPT | Mod: PBBFAC,,, | Performed by: ORTHOPAEDIC SURGERY

## 2025-08-04 PROCEDURE — 99024 POSTOP FOLLOW-UP VISIT: CPT | Mod: S$GLB,,, | Performed by: ORTHOPAEDIC SURGERY

## 2025-08-04 PROCEDURE — 3061F NEG MICROALBUMINURIA REV: CPT | Mod: CPTII,S$GLB,, | Performed by: ORTHOPAEDIC SURGERY

## 2025-08-04 PROCEDURE — 1159F MED LIST DOCD IN RCRD: CPT | Mod: CPTII,S$GLB,, | Performed by: ORTHOPAEDIC SURGERY

## 2025-08-04 PROCEDURE — 3066F NEPHROPATHY DOC TX: CPT | Mod: CPTII,S$GLB,, | Performed by: ORTHOPAEDIC SURGERY

## 2025-08-04 PROCEDURE — 73110 X-RAY EXAM OF WRIST: CPT | Mod: TC,PO,LT

## 2025-08-04 PROCEDURE — 73110 X-RAY EXAM OF WRIST: CPT | Mod: 26,LT,, | Performed by: STUDENT IN AN ORGANIZED HEALTH CARE EDUCATION/TRAINING PROGRAM

## 2025-08-04 NOTE — PROGRESS NOTES
Mr Forrest returns to clinic today.  Has a history of ulnar impaction syndrome.  Underwent an ulnar shortening osteotomy and wrist arthroscopy approximately 5-6 weeks ago.  He is slowly improving.  He is still having some stiffness and soreness in the wrist     Physical exam: Examination of the left upper extremity reveals that the wound over the forearm is well healed.  There was no significant edema.  Palpation produces only very minimal tenderness over the forearm.  He still has mild tenderness over the wrist itself.  Wrist range of motion is extension of 60° and flexion of 50°.  He is able to pronate and supinate.      Radiology: X-rays of the left wrist were taken in clinic today.  He is noted have evidence of the ulnar shortening osteotomy.  The osteotomy site appears to be healing well.  There has plate and screw fixation in place.  There was ulnar negative variance at this point     Assessment: Status post left wrist ulnar shortening osteotomy with wrist arthroscopy     Plan:     1. We will keep him very limited in his weight-bearing.      2.  He can begin to wean out of the Velcro brace.      3. He will continue to work on his range of motion has a home program     4. He will follow up in 3 weeks with repeat x-ray of the left wrist at which point I will consider any need for therapy and consider discontinuing the brace completely

## 2025-08-04 NOTE — TELEPHONE ENCOUNTER
Advised pt you were out until Wednesday, wants to wait for your response. Pt stopped at  and said at his last visit he was increased to 5mg Mounjaro but this has made him very sick and he cannot tolerate it. He takes it on Thursdays and will skip this Thursdays dose. Please advise.

## 2025-08-13 DIAGNOSIS — M47.896 OTHER SPONDYLOSIS, LUMBAR REGION: ICD-10-CM

## 2025-08-13 RX ORDER — GABAPENTIN 600 MG/1
600 TABLET ORAL 3 TIMES DAILY
Qty: 90 TABLET | Refills: 2 | Status: SHIPPED | OUTPATIENT
Start: 2025-08-13

## 2025-08-14 ENCOUNTER — TELEPHONE (OUTPATIENT)
Dept: ENDOCRINOLOGY | Facility: CLINIC | Age: 60
End: 2025-08-14
Payer: MEDICARE

## 2025-08-14 RX ORDER — GLIMEPIRIDE 1 MG/1
1 TABLET ORAL
Qty: 90 TABLET | Refills: 3 | Status: SHIPPED | OUTPATIENT
Start: 2025-08-14 | End: 2026-08-14

## 2025-08-22 DIAGNOSIS — M24.132 DEGENERATIVE TEAR OF TRIANGULAR FIBROCARTILAGE COMPLEX (TFCC) OF LEFT WRIST: Primary | ICD-10-CM

## 2025-08-25 ENCOUNTER — HOSPITAL ENCOUNTER (OUTPATIENT)
Dept: RADIOLOGY | Facility: HOSPITAL | Age: 60
Discharge: HOME | End: 2025-08-25
Attending: ORTHOPAEDIC SURGERY
Payer: MEDICARE

## 2025-08-25 ENCOUNTER — OFFICE VISIT (OUTPATIENT)
Dept: ORTHOPEDICS | Facility: CLINIC | Age: 60
End: 2025-08-25
Payer: MEDICARE

## 2025-08-25 DIAGNOSIS — M25.832 ULNAR ABUTMENT SYNDROME OF LEFT WRIST: Primary | ICD-10-CM

## 2025-08-25 DIAGNOSIS — M24.132 DEGENERATIVE TEAR OF TRIANGULAR FIBROCARTILAGE COMPLEX (TFCC) OF LEFT WRIST: ICD-10-CM

## 2025-08-25 PROCEDURE — 3066F NEPHROPATHY DOC TX: CPT | Mod: CPTII,S$GLB,, | Performed by: ORTHOPAEDIC SURGERY

## 2025-08-25 PROCEDURE — 1159F MED LIST DOCD IN RCRD: CPT | Mod: CPTII,S$GLB,, | Performed by: ORTHOPAEDIC SURGERY

## 2025-08-25 PROCEDURE — 99999 PR PBB SHADOW E&M-EST. PATIENT-LVL III: CPT | Mod: PBBFAC,,, | Performed by: ORTHOPAEDIC SURGERY

## 2025-08-25 PROCEDURE — 3061F NEG MICROALBUMINURIA REV: CPT | Mod: CPTII,S$GLB,, | Performed by: ORTHOPAEDIC SURGERY

## 2025-08-25 PROCEDURE — 73110 X-RAY EXAM OF WRIST: CPT | Mod: TC,PO,LT

## 2025-08-25 PROCEDURE — 73110 X-RAY EXAM OF WRIST: CPT | Mod: 26,LT,, | Performed by: RADIOLOGY

## 2025-08-25 PROCEDURE — 3044F HG A1C LEVEL LT 7.0%: CPT | Mod: CPTII,S$GLB,, | Performed by: ORTHOPAEDIC SURGERY

## 2025-08-25 PROCEDURE — 99024 POSTOP FOLLOW-UP VISIT: CPT | Mod: S$GLB,,, | Performed by: ORTHOPAEDIC SURGERY

## 2025-09-05 DIAGNOSIS — Z98.890 HX OF BURSECTOMY: ICD-10-CM

## 2025-09-05 RX ORDER — TIRZEPATIDE 2.5 MG/.5ML
2.5 INJECTION, SOLUTION SUBCUTANEOUS
Qty: 2 ML | Refills: 0 | Status: CANCELLED | OUTPATIENT
Start: 2025-09-05

## 2025-09-05 RX ORDER — METHOCARBAMOL 750 MG/1
750 TABLET, FILM COATED ORAL 4 TIMES DAILY PRN
Qty: 60 TABLET | Refills: 2 | Status: SHIPPED | OUTPATIENT
Start: 2025-09-05

## (undated) DEVICE — TOURNIQUET SB QC DP 18X4IN

## (undated) DEVICE — APPLICATOR CHLORAPREP ORN 26ML

## (undated) DEVICE — BANDAGE ESMARK LATEX FREE 4INX

## (undated) DEVICE — WARMER SCOPE SEE SHARP

## (undated) DEVICE — PAD BOVIE ADULT

## (undated) DEVICE — BLADE SURG CARBON STEEL SZ11

## (undated) DEVICE — Device

## (undated) DEVICE — TROCAR ADVANCED FIXATION  CFF03

## (undated) DEVICE — DRESSING XEROFORM FOIL PK 1X8

## (undated) DEVICE — DRAPE HAND STERILE

## (undated) DEVICE — NDL SAFETY 25G X 1.5 ECLIPSE

## (undated) DEVICE — DRAPE STERI-DRAPE 1000 17X11IN

## (undated) DEVICE — CORD BIPOLAR 12 FOOT

## (undated) DEVICE — CANNULA RADIOPAQUE 20G CURVED

## (undated) DEVICE — STRAP OR TABLE 5IN X 72IN

## (undated) DEVICE — PAD CAST SPECIALIST STRL 3

## (undated) DEVICE — BOWL STERILE LARGE 32OZ

## (undated) DEVICE — GLOVE SURG ULTRA TOUCH 7.5

## (undated) DEVICE — STERISTRIP 1/2 R1547

## (undated) DEVICE — SOLUTION NACL 0.9% 3000ML

## (undated) DEVICE — DRAPE PLASTIC U 60X72

## (undated) DEVICE — RETRIEVER SUTURE HEWSON DISP

## (undated) DEVICE — DRESSING TRANS 2X2 TEGADERM

## (undated) DEVICE — SUT 2-0 VICRYL / SH (J417)

## (undated) DEVICE — SPLINT PLASTER FAST SET 5X30IN

## (undated) DEVICE — FORCEP STRAIGHT DISP

## (undated) DEVICE — SYRINGE 30ML 302832

## (undated) DEVICE — SEE L#120831

## (undated) DEVICE — GOWN SURGICAL BASICS XL

## (undated) DEVICE — GOWN X-LG STERILE BACK

## (undated) DEVICE — TUBING SUC UNIV W/CONN 12FT

## (undated) DEVICE — DRESSING TRANS 4X4 TEGADERM

## (undated) DEVICE — BANDAGE MATRIX HK LOOP 4IN 5YD

## (undated) DEVICE — SUT ETHILON 4-0 PS2 18 BLK

## (undated) DEVICE — DRILL QUICK RELEASE 2.8MM 5IN

## (undated) DEVICE — GLOVE SENSICARE PI ALOE 8

## (undated) DEVICE — KIT SAHARA DRAPE DRAW/LIFT

## (undated) DEVICE — SUT 3-0 VICRYL / SH (J416)

## (undated) DEVICE — LIGASURE MARYLAND LF1937 REPLACES LF1737

## (undated) DEVICE — SOL IRR 0.9% NACL 500ML PB

## (undated) DEVICE — SYS LABEL CORRECT MED

## (undated) DEVICE — DRESSING XEROFORM NONADH 1X8IN

## (undated) DEVICE — ALCOHOL 70% ISOP RUBBING 4OZ

## (undated) DEVICE — PLATE TACK TEMP 3 IN
Type: IMPLANTABLE DEVICE | Site: WRIST | Status: NON-FUNCTIONAL
Removed: 2025-06-26

## (undated) DEVICE — DRAPE U SPLIT SHEET 54X76IN

## (undated) DEVICE — BIT DRILL 3.5MM X 5

## (undated) DEVICE — PAD GROUNDING DISPER ELECTRODE

## (undated) DEVICE — HANDLE SURG LIGHT NONRIGID

## (undated) DEVICE — ADHESIVE MASTISOL VIAL 0523-48

## (undated) DEVICE — GAUZE SPONGE 4X4 12PLY

## (undated) DEVICE — STRAP TABLE 5X72 M5173

## (undated) DEVICE — GLOVE PROTEXIS LTX MICRO  7.5

## (undated) DEVICE — BIT DRILL QUICK RELEASE 2.8MM

## (undated) DEVICE — SUCTION/IRRIGATOR W/TIP

## (undated) DEVICE — GLOVE PROTEXIS LTX MICRO 8

## (undated) DEVICE — SUT VICRYL 4-0 RB1 27IN UD

## (undated) DEVICE — SYR 10CC LUER LOCK

## (undated) DEVICE — PREP CHLORA 26ML

## (undated) DEVICE — GLOVE BIOGELPI GOLD SIZE 7.5

## (undated) DEVICE — DRAPE HALF SURGICAL 40X58IN

## (undated) DEVICE — SEE MEDLINE ITEM 157128

## (undated) DEVICE — TUBIGRIP 10M SZ E

## (undated) DEVICE — BAND RUBBER STERILE 1/4X3.5IN

## (undated) DEVICE — SET SPRAY APPLICATOR FIBRIN SEALANT L40CM WITH SNAP LOCK

## (undated) DEVICE — SOLUTION IRRI NS BOTTLE 1000ML R5200-01

## (undated) DEVICE — SUT PROLENE 4-0 MONO 18IN

## (undated) DEVICE — DRESSING GAUZE XEROFORM 5X9

## (undated) DEVICE — SEE MEDLINE ITEM 157131

## (undated) DEVICE — TRAP DIGIT

## (undated) DEVICE — PAD CAST SPECIALIST STRL 4

## (undated) DEVICE — TOWEL OR BLUE      MDT2168284

## (undated) DEVICE — SEE MEDLINE ITEM 152487

## (undated) DEVICE — SUT ETHIBOND 3-0 RB1 30IN

## (undated) DEVICE — NEEDLE SPINAL 18GA 3 1/2 333350

## (undated) DEVICE — BLADE SHAVER MICRO HUB 2.9MM

## (undated) DEVICE — SCISSORS 5MM APPLIED MEDICAL   CB030

## (undated) DEVICE — APPLICATOR CHLORAPREP CLR 10.5

## (undated) DEVICE — SUT ETHILON 3-0 PS2 18 BLK

## (undated) DEVICE — SEE MEDLINE ITEM 157173

## (undated) DEVICE — TRAY GENERAL LAPAROSCOPY

## (undated) DEVICE — SHEET DRAPE MEDIUM

## (undated) DEVICE — DRAPE C ARM 42 X 120 10/BX

## (undated) DEVICE — SUT VICRYL 3-0 27 SH

## (undated) DEVICE — SEE MEDLINE ITEM 152514

## (undated) DEVICE — SEE MEDLINE ITEM 152622

## (undated) DEVICE — BANDAID 3/4

## (undated) DEVICE — NDL 27G X 1 1/4

## (undated) DEVICE — COVER MAYO STAND 89601

## (undated) DEVICE — SET INSUFFLATION TUBING HIGH FLOW SMOKE EVACUATION PNEUMOCLE

## (undated) DEVICE — ELECTRODE REM PLYHSV RETURN 9

## (undated) DEVICE — NDL HYPO STD REG BVL 18GX1.5IN

## (undated) DEVICE — DRAPE THREE-QTR REINF 53X77IN

## (undated) DEVICE — PENCIL ROCKER SWITCH 10FT CORD

## (undated) DEVICE — TUBING & CANNULA JOINT SMALL

## (undated) DEVICE — BLADE SCALPEL #11 371111

## (undated) DEVICE — MICRO SAGITTAL BLADE, COARSE

## (undated) DEVICE — SEE MEDLINE ITEM 146313

## (undated) DEVICE — BLADE SURG #15 CARBON STEEL

## (undated) DEVICE — SPONGE COTTON TRAY 4X4IN

## (undated) DEVICE — GUIDEWIRE ORTHO .054 X 6 IN
Type: IMPLANTABLE DEVICE | Site: WRIST | Status: NON-FUNCTIONAL
Removed: 2025-06-26

## (undated) DEVICE — CHLORAPREP 10.5 ML APPLICATOR

## (undated) DEVICE — SUTURE VICRYL #0 27 UR-6 VCP603H

## (undated) DEVICE — SUTURE MONOCRYL 4-0 27 SH MCP415H

## (undated) DEVICE — NDL HYPODERMIC BLUNT 18G 1.5IN